# Patient Record
Sex: FEMALE | Race: WHITE | NOT HISPANIC OR LATINO | Employment: OTHER | URBAN - METROPOLITAN AREA
[De-identification: names, ages, dates, MRNs, and addresses within clinical notes are randomized per-mention and may not be internally consistent; named-entity substitution may affect disease eponyms.]

---

## 2017-01-03 ENCOUNTER — OFFICE VISIT (OUTPATIENT)
Dept: NEUROLOGY | Facility: CLINIC | Age: 72
End: 2017-01-03
Payer: MEDICARE

## 2017-01-03 VITALS
DIASTOLIC BLOOD PRESSURE: 78 MMHG | WEIGHT: 191.56 LBS | BODY MASS INDEX: 33.94 KG/M2 | RESPIRATION RATE: 16 BRPM | HEART RATE: 84 BPM | HEIGHT: 63 IN | SYSTOLIC BLOOD PRESSURE: 132 MMHG

## 2017-01-03 DIAGNOSIS — R41.3 MEMORY LOSS: ICD-10-CM

## 2017-01-03 DIAGNOSIS — E11.42 DIABETIC POLYNEUROPATHY ASSOCIATED WITH TYPE 2 DIABETES MELLITUS: ICD-10-CM

## 2017-01-03 DIAGNOSIS — R29.6 FALLS FREQUENTLY: ICD-10-CM

## 2017-01-03 DIAGNOSIS — D33.3 VESTIBULAR SCHWANNOMA: Primary | ICD-10-CM

## 2017-01-03 DIAGNOSIS — R13.10 DYSPHAGIA, UNSPECIFIED TYPE: ICD-10-CM

## 2017-01-03 PROCEDURE — 3046F HEMOGLOBIN A1C LEVEL >9.0%: CPT | Mod: S$GLB,,, | Performed by: NURSE PRACTITIONER

## 2017-01-03 PROCEDURE — 3075F SYST BP GE 130 - 139MM HG: CPT | Mod: S$GLB,,, | Performed by: NURSE PRACTITIONER

## 2017-01-03 PROCEDURE — 3078F DIAST BP <80 MM HG: CPT | Mod: S$GLB,,, | Performed by: NURSE PRACTITIONER

## 2017-01-03 PROCEDURE — 1159F MED LIST DOCD IN RCRD: CPT | Mod: S$GLB,,, | Performed by: NURSE PRACTITIONER

## 2017-01-03 PROCEDURE — 1157F ADVNC CARE PLAN IN RCRD: CPT | Mod: S$GLB,,, | Performed by: NURSE PRACTITIONER

## 2017-01-03 PROCEDURE — 1126F AMNT PAIN NOTED NONE PRSNT: CPT | Mod: S$GLB,,, | Performed by: NURSE PRACTITIONER

## 2017-01-03 PROCEDURE — 1160F RVW MEDS BY RX/DR IN RCRD: CPT | Mod: S$GLB,,, | Performed by: NURSE PRACTITIONER

## 2017-01-03 PROCEDURE — 99214 OFFICE O/P EST MOD 30 MIN: CPT | Mod: S$GLB,,, | Performed by: NURSE PRACTITIONER

## 2017-01-03 PROCEDURE — 2022F DILAT RTA XM EVC RTNOPTHY: CPT | Mod: S$GLB,,, | Performed by: NURSE PRACTITIONER

## 2017-01-03 PROCEDURE — 4010F ACE/ARB THERAPY RXD/TAKEN: CPT | Mod: S$GLB,,, | Performed by: NURSE PRACTITIONER

## 2017-01-03 PROCEDURE — 99999 PR PBB SHADOW E&M-EST. PATIENT-LVL IV: CPT | Mod: PBBFAC,,, | Performed by: NURSE PRACTITIONER

## 2017-01-03 PROCEDURE — 99499 UNLISTED E&M SERVICE: CPT | Mod: S$GLB,,, | Performed by: NURSE PRACTITIONER

## 2017-01-03 RX ORDER — DONEPEZIL HYDROCHLORIDE 10 MG/1
10 TABLET, FILM COATED ORAL AS DIRECTED
Qty: 30 TABLET | Refills: 3 | Status: SHIPPED | OUTPATIENT
Start: 2017-01-03 | End: 2017-06-11 | Stop reason: SDUPTHER

## 2017-01-03 NOTE — PROGRESS NOTES
Chief Complaint: Memory loss    HPI: Narda Person is a 71 y.o. female with a history of a right vestibular schwannoma, as well as diastolic dysfunction, hypothyroidism, and insulin dependent DM. She was recently evaluated for memory loss, as well as increased falls. She completed labs and a repeat MRI Brain at her last visit.     She continues with short term memory complaints. A recent example includes forgetting whether or not she received an insulin injection 20 minutes prior.     She continues to grieve the death of her , and admits to feeling stressed, due to family conflict at times. She does not believe that she is overly depressed, and that her feelings are appropriate to the situation. She continues to deny hallucinations, delusions, paranoia, or tremors.     She continues to fall frequently, more so when not using her cane for stability.     She continues to take Gabapentin for her neuropathic complaints, but discontinued the use of Elavil, as she no longer needs it for her insomnia.       Review of Systems   Constitutional: Negative for malaise/fatigue.   HENT: Positive for tinnitus.    Eyes: Positive for blurred vision.   Cardiovascular: Negative for chest pain.   Musculoskeletal: Positive for falls. Negative for myalgias.   Neurological: Positive for dizziness. Negative for speech change, seizures, weakness and headaches.   Endo/Heme/Allergies: Does not bruise/bleed easily.   Psychiatric/Behavioral: Positive for depression and memory loss. Negative for hallucinations and suicidal ideas. The patient is not nervous/anxious and does not have insomnia.          I have reviewed all of this patient's past medical and surgical histories as well as family and social histories and active allergies and medications as documented in the electronic medical record.    MMSE Today: 28/30    Exam:  Gen Appearance, well developed/nourished in no apparent distress  CV: 2+ distal pulses with no edema or  swelling  Neuro:  MS: Awake, alert, oriented to place, person, time, situation. Sustains attention. Recent/remote memory intact, Language is full to spontaneous speech/repetition/naming/comprehension. Fund of Knowledge is full  CN: Optic discs are flat with normal vasculature, PERRL, Extraoccular movements and visual fields are full. Normal facial sensation and strength, Hearing symmetric, Tongue and Palate are midline and strong. Shoulder Shrug symmetric and strong.  Motor: Normal bulk, tone, no abnormal movements. 5/5 strength bilateral upper/lower extremities with 2+ reflexes BUE, and 1+ reflexes BLE, and bilateral plantar response  Sensory: symmetric to light touch, pain, temp, and proprioception. Reduced vibration at left ankle; Romberg negative  Cerebellar: Finger-nose,Heal-shin, Rapid alternating movements intact  Gait: ambulates with walker for fall prevention.     Imaging:  MRI Brain with and without contrast 6/16/2016  4.5 mm focus of enhancement is again identified within the deep aspect of the right internal auditory canal to suggest a stable vestibular schwannoma.  Age-appropriate generalized volume loss with findings suggestive of mild chronic microvascular ischemic change.     MRI Brain with and without contrast 11/14/2016  4.5 mm focus of enhancement in the deep aspect of the right internal artery canal suggestive for a stable vestibular schwannoma.  No new enhancing lesion is seen.  Age-appropriate generalized cerebral volume loss with mild chronic microvascular ischemic change.    Labs: KARSTEN, homocysteine, B12/folate, B1 reviewed; unremarkable  Her BMP, CBC, Lipid Panel, TSH, and LFT's per Dr. Leone requested but not received    Assessment/Plan:  Narda Person is a 71 y.o. female with a history of a right vestibular schwannoma, as well as neuropathy, diastolic dysfunction, hypothyroidism, and insulin dependent DM. She was recently evaluated for memory loss, as well as increased falls. Her MRI Brain  was unremarkable for progression of her vestibular schwannoma or for areas of infarct, and her labs were overall unrevealing; however, I have yet to receive her labs from Dr. Leone, specifically her TSH.     While I suspect that her memory complaints are mild cognitive impairment, possibly related to stress/depression, I will start a trial of Aricept at this time. She was, however,    advised that her stress could be contributing to her memory complaints.     I recommend:     1. Refer for PT TIW x 6 weeks for balance/fall reduction, given her gait instability. This will be ordered via home health with the Medical Team, as she is unable to drive.    2. Start Aricept 5 mg x 2 weeks, then increase to 10 mg afterwards.   3. Continue to monitor her mood.   4. Refer for swallowing study to further assess her dysphagia.     Follow up in 6 weeks.

## 2017-01-03 NOTE — MR AVS SNAPSHOT
[facility]  [address] [address]  Phone: [phone]  Fax: [phone]                  Narda Person   1/3/2017 1:30 PM   Office Visit    Description:  Female : 1945   Provider:  Narda Serrato NP   Department:  [facility]           Reason for Visit     Memory Loss     Fall           Diagnoses this Visit        Comments    Vestibular schwannoma    -  Primary     Falls frequently         Memory loss         Diabetic polyneuropathy associated with type 2 diabetes mellitus                To Do List           Goals (5 Years of Data)     None      Follow-Up and Disposition     Return in about 6 weeks (around 2017).       These Medications        Disp Refills Start End    donepezil (ARICEPT) 10 MG tablet 30 tablet 3 1/3/2017 1/3/2018    Take 1 tablet (10 mg total) by mouth as directed. Take 1/2 tablet each night x 2 weeks, then take 1 tablet each night afterwards - Oral    Pharmacy: [facility]/pharmacy #[id number] - [place] - [address] Ph #: [phone]         [facility] On Call     [facility] On Call Nurse Care Line -  Assistance  Registered nurses in the [facility] On Call Center provide clinical advisement, health education, appointment booking, and other advisory services.  Call for this free service at [phone].             Medications           Message regarding Medications     Verify the changes and/or additions to your medication regime listed below are the same as discussed with your clinician today.  If any of these changes or additions are incorrect, please notify your healthcare provider.        START taking these NEW medications        Refills    donepezil (ARICEPT) 10 MG tablet 3    Sig: Take 1 tablet (10 mg total) by mouth as directed. Take 1/2 tablet each night x 2 weeks, then take 1 tablet each night afterwards    Class: Normal    Route: Oral      STOP taking these medications     amitriptyline (ELAVIL) 50 MG tablet Take 50  "mg by mouth every evening.           Verify that the below list of medications is an accurate representation of the medications you are currently taking.  If none reported, the list may be blank. If incorrect, please contact your healthcare provider. Carry this list with you in case of emergency.           Current Medications     atorvastatin (LIPITOR) 20 MG tablet Take 1 tablet (20 mg total) by mouth once daily.    blood sugar diagnostic Strp Accu-chek khadra testing strips use to test sugars AC/HS    blood-glucose meter (ACCU-CHEK KHADRA) Misc Use to test sugars AC/HS daily    gabapentin (NEURONTIN) 300 MG capsule TAKE ONE CAPSULE BY MOUTH 4 TIMES A DAY    hydrochlorothiazide (HYDRODIURIL) 25 MG tablet TAKE ONE TABLET BY MOUTH EVERY DAY    insulin aspart (NOVOLOG FLEXPEN) 100 unit/mL InPn pen INJECT 22 UNITS INTO THE SKIN 3 (THREE) TIMES DAILY WITH MEALS.    insulin detemir (LEVEMIR FLEXTOUCH) 100 unit/mL (3 mL) SubQ InPn pen INJECT 25 UNITS INTO THE SKIN 2 TIMES A DAY    insulin syringe-needle U-100 0.3 mL 30 Syrg Use with Levemir vial BID    JANUVIA 50 mg Tab TAKE 1 TABLET BY MOUTH EVERY DAY    lactulose (CHRONULAC) 10 gram/15 mL solution Take by mouth daily as needed.     lancets (ACCU-CHEK SOFTCLIX LANCETS) Misc Use to test sugars AC/HS    levothyroxine (SYNTHROID) 50 MCG tablet Take 1 tablet (50 mcg total) by mouth once daily.    meclizine (ANTIVERT) 25 mg tablet TAKE ONE TABLET BY MOUTH TWICE A DAY AS NEEDED FOR DIZZINESS OR NAUSEA    melatonin 5 mg Tab Take 5 mg by mouth nightly as needed.     metoprolol succinate (TOPROL-XL) 25 MG 24 hr tablet Take 25 mg by mouth once daily.    naproxen sodium 220 mg Cap Take 2 tablets by mouth daily as needed.    omeprazole (PRILOSEC) 20 MG capsule Take 1 capsule (20 mg total) by mouth 2 (two) times daily.    pen needle, diabetic (LITE TOUCH INSULIN PEN NEEDLES) 31 gauge x 3/16" Ndle 1 application by Misc.(Non-Drug; Combo Route) route 4 (four) times daily.    pen needle, " "diabetic 32 gauge x 5/32" Ndle 1 Device by Misc.(Non-Drug; Combo Route) route as directed. Needles to be used with Novolog pens and Levemir pens daily.    ramipril (ALTACE) 10 MG capsule TAKE ONE CAPSULE BY MOUTH EVERY DAY    donepezil (ARICEPT) 10 MG tablet Take 1 tablet (10 mg total) by mouth as directed. Take 1/2 tablet each night x 2 weeks, then take 1 tablet each night afterwards           Clinical Reference Information           Vital Signs - Last Recorded  Most recent update: 1/3/2017  1:27 PM by Natty Saavedra MA    BP Pulse Resp Ht Wt LMP    132/78 (BP Location: Right arm, Patient Position: Sitting, BP Method: Manual) 84 16 5' 2.5" (1.588 m) 86.9 kg (191 lb 9.3 oz) (LMP Unknown)    BMI                34.48 kg/m2          Blood Pressure          Most Recent Value    BP  132/78      Allergies as of 1/3/2017     Percocet [Oxycodone-acetaminophen]    Percodan [Oxycodone Hcl-oxycodone-asa]    Latex, Natural Rubber    Phenytoin Sodium Extended    Sutures, Catgut    Adhesive    Adhesive Tape-silicones      Immunizations Administered on Date of Encounter - 1/3/2017     None      Orders Placed During Today's Visit      Normal Orders This Visit    Ambulatory Referral to Home Health       Instructions    When starting Donepezil (Aricept), take 1/2 at night x 2 weeks, then increase to 1 tablet each night afterwards.        "

## 2017-01-03 NOTE — PATIENT INSTRUCTIONS
When starting Donepezil (Aricept), take 1/2 at night x 2 weeks, then increase to 1 tablet each night afterwards.

## 2017-01-04 ENCOUNTER — TELEPHONE (OUTPATIENT)
Dept: REHABILITATION | Facility: HOSPITAL | Age: 72
End: 2017-01-04

## 2017-01-09 ENCOUNTER — HOSPITAL ENCOUNTER (OUTPATIENT)
Dept: RADIOLOGY | Facility: HOSPITAL | Age: 72
Discharge: HOME | End: 2017-01-09
Attending: NURSE PRACTITIONER
Payer: MEDICARE

## 2017-01-09 ENCOUNTER — CLINICAL SUPPORT (OUTPATIENT)
Dept: REHABILITATION | Facility: HOSPITAL | Age: 72
End: 2017-01-09
Attending: NURSE PRACTITIONER
Payer: MEDICARE

## 2017-01-09 DIAGNOSIS — R13.10 DYSPHAGIA, UNSPECIFIED TYPE: ICD-10-CM

## 2017-01-09 DIAGNOSIS — R13.12 DYSPHAGIA, OROPHARYNGEAL PHASE: Primary | ICD-10-CM

## 2017-01-09 PROCEDURE — G8996 SWALLOW CURRENT STATUS: HCPCS | Mod: CJ

## 2017-01-09 PROCEDURE — G8998 SWALLOW D/C STATUS: HCPCS | Mod: CJ

## 2017-01-09 PROCEDURE — G8997 SWALLOW GOAL STATUS: HCPCS | Mod: CJ

## 2017-01-09 PROCEDURE — 74230 X-RAY XM SWLNG FUNCJ C+: CPT | Mod: TC

## 2017-01-09 PROCEDURE — 92611 MOTION FLUOROSCOPY/SWALLOW: CPT

## 2017-01-09 NOTE — PROCEDURES
SPEECH LANGUAGE PATHOLOGY  Outpatient Modified Barium Swallow Study    Time In: 1015   Time Out:1110  Total Time: 55 minutes  Charges: 1X Modified Barium Swallow Study  Referring Physician: Narda Serrato  History     Narda Person is a 71 y.o. female who was seen in Radiology today for a modified barium swallow study.  she was seated upright in a chair for a lateral videofluoroscopic view.  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 MRI done on  11/14/16 revealed 4.5 mm focus of enhancement in the deep aspect of the right internal artery canal suggestive for a stable vestibular schwannoma and age-appropriate generalized cerebral volume loss with mild chronic microvascular ischemic change.   Pt reported recent history of choking with everything.  Pt does give some conflicting information with regard to this.  Initially she was adamant that she chokes every time she eats or drinks something.  After her daughter entered the room she stated that she mostly has difficulty with meat and rice, and sometimes liquids.  This study is being done today to determine if dysphagia is present and to determine the most appropriate diet.      Past Medical History   Diagnosis Date    Abnormal Pap smear     DIONNE (acute kidney injury) 6/29/2014    DIONNE (acute kidney injury) 6/29/2014    Anemia     Breast disorder     Diabetes mellitus     Diabetes mellitus, type 2     ESSENTIAL HYPERTENSION 10/1/2012    Hypothyroid 6/29/2014     Past Surgical History   Procedure Laterality Date    Hysterectomy      Masectomy       single left breast       Current Outpatient Prescriptions:     atorvastatin (LIPITOR) 20 MG tablet, Take 1 tablet (20 mg total) by mouth once  daily., Disp: 30 tablet, Rfl: 3    blood sugar diagnostic Strp, Accu-chek khadra testing strips use to test sugars AC/HS, Disp: 400 each, Rfl: 3    blood-glucose meter (ACCU-CHEK KHADRA) Misc, Use to test sugars AC/HS daily, Disp: 1 each, Rfl: 0    donepezil (ARICEPT) 10 MG tablet, Take 1 tablet (10 mg total) by mouth as directed. Take 1/2 tablet each night x 2 weeks, then take 1 tablet each night afterwards, Disp: 30 tablet, Rfl: 3    gabapentin (NEURONTIN) 300 MG capsule, TAKE ONE CAPSULE BY MOUTH 4 TIMES A DAY (Patient taking differently: Take 300 mg by mouth 3 (three) times daily. ), Disp: 120 capsule, Rfl: 2    hydrochlorothiazide (HYDRODIURIL) 25 MG tablet, TAKE ONE TABLET BY MOUTH EVERY DAY, Disp: 30 tablet, Rfl: 3    insulin aspart (NOVOLOG FLEXPEN) 100 unit/mL InPn pen, INJECT 22 UNITS INTO THE SKIN 3 (THREE) TIMES DAILY WITH MEALS., Disp: 15 Syringe, Rfl: 2    insulin detemir (LEVEMIR FLEXTOUCH) 100 unit/mL (3 mL) SubQ InPn pen, INJECT 25 UNITS INTO THE SKIN 2 TIMES A DAY, Disp: 15 mL, Rfl: 1    insulin syringe-needle U-100 0.3 mL 30 Syrg, Use with Levemir vial BID, Disp: 100 each, Rfl: 5    JANUVIA 50 mg Tab, TAKE 1 TABLET BY MOUTH EVERY DAY, Disp: 30 tablet, Rfl: 2    lactulose (CHRONULAC) 10 gram/15 mL solution, Take by mouth daily as needed. , Disp: , Rfl:     lancets (ACCU-CHEK SOFTCLIX LANCETS) Misc, Use to test sugars AC/HS, Disp: 400 each, Rfl: 3    levothyroxine (SYNTHROID) 50 MCG tablet, Take 1 tablet (50 mcg total) by mouth once daily., Disp: 30 tablet, Rfl: 3    meclizine (ANTIVERT) 25 mg tablet, TAKE ONE TABLET BY MOUTH TWICE A DAY AS NEEDED FOR DIZZINESS OR NAUSEA, Disp: 60 tablet, Rfl: 1    melatonin 5 mg Tab, Take 5 mg by mouth nightly as needed. , Disp: , Rfl:     metoprolol succinate (TOPROL-XL) 25 MG 24 hr tablet, Take 25 mg by mouth once daily., Disp: , Rfl: 9    naproxen sodium 220 mg Cap, Take 2 tablets by mouth daily as needed., Disp: , Rfl:     omeprazole (PRILOSEC) 20  "MG capsule, Take 1 capsule (20 mg total) by mouth 2 (two) times daily., Disp: 60 capsule, Rfl: 2    pen needle, diabetic (LITE TOUCH INSULIN PEN NEEDLES) 31 gauge x 3/16" Ndle, 1 application by Misc.(Non-Drug; Combo Route) route 4 (four) times daily., Disp: 100 each, Rfl: 2    pen needle, diabetic 32 gauge x 5/32" Ndle, 1 Device by Misc.(Non-Drug; Combo Route) route as directed. Needles to be used with Novolog pens and Levemir pens daily., Disp: 100 each, Rfl: 3    ramipril (ALTACE) 10 MG capsule, TAKE ONE CAPSULE BY MOUTH EVERY DAY, Disp: 30 capsule, Rfl: 2  No current facility-administered medications for this visit.       Evaluation    Oral exam-  Pt with symmetric presentattion and no over signs of weakness.  Pt with upper and lower dentures in place initially.  She later stated that she does not eat with her lower dentures in due to them being loose.  These were removed after the first solid presentation.      Thin radiopaque barium was delivered via cup and small straw sips. Oral phase was characterized by decreased lingual-labial coordination resulting in decreased bolus cohesion and oral residue in the anterior sulcus. Pharyngeal phase of swallow was characterized by mild-moderate delay of swallow initiation, mild-mod decreased tongue base retraction, moderately decreased hyolaryngeal anterior excursion, decreased hyolaryngeal contraction, and decreased laryngeal closure resulting in transient penetration during the swallow and trace-minimal residue in the vallecula and pyriform sinuses.  No aspiration was present with this consistency.  Residue was cleared with 1-3 cued swallows.      Pudding thick radiopaque barium was delivered via large self fed teaspoon. Oral phase was characterized by decreased lingual-labial coordination resulting in decreased bolus cohesion, inconsistent premature spillage of bolus, inconsistent piecemeal deglutition, and oral residue along the tongue and throughout the oral " cavity. Pharyngeal phase of swallow was characterized by mild-moderate delay of swallow initiation, mild-mod decreased tongue base retraction, moderately decreased hyolaryngeal anterior excursion, decreased hyolaryngeal contraction, and decreased laryngeal closure resulting in transient penetration during the swallow, moderate residue in the vallecula and trace in the pyriform sinuses.  Penetration was observed with all presentations.  Silent aspiration was noted X1 during the swallow in which a consecutive bite was taken without clearing residue in between presentations.  Pt later was cued to elicit extra swallows between consecutive bites and this was effective in preventing aspiration.      Pudding barium coated cristina crackers were self fed. Oral phase was characterized by increased mastication time and trace oral residue along the tongue blade.  Piecemeal deglutition was also noted specifically of the solid bolus, not simply the pudding coating.  Pharyngeal phase of swallow was characterized by mild-moderate delay of swallow initiation, mild-mod decreased tongue base retraction, moderately decreased hyolaryngeal anterior excursion, decreased hyolaryngeal contraction, and decreased laryngeal closure resulting only in trace-minimal residue in the vallecula.    Cervical Esophageal Assessment  No laryngopharyngeal reflux noted and good UES function.  Some anatomical abnormalities were noted in the cervical esophagus though no bolus retention was found.  Pt described previously being told she has osteophytes in that area.  This could be related.      Impressions/Recommendations:    Pt demonstrated mild oropharyngeal dysphagia as noted above. Pt would benefit from skilled speech services for dysphagia tx 2Xweek for 4-6 weeks to address behavioral changes, compensatory strategies, and laryngeal strengthening.  Also recommend further assessment of esophageal function     Recommend the following:    · Soft  cooked/chopped foods with regular/thin liquids  · Avoid foods with hard pieces, nuts, grains, seeds, and hard pieces of meat  · Gravy on all meats    · Small bites and sips  · Chew all food well  · 2X/double swallow  · No talking while eating  · No more than 3 bites then take a sip of drink    Initially recommend OP speech for this patient, however, upon further review I found she is currently having PT home health.  I spoke with her and informed her that she will need to receive services through her home health company. She is in agreement to speak with her  nurse and request this service.  Goals are deferred to primary treating SLP.      FLAVIA Strickland, CCC-SLP   1/13/2017

## 2017-01-09 NOTE — Clinical Note
Pt will need hh orders for speech.  Please review and let me know if you have any questions.  Thanks! CBL

## 2017-01-12 PROBLEM — R13.12 DYSPHAGIA, OROPHARYNGEAL PHASE: Status: ACTIVE | Noted: 2017-01-12

## 2017-01-23 ENCOUNTER — HOSPITAL ENCOUNTER (EMERGENCY)
Facility: HOSPITAL | Age: 72
Discharge: HOME | End: 2017-01-24
Attending: EMERGENCY MEDICINE
Payer: MEDICARE

## 2017-01-23 DIAGNOSIS — R19.7 DIARRHEA: ICD-10-CM

## 2017-01-23 DIAGNOSIS — R19.7 DIARRHEA, UNSPECIFIED TYPE: ICD-10-CM

## 2017-01-23 LAB
ALBUMIN SERPL BCP-MCNC: 3.4 G/DL
ALP SERPL-CCNC: 73 U/L
ALT SERPL W/O P-5'-P-CCNC: 24 U/L
AMYLASE SERPL-CCNC: 26 U/L
ANION GAP SERPL CALC-SCNC: 13 MMOL/L
AST SERPL-CCNC: 23 U/L
BASOPHILS # BLD AUTO: 0.02 K/UL
BASOPHILS NFR BLD: 0.2 %
BILIRUB SERPL-MCNC: 0.7 MG/DL
BUN SERPL-MCNC: 15 MG/DL
CALCIUM SERPL-MCNC: 8.9 MG/DL
CHLORIDE SERPL-SCNC: 101 MMOL/L
CO2 SERPL-SCNC: 22 MMOL/L
CREAT SERPL-MCNC: 1.1 MG/DL
DIFFERENTIAL METHOD: ABNORMAL
EOSINOPHIL # BLD AUTO: 0 K/UL
EOSINOPHIL NFR BLD: 0 %
ERYTHROCYTE [DISTWIDTH] IN BLOOD BY AUTOMATED COUNT: 12.8 %
EST. GFR  (AFRICAN AMERICAN): 58 ML/MIN/1.73 M^2
EST. GFR  (NON AFRICAN AMERICAN): 50 ML/MIN/1.73 M^2
GLUCOSE SERPL-MCNC: 265 MG/DL
HCT VFR BLD AUTO: 39.5 %
HGB BLD-MCNC: 13.9 G/DL
LYMPHOCYTES # BLD AUTO: 0.7 K/UL
LYMPHOCYTES NFR BLD: 8 %
MCH RBC QN AUTO: 30.1 PG
MCHC RBC AUTO-ENTMCNC: 35.2 %
MCV RBC AUTO: 86 FL
MONOCYTES # BLD AUTO: 0.6 K/UL
MONOCYTES NFR BLD: 6.9 %
NEUTROPHILS # BLD AUTO: 7.1 K/UL
NEUTROPHILS NFR BLD: 84.9 %
PLATELET # BLD AUTO: 209 K/UL
PMV BLD AUTO: 10 FL
POTASSIUM SERPL-SCNC: 3.5 MMOL/L
PROT SERPL-MCNC: 7.2 G/DL
RBC # BLD AUTO: 4.62 M/UL
SODIUM SERPL-SCNC: 136 MMOL/L
WBC # BLD AUTO: 8.39 K/UL

## 2017-01-23 PROCEDURE — 80053 COMPREHEN METABOLIC PANEL: CPT

## 2017-01-23 PROCEDURE — 96360 HYDRATION IV INFUSION INIT: CPT

## 2017-01-23 PROCEDURE — 85025 COMPLETE CBC W/AUTO DIFF WBC: CPT

## 2017-01-23 PROCEDURE — 63600175 PHARM REV CODE 636 W HCPCS: Performed by: EMERGENCY MEDICINE

## 2017-01-23 PROCEDURE — 99284 EMERGENCY DEPT VISIT MOD MDM: CPT | Mod: 25

## 2017-01-23 PROCEDURE — 96361 HYDRATE IV INFUSION ADD-ON: CPT

## 2017-01-23 PROCEDURE — 96372 THER/PROPH/DIAG INJ SC/IM: CPT

## 2017-01-23 PROCEDURE — 82150 ASSAY OF AMYLASE: CPT

## 2017-01-23 PROCEDURE — 81000 URINALYSIS NONAUTO W/SCOPE: CPT

## 2017-01-23 PROCEDURE — 25000003 PHARM REV CODE 250: Performed by: EMERGENCY MEDICINE

## 2017-01-23 RX ORDER — DICYCLOMINE HYDROCHLORIDE 10 MG/ML
20 INJECTION INTRAMUSCULAR
Status: COMPLETED | OUTPATIENT
Start: 2017-01-23 | End: 2017-01-23

## 2017-01-23 RX ORDER — ACETAMINOPHEN 500 MG
500 TABLET ORAL
Status: COMPLETED | OUTPATIENT
Start: 2017-01-23 | End: 2017-01-23

## 2017-01-23 RX ADMIN — DICYCLOMINE HYDROCHLORIDE 20 MG: 20 INJECTION, SOLUTION INTRAMUSCULAR at 09:01

## 2017-01-23 RX ADMIN — SODIUM CHLORIDE 250 ML: 0.9 INJECTION, SOLUTION INTRAVENOUS at 10:01

## 2017-01-23 RX ADMIN — SODIUM CHLORIDE 500 ML: 0.9 INJECTION, SOLUTION INTRAVENOUS at 09:01

## 2017-01-23 RX ADMIN — ACETAMINOPHEN 500 MG: 500 TABLET ORAL at 09:01

## 2017-01-23 NOTE — ED AVS SNAPSHOT
[facility]  [address] [place] [id number]               Narda Person   2017  9:12 PM   ED    Description:  Female : 1945   Department:  [facility]           Your Care was Coordinated By:     Provider Role From To    Tyree Owusu MD Attending Provider 17      Reason for Visit     Diarrhea           Diagnoses this Visit        Comments    Food poisoning, accidental or unintentional, initial encounter    -  Primary     Diarrhea         Diarrhea, unspecified type           ED Disposition     ED Disposition Condition Comment    Discharge             To Do List           Follow-up Information     Schedule an appointment as soon as possible for a visit with Apolonia Sullivan NP.    Specialty:  Family Medicine    Contact information:    [address]  [facility] [address]  [phone]         These Medications        Disp Refills Start End    ondansetron (ZOFRAN) 4 MG tablet 12 tablet 0 2017     Take 1 tablet (4 mg total) by mouth every 8 (eight) hours as needed for Nausea. - Oral    Pharmacy: [facility] [address] [place], [place] - [address] [facility] [address] Ph #: [phone]       loperamide (IMODIUM) 2 mg capsule 12 capsule 0 2017 2/3/2017    Take 1 capsule (2 mg total) by mouth 4 (four) times daily as needed for Diarrhea. - Oral    Pharmacy: [facility]/pharmacy [address] [place] [address] [address] Ph #: [phone]       dicyclomine (BENTYL) 20 mg tablet 20 tablet 0 2017    Take 1 tablet (20 mg total) by mouth 2 (two) times daily. - Oral    Pharmacy: [facility] [address] [place], [place] [address] Ph #: [phone]         [facility]     [facility] -  Assistance  Registered nurses in the [facility] provide clinical advisement, health education, appointment booking, and other advisory services.  Call for this free service at [phone].             Medications            Message regarding Medications     Verify the changes and/or additions to your medication regime listed below are the same as discussed with your clinician today.  If any of these changes or additions are incorrect, please notify your healthcare provider.        START taking these NEW medications        Refills    ondansetron (ZOFRAN) 4 MG tablet 0    Sig: Take 1 tablet (4 mg total) by mouth every 8 (eight) hours as needed for Nausea.    Class: Print    Route: Oral    loperamide (IMODIUM) 2 mg capsule 0    Sig: Take 1 capsule (2 mg total) by mouth 4 (four) times daily as needed for Diarrhea.    Class: Print    Route: Oral    dicyclomine (BENTYL) 20 mg tablet 0    Sig: Take 1 tablet (20 mg total) by mouth 2 (two) times daily.    Class: Print    Route: Oral      These medications were administered today        Dose Freq    sodium chloride 0.9% bolus 500 mL 500 mL ED 1 Time    Sig: Inject 500 mLs into the vein ED 1 Time.    Class: Normal    Route: Intravenous    dicyclomine injection 20 mg 20 mg ED 1 Time    Sig: Inject 2 mLs (20 mg total) into the muscle ED 1 Time.    Class: Normal    Route: Intramuscular    acetaminophen tablet 500 mg 500 mg ED 1 Time    Sig: Take 1 tablet (500 mg total) by mouth ED 1 Time.    Class: Normal    Route: Oral    sodium chloride 0.9% bolus 250 mL 250 mL ED 1 Time    Sig: Inject 250 mLs into the vein ED 1 Time.    Class: Normal    Route: Intravenous    diphenoxylate-atropine 2.5-0.025 mg per tablet 1 tablet 1 tablet ED 1 Time    Sig: Take 1 tablet by mouth ED 1 Time.    Class: Normal    Route: Oral           Verify that the below list of medications is an accurate representation of the medications you are currently taking.  If none reported, the list may be blank. If incorrect, please contact your healthcare provider. Carry this list with you in case of emergency.           Current Medications     atorvastatin (LIPITOR) 20 MG tablet Take 1 tablet (20 mg total) by mouth once daily.     "blood sugar diagnostic Str Accu-chek khadra testing strips use to test sugars AC/HS    blood-glucose meter (ACCU-CHEK KHADRA) Misc Use to test sugars AC/HS daily    dicyclomine (BENTYL) 20 mg tablet Take 1 tablet (20 mg total) by mouth 2 (two) times daily.    donepezil (ARICEPT) 10 MG tablet Take 1 tablet (10 mg total) by mouth as directed. Take 1/2 tablet each night x 2 weeks, then take 1 tablet each night afterwards    gabapentin (NEURONTIN) 300 MG capsule TAKE ONE CAPSULE BY MOUTH 4 TIMES A DAY    hydrochlorothiazide (HYDRODIURIL) 25 MG tablet TAKE ONE TABLET BY MOUTH EVERY DAY    insulin aspart (NOVOLOG FLEXPEN) 100 unit/mL InPn pen INJECT 22 UNITS INTO THE SKIN 3 (THREE) TIMES DAILY WITH MEALS.    insulin detemir (LEVEMIR FLEXTOUCH) 100 unit/mL (3 mL) SubQ InPn pen INJECT 25 UNITS INTO THE SKIN 2 TIMES A DAY    insulin syringe-needle U-100 0.3 mL 30 Syrg Use with Levemir vial BID    JANUVIA 50 mg Tab TAKE 1 TABLET BY MOUTH EVERY DAY    lactulose (CHRONULAC) 10 gram/15 mL solution Take by mouth daily as needed.     lancets (ACCU-CHEK SOFTCLIX LANCETS) Misc Use to test sugars AC/HS    levothyroxine (SYNTHROID) 50 MCG tablet Take 1 tablet (50 mcg total) by mouth once daily.    loperamide (IMODIUM) 2 mg capsule Take 1 capsule (2 mg total) by mouth 4 (four) times daily as needed for Diarrhea.    meclizine (ANTIVERT) 25 mg tablet TAKE ONE TABLET BY MOUTH TWICE A DAY AS NEEDED FOR DIZZINESS OR NAUSEA    melatonin 5 mg Tab Take 5 mg by mouth nightly as needed.     metoprolol succinate (TOPROL-XL) 25 MG 24 hr tablet Take 25 mg by mouth once daily.    naproxen sodium 220 mg Cap Take 2 tablets by mouth daily as needed.    omeprazole (PRILOSEC) 20 MG capsule Take 1 capsule (20 mg total) by mouth 2 (two) times daily.    ondansetron (ZOFRAN) 4 MG tablet Take 1 tablet (4 mg total) by mouth every 8 (eight) hours as needed for Nausea.    pen needle, diabetic (LITE TOUCH INSULIN PEN NEEDLES) 31 gauge x 3/16" Ndle 1 application by " "Misc.(Non-Drug; Combo Route) route 4 (four) times daily.    pen needle, diabetic 32 gauge x 5/32" Ndle 1 Device by Misc.(Non-Drug; Combo Route) route as directed. Needles to be used with Novolog pens and Levemir pens daily.    ramipril (ALTACE) 10 MG capsule TAKE ONE CAPSULE BY MOUTH EVERY DAY           Clinical Reference Information           Your Vitals Were     BP Pulse Temp Resp Last Period       94/65 80 98.3 °F (36.8 °C) 16 (LMP Unknown)       Allergies as of 1/24/2017        Reactions    Percocet [Oxycodone-acetaminophen] Itching    Percodan [Oxycodone Hcl-oxycodone-asa] Itching    Other reaction(s): Unknown    Latex, Natural Rubber Rash    Phenytoin Sodium Extended     Other reaction(s): Unknown    Sutures, Catgut     Infections to sutures     Adhesive Rash    Adhesive Tape-silicones Rash      Immunizations Administered on Date of Encounter - 1/24/2017     None      ED Micro, Lab, POCT     Start Ordered       Status Ordering Provider    01/23/17 2123 01/23/17 2123  Comprehensive metabolic panel  STAT      Final result     01/23/17 2123 01/23/17 2123  CBC auto differential  STAT      Final result     01/23/17 2123 01/23/17 2123  Amylase  Once      Final result     01/23/17 2123 01/23/17 2123  Urinalysis  STAT      Final result     01/23/17 2123 01/23/17 2123  Urinalysis Microscopic  Once      Final result       ED Imaging Orders     Start Ordered       Status Ordering Provider    01/23/17 2148 01/23/17 2147  X-Ray Abdomen AP 1 View (KUB)  1 time imaging      In process         Discharge Instructions         Treating Diarrhea  Diarrhea happens when you have loose, watery, or frequent bowel movements. It is a common problem with many causes. Most cases of diarrhea clear up on their own. But certain cases may need treatment. Be sure to see your healthcare provider if your symptoms do not improve within a few days.    Getting relief  Treatment of diarrhea depends on its cause. Diarrhea caused by bacterial or " parasite infection is often treated with antibiotics. Diarrhea caused by other factors, such as a stomach virus, often improves with simple home treatment. The tips below may also help relieve your symptoms.  · Drink plenty of fluids. This helps prevent too much fluid loss (dehydration). Water, clear soups, and electrolyte solutions are good choices. Avoid alcohol, coffee, tea, and milk. These can irritate your intestines and make symptoms worse.  · Suck on ice chips if drinking makes you queasy.  · Return to your normal diet slowly. You may want to eat bland foods at first, such as rice and toast. Also, you may need to avoid certain foods for a while, such as dairy products. These can make symptoms worse. Ask your healthcare provider if there are any other foods you should avoid.  · If you were prescribed antibiotics, take them as directed.  · Do not take anti-diarrhea medicines without asking your healthcare provider first.  Call your healthcare provider   Call your healthcare provider if you have any of the following:   · A fever of 100.4°F (38.0°C) or higher, or as directed by your healthcare provider  · Severe pain  · Worsening diarrhea or diarrhea for more than 2 days  · Bloody vomit or stool  · Signs of dehydration (dizziness, dry mouth and tongue, rapid pulse, dark urine)  © 3505-8265 [employer]. [address] [place] [address], [place] [address]. All rights reserved. This information is not intended as a substitute for professional medical care. Always follow your healthcare professional's instructions.          Food Poisoning (Adult)  Food poisoning is illness that is passed along in food. It usually occurs from 1 to 24 hours after eating food that has spoiled. Food poisoning can occur when you eat food or drink that contains viruses, bacteria, parasites, or toxins. This includes food that has not been cooked or refrigerated properly.  Food poisoning can cause these symptoms:  · Abdominal pain and  cramping  · Nausea  · Vomiting  · Diarrhea  · Fever and chills  · Fatigue  The symptoms usually last from 1 to 2 days.  Antibiotics are not effective for this illness.  Home care  Follow all instructions given by your healthcare provider. Rest at home for the next 24 hours, or until you feel better. Avoid caffeine, tobacco, and alcohol. These can make diarrhea, cramping, and pain worse.  If taking medicines:  · Dont take over-the-counter diarrhea or nausea medicines unless your healthcare provider tells you to.  · You may be given medicine for nausea or vomiting to help keep down fluids. Take these medicines as prescribed.  · You may use acetaminophen or NSAID medicine like ibuprofen or naproxen to reduce pain and fever. Dont use these if you have chronic liver or kidney disease, or ever had a stomach ulcer or gastrointestinal bleeding. Talk with your healthcare provider first. Don't use NSAID medicines if you are already taking one for another condition (like arthritis) or are on aspirin (such as for heart disease or after a stroke).  To prevent the spread of illness:  · Remember that washing with soap and water or using alcohol-based  is the best way to prevent the spread of infection.  · Clean the toilet after each use.  · Wash your hands before eating.  · Wash your hands or use alcohol-based  before and after preparing food. Keep in mind that people with diarrhea or vomiting should not prepare food for others.  · Wash your hands after using cutting boards, counter-tops, and knives or other utensils that have been in contact with raw foods.  · Wash and then peel fruits and vegetables.  · Keep uncooked meats away from cooked and ready-to-eat foods.  · Use a food thermometer when cooking. Cook poultry to at least 165°F (74°C). Cook ground meat (beef, veal, pork, lamb) to at least 160°F (71°C). Cook fresh beef, veal, lamb, and pork to at least 145°F (63°C).  · Dont eat raw or undercooked eggs  (poached or trish side up), poultry, meat or unpasteurized milk and juices.  · Do not eat foods requiring refrigeration. Don't eat foods that have not been refrigerated for long periods such as at buffets or picnics.  · Do not eat seafood that is undercooked or with high rates of food toxins.  Food and drinks  The main goal while treating vomiting or diarrhea is to prevent dehydration. This is done by taking small amounts of liquids often.  · Keep in mind that liquids are more important than food right now.  · Drink only small amounts of liquids at a time.  · Dont force yourself to eat, especially if you are having cramping, vomiting, or diarrhea. Dont eat large amounts at a time, even if you are hungry.  · If you eat, avoid fatty, greasy, spicy, or fried foods.  · Dont eat dairy foods or drink milk if you have diarrhea. These can make diarrhea worse.  The first 24 hours you can try:  · Soft drinks without caffeine  · Ginger ale  · Water (plain or flavored)  · Decaf tea or coffee  · Clear broth, consommé, or bouillon  · Gelatin, popsicles, or frozen fruit juice bars  If you are very dehydrated, sports drinks are not a good choice. They have too much sugar and not enough electrolytes. In this case, commercially available products called oral rehydration solutions are best.  The second 24 hours, if you are feeling better, you can add:  · Hot cereal, plain toast, bread, rolls, or crackers  · Plain noodles, rice, mashed potatoes, chicken noodle soup, or rice soup  · Unsweetened canned fruit (no pineapple)  · Bananas  As you recover:  · Limit fat intake to less than 15 grams per day. Dont eat margarine, butter, oils, mayonnaise, sauces, gravies, fried foods, peanut butter, meat, poultry, or fish.  · Limit fiber. Dont eat raw or cooked vegetables, fresh fruits except bananas, and bran cereals.  · Limit caffeine and chocolate.  · Dont use spices or seasonings except salt.  · Resume a normal diet over time, as you  feel better and your symptoms improve.  · If the symptoms come back, go back to a simple diet or clear liquids.  Follow-up care  Follow up with your healthcare provider, or as advised. If a stool sample was taken or cultures were done, call the healthcare provider for the results as instructed.  Call 911  Call 911 if you have any of these symptoms:  · Trouble breathing  · Chest pain  · Confusion  · Extreme drowsiness or trouble walking  · Loss of consciousness  · Rapid heart rate  · Stiff neck  · Seizure  When to seek medical advice  Call your health care provider right away if any of these occur:  · Abdominal pain that gets worse  · Constant lower right abdominal pain  · Continued vomiting and inability to keep liquids down  · Diarrhea more than 5 times a day  · Blood in vomit or stool  · Dark urine or no urine for 8 hours, dry mouth and tongue, tiredness, weakness, or dizziness  · New rash  · You dont get better in 2 to 3 days  · Fever of 100.4°F (38°C) or higher that doesnt get lower with medicine  © 3851-8858 [employer] [address] [place] [address]. All rights reserved. This information is not intended as a substitute for professional medical care. Always follow your healthcare professional's instructions.          Your Scheduled Appointments     Feb 14, 2017  1:00 PM CST   Established Patient Visit with Narda Serrato NP   [facility] ([facility])    [address] [facility] [address] [phone]              Smoking Cessation     If you would like to quit smoking:   You may be eligible for free services if you are a [place] resident and started smoking cigarettes before September 1, 1988.  Call the Smoking Cessation Trust (SCT) toll free at (417) 144-1105 or (380) 437-9748.   Call 6-296-QUIT-NOW if you do not meet the above criteria.             [facility] complies with applicable Federal civil rights laws and does  not discriminate on the basis of race, color, national origin, age, disability, or sex.        Language Assistance Services     ATTENTION: Language assistance services are available, free of charge. Please call [phone].      ATENCIÓN: Si shyanne cisse, tiene a france disposición servicios gratuitos de asistencia lingüística. Llame al [phone].     CHÚ Ý: N?u b?n nói Ti?ng Vi?t, có các d?ch v? h? tr? ngôn ng? mi?n phí dành cho b?n. G?i s? [phone].

## 2017-01-24 VITALS
SYSTOLIC BLOOD PRESSURE: 108 MMHG | TEMPERATURE: 98 F | HEART RATE: 72 BPM | RESPIRATION RATE: 16 BRPM | DIASTOLIC BLOOD PRESSURE: 69 MMHG

## 2017-01-24 LAB
BACTERIA #/AREA URNS HPF: ABNORMAL /HPF
BILIRUB UR QL STRIP: ABNORMAL
CLARITY UR: ABNORMAL
COLOR UR: YELLOW
GLUCOSE UR QL STRIP: ABNORMAL
GRAN CASTS #/AREA URNS LPF: 5 /LPF
HGB UR QL STRIP: NEGATIVE
HYALINE CASTS #/AREA URNS LPF: 15 /LPF
KETONES UR QL STRIP: ABNORMAL
LEUKOCYTE ESTERASE UR QL STRIP: NEGATIVE
MICROSCOPIC COMMENT: ABNORMAL
NITRITE UR QL STRIP: NEGATIVE
PH UR STRIP: 5 [PH] (ref 5–8)
PROT UR QL STRIP: ABNORMAL
RBC #/AREA URNS HPF: 0 /HPF (ref 0–4)
SP GR UR STRIP: >=1.03 (ref 1–1.03)
SQUAMOUS #/AREA URNS HPF: 2 /HPF
URN SPEC COLLECT METH UR: ABNORMAL
UROBILINOGEN UR STRIP-ACNC: NEGATIVE EU/DL
WBC #/AREA URNS HPF: 20 /HPF (ref 0–5)
YEAST URNS QL MICRO: ABNORMAL

## 2017-01-24 PROCEDURE — 25000003 PHARM REV CODE 250: Performed by: EMERGENCY MEDICINE

## 2017-01-24 RX ORDER — ONDANSETRON 4 MG/1
4 TABLET, FILM COATED ORAL EVERY 8 HOURS PRN
Qty: 12 TABLET | Refills: 0 | Status: SHIPPED | OUTPATIENT
Start: 2017-01-24 | End: 2017-04-24

## 2017-01-24 RX ORDER — LOPERAMIDE HYDROCHLORIDE 2 MG/1
2 CAPSULE ORAL 4 TIMES DAILY PRN
Qty: 12 CAPSULE | Refills: 0 | Status: SHIPPED | OUTPATIENT
Start: 2017-01-24 | End: 2017-02-03

## 2017-01-24 RX ORDER — DIPHENOXYLATE HYDROCHLORIDE AND ATROPINE SULFATE 2.5; .025 MG/1; MG/1
1 TABLET ORAL
Status: COMPLETED | OUTPATIENT
Start: 2017-01-24 | End: 2017-01-24

## 2017-01-24 RX ORDER — DICYCLOMINE HYDROCHLORIDE 20 MG/1
20 TABLET ORAL 2 TIMES DAILY
Qty: 20 TABLET | Refills: 0 | Status: SHIPPED | OUTPATIENT
Start: 2017-01-24 | End: 2017-02-23

## 2017-01-24 RX ADMIN — DIPHENOXYLATE HYDROCHLORIDE AND ATROPINE SULFATE 1 TABLET: 2.5; .025 TABLET ORAL at 12:01

## 2017-01-24 NOTE — ED PROVIDER NOTES
Encounter Date: 1/23/2017       History     Chief Complaint   Patient presents with    Diarrhea     Review of patient's allergies indicates:   Allergen Reactions    Percocet [oxycodone-acetaminophen] Itching    Percodan [oxycodone hcl-oxycodone-asa] Itching     Other reaction(s): Unknown    Latex, natural rubber Rash    Phenytoin sodium extended      Other reaction(s): Unknown    Sutures, catgut      Infections to sutures     Adhesive Rash    Adhesive tape-silicones Rash     Patient is a 71 y.o. female presenting with the following complaint: diarrhea. The history is provided by the patient.   Diarrhea    This is a new problem. The current episode started yesterday. The problem has been unchanged. The stool consistency is described as watery. Associated symptoms include bloating and a fever. Pertinent negatives include no abdominal pain, arthralgias, chills, coughing, headaches, increased  flatus, myalgias, sweats, URI or vomiting. Risk factors include suspect food intake. She has tried nothing for the symptoms.     Past Medical History   Diagnosis Date    Abnormal Pap smear     DIONNE (acute kidney injury) 6/29/2014    DIONNE (acute kidney injury) 6/29/2014    Anemia     Breast disorder     Diabetes mellitus     Diabetes mellitus, type 2     ESSENTIAL HYPERTENSION 10/1/2012    Hypothyroid 6/29/2014     Past Medical History Pertinent Negatives   Diagnosis Date Noted    Asthma 7/3/2012    Blood dyscrasia 7/3/2012    Complication of anesthesia 7/3/2012    Coronary artery disease 7/3/2012    Deep vein thrombosis 7/3/2012    Herpes simplex without mention of complication 7/3/2012    HIV infection 7/3/2012    Infertility 7/3/2012    Liver disease 7/3/2012    Mental disorder 7/3/2012    Postpartum depression 7/3/2012    Rh incompatibility 7/3/2012    Seizures 7/3/2012    Sickle cell anemia 7/3/2012    Stroke 7/3/2012    Systemic lupus erythematosus 7/3/2012    Varicosities 7/3/2012     Past  Surgical History   Procedure Laterality Date    Hysterectomy      Masectomy       single left breast     Family History   Problem Relation Age of Onset    Cancer Mother     Diabetes Mother     Hypertension Mother     Heart attack Mother 66    Diabetes Sister     Heart disease Sister     Heart disease Brother     Hypertension Daughter     Thyroid disease Daughter     Heart attack Son 38    Heart disease Brother     Depression Daughter     Hypertension Son      Social History   Substance Use Topics    Smoking status: Former Smoker     Packs/day: 1.00     Years: 10.00     Types: Cigarettes     Quit date: 1/1/1988    Smokeless tobacco: Never Used    Alcohol use No     Review of Systems   Constitutional: Positive for fever. Negative for chills.   HENT: Negative for ear discharge, ear pain and facial swelling.    Eyes: Negative for pain, discharge, redness and itching.   Respiratory: Negative for cough, shortness of breath, wheezing and stridor.    Cardiovascular: Negative for chest pain.   Gastrointestinal: Positive for bloating and diarrhea. Negative for abdominal pain, flatus and vomiting.   Genitourinary: Negative for flank pain and frequency.   Musculoskeletal: Negative for arthralgias, myalgias, neck pain and neck stiffness.   Skin: Negative for color change, pallor, rash and wound.   Neurological: Negative for tremors, syncope, speech difficulty, weakness and headaches.       Physical Exam   Initial Vitals   BP Pulse Resp Temp SpO2   01/23/17 2118 01/23/17 2118 01/23/17 2118 01/23/17 2118 --   138/55 91 16 101.2 °F (38.4 °C)      Physical Exam    Nursing note and vitals reviewed.  Constitutional: She appears well-developed and well-nourished. She is not diaphoretic. No distress.   HENT:   Head: Normocephalic and atraumatic.   Mouth/Throat: No oropharyngeal exudate.   Eyes: Conjunctivae and EOM are normal. Pupils are equal, round, and reactive to light. Right eye exhibits no discharge. Left eye  exhibits no discharge. No scleral icterus.   Neck: Normal range of motion. Neck supple. No JVD present.   Cardiovascular: Normal rate, regular rhythm and normal heart sounds. Exam reveals no friction rub.    Pulmonary/Chest: Breath sounds normal. No stridor. No respiratory distress. She has no wheezes. She has no rhonchi. She has no rales.   Abdominal: Soft. Bowel sounds are normal. She exhibits no distension. There is no tenderness. There is no rebound and no guarding.   Musculoskeletal: Normal range of motion. She exhibits no edema or tenderness.   Neurological: She is oriented to person, place, and time. She has normal strength and normal reflexes. No sensory deficit.         ED Course   Procedures  Labs Reviewed   COMPREHENSIVE METABOLIC PANEL   CBC W/ AUTO DIFFERENTIAL   AMYLASE   URINALYSIS                               ED Course     Clinical Impression:   The primary encounter diagnosis was Food poisoning, accidental or unintentional, initial encounter. Diagnoses of Diarrhea and Diarrhea, unspecified type were also pertinent to this visit.    Disposition:   Disposition: Discharged  Condition: Stable       Tyree Owusu MD  01/24/17 0025

## 2017-01-24 NOTE — DISCHARGE INSTRUCTIONS
Treating Diarrhea  Diarrhea happens when you have loose, watery, or frequent bowel movements. It is a common problem with many causes. Most cases of diarrhea clear up on their own. But certain cases may need treatment. Be sure to see your healthcare provider if your symptoms do not improve within a few days.    Getting relief  Treatment of diarrhea depends on its cause. Diarrhea caused by bacterial or parasite infection is often treated with antibiotics. Diarrhea caused by other factors, such as a stomach virus, often improves with simple home treatment. The tips below may also help relieve your symptoms.  · Drink plenty of fluids. This helps prevent too much fluid loss (dehydration). Water, clear soups, and electrolyte solutions are good choices. Avoid alcohol, coffee, tea, and milk. These can irritate your intestines and make symptoms worse.  · Suck on ice chips if drinking makes you queasy.  · Return to your normal diet slowly. You may want to eat bland foods at first, such as rice and toast. Also, you may need to avoid certain foods for a while, such as dairy products. These can make symptoms worse. Ask your healthcare provider if there are any other foods you should avoid.  · If you were prescribed antibiotics, take them as directed.  · Do not take anti-diarrhea medicines without asking your healthcare provider first.  Call your healthcare provider   Call your healthcare provider if you have any of the following:   · A fever of 100.4°F (38.0°C) or higher, or as directed by your healthcare provider  · Severe pain  · Worsening diarrhea or diarrhea for more than 2 days  · Bloody vomit or stool  · Signs of dehydration (dizziness, dry mouth and tongue, rapid pulse, dark urine)  © 3021-2624 [employer]. [address] [place] [address], [place] [address]. All rights reserved. This information is not intended as a substitute for professional medical care. Always follow your healthcare professional's  instructions.          Food Poisoning (Adult)  Food poisoning is illness that is passed along in food. It usually occurs from 1 to 24 hours after eating food that has spoiled. Food poisoning can occur when you eat food or drink that contains viruses, bacteria, parasites, or toxins. This includes food that has not been cooked or refrigerated properly.  Food poisoning can cause these symptoms:  · Abdominal pain and cramping  · Nausea  · Vomiting  · Diarrhea  · Fever and chills  · Fatigue  The symptoms usually last from 1 to 2 days.  Antibiotics are not effective for this illness.  Home care  Follow all instructions given by your healthcare provider. Rest at home for the next 24 hours, or until you feel better. Avoid caffeine, tobacco, and alcohol. These can make diarrhea, cramping, and pain worse.  If taking medicines:  · Dont take over-the-counter diarrhea or nausea medicines unless your healthcare provider tells you to.  · You may be given medicine for nausea or vomiting to help keep down fluids. Take these medicines as prescribed.  · You may use acetaminophen or NSAID medicine like ibuprofen or naproxen to reduce pain and fever. Dont use these if you have chronic liver or kidney disease, or ever had a stomach ulcer or gastrointestinal bleeding. Talk with your healthcare provider first. Don't use NSAID medicines if you are already taking one for another condition (like arthritis) or are on aspirin (such as for heart disease or after a stroke).  To prevent the spread of illness:  · Remember that washing with soap and water or using alcohol-based  is the best way to prevent the spread of infection.  · Clean the toilet after each use.  · Wash your hands before eating.  · Wash your hands or use alcohol-based  before and after preparing food. Keep in mind that people with diarrhea or vomiting should not prepare food for others.  · Wash your hands after using cutting boards, counter-tops, and knives or  other utensils that have been in contact with raw foods.  · Wash and then peel fruits and vegetables.  · Keep uncooked meats away from cooked and ready-to-eat foods.  · Use a food thermometer when cooking. Cook poultry to at least 165°F (74°C). Cook ground meat (beef, veal, pork, lamb) to at least 160°F (71°C). Cook fresh beef, veal, lamb, and pork to at least 145°F (63°C).  · Dont eat raw or undercooked eggs (poached or trish side up), poultry, meat or unpasteurized milk and juices.  · Do not eat foods requiring refrigeration. Don't eat foods that have not been refrigerated for long periods such as at buffets or picnics.  · Do not eat seafood that is undercooked or with high rates of food toxins.  Food and drinks  The main goal while treating vomiting or diarrhea is to prevent dehydration. This is done by taking small amounts of liquids often.  · Keep in mind that liquids are more important than food right now.  · Drink only small amounts of liquids at a time.  · Dont force yourself to eat, especially if you are having cramping, vomiting, or diarrhea. Dont eat large amounts at a time, even if you are hungry.  · If you eat, avoid fatty, greasy, spicy, or fried foods.  · Dont eat dairy foods or drink milk if you have diarrhea. These can make diarrhea worse.  The first 24 hours you can try:  · Soft drinks without caffeine  · Ginger ale  · Water (plain or flavored)  · Decaf tea or coffee  · Clear broth, consommé, or bouillon  · Gelatin, popsicles, or frozen fruit juice bars  If you are very dehydrated, sports drinks are not a good choice. They have too much sugar and not enough electrolytes. In this case, commercially available products called oral rehydration solutions are best.  The second 24 hours, if you are feeling better, you can add:  · Hot cereal, plain toast, bread, rolls, or crackers  · Plain noodles, rice, mashed potatoes, chicken noodle soup, or rice soup  · Unsweetened canned fruit (no  pineapple)  · Bananas  As you recover:  · Limit fat intake to less than 15 grams per day. Dont eat margarine, butter, oils, mayonnaise, sauces, gravies, fried foods, peanut butter, meat, poultry, or fish.  · Limit fiber. Dont eat raw or cooked vegetables, fresh fruits except bananas, and bran cereals.  · Limit caffeine and chocolate.  · Dont use spices or seasonings except salt.  · Resume a normal diet over time, as you feel better and your symptoms improve.  · If the symptoms come back, go back to a simple diet or clear liquids.  Follow-up care  Follow up with your healthcare provider, or as advised. If a stool sample was taken or cultures were done, call the healthcare provider for the results as instructed.  Call 911  Call 911 if you have any of these symptoms:  · Trouble breathing  · Chest pain  · Confusion  · Extreme drowsiness or trouble walking  · Loss of consciousness  · Rapid heart rate  · Stiff neck  · Seizure  When to seek medical advice  Call your health care provider right away if any of these occur:  · Abdominal pain that gets worse  · Constant lower right abdominal pain  · Continued vomiting and inability to keep liquids down  · Diarrhea more than 5 times a day  · Blood in vomit or stool  · Dark urine or no urine for 8 hours, dry mouth and tongue, tiredness, weakness, or dizziness  · New rash  · You dont get better in 2 to 3 days  · Fever of 100.4°F (38°C) or higher that doesnt get lower with medicine  © 8655-2026 [employer]. [address] [place] [address], [place] [address]. All rights reserved. This information is not intended as a substitute for professional medical care. Always follow your healthcare professional's instructions.

## 2017-01-26 ENCOUNTER — HOSPITAL ENCOUNTER (INPATIENT)
Facility: HOSPITAL | Age: 72
LOS: 2 days | Discharge: HOME | DRG: 392 | End: 2017-01-29
Attending: SURGERY | Admitting: FAMILY MEDICINE
Payer: MEDICARE

## 2017-01-26 DIAGNOSIS — E86.0 DEHYDRATION: ICD-10-CM

## 2017-01-26 DIAGNOSIS — R19.7 INTRACTABLE DIARRHEA: Primary | ICD-10-CM

## 2017-01-26 DIAGNOSIS — R79.89 PRERENAL AZOTEMIA: ICD-10-CM

## 2017-01-26 LAB
ALBUMIN SERPL BCP-MCNC: 3.1 G/DL
ALP SERPL-CCNC: 71 U/L
ALT SERPL W/O P-5'-P-CCNC: 24 U/L
ANION GAP SERPL CALC-SCNC: 15 MMOL/L
AST SERPL-CCNC: 26 U/L
BASOPHILS # BLD AUTO: ABNORMAL K/UL
BASOPHILS NFR BLD: 2 %
BILIRUB SERPL-MCNC: 0.7 MG/DL
BUN SERPL-MCNC: 17 MG/DL
CALCIUM SERPL-MCNC: 8.7 MG/DL
CHLORIDE SERPL-SCNC: 101 MMOL/L
CO2 SERPL-SCNC: 17 MMOL/L
CREAT SERPL-MCNC: 1.3 MG/DL
DIFFERENTIAL METHOD: ABNORMAL
EOSINOPHIL # BLD AUTO: ABNORMAL K/UL
EOSINOPHIL NFR BLD: 0 %
ERYTHROCYTE [DISTWIDTH] IN BLOOD BY AUTOMATED COUNT: 12.7 %
EST. GFR  (AFRICAN AMERICAN): 47 ML/MIN/1.73 M^2
EST. GFR  (NON AFRICAN AMERICAN): 41 ML/MIN/1.73 M^2
GLUCOSE SERPL-MCNC: 194 MG/DL
HCT VFR BLD AUTO: 41.6 %
HGB BLD-MCNC: 14.7 G/DL
LACTATE SERPL-SCNC: 1.3 MMOL/L
LYMPHOCYTES # BLD AUTO: ABNORMAL K/UL
LYMPHOCYTES NFR BLD: 8 %
MCH RBC QN AUTO: 29.8 PG
MCHC RBC AUTO-ENTMCNC: 35.3 %
MCV RBC AUTO: 84 FL
MONOCYTES # BLD AUTO: ABNORMAL K/UL
MONOCYTES NFR BLD: 7 %
NEUTROPHILS # BLD AUTO: ABNORMAL K/UL
NEUTROPHILS NFR BLD: 51 %
NEUTS BAND NFR BLD MANUAL: 32 %
PLATELET # BLD AUTO: 224 K/UL
PMV BLD AUTO: 10.2 FL
POCT GLUCOSE: 225 MG/DL (ref 70–110)
POTASSIUM SERPL-SCNC: 3.2 MMOL/L
PROT SERPL-MCNC: 6.9 G/DL
RBC # BLD AUTO: 4.93 M/UL
SODIUM SERPL-SCNC: 133 MMOL/L
WBC # BLD AUTO: 6.91 K/UL

## 2017-01-26 PROCEDURE — 27000339 *HC DAILY SUPPLY KIT

## 2017-01-26 PROCEDURE — 99284 EMERGENCY DEPT VISIT MOD MDM: CPT | Mod: 25

## 2017-01-26 PROCEDURE — 87184 SC STD DISK METHOD PER PLATE: CPT

## 2017-01-26 PROCEDURE — 25000003 PHARM REV CODE 250: Performed by: SURGERY

## 2017-01-26 PROCEDURE — 87045 FECES CULTURE AEROBIC BACT: CPT

## 2017-01-26 PROCEDURE — 87427 SHIGA-LIKE TOXIN AG IA: CPT

## 2017-01-26 PROCEDURE — 96360 HYDRATION IV INFUSION INIT: CPT

## 2017-01-26 PROCEDURE — G0378 HOSPITAL OBSERVATION PER HR: HCPCS

## 2017-01-26 PROCEDURE — 80053 COMPREHEN METABOLIC PANEL: CPT

## 2017-01-26 PROCEDURE — 85027 COMPLETE CBC AUTOMATED: CPT

## 2017-01-26 PROCEDURE — 85007 BL SMEAR W/DIFF WBC COUNT: CPT

## 2017-01-26 PROCEDURE — 96361 HYDRATE IV INFUSION ADD-ON: CPT

## 2017-01-26 PROCEDURE — 87046 STOOL CULTR AEROBIC BACT EA: CPT | Mod: 59

## 2017-01-26 PROCEDURE — 87040 BLOOD CULTURE FOR BACTERIA: CPT

## 2017-01-26 PROCEDURE — 83605 ASSAY OF LACTIC ACID: CPT

## 2017-01-26 PROCEDURE — 36415 COLL VENOUS BLD VENIPUNCTURE: CPT

## 2017-01-26 PROCEDURE — 82962 GLUCOSE BLOOD TEST: CPT

## 2017-01-26 RX ORDER — PANTOPRAZOLE SODIUM 40 MG/1
40 TABLET, DELAYED RELEASE ORAL DAILY
Status: DISCONTINUED | OUTPATIENT
Start: 2017-01-27 | End: 2017-01-29 | Stop reason: HOSPADM

## 2017-01-26 RX ORDER — DIPHENOXYLATE HYDROCHLORIDE AND ATROPINE SULFATE 2.5; .025 MG/1; MG/1
2 TABLET ORAL
Status: DISCONTINUED | OUTPATIENT
Start: 2017-01-26 | End: 2017-01-26

## 2017-01-26 RX ORDER — SODIUM CHLORIDE 9 MG/ML
INJECTION, SOLUTION INTRAVENOUS CONTINUOUS
Status: DISCONTINUED | OUTPATIENT
Start: 2017-01-26 | End: 2017-01-29 | Stop reason: HOSPADM

## 2017-01-26 RX ORDER — INSULIN ASPART 100 [IU]/ML
22 INJECTION, SOLUTION INTRAVENOUS; SUBCUTANEOUS
Status: DISCONTINUED | OUTPATIENT
Start: 2017-01-27 | End: 2017-01-29 | Stop reason: HOSPADM

## 2017-01-26 RX ORDER — DIPHENOXYLATE HYDROCHLORIDE AND ATROPINE SULFATE 2.5; .025 MG/1; MG/1
2 TABLET ORAL 4 TIMES DAILY
Status: DISCONTINUED | OUTPATIENT
Start: 2017-01-27 | End: 2017-01-29 | Stop reason: HOSPADM

## 2017-01-26 RX ORDER — LEVOTHYROXINE SODIUM 50 UG/1
50 TABLET ORAL
Status: DISCONTINUED | OUTPATIENT
Start: 2017-01-27 | End: 2017-01-29 | Stop reason: HOSPADM

## 2017-01-26 RX ORDER — SODIUM CHLORIDE 9 MG/ML
1000 INJECTION, SOLUTION INTRAVENOUS
Status: COMPLETED | OUTPATIENT
Start: 2017-01-26 | End: 2017-01-26

## 2017-01-26 RX ORDER — ONDANSETRON 2 MG/ML
4 INJECTION INTRAMUSCULAR; INTRAVENOUS EVERY 8 HOURS PRN
Status: DISCONTINUED | OUTPATIENT
Start: 2017-01-26 | End: 2017-01-29 | Stop reason: HOSPADM

## 2017-01-26 RX ORDER — METOPROLOL SUCCINATE 25 MG/1
25 TABLET, EXTENDED RELEASE ORAL DAILY
Status: DISCONTINUED | OUTPATIENT
Start: 2017-01-27 | End: 2017-01-29 | Stop reason: HOSPADM

## 2017-01-26 RX ORDER — DIPHENOXYLATE HYDROCHLORIDE AND ATROPINE SULFATE 2.5; .025 MG/1; MG/1
2 TABLET ORAL
Status: COMPLETED | OUTPATIENT
Start: 2017-01-26 | End: 2017-01-26

## 2017-01-26 RX ORDER — INFANT FORMULA WITH IRON
POWDER (GRAM) ORAL
Status: DISCONTINUED | OUTPATIENT
Start: 2017-01-26 | End: 2017-01-29 | Stop reason: HOSPADM

## 2017-01-26 RX ORDER — DONEPEZIL HYDROCHLORIDE 5 MG/1
10 TABLET, FILM COATED ORAL DAILY
Status: DISCONTINUED | OUTPATIENT
Start: 2017-01-27 | End: 2017-01-27

## 2017-01-26 RX ADMIN — SODIUM CHLORIDE 500 ML: 0.9 INJECTION, SOLUTION INTRAVENOUS at 09:01

## 2017-01-26 RX ADMIN — DIPHENOXYLATE HYDROCHLORIDE AND ATROPINE SULFATE 2 TABLET: 2.5; .025 TABLET ORAL at 06:01

## 2017-01-26 RX ADMIN — SODIUM CHLORIDE 1000 ML: 0.9 INJECTION, SOLUTION INTRAVENOUS at 11:01

## 2017-01-26 RX ADMIN — SODIUM CHLORIDE 1000 ML: 0.9 INJECTION, SOLUTION INTRAVENOUS at 06:01

## 2017-01-26 NOTE — IP AVS SNAPSHOT
[address] [address]  Phone: [phone]           Patient Discharge Instructions     Our goal is to set you up for success. This packet includes information on your condition, medications, and your home care. It will help you to care for yourself so you don't get sicker and need to go back to the hospital.     Please ask your nurse if you have any questions.        There are many details to remember when preparing to leave the hospital. Here is what you will need to do:    1. Take your medicine. If you are prescribed medications, review your Medication List in the following pages. You may have new medications to  at the pharmacy and others that you'll need to stop taking. Review the instructions for how and when to take your medications. Talk with your doctor or nurses if you are unsure of what to do.     2. Go to your follow-up appointments. Specific follow-up information is listed in the following pages. Your may be contacted by a transition nurse or clinical provider about future appointments. Be sure we have all of the phone numbers to reach you, if needed. Please contact your provider's office if you are unable to make an appointment.     3. Watch for warning signs. Your doctor or nurse will give you detailed warning signs to watch for and when to call for assistance. These instructions may also include educational information about your condition. If you experience any of warning signs to your health, call your doctor.               [facility]  Unless otherwise directed by your provider, please contact [facility], our nurse care line that is available for 24/7 assistance.     [phone] (toll-free)    Registered nurses in the [facility] provide clinical advisement, health education, appointment booking, and other advisory services.                    ** Verify the list of medication(s) below is accurate and up to date. Carry  this with you in case of emergency. If your medications have changed, please notify your healthcare provider.             Medication List      START taking these medications        Additional Info                      colestipol 1 gram Tab   Commonly known as:  COLESTID   Quantity:  30 tablet   Refills:  0   Dose:  2 g    Instructions:  Take 2 tablets (2 g total) by mouth 2 (two) times daily as needed (loose stools).     Begin Date    AM    Noon    PM    Bedtime         CHANGE how you take these medications        Additional Info                      gabapentin 300 MG capsule   Commonly known as:  NEURONTIN   Quantity:  120 capsule   Refills:  2   What changed:    - how much to take  - how to take this  - when to take this  - additional instructions    Last time this was given:  300 mg on 1/29/2017  6:21 AM   Instructions:  TAKE ONE CAPSULE BY MOUTH 4 TIMES A DAY     Begin Date    AM    Noon    PM    Bedtime         CONTINUE taking these medications        Additional Info                      atorvastatin 20 MG tablet   Commonly known as:  LIPITOR   Quantity:  30 tablet   Refills:  3   Dose:  20 mg    Instructions:  Take 1 tablet (20 mg total) by mouth once daily.     Begin Date    AM    Noon    PM    Bedtime       blood sugar diagnostic Strp   Quantity:  400 each   Refills:  3    Instructions:  Accu-chek khadra testing strips use to test sugars AC/HS     Begin Date    AM    Noon    PM    Bedtime       blood-glucose meter Misc   Commonly known as:  ACCU-CHEK KHADRA   Quantity:  1 each   Refills:  0    Instructions:  Use to test sugars AC/HS daily     Begin Date    AM    Noon    PM    Bedtime       dicyclomine 20 mg tablet   Commonly known as:  BENTYL   Quantity:  20 tablet   Refills:  0   Dose:  20 mg    Instructions:  Take 1 tablet (20 mg total) by mouth 2 (two) times daily.     Begin Date    AM    Noon    PM    Bedtime       donepezil 10 MG tablet   Commonly known as:  ARICEPT   Quantity:  30 tablet   Refills:  3    Dose:  10 mg    Last time this was given:  10 mg on 1/27/2017  8:20 AM   Instructions:  Take 1 tablet (10 mg total) by mouth as directed. Take 1/2 tablet each night x 2 weeks, then take 1 tablet each night afterwards     Begin Date    AM    Noon    PM    Bedtime       hydrochlorothiazide 25 MG tablet   Commonly known as:  HYDRODIURIL   Quantity:  30 tablet   Refills:  3    Instructions:  TAKE ONE TABLET BY MOUTH EVERY DAY     Begin Date    AM    Noon    PM    Bedtime       insulin aspart 100 unit/mL Inpn pen   Commonly known as:  NOVOLOG FLEXPEN   Quantity:  15 Syringe   Refills:  2    Last time this was given:  22 Units on 1/29/2017  8:14 AM   Instructions:  INJECT 22 UNITS INTO THE SKIN 3 (THREE) TIMES DAILY WITH MEALS.     Begin Date    AM    Noon    PM    Bedtime       insulin detemir 100 unit/mL (3 mL) Inpn pen   Commonly known as:  LEVEMIR FLEXTOUCH   Quantity:  15 mL   Refills:  1    Last time this was given:  25 Units on 1/29/2017  9:11 AM   Instructions:  INJECT 25 UNITS INTO THE SKIN 2 TIMES A DAY     Begin Date    AM    Noon    PM    Bedtime       insulin syringe-needle U-100 0.3 mL 30 Syrg   Quantity:  100 each   Refills:  5    Instructions:  Use with Levemir vial BID     Begin Date    AM    Noon    PM    Bedtime       JANUVIA 50 MG Tab   Quantity:  30 tablet   Refills:  2   Generic drug:  SITagliptan    Instructions:  TAKE 1 TABLET BY MOUTH EVERY DAY     Begin Date    AM    Noon    PM    Bedtime       lactulose 10 gram/15 mL solution   Commonly known as:  CHRONULAC   Refills:  0    Instructions:  Take by mouth daily as needed.     Begin Date    AM    Noon    PM    Bedtime       lancets Misc   Commonly known as:  ACCU-CHEK SOFTCLIX LANCETS   Quantity:  400 each   Refills:  3    Instructions:  Use to test sugars AC/HS     Begin Date    AM    Noon    PM    Bedtime       levothyroxine 50 MCG tablet   Commonly known as:  SYNTHROID   Quantity:  30 tablet   Refills:  3   Dose:  50 mcg    Last time this was  "given:  50 mcg on 1/29/2017  6:21 AM   Instructions:  Take 1 tablet (50 mcg total) by mouth once daily.     Begin Date    AM    Noon    PM    Bedtime       loperamide 2 mg capsule   Commonly known as:  IMODIUM   Quantity:  12 capsule   Refills:  0   Dose:  2 mg    Instructions:  Take 1 capsule (2 mg total) by mouth 4 (four) times daily as needed for Diarrhea.     Begin Date    AM    Noon    PM    Bedtime       meclizine 25 mg tablet   Commonly known as:  ANTIVERT   Quantity:  60 tablet   Refills:  1    Instructions:  TAKE ONE TABLET BY MOUTH TWICE A DAY AS NEEDED FOR DIZZINESS OR NAUSEA     Begin Date    AM    Noon    PM    Bedtime       melatonin 5 mg Tab   Refills:  0   Dose:  5 mg    Instructions:  Take 5 mg by mouth nightly as needed.     Begin Date    AM    Noon    PM    Bedtime       metoprolol succinate 25 MG 24 hr tablet   Commonly known as:  TOPROL-XL   Refills:  9   Dose:  25 mg    Last time this was given:  25 mg on 1/29/2017  9:11 AM   Instructions:  Take 25 mg by mouth once daily.     Begin Date    AM    Noon    PM    Bedtime       naproxen sodium 220 mg Cap   Refills:  0   Dose:  2 tablet    Instructions:  Take 2 tablets by mouth daily as needed.     Begin Date    AM    Noon    PM    Bedtime       omeprazole 20 MG capsule   Commonly known as:  PRILOSEC   Quantity:  60 capsule   Refills:  2   Dose:  20 mg    Instructions:  Take 1 capsule (20 mg total) by mouth 2 (two) times daily.     Begin Date    AM    Noon    PM    Bedtime       ondansetron 4 MG tablet   Commonly known as:  ZOFRAN   Quantity:  12 tablet   Refills:  0   Dose:  4 mg    Instructions:  Take 1 tablet (4 mg total) by mouth every 8 (eight) hours as needed for Nausea.     Begin Date    AM    Noon    PM    Bedtime       pen needle, diabetic 32 gauge x 5/32" Ndle   Quantity:  100 each   Refills:  3   Dose:  1 Device    Instructions:  1 Device by Misc.(Non-Drug; Combo Route) route as directed. Needles to be used with Novolog pens and Levemir " "pens daily.     Begin Date    AM    Noon    PM    Bedtime       pen needle, diabetic 31 gauge x 3/16" Ndle   Commonly known as:  LITE TOUCH INSULIN PEN NEEDLES   Quantity:  100 each   Refills:  2   Dose:  1 application    Instructions:  1 application by Misc.(Non-Drug; Combo Route) route 4 (four) times daily.     Begin Date    AM    Noon    PM    Bedtime       ramipril 10 MG capsule   Commonly known as:  ALTACE   Quantity:  30 capsule   Refills:  2    Instructions:  TAKE ONE CAPSULE BY MOUTH EVERY DAY     Begin Date    AM    Noon    PM    Bedtime            Where to Get Your Medications      These medications were sent to [facility]/pharmacy #[id number] - [place], [place] - [address], [facility] [address]     Phone:  [phone]     colestipol 1 gram Tab                  Please bring to all follow up appointments:    1. A copy of your discharge instructions.  2. All medicines you are currently taking in their original bottles.  3. Identification and insurance card.    Please arrive 15 minutes ahead of scheduled appointment time.    Please call 24 hours in advance if you must reschedule your appointment and/or time.        Your Scheduled Appointments     Feb 06, 2017  9:15 AM [facility]   Hospital Follow Up with Apolonia Sullivan NP   [place] [facility] [place] [facility] [place]    [address] [facility] [address] [phone]            Feb 14, 2017  1:00 PM [facility]   Established Patient Visit with Narda Serrato NP   [facility] ([facility])    [address] [facility] [address] [phone]              Follow-up Information     Follow up with [facility]    Specialty:  Home Health Services    Why:  Home Health    Contact information:    [address] [place] [address] [phone]          Follow up with Apolonia Sullivan NP In 1 week.    Specialty:  Family Medicine    Contact information:    [address] [facility] [id number] [phone]          Discharge " "Instructions     Future Orders    Activity as tolerated     Diet general     Questions:    Total calories:      Fat restriction, if any:      Protein restriction, if any:      Na restriction, if any:      Fluid restriction:      Additional restrictions:        Discharge References/Attachments     FALLS, PREVENTING, MOVING SAFELY OUT OF A CHAIR AND BED (ENGLISH)    GETTING INTO AND OUT OF BED, BACK SAFETY (ENGLISH)    CLOSTRIDIUM DIFFICILE TOXIN (STOOL) (ENGLISH)    DIET, BLAND (ADULT) (ENGLISH)        Primary Diagnosis     Your primary diagnosis was:  Intractable Diarrhea      Admission Information     Date & Time Provider Department CSN    1/26/2017  5:59 PM Damaris Yoder MD [facility] [id number]      Care Providers     Provider Role Specialty Primary office phone    Damaris Yoder MD Attending Provider Family Medicine [phone]      Important Medicare Message          Most Recent Value    Important Message from Medicare Regarding Discharge Appeal Rights  Given to patient/caregiver, Explained to patient/caregiver, Signed/date by patient/caregiver yes 01/27/2017 1500      Your Vitals Were     BP Pulse Temp Resp Height    135/64 (BP Location: Right arm, Patient Position: Lying, BP Method: Automatic) 80 98.7 °F (37.1 °C) (Oral) 18 5' 2.5" (1.588 m)    Weight Last Period SpO2 BMI    126.3 kg (278 lb 6.4 oz) (LMP Unknown) 96% 50.11 kg/m2      Recent Lab Values        10/2/2012 3/1/2013 6/28/2013 6/9/2014 9/25/2014 8/10/2015 1/27/2017         9:25 AM  8:50 AM  8:30 AM  8:11 AM  9:40 AM 11:17 AM  5:37 AM     A1C 10.3 (H) 10.6 (H) 10.7 (H) 9.2 (H) 8.0 (H) 9.8 (H) 11.2 (H)     Comment for A1C at  5:37 AM on 1/27/2017:  According to ADA guidelines, hemoglobin A1C <7.0% represents  optimal control in non-pregnant diabetic patients.  Different  metrics may apply to specific populations.   Standards of Medical Care in Diabetes - 2016.  For the purpose of screening for the presence of diabetes:  <5.7% "     Consistent with the absence of diabetes  5.7-6.4%  Consistent with increasing risk for diabetes   (prediabetes)  >or=6.5%  Consistent with diabetes  Currently no consensus exists for use of hemoglobin A1C  for diagnosis of diabetes for children.        Pending Labs     Order Current Status    E. coli 0157 antigen In process    Giardia / Cryptosporidum, EIA In process    Stool Exam-Ova,Cysts,Parasites In process    WBC, Stool In process    Blood culture Preliminary result    Stool culture Preliminary result      Allergies as of 1/29/2017        Reactions    Percocet [Oxycodone-acetaminophen] Itching    Percodan [Oxycodone Hcl-oxycodone-asa] Itching    Other reaction(s): Unknown    Latex, Natural Rubber Rash    Phenytoin Sodium Extended     Other reaction(s): Unknown    Sutures, Catgut     Infections to sutures     Adhesive Rash    Adhesive Tape-silicones Rash      Advance Directives     An advance directive is a document which, in the event you are no longer able to make decisions for yourself, tells your healthcare team what kind of treatment you do or do not want to receive, or who you would like to make those decisions for you.  If you do not currently have an advance directive, [facility] encourages you to create one.  For more information call:  [phone]), [phone] ([phone]),  or log on to [email or web address].        Smoking Cessation     If you would like to quit smoking:   You may be eligible for free services if you are a [place] resident and started smoking cigarettes before September 1, 1988.  Call the Smoking Cessation Trust ([facility]) toll free at (078) 016-8504 or (436) 158-9818.   Call 9-080-QUIT-NOW if you do not meet the above criteria.            Language Assistance Services     ATTENTION: Language assistance services are available, free of charge. Please call [phone].      ATENCIÓN: Si habla español, tiene a france disposición servicios gratuitos de asistencia  lingüística. Andre al [phone].     SHANNON Ý: N?u b?n nói Ti?ng Vi?t, có các d?ch v? h? tr? ngôn ng? mi?n phí dành cho b?n. G?i s? [phone].        Diabetes Discharge Instructions                                    [facility] complies with applicable Federal civil rights laws and does not discriminate on the basis of race, color, national origin, age, disability, or sex.

## 2017-01-27 PROBLEM — E87.1 HYPONATREMIA: Status: ACTIVE | Noted: 2017-01-27

## 2017-01-27 PROBLEM — E87.6 HYPOKALEMIA: Status: ACTIVE | Noted: 2017-01-27

## 2017-01-27 PROBLEM — N28.9 ACUTE RENAL INSUFFICIENCY: Status: ACTIVE | Noted: 2017-01-27

## 2017-01-27 LAB
ALBUMIN SERPL BCP-MCNC: 2.8 G/DL
ALP SERPL-CCNC: 65 U/L
ALT SERPL W/O P-5'-P-CCNC: 19 U/L
ANION GAP SERPL CALC-SCNC: 11 MMOL/L
AST SERPL-CCNC: 21 U/L
BASOPHILS # BLD AUTO: ABNORMAL K/UL
BASOPHILS NFR BLD: 1 %
BILIRUB SERPL-MCNC: 0.8 MG/DL
BUN SERPL-MCNC: 19 MG/DL
CALCIUM SERPL-MCNC: 8 MG/DL
CHLORIDE SERPL-SCNC: 102 MMOL/L
CO2 SERPL-SCNC: 19 MMOL/L
CREAT SERPL-MCNC: 1 MG/DL
DIFFERENTIAL METHOD: ABNORMAL
EOSINOPHIL # BLD AUTO: ABNORMAL K/UL
EOSINOPHIL NFR BLD: 1 %
ERYTHROCYTE [DISTWIDTH] IN BLOOD BY AUTOMATED COUNT: 12.5 %
EST. GFR  (AFRICAN AMERICAN): >60 ML/MIN/1.73 M^2
EST. GFR  (NON AFRICAN AMERICAN): 56 ML/MIN/1.73 M^2
GLUCOSE SERPL-MCNC: 210 MG/DL
HCT VFR BLD AUTO: 35.5 %
HGB BLD-MCNC: 12.2 G/DL
LYMPHOCYTES # BLD AUTO: ABNORMAL K/UL
LYMPHOCYTES NFR BLD: 23 %
MCH RBC QN AUTO: 29.3 PG
MCHC RBC AUTO-ENTMCNC: 34.4 %
MCV RBC AUTO: 85 FL
MONOCYTES # BLD AUTO: ABNORMAL K/UL
MONOCYTES NFR BLD: 12 %
NEUTROPHILS # BLD AUTO: ABNORMAL K/UL
NEUTROPHILS NFR BLD: 40 %
NEUTS BAND NFR BLD MANUAL: 23 %
PLATELET # BLD AUTO: 198 K/UL
PMV BLD AUTO: 10.3 FL
POCT GLUCOSE: 116 MG/DL (ref 70–110)
POCT GLUCOSE: 122 MG/DL (ref 70–110)
POCT GLUCOSE: 207 MG/DL (ref 70–110)
POCT GLUCOSE: 66 MG/DL (ref 70–110)
POCT GLUCOSE: 67 MG/DL (ref 70–110)
POTASSIUM SERPL-SCNC: 3.2 MMOL/L
PROT SERPL-MCNC: 6 G/DL
RBC # BLD AUTO: 4.17 M/UL
SODIUM SERPL-SCNC: 132 MMOL/L
WBC # BLD AUTO: 5.85 K/UL

## 2017-01-27 PROCEDURE — 97162 PT EVAL MOD COMPLEX 30 MIN: CPT

## 2017-01-27 PROCEDURE — 36415 COLL VENOUS BLD VENIPUNCTURE: CPT

## 2017-01-27 PROCEDURE — 97110 THERAPEUTIC EXERCISES: CPT

## 2017-01-27 PROCEDURE — 25000003 PHARM REV CODE 250: Performed by: NURSE PRACTITIONER

## 2017-01-27 PROCEDURE — 85007 BL SMEAR W/DIFF WBC COUNT: CPT

## 2017-01-27 PROCEDURE — 63600175 PHARM REV CODE 636 W HCPCS: Performed by: SURGERY

## 2017-01-27 PROCEDURE — 85027 COMPLETE CBC AUTOMATED: CPT

## 2017-01-27 PROCEDURE — 25000003 PHARM REV CODE 250: Performed by: SURGERY

## 2017-01-27 PROCEDURE — 25000003 PHARM REV CODE 250: Performed by: FAMILY MEDICINE

## 2017-01-27 PROCEDURE — 99223 1ST HOSP IP/OBS HIGH 75: CPT | Mod: ,,, | Performed by: FAMILY MEDICINE

## 2017-01-27 PROCEDURE — 83036 HEMOGLOBIN GLYCOSYLATED A1C: CPT

## 2017-01-27 PROCEDURE — 27000339 *HC DAILY SUPPLY KIT

## 2017-01-27 PROCEDURE — 82962 GLUCOSE BLOOD TEST: CPT

## 2017-01-27 PROCEDURE — 80053 COMPREHEN METABOLIC PANEL: CPT

## 2017-01-27 PROCEDURE — 11000001 HC ACUTE MED/SURG PRIVATE ROOM

## 2017-01-27 PROCEDURE — 96372 THER/PROPH/DIAG INJ SC/IM: CPT

## 2017-01-27 PROCEDURE — 63600175 PHARM REV CODE 636 W HCPCS: Performed by: NURSE PRACTITIONER

## 2017-01-27 PROCEDURE — 96361 HYDRATE IV INFUSION ADD-ON: CPT

## 2017-01-27 RX ORDER — IBUPROFEN 200 MG
16 TABLET ORAL
Status: DISCONTINUED | OUTPATIENT
Start: 2017-01-27 | End: 2017-01-29 | Stop reason: HOSPADM

## 2017-01-27 RX ORDER — ENOXAPARIN SODIUM 100 MG/ML
40 INJECTION SUBCUTANEOUS EVERY 24 HOURS
Status: DISCONTINUED | OUTPATIENT
Start: 2017-01-27 | End: 2017-01-29 | Stop reason: HOSPADM

## 2017-01-27 RX ORDER — POTASSIUM CHLORIDE 20 MEQ/1
40 TABLET, EXTENDED RELEASE ORAL ONCE
Status: COMPLETED | OUTPATIENT
Start: 2017-01-27 | End: 2017-01-27

## 2017-01-27 RX ORDER — GABAPENTIN 300 MG/1
300 CAPSULE ORAL 3 TIMES DAILY
Status: DISCONTINUED | OUTPATIENT
Start: 2017-01-27 | End: 2017-01-29 | Stop reason: HOSPADM

## 2017-01-27 RX ORDER — DIPHENHYDRAMINE HCL 25 MG
25 CAPSULE ORAL ONCE
Status: COMPLETED | OUTPATIENT
Start: 2017-01-27 | End: 2017-01-27

## 2017-01-27 RX ORDER — POTASSIUM CHLORIDE 20 MEQ/1
40 TABLET, EXTENDED RELEASE ORAL DAILY
Status: DISCONTINUED | OUTPATIENT
Start: 2017-01-27 | End: 2017-01-29 | Stop reason: HOSPADM

## 2017-01-27 RX ORDER — IBUPROFEN 200 MG
24 TABLET ORAL
Status: DISCONTINUED | OUTPATIENT
Start: 2017-01-27 | End: 2017-01-29 | Stop reason: HOSPADM

## 2017-01-27 RX ORDER — GLUCAGON 1 MG
1 KIT INJECTION
Status: DISCONTINUED | OUTPATIENT
Start: 2017-01-27 | End: 2017-01-29 | Stop reason: HOSPADM

## 2017-01-27 RX ADMIN — PANTOPRAZOLE SODIUM 40 MG: 40 TABLET, DELAYED RELEASE ORAL at 08:01

## 2017-01-27 RX ADMIN — GABAPENTIN 300 MG: 300 CAPSULE ORAL at 09:01

## 2017-01-27 RX ADMIN — DIPHENOXYLATE HYDROCHLORIDE AND ATROPINE SULFATE 2 TABLET: 2.5; .025 TABLET ORAL at 05:01

## 2017-01-27 RX ADMIN — DIPHENOXYLATE HYDROCHLORIDE AND ATROPINE SULFATE 2 TABLET: 2.5; .025 TABLET ORAL at 06:01

## 2017-01-27 RX ADMIN — INSULIN DETEMIR 25 UNITS: 100 INJECTION, SOLUTION SUBCUTANEOUS at 08:01

## 2017-01-27 RX ADMIN — INSULIN ASPART 22 UNITS: 100 INJECTION, SOLUTION INTRAVENOUS; SUBCUTANEOUS at 11:01

## 2017-01-27 RX ADMIN — SODIUM CHLORIDE: 0.9 INJECTION, SOLUTION INTRAVENOUS at 07:01

## 2017-01-27 RX ADMIN — DIPHENOXYLATE HYDROCHLORIDE AND ATROPINE SULFATE 2 TABLET: 2.5; .025 TABLET ORAL at 12:01

## 2017-01-27 RX ADMIN — SODIUM CHLORIDE: 0.9 INJECTION, SOLUTION INTRAVENOUS at 11:01

## 2017-01-27 RX ADMIN — ENOXAPARIN SODIUM 40 MG: 40 INJECTION, SOLUTION INTRAVENOUS; SUBCUTANEOUS at 11:01

## 2017-01-27 RX ADMIN — DONEPEZIL HYDROCHLORIDE 10 MG: 5 TABLET, FILM COATED ORAL at 08:01

## 2017-01-27 RX ADMIN — DIPHENOXYLATE HYDROCHLORIDE AND ATROPINE SULFATE 2 TABLET: 2.5; .025 TABLET ORAL at 11:01

## 2017-01-27 RX ADMIN — LEVOTHYROXINE SODIUM 50 MCG: 50 TABLET ORAL at 06:01

## 2017-01-27 RX ADMIN — INSULIN ASPART 22 UNITS: 100 INJECTION, SOLUTION INTRAVENOUS; SUBCUTANEOUS at 08:01

## 2017-01-27 RX ADMIN — POTASSIUM CHLORIDE 40 MEQ: 1500 TABLET, EXTENDED RELEASE ORAL at 11:01

## 2017-01-27 RX ADMIN — SODIUM CHLORIDE: 0.9 INJECTION, SOLUTION INTRAVENOUS at 03:01

## 2017-01-27 RX ADMIN — POTASSIUM CHLORIDE 40 MEQ: 1500 TABLET, EXTENDED RELEASE ORAL at 08:01

## 2017-01-27 RX ADMIN — GABAPENTIN 300 MG: 300 CAPSULE ORAL at 02:01

## 2017-01-27 RX ADMIN — DIPHENHYDRAMINE HYDROCHLORIDE 25 MG: 25 CAPSULE ORAL at 04:01

## 2017-01-27 RX ADMIN — POTASSIUM CHLORIDE 40 MEQ: 1500 TABLET, EXTENDED RELEASE ORAL at 04:01

## 2017-01-27 RX ADMIN — METOPROLOL SUCCINATE 25 MG: 25 TABLET, EXTENDED RELEASE ORAL at 08:01

## 2017-01-27 NOTE — SUBJECTIVE & OBJECTIVE
Past Medical History   Diagnosis Date    Abnormal Pap smear     DIONNE (acute kidney injury) 6/29/2014    DIONNE (acute kidney injury) 6/29/2014    Anemia     Breast disorder     Diabetes mellitus     Diabetes mellitus, type 2     ESSENTIAL HYPERTENSION 10/1/2012    Hypothyroid 6/29/2014       Past Surgical History   Procedure Laterality Date    Hysterectomy      Masectomy       single left breast       Review of patient's allergies indicates:   Allergen Reactions    Percocet [oxycodone-acetaminophen] Itching    Percodan [oxycodone hcl-oxycodone-asa] Itching     Other reaction(s): Unknown    Latex, natural rubber Rash    Phenytoin sodium extended      Other reaction(s): Unknown    Sutures, catgut      Infections to sutures     Adhesive Rash    Adhesive tape-silicones Rash       No current facility-administered medications on file prior to encounter.      Current Outpatient Prescriptions on File Prior to Encounter   Medication Sig    atorvastatin (LIPITOR) 20 MG tablet Take 1 tablet (20 mg total) by mouth once daily.    blood sugar diagnostic Strp Accu-chek khadra testing strips use to test sugars AC/HS    blood-glucose meter (ACCU-CHEK KHADRA) Misc Use to test sugars AC/HS daily    dicyclomine (BENTYL) 20 mg tablet Take 1 tablet (20 mg total) by mouth 2 (two) times daily.    donepezil (ARICEPT) 10 MG tablet Take 1 tablet (10 mg total) by mouth as directed. Take 1/2 tablet each night x 2 weeks, then take 1 tablet each night afterwards    gabapentin (NEURONTIN) 300 MG capsule TAKE ONE CAPSULE BY MOUTH 4 TIMES A DAY (Patient taking differently: Take 300 mg by mouth 3 (three) times daily. )    hydrochlorothiazide (HYDRODIURIL) 25 MG tablet TAKE ONE TABLET BY MOUTH EVERY DAY    insulin aspart (NOVOLOG FLEXPEN) 100 unit/mL InPn pen INJECT 22 UNITS INTO THE SKIN 3 (THREE) TIMES DAILY WITH MEALS.    insulin detemir (LEVEMIR FLEXTOUCH) 100 unit/mL (3 mL) SubQ InPn pen INJECT 25 UNITS INTO THE SKIN 2 TIMES A  "DAY    insulin syringe-needle U-100 0.3 mL 30 Syrg Use with Levemir vial BID    JANUVIA 50 mg Tab TAKE 1 TABLET BY MOUTH EVERY DAY    lactulose (CHRONULAC) 10 gram/15 mL solution Take by mouth daily as needed.     lancets (ACCU-CHEK SOFTCLIX LANCETS) Misc Use to test sugars AC/HS    levothyroxine (SYNTHROID) 50 MCG tablet Take 1 tablet (50 mcg total) by mouth once daily.    loperamide (IMODIUM) 2 mg capsule Take 1 capsule (2 mg total) by mouth 4 (four) times daily as needed for Diarrhea.    meclizine (ANTIVERT) 25 mg tablet TAKE ONE TABLET BY MOUTH TWICE A DAY AS NEEDED FOR DIZZINESS OR NAUSEA    melatonin 5 mg Tab Take 5 mg by mouth nightly as needed.     metoprolol succinate (TOPROL-XL) 25 MG 24 hr tablet Take 25 mg by mouth once daily.    naproxen sodium 220 mg Cap Take 2 tablets by mouth daily as needed.    omeprazole (PRILOSEC) 20 MG capsule Take 1 capsule (20 mg total) by mouth 2 (two) times daily.    ondansetron (ZOFRAN) 4 MG tablet Take 1 tablet (4 mg total) by mouth every 8 (eight) hours as needed for Nausea.    pen needle, diabetic (LITE TOUCH INSULIN PEN NEEDLES) 31 gauge x 3/16" Ndle 1 application by Misc.(Non-Drug; Combo Route) route 4 (four) times daily.    pen needle, diabetic 32 gauge x 5/32" Ndle 1 Device by Misc.(Non-Drug; Combo Route) route as directed. Needles to be used with Novolog pens and Levemir pens daily.    ramipril (ALTACE) 10 MG capsule TAKE ONE CAPSULE BY MOUTH EVERY DAY     Family History     Problem Relation (Age of Onset)    Cancer Mother    Depression Daughter    Diabetes Mother, Sister    Heart attack Mother (66), Son (38)    Heart disease Sister, Brother, Brother    Hypertension Mother, Daughter, Son    Thyroid disease Daughter        Social History Main Topics    Smoking status: Former Smoker     Packs/day: 1.00     Years: 10.00     Types: Cigarettes     Quit date: 1/1/1988    Smokeless tobacco: Never Used    Alcohol use No    Drug use: No    Sexual activity: " No     Review of Systems   Constitutional: Positive for chills, diaphoresis, fatigue (low grade) and fever.   HENT: Negative for congestion, postnasal drip, rhinorrhea, sinus pressure and sore throat.    Respiratory: Negative for cough, shortness of breath and wheezing.    Gastrointestinal: Positive for abdominal distention, abdominal pain and diarrhea. Negative for blood in stool, constipation, nausea and vomiting.   Genitourinary: Negative for difficulty urinating, dysuria, frequency and hematuria. Vaginal pain: generalized.   Musculoskeletal: Positive for arthralgias. Negative for back pain, joint swelling and myalgias.   Neurological: Positive for weakness. Negative for dizziness, numbness and headaches.     Objective:     Vital Signs (Most Recent):  Temp: 97.6 °F (36.4 °C) (01/27/17 0757)  Pulse: 75 (01/27/17 0757)  Resp: 20 (01/27/17 0757)  BP: (!) 132/59 (01/27/17 0757)  SpO2: 98 % (01/27/17 0757) Vital Signs (24h Range):  Temp:  [97 °F (36.1 °C)-99.8 °F (37.7 °C)] 97.6 °F (36.4 °C)  Pulse:  [] 75  Resp:  [16-20] 20  SpO2:  [98 %] 98 %  BP: ()/(44-59) 132/59     Weight: 126.3 kg (278 lb 6.4 oz)  Body mass index is 50.11 kg/(m^2).    Physical Exam   Constitutional: She is oriented to person, place, and time. She appears well-developed and well-nourished.   HENT:   Head: Normocephalic and atraumatic.   Right Ear: External ear normal.   Left Ear: External ear normal.   Nose: Nose normal.   Mouth/Throat: Oropharynx is clear and moist.   Dry MM   Eyes: EOM are normal. Pupils are equal, round, and reactive to light.   Neck: Normal range of motion. Neck supple. No JVD present. No tracheal deviation present. No thyromegaly present.   Cardiovascular: Normal rate, normal heart sounds and intact distal pulses.    No murmur heard.  Pulmonary/Chest: Effort normal and breath sounds normal. No respiratory distress. She has no wheezes. She has no rales. She exhibits no tenderness.   Abdominal: Soft. Bowel sounds  are normal. She exhibits no distension and no mass. There is no tenderness. There is no rebound and no guarding.   Musculoskeletal: Normal range of motion. She exhibits no edema or tenderness.   Lymphadenopathy:     She has no cervical adenopathy.   Neurological: She is alert and oriented to person, place, and time. She has normal reflexes. She displays normal reflexes. No cranial nerve deficit. She exhibits normal muscle tone. Coordination normal.   Skin: Skin is warm and dry. No rash noted. No erythema. No pallor.   Psychiatric: She has a normal mood and affect. Her behavior is normal. Judgment and thought content normal.        Significant Labs:   CBC:   Recent Labs  Lab 01/26/17 1822 01/27/17  0537   WBC 6.91 5.85   HGB 14.7 12.2   HCT 41.6 35.5*    198     CMP:   Recent Labs  Lab 01/26/17 1822 01/27/17  0537   * 132*   K 3.2* 3.2*    102   CO2 17* 19*   * 210*   BUN 17 19   CREATININE 1.3 1.0   CALCIUM 8.7 8.0*   PROT 6.9 6.0   ALBUMIN 3.1* 2.8*   BILITOT 0.7 0.8   ALKPHOS 71 65   AST 26 21   ALT 24 19   ANIONGAP 15 11   EGFRNONAA 41* 56*       Significant Imaging: I have reviewed and interpreted all pertinent imaging results/findings within the past 24 hours.     CT ABD: neg

## 2017-01-27 NOTE — ED PROVIDER NOTES
[facility]                                        January 26, 2017                     Chief Complaint  71 y.o. female with Diarrhea    History of Present Illness  Narda Person presents to the emergency room with diarrhea for the past week  Patient was seen in the emergency room on January 23 with diarrhea/dehydration  Patient some moldy sausage with possible food poisoning per daughter at bedside  Patient was given IV fluids as well as a prescription for Imodium, diarrhea not better  Daughter states the pt has been having diarrhea around-the-clock, 10 times today  Pt states she cannot move without profuse watery diarrhea, feels very dehydrated  Pt has a temperature 99.8°F and is nauseated with the diarrhea but no vomiting  Patient presents listless/feels dehydrated, we changed her diaper 7 times in ER    The history is provided by the patient    Past Medical History   -- Abnormal Pap smear    -- DIONNE (acute kidney injury)    -- DIONNE (acute kidney injury)    -- Anemia    -- Breast disorder    -- Diabetes mellitus    -- Diabetes mellitus, type 2    -- ESSENTIAL HYPERTENSION    -- Hypothyroid      Past surgical history: Hysterectomy and mastectomy     Review of patient's allergies   -- Percocet [oxycodone-acetaminophen]    -- Percodan [oxycodone hcl-oxycodone-asa]    -- Latex, natural rubber    -- Phenytoin sodium extended    -- Sutures, catgut    -- Adhesive    -- Adhesive tape-silicones       Review of Systems and Physical Exam     Review of Systems  -- Constitution - no fever, denies fatigue, no weakness, no chills  -- Eyes - no tearing or redness, no visual disturbance  -- Ear, Nose - no tinnitus or earache, no nasal congestion or discharge  -- Mouth,Throat - no sore throat, no toothache, normal voice, normal swallowing  -- Respiratory - denies cough and congestion, no shortness of breath, no FOWLER  -- Cardiovascular - denies chest pain, no palpitations, denies claudication  -- Gastrointestinal -  abdominal cramps with nausea & diarrhea   -- Musculoskeletal - denies back pain, negative for myalgias and arthralgias   -- Neurological - no headache, denies weakness or seizure; no LOC  -- Skin - denies pallor, rash, or changes in skin. no hives or welts noted    Vital Signs  -- BP is 100/50 (abnormal) and her pulse is 83.   -- Her respiration is 16 and oxygen saturation is 98%.      Physical Exam  -- Nursing note and vitals reviewed  -- Head: Atraumatic. Normocephalic. No obvious abnormality  -- Eyes: Pupils are equal and reactive to light. Normal conjunctiva and lids  -- Cardiac: Normal rate, regular rhythm and normal heart sounds  -- Pulmonary: Normal respiratory effort, breath sounds clear to auscultation  -- Abdominal: Soft, no tenderness. Normal bowel sounds. Normal liver edge  -- Musculoskeletal: Normal range of motion, no effusions. Joints stable   -- Neurological: No focal deficits. Showed good interaction with staff    Emergency Room Course     Treatment and Evaluation  -- The CT of the abdomen and pelvis was negative for acute pathology   -- The CBC drawn in the ER today was within normal limits   -- Lactic Acid drawn in the ER today was normal   -- Stool cultures pending    Abnormal lab values  -- Sodium 133 (*)   -- Potassium 3.2 (*)   -- CO2 17 (*)   -- Glucose 194 (*)   -- Albumin 3.1 (*)   -- eGFR if non  41 (*)     Medications Given  -- 0.9%  NaCl infusion    -- diphenoxylate-atropine 2.5-0.025 mg per tablet 2 tablet    -- sodium chloride 0.9% bolus 500 mL      ED Management  -- The patient has had diarrhea since this past weekend, worse the last 2 days  -- The patient has had 6 diaper changes in the ER with watery frequent diarrhea  -- Daughter states the patient has become listless and dehydrated at home  -- Patient now has a CO2 of 17, also has a bandemia of 32 on today's labs  -- Patient states she does not usually ambulate and care for herself in this condition  -- Observation  overnight with IV fluids with labs in the morning  -- Around-the-clock Lomotil 4 times a day with stool cultures pending    Diagnosis  -- Intractable diarrhea  -- Dehydration  -- Prerenal azotemia     Disposition and Plan  -- Disposition: observation  -- Condition: stable  -- Telemetry monitoring  -- Morning labs  -- SCD hoses  -- Home medications  -- Nausea medication when necessary  -- Pain medication when necessary  -- Clear liquid diet  -- Intravenous fluids  -- Routine monitoring  -- Bed rest until otherwise stated  -- Protonix for GERD prophylaxis  -- Lomotil 4 times a day for diarrhea  -- Blood cultures pending  -- Stool cultures pending     This note is dictated on Dragon Natural Speaking word recognition program.  There are word recognition mistakes that are occasionally missed on review.           Leonid Burroughs MD  01/26/17 6625

## 2017-01-27 NOTE — PROGRESS NOTES
"Food & Nutrition  INITITAL Note    Recommendation/Interventions:  1. Continue Clear Liquids advancing as tolerated to Diabetic, encouraging good intake   2. Monitor days on Clear Liquids and recommend nutrition support as needed    Goals: advancement diet intake  Nutrition Goal Status:new    Reason for Assessment: Identified at risk  Diagnosis: Intractable Diarrhea  Medical History: Diabetes, HTN, Anemia, DIONNE  Level of Care: medium    Pt with diarrhea and nausea. Per daughter pt consumed moldy sausage the other day and possibly food poisoning    Nutrition Risk Screen: other (diarrhea)    Anthropometrics:  Ht: 62.5"  Wt: 126.3kg  IBW: 113Ibs  %IBW: 247  ABW: 70kg  BMI: 50.08  BMI Grade: morbid obesity    Labs:     Recent Labs  Lab 01/27/17  0537   WBC 5.85   RBC 4.17   HGB 12.2   HCT 35.5*      MCV 85   MCH 29.3   MCHC 34.4       Recent Labs  Lab 01/27/17  0537   CALCIUM 8.0*   PROT 6.0   *   K 3.2*   CO2 19*      BUN 19   CREATININE 1.0   ALKPHOS 65   ALT 19   AST 21   BILITOT 0.8     Medications:   Current Facility-Administered Medications:     0.9%  NaCl infusion, , Intravenous, Continuous, Leonid Burroughs MD, Last Rate: 125 mL/hr at 01/27/17 0745    dextrose 50% injection 12.5 g, 12.5 g, Intravenous, PRN, Conchis L. Trosclair, NP    dextrose 50% injection 25 g, 25 g, Intravenous, PRN, Conchis L. Trosclair, NP    diphenoxylate-atropine 2.5-0.025 mg per tablet 2 tablet, 2 tablet, Oral, QID, Leonid Burroughs MD, 2 tablet at 01/27/17 1154    enoxaparin injection 40 mg, 40 mg, Subcutaneous, Daily, Conchis L. Trosclair, NP, 40 mg at 01/27/17 1153    gabapentin capsule 300 mg, 300 mg, Oral, TID, Conchis L. Trosclair, NP, 300 mg at 01/27/17 1403    glucagon (human recombinant) injection 1 mg, 1 mg, Intramuscular, PRN, Conchis L. Trosclair, NP    glucose chewable tablet 16 g, 16 g, Oral, PRN, Conchis L. Trosclair, NP    glucose chewable tablet 24 g, 24 g, Oral, PRN, Conchis Garrido, LIANA    influenza " vaccine 180 mcg/0.5 mL (for patients > 65) injection, 0.5 mL, Intramuscular, vaccine x 1 dose, Damaris Yoder MD    insulin aspart pen 22 Units, 22 Units, Subcutaneous, TIDWM, Leonid Burroughs MD, 22 Units at 01/27/17 1155    insulin detemir pen 25 Units, 25 Units, Subcutaneous, Daily, Leonid Burroughs MD, 25 Units at 01/27/17 0820    levothyroxine tablet 50 mcg, 50 mcg, Oral, Before breakfast, Leonid Burroughs MD, 50 mcg at 01/27/17 0655    metoprolol succinate (TOPROL-XL) 24 hr tablet 25 mg, 25 mg, Oral, Daily, Leonid Burroughs MD, 25 mg at 01/27/17 0820    omnipaque 350 iohexol 30 mL, 30 mL, Oral, ONCE PRN, Leonid Burroughs MD    ondansetron injection 4 mg, 4 mg, Intravenous, Q8H PRN, Leonid Burroughs MD    pantoprazole EC tablet 40 mg, 40 mg, Oral, Daily, Leonid Burroughs MD, 40 mg at 01/27/17 0820    potassium chloride SA CR tablet 40 mEq, 40 mEq, Oral, Daily, Willis Garza MD, 40 mEq at 01/27/17 1154    promethazine (PHENERGAN) 12.5 mg in dextrose 5 % 50 mL IVPB, 12.5 mg, Intravenous, Q6H PRN, Leonid Burroughs MD    vits A and D-white pet-lanolin ointment, , Topical (Top), PRN, Leonid Burroughs MD      Physical Findings:  Overall: overweight    Estimated Needs:  Wt used: 70kg  Energy Calorie Requirement: 2000(mifflin 1.2)  Protein Requirement: 80(1.0-1.2)  Fluid: 2000(1ml/kcal)    Current Diet:  Clear Liquids  Nutrition Order Comments: inadequate    Nutrition Risk: moderate    Nutrition Diagnosis:   Inadequate energy intake R/T diarrhea AEB intractable diarrhea, possible food poisoning  Status: new    Monitor & Evaluate:  Intake: food and beverage intake  Nutrition Administration: diet order  Knowledge/Beliefs/Attitutes: food and nutrition knowledge/skills  Physical Activity & Function: nutrition related ADL's  Anthropometric Measurements: weight/weight change  Biochemical Data, Medical Tests and Procedures: glucose/endocrine profile, electrolytes  Nutrition-Focused Physical Findings:  overall appearance    Nutrition Follow-Up: 01/31/2017

## 2017-01-27 NOTE — PLAN OF CARE
Problem: Patient Care Overview  Goal: Plan of Care Review  Outcome: Ongoing (interventions implemented as appropriate)  Diarrhea has decreased. Tolerating IV fluids. Call bell in reach and bed alarm in use. Plan of care discussed during shift and verbalizes understanding. PATSY Hill RN

## 2017-01-27 NOTE — PLAN OF CARE
Problem: Patient Care Overview  Goal: Plan of Care Review  Outcome: Ongoing (interventions implemented as appropriate)  Nutrition Recommendation/Interventions:  1. Continue Clear Liquids advancing as tolerated to Diabetic, encouraging good intake   2. Monitor days on Clear Liquids and recommend nutrition support as needed     Goals: advancement diet intake  Nutrition Goal Status:new

## 2017-01-27 NOTE — NURSING
C/O anxiety and wanting medications. Has had profound diarrhea stool since admit. Barrier cream being applied. Dr. Yoder made aware of anxiety with diarrhea and a Potassium of 3.2 orders noted. PATSY Hill RN

## 2017-01-27 NOTE — PROGRESS NOTES
Nursing Notes  Bedside report rec'd from NICKI Hill RN.  Pt AAOX3. NS @125ml/hr infusing to right hand 20g without difficulty. Pt c/o still having diarrhea. Limb alert bracelet to left arm due to mastectomy. Fall and allergy bracelet also present. Special  Contact isolation maintained with isolation and latex allergy carts at door.      Huddle Comments

## 2017-01-27 NOTE — NURSING
"POCT glucose 66 with recheck.    Pt asymptomatic, does not want to take glucose tablet as ordered, supper tray here and served. Pt will eat what is served.   Daughter at bedside states "she will eat everything, I promise."  "

## 2017-01-27 NOTE — PT/OT/SLP EVAL
"Physical Therapy  Evaluation    Narda Person   MRN: [id number]   Admitting Diagnosis: Intractable diarrhea    PT Received On: 17  PT Start Time: 1105     PT Stop Time: 1130    PT Total Time (min): 25 min       Billable Minutes:  Evaluation 15 minutes and Therapeutic Exercise 10 minutes    Diagnosis: Intractable diarrhea      Past Medical History   Diagnosis Date    Abnormal Pap smear     DIONNE (acute kidney injury) 2014    DIONNE (acute kidney injury) 2014    Anemia     Breast disorder     Diabetes mellitus     Diabetes mellitus, type 2     ESSENTIAL HYPERTENSION 10/1/2012    Hypothyroid 2014      Past Surgical History   Procedure Laterality Date    Hysterectomy      Masectomy       single left breast       Referring physician: Conchis Garrido NP  Date referred to PT: 2017    General Precautions: Standard, fall, contact  Orthopedic Precautions: N/A     Patient History:  Lives With: child(lou), adult  Living Arrangements: house  Equipment Currently Used at Home: cane, straight, rollator, bedside commode  DME owned (not currently used): rolling walker, single point cane and bedside commode    Previous Level of Function:  Ambulation Skills: independent  Transfer Skills: independent  ADL Skills: independent  Work/Leisure Activity: independent    Subjective:  Communicated with patient and NSG prior to session.    Chief Complaint: "I cannot stop going to the bathroom"  Patient goals: "Go home"    Pain Ratin/10                    Objective:   Patient found with: peripheral IV     Cognitive Exam:  Oriented to: Person, Place, Time and Situation    Follows Commands/attention: Follows multistep  commands  Communication: clear/fluent  Safety awareness/insight to disability: intact    Physical Exam:    Skin integrity: Visible skin intact  Edema: None noted     Sensation:   Intact    Upper Extremity Range of Motion:  Right Upper Extremity: WNL  Left Upper Extremity: WNL    Upper Extremity " Strength:  Right Upper Extremity: WFL  Left Upper Extremity: WFL    Lower Extremity Range of Motion:  Right Lower Extremity: WNL  Left Lower Extremity: WNL    Lower Extremity Strength:  Right Lower Extremity: WFL  Left Lower Extremity: WFL     Fine motor coordination:  Intact    Gross motor coordination: WFL    Functional Mobility:  Bed Mobility:  Rolling/Turning to Left: Minimum assistance  Rolling/Turning Right: Minimum assistance  Scooting/Bridging: Minimum Assistance  Supine to Sit: Minimum Assistance  Sit to Supine: Minimum Assistance    Transfers: unable to assess       Gait: unable to assess       Balance:   Static Sit: FAIR: Maintains without assist, but unable to take any challenges   Dynamic Sit: FAIR+: Maintains balance through MINIMAL excursions of active trunk motion    Therapeutic Activities and Exercises:  Pt performed therapeutic exercises on BUE/BLE at all joints in available planes of motion with rest breaks as needed to increase strength and endurance with all gross mobility skills.    AM-PAC 6 CLICK MOBILITY  How much help from another person does this patient currently need?   1 = Unable, Total/Dependent Assistance  2 = A lot, Maximum/Moderate Assistance  3 = A little, Minimum/Contact Guard/Supervision  4 = None, Modified [place]/Independent    Turning over in bed (including adjusting bedclothes, sheets and blankets)?: 2  Sitting down on and standing up from a chair with arms (e.g., wheelchair, bedside commode, etc.): 1  Moving from lying on back to sitting on the side of the bed?: 2  Moving to and from a bed to a chair (including a wheelchair)?: 1  Need to walk in hospital room?: 1  Climbing 3-5 steps with a railing?: 1  Total Score: 8     AM-PAC Raw Score CMS G-Code Modifier Level of Impairment Assistance   6 % Total / Unable   7 - 9 CM 80 - 100% Maximal Assist   10 - 14 CL 60 - 80% Moderate Assist   15 - 19 CK 40 - 60% Moderate Assist   20 - 22 CJ 20 - 40% Minimal Assist   23 CI  1-20% SBA / CGA   24 CH 0% Independent/ Mod I     Patient left supine with all lines intact, call button in reach and NSG notified.    Assessment:     Factors that may affect patient's status:  [] Patient's age [] Time since onset of injury/illness/exacerbation  [x]Prior Level of function       [] Current level of function [x] Status of current condition []Patient's cognitive status and safety concerns      [] Patient's level of motivation    [] Patient's home situation (environment and family support)  [] Objective examination findings [] Goals and goal agreement with the patient        [] Rehab potential (prognosis) and probable outcome    [] Expected progression of patient    Criteria:     History:    1-2 Personal Factors and/or co-morbidity - 74012  Examination of Body System:  addressing 1-2 elements - 90598  Clinical Presentation:  Evolving - 97993  Clinical Decision Making (Complexity):  Moderate - 09977      On examination of body system using standardized tests and measures patient presents with 1-2 elements from any of the following: body structures and functions, activity limitations, and/or participation restrictions.  Leading to a clinical presentation that is considered evolving with changing characteristics    Narda Person is a 71 y.o. female with a medical diagnosis of Intractable diarrhea and presents with mild weakness in BUE/BLE and reduced functional activity tolerance. Pt with decline from independent PLOF. Pt with increased fall risk. Pt refused to attempt OOB T/F or ambulation upon evaluation secondary to diarrhea every time she attempts to move per pt report. Will assess T/F and ambulation when pt able to cooperate.    Rehab identified problem list/impairments: Rehab identified problem list/impairments: weakness, impaired endurance, impaired self care skills, impaired functional mobilty, gait instability, impaired balance, decreased lower extremity function, decreased safety awareness,  decreased upper extremity function    Rehab potential is good.    Activity tolerance: Good    Discharge recommendations: Discharge Facility/Level Of Care Needs: home     Barriers to discharge: Barriers to Discharge: None    Equipment recommendations: Equipment Needed After Discharge: none     GOALS:   Physical Therapy Goals        Problem: Physical Therapy Goal    Goal Priority Disciplines Outcome Goal Variances Interventions   Physical Therapy Goal     PT/OT, PT      Description:  Goals to be met by: upon d/C from facility     Patient will increase functional independence with mobility by performin. Supine to sit with [place]  2. Sit to supine with [place]  3. Sit to stand transfer with [place]  4. Bed to chair transfer with [place]   5. Gait  x 150 feet with Supervision using Rolling Walker.                 PLAN:    Patient to be seen  (1-2x/day Monday-Friday; PRN Weekends/Holidays) to address the above listed problems via therapeutic activities, therapeutic exercises, gait training  Plan of Care expires:  (upon d/C from facility)  Plan of Care reviewed with: patient          Humera Quinteros, PT  2017

## 2017-01-27 NOTE — PLAN OF CARE
01/27/17 0933   Discharge Assessment   Assessment Type Discharge Planning Assessment   Confirmed/corrected address and phone number on facesheet? Yes   Assessment information obtained from? Patient   Expected Length of Stay (days) 2   Type of Healthcare Directive Received (Declined)   Prior to hospitilization cognitive status: Alert/Oriented   Prior to hospitalization functional status: Assistive Equipment   Current cognitive status: Alert/Oriented   Current Functional Status: Assistive Equipment   Arrived From home or self-care   Lives With child(lou), adult   Able to Return to Prior Arrangements yes   Is patient able to care for self after discharge? Yes   How many people do you have in your home that can help with your care after discharge? 2   Who are your caregiver(s) and their phone number(s)? Daughter Marily Person [phone], and grandterra Ramirez.   Patient's perception of discharge disposition home or selfcare;home health   Readmission Within The Last 30 Days no previous admission in last 30 days   Patient currently being followed by outpatient case management? No   Patient currently receives [facility] health [facility]? [facility]  [facility])   Patient previously received home health services and would like to resume services if necessary? Yes   If yes, name of home health provider: [facility]   Does the patient currently use HME? Yes   Name and contact number for HME provider: Unknown   Patient currently receives private duty nursing? No   Equipment Currently Used at Home rollator;cane, straight;bedside commode   Do you have any problems affording any of your prescribed medications? No   Is the patient taking medications as prescribed? yes   Do you have any financial concerns preventing you from receiving the healthcare you need? No   Does the patient have transportation to healthcare appointments? Yes   Transportation Available family or friend will provide   On Dialysis? No   Does the  patient receive services at the Coumadin Clinic? No   Are there any open cases? No   Discharge Plan A Home with family   Discharge Plan B Home with family   Patient/Family In Agreement With Plan yes   Patient is a 71 year old female coming to the hospital from home where her daughter Marily and grandson James live with her.  Patient is alert and engages in conversation.  She states she has a lot of family and they take her to her doctors appointments.  She was informed of her observation status and given a copy of the MOON form.  She was also given the discharge planning check list to review.  Patient has [facility] and will benefit to resume.  No other discharge needs.

## 2017-01-27 NOTE — H&P
[facility] Medicine  History & Physical    Patient Name: Narda Person  MRN: [id number]  Admission Date: 1/26/2017  Attending Physician: Damaris Yoder MD   Primary Care Provider: Apolonia Sullivan NP         Patient information was obtained from patient and ER records.     Subjective:     Principal Problem:Intractable diarrhea    Chief Complaint:  diarrhea     HPI: Narda Person, 72 y/o female, presents to the emergency room with diarrhea for the past week. Patient was seen in the emergency room on January 23 with diarrhea/dehydration. Patient ate some moldy sausage with possible food poisoning per daughter at bedside. Patient was given IV fluids as well as a prescription for Imodium, diarrhea not better. Daughter states the pt has been having diarrhea around-the-clock, 10 times today. Admits black at first. Pt states she cannot move without profuse watery diarrhea, feels very dehydrated. Pt has a temperature 99.8°F and is nauseated with the diarrhea but no vomiting in ER.  Patient presents listless/feels dehydrated, her diaper was changed 7 times in ER.      Past Medical History   Diagnosis Date    Abnormal Pap smear     DIONNE (acute kidney injury) 6/29/2014    DIONNE (acute kidney injury) 6/29/2014    Anemia     Breast disorder     Diabetes mellitus     Diabetes mellitus, type 2     ESSENTIAL HYPERTENSION 10/1/2012    Hypothyroid 6/29/2014       Past Surgical History   Procedure Laterality Date    Hysterectomy      Masectomy       single left breast       Review of patient's allergies indicates:   Allergen Reactions    Percocet [oxycodone-acetaminophen] Itching    Percodan [oxycodone hcl-oxycodone-asa] Itching     Other reaction(s): Unknown    Latex, natural rubber Rash    Phenytoin sodium extended      Other reaction(s): Unknown    Sutures, catgut      Infections to sutures     Adhesive Rash    Adhesive tape-silicones Rash       No current facility-administered  medications on file prior to encounter.      Current Outpatient Prescriptions on File Prior to Encounter   Medication Sig    atorvastatin (LIPITOR) 20 MG tablet Take 1 tablet (20 mg total) by mouth once daily.    blood sugar diagnostic Strp Accu-chek khadra testing strips use to test sugars AC/HS    blood-glucose meter (ACCU-CHEK KHADRA) Misc Use to test sugars AC/HS daily    dicyclomine (BENTYL) 20 mg tablet Take 1 tablet (20 mg total) by mouth 2 (two) times daily.    donepezil (ARICEPT) 10 MG tablet Take 1 tablet (10 mg total) by mouth as directed. Take 1/2 tablet each night x 2 weeks, then take 1 tablet each night afterwards    gabapentin (NEURONTIN) 300 MG capsule TAKE ONE CAPSULE BY MOUTH 4 TIMES A DAY (Patient taking differently: Take 300 mg by mouth 3 (three) times daily. )    hydrochlorothiazide (HYDRODIURIL) 25 MG tablet TAKE ONE TABLET BY MOUTH EVERY DAY    insulin aspart (NOVOLOG FLEXPEN) 100 unit/mL InPn pen INJECT 22 UNITS INTO THE SKIN 3 (THREE) TIMES DAILY WITH MEALS.    insulin detemir (LEVEMIR FLEXTOUCH) 100 unit/mL (3 mL) SubQ InPn pen INJECT 25 UNITS INTO THE SKIN 2 TIMES A DAY    insulin syringe-needle U-100 0.3 mL 30 Syrg Use with Levemir vial BID    JANUVIA 50 mg Tab TAKE 1 TABLET BY MOUTH EVERY DAY    lactulose (CHRONULAC) 10 gram/15 mL solution Take by mouth daily as needed.     lancets (ACCU-CHEK SOFTCLIX LANCETS) Misc Use to test sugars AC/HS    levothyroxine (SYNTHROID) 50 MCG tablet Take 1 tablet (50 mcg total) by mouth once daily.    loperamide (IMODIUM) 2 mg capsule Take 1 capsule (2 mg total) by mouth 4 (four) times daily as needed for Diarrhea.    meclizine (ANTIVERT) 25 mg tablet TAKE ONE TABLET BY MOUTH TWICE A DAY AS NEEDED FOR DIZZINESS OR NAUSEA    melatonin 5 mg Tab Take 5 mg by mouth nightly as needed.     metoprolol succinate (TOPROL-XL) 25 MG 24 hr tablet Take 25 mg by mouth once daily.    naproxen sodium 220 mg Cap Take 2 tablets by mouth daily as needed.     "omeprazole (PRILOSEC) 20 MG capsule Take 1 capsule (20 mg total) by mouth 2 (two) times daily.    ondansetron (ZOFRAN) 4 MG tablet Take 1 tablet (4 mg total) by mouth every 8 (eight) hours as needed for Nausea.    pen needle, diabetic (LITE TOUCH INSULIN PEN NEEDLES) 31 gauge x 3/16" Ndle 1 application by Misc.(Non-Drug; Combo Route) route 4 (four) times daily.    pen needle, diabetic 32 gauge x 5/32" Ndle 1 Device by Misc.(Non-Drug; Combo Route) route as directed. Needles to be used with Novolog pens and Levemir pens daily.    ramipril (ALTACE) 10 MG capsule TAKE ONE CAPSULE BY MOUTH EVERY DAY     Family History     Problem Relation (Age of Onset)    Cancer Mother    Depression Daughter    Diabetes Mother, Sister    Heart attack Mother (66), Son (38)    Heart disease Sister, Brother, Brother    Hypertension Mother, Daughter, Son    Thyroid disease Daughter        Social History Main Topics    Smoking status: Former Smoker     Packs/day: 1.00     Years: 10.00     Types: Cigarettes     Quit date: 1/1/1988    Smokeless tobacco: Never Used    Alcohol use No    Drug use: No    Sexual activity: No     Review of Systems   Constitutional: Positive for chills, diaphoresis, fatigue (low grade) and fever.   HENT: Negative for congestion, postnasal drip, rhinorrhea, sinus pressure and sore throat.    Respiratory: Negative for cough, shortness of breath and wheezing.    Gastrointestinal: Positive for abdominal distention, abdominal pain and diarrhea. Negative for blood in stool, constipation, nausea and vomiting.   Genitourinary: Negative for difficulty urinating, dysuria, frequency and hematuria. Vaginal pain: generalized.   Musculoskeletal: Positive for arthralgias. Negative for back pain, joint swelling and myalgias.   Neurological: Positive for weakness. Negative for dizziness, numbness and headaches.     Objective:     Vital Signs (Most Recent):  Temp: 97.6 °F (36.4 °C) (01/27/17 0757)  Pulse: 75 (01/27/17 " 0757)  Resp: 20 (01/27/17 0757)  BP: (!) 132/59 (01/27/17 0757)  SpO2: 98 % (01/27/17 0757) Vital Signs (24h Range):  Temp:  [97 °F (36.1 °C)-99.8 °F (37.7 °C)] 97.6 °F (36.4 °C)  Pulse:  [] 75  Resp:  [16-20] 20  SpO2:  [98 %] 98 %  BP: ()/(44-59) 132/59     Weight: 126.3 kg (278 lb 6.4 oz)  Body mass index is 50.11 kg/(m^2).    Physical Exam   Constitutional: She is oriented to person, place, and time. She appears well-developed and well-nourished.   HENT:   Head: Normocephalic and atraumatic.   Right Ear: External ear normal.   Left Ear: External ear normal.   Nose: Nose normal.   Mouth/Throat: Oropharynx is clear and moist.   Dry MM   Eyes: EOM are normal. Pupils are equal, round, and reactive to light.   Neck: Normal range of motion. Neck supple. No JVD present. No tracheal deviation present. No thyromegaly present.   Cardiovascular: Normal rate, normal heart sounds and intact distal pulses.    No murmur heard.  Pulmonary/Chest: Effort normal and breath sounds normal. No respiratory distress. She has no wheezes. She has no rales. She exhibits no tenderness.   Abdominal: Soft. Bowel sounds are normal. She exhibits no distension and no mass. There is no tenderness. There is no rebound and no guarding.   Musculoskeletal: Normal range of motion. She exhibits no edema or tenderness.   Lymphadenopathy:     She has no cervical adenopathy.   Neurological: She is alert and oriented to person, place, and time. She has normal reflexes. She displays normal reflexes. No cranial nerve deficit. She exhibits normal muscle tone. Coordination normal.   Skin: Skin is warm and dry. No rash noted. No erythema. No pallor.   Psychiatric: She has a normal mood and affect. Her behavior is normal. Judgment and thought content normal.        Significant Labs:   CBC:   Recent Labs  Lab 01/26/17  1822 01/27/17  0537   WBC 6.91 5.85   HGB 14.7 12.2   HCT 41.6 35.5*    198     CMP:   Recent Labs  Lab 01/26/17  1306  01/27/17  0537   * 132*   K 3.2* 3.2*    102   CO2 17* 19*   * 210*   BUN 17 19   CREATININE 1.3 1.0   CALCIUM 8.7 8.0*   PROT 6.9 6.0   ALBUMIN 3.1* 2.8*   BILITOT 0.7 0.8   ALKPHOS 71 65   AST 26 21   ALT 24 19   ANIONGAP 15 11   EGFRNONAA 41* 56*       Significant Imaging: I have reviewed and interpreted all pertinent imaging results/findings within the past 24 hours.     CT ABD: neg    Assessment/Plan:     * Intractable diarrhea  Admit to obs for IVF-change to inpt therapy  Stool studies pending  Clear liq diet      Neuropathy in diabetes  Gabapentin 300 mg po tid  D/c scd's as this is worsening leg pain  OK for proph lovenox    Type 2 diabetes, uncontrolled, with neuropathy  Insulin resumed  Januvia on hold  Accu checks q shift    Lab Results   Component Value Date    HGBA1C 9.8 (H) 08/10/2015           Essential hypertension  ACE on hold  BB continued    Gastroesophageal reflux disease without esophagitis  protonix daily      Pure hypercholesterolemia  Statin on hold      Hypothyroidism due to acquired atrophy of thyroid  Synthroid 50 mcg daily      Memory loss  HOLD donepezil 10 mg daily-may worsen diarrhea      Hypokalemia  Bmp daily  Give 40 meq po on admit, repeat this am      Hyponatremia  Bmp daily, continue to monitor      Acute renal insufficiency  Bmp daily, much better this am      VTE Risk Mitigation         Ordered     enoxaparin injection 40 mg  Daily     Route:  Subcutaneous        01/27/17 1005     Medium Risk of VTE  Once      01/26/17 2300        Willis Garza MD  Department of Hospital Medicine   [facility]

## 2017-01-27 NOTE — ASSESSMENT & PLAN NOTE
Insulin resumed  Januvia on hold  Accu checks q shift    Lab Results   Component Value Date    HGBA1C 9.8 (H) 08/10/2015

## 2017-01-27 NOTE — ED NOTES
Meds given for diarrhea. Diarrhea stool noted; green and very watery. Nelli-rectal care given; buttocks with some redness due to prolonged stool exposure at home.  New diaper applied.

## 2017-01-27 NOTE — PLAN OF CARE
Problem: Patient Care Overview  Goal: Plan of Care Review  Outcome: Ongoing (interventions implemented as appropriate)  Free of falls/injury.  Fall reduction program  Maintained. She continues with diarrhea and continues on Lomotil.  Special contact isolation maintained. Potassium 3.2 in which oral potassium was given. Remains on IVFs and is tolerating clear liquids. Is on scheduled insulin and glucometer checks. Patient is able to position herself. For safety reasons she is using the bedpan at present.  Will progress to bedside commode as tolerated with strength.  Plan of care to continue as is and she agrees with plan.

## 2017-01-27 NOTE — PROGRESS NOTES
Patient has [facility] ([phone]).  They have been notified patient is here.  Patient states they were to come see her today.

## 2017-01-27 NOTE — PT/OT/SLP PROGRESS
Physical Therapy      Narda Person  MRN: [id number]    Patient not seen today secondary to Patient unwilling to participate (Pt c/o not feeling well and jittery. NSG notified.). Will follow-up 01/28/2017.    Humera Quinteros, PT

## 2017-01-27 NOTE — NURSING
Admitted to Rm 302 from ER. Report from Santy Bolanos RN. Stable condition. Skin warm and dry. Saline lock right hand intact with IV solutions initiated. Hyperactive bowl sounds. Had one large dark green diarrhea on admit. Cleanse and barrier cream applied. Room orientation and plan  of care discussed with daughter and patient and verbalizes understanding. PATSY Hill RN

## 2017-01-28 LAB
ANION GAP SERPL CALC-SCNC: 8 MMOL/L
BILIRUB UR QL STRIP: NEGATIVE
BUN SERPL-MCNC: 6 MG/DL
CALCIUM SERPL-MCNC: 8.2 MG/DL
CHLORIDE SERPL-SCNC: 112 MMOL/L
CLARITY UR: CLEAR
CO2 SERPL-SCNC: 18 MMOL/L
COLOR UR: YELLOW
CREAT SERPL-MCNC: 0.7 MG/DL
EST. GFR  (AFRICAN AMERICAN): >60 ML/MIN/1.73 M^2
EST. GFR  (NON AFRICAN AMERICAN): >60 ML/MIN/1.73 M^2
GLUCOSE SERPL-MCNC: 90 MG/DL
GLUCOSE UR QL STRIP: ABNORMAL
HGB UR QL STRIP: NEGATIVE
KETONES UR QL STRIP: NEGATIVE
LEUKOCYTE ESTERASE UR QL STRIP: NEGATIVE
NITRITE UR QL STRIP: NEGATIVE
PH UR STRIP: 6 [PH] (ref 5–8)
POCT GLUCOSE: 104 MG/DL (ref 70–110)
POCT GLUCOSE: 120 MG/DL (ref 70–110)
POCT GLUCOSE: 227 MG/DL (ref 70–110)
POCT GLUCOSE: 266 MG/DL (ref 70–110)
POTASSIUM SERPL-SCNC: 4.4 MMOL/L
PROT UR QL STRIP: NEGATIVE
SODIUM SERPL-SCNC: 138 MMOL/L
SP GR UR STRIP: 1.02 (ref 1–1.03)
URN SPEC COLLECT METH UR: ABNORMAL
UROBILINOGEN UR STRIP-ACNC: NEGATIVE EU/DL

## 2017-01-28 PROCEDURE — 63600175 PHARM REV CODE 636 W HCPCS: Performed by: FAMILY MEDICINE

## 2017-01-28 PROCEDURE — 25000003 PHARM REV CODE 250: Performed by: FAMILY MEDICINE

## 2017-01-28 PROCEDURE — 82962 GLUCOSE BLOOD TEST: CPT

## 2017-01-28 PROCEDURE — 11000001 HC ACUTE MED/SURG PRIVATE ROOM

## 2017-01-28 PROCEDURE — 80048 BASIC METABOLIC PNL TOTAL CA: CPT

## 2017-01-28 PROCEDURE — 27000339 *HC DAILY SUPPLY KIT

## 2017-01-28 PROCEDURE — 99232 SBSQ HOSP IP/OBS MODERATE 35: CPT | Mod: ,,, | Performed by: FAMILY MEDICINE

## 2017-01-28 PROCEDURE — 97116 GAIT TRAINING THERAPY: CPT

## 2017-01-28 PROCEDURE — 63600175 PHARM REV CODE 636 W HCPCS: Performed by: NURSE PRACTITIONER

## 2017-01-28 PROCEDURE — 87449 NOS EACH ORGANISM AG IA: CPT

## 2017-01-28 PROCEDURE — 81003 URINALYSIS AUTO W/O SCOPE: CPT

## 2017-01-28 PROCEDURE — 36415 COLL VENOUS BLD VENIPUNCTURE: CPT

## 2017-01-28 PROCEDURE — 25000003 PHARM REV CODE 250: Performed by: SURGERY

## 2017-01-28 PROCEDURE — 25000003 PHARM REV CODE 250: Performed by: NURSE PRACTITIONER

## 2017-01-28 RX ORDER — INSULIN ASPART 100 [IU]/ML
11 INJECTION, SOLUTION INTRAVENOUS; SUBCUTANEOUS ONCE
Status: COMPLETED | OUTPATIENT
Start: 2017-01-28 | End: 2017-01-28

## 2017-01-28 RX ADMIN — POTASSIUM CHLORIDE 40 MEQ: 1500 TABLET, EXTENDED RELEASE ORAL at 08:01

## 2017-01-28 RX ADMIN — SODIUM CHLORIDE: 0.9 INJECTION, SOLUTION INTRAVENOUS at 08:01

## 2017-01-28 RX ADMIN — METOPROLOL SUCCINATE 25 MG: 25 TABLET, EXTENDED RELEASE ORAL at 08:01

## 2017-01-28 RX ADMIN — PANTOPRAZOLE SODIUM 40 MG: 40 TABLET, DELAYED RELEASE ORAL at 08:01

## 2017-01-28 RX ADMIN — DIPHENOXYLATE HYDROCHLORIDE AND ATROPINE SULFATE 2 TABLET: 2.5; .025 TABLET ORAL at 05:01

## 2017-01-28 RX ADMIN — INSULIN DETEMIR 25 UNITS: 100 INJECTION, SOLUTION SUBCUTANEOUS at 08:01

## 2017-01-28 RX ADMIN — INSULIN ASPART 22 UNITS: 100 INJECTION, SOLUTION INTRAVENOUS; SUBCUTANEOUS at 08:01

## 2017-01-28 RX ADMIN — GABAPENTIN 300 MG: 300 CAPSULE ORAL at 03:01

## 2017-01-28 RX ADMIN — INSULIN ASPART 11 UNITS: 100 INJECTION, SOLUTION INTRAVENOUS; SUBCUTANEOUS at 05:01

## 2017-01-28 RX ADMIN — DIPHENOXYLATE HYDROCHLORIDE AND ATROPINE SULFATE 2 TABLET: 2.5; .025 TABLET ORAL at 11:01

## 2017-01-28 RX ADMIN — SODIUM CHLORIDE: 0.9 INJECTION, SOLUTION INTRAVENOUS at 04:01

## 2017-01-28 RX ADMIN — INSULIN ASPART 22 UNITS: 100 INJECTION, SOLUTION INTRAVENOUS; SUBCUTANEOUS at 11:01

## 2017-01-28 RX ADMIN — GABAPENTIN 300 MG: 300 CAPSULE ORAL at 09:01

## 2017-01-28 RX ADMIN — DIPHENOXYLATE HYDROCHLORIDE AND ATROPINE SULFATE 2 TABLET: 2.5; .025 TABLET ORAL at 06:01

## 2017-01-28 RX ADMIN — LEVOTHYROXINE SODIUM 50 MCG: 50 TABLET ORAL at 06:01

## 2017-01-28 RX ADMIN — ENOXAPARIN SODIUM 40 MG: 40 INJECTION, SOLUTION INTRAVENOUS; SUBCUTANEOUS at 11:01

## 2017-01-28 RX ADMIN — GABAPENTIN 300 MG: 300 CAPSULE ORAL at 06:01

## 2017-01-28 NOTE — PROGRESS NOTES
"Nursing Notes  Bedside report rec'd from ARACELIS Montoya RN.  Pt AAOX3. NS @125ml/hr infusing to right hand 20g without difficulty. Pt c/o still having diarrhea. Limb alert bracelet to left arm due to mastectomy. Fall and allergy bracelet also present.   Bed alarm engaged. Requesting "real food. I can't drink liquids anymore"      Huddle Comments    "

## 2017-01-28 NOTE — NURSING
Spoke with Dr Garza regarding blood sugar 104 before supper.  Aspart not given at that time.  Had regular food for supper.  He wants only 11 units Aspart given now X1

## 2017-01-28 NOTE — ASSESSMENT & PLAN NOTE
Admit to obs for IVF-change to inpt therapy  Stool studies pending  Clear liq diet. Advance to BRAT

## 2017-01-28 NOTE — PROGRESS NOTES
[facility] Medicine  Progress Note    Patient Name: Narda Person  MRN: [id number]  Patient Class: IP- Inpatient   Admission Date: 1/26/2017  Length of Stay: 1 days  Attending Physician: Damaris Yoder MD  Primary Care Provider: Apolonia Sullivan NP        Subjective:     Principal Problem:Intractable diarrhea    HPI:  Narda Person, 70 y/o female, presents to the emergency room with diarrhea for the past week. Patient was seen in the emergency room on January 23 with diarrhea/dehydration. Patient ate some moldy sausage with possible food poisoning per daughter at bedside. Patient was given IV fluids as well as a prescription for Imodium, diarrhea not better. Daughter states the pt has been having diarrhea around-the-clock, 10 times today. Admits black at first. Pt states she cannot move without profuse watery diarrhea, feels very dehydrated. Pt has a temperature 99.8°F and is nauseated with the diarrhea but no vomiting in ER.  Patient presents listless/feels dehydrated, her diaper was changed 7 times in ER.      Hospital Course:  Pt placed in obs on the floor on NS @ 125 ml/hour. She is a IDDM, BS from 194-225. On clear liq diet. CBC reviewed, pt with 32 bands yesterday, CBC with no bands found. Phoned [facility], they will check slide for bands this am.     Pt had 4 large watery stools last night, 1 small liquid stool.     Continues with loose stools. Several overnight and this am. On clears       Interval History: see HC    Review of Systems   Constitutional: Negative for chills and fever.   Gastrointestinal: Positive for diarrhea.   Neurological: Positive for dizziness.     Objective:     Vital Signs (Most Recent):  Temp: 99 °F (37.2 °C) (01/28/17 1530)  Pulse: 75 (01/28/17 1600)  Resp: 18 (01/28/17 1530)  BP: (!) 104/55 (01/28/17 1530)  SpO2: 97 % (01/28/17 1530) Vital Signs (24h Range):  Temp:  [97.4 °F (36.3 °C)-99 °F (37.2 °C)] 99 °F (37.2 °C)  Pulse:  [75-86] 75  Resp:  [16-18]  18  SpO2:  [94 %-98 %] 97 %  BP: (104-155)/(53-95) 104/55     Weight: 126.3 kg (278 lb 6.4 oz)  Body mass index is 50.11 kg/(m^2).    Intake/Output Summary (Last 24 hours) at 01/28/17 1649  Last data filed at 01/28/17 1430   Gross per 24 hour   Intake          4221.25 ml   Output                0 ml   Net          4221.25 ml      Physical Exam   Constitutional: She is oriented to person, place, and time. She appears well-developed and well-nourished.   HENT:   Head: Normocephalic and atraumatic.   Right Ear: External ear normal.   Left Ear: External ear normal.   Nose: Nose normal.   Mouth/Throat: Oropharynx is clear and moist.   Eyes: EOM are normal. Pupils are equal, round, and reactive to light.   Neck: Normal range of motion. Neck supple. No JVD present. No tracheal deviation present. No thyromegaly present.   Cardiovascular: Normal rate, normal heart sounds and intact distal pulses.    No murmur heard.  Pulmonary/Chest: Effort normal and breath sounds normal. No respiratory distress. She has no wheezes. She has no rales. She exhibits no tenderness.   Abdominal: Soft. Bowel sounds are normal. She exhibits no distension and no mass. There is no tenderness. There is no rebound and no guarding.   Musculoskeletal: Normal range of motion. She exhibits no edema or tenderness.   Lymphadenopathy:     She has no cervical adenopathy.   Neurological: She is alert and oriented to person, place, and time. She has normal reflexes. She displays normal reflexes. No cranial nerve deficit. She exhibits normal muscle tone. Coordination normal.   Skin: Skin is warm and dry. No rash noted. No erythema. No pallor.   Psychiatric: She has a normal mood and affect. Her behavior is normal. Judgment and thought content normal.       Significant Labs:   CBC:   Recent Labs  Lab 01/26/17 1822 01/27/17  0537   WBC 6.91 5.85   HGB 14.7 12.2   HCT 41.6 35.5*    198     CMP:   Recent Labs  Lab 01/26/17  1822 01/27/17  0537 01/28/17  1502    * 132* 138   K 3.2* 3.2* 4.4    102 112*   CO2 17* 19* 18*   * 210* 90   BUN 17 19 6*   CREATININE 1.3 1.0 0.7   CALCIUM 8.7 8.0* 8.2*   PROT 6.9 6.0  --    ALBUMIN 3.1* 2.8*  --    BILITOT 0.7 0.8  --    ALKPHOS 71 65  --    AST 26 21  --    ALT 24 19  --    ANIONGAP 15 11 8   EGFRNONAA 41* 56* >60       Significant Imaging: I have reviewed and interpreted all pertinent imaging results/findings within the past 24 hours.    Assessment/Plan:      * Intractable diarrhea  Admit to obs for IVF-change to inpt therapy  Stool studies pending  Clear liq diet. Advance to BRAT       Neuropathy in diabetes  Gabapentin 300 mg po tid  D/c scd's as this is worsening leg pain  OK for proph lovenox    Type 2 diabetes, uncontrolled, with neuropathy  Insulin resumed  Januvia on hold  Accu checks q shift    Lab Results   Component Value Date    HGBA1C 9.8 (H) 08/10/2015           Essential hypertension  ACE on hold  BB continued    Gastroesophageal reflux disease without esophagitis  protonix daily      Pure hypercholesterolemia  Statin on hold      Hypothyroidism due to acquired atrophy of thyroid  Synthroid 50 mcg daily      Memory loss  HOLD donepezil 10 mg daily-may worsen diarrhea      Hypokalemia  Resolved Bmp daily      Hyponatremia  Bmp daily, continue to monitor      Acute renal insufficiency  Bmp daily, much better this am      VTE Risk Mitigation         Ordered     enoxaparin injection 40 mg  Daily     Route:  Subcutaneous        01/27/17 1005     Medium Risk of VTE  Once      01/26/17 2300          Willis Garza MD  Department of Hospital Medicine   [facility]

## 2017-01-28 NOTE — SUBJECTIVE & OBJECTIVE
Interval History: see     Review of Systems   Constitutional: Negative for chills and fever.   Gastrointestinal: Positive for diarrhea.   Neurological: Positive for dizziness.     Objective:     Vital Signs (Most Recent):  Temp: 99 °F (37.2 °C) (01/28/17 1530)  Pulse: 75 (01/28/17 1600)  Resp: 18 (01/28/17 1530)  BP: (!) 104/55 (01/28/17 1530)  SpO2: 97 % (01/28/17 1530) Vital Signs (24h Range):  Temp:  [97.4 °F (36.3 °C)-99 °F (37.2 °C)] 99 °F (37.2 °C)  Pulse:  [75-86] 75  Resp:  [16-18] 18  SpO2:  [94 %-98 %] 97 %  BP: (104-155)/(53-95) 104/55     Weight: 126.3 kg (278 lb 6.4 oz)  Body mass index is 50.11 kg/(m^2).    Intake/Output Summary (Last 24 hours) at 01/28/17 1649  Last data filed at 01/28/17 1430   Gross per 24 hour   Intake          4221.25 ml   Output                0 ml   Net          4221.25 ml      Physical Exam   Constitutional: She is oriented to person, place, and time. She appears well-developed and well-nourished.   HENT:   Head: Normocephalic and atraumatic.   Right Ear: External ear normal.   Left Ear: External ear normal.   Nose: Nose normal.   Mouth/Throat: Oropharynx is clear and moist.   Eyes: EOM are normal. Pupils are equal, round, and reactive to light.   Neck: Normal range of motion. Neck supple. No JVD present. No tracheal deviation present. No thyromegaly present.   Cardiovascular: Normal rate, normal heart sounds and intact distal pulses.    No murmur heard.  Pulmonary/Chest: Effort normal and breath sounds normal. No respiratory distress. She has no wheezes. She has no rales. She exhibits no tenderness.   Abdominal: Soft. Bowel sounds are normal. She exhibits no distension and no mass. There is no tenderness. There is no rebound and no guarding.   Musculoskeletal: Normal range of motion. She exhibits no edema or tenderness.   Lymphadenopathy:     She has no cervical adenopathy.   Neurological: She is alert and oriented to person, place, and time. She has normal reflexes. She  displays normal reflexes. No cranial nerve deficit. She exhibits normal muscle tone. Coordination normal.   Skin: Skin is warm and dry. No rash noted. No erythema. No pallor.   Psychiatric: She has a normal mood and affect. Her behavior is normal. Judgment and thought content normal.       Significant Labs:   CBC:   Recent Labs  Lab 01/26/17 1822 01/27/17  0537   WBC 6.91 5.85   HGB 14.7 12.2   HCT 41.6 35.5*    198     CMP:   Recent Labs  Lab 01/26/17 1822 01/27/17  0537 01/28/17  1502   * 132* 138   K 3.2* 3.2* 4.4    102 112*   CO2 17* 19* 18*   * 210* 90   BUN 17 19 6*   CREATININE 1.3 1.0 0.7   CALCIUM 8.7 8.0* 8.2*   PROT 6.9 6.0  --    ALBUMIN 3.1* 2.8*  --    BILITOT 0.7 0.8  --    ALKPHOS 71 65  --    AST 26 21  --    ALT 24 19  --    ANIONGAP 15 11 8   EGFRNONAA 41* 56* >60       Significant Imaging: I have reviewed and interpreted all pertinent imaging results/findings within the past 24 hours.

## 2017-01-28 NOTE — PLAN OF CARE
Problem: Fall Risk (Adult)  Goal: Absence of Falls  Patient will demonstrate the desired outcomes by discharge/transition of care.   Outcome: Ongoing (interventions implemented as appropriate)  Fall precautions maintained. No falls this shift. Bed alarm engaged at all times.     Problem: Patient Care Overview  Goal: Plan of Care Review  Outcome: Ongoing (interventions implemented as appropriate)  Plan of care reviewed with patient and she agrees with the plan of care.     Problem: Pressure Ulcer Risk (Barak Scale) (Adult,Obstetrics,Pediatric)  Goal: Skin Integrity  Patient will demonstrate the desired outcomes by discharge/transition of care.   Outcome: Ongoing (interventions implemented as appropriate)  Patient is able to turn and reposition herself with encouragement.     Problem: Diarrhea (Adult)  Goal: Improved/Reduced Symptoms  Patient will demonstrate the desired outcomes by discharge/transition of care.   Outcome: Ongoing (interventions implemented as appropriate)  Patient has had several loose stools this shift. Lomotil continues. Special contact isolation maintained.     Problem: Mobility, Physical Impaired (Adult)  Goal: Enhanced Mobility Skills  Patient will demonstrate the desired outcomes by discharge/transition of care.   Outcome: Ongoing (interventions implemented as appropriate)  Encouraged patient to go to restroom instead of using diaper. Patient ambulated to bathroom several times this shift with assistance of 1 person.

## 2017-01-28 NOTE — PT/OT/SLP PROGRESS
Physical Therapy  Treatment    Narda Person   MRN: [id number]   Admitting Diagnosis: Intractable diarrhea    PT Received On: 01/28/17  PT Start Time: 0800     PT Stop Time: 0815    PT Total Time (min): 15 min       Billable Minutes:  Gait training 15 min     Treatment Type: Treatment  PT/PTA: PT             General Precautions: Standard,    Orthopedic Precautions:     Braces:           Subjective:  Communicated with patient prior to session.  I feel stronger than the last couple of days                        Objective:        Functional Mobility:  Bed Mobility:        Transfers:  Sit <> Stand Assistance: Contact Guard Assistance  Sit <> Stand Assistive Device: No Assistive Device  Bed <> Chair Technique: Stand Pivot    Gait:   Gait Distance: 50 ft.  Assistance 1: Contact Guard Assistance  Gait Assistive Device:  (I.V. Pole)  Gait Pattern: swing-through gait  Gait Deviation(s): decreased velocity of limb motion, decreased step length, decreased stride length    Stairs:      Balance:   Static Sit: GOOD+: Takes MAXIMAL challenges from all directions.    Dynamic Sit: GOOD-: Maintains balance through MODERATE excursions of active trunk movement,     Static Stand: GOOD-: Takes MODERATE challenges from all directions inconsistently  Dynamic stand: FAIR+: Needs CLOSE SUPERVISION during gait and is able to right self with minor LOB     Therapeutic Activities and Exercises:  Following gait training, Patient was able to participate with standing pivot and sit-stand to bedside chair for out of bed sitting.  Demonstrates appropriate  NM control; of all four  extremities     AM-PAC 6 CLICK MOBILITY  How much help from another person does this patient currently need?   1 = Unable, Total/Dependent Assistance  2 = A lot, Maximum/Moderate Assistance  3 = A little, Minimum/Contact Guard/Supervision  4 = None, Modified [place]/Independent    Turning over in bed (including adjusting bedclothes, sheets and blankets)?: 3  Sitting down  on and standing up from a chair with arms (e.g., wheelchair, bedside commode, etc.): 3  Need to walk in hospital room?: 3    AM-PAC Raw Score CMS G-Code Modifier Level of Impairment Assistance   6 % Total / Unable   7 - 9 CM 80 - 100% Maximal Assist   10 - 14 CL 60 - 80% Moderate Assist   15 - 19 CK 40 - 60% Moderate Assist   20 - 22 CJ 20 - 40% Minimal Assist   23 CI 1-20% SBA / CGA   24 CH 0% Independent/ Mod I     Patient left up in chair with call button in reach and RN staff notified.    Assessment:  Narda Person is a 71 y.o. female with a medical diagnosis of Intractable diarrhea and presents with generalized weakness.    Rehab identified problem list/impairments:      Rehab potential is good.    Activity tolerance: Fair    Discharge recommendations:       Barriers to discharge:      Equipment recommendations:       GOALS:   Physical Therapy Goals        Problem: Physical Therapy Goal    Goal Priority Disciplines Outcome Goal Variances Interventions   Physical Therapy Goal     PT/OT, PT      Description:  Goals to be met by: upon d/C from facility     Patient will increase functional independence with mobility by performin. Supine to sit with [place]  2. Sit to supine with [place]  3. Sit to stand transfer with [place]  4. Bed to chair transfer with [place]   5. Gait  x 150 feet with Supervision using Rolling Walker.                 PLAN:    Patient to be seen daily  to address the above listed problems via    Plan of Care expires:    Plan of Care reviewed with: patient         Juan Flemingnard, PT  2017

## 2017-01-29 VITALS
HEART RATE: 83 BPM | TEMPERATURE: 99 F | DIASTOLIC BLOOD PRESSURE: 64 MMHG | BODY MASS INDEX: 49.32 KG/M2 | OXYGEN SATURATION: 96 % | HEIGHT: 63 IN | RESPIRATION RATE: 18 BRPM | SYSTOLIC BLOOD PRESSURE: 135 MMHG | WEIGHT: 278.38 LBS

## 2017-01-29 LAB
ANION GAP SERPL CALC-SCNC: 7 MMOL/L
BUN SERPL-MCNC: 4 MG/DL
C DIFF GDH STL QL: NEGATIVE
C DIFF TOX A+B STL QL IA: NEGATIVE
CALCIUM SERPL-MCNC: 8.1 MG/DL
CHLORIDE SERPL-SCNC: 109 MMOL/L
CO2 SERPL-SCNC: 20 MMOL/L
CREAT SERPL-MCNC: 0.7 MG/DL
E COLI SXT1 STL QL IA: NEGATIVE
E COLI SXT2 STL QL IA: NEGATIVE
EST. GFR  (AFRICAN AMERICAN): >60 ML/MIN/1.73 M^2
EST. GFR  (NON AFRICAN AMERICAN): >60 ML/MIN/1.73 M^2
ESTIMATED AVG GLUCOSE: 275 MG/DL
GLUCOSE SERPL-MCNC: 201 MG/DL
HBA1C MFR BLD HPLC: 11.2 %
POCT GLUCOSE: 222 MG/DL (ref 70–110)
POCT GLUCOSE: 79 MG/DL (ref 70–110)
POTASSIUM SERPL-SCNC: 4.2 MMOL/L
SODIUM SERPL-SCNC: 136 MMOL/L

## 2017-01-29 PROCEDURE — 82962 GLUCOSE BLOOD TEST: CPT

## 2017-01-29 PROCEDURE — 97110 THERAPEUTIC EXERCISES: CPT

## 2017-01-29 PROCEDURE — 25000003 PHARM REV CODE 250: Performed by: FAMILY MEDICINE

## 2017-01-29 PROCEDURE — 97116 GAIT TRAINING THERAPY: CPT

## 2017-01-29 PROCEDURE — 25000003 PHARM REV CODE 250: Performed by: NURSE PRACTITIONER

## 2017-01-29 PROCEDURE — 25000003 PHARM REV CODE 250: Performed by: SURGERY

## 2017-01-29 PROCEDURE — 80048 BASIC METABOLIC PNL TOTAL CA: CPT

## 2017-01-29 PROCEDURE — 36415 COLL VENOUS BLD VENIPUNCTURE: CPT

## 2017-01-29 PROCEDURE — 99238 HOSP IP/OBS DSCHRG MGMT 30/<: CPT | Mod: ,,, | Performed by: FAMILY MEDICINE

## 2017-01-29 RX ORDER — MONTELUKAST SODIUM 4 MG/1
2 TABLET, CHEWABLE ORAL 2 TIMES DAILY PRN
Qty: 30 TABLET | Refills: 0 | Status: SHIPPED | OUTPATIENT
Start: 2017-01-29 | End: 2017-04-24

## 2017-01-29 RX ADMIN — SODIUM CHLORIDE: 0.9 INJECTION, SOLUTION INTRAVENOUS at 06:01

## 2017-01-29 RX ADMIN — LEVOTHYROXINE SODIUM 50 MCG: 50 TABLET ORAL at 06:01

## 2017-01-29 RX ADMIN — PANTOPRAZOLE SODIUM 40 MG: 40 TABLET, DELAYED RELEASE ORAL at 09:01

## 2017-01-29 RX ADMIN — DIPHENOXYLATE HYDROCHLORIDE AND ATROPINE SULFATE 2 TABLET: 2.5; .025 TABLET ORAL at 12:01

## 2017-01-29 RX ADMIN — DIPHENOXYLATE HYDROCHLORIDE AND ATROPINE SULFATE 2 TABLET: 2.5; .025 TABLET ORAL at 06:01

## 2017-01-29 RX ADMIN — INSULIN ASPART 22 UNITS: 100 INJECTION, SOLUTION INTRAVENOUS; SUBCUTANEOUS at 08:01

## 2017-01-29 RX ADMIN — GABAPENTIN 300 MG: 300 CAPSULE ORAL at 06:01

## 2017-01-29 RX ADMIN — METOPROLOL SUCCINATE 25 MG: 25 TABLET, EXTENDED RELEASE ORAL at 09:01

## 2017-01-29 RX ADMIN — INSULIN DETEMIR 25 UNITS: 100 INJECTION, SOLUTION SUBCUTANEOUS at 09:01

## 2017-01-29 RX ADMIN — POTASSIUM CHLORIDE 40 MEQ: 1500 TABLET, EXTENDED RELEASE ORAL at 09:01

## 2017-01-29 NOTE — PT/OT/SLP PROGRESS
"Physical Therapy  Treatment    Narda Person   MRN: [id number]   Admitting Diagnosis: Intractable diarrhea    PT Received On: 17  PT Start Time: 0800     PT Stop Time: 0825    PT Total Time (min): 25 min       Billable Minutes:  Gait [id number] min  and Therapeutic Exercise 10 min    Treatment Type: Treatment  PT/PTA: PT             General Precautions: Standard, fall, contact  Orthopedic Precautions:     Braces:           Subjective:  Communicated with patient and daughter prior to session.  I think I am getting stronger, still with "belly problems"    Pain Ratin/10              Pain Rating Post-Intervention: 0/10    Objective:        Functional Mobility:  Bed Mobility:   Rolling/Turning to Left: Contact guard assistance  Rolling/Turning Right: Contact guard assistance  Scooting/Bridging: Contact Guard Assistance  Supine to Sit: Contact Guard Assistance  Sit to Supine: Contact Guard Assistance    Transfers:  Sit <> Stand Assistance: Contact Guard Assistance  Sit <> Stand Assistive Device: No Assistive Device  Bed <> Chair Technique: Stand Pivot  Bed <> Chair Assistance: Contact Guard Assistance  Bed <> Chair Assistive Device: No Assistive Device    Gait:   Gait Distance: 60 feet about room  Assistance 1: Contact Guard Assistance  Gait Assistive Device: No device  Gait Pattern: swing-through gait    Stairs:      Balance:   Static Sit: GOOD+: Takes MAXIMAL challenges from all directions.    Dynamic Sit: GOOD: Maintains balance through MODERATE excursions of active trunk movement  Static Stand: GOOD-: Takes MODERATE challenges from all directions inconsistently  Dynamic stand: FAIR+: Needs CLOSE SUPERVISION during gait and is able to right self with minor LOB     Therapeutic Activities and Exercises:  Multiple active and assistive exercises for all four extremities to increase NM tone and conditioning     AM-PAC 6 CLICK MOBILITY  How much help from another person does this patient currently need?   1 = Unable, " Total/Dependent Assistance  2 = A lot, Maximum/Moderate Assistance  3 = A little, Minimum/Contact Guard/Supervision  4 = None, Modified [place]/Independent    Moving from lying on back to sitting on the side of the bed?: 3  Moving to and from a bed to a chair (including a wheelchair)?: 3  Need to walk in hospital room?: 3  Climbing 3-5 steps with a railing?: 1    AM-PAC Raw Score CMS G-Code Modifier Level of Impairment Assistance   6 % Total / Unable   7 - 9 CM 80 - 100% Maximal Assist   10 - 14 CL 60 - 80% Moderate Assist   15 - 19 CK 40 - 60% Moderate Assist   20 - 22 CJ 20 - 40% Minimal Assist   23 CI 1-20% SBA / CGA   24 CH 0% Independent/ Mod I     Patient left up in chair with call button in reach and Daughter present.    Assessment:  Narda Person is a 71 y.o. female with a medical diagnosis of Intractable diarrhea and presents with weakness and decreased ability to walk greater than 40 - 50 feet presently .    Rehab identified problem list/impairments:      Rehab potential is good.    Activity tolerance: Good    Discharge recommendations:       Barriers to discharge:      Equipment recommendations:       GOALS:   Physical Therapy Goals        Problem: Physical Therapy Goal    Goal Priority Disciplines Outcome Goal Variances Interventions   Physical Therapy Goal     PT/OT, PT      Description:  Goals to be met by: upon d/C from facility     Patient will increase functional independence with mobility by performin. Supine to sit with [place]  2. Sit to supine with [place]  3. Sit to stand transfer with [place]  4. Bed to chair transfer with [place]   5. Gait  x 150 feet with Supervision using Rolling Walker.                 PLAN:    Patient to be seen daily  to address the above listed problems via    Plan of Care expires:    Plan of Care reviewed with: patient, daughter         Juan Latham, PT  2017

## 2017-01-29 NOTE — NURSING
Assessment done with patient in the chair. Call bell is in hand. She is awaiting breakfast.  Discussed her NOT getting up alone and she must call for assistance.  She voices understanding and agrees to call. Informed her I would return shortly with her insulin.

## 2017-01-29 NOTE — NURSING
Daughter at the nurses station wanting to speak to me again.  She is informing me of their family dynamics.

## 2017-01-29 NOTE — PLAN OF CARE
Problem: Patient Care Overview  Goal: Plan of Care Review  Outcome: Outcome(s) achieved Date Met:  01/29/17  Dr Garza assessed patient and will discharge home today.  Patient and daughter agree with plan for discharge and will follow up as instructed.

## 2017-01-29 NOTE — DISCHARGE SUMMARY
[facility] Medicine  Discharge Summary      Patient Name: Narda Person  MRN: [id number]  Admission Date: 1/26/2017  Hospital Length of Stay: 2 days  Discharge Date and Time: 1/29/2017 12:08 PM  Attending Physician: Damaris Yoder MD   Discharging Provider: Willis Garza MD  Primary Care Provider: Apolonia Sullivan NP      HPI:   Narda Person, 72 y/o female, presents to the emergency room with diarrhea for the past week. Patient was seen in the emergency room on January 23 with diarrhea/dehydration. Patient ate some moldy sausage with possible food poisoning per daughter at bedside. Patient was given IV fluids as well as a prescription for Imodium, diarrhea not better. Daughter states the pt has been having diarrhea around-the-clock, 10 times today. Admits black at first. Pt states she cannot move without profuse watery diarrhea, feels very dehydrated. Pt has a temperature 99.8°F and is nauseated with the diarrhea but no vomiting in ER.  Patient presents listless/feels dehydrated, her diaper was changed 7 times in ER.      * No surgery found *      Indwelling Lines/Drains at time of discharge:   Lines/Drains/Airways          No matching active lines, drains, or airways        Hospital Course:   Pt placed in obs on the floor on NS @ 125 ml/hour. She is a IDDM, BS from 194-225. On clear liq diet. CBC reviewed, pt with 32 bands yesterday, CBC with no bands found. Phoned [facility], they will check slide for bands this am.     Pt had 4 large watery stools 1/27 night, 1 small liquid stool.     Continues with loose stools 1/28. Several overnight and this am. On clears     Advanced to BRAT yesterday and 4 loose stools overnight but much better, some consistency, no watery stools        Consults:     Significant Diagnostic Studies: Labs:   BMP:   Recent Labs  Lab 01/28/17  1502 01/29/17  0706   GLU 90 201*    136   K 4.4 4.2   * 109   CO2 18* 20*   BUN 6* 4*   CREATININE 0.7 0.7    CALCIUM 8.2* 8.1*       Pending Diagnostic Studies:     Procedure Component Value Units Date/Time    Giardia / Cryptosporidum, EIA [[id number]] Collected:  01/26/17 1900    Order Status:  Sent Lab Status:  In process Updated:  01/26/17 1900    Specimen:  Stool from Stool     Stool Exam-Ova,Cysts,Parasites [[id number]] Collected:  01/26/17 1900    Order Status:  Sent Lab Status:  In process Updated:  01/26/17 1900    Specimen:  Stool from Stool     WBC, Stool [[id number]] Collected:  01/26/17 1900    Order Status:  Sent Lab Status:  In process Updated:  01/26/17 1900    Specimen:  Stool from Stool         Final Active Diagnoses:    Diagnosis Date Noted POA    PRINCIPAL PROBLEM:  Intractable diarrhea [R19.7] 01/26/2017 Yes    Hypokalemia [E87.6] 01/27/2017 Yes    Hyponatremia [E87.1] 01/27/2017 Yes    Acute renal insufficiency [N28.9] 01/27/2017 Yes    Memory loss [R41.3] 01/03/2017 Yes    Pure hypercholesterolemia [E78.00] 06/29/2014 Yes    Hypothyroidism due to acquired atrophy of thyroid [E03.4] 06/29/2014 Yes    Type 2 diabetes, uncontrolled, with neuropathy [E11.40, E11.65] 10/01/2012 Yes    Essential hypertension [I10] 10/01/2012 Yes    Gastroesophageal reflux disease without esophagitis [K21.9] 10/01/2012 Yes    Neuropathy in diabetes [E11.40] 07/13/2012 Yes      Problems Resolved During this Admission:    Diagnosis Date Noted Date Resolved POA      * Intractable diarrhea  Admit to obs for IVF-change to inpt therapy  Stool studies pending, c diff is negative    Advance to BRAT   F/u PCP one week for stools studies       Neuropathy in diabetes  Gabapentin 300 mg po tid      Type 2 diabetes, uncontrolled, with neuropathy  Insulin resumed  F/u PCP    Lab Results   Component Value Date    HGBA1C 11.2 (H) 01/27/2017           Essential hypertension  ACE on hold  BB continued  Resume ACE with PCP     Gastroesophageal reflux disease without esophagitis  protonix daily      Pure hypercholesterolemia  Statin  on hold      Hypothyroidism due to acquired atrophy of thyroid  Synthroid 50 mcg daily      Memory loss  HOLD donepezil 10 mg daily-may worsen diarrhea      Hypokalemia  Resolved Bmp daily      Hyponatremia  resovled    Acute renal insufficiency  Resolved       Discharged Condition: good    Disposition: Home or Self Care    Follow Up:  Follow-up Information     Follow up with [facility].    Specialty:  Home Health Services    Why:  Home Health    Contact information:    [address] [address]  [place] [address]  [phone]          Follow up with Apolonia Sullivan NP In 1 week.    Specialty:  Family Medicine    Contact information:    [address]  [place] [address]  [phone]          Patient Instructions:     Diet general     Activity as tolerated       Medications:  Reconciled Home Medications:   Current Discharge Medication List      START taking these medications    Details   colestipol (COLESTID) 1 gram Tab Take 2 tablets (2 g total) by mouth 2 (two) times daily as needed (loose stools).  Qty: 30 tablet, Refills: 0         CONTINUE these medications which have NOT CHANGED    Details   atorvastatin (LIPITOR) 20 MG tablet Take 1 tablet (20 mg total) by mouth once daily.  Qty: 30 tablet, Refills: 3    Associated Diagnoses: Encounter for medication refill      blood sugar diagnostic Strp Accu-chek french testing strips use to test sugars AC/HS  Qty: 400 each, Refills: 3    Associated Diagnoses: Diabetes mellitus type II, uncontrolled      blood-glucose meter (ACCU-CHEK FRENCH) Misc Use to test sugars AC/HS daily  Qty: 1 each, Refills: 0    Associated Diagnoses: Diabetes mellitus type II, uncontrolled      dicyclomine (BENTYL) 20 mg tablet Take 1 tablet (20 mg total) by mouth 2 (two) times daily.  Qty: 20 tablet, Refills: 0      donepezil (ARICEPT) 10 MG tablet Take 1 tablet (10 mg total) by mouth as directed. Take 1/2 tablet each night x 2 weeks, then take 1 tablet each night afterwards  Qty: 30 tablet,  Refills: 3    Associated Diagnoses: Memory loss      gabapentin (NEURONTIN) 300 MG capsule TAKE ONE CAPSULE BY MOUTH 4 TIMES A DAY  Qty: 120 capsule, Refills: 2    Associated Diagnoses: Encounter for medication refill      hydrochlorothiazide (HYDRODIURIL) 25 MG tablet TAKE ONE TABLET BY MOUTH EVERY DAY  Qty: 30 tablet, Refills: 3    Associated Diagnoses: Encounter for medication refill      insulin aspart (NOVOLOG FLEXPEN) 100 unit/mL InPn pen INJECT 22 UNITS INTO THE SKIN 3 (THREE) TIMES DAILY WITH MEALS.  Qty: 15 Syringe, Refills: 2    Associated Diagnoses: Uncontrolled type 2 diabetes mellitus with complication, with long-term current use of insulin      insulin detemir (LEVEMIR FLEXTOUCH) 100 unit/mL (3 mL) SubQ InPn pen INJECT 25 UNITS INTO THE SKIN 2 TIMES A DAY  Qty: 15 mL, Refills: 1    Associated Diagnoses: Uncontrolled type 2 diabetes mellitus with complication, with long-term current use of insulin      insulin syringe-needle U-100 0.3 mL 30 Syrg Use with Levemir vial BID  Qty: 100 each, Refills: 5      JANUVIA 50 mg Tab TAKE 1 TABLET BY MOUTH EVERY DAY  Qty: 30 tablet, Refills: 2    Associated Diagnoses: Encounter for medication refill      lactulose (CHRONULAC) 10 gram/15 mL solution Take by mouth daily as needed.       lancets (ACCU-CHEK SOFTCLIX LANCETS) Misc Use to test sugars AC/HS  Qty: 400 each, Refills: 3    Associated Diagnoses: Diabetes mellitus type II, uncontrolled      levothyroxine (SYNTHROID) 50 MCG tablet Take 1 tablet (50 mcg total) by mouth once daily.  Qty: 30 tablet, Refills: 3    Associated Diagnoses: Encounter for medication refill      loperamide (IMODIUM) 2 mg capsule Take 1 capsule (2 mg total) by mouth 4 (four) times daily as needed for Diarrhea.  Qty: 12 capsule, Refills: 0      meclizine (ANTIVERT) 25 mg tablet TAKE ONE TABLET BY MOUTH TWICE A DAY AS NEEDED FOR DIZZINESS OR NAUSEA  Qty: 60 tablet, Refills: 1    Associated Diagnoses: Vertigo      melatonin 5 mg Tab Take 5 mg  "by mouth nightly as needed.       metoprolol succinate (TOPROL-XL) 25 MG 24 hr tablet Take 25 mg by mouth once daily.  Refills: 9      naproxen sodium 220 mg Cap Take 2 tablets by mouth daily as needed.      omeprazole (PRILOSEC) 20 MG capsule Take 1 capsule (20 mg total) by mouth 2 (two) times daily.  Qty: 60 capsule, Refills: 2    Associated Diagnoses: Encounter for medication refill      ondansetron (ZOFRAN) 4 MG tablet Take 1 tablet (4 mg total) by mouth every 8 (eight) hours as needed for Nausea.  Qty: 12 tablet, Refills: 0      pen needle, diabetic (LITE TOUCH INSULIN PEN NEEDLES) 31 gauge x 3/16" Ndle 1 application by Misc.(Non-Drug; Combo Route) route 4 (four) times daily.  Qty: 100 each, Refills: 2    Associated Diagnoses: Diabetes mellitus type II, uncontrolled      pen needle, diabetic 32 gauge x 5/32" Ndle 1 Device by Misc.(Non-Drug; Combo Route) route as directed. Needles to be used with Novolog pens and Levemir pens daily.  Qty: 100 each, Refills: 3    Associated Diagnoses: Encounter for medication refill      ramipril (ALTACE) 10 MG capsule TAKE ONE CAPSULE BY MOUTH EVERY DAY  Qty: 30 capsule, Refills: 2    Associated Diagnoses: Encounter for medication refill           Time spent on the discharge of patient: 30 minutes    Willis Garza MD  Department of Hospital Medicine  [facility]  "

## 2017-01-29 NOTE — ASSESSMENT & PLAN NOTE
Insulin resumed  F/u PCP    Lab Results   Component Value Date    HGBA1C 11.2 (H) 01/27/2017

## 2017-01-29 NOTE — PLAN OF CARE
Problem: Fall Risk (Adult)  Goal: Absence of Falls  Patient will demonstrate the desired outcomes by discharge/transition of care.   Outcome: Ongoing (interventions implemented as appropriate)  Fall precautions maintained. No falls this shift. Bed alarm engaged at all times. Patient instructed to call for help prior to getting out of bed.     Problem: Patient Care Overview  Goal: Plan of Care Review  Outcome: Ongoing (interventions implemented as appropriate)  Plan of care reviewed with patient and she agrees with the plan of care.      Problem: Pressure Ulcer Risk (Braak Scale) (Adult,Obstetrics,Pediatric)  Goal: Skin Integrity  Patient will demonstrate the desired outcomes by discharge/transition of care.   Outcome: Ongoing (interventions implemented as appropriate)  Patient is able to turn and reposition herself every 2 hours.     Problem: Diarrhea (Adult)  Goal: Improved/Reduced Symptoms  Patient will demonstrate the desired outcomes by discharge/transition of care.   Outcome: Ongoing (interventions implemented as appropriate)  Diarrhea continues. Loose, watery brown stools noted. Special contact isolation maintained. IV fluids continue. Lomotil continues. Patient unable to reach bathroom prior to having BM; bedside commode now in use.

## 2017-01-29 NOTE — NURSING
"Returned to room to administer insulin and patient has walked around the bed from the chair to the bedside commode.  There is a trail of urine on the floor.  Patient has removed her clothes.  She states "I need a mop to clean that"  I asked her why she didn't use call bell when we had just discussed this. Her response "I wanted to pee before breakfast came."  I had offered her the bathroom with the assessment and she declined. She is looking in the closest for a diaper.  I told her we would get her bathed and redress her.  Her IV is now red and swollen, leaking at site.  IVFs stopped.  Telemetry box hanging so I removed tht for now and informed ICU.  Pt is not worried about getting a bath, she wants to eat before it gets cold.. I told her we would get her another tray.  PCT in to assist and housekeeping here to mop.  Patient refuses for me to restart IV "I'm going home today"  She refuses to get in the bed for bed alarm to be engaged.  Pt assisted to chair after getting cleaned up and she immediately starts eating.  Does not want telemetry back on "Thats the culpret."  Call bell in chair with her and reviewed her calling to get up.  Again she voices understanding.  It is now 0830  "

## 2017-01-29 NOTE — NURSING
Spoke with the answering service for [facility], unable to get Reena on the phone, awaiting return call

## 2017-01-29 NOTE — NURSING
Reena Garza with [facility] called me back.  Information given on patient discharge and informed her that paperwork was faxed to [phone]

## 2017-01-29 NOTE — ASSESSMENT & PLAN NOTE
Admit to obs for IVF-change to inpt therapy  Stool studies pending, c diff is negative    Advance to BRAT   F/u PCP one week for stools studies

## 2017-01-29 NOTE — NURSING
Daughter arriving at bedside and discussed fall precautions.  Also discussed home situation and safety.  Daughter explaining all safety accommodations at home.  Dietary entering to provide lunch choices.  It is now 0855 will bring meds soon

## 2017-01-29 NOTE — PLAN OF CARE
Problem: Patient Care Overview  Goal: Plan of Care Review  Outcome: Ongoing (interventions implemented as appropriate)  Patient agrees with plan of care for diarrhea with Lomotil. Blood sugar checks with insulin administration.  Is being assisted to the bathroom today as she has gained more strength.  IVFs decreased to 75ml/hr today. Fall precautions remain in use and bed alarm engaged.  Family at bedside all day.  Urine and stool specimen sent to lab today

## 2017-01-30 NOTE — PT/OT/SLP DISCHARGE
Physical Therapy Discharge Summary    Narda Person  MRN: [id number]   Intractable diarrhea   Patient Discharged from acute Physical Therapy on 2017.  Please refer to prior PT noted date on 2017 for functional status.     Assessment:   Goals partially met.  GOALS:   Physical Therapy Goals     Not on file      Multidisciplinary Problems (Resolved)        Problem: Physical Therapy Goal    Goal Priority Disciplines Outcome Goal Variances Interventions   Physical Therapy Goal   (Resolved)     PT/OT, PT Outcome(s) achieved     Description:  Goals to be met by: upon d/C from facility     Patient will increase functional independence with mobility by performin. Supine to sit with [place] -- GOAL MET  2. Sit to supine with [place] -- GOAL MET  3. Sit to stand transfer with [place] -- NOT MET  4. Bed to chair transfer with [place] -- NOT MET  5. Gait  x 150 feet with Supervision using Rolling Walker. - NOT MET                Reasons for Discontinuation of Therapy Services  Transfer to alternate level of care.      Plan:  Patient Discharged to: Home with Home Health Service.

## 2017-01-31 ENCOUNTER — TELEPHONE (OUTPATIENT)
Dept: INTERNAL MEDICINE | Facility: CLINIC | Age: 72
End: 2017-01-31

## 2017-01-31 LAB
BACTERIA STL CULT: NORMAL
BACTERIA STL CULT: NORMAL

## 2017-01-31 RX ORDER — PEN NEEDLE, DIABETIC 30 GX3/16"
1 NEEDLE, DISPOSABLE MISCELLANEOUS 4 TIMES DAILY
Qty: 100 EACH | Refills: 2 | Status: SHIPPED | OUTPATIENT
Start: 2017-01-31

## 2017-01-31 NOTE — TELEPHONE ENCOUNTER
----- Message from Jessica Franco MA sent at 2017  3:06 PM CST -----  Contact: Daughter-Marily Person  MRN: [id number]  : 1945  PCP: Apolonia Sullivan  Home Phone      [phone]  Work Phone      [phone]  Mobile          Not on file.      MESSAGE: Patient needs a script for the needles for her Flexpens.  Send to [facility] ([place])

## 2017-02-01 DIAGNOSIS — A02.9 SALMONELLA: Primary | ICD-10-CM

## 2017-02-01 LAB — BACTERIA BLD CULT: NORMAL

## 2017-02-01 RX ORDER — SULFAMETHOXAZOLE AND TRIMETHOPRIM 800; 160 MG/1; MG/1
1 TABLET ORAL 2 TIMES DAILY
Qty: 14 TABLET | Refills: 0 | Status: SHIPPED | OUTPATIENT
Start: 2017-02-01 | End: 2017-02-11

## 2017-02-02 ENCOUNTER — OFFICE VISIT (OUTPATIENT)
Dept: INTERNAL MEDICINE | Facility: CLINIC | Age: 72
End: 2017-02-02
Payer: MEDICARE

## 2017-02-02 VITALS
SYSTOLIC BLOOD PRESSURE: 110 MMHG | BODY MASS INDEX: 34.76 KG/M2 | OXYGEN SATURATION: 98 % | DIASTOLIC BLOOD PRESSURE: 62 MMHG | HEIGHT: 63 IN | WEIGHT: 196.19 LBS | RESPIRATION RATE: 12 BRPM | HEART RATE: 78 BPM

## 2017-02-02 DIAGNOSIS — A02.0 SALMONELLA GASTROENTERITIS: Primary | ICD-10-CM

## 2017-02-02 LAB
BILIRUB SERPL-MCNC: NORMAL MG/DL
BLOOD URINE, POC: NORMAL
COLOR, POC UA: CLEAR
GLUCOSE UR QL STRIP: 100
KETONES UR QL STRIP: NORMAL
LEUKOCYTE ESTERASE URINE, POC: NORMAL
NITRITE, POC UA: NORMAL
PH, POC UA: 7
PROTEIN, POC: NORMAL
SPECIFIC GRAVITY, POC UA: 1.02
UROBILINOGEN, POC UA: NORMAL

## 2017-02-02 PROCEDURE — 3074F SYST BP LT 130 MM HG: CPT | Mod: S$GLB,,, | Performed by: NURSE PRACTITIONER

## 2017-02-02 PROCEDURE — 1159F MED LIST DOCD IN RCRD: CPT | Mod: S$GLB,,, | Performed by: NURSE PRACTITIONER

## 2017-02-02 PROCEDURE — 1160F RVW MEDS BY RX/DR IN RCRD: CPT | Mod: S$GLB,,, | Performed by: NURSE PRACTITIONER

## 2017-02-02 PROCEDURE — 99214 OFFICE O/P EST MOD 30 MIN: CPT | Mod: S$GLB,,, | Performed by: NURSE PRACTITIONER

## 2017-02-02 PROCEDURE — 1126F AMNT PAIN NOTED NONE PRSNT: CPT | Mod: S$GLB,,, | Performed by: NURSE PRACTITIONER

## 2017-02-02 PROCEDURE — 1157F ADVNC CARE PLAN IN RCRD: CPT | Mod: S$GLB,,, | Performed by: NURSE PRACTITIONER

## 2017-02-02 PROCEDURE — 3078F DIAST BP <80 MM HG: CPT | Mod: S$GLB,,, | Performed by: NURSE PRACTITIONER

## 2017-02-02 PROCEDURE — 99999 PR PBB SHADOW E&M-EST. PATIENT-LVL V: CPT | Mod: PBBFAC,,, | Performed by: NURSE PRACTITIONER

## 2017-02-02 PROCEDURE — 81001 URINALYSIS AUTO W/SCOPE: CPT | Mod: S$GLB,,, | Performed by: NURSE PRACTITIONER

## 2017-02-02 RX ORDER — AMITRIPTYLINE HYDROCHLORIDE 50 MG/1
50 TABLET, FILM COATED ORAL NIGHTLY
Refills: 5 | COMMUNITY
Start: 2017-01-19 | End: 2017-07-26 | Stop reason: SDUPTHER

## 2017-02-02 NOTE — MR AVS SNAPSHOT
[facility] [place] [facility]  [address] [facility] [address]  Phone: [phone]  Fax: [phone]                  Narda Person   2017 1:15 PM   Office Visit    Description:  Female : 1945   Provider:  Apolonia Sullivan NP   Department:  [facility]           Reason for Visit     Follow-up     Hematuria           Diagnoses this Visit        Comments    Salmonella gastroenteritis    -  Primary            To Do List           Future Appointments        Provider Department Dept Phone    2017 1:00 PM Narda Serrato NP [facility] [phone]      Goals (5 Years of Data)     None      Follow-Up and Disposition     Return if symptoms worsen or fail to improve.      [facility]     [facility] Care [facility] -  Assistance  Registered nurses in the [facility] provide clinical advisement, health education, appointment booking, and other advisory services.  Call for this free service at [phone].             Medications           Message regarding Medications     Verify the changes and/or additions to your medication regime listed below are the same as discussed with your clinician today.  If any of these changes or additions are incorrect, please notify your healthcare provider.             Verify that the below list of medications is an accurate representation of the medications you are currently taking.  If none reported, the list may be blank. If incorrect, please contact your healthcare provider. Carry this list with you in case of emergency.           Current Medications     amitriptyline (ELAVIL) 50 MG tablet Take 50 mg by mouth every evening.    atorvastatin (LIPITOR) 20 MG tablet Take 1 tablet (20 mg total) by mouth once daily.    blood sugar diagnostic Strp Accu-chek khadra testing strips use to test sugars AC/HS    blood-glucose meter (ACCU-CHEK KHADRA) Misc Use to test sugars AC/HS daily    colestipol (COLESTID)  "1 gram Tab Take 2 tablets (2 g total) by mouth 2 (two) times daily as needed (loose stools).    dicyclomine (BENTYL) 20 mg tablet Take 1 tablet (20 mg total) by mouth 2 (two) times daily.    donepezil (ARICEPT) 10 MG tablet Take 1 tablet (10 mg total) by mouth as directed. Take 1/2 tablet each night x 2 weeks, then take 1 tablet each night afterwards    gabapentin (NEURONTIN) 300 MG capsule TAKE ONE CAPSULE BY MOUTH 4 TIMES A DAY    hydrochlorothiazide (HYDRODIURIL) 25 MG tablet TAKE ONE TABLET BY MOUTH EVERY DAY    insulin aspart (NOVOLOG FLEXPEN) 100 unit/mL InPn pen INJECT 22 UNITS INTO THE SKIN 3 (THREE) TIMES DAILY WITH MEALS.    insulin detemir (LEVEMIR FLEXTOUCH) 100 unit/mL (3 mL) SubQ InPn pen INJECT 25 UNITS INTO THE SKIN 2 TIMES A DAY    insulin syringe-needle U-100 0.3 mL 30 Syrg Use with Levemir vial BID    JANUVIA 50 mg Tab TAKE 1 TABLET BY MOUTH EVERY DAY    lactulose (CHRONULAC) 10 gram/15 mL solution Take by mouth daily as needed.     lancets (ACCU-CHEK SOFTCLIX LANCETS) Misc Use to test sugars AC/HS    levothyroxine (SYNTHROID) 50 MCG tablet Take 1 tablet (50 mcg total) by mouth once daily.    loperamide (IMODIUM) 2 mg capsule Take 1 capsule (2 mg total) by mouth 4 (four) times daily as needed for Diarrhea.    meclizine (ANTIVERT) 25 mg tablet TAKE ONE TABLET BY MOUTH TWICE A DAY AS NEEDED FOR DIZZINESS OR NAUSEA    melatonin 5 mg Tab Take 5 mg by mouth nightly as needed.     metoprolol succinate (TOPROL-XL) 25 MG 24 hr tablet Take 25 mg by mouth once daily.    naproxen sodium 220 mg Cap Take 2 tablets by mouth daily as needed.    omeprazole (PRILOSEC) 20 MG capsule Take 1 capsule (20 mg total) by mouth 2 (two) times daily.    ondansetron (ZOFRAN) 4 MG tablet Take 1 tablet (4 mg total) by mouth every 8 (eight) hours as needed for Nausea.    pen needle, diabetic (LITE TOUCH INSULIN PEN NEEDLES) 31 gauge x 3/16" Ndle 1 application by Misc.(Non-Drug; Combo Route) route 4 (four) times daily.    pen " "needle, diabetic 32 gauge x 5/32" Ndle 1 Device by Misc.(Non-Drug; Combo Route) route as directed. Needles to be used with Novolog pens and Levemir pens daily.    ramipril (ALTACE) 10 MG capsule TAKE ONE CAPSULE BY MOUTH EVERY DAY    sulfamethoxazole-trimethoprim 800-160mg (BACTRIM DS) 800-160 mg Tab Take 1 tablet by mouth 2 (two) times daily.           Clinical Reference Information           Your Vitals Were     BP Pulse Resp Height Weight Last Period    110/62 (BP Location: Right arm, Patient Position: Sitting, BP Method: Manual) 78 12 5' 2.5" (1.588 m) 89 kg (196 lb 3.4 oz) (LMP Unknown)    SpO2 BMI             98% 35.32 kg/m2         Blood Pressure          Most Recent Value    BP  110/62      Allergies as of 2/2/2017     Percocet [Oxycodone-acetaminophen]    Percodan [Oxycodone Hcl-oxycodone-asa]    Latex, Natural Rubber    Phenytoin Sodium Extended    Sutures, Catgut    Adhesive    Adhesive Tape-silicones      Immunizations Administered on Date of Encounter - 2/2/2017     None      Orders Placed During Today's Visit      Normal Orders This Visit    POCT urinalysis, dipstick or tablet reag       Instructions      Food Poisoning (Adult)  Food poisoning is illness that is passed along in food. It usually occurs from 1 to 24 hours after eating food that has spoiled. Food poisoning can occur when you eat food or drink that contains viruses, bacteria, parasites, or toxins. This includes food that has not been cooked or refrigerated properly.  Food poisoning can cause these symptoms:  · Abdominal pain and cramping  · Nausea  · Vomiting  · Diarrhea  · Fever and chills  · Fatigue  The symptoms usually last from 1 to 2 days.  Antibiotics are not effective for this illness.  Home care  Follow all instructions given by your healthcare provider. Rest at home for the next 24 hours, or until you feel better. Avoid caffeine, tobacco, and alcohol. These can make diarrhea, cramping, and pain worse.  If taking " medicines:  · Dont take over-the-counter diarrhea or nausea medicines unless your healthcare provider tells you to.  · You may be given medicine for nausea or vomiting to help keep down fluids. Take these medicines as prescribed.  · You may use acetaminophen or NSAID medicine like ibuprofen or naproxen to reduce pain and fever. Dont use these if you have chronic liver or kidney disease, or ever had a stomach ulcer or gastrointestinal bleeding. Talk with your healthcare provider first. Don't use NSAID medicines if you are already taking one for another condition (like arthritis) or are on aspirin (such as for heart disease or after a stroke).  To prevent the spread of illness:  · Remember that washing with soap and water or using alcohol-based  is the best way to prevent the spread of infection.  · Clean the toilet after each use.  · Wash your hands before eating.  · Wash your hands or use alcohol-based  before and after preparing food. Keep in mind that people with diarrhea or vomiting should not prepare food for others.  · Wash your hands after using cutting boards, counter-tops, and knives or other utensils that have been in contact with raw foods.  · Wash and then peel fruits and vegetables.  · Keep uncooked meats away from cooked and ready-to-eat foods.  · Use a food thermometer when cooking. Cook poultry to at least 165°F (74°C). Cook ground meat (beef, veal, pork, lamb) to at least 160°F (71°C). Cook fresh beef, veal, lamb, and pork to at least 145°F (63°C).  · Dont eat raw or undercooked eggs (poached or trish side up), poultry, meat or unpasteurized milk and juices.  · Do not eat foods requiring refrigeration. Don't eat foods that have not been refrigerated for long periods such as at buffets or picnics.  · Do not eat seafood that is undercooked or with high rates of food toxins.  Food and drinks  The main goal while treating vomiting or diarrhea is to prevent dehydration. This is done  by taking small amounts of liquids often.  · Keep in mind that liquids are more important than food right now.  · Drink only small amounts of liquids at a time.  · Dont force yourself to eat, especially if you are having cramping, vomiting, or diarrhea. Dont eat large amounts at a time, even if you are hungry.  · If you eat, avoid fatty, greasy, spicy, or fried foods.  · Dont eat dairy foods or drink milk if you have diarrhea. These can make diarrhea worse.  The first 24 hours you can try:  · Soft drinks without caffeine  · Ginger ale  · Water (plain or flavored)  · Decaf tea or coffee  · Clear broth, consommé, or bouillon  · Gelatin, popsicles, or frozen fruit juice bars  If you are very dehydrated, sports drinks are not a good choice. They have too much sugar and not enough electrolytes. In this case, commercially available products called oral rehydration solutions are best.  The second 24 hours, if you are feeling better, you can add:  · Hot cereal, plain toast, bread, rolls, or crackers  · Plain noodles, rice, mashed potatoes, chicken noodle soup, or rice soup  · Unsweetened canned fruit (no pineapple)  · Bananas  As you recover:  · Limit fat intake to less than 15 grams per day. Dont eat margarine, butter, oils, mayonnaise, sauces, gravies, fried foods, peanut butter, meat, poultry, or fish.  · Limit fiber. Dont eat raw or cooked vegetables, fresh fruits except bananas, and bran cereals.  · Limit caffeine and chocolate.  · Dont use spices or seasonings except salt.  · Resume a normal diet over time, as you feel better and your symptoms improve.  · If the symptoms come back, go back to a simple diet or clear liquids.  Follow-up care  Follow up with your healthcare provider, or as advised. If a stool sample was taken or cultures were done, call the healthcare provider for the results as instructed.  Call 911  Call 911 if you have any of these symptoms:  · Trouble breathing  · Chest  pain  · Confusion  · Extreme drowsiness or trouble walking  · Loss of consciousness  · Rapid heart rate  · Stiff neck  · Seizure  When to seek medical advice  Call your health care provider right away if any of these occur:  · Abdominal pain that gets worse  · Constant lower right abdominal pain  · Continued vomiting and inability to keep liquids down  · Diarrhea more than 5 times a day  · Blood in vomit or stool  · Dark urine or no urine for 8 hours, dry mouth and tongue, tiredness, weakness, or dizziness  · New rash  · You dont get better in 2 to 3 days  · Fever of 100.4°F (38°C) or higher that doesnt get lower with medicine  © 8712-7087 [employer]. [address] [place] [address], [place] [address]. All rights reserved. This information is not intended as a substitute for professional medical care. Always follow your healthcare professional's instructions.        Salmonella Gastroenteritis (Adult)    Salmonella is a bacterium that some animals carry. Examples include poultry, cows, fish, pigs, turtles, lizards, iguanas, dogs, and cats. You can get salmonella by eating food contaminated with animal feces or from feces contaminated water. Any foods may be contaminated, including vegetables, but beef, poultry, milk, eggs, and seafood are the main sources of contamination. Cooking food completely kills salmonella.  Salmonella infection occurs most often in infants and children, older adults, pregnant women and their unborn fetuses, and people with weak immune systems. Examples are people with HIV or sickle cell disease, those who have had their spleen removed, or people taking chemotherapy.  A salmonella infection can cause nausea, vomiting, abdominal cramps, diarrhea (sometimes with blood), fever, and headache. Symptoms appear within 12 to 72 hours of exposure and usually go away after 4 to 7 days. Mild symptoms will get better without any antibiotic treatment. More severe illness, or those at high risk,  need to take antibiotics. Medicines to stop diarrhea can make the illness last longer and make symptoms worse, especially if used without antibiotics. Don't use these medicines unless your healthcare provider prescribes them.   Home care  Medications  · If antibiotics are prescribed, be sure you take them until they are finished.  · You may use acetaminophen or NSAID medicines like ibuprofen or naproxen to control fever, unless another medicine is prescribed. If you have chronic liver or kidney disease, talk with your healthcare provider before using these medicines. Also talk with your provider if you've had a stomach ulcer or gastrointestinal bleeding. Don't give aspirin to anyone under 18 years of age who is ill with a fever. It may cause severe liver damage. Don't use NSAID medicine if you are already taking one for another condition (like arthritis) or on aspirin (such as for heart disease or after a stroke.)  · If medicines for diarrhea or vomiting are prescribed, take only as directed.  General care  · If symptoms are severe, rest at home for the next 24 hours, or until you feel better.  · Washing your hands with soap and water or using alcohol-based  is the best way to stop the spread of infection. Wash your hands after touching anyone who is sick.  · Wash your hands after using the toilet and before meals. Clean the toilet after each use.  · Caffeine, tobacco, and alcohol can make your diarrhea, cramping, and pain worse.  Food  · Water and clear liquids are important so you don't get dehydrated. Drink small amounts at a time.  · Sports drinks may also help.  · Don't force yourself to eat, especially if you have cramps, vomiting, or diarrhea. Don't eat large amounts at a time, even if you are hungry.  · If you eat, avoid fatty, greasy, spicy, or fried foods.  · Don't eat dairy products if you have diarrhea. These can make the diarrhea worse.  During the first 24 hours (the first full day), follow  the diet below:  · Beverages: Sports drinks, soft drinks without caffeine, mineral water, and decaffeinated tea and coffee. Keep in mind, though, that if you are very dehydrated, sports drinks aren't a good choice. They have too much sugar and not enough electrolytes. In this case, commercially available products called oral rehydration solutions, are best.  · Soups: Clear broth, consommé, and bouillon  · Desserts: Plain gelatin, popsicles, and fruit juice bars  During the next 24 hours (the second day), you may add the following to the above if you are better. If not, continue what you did the first day:  · Hot cereal, plain toast, bread, rolls, crackers  · Plain noodles, rice, mashed potatoes, chicken noodle or rice soup  · Unsweetened canned fruit (avoid pineapple), bananas  · Limit caffeine and chocolate. No spices or seasonings except salt.  During the next 24 hours:  · Gradually resume a normal diet, as you feel better and your symptoms improve.  · If at any time your symptoms start getting worse, go back to clear liquids until you feel better.  Food preparation  · If you have diarrhea, you should not prepare or serve food to others.  · When preparing foods, wash your hands before and after.  · Wash your hands after using cutting boards, countertops, and knives that have been in contact with raw food.  · Keep uncooked meats away from cooked and ready-to-eat foods.  · Use a food thermometer when cooking. Cook poultry to at least 165.0°F (74.0°C). Cook ground meat (beef, veal, pork, lamb) to at least 160.0°F (71.0°C). Cook fresh beef, veal, lamb, and pork to at least 145.0°F (63.0°C).  · Avoid raw or undercooked eggs (poached or trish side up), and unpasteurized milk and juices.  · Wash and peel produce before eating.  Follow-up care  Follow up with your healthcare provider, or as advised. Call if you do not get better within 24 hours, or if diarrhea lasts more than 2 days. If a stool (diarrhea) sample was  taken, call as directed for the results.  Call 911  Call 911 if any of these occur:  · Trouble breathing  · Confusion  · Severe drowsiness or trouble awakening  · Fainting or loss of consciousness  · Rapid heart rate  · Chest pain  · Seizure  · Stiff neck  When to seek medical advice  Call your healthcare provider right away if any of these occur:  · Severe, constant pain in the abdomen  · Continued vomiting (unable to keep liquids down)  · Frequent diarrhea (more than 5 times a day)  · Blood (red or black color) or mucus in diarrhea  · Reduced oral intake  · Signs of dehydration: increased thirst, reduced or no urine output, dark colored urine, dry skin.   · Fever of 100.4°F (38°C) oral or higher, not better with fever medicine  · New rash  © 2317-9737 [employer]. [address] [place] [address], [place] [address]. All rights reserved. This information is not intended as a substitute for professional medical care. Always follow your healthcare professional's instructions.             Language Assistance Services     ATTENTION: Language assistance services are available, free of charge. Please call [phone].      ATENCIÓN: Si habla español, tiene a france disposición servicios gratuitos de asistencia lingüística. Llame al [phone].     CHÚ Ý: N?u b?n nói Ti?ng Vi?t, có các d?ch v? h? tr? ngôn ng? mi?n phí dành cho b?n. G?i s? [phone].         [facility] complies with applicable Federal civil rights laws and does not discriminate on the basis of race, color, national origin, age, disability, or sex.

## 2017-02-02 NOTE — PROGRESS NOTES
Subjective:       Patient ID: Narda Person is a 71 y.o. female.    Chief Complaint: Follow-up (ER ) and Hematuria (yesterday, reports no urine today)    Patient is known, to me and presents with   Chief Complaint   Patient presents with    Follow-up     ER     Hematuria     yesterday, reports no urine today   .  Denies chest pain and shortness of breath.  Patient presents with hospital follow up for salmonella food poisoning. She is feeling better and is on bactrim.  No longer having hematuria    HPI  Review of Systems   Constitutional: Negative for activity change, appetite change, fatigue, fever and unexpected weight change.   HENT: Negative for congestion, ear discharge, ear pain, hearing loss, postnasal drip and tinnitus.    Eyes: Negative for photophobia, pain and visual disturbance.   Respiratory: Negative for cough, shortness of breath, wheezing and stridor.    Cardiovascular: Negative for chest pain, palpitations and leg swelling.   Gastrointestinal: Negative for abdominal distention.   Genitourinary: Negative for difficulty urinating, dysuria, frequency, hematuria and urgency.   Musculoskeletal: Negative for arthralgias, back pain, gait problem, joint swelling and neck pain.   Skin: Negative.    Neurological: Negative for dizziness, seizures, syncope, weakness, light-headedness, numbness and headaches.   Hematological: Negative for adenopathy. Does not bruise/bleed easily.   Psychiatric/Behavioral: Negative for behavioral problems, confusion, hallucinations, sleep disturbance and suicidal ideas. The patient is not nervous/anxious.        Objective:      Physical Exam   Constitutional: She is oriented to person, place, and time. She appears well-developed and well-nourished. No distress.   HENT:   Head: Normocephalic and atraumatic.   Right Ear: External ear normal.   Left Ear: External ear normal.   Mouth/Throat: Oropharynx is clear and moist. No oropharyngeal exudate.   Eyes: Conjunctivae and EOM are  normal. Pupils are equal, round, and reactive to light. Right eye exhibits no discharge. Left eye exhibits no discharge.   Neck: Normal range of motion. Neck supple. No JVD present. No thyromegaly present.   Cardiovascular: Normal rate, regular rhythm, normal heart sounds and intact distal pulses.  Exam reveals no gallop and no friction rub.    No murmur heard.  Pulses:       Dorsalis pedis pulses are 2+ on the right side, and 2+ on the left side.        Posterior tibial pulses are 2+ on the right side, and 2+ on the left side.   Pulmonary/Chest: Effort normal and breath sounds normal. No stridor. No respiratory distress. She has no wheezes. She has no rales. She exhibits no tenderness.   Abdominal: Soft. Bowel sounds are normal. She exhibits no distension and no mass. There is no tenderness. There is no rebound.   Musculoskeletal: Normal range of motion. She exhibits no edema or tenderness.        Right foot: There is normal range of motion and no deformity.        Left foot: There is normal range of motion and no deformity.   Feet:   Right Foot:   Protective Sensation: 9 sites tested. 9 sites sensed.   Skin Integrity: Positive for dry skin. Negative for ulcer, blister, skin breakdown, erythema, warmth or callus.   Left Foot:   Protective Sensation: 9 sites tested. 9 sites sensed.   Skin Integrity: Positive for dry skin. Negative for ulcer, blister, skin breakdown, erythema, warmth or callus.   Lymphadenopathy:     She has no cervical adenopathy.   Neurological: She is alert and oriented to person, place, and time. She has normal reflexes. She displays normal reflexes. No cranial nerve deficit. She exhibits normal muscle tone. Coordination normal.   Skin: Skin is warm and dry. No rash noted. No erythema. No pallor.   Psychiatric: She has a normal mood and affect. Her behavior is normal. Judgment and thought content normal.       Assessment:       1. Salmonella gastroenteritis        Plan:   Narda was seen today for  "follow-up and hematuria.    Diagnoses and all orders for this visit:    Salmonella gastroenteritis  -     POCT urinalysis, dipstick or tablet reag  "This note will not be shared with the patient."  "This note will not be shared with the patient."  Keep hydrated  Take bactrim bid for seven days  Protective Sensation (w/ 10 gram monofilament):  Right: Decreased  Left: Decreased    Visual Inspection:  Normal -  Bilateral    Pedal Pulses:   Right: Present  Left: Present    Posterior tibialis:   Right:Present  Left: Present    Urine clear  rtc as scheduled  "

## 2017-02-02 NOTE — PATIENT INSTRUCTIONS
Food Poisoning (Adult)  Food poisoning is illness that is passed along in food. It usually occurs from 1 to 24 hours after eating food that has spoiled. Food poisoning can occur when you eat food or drink that contains viruses, bacteria, parasites, or toxins. This includes food that has not been cooked or refrigerated properly.  Food poisoning can cause these symptoms:  · Abdominal pain and cramping  · Nausea  · Vomiting  · Diarrhea  · Fever and chills  · Fatigue  The symptoms usually last from 1 to 2 days.  Antibiotics are not effective for this illness.  Home care  Follow all instructions given by your healthcare provider. Rest at home for the next 24 hours, or until you feel better. Avoid caffeine, tobacco, and alcohol. These can make diarrhea, cramping, and pain worse.  If taking medicines:  · Dont take over-the-counter diarrhea or nausea medicines unless your healthcare provider tells you to.  · You may be given medicine for nausea or vomiting to help keep down fluids. Take these medicines as prescribed.  · You may use acetaminophen or NSAID medicine like ibuprofen or naproxen to reduce pain and fever. Dont use these if you have chronic liver or kidney disease, or ever had a stomach ulcer or gastrointestinal bleeding. Talk with your healthcare provider first. Don't use NSAID medicines if you are already taking one for another condition (like arthritis) or are on aspirin (such as for heart disease or after a stroke).  To prevent the spread of illness:  · Remember that washing with soap and water or using alcohol-based  is the best way to prevent the spread of infection.  · Clean the toilet after each use.  · Wash your hands before eating.  · Wash your hands or use alcohol-based  before and after preparing food. Keep in mind that people with diarrhea or vomiting should not prepare food for others.  · Wash your hands after using cutting boards, counter-tops, and knives or other utensils that  have been in contact with raw foods.  · Wash and then peel fruits and vegetables.  · Keep uncooked meats away from cooked and ready-to-eat foods.  · Use a food thermometer when cooking. Cook poultry to at least 165°F (74°C). Cook ground meat (beef, veal, pork, lamb) to at least 160°F (71°C). Cook fresh beef, veal, lamb, and pork to at least 145°F (63°C).  · Dont eat raw or undercooked eggs (poached or trish side up), poultry, meat or unpasteurized milk and juices.  · Do not eat foods requiring refrigeration. Don't eat foods that have not been refrigerated for long periods such as at buffets or picnics.  · Do not eat seafood that is undercooked or with high rates of food toxins.  Food and drinks  The main goal while treating vomiting or diarrhea is to prevent dehydration. This is done by taking small amounts of liquids often.  · Keep in mind that liquids are more important than food right now.  · Drink only small amounts of liquids at a time.  · Dont force yourself to eat, especially if you are having cramping, vomiting, or diarrhea. Dont eat large amounts at a time, even if you are hungry.  · If you eat, avoid fatty, greasy, spicy, or fried foods.  · Dont eat dairy foods or drink milk if you have diarrhea. These can make diarrhea worse.  The first 24 hours you can try:  · Soft drinks without caffeine  · Ginger ale  · Water (plain or flavored)  · Decaf tea or coffee  · Clear broth, consommé, or bouillon  · Gelatin, popsicles, or frozen fruit juice bars  If you are very dehydrated, sports drinks are not a good choice. They have too much sugar and not enough electrolytes. In this case, commercially available products called oral rehydration solutions are best.  The second 24 hours, if you are feeling better, you can add:  · Hot cereal, plain toast, bread, rolls, or crackers  · Plain noodles, rice, mashed potatoes, chicken noodle soup, or rice soup  · Unsweetened canned fruit (no pineapple)  · Bananas  As you  recover:  · Limit fat intake to less than 15 grams per day. Dont eat margarine, butter, oils, mayonnaise, sauces, gravies, fried foods, peanut butter, meat, poultry, or fish.  · Limit fiber. Dont eat raw or cooked vegetables, fresh fruits except bananas, and bran cereals.  · Limit caffeine and chocolate.  · Dont use spices or seasonings except salt.  · Resume a normal diet over time, as you feel better and your symptoms improve.  · If the symptoms come back, go back to a simple diet or clear liquids.  Follow-up care  Follow up with your healthcare provider, or as advised. If a stool sample was taken or cultures were done, call the healthcare provider for the results as instructed.  Call 911  Call 911 if you have any of these symptoms:  · Trouble breathing  · Chest pain  · Confusion  · Extreme drowsiness or trouble walking  · Loss of consciousness  · Rapid heart rate  · Stiff neck  · Seizure  When to seek medical advice  Call your health care provider right away if any of these occur:  · Abdominal pain that gets worse  · Constant lower right abdominal pain  · Continued vomiting and inability to keep liquids down  · Diarrhea more than 5 times a day  · Blood in vomit or stool  · Dark urine or no urine for 8 hours, dry mouth and tongue, tiredness, weakness, or dizziness  · New rash  · You dont get better in 2 to 3 days  · Fever of 100.4°F (38°C) or higher that doesnt get lower with medicine  © 0419-4432 [employer]. [address] [place] [address], [place] [address]. All rights reserved. This information is not intended as a substitute for professional medical care. Always follow your healthcare professional's instructions.        Salmonella Gastroenteritis (Adult)    Salmonella is a bacterium that some animals carry. Examples include poultry, cows, fish, pigs, turtles, lizards, iguanas, dogs, and cats. You can get salmonella by eating food contaminated with animal feces or from feces contaminated water. Any  foods may be contaminated, including vegetables, but beef, poultry, milk, eggs, and seafood are the main sources of contamination. Cooking food completely kills salmonella.  Salmonella infection occurs most often in infants and children, older adults, pregnant women and their unborn fetuses, and people with weak immune systems. Examples are people with HIV or sickle cell disease, those who have had their spleen removed, or people taking chemotherapy.  A salmonella infection can cause nausea, vomiting, abdominal cramps, diarrhea (sometimes with blood), fever, and headache. Symptoms appear within 12 to 72 hours of exposure and usually go away after 4 to 7 days. Mild symptoms will get better without any antibiotic treatment. More severe illness, or those at high risk, need to take antibiotics. Medicines to stop diarrhea can make the illness last longer and make symptoms worse, especially if used without antibiotics. Don't use these medicines unless your healthcare provider prescribes them.   Home care  Medications  · If antibiotics are prescribed, be sure you take them until they are finished.  · You may use acetaminophen or NSAID medicines like ibuprofen or naproxen to control fever, unless another medicine is prescribed. If you have chronic liver or kidney disease, talk with your healthcare provider before using these medicines. Also talk with your provider if you've had a stomach ulcer or gastrointestinal bleeding. Don't give aspirin to anyone under 18 years of age who is ill with a fever. It may cause severe liver damage. Don't use NSAID medicine if you are already taking one for another condition (like arthritis) or on aspirin (such as for heart disease or after a stroke.)  · If medicines for diarrhea or vomiting are prescribed, take only as directed.  General care  · If symptoms are severe, rest at home for the next 24 hours, or until you feel better.  · Washing your hands with soap and water or using  alcohol-based  is the best way to stop the spread of infection. Wash your hands after touching anyone who is sick.  · Wash your hands after using the toilet and before meals. Clean the toilet after each use.  · Caffeine, tobacco, and alcohol can make your diarrhea, cramping, and pain worse.  Food  · Water and clear liquids are important so you don't get dehydrated. Drink small amounts at a time.  · Sports drinks may also help.  · Don't force yourself to eat, especially if you have cramps, vomiting, or diarrhea. Don't eat large amounts at a time, even if you are hungry.  · If you eat, avoid fatty, greasy, spicy, or fried foods.  · Don't eat dairy products if you have diarrhea. These can make the diarrhea worse.  During the first 24 hours (the first full day), follow the diet below:  · Beverages: Sports drinks, soft drinks without caffeine, mineral water, and decaffeinated tea and coffee. Keep in mind, though, that if you are very dehydrated, sports drinks aren't a good choice. They have too much sugar and not enough electrolytes. In this case, commercially available products called oral rehydration solutions, are best.  · Soups: Clear broth, consommé, and bouillon  · Desserts: Plain gelatin, popsicles, and fruit juice bars  During the next 24 hours (the second day), you may add the following to the above if you are better. If not, continue what you did the first day:  · Hot cereal, plain toast, bread, rolls, crackers  · Plain noodles, rice, mashed potatoes, chicken noodle or rice soup  · Unsweetened canned fruit (avoid pineapple), bananas  · Limit caffeine and chocolate. No spices or seasonings except salt.  During the next 24 hours:  · Gradually resume a normal diet, as you feel better and your symptoms improve.  · If at any time your symptoms start getting worse, go back to clear liquids until you feel better.  Food preparation  · If you have diarrhea, you should not prepare or serve food to  others.  · When preparing foods, wash your hands before and after.  · Wash your hands after using cutting boards, countertops, and knives that have been in contact with raw food.  · Keep uncooked meats away from cooked and ready-to-eat foods.  · Use a food thermometer when cooking. Cook poultry to at least 165.0°F (74.0°C). Cook ground meat (beef, veal, pork, lamb) to at least 160.0°F (71.0°C). Cook fresh beef, veal, lamb, and pork to at least 145.0°F (63.0°C).  · Avoid raw or undercooked eggs (poached or trish side up), and unpasteurized milk and juices.  · Wash and peel produce before eating.  Follow-up care  Follow up with your healthcare provider, or as advised. Call if you do not get better within 24 hours, or if diarrhea lasts more than 2 days. If a stool (diarrhea) sample was taken, call as directed for the results.  Call 911  Call 911 if any of these occur:  · Trouble breathing  · Confusion  · Severe drowsiness or trouble awakening  · Fainting or loss of consciousness  · Rapid heart rate  · Chest pain  · Seizure  · Stiff neck  When to seek medical advice  Call your healthcare provider right away if any of these occur:  · Severe, constant pain in the abdomen  · Continued vomiting (unable to keep liquids down)  · Frequent diarrhea (more than 5 times a day)  · Blood (red or black color) or mucus in diarrhea  · Reduced oral intake  · Signs of dehydration: increased thirst, reduced or no urine output, dark colored urine, dry skin.   · Fever of 100.4°F (38°C) oral or higher, not better with fever medicine  · New rash  © 1910-6732 [employer]. [address] [place] [address], [place] [address]. All rights reserved. This information is not intended as a substitute for professional medical care. Always follow your healthcare professional's instructions.

## 2017-02-10 DIAGNOSIS — Z76.0 ENCOUNTER FOR MEDICATION REFILL: ICD-10-CM

## 2017-02-10 RX ORDER — ATORVASTATIN CALCIUM 20 MG/1
TABLET, FILM COATED ORAL
Qty: 30 TABLET | Refills: 2 | Status: SHIPPED | OUTPATIENT
Start: 2017-02-10 | End: 2017-03-12 | Stop reason: SDUPTHER

## 2017-02-10 RX ORDER — SITAGLIPTIN 50 MG/1
TABLET, FILM COATED ORAL
Qty: 30 TABLET | Refills: 2 | Status: SHIPPED | OUTPATIENT
Start: 2017-02-10 | End: 2017-05-16 | Stop reason: SDUPTHER

## 2017-02-10 NOTE — TELEPHONE ENCOUNTER
Pt is leaving to go out of town to [place] and needing a refill on atorvastatin and januvia sent to [facility]  Please advise  Thanks!

## 2017-02-13 RX ORDER — INSULIN ASPART 100 [IU]/ML
INJECTION, SOLUTION INTRAVENOUS; SUBCUTANEOUS
Qty: 15 SYRINGE | Refills: 2 | Status: SHIPPED | OUTPATIENT
Start: 2017-02-13 | End: 2017-04-01 | Stop reason: SDUPTHER

## 2017-02-13 NOTE — TELEPHONE ENCOUNTER
----- Message from Jessica Franco MA sent at 2017  1:30 PM CST -----  Contact: Daughter-Barbi Person  MRN: [id number]  : 1945  PCP: Apolonia Sullivan  Home Phone      [phone]  Work Phone      [phone]  Mobile          Not on file.      MESSAGE: Patient is staying at her daughter's house in [place].  She forgot her Levemir and Novolog.  She needs a script for each sent [facility]

## 2017-02-15 DIAGNOSIS — Z76.0 ENCOUNTER FOR MEDICATION REFILL: ICD-10-CM

## 2017-02-16 RX ORDER — RAMIPRIL 10 MG/1
CAPSULE ORAL
Qty: 30 CAPSULE | Refills: 2 | Status: SHIPPED | OUTPATIENT
Start: 2017-02-16 | End: 2017-05-16 | Stop reason: SDUPTHER

## 2017-02-24 DIAGNOSIS — E11.9 TYPE 2 DIABETES MELLITUS WITHOUT COMPLICATION: ICD-10-CM

## 2017-03-02 ENCOUNTER — TELEPHONE (OUTPATIENT)
Dept: FAMILY MEDICINE | Facility: CLINIC | Age: 72
End: 2017-03-02

## 2017-03-02 NOTE — TELEPHONE ENCOUNTER
Francheska from [facility] called to say.Labs weren't done during  hospital stay on 1/26/17  WBC ,Stool ,Stool exam -Ova,Cysts,Parasites and Giardia /Cryptosporidum ,EIA She did know why they weren't done.

## 2017-03-03 NOTE — PHYSICIAN QUERY
PT Name: Narda Person  MR #: [id number]     Physician Query Form - Etiology of Condition Clarification    Reviewer Eloise Chandler - [email or web address]    This form is a permanent document in the medical record.     Query Date: March 3, 2017    By submitting this query, we are merely seeking further clarification of documentation.  Please utilize your independent clinical judgment when addressing the question(s) below.     (The Medical record reflects the following:)      Findings Location in Medical Record   Stool culture   Order: [id number]   Status:  Final result   Visible to patient:  No (Not Released) Next appt:  None   Notes Recorded by Alcides Mata on 2/1/2017 at 2:21 PM  Patient notified  ------  Notes Recorded by Willis Garza MD on 2/1/2017 at 12:57 PM  No but I just sent in bactrim   Nurses, please call her and let her know i sent in abx thanks  ------    Notes Recorded by Damaris Yoder MD on 1/31/2017 at 8:41 AM  Was she sent home on abx?  mh      1mo ago     Stool Culture No Shigella, Vibrio, Campylobacter, Yersinia isolated.   Stool Culture SALMONELLA SPECIES   Resulting Agency [facility]   Susceptibility    Salmonella species     CULTURE, STOOL     Ampicillin Sensitive     Ciprofloxacin Sensitive     Trimeth/Sulfa Sensitive              Specimen Collected: 01/26/17  7:00 PM Last Resulted: 01/31/17  1:02 PM Lab Flowsheet Order Details View Encounter Lab and                    Stool Culture     Patient was seen in the emergency room on January 23 with diarrhea/dehydration. Patient ate some moldy sausage with possible food poisoning per daughter at bedside.  Patient was given IV fluids as well as a prescription for Imodium, diarrhea not better. Daughter states the pt has been having diarrhea around-the-clock, 10 times today.      HPI of H&P     Progress Notes by Willis Garza MD at 1/29/2017  1:17 PM      Author: Willis Garza MD Service: (none) Author Type: Physician     Filed:  2/1/2017 12:57 PM Status: Signed : Willis Garza MD (Physician)        No but I just sent in bactrim   Nurses, please call her and let her know i sent in abx thanks            Signed by Willis Garza MD on 2/1/2017 12:57 PM          Progress note     Please provide in your best medical opinion regarding the etiology of diarrhea for which the primary focus of treatment was directed.     [    ]  Salmonella enteritis   [    ]  Chronic diarrhea (noninfectious gastroenteritis, unspecified)   [    ]  Infectious diarrhea (infectious gastroenteritis, unspecified)     [    ]  Other:  ________________________   [    ]  Clinically Undetermined

## 2017-03-12 DIAGNOSIS — Z76.0 ENCOUNTER FOR MEDICATION REFILL: ICD-10-CM

## 2017-03-13 DIAGNOSIS — R42 VERTIGO: ICD-10-CM

## 2017-03-13 RX ORDER — ATORVASTATIN CALCIUM 20 MG/1
TABLET, FILM COATED ORAL
Qty: 30 TABLET | Refills: 2 | Status: SHIPPED | OUTPATIENT
Start: 2017-03-13 | End: 2017-05-07 | Stop reason: SDUPTHER

## 2017-03-14 RX ORDER — MECLIZINE HYDROCHLORIDE 25 MG/1
TABLET ORAL
Qty: 60 TABLET | Refills: 5 | Status: SHIPPED | OUTPATIENT
Start: 2017-03-14 | End: 2017-05-15 | Stop reason: SDUPTHER

## 2017-04-03 RX ORDER — INSULIN ASPART 100 [IU]/ML
INJECTION, SOLUTION INTRAVENOUS; SUBCUTANEOUS
Qty: 18 SYRINGE | Refills: 1 | Status: SHIPPED | OUTPATIENT
Start: 2017-04-03 | End: 2017-06-14 | Stop reason: SDUPTHER

## 2017-04-24 ENCOUNTER — OFFICE VISIT (OUTPATIENT)
Dept: INTERNAL MEDICINE | Facility: CLINIC | Age: 72
End: 2017-04-24
Payer: MEDICARE

## 2017-04-24 ENCOUNTER — LAB VISIT (OUTPATIENT)
Dept: LAB | Facility: HOSPITAL | Age: 72
End: 2017-04-24
Attending: NURSE PRACTITIONER
Payer: MEDICARE

## 2017-04-24 VITALS
OXYGEN SATURATION: 96 % | HEART RATE: 80 BPM | WEIGHT: 194 LBS | BODY MASS INDEX: 35.7 KG/M2 | TEMPERATURE: 99 F | DIASTOLIC BLOOD PRESSURE: 60 MMHG | HEIGHT: 62 IN | SYSTOLIC BLOOD PRESSURE: 128 MMHG | RESPIRATION RATE: 20 BRPM

## 2017-04-24 DIAGNOSIS — N39.0 URINARY TRACT INFECTION WITH HEMATURIA, SITE UNSPECIFIED: ICD-10-CM

## 2017-04-24 DIAGNOSIS — N39.0 URINARY TRACT INFECTION WITH HEMATURIA, SITE UNSPECIFIED: Primary | ICD-10-CM

## 2017-04-24 DIAGNOSIS — R31.9 URINARY TRACT INFECTION WITH HEMATURIA, SITE UNSPECIFIED: Primary | ICD-10-CM

## 2017-04-24 DIAGNOSIS — N39.41 URGE INCONTINENCE: ICD-10-CM

## 2017-04-24 DIAGNOSIS — R31.9 URINARY TRACT INFECTION WITH HEMATURIA, SITE UNSPECIFIED: ICD-10-CM

## 2017-04-24 LAB
BILIRUB SERPL-MCNC: ABNORMAL MG/DL
BLOOD URINE, POC: ABNORMAL
COLOR, POC UA: CLEAR
GLUCOSE UR QL STRIP: 250
KETONES UR QL STRIP: ABNORMAL
LEUKOCYTE ESTERASE URINE, POC: ABNORMAL
NITRITE, POC UA: ABNORMAL
PH, POC UA: 5
PROTEIN, POC: NORMAL
SPECIFIC GRAVITY, POC UA: 1
UROBILINOGEN, POC UA: NORMAL

## 2017-04-24 PROCEDURE — 99214 OFFICE O/P EST MOD 30 MIN: CPT | Mod: S$GLB,,, | Performed by: NURSE PRACTITIONER

## 2017-04-24 PROCEDURE — 3078F DIAST BP <80 MM HG: CPT | Mod: S$GLB,,, | Performed by: NURSE PRACTITIONER

## 2017-04-24 PROCEDURE — 99499 UNLISTED E&M SERVICE: CPT | Mod: S$GLB,,, | Performed by: NURSE PRACTITIONER

## 2017-04-24 PROCEDURE — 1126F AMNT PAIN NOTED NONE PRSNT: CPT | Mod: S$GLB,,, | Performed by: NURSE PRACTITIONER

## 2017-04-24 PROCEDURE — 3074F SYST BP LT 130 MM HG: CPT | Mod: S$GLB,,, | Performed by: NURSE PRACTITIONER

## 2017-04-24 PROCEDURE — 81001 URINALYSIS AUTO W/SCOPE: CPT | Mod: S$GLB,,, | Performed by: NURSE PRACTITIONER

## 2017-04-24 PROCEDURE — 1157F ADVNC CARE PLAN IN RCRD: CPT | Mod: 8P,S$GLB,, | Performed by: NURSE PRACTITIONER

## 2017-04-24 PROCEDURE — 1159F MED LIST DOCD IN RCRD: CPT | Mod: S$GLB,,, | Performed by: NURSE PRACTITIONER

## 2017-04-24 PROCEDURE — 99999 PR PBB SHADOW E&M-EST. PATIENT-LVL V: CPT | Mod: PBBFAC,,, | Performed by: NURSE PRACTITIONER

## 2017-04-24 PROCEDURE — 87086 URINE CULTURE/COLONY COUNT: CPT

## 2017-04-24 PROCEDURE — 1160F RVW MEDS BY RX/DR IN RCRD: CPT | Mod: S$GLB,,, | Performed by: NURSE PRACTITIONER

## 2017-04-24 RX ORDER — CIPROFLOXACIN 500 MG/1
500 TABLET ORAL 2 TIMES DAILY
Qty: 10 TABLET | Refills: 0 | Status: SHIPPED | OUTPATIENT
Start: 2017-04-24 | End: 2017-04-29

## 2017-04-24 RX ORDER — OXYBUTYNIN CHLORIDE 5 MG/1
5 TABLET, EXTENDED RELEASE ORAL DAILY
Qty: 30 TABLET | Refills: 2 | Status: SHIPPED | OUTPATIENT
Start: 2017-04-24 | End: 2017-11-09 | Stop reason: SDUPTHER

## 2017-04-24 NOTE — PROGRESS NOTES
Subjective:       Patient ID: Narda Person is a 71 y.o. female.    Chief Complaint: Cystitis (x 1 month)    Patient is known, to me and presents with   Chief Complaint   Patient presents with    Cystitis     x 1 month   .  Denies chest pain and shortness of breath.  Patient presents with cystitis for the past month and is not improving. Patient states that she has to wear depends. She states that when she has the urge she urinates on herself   HPI  Review of Systems   Constitutional: Negative for activity change, appetite change, fatigue, fever and unexpected weight change.   HENT: Negative for congestion, ear discharge, ear pain, hearing loss, postnasal drip and tinnitus.    Eyes: Negative for photophobia, pain and visual disturbance.   Respiratory: Negative for cough, shortness of breath, wheezing and stridor.    Cardiovascular: Negative for chest pain, palpitations and leg swelling.   Gastrointestinal: Negative for abdominal distention.   Genitourinary: Positive for frequency and urgency. Negative for difficulty urinating, dysuria and hematuria.   Musculoskeletal: Negative for arthralgias, back pain, gait problem, joint swelling and neck pain.   Skin: Negative.    Neurological: Negative for dizziness, seizures, syncope, weakness, light-headedness, numbness and headaches.   Hematological: Negative for adenopathy. Does not bruise/bleed easily.   Psychiatric/Behavioral: Negative for behavioral problems, confusion, hallucinations, sleep disturbance and suicidal ideas. The patient is not nervous/anxious.        Objective:      Physical Exam   Constitutional: She is oriented to person, place, and time. She appears well-developed and well-nourished. No distress.   HENT:   Head: Normocephalic and atraumatic.   Right Ear: External ear normal.   Left Ear: External ear normal.   Mouth/Throat: Oropharynx is clear and moist. No oropharyngeal exudate.   Eyes: Conjunctivae and EOM are normal. Pupils are equal, round, and  "reactive to light. Right eye exhibits no discharge. Left eye exhibits no discharge.   Neck: Normal range of motion. Neck supple. No JVD present. No thyromegaly present.   Cardiovascular: Normal rate, regular rhythm, normal heart sounds and intact distal pulses.  Exam reveals no gallop and no friction rub.    No murmur heard.  Pulmonary/Chest: Effort normal and breath sounds normal. No stridor. No respiratory distress. She has no wheezes. She has no rales. She exhibits no tenderness.   Abdominal: Soft. Bowel sounds are normal. She exhibits no distension and no mass. There is no tenderness. There is no rebound.   Genitourinary:   Genitourinary Comments: See dip   Musculoskeletal: Normal range of motion. She exhibits no edema or tenderness.   Lymphadenopathy:     She has no cervical adenopathy.   Neurological: She is alert and oriented to person, place, and time. She has normal reflexes. She displays normal reflexes. No cranial nerve deficit. She exhibits normal muscle tone. Coordination normal.   Skin: Skin is warm and dry. No rash noted. No erythema. No pallor.   Psychiatric: She has a normal mood and affect. Her behavior is normal. Judgment and thought content normal.       Assessment:       1. Urinary tract infection with hematuria, site unspecified    2. Urge incontinence        Plan:   Narda was seen today for cystitis.    Diagnoses and all orders for this visit:    Urinary tract infection with hematuria, site unspecified  -     POCT urinalysis, dipstick or tablet reag  -     Urine culture; Future  -     ciprofloxacin HCl (CIPRO) 500 MG tablet; Take 1 tablet (500 mg total) by mouth 2 (two) times daily.    Urge incontinence  -     POCT urinalysis, dipstick or tablet reag  -     Urine culture; Future  -     oxybutynin (DITROPAN-XL) 5 MG TR24; Take 1 tablet (5 mg total) by mouth once daily.    "This note will not be shared with the patient."  I will place on oxybutynin for symptom control  My concern is that she " does suffer with mild dementia so I explained to her and family to watch for worsening s/s with incont meds  Keep  Hydrated  Will call me and let me know how it is working  RTC AS SCHEDULED

## 2017-04-24 NOTE — PATIENT INSTRUCTIONS
"  Bladder Infection, Female (Adult)    Urine is normally doesn't have any bacteria in it. But bacteria can get into the urinary tract from the skin around the rectum. Or they can travel in the blood from elsewhere in the body. Once they are in your urinary tract, they can cause infection in the urethra (urethritis), the bladder (cystitis), or the kidneys (pyelonephritis).  The most common place for an infection is in the bladder. This is called a bladder infection. This is one of the most common infections in women. Most bladder infections are easily treated. They are not serious unless the infection spreads to the kidney.  The phrases "bladder infection," "UTI," and "cystitis" are often used to describe the same thing. But they are not always the same. Cystitis is an inflammation of the bladder. The most common cause of cystitis is an infection.  Symptoms  The infection causes inflammation in the urethra and bladder. This causes many of the symptoms. The most common symptoms of a bladder infection are:  · Pain or burning when urinating  · Having to urinate more often than usual  · Urgent need to urinate  · Only a small amount of urine comes out  · Blood in urine  · Abdominal discomfort. This is usually in the lower abdomen above the pubic bone.  · Cloudy urine  · Strong- or bad-smelling urine  · Unable to urinate (urinary retention)  · Unable to hold urine in (urinary incontinence)  · Fever  · Loss of appetite  · Confusion (in older adults)  Causes  Bladder infections are not contagious. You can't get one from someone else, from a toilet seat, or from sharing a bath.  The most common cause of bladder infections is bacteria from the bowels. The bacteria get onto the skin around the opening of the urethra. From there, they can get into the urine and travel up to the bladder, causing inflammation and infection. This usually happens because of:  · Wiping improperly after urinating. Always wipe from front to " back.  · Bowel incontinence  · Pregnancy  · Procedures such as having a catheter inserted  · Older age  · Not emptying your bladder. This can allow bacteria a chance to grow in your urine.  · Dehydration  · Constipation  · Sex  · Use of a diaphragm for birth control   Treatment  Bladder infections are diagnosed by a urine test. They are treated with antibiotics and usually clear up quickly without complications. Treatment helps prevent a more serious kidney infection.  Medicines  Medicines can help in the treatment of a bladder infection:  · Take antibiotics until they are used up, even if you feel better. It is important to finish them to make sure the infection has cleared.  · You can use acetaminophen or ibuprofen for pain, fever, or discomfort, unless another medicine was prescribed. If you have chronic liver or kidney disease, talk with your healthcare provider before using these medicines. Also talk with your provider if you've ever had a stomach ulcer or gastrointestinal bleeding, or are taking blood-thinner medicines.  · If you are given phenazopydridine to reduce burning with urination, it will cause your urine to become a bright orange color. This can stain clothing.  Care and prevention  These self-care steps can help prevent future infections:  · Drink plenty of fluids to prevent dehydration and flush out your bladder. Do this unless you must restrict fluids for other health reasons, or your doctor told you not to.  · Proper cleaning after going to the bathroom is important. Wipe from front to back after using the toilet to prevent the spread of bacteria.  · Urinate more often. Don't try to hold urine in for a long time.  · Wear loose-fitting clothes and cotton underwear. Avoid tight-fitting pants.  · Improve your diet and prevent constipation. Eat more fresh fruit and vegetables, and fiber, and less junk and fatty foods.  · Avoid sex until your symptoms are gone.  · Avoid caffeine, alcohol, and spicy  foods. These can irritate your bladder.  · Urinate right after intercourse to flush out your bladder.  · If you use birth control pills and have frequent bladder infections, discuss it with your doctor.  Follow-up care  Call your healthcare provider if all symptoms are not gone after 3 days of treatment. This is especially important if you have repeat infections.  If a culture was done, you will be told if your treatment needs to be changed. If directed, you can call to find out the results.  If X-rays were done, you will be told if the results will affect your treatment.  Call 911  Call 911 if any of the following occur:  · Trouble breathing  · Hard to wake up or confusion  · Fainting or loss of consciousness  · Rapid heart rate  When to seek medical advice  Call your healthcare provider right away if any of these occur:  · Fever of 100.4ºF (38.0ºC) or higher, or as directed by your healthcare provider  · Symptoms are not better by the third day of treatment  · Back or belly (abdominal) pain that gets worse  · Repeated vomiting, or unable to keep medicine down  · Weakness or dizziness  · Vaginal discharge  · Pain, redness, or swelling in the outer vaginal area (labia)  Date Last Reviewed: 10/1/2016  © 8002-9314 [employer]. [address] [place] [address], [place] [address]. All rights reserved. This information is not intended as a substitute for professional medical care. Always follow your healthcare professional's instructions.        Urinary Incontinence, Female (Adult)  Urinary incontinence means loss of control of the bladder. This problem affects many women, especially as they get older. If you have incontinence, you may be embarrassed to ask for help. But know that this problem can be treated.    Types of Incontinence  There are different types of incontinence. Two of the main types are described here. You can have more than one type.  Stress incontinence: With this type, urine leaks when pressure  (stress) is put on the bladder. This may happen when you cough, sneeze, or laugh. Stress incontinence most often occurs because the pelvic floor muscles that support the bladder and urethra are weak. This can happen after pregnancy and vaginal childbirth or a hysterectomy. It can also be due to excess body weight or hormone changes.  Urge incontinence (also called overactive bladder): With this type, a sudden urge to urinate is felt often. This may happen even though there may not be much urine in the bladder. The need to urinate often during the night is common. Urge incontinence most often occurs because of bladder spasms. This may be due to bladder irritation or infection. Damage to bladder nerves or pelvic muscles, constipation, and certain medicines can also lead to urge incontinence.  Treatment of urinary incontinence depends on the cause. Further evaluation is needed to find the type you have. This will likely include an exam and certain tests. Based on the results, you and your healthcare provider can then plan treatment. Until a diagnosis is made, the home care tips below can help relieve symptoms.  Home care  · Do pelvic floor muscle (Kegel) exercises, if they are prescribed. The pelvic floor muscles help support the bladder and urethra. Many women find that their symptoms improve when doing special exercises that strengthen these muscles. To do the exercises:  ¨ Contract the muscles you would use to stop your stream of urine, but do this when youre not urinating. Hold for 10 seconds, then relax. Repeat 10 to 20 times in a row, at least 3 times a day. Your provider may give you other instructions for how to do the exercises and how often.  · Keep a bladder diary. This helps track how often and how much you urinate over a set period of time. Bring this diary with you to your next visit with the provider. The information can help your provider learn more about your bladder problem.  · Lose weight, if  advised to by your provider. Excess weight puts pressure on the bladder. Your provider can help you create a weight-loss plan thats right for you. This may include exercising more and making certain diet changes.  · Avoid foods and drinks that may irritate the bladder. These can include alcohol and caffeinated drinks.  · Quit smoking. Smoking and other tobacco use can lead to chronic cough that strains the pelvic floor muscles. Smoking may also damage the bladder and urethra. Talk with your provider about treatments or methods you can use to quit smoking.  · If drinking large amounts of fluid causes you to have symptoms, you may be advised to limit your fluid intake. You may also be advised to drink most of your fluids during the day and to limit fluids at night.  · If youre worried about urine leakage or accidents, you may wear absorbent pads to catch urine. Change the pads often. This helps reduce discomfort. It may also reduce the risk of skin or bladder infections.  Follow-up care  Follow up with your healthcare provider as directed. If testing was done, youll be told the results as soon as they are ready. It may take some to find the right treatment for your problem. Work closely with your provider to ensure you get the best care for your needs. Your treatment plan may include special therapies or medicines. Certain procedures or surgery may also be options. Be sure to discuss any questions you have with your provider.  When to seek medical advice  Call the healthcare provider right away if any of these occur:  · Fever of 100.4°F (38°C) or higher, or as directed by your provider  · Bladder pain or fullness  · Abdominal swelling  · Nausea or vomiting  · Back pain  · Weakness, dizziness or fainting  Date Last Reviewed: 7/26/2015 © 2000-2016 [employer]. [address] [place] [address], [place] [address]. All rights reserved. This information is not intended as a substitute for professional medical care.  Always follow your healthcare professional's instructions.

## 2017-04-24 NOTE — MR AVS SNAPSHOT
[facility]  [address] [facility] [address]  Phone: [phone]  Fax: [phone]                  Narda Person   2017 3:15 PM   Office Visit    Description:  Female : 1945   Provider:  Apolonia Sullivan NP   Department:  [facility]           Reason for Visit     Cystitis           Diagnoses this Visit        Comments    Urinary tract infection with hematuria, site unspecified    -  Primary     Urge incontinence                To Do List           Future Appointments        Provider Department Dept Phone    2017 3:15 PM Apolonia Sullivan NP [facility] [phone]      Goals (5 Years of Data)     None      Follow-Up and Disposition     Return if symptoms worsen or fail to improve.       These Medications        Disp Refills Start End    oxybutynin (DITROPAN-XL) 5 MG TR24 30 tablet 2 2017    Take 1 tablet (5 mg total) by mouth once daily. - Oral    [id number] [place] [facility] [address] Ph #: [phone]       ciprofloxacin HCl (CIPRO) 500 MG tablet 10 tablet 0 2017    Take 1 tablet (500 mg total) by mouth 2 (two) times daily. - Oral    Pharm[id number] [place] [facility] [address] Ph #: [phone]         [facility]     [facility] -  Assistance  Unless otherwise directed by your provider, please contact [facility], our nurse care line that is available for  assistance.     Registered nurses in the [facility] provide: appointment scheduling, clinical advisement, health education, and other advisory services.  Call: [phone] (toll free)               Medications           Message regarding Medications     Verify the changes and/or additions to your medication regime listed below are the same as discussed with your clinician today.  If any of these changes or additions are incorrect, please notify your  healthcare provider.        START taking these NEW medications        Refills    oxybutynin (DITROPAN-XL) 5 MG TR24 2    Sig: Take 1 tablet (5 mg total) by mouth once daily.    Class: Normal    Route: Oral    ciprofloxacin HCl (CIPRO) 500 MG tablet 0    Sig: Take 1 tablet (500 mg total) by mouth 2 (two) times daily.    Class: Normal    Route: Oral      STOP taking these medications     colestipol (COLESTID) 1 gram Tab Take 2 tablets (2 g total) by mouth 2 (two) times daily as needed (loose stools).    ondansetron (ZOFRAN) 4 MG tablet Take 1 tablet (4 mg total) by mouth every 8 (eight) hours as needed for Nausea.           Verify that the below list of medications is an accurate representation of the medications you are currently taking.  If none reported, the list may be blank. If incorrect, please contact your healthcare provider. Carry this list with you in case of emergency.           Current Medications     amitriptyline (ELAVIL) 50 MG tablet Take 50 mg by mouth every evening.    atorvastatin (LIPITOR) 20 MG tablet TAKE 1 TABLET (20 MG TOTAL) BY MOUTH ONCE DAILY.    blood sugar diagnostic Strp Accu-chek khadra testing strips use to test sugars AC/HS    blood-glucose meter (ACCU-CHEK KHADRA) Misc Use to test sugars AC/HS daily    donepezil (ARICEPT) 10 MG tablet Take 1 tablet (10 mg total) by mouth as directed. Take 1/2 tablet each night x 2 weeks, then take 1 tablet each night afterwards    gabapentin (NEURONTIN) 300 MG capsule TAKE ONE CAPSULE BY MOUTH 4 TIMES A DAY    hydrochlorothiazide (HYDRODIURIL) 25 MG tablet TAKE ONE TABLET BY MOUTH EVERY DAY    insulin detemir (LEVEMIR FLEXTOUCH) 100 unit/mL (3 mL) SubQ InPn pen INJECT 25 UNITS INTO THE SKIN 2 TIMES A DAY    insulin syringe-needle U-100 0.3 mL 30 Syrg Use with Levemir vial BID    JANUVIA 50 mg Tab TAKE 1 TABLET BY MOUTH EVERY DAY    lactulose (CHRONULAC) 10 gram/15 mL solution Take by mouth daily as needed.     lancets (ACCU-CHEK SOFTCLIX LANCETS) Misc Use  "to test sugars AC/HS    levothyroxine (SYNTHROID) 50 MCG tablet Take 1 tablet (50 mcg total) by mouth once daily.    meclizine (ANTIVERT) 25 mg tablet TAKE ONE TABLET BY MOUTH TWICE A DAY AS NEEDED FOR DIZZINESS OR NAUSEA    melatonin 5 mg Tab Take 5 mg by mouth nightly as needed.     metoprolol succinate (TOPROL-XL) 25 MG 24 hr tablet Take 25 mg by mouth once daily.    naproxen sodium 220 mg Cap Take 2 tablets by mouth daily as needed.    NOVOLOG FLEXPEN 100 unit/mL InPn pen INJECT 22 UNITS INTO THE SKIN 3 (THREE) TIMES DAILY WITH MEALS.    omeprazole (PRILOSEC) 20 MG capsule Take 1 capsule (20 mg total) by mouth 2 (two) times daily.    pen needle, diabetic (LITE TOUCH INSULIN PEN NEEDLES) 31 gauge x 3/16" Ndle 1 application by Misc.(Non-Drug; Combo Route) route 4 (four) times daily.    pen needle, diabetic 32 gauge x 5/32" Ndle 1 Device by Misc.(Non-Drug; Combo Route) route as directed. Needles to be used with Novolog pens and Levemir pens daily.    ramipril (ALTACE) 10 MG capsule TAKE ONE CAPSULE BY MOUTH EVERY DAY    ciprofloxacin HCl (CIPRO) 500 MG tablet Take 1 tablet (500 mg total) by mouth 2 (two) times daily.    oxybutynin (DITROPAN-XL) 5 MG TR24 Take 1 tablet (5 mg total) by mouth once daily.           Clinical Reference Information           Your Vitals Were     BP Pulse Temp Resp Height Weight    128/60 80 98.8 °F (37.1 °C) (Oral) 20 5' 2" (1.575 m) 88 kg (194 lb)    Last Period SpO2 BMI          (LMP Unknown) 96% 35.48 kg/m2        Blood Pressure          Most Recent Value    BP  128/60      Allergies as of 4/24/2017     Percocet [Oxycodone-acetaminophen]    Percodan [Oxycodone Hcl-oxycodone-asa]    Latex, Natural Rubber    Phenytoin Sodium Extended    Sutures, Catgut    Adhesive    Adhesive Tape-silicones      Immunizations Administered on Date of Encounter - 4/24/2017     None      Orders Placed During Today's Visit      Normal Orders This Visit    POCT urinalysis, dipstick or tablet reag     Future " "Labs/Procedures Expected by Expires    Urine culture  4/24/2017 6/23/2018      Instructions      Bladder Infection, Female (Adult)    Urine is normally doesn't have any bacteria in it. But bacteria can get into the urinary tract from the skin around the rectum. Or they can travel in the blood from elsewhere in the body. Once they are in your urinary tract, they can cause infection in the urethra (urethritis), the bladder (cystitis), or the kidneys (pyelonephritis).  The most common place for an infection is in the bladder. This is called a bladder infection. This is one of the most common infections in women. Most bladder infections are easily treated. They are not serious unless the infection spreads to the kidney.  The phrases "bladder infection," "UTI," and "cystitis" are often used to describe the same thing. But they are not always the same. Cystitis is an inflammation of the bladder. The most common cause of cystitis is an infection.  Symptoms  The infection causes inflammation in the urethra and bladder. This causes many of the symptoms. The most common symptoms of a bladder infection are:  · Pain or burning when urinating  · Having to urinate more often than usual  · Urgent need to urinate  · Only a small amount of urine comes out  · Blood in urine  · Abdominal discomfort. This is usually in the lower abdomen above the pubic bone.  · Cloudy urine  · Strong- or bad-smelling urine  · Unable to urinate (urinary retention)  · Unable to hold urine in (urinary incontinence)  · Fever  · Loss of appetite  · Confusion (in older adults)  Causes  Bladder infections are not contagious. You can't get one from someone else, from a toilet seat, or from sharing a bath.  The most common cause of bladder infections is bacteria from the bowels. The bacteria get onto the skin around the opening of the urethra. From there, they can get into the urine and travel up to the bladder, causing inflammation and infection. This usually " happens because of:  · Wiping improperly after urinating. Always wipe from front to back.  · Bowel incontinence  · Pregnancy  · Procedures such as having a catheter inserted  · Older age  · Not emptying your bladder. This can allow bacteria a chance to grow in your urine.  · Dehydration  · Constipation  · Sex  · Use of a diaphragm for birth control   Treatment  Bladder infections are diagnosed by a urine test. They are treated with antibiotics and usually clear up quickly without complications. Treatment helps prevent a more serious kidney infection.  Medicines  Medicines can help in the treatment of a bladder infection:  · Take antibiotics until they are used up, even if you feel better. It is important to finish them to make sure the infection has cleared.  · You can use acetaminophen or ibuprofen for pain, fever, or discomfort, unless another medicine was prescribed. If you have chronic liver or kidney disease, talk with your healthcare provider before using these medicines. Also talk with your provider if you've ever had a stomach ulcer or gastrointestinal bleeding, or are taking blood-thinner medicines.  · If you are given phenazopydridine to reduce burning with urination, it will cause your urine to become a bright orange color. This can stain clothing.  Care and prevention  These self-care steps can help prevent future infections:  · Drink plenty of fluids to prevent dehydration and flush out your bladder. Do this unless you must restrict fluids for other health reasons, or your doctor told you not to.  · Proper cleaning after going to the bathroom is important. Wipe from front to back after using the toilet to prevent the spread of bacteria.  · Urinate more often. Don't try to hold urine in for a long time.  · Wear loose-fitting clothes and cotton underwear. Avoid tight-fitting pants.  · Improve your diet and prevent constipation. Eat more fresh fruit and vegetables, and fiber, and less junk and fatty  foods.  · Avoid sex until your symptoms are gone.  · Avoid caffeine, alcohol, and spicy foods. These can irritate your bladder.  · Urinate right after intercourse to flush out your bladder.  · If you use birth control pills and have frequent bladder infections, discuss it with your doctor.  Follow-up care  Call your healthcare provider if all symptoms are not gone after 3 days of treatment. This is especially important if you have repeat infections.  If a culture was done, you will be told if your treatment needs to be changed. If directed, you can call to find out the results.  If X-rays were done, you will be told if the results will affect your treatment.  Call 911  Call 911 if any of the following occur:  · Trouble breathing  · Hard to wake up or confusion  · Fainting or loss of consciousness  · Rapid heart rate  When to seek medical advice  Call your healthcare provider right away if any of these occur:  · Fever of 100.4ºF (38.0ºC) or higher, or as directed by your healthcare provider  · Symptoms are not better by the third day of treatment  · Back or belly (abdominal) pain that gets worse  · Repeated vomiting, or unable to keep medicine down  · Weakness or dizziness  · Vaginal discharge  · Pain, redness, or swelling in the outer vaginal area (labia)  Date Last Reviewed: 10/1/2016  © 4861-6376 [employer]. [address] [place] [address], [place] [address]. All rights reserved. This information is not intended as a substitute for professional medical care. Always follow your healthcare professional's instructions.        Urinary Incontinence, Female (Adult)  Urinary incontinence means loss of control of the bladder. This problem affects many women, especially as they get older. If you have incontinence, you may be embarrassed to ask for help. But know that this problem can be treated.    Types of Incontinence  There are different types of incontinence. Two of the main types are described here. You can have  more than one type.  Stress incontinence: With this type, urine leaks when pressure (stress) is put on the bladder. This may happen when you cough, sneeze, or laugh. Stress incontinence most often occurs because the pelvic floor muscles that support the bladder and urethra are weak. This can happen after pregnancy and vaginal childbirth or a hysterectomy. It can also be due to excess body weight or hormone changes.  Urge incontinence (also called overactive bladder): With this type, a sudden urge to urinate is felt often. This may happen even though there may not be much urine in the bladder. The need to urinate often during the night is common. Urge incontinence most often occurs because of bladder spasms. This may be due to bladder irritation or infection. Damage to bladder nerves or pelvic muscles, constipation, and certain medicines can also lead to urge incontinence.  Treatment of urinary incontinence depends on the cause. Further evaluation is needed to find the type you have. This will likely include an exam and certain tests. Based on the results, you and your healthcare provider can then plan treatment. Until a diagnosis is made, the home care tips below can help relieve symptoms.  Home care  · Do pelvic floor muscle (Kegel) exercises, if they are prescribed. The pelvic floor muscles help support the bladder and urethra. Many women find that their symptoms improve when doing special exercises that strengthen these muscles. To do the exercises:  ¨ Contract the muscles you would use to stop your stream of urine, but do this when youre not urinating. Hold for 10 seconds, then relax. Repeat 10 to 20 times in a row, at least 3 times a day. Your provider may give you other instructions for how to do the exercises and how often.  · Keep a bladder diary. This helps track how often and how much you urinate over a set period of time. Bring this diary with you to your next visit with the provider. The information can  help your provider learn more about your bladder problem.  · Lose weight, if advised to by your provider. Excess weight puts pressure on the bladder. Your provider can help you create a weight-loss plan thats right for you. This may include exercising more and making certain diet changes.  · Avoid foods and drinks that may irritate the bladder. These can include alcohol and caffeinated drinks.  · Quit smoking. Smoking and other tobacco use can lead to chronic cough that strains the pelvic floor muscles. Smoking may also damage the bladder and urethra. Talk with your provider about treatments or methods you can use to quit smoking.  · If drinking large amounts of fluid causes you to have symptoms, you may be advised to limit your fluid intake. You may also be advised to drink most of your fluids during the day and to limit fluids at night.  · If youre worried about urine leakage or accidents, you may wear absorbent pads to catch urine. Change the pads often. This helps reduce discomfort. It may also reduce the risk of skin or bladder infections.  Follow-up care  Follow up with your healthcare provider as directed. If testing was done, youll be told the results as soon as they are ready. It may take some to find the right treatment for your problem. Work closely with your provider to ensure you get the best care for your needs. Your treatment plan may include special therapies or medicines. Certain procedures or surgery may also be options. Be sure to discuss any questions you have with your provider.  When to seek medical advice  Call the healthcare provider right away if any of these occur:  · Fever of 100.4°F (38°C) or higher, or as directed by your provider  · Bladder pain or fullness  · Abdominal swelling  · Nausea or vomiting  · Back pain  · Weakness, dizziness or fainting  Date Last Reviewed: 7/26/2015  © 3819-2101 [employer]. [address] [place] [address], [place] [address]. All rights reserved.  This information is not intended as a substitute for professional medical care. Always follow your healthcare professional's instructions.             Language Assistance Services     ATTENTION: Language assistance services are available, free of charge. Please call [phone].      ATENCIÓN: Si shyanne cisse, tiene a france disposición servicios gratuitos de asistencia lingüística. Llame al [phone].     CHÚ Ý: N?u b?n nói Ti?ng Vi?t, có các d?ch v? h? tr? ngôn ng? mi?n phí dành cho b?n. G?i s? [phone].         [facility] complies with applicable Federal civil rights laws and does not discriminate on the basis of race, color, national origin, age, disability, or sex.

## 2017-04-26 LAB — BACTERIA UR CULT: NORMAL

## 2017-05-07 DIAGNOSIS — Z76.0 ENCOUNTER FOR MEDICATION REFILL: ICD-10-CM

## 2017-05-08 RX ORDER — ATORVASTATIN CALCIUM 20 MG/1
TABLET, FILM COATED ORAL
Qty: 30 TABLET | Refills: 2 | Status: SHIPPED | OUTPATIENT
Start: 2017-05-08 | End: 2017-07-14 | Stop reason: SDUPTHER

## 2017-05-08 NOTE — TELEPHONE ENCOUNTER
Requested Prescriptions     Pending Prescriptions Disp Refills    insulin detemir (LEVEMIR FLEXTOUCH) 100 unit/mL (3 mL) SubQ InPn pen 15 mL 1     Sig: INJECT 25 UNITS INTO THE SKIN 2 TIMES A DAY     [facility]

## 2017-05-12 DIAGNOSIS — Z76.0 ENCOUNTER FOR MEDICATION REFILL: ICD-10-CM

## 2017-05-12 RX ORDER — LEVOTHYROXINE SODIUM 50 UG/1
TABLET ORAL
Qty: 30 TABLET | Refills: 2 | Status: SHIPPED | OUTPATIENT
Start: 2017-05-12 | End: 2017-09-23 | Stop reason: SDUPTHER

## 2017-05-15 DIAGNOSIS — R42 VERTIGO: ICD-10-CM

## 2017-05-15 RX ORDER — MECLIZINE HYDROCHLORIDE 25 MG/1
TABLET ORAL
Qty: 60 TABLET | Refills: 5 | Status: SHIPPED | OUTPATIENT
Start: 2017-05-15 | End: 2017-07-26 | Stop reason: SDUPTHER

## 2017-05-15 NOTE — TELEPHONE ENCOUNTER
Requested Prescriptions     Pending Prescriptions Disp Refills    meclizine (ANTIVERT) 25 mg tablet 60 tablet 5     [facility]

## 2017-05-16 DIAGNOSIS — Z76.0 ENCOUNTER FOR MEDICATION REFILL: ICD-10-CM

## 2017-05-16 RX ORDER — RAMIPRIL 10 MG/1
CAPSULE ORAL
Qty: 30 CAPSULE | Refills: 2 | Status: SHIPPED | OUTPATIENT
Start: 2017-05-16 | End: 2017-07-14 | Stop reason: SDUPTHER

## 2017-05-16 RX ORDER — SITAGLIPTIN 50 MG/1
TABLET, FILM COATED ORAL
Qty: 30 TABLET | Refills: 2 | Status: SHIPPED | OUTPATIENT
Start: 2017-05-16 | End: 2017-07-26 | Stop reason: SDUPTHER

## 2017-05-16 RX ORDER — OMEPRAZOLE 20 MG/1
CAPSULE, DELAYED RELEASE ORAL
Qty: 60 CAPSULE | Refills: 2 | Status: SHIPPED | OUTPATIENT
Start: 2017-05-16 | End: 2017-07-26 | Stop reason: SDUPTHER

## 2017-05-16 RX ORDER — GABAPENTIN 300 MG/1
CAPSULE ORAL
Qty: 120 CAPSULE | Refills: 2 | Status: SHIPPED | OUTPATIENT
Start: 2017-05-16 | End: 2017-12-12 | Stop reason: SDUPTHER

## 2017-06-02 DIAGNOSIS — E11.9 DIABETES MELLITUS WITHOUT COMPLICATION: ICD-10-CM

## 2017-06-05 ENCOUNTER — OUTPATIENT CASE MANAGEMENT (OUTPATIENT)
Dept: ADMINISTRATIVE | Facility: OTHER | Age: 72
End: 2017-06-05

## 2017-06-05 NOTE — PROGRESS NOTES
Please note the following patient has been assigned to Irlanda Leigh RN in Outpatient Case Management for Diabetes Disease Management with a hbA1C greater than 10.0.    Please contact [facility] at Ext. [phone] with any questions.    Thank you,      Katarina Hilliard, SSC

## 2017-06-06 ENCOUNTER — TELEPHONE (OUTPATIENT)
Dept: ADMINISTRATIVE | Facility: HOSPITAL | Age: 72
End: 2017-06-06

## 2017-06-06 NOTE — TELEPHONE ENCOUNTER
----- Message from Katarina Hilliard sent at 6/5/2017  3:12 PM CDT -----  Please note the following patient has been assigned to Irlanda Leigh RN in Outpatient Case Management for Diabetes Disease Management with a hbA1C greater than 10.0.     Please contact [facility] at [phone]. [phone] with any questions.     Thank you,        Katarina Hilliard, SSC

## 2017-06-11 DIAGNOSIS — R41.3 MEMORY LOSS: ICD-10-CM

## 2017-06-12 ENCOUNTER — OUTPATIENT CASE MANAGEMENT (OUTPATIENT)
Dept: ADMINISTRATIVE | Facility: OTHER | Age: 72
End: 2017-06-12

## 2017-06-12 ENCOUNTER — TELEPHONE (OUTPATIENT)
Dept: INTERNAL MEDICINE | Facility: CLINIC | Age: 72
End: 2017-06-12

## 2017-06-12 NOTE — PROGRESS NOTES
Patient referred to OPCM for disease management. Last A1C 11.2 on 1/17. Called patient at home number, spoke with grandson James Cardoza who states patient is staying with her daughter in [place] for one month to get her dental care as she lost her bottom teeth. Discussed [facility] and agrees patient needs help with DM. States she eats/snacks all night and wakes up with  up to 500. He is aware of complications. Agrees to set-up appt for mid July with PCP/NP/endocrinologist and DM educator. In-basket message sent to LIANA Sullivan. Will send unable to reach letter as discussed with family and will defer case at this time. Grandson will reach out to O[facility] upon her return.

## 2017-06-12 NOTE — LETTER
June 12, 2017    Narda Person  [address] [address]             [facility] [address] [place] [address] Dear MsJulianna Naya:    I work with [facility] Out[facility] Management Department. We received a referral to call you to discuss your medical history. I was told you are out of town until mid July. Upon your return, please call our department so that we can go over some questions with you regarding your health.    The Outpatient Case Management Department can be reached at [phone] from 8:00AM to 4:30 PM on Monday thru Friday. [facility] also has a program where a nurse is available 24/7 to answer questions or provide medical advice, their number is [phone].    Thanks,      Irlanda Leigh RN [facility]  Outpatient Complex Care Management

## 2017-06-12 NOTE — TELEPHONE ENCOUNTER
----- Message from Irlanda Leigh RN sent at 6/12/2017  1:51 PM CDT -----  Patient was referred to OPCM for disease management - DM teaching. Patient is currently in [place] with daughter, getting dental care. Family states she eats/snacks all night and wakes up with  up to 500. Aware of complications of DM. Requesting if you can set-up appt for mid July with PCP/NP, endocrinologist and DM educator. Can you please place referrals and make appointments mid July on forward. [facility]PCM case will be deferred until her return.Thank you.

## 2017-06-15 RX ORDER — INSULIN ASPART 100 [IU]/ML
INJECTION, SOLUTION INTRAVENOUS; SUBCUTANEOUS
Qty: 18 SYRINGE | Refills: 1 | Status: SHIPPED | OUTPATIENT
Start: 2017-06-15 | End: 2017-10-28 | Stop reason: SDUPTHER

## 2017-06-16 RX ORDER — DONEPEZIL HYDROCHLORIDE 10 MG/1
10 TABLET, FILM COATED ORAL NIGHTLY
Qty: 30 TABLET | Refills: 1 | Status: SHIPPED | OUTPATIENT
Start: 2017-06-16 | End: 2017-07-26 | Stop reason: SDUPTHER

## 2017-07-13 ENCOUNTER — TELEPHONE (OUTPATIENT)
Dept: INTERNAL MEDICINE | Facility: CLINIC | Age: 72
End: 2017-07-13

## 2017-07-13 DIAGNOSIS — Z76.0 ENCOUNTER FOR MEDICATION REFILL: ICD-10-CM

## 2017-07-13 NOTE — TELEPHONE ENCOUNTER
Pt called requesting refills on medications stating her levemir  Needs to be sent in to [facility] in  [facility]  And the rest of the meds listed to [facility] Jocelyn : ramipril 10 mg, atorva  Please advise  Thanks!

## 2017-07-14 ENCOUNTER — TELEPHONE (OUTPATIENT)
Dept: INTERNAL MEDICINE | Facility: CLINIC | Age: 72
End: 2017-07-14

## 2017-07-14 RX ORDER — RAMIPRIL 10 MG/1
10 CAPSULE ORAL DAILY
Qty: 30 CAPSULE | Refills: 2 | Status: SHIPPED | OUTPATIENT
Start: 2017-07-14 | End: 2017-11-07 | Stop reason: SDUPTHER

## 2017-07-14 RX ORDER — ATORVASTATIN CALCIUM 20 MG/1
TABLET, FILM COATED ORAL
Qty: 30 TABLET | Refills: 2 | Status: SHIPPED | OUTPATIENT
Start: 2017-07-14 | End: 2017-07-26 | Stop reason: SDUPTHER

## 2017-07-25 DIAGNOSIS — Z76.0 ENCOUNTER FOR MEDICATION REFILL: ICD-10-CM

## 2017-07-25 DIAGNOSIS — R41.3 MEMORY LOSS: ICD-10-CM

## 2017-07-25 DIAGNOSIS — R42 VERTIGO: ICD-10-CM

## 2017-07-25 NOTE — TELEPHONE ENCOUNTER
----- Message from Jessica Franco MA sent at 2017  2:06 PM CDT -----  Contact: Patient  Narda Person  MRN: [id number]  : 1945  PCP: Apolonia Sullivan  Home Phone      [phone]  Work Phone      [phone]  Mobile          Not on file.      MESSAGE: Patient needs all new scripts sent to [facility] ([place])  Atorvastatin              Januvia  Metoprolol                 Aricept  Amitriptylline             Omeprazole

## 2017-07-26 RX ORDER — MECLIZINE HYDROCHLORIDE 25 MG/1
TABLET ORAL
Qty: 60 TABLET | Refills: 5 | Status: SHIPPED | OUTPATIENT
Start: 2017-07-26 | End: 2017-11-16 | Stop reason: SDUPTHER

## 2017-07-26 RX ORDER — ATORVASTATIN CALCIUM 20 MG/1
TABLET, FILM COATED ORAL
Qty: 30 TABLET | Refills: 2 | Status: SHIPPED | OUTPATIENT
Start: 2017-07-26 | End: 2017-10-28 | Stop reason: SDUPTHER

## 2017-07-26 RX ORDER — DONEPEZIL HYDROCHLORIDE 10 MG/1
10 TABLET, FILM COATED ORAL NIGHTLY
Qty: 30 TABLET | Refills: 1 | Status: SHIPPED | OUTPATIENT
Start: 2017-07-26 | End: 2017-09-28 | Stop reason: SDUPTHER

## 2017-07-26 RX ORDER — METOPROLOL SUCCINATE 25 MG/1
25 TABLET, EXTENDED RELEASE ORAL DAILY
Qty: 30 TABLET | Refills: 9 | Status: SHIPPED | OUTPATIENT
Start: 2017-07-26 | End: 2019-08-07

## 2017-07-26 RX ORDER — OMEPRAZOLE 20 MG/1
20 CAPSULE, DELAYED RELEASE ORAL 2 TIMES DAILY
Qty: 60 CAPSULE | Refills: 2 | Status: SHIPPED | OUTPATIENT
Start: 2017-07-26 | End: 2017-09-23 | Stop reason: SDUPTHER

## 2017-07-26 RX ORDER — AMITRIPTYLINE HYDROCHLORIDE 50 MG/1
50 TABLET, FILM COATED ORAL NIGHTLY
Qty: 30 TABLET | Refills: 5 | Status: SHIPPED | OUTPATIENT
Start: 2017-07-26 | End: 2018-01-29 | Stop reason: SDUPTHER

## 2017-08-09 RX ORDER — INSULIN DETEMIR 100 [IU]/ML
INJECTION, SOLUTION SUBCUTANEOUS
Qty: 15 ML | Refills: 5 | Status: SHIPPED | OUTPATIENT
Start: 2017-08-09 | End: 2017-08-14 | Stop reason: SDUPTHER

## 2017-08-14 NOTE — TELEPHONE ENCOUNTER
----- Message from Jessica Franco MA sent at 2017  1:14 PM CDT -----  Contact: Patient  Narda Person  MRN: [id number]  : 1945  PCP: Apolonia Sullivan  Home Phone      [phone]  Work Phone      [phone]  Mobile          Not on file.      MESSAGE: Patient needs a script for Levemir.  Send to [facility] ([place])

## 2017-09-23 DIAGNOSIS — Z76.0 ENCOUNTER FOR MEDICATION REFILL: ICD-10-CM

## 2017-09-25 RX ORDER — LEVOTHYROXINE SODIUM 50 UG/1
TABLET ORAL
Qty: 30 TABLET | Refills: 5 | Status: SHIPPED | OUTPATIENT
Start: 2017-09-25 | End: 2019-08-07

## 2017-09-25 RX ORDER — SITAGLIPTIN 50 MG/1
TABLET, FILM COATED ORAL
Qty: 30 TABLET | Refills: 5 | Status: SHIPPED | OUTPATIENT
Start: 2017-09-25 | End: 2017-12-12 | Stop reason: SDUPTHER

## 2017-09-25 RX ORDER — OMEPRAZOLE 20 MG/1
20 CAPSULE, DELAYED RELEASE ORAL 2 TIMES DAILY
Qty: 60 CAPSULE | Refills: 5 | Status: SHIPPED | OUTPATIENT
Start: 2017-09-25 | End: 2017-12-12 | Stop reason: SDUPTHER

## 2017-09-28 DIAGNOSIS — R41.3 MEMORY LOSS: ICD-10-CM

## 2017-09-29 RX ORDER — DONEPEZIL HYDROCHLORIDE 10 MG/1
TABLET, FILM COATED ORAL
Qty: 30 TABLET | Refills: 2 | Status: SHIPPED | OUTPATIENT
Start: 2017-09-29 | End: 2017-11-09 | Stop reason: SDUPTHER

## 2017-10-20 DIAGNOSIS — Z12.11 COLON CANCER SCREENING: ICD-10-CM

## 2017-10-28 DIAGNOSIS — Z76.0 ENCOUNTER FOR MEDICATION REFILL: ICD-10-CM

## 2017-10-30 RX ORDER — ATORVASTATIN CALCIUM 20 MG/1
TABLET, FILM COATED ORAL
Qty: 30 TABLET | Refills: 2 | Status: SHIPPED | OUTPATIENT
Start: 2017-10-30 | End: 2018-01-29 | Stop reason: SDUPTHER

## 2017-10-30 RX ORDER — INSULIN ASPART 100 [IU]/ML
INJECTION, SOLUTION INTRAVENOUS; SUBCUTANEOUS
Qty: 15 SYRINGE | Refills: 2 | Status: SHIPPED | OUTPATIENT
Start: 2017-10-30 | End: 2018-01-29 | Stop reason: SDUPTHER

## 2017-11-07 DIAGNOSIS — Z76.0 ENCOUNTER FOR MEDICATION REFILL: ICD-10-CM

## 2017-11-07 RX ORDER — RAMIPRIL 10 MG/1
10 CAPSULE ORAL DAILY
Qty: 90 CAPSULE | Refills: 0 | Status: SHIPPED | OUTPATIENT
Start: 2017-11-07 | End: 2018-01-29 | Stop reason: SDUPTHER

## 2017-11-07 NOTE — TELEPHONE ENCOUNTER
LOV: 4/24/17    Received 90 day refill request from pharmacy for Ramipril 10mg. Advise    Pharmacy: [facility] in [place]

## 2017-11-09 ENCOUNTER — OFFICE VISIT (OUTPATIENT)
Dept: INTERNAL MEDICINE | Facility: CLINIC | Age: 72
End: 2017-11-09
Payer: MEDICARE

## 2017-11-09 ENCOUNTER — HOSPITAL ENCOUNTER (OUTPATIENT)
Dept: RADIOLOGY | Facility: HOSPITAL | Age: 72
Discharge: HOME | End: 2017-11-09
Attending: NURSE PRACTITIONER
Payer: MEDICARE

## 2017-11-09 VITALS — BODY MASS INDEX: 35.7 KG/M2 | WEIGHT: 194 LBS | HEIGHT: 62 IN

## 2017-11-09 VITALS
OXYGEN SATURATION: 97 % | WEIGHT: 193.56 LBS | RESPIRATION RATE: 18 BRPM | SYSTOLIC BLOOD PRESSURE: 122 MMHG | TEMPERATURE: 96 F | HEIGHT: 62 IN | HEART RATE: 87 BPM | BODY MASS INDEX: 35.62 KG/M2 | DIASTOLIC BLOOD PRESSURE: 64 MMHG
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
## 2017-11-09 DIAGNOSIS — Z12.31 ENCOUNTER FOR SCREENING MAMMOGRAM FOR BREAST CANCER: ICD-10-CM

## 2017-11-09 DIAGNOSIS — N39.3 STRESS INCONTINENCE OF URINE: ICD-10-CM

## 2017-11-09 DIAGNOSIS — M25.552 LEFT HIP PAIN: ICD-10-CM

## 2017-11-09 DIAGNOSIS — M25.512 ACUTE PAIN OF LEFT SHOULDER: ICD-10-CM

## 2017-11-09 DIAGNOSIS — R41.3 MEMORY LOSS: ICD-10-CM

## 2017-11-09 DIAGNOSIS — N39.41 URGE INCONTINENCE: ICD-10-CM

## 2017-11-09 PROCEDURE — 99214 OFFICE O/P EST MOD 30 MIN: CPT | Mod: S$GLB,,, | Performed by: NURSE PRACTITIONER

## 2017-11-09 PROCEDURE — 77063 BREAST TOMOSYNTHESIS BI: CPT | Mod: 26,,, | Performed by: RADIOLOGY

## 2017-11-09 PROCEDURE — 77067 SCR MAMMO BI INCL CAD: CPT | Mod: TC

## 2017-11-09 PROCEDURE — 77067 SCR MAMMO BI INCL CAD: CPT | Mod: 26,,, | Performed by: RADIOLOGY

## 2017-11-09 PROCEDURE — 99999 PR PBB SHADOW E&M-EST. PATIENT-LVL V: CPT | Mod: PBBFAC,,, | Performed by: NURSE PRACTITIONER

## 2017-11-09 RX ORDER — DONEPEZIL HYDROCHLORIDE 10 MG/1
TABLET, FILM COATED ORAL
Qty: 30 TABLET | Refills: 5 | Status: SHIPPED | OUTPATIENT
Start: 2017-11-09 | End: 2018-11-07 | Stop reason: SDUPTHER

## 2017-11-09 RX ORDER — OXYBUTYNIN CHLORIDE 5 MG/1
5 TABLET, EXTENDED RELEASE ORAL DAILY
Qty: 30 TABLET | Refills: 5 | Status: SHIPPED | OUTPATIENT
Start: 2017-11-09 | End: 2018-05-09 | Stop reason: SDUPTHER

## 2017-11-09 NOTE — PROGRESS NOTES
Subjective:       Patient ID: Narda Person is a 72 y.o. female.    Chief Complaint: Fall (x 3 days ago, multiple falls); Hip Pain (post fall); Urinary Incontinence; and Dizziness (mass in right ear, nerve since last year)    Patient is known, to me and presents with   Chief Complaint   Patient presents with    Fall     x 3 days ago, multiple falls    Hip Pain     post fall    Urinary Incontinence    Dizziness     mass in right ear, nerve since last year   .  Denies chest pain and shortness of breath. Patient presents with a complaint of a fall. The patient was ambulating to the restroom, and had a episode of urinary incontinence then she slipped and fell on a ceramic floor. Patient states she hit her left arm and hip. She was not utilizing her cane.  She did not hit her head or lose consciousness. She did not seek prior treatment. She rates the pain the the arm as a 2/10, and the hip pain as a 4/10. She has not attempted any treatments at home. She endorses numbness and tingling, which is a chronic issue. She also complains of dizziness. Patient and family states this is a chronic issue related to her right vestibular schwannoma. She established with Neurology.   HPI  Review of Systems   Constitutional: Negative for activity change, fatigue, fever and unexpected weight change.   HENT: Positive for tinnitus. Negative for congestion, ear discharge, ear pain, hearing loss, rhinorrhea and sore throat.    Eyes: Negative for pain, redness and visual disturbance.   Respiratory: Negative for cough, shortness of breath and wheezing.    Cardiovascular: Negative for chest pain, palpitations and leg swelling.   Gastrointestinal: Negative for abdominal pain, constipation, diarrhea, nausea and vomiting.   Genitourinary: Negative for dysuria, frequency, pelvic pain and urgency.   Musculoskeletal: Positive for arthralgias and joint swelling. Negative for back pain and neck pain.   Skin: Negative for color change, rash and  wound.   Neurological: Positive for dizziness and weakness. Negative for tremors, light-headedness and headaches.                 Objective:      Physical Exam   Constitutional: She is oriented to person, place, and time. She appears well-developed and well-nourished. No distress.   HENT:   Head: Normocephalic and atraumatic.   Right Ear: External ear normal.   Left Ear: External ear normal.   Eyes: EOM are normal. Right eye exhibits no discharge. Left eye exhibits no discharge.   Neck: Neck supple.   Cardiovascular: Normal rate, regular rhythm and intact distal pulses.  Exam reveals no gallop and no friction rub.    No murmur heard.       Pulmonary/Chest: Effort normal and breath sounds normal. No respiratory distress. She has no wheezes. She has no rales.   Abdominal: Soft. Bowel sounds are normal. She exhibits no distension. There is no tenderness.   Musculoskeletal: She exhibits no edema.        Left hip: She exhibits tenderness.   Tenderness upon palpation to left iliac    Lymphadenopathy:     She has no cervical adenopathy.   Neurological: She is alert and oriented to person, place, and time. She displays no atrophy and no tremor. No cranial nerve deficit. She exhibits normal muscle tone. Coordination and gait abnormal.   Skin: Skin is warm and dry.   Psychiatric: She has a normal mood and affect. Her behavior is normal.   Vitals reviewed.      Assessment:       1. Fall, initial encounter    2. Acute pain of left shoulder    3. Left hip pain    4. Stress incontinence of urine    5. Urge incontinence    6. Memory loss        Plan:   Narda was seen today for fall, hip pain, urinary incontinence and dizziness.    Diagnoses and all orders for this visit:    Fall, initial encounter    Acute pain of left shoulder    Left hip pain    Stress incontinence of urine    Urge incontinence  -     oxybutynin (DITROPAN-XL) 5 MG TR24; Take 1 tablet (5 mg total) by mouth once daily.    Memory loss  -     donepezil (ARICEPT) 10  "MG tablet; TAKE ONE TABLET BY MOUTH ONCE DAILY IN THE EVENING    "This note will not be shared with the patient."  Continue with plan of care  No need to have x-rays  Will place back on ditropan     RTC as scheduled  "

## 2017-11-15 ENCOUNTER — TELEPHONE (OUTPATIENT)
Dept: INTERNAL MEDICINE | Facility: CLINIC | Age: 72
End: 2017-11-15

## 2017-11-15 DIAGNOSIS — R42 VERTIGO: ICD-10-CM

## 2017-11-15 NOTE — TELEPHONE ENCOUNTER
Pt called stating she is now Taking her meclizine twice a day but increased to a pill and a half each time so total 3 pills a day instead of 2 and helping her better  so needs a new Rx sent in  Please advise  Thanks  WM

## 2017-11-16 RX ORDER — MECLIZINE HYDROCHLORIDE 25 MG/1
TABLET ORAL
Qty: 90 TABLET | Refills: 5 | Status: SHIPPED | OUTPATIENT
Start: 2017-11-16 | End: 2018-08-08 | Stop reason: SDUPTHER

## 2017-11-16 NOTE — TELEPHONE ENCOUNTER
Discussed Apolonia's recommendations with Marily, She says her mother c/o dizziness and that's why she falls. Meclizine twice daily is not working, what else can she give her?    Please call [phone]

## 2017-12-01 DIAGNOSIS — Z76.0 ENCOUNTER FOR MEDICATION REFILL: ICD-10-CM

## 2017-12-01 RX ORDER — OMEPRAZOLE 20 MG/1
CAPSULE, DELAYED RELEASE ORAL
Qty: 60 CAPSULE | Refills: 2 | Status: SHIPPED | OUTPATIENT
Start: 2017-12-01 | End: 2018-06-11 | Stop reason: SDUPTHER

## 2017-12-01 RX ORDER — SITAGLIPTIN 50 MG/1
TABLET, FILM COATED ORAL
Qty: 30 TABLET | Refills: 2 | Status: SHIPPED | OUTPATIENT
Start: 2017-12-01 | End: 2018-03-15

## 2017-12-01 NOTE — TELEPHONE ENCOUNTER
----- Message from Jessica Franco MA sent at 2017 11:01 AM CST -----  Contact: Patient  Narda Person  MRN: [id number]  : 1945  PCP: Apolonia Sullivan  Home Phone      [phone]  Work Phone      [phone]  Mobile          Not on file.      MESSAGE: Patient needs new 90 day scripts for Gabapentin, Januvia and Omeprazole.  All 3 to [facility] ([place])  Also to let Apolonia know that her Meclizine has been decreased to 1 in the am and 1 in th pm

## 2017-12-12 DIAGNOSIS — Z76.0 ENCOUNTER FOR MEDICATION REFILL: ICD-10-CM

## 2017-12-12 RX ORDER — OMEPRAZOLE 20 MG/1
20 CAPSULE, DELAYED RELEASE ORAL 2 TIMES DAILY
Qty: 180 CAPSULE | Refills: 0 | Status: SHIPPED | OUTPATIENT
Start: 2017-12-12 | End: 2018-01-15 | Stop reason: SDUPTHER

## 2017-12-12 RX ORDER — GABAPENTIN 300 MG/1
300 CAPSULE ORAL 2 TIMES DAILY
Qty: 180 CAPSULE | Refills: 0 | Status: SHIPPED | OUTPATIENT
Start: 2017-12-12 | End: 2018-03-08 | Stop reason: SDUPTHER

## 2017-12-12 NOTE — TELEPHONE ENCOUNTER
LOV: 11/09/2017    Patient had refills but was requesting 90 day refills for:   Januvia, Omeprazole, and Gabapentin. Advise    Pharmacy: [facility] in [place]

## 2017-12-30 DIAGNOSIS — R41.3 MEMORY LOSS: ICD-10-CM

## 2018-01-02 RX ORDER — DONEPEZIL HYDROCHLORIDE 10 MG/1
TABLET, FILM COATED ORAL
Qty: 30 TABLET | Refills: 2 | Status: SHIPPED | OUTPATIENT
Start: 2018-01-02 | End: 2018-03-09 | Stop reason: SDUPTHER

## 2018-01-15 ENCOUNTER — TELEPHONE (OUTPATIENT)
Dept: INTERNAL MEDICINE | Facility: CLINIC | Age: 73
End: 2018-01-15

## 2018-01-15 ENCOUNTER — OFFICE VISIT (OUTPATIENT)
Dept: INTERNAL MEDICINE | Facility: CLINIC | Age: 73
End: 2018-01-15
Payer: MEDICARE

## 2018-01-15 VITALS
OXYGEN SATURATION: 98 % | HEIGHT: 62 IN | BODY MASS INDEX: 36.07 KG/M2 | WEIGHT: 196 LBS | HEART RATE: 87 BPM | RESPIRATION RATE: 16 BRPM | DIASTOLIC BLOOD PRESSURE: 68 MMHG | SYSTOLIC BLOOD PRESSURE: 108 MMHG

## 2018-01-15 DIAGNOSIS — I10 ESSENTIAL HYPERTENSION: ICD-10-CM

## 2018-01-15 DIAGNOSIS — Z01.818 PREOP GENERAL PHYSICAL EXAM: Primary | ICD-10-CM

## 2018-01-15 PROCEDURE — 99214 OFFICE O/P EST MOD 30 MIN: CPT | Mod: S$GLB,,, | Performed by: INTERNAL MEDICINE

## 2018-01-15 PROCEDURE — 99499 UNLISTED E&M SERVICE: CPT | Mod: S$GLB,,, | Performed by: INTERNAL MEDICINE

## 2018-01-15 PROCEDURE — 99999 PR PBB SHADOW E&M-EST. PATIENT-LVL III: CPT | Mod: PBBFAC,,, | Performed by: INTERNAL MEDICINE

## 2018-01-15 NOTE — PROGRESS NOTES
Subjective:       Patient ID: Narda Person is a 72 y.o. female.    Chief Complaint: Pre-op Exam (right eye cataract surgery on 1/16/2018 by Dr. Dudley Cummins)    Narda Person is a 72 y.o. female  Here for right eye cataract surgery on 1/16/2018 by Dr. Dudley Cummins    She reports doing well.  No chest pain   No shortness of breath .        Review of Systems   Constitutional: Negative for activity change, fatigue, fever and unexpected weight change.   HENT: Positive for tinnitus. Negative for congestion, ear discharge, ear pain, hearing loss, rhinorrhea and sore throat.    Eyes: Negative for pain, redness and visual disturbance.   Respiratory: Negative for cough, shortness of breath and wheezing.    Cardiovascular: Negative for chest pain, palpitations and leg swelling.   Gastrointestinal: Negative for abdominal pain, constipation, diarrhea, nausea and vomiting.   Genitourinary: Negative for dysuria, frequency, pelvic pain and urgency.   Musculoskeletal: Positive for arthralgias and joint swelling. Negative for back pain and neck pain.   Skin: Negative for color change, rash and wound.   Neurological: Positive for dizziness and weakness. Negative for tremors, light-headedness and headaches.                 Objective:      Physical Exam   Constitutional: She is oriented to person, place, and time. She appears well-developed and well-nourished. No distress.   HENT:   Head: Normocephalic and atraumatic.   Right Ear: External ear normal.   Left Ear: External ear normal.   Eyes: EOM are normal. Right eye exhibits no discharge. Left eye exhibits no discharge.   Bilateral cataract changes in lens   Neck: Neck supple.   Cardiovascular: Normal rate, regular rhythm and intact distal pulses.  Exam reveals no gallop and no friction rub.    No murmur heard.       Pulmonary/Chest: Effort normal and breath sounds normal. No respiratory distress. She has no wheezes. She has no rales.   Abdominal: Soft. Bowel sounds are  normal. She exhibits no distension. There is no tenderness.   Musculoskeletal: She exhibits no edema.        Left hip: She exhibits tenderness.   Tenderness upon palpation to left iliac    Lymphadenopathy:     She has no cervical adenopathy.   Neurological: She is alert and oriented to person, place, and time. She displays no atrophy and no tremor. No cranial nerve deficit. She exhibits normal muscle tone. Coordination and gait abnormal.   Skin: Skin is warm and dry.   Psychiatric: She has a normal mood and affect. Her behavior is normal.   Vitals reviewed.      Assessment:       1. Preop general physical exam    2. Type 2 diabetes, uncontrolled, with neuropathy    3. Essential hypertension        Plan:   Narda was seen today for pre-op exam.    Diagnoses and all orders for this visit:    Preop general physical exam    Type 2 diabetes, uncontrolled, with neuropathy    Essential hypertension    Dear Dr. Cummins   I reviewed her history/labs  Her DM control is challenging     Please proceed with surgery.  Patient appears in stable Cardiovascular  status  Other recommendations include:  Telemetry     Patient to hold the insulin in am of the surgery .    Please allow the patient to take BP pills on the am of surgery    Thank you for allowing me to participate in this patient's care. Please consult me if post-operative medical care assistance is needed.

## 2018-01-15 NOTE — TELEPHONE ENCOUNTER
----- Message from Nanette Morgan sent at 1/15/2018 10:20 AM CST -----  Contact: CLAUDIA / DAUGHTER  Narda Person  MRN: [id number]  : 1945  PCP: Apolonia Sullivan  Home Phone      [phone]  Work Phone      [phone]  Mobile          Not on file.      MESSAGE: NEEDS PT TO BE SEEN TODAY FOR A SURGERY CLEARANCE. SURGERY IS TOMORROW. USUALLY SEE APOLONIA BUT NEEDS A MD TO CLEAR HER. PLEASE CALL     PHONE: [phone]

## 2018-01-29 DIAGNOSIS — Z76.0 ENCOUNTER FOR MEDICATION REFILL: ICD-10-CM

## 2018-01-29 RX ORDER — ATORVASTATIN CALCIUM 20 MG/1
TABLET, FILM COATED ORAL
Qty: 30 TABLET | Refills: 2 | Status: SHIPPED | OUTPATIENT
Start: 2018-01-29 | End: 2018-05-09 | Stop reason: SDUPTHER

## 2018-01-29 RX ORDER — INSULIN ASPART 100 [IU]/ML
INJECTION, SOLUTION INTRAVENOUS; SUBCUTANEOUS
Qty: 15 BOX | Refills: 2 | Status: SHIPPED | OUTPATIENT
Start: 2018-01-29 | End: 2018-05-18 | Stop reason: SDUPTHER

## 2018-01-29 RX ORDER — RAMIPRIL 10 MG/1
CAPSULE ORAL
Qty: 90 CAPSULE | Refills: 0 | Status: SHIPPED | OUTPATIENT
Start: 2018-01-29 | End: 2018-05-09 | Stop reason: SDUPTHER

## 2018-01-29 RX ORDER — AMITRIPTYLINE HYDROCHLORIDE 50 MG/1
TABLET, FILM COATED ORAL
Qty: 30 TABLET | Refills: 5 | Status: SHIPPED | OUTPATIENT
Start: 2018-01-29 | End: 2018-04-12 | Stop reason: ALTCHOICE

## 2018-02-15 RX ORDER — INSULIN DETEMIR 100 [IU]/ML
INJECTION, SOLUTION SUBCUTANEOUS
Qty: 15 ML | Refills: 5 | Status: SHIPPED | OUTPATIENT
Start: 2018-02-15 | End: 2019-04-01 | Stop reason: SDUPTHER

## 2018-03-08 DIAGNOSIS — Z76.0 ENCOUNTER FOR MEDICATION REFILL: ICD-10-CM

## 2018-03-08 RX ORDER — GABAPENTIN 300 MG/1
CAPSULE ORAL
Qty: 180 CAPSULE | Refills: 2 | Status: SHIPPED | OUTPATIENT
Start: 2018-03-08 | End: 2018-11-25 | Stop reason: SDUPTHER

## 2018-03-09 ENCOUNTER — OFFICE VISIT (OUTPATIENT)
Dept: INTERNAL MEDICINE | Facility: CLINIC | Age: 73
End: 2018-03-09
Payer: MEDICARE

## 2018-03-09 VITALS
HEIGHT: 62 IN | DIASTOLIC BLOOD PRESSURE: 56 MMHG | SYSTOLIC BLOOD PRESSURE: 96 MMHG | HEART RATE: 85 BPM | RESPIRATION RATE: 16 BRPM | WEIGHT: 196.63 LBS | OXYGEN SATURATION: 91 % | BODY MASS INDEX: 36.18 KG/M2

## 2018-03-09 DIAGNOSIS — E03.4 HYPOTHYROIDISM DUE TO ACQUIRED ATROPHY OF THYROID: ICD-10-CM

## 2018-03-09 DIAGNOSIS — E11.42 DIABETIC POLYNEUROPATHY ASSOCIATED WITH TYPE 2 DIABETES MELLITUS: Primary | ICD-10-CM

## 2018-03-09 DIAGNOSIS — I10 ESSENTIAL HYPERTENSION: ICD-10-CM

## 2018-03-09 DIAGNOSIS — E78.00 PURE HYPERCHOLESTEROLEMIA: ICD-10-CM

## 2018-03-09 PROCEDURE — 99499 UNLISTED E&M SERVICE: CPT | Mod: S$GLB,,, | Performed by: INTERNAL MEDICINE

## 2018-03-09 PROCEDURE — 90662 IIV NO PRSV INCREASED AG IM: CPT | Mod: S$GLB,,, | Performed by: INTERNAL MEDICINE

## 2018-03-09 PROCEDURE — 3074F SYST BP LT 130 MM HG: CPT | Mod: S$GLB,,, | Performed by: INTERNAL MEDICINE

## 2018-03-09 PROCEDURE — 3078F DIAST BP <80 MM HG: CPT | Mod: S$GLB,,, | Performed by: INTERNAL MEDICINE

## 2018-03-09 PROCEDURE — 99999 PR PBB SHADOW E&M-EST. PATIENT-LVL III: CPT | Mod: PBBFAC,,, | Performed by: INTERNAL MEDICINE

## 2018-03-09 PROCEDURE — G0008 ADMIN INFLUENZA VIRUS VAC: HCPCS | Mod: S$GLB,,, | Performed by: INTERNAL MEDICINE

## 2018-03-09 PROCEDURE — 99213 OFFICE O/P EST LOW 20 MIN: CPT | Mod: S$GLB,,, | Performed by: INTERNAL MEDICINE

## 2018-03-09 NOTE — PROGRESS NOTES
Subjective:       Patient ID: Narda Person is a 72 y.o. female.    Chief Complaint: Diabetic Foot Exam (needs Diabetic shoes.she has been on DM shoes.) and Diabetes    Narda Person is a 72 y.o. female  Here for DM-2 foot exam.  She has peripheral neuropathy.        Diabetes   Hypoglycemia symptoms include dizziness. Pertinent negatives for hypoglycemia include no headaches or tremors. Associated symptoms include weakness. Pertinent negatives for diabetes include no chest pain and no fatigue.     Review of Systems   Constitutional: Negative for activity change, fatigue, fever and unexpected weight change.   HENT: Positive for tinnitus. Negative for congestion, ear discharge, ear pain, hearing loss, rhinorrhea and sore throat.    Eyes: Negative for pain, redness and visual disturbance.   Respiratory: Negative for cough, shortness of breath and wheezing.    Cardiovascular: Negative for chest pain, palpitations and leg swelling.   Gastrointestinal: Negative for abdominal pain, constipation, diarrhea, nausea and vomiting.   Genitourinary: Negative for dysuria, frequency, pelvic pain and urgency.   Musculoskeletal: Positive for arthralgias and joint swelling. Negative for back pain and neck pain.   Skin: Negative for color change, rash and wound.   Neurological: Positive for dizziness and weakness. Negative for tremors, light-headedness and headaches.                 Objective:      Physical Exam   Constitutional: She is oriented to person, place, and time. She appears well-developed and well-nourished. No distress.   HENT:   Head: Normocephalic and atraumatic.   Right Ear: External ear normal.   Left Ear: External ear normal.   Eyes: EOM are normal. Right eye exhibits no discharge. Left eye exhibits no discharge.   Neck: Neck supple.   Cardiovascular: Normal rate, regular rhythm and intact distal pulses.  Exam reveals no gallop and no friction rub.    No murmur heard.  Pulses:       Dorsalis pedis pulses are 1+ on the  right side, and 1+ on the left side.        Posterior tibial pulses are 1+ on the right side, and 1+ on the left side.        Pulmonary/Chest: Effort normal and breath sounds normal. No respiratory distress. She has no wheezes. She has no rales.   Abdominal: Soft. Bowel sounds are normal. She exhibits no distension. There is no tenderness.   Musculoskeletal: She exhibits no edema.        Left hip: She exhibits tenderness.   Tenderness upon palpation to left iliac    Feet:   Right Foot:   Protective Sensation: 7 sites tested. 2 sites sensed.   Skin Integrity: Negative for ulcer, erythema or dry skin.   Left Foot:   Protective Sensation: 7 sites tested. 2 sites sensed.   Skin Integrity: Negative for ulcer, erythema or dry skin.   Lymphadenopathy:     She has no cervical adenopathy.   Neurological: She is alert and oriented to person, place, and time. She displays no atrophy and no tremor. No cranial nerve deficit. She exhibits normal muscle tone. Coordination and gait abnormal.   Skin: Skin is warm and dry.   Psychiatric: She has a normal mood and affect. Her behavior is normal.   Vitals reviewed.      Assessment:       1. Diabetic polyneuropathy associated with type 2 diabetes mellitus    2. Essential hypertension    3. Pure hypercholesterolemia    4. Hypothyroidism due to acquired atrophy of thyroid        Plan:   Narda was seen today for diabetic foot exam and diabetes.    Diagnoses and all orders for this visit:    Diabetic polyneuropathy associated with type 2 diabetes mellitus  -     Microalbumin/creatinine urine ratio; Future  -     Hemoglobin A1c; Future  -     DIABETIC SHOES FOR HOME USE    Essential hypertension  -     CBC auto differential; Future  -     Comprehensive metabolic panel; Future    Pure hypercholesterolemia  -     Lipid panel; Future    Hypothyroidism due to acquired atrophy of thyroid  -     TSH; Future

## 2018-03-12 ENCOUNTER — LAB VISIT (OUTPATIENT)
Dept: LAB | Facility: HOSPITAL | Age: 73
End: 2018-03-12
Attending: INTERNAL MEDICINE
Payer: MEDICARE

## 2018-03-12 DIAGNOSIS — E03.4 HYPOTHYROIDISM DUE TO ACQUIRED ATROPHY OF THYROID: ICD-10-CM

## 2018-03-12 DIAGNOSIS — E11.42 DIABETIC POLYNEUROPATHY ASSOCIATED WITH TYPE 2 DIABETES MELLITUS: ICD-10-CM

## 2018-03-12 DIAGNOSIS — E78.00 PURE HYPERCHOLESTEROLEMIA: ICD-10-CM

## 2018-03-12 DIAGNOSIS — I10 ESSENTIAL HYPERTENSION: ICD-10-CM

## 2018-03-12 LAB
ALBUMIN SERPL BCP-MCNC: 3.6 G/DL
ALP SERPL-CCNC: 90 U/L
ALT SERPL W/O P-5'-P-CCNC: 29 U/L
ANION GAP SERPL CALC-SCNC: 10 MMOL/L
AST SERPL-CCNC: 23 U/L
BASOPHILS # BLD AUTO: 0.05 K/UL
BASOPHILS NFR BLD: 1.1 %
BILIRUB SERPL-MCNC: 0.4 MG/DL
BUN SERPL-MCNC: 14 MG/DL
CALCIUM SERPL-MCNC: 9.3 MG/DL
CHLORIDE SERPL-SCNC: 103 MMOL/L
CHOLEST SERPL-MCNC: 149 MG/DL
CHOLEST/HDLC SERPL: 3.5 {RATIO}
CO2 SERPL-SCNC: 24 MMOL/L
CREAT SERPL-MCNC: 0.8 MG/DL
CREAT UR-MCNC: 77.8 MG/DL
DIFFERENTIAL METHOD: NORMAL
EOSINOPHIL # BLD AUTO: 0.3 K/UL
EOSINOPHIL NFR BLD: 6.3 %
ERYTHROCYTE [DISTWIDTH] IN BLOOD BY AUTOMATED COUNT: 12.9 %
EST. GFR  (AFRICAN AMERICAN): >60 ML/MIN/1.73 M^2
EST. GFR  (NON AFRICAN AMERICAN): >60 ML/MIN/1.73 M^2
ESTIMATED AVG GLUCOSE: 303 MG/DL
GLUCOSE SERPL-MCNC: 267 MG/DL
HBA1C MFR BLD HPLC: 12.2 %
HCT VFR BLD AUTO: 40.8 %
HDLC SERPL-MCNC: 43 MG/DL
HDLC SERPL: 28.9 %
HGB BLD-MCNC: 13.8 G/DL
LDLC SERPL CALC-MCNC: 89.4 MG/DL
LYMPHOCYTES # BLD AUTO: 1.4 K/UL
LYMPHOCYTES NFR BLD: 30.2 %
MCH RBC QN AUTO: 28.9 PG
MCHC RBC AUTO-ENTMCNC: 33.8 G/DL
MCV RBC AUTO: 85 FL
MICROALBUMIN UR DL<=1MG/L-MCNC: 11 UG/ML
MICROALBUMIN/CREATININE RATIO: 14.1 UG/MG
MONOCYTES # BLD AUTO: 0.4 K/UL
MONOCYTES NFR BLD: 8 %
NEUTROPHILS # BLD AUTO: 2.5 K/UL
NEUTROPHILS NFR BLD: 54.4 %
NONHDLC SERPL-MCNC: 106 MG/DL
PLATELET # BLD AUTO: 204 K/UL
PMV BLD AUTO: 10.2 FL
POTASSIUM SERPL-SCNC: 3.8 MMOL/L
PROT SERPL-MCNC: 7.2 G/DL
RBC # BLD AUTO: 4.78 M/UL
SODIUM SERPL-SCNC: 137 MMOL/L
TRIGL SERPL-MCNC: 83 MG/DL
TSH SERPL DL<=0.005 MIU/L-ACNC: 1.95 UIU/ML
WBC # BLD AUTO: 4.61 K/UL

## 2018-03-12 PROCEDURE — 85025 COMPLETE CBC W/AUTO DIFF WBC: CPT

## 2018-03-12 PROCEDURE — 80053 COMPREHEN METABOLIC PANEL: CPT

## 2018-03-12 PROCEDURE — 36415 COLL VENOUS BLD VENIPUNCTURE: CPT

## 2018-03-12 PROCEDURE — 83036 HEMOGLOBIN GLYCOSYLATED A1C: CPT

## 2018-03-12 PROCEDURE — 84443 ASSAY THYROID STIM HORMONE: CPT

## 2018-03-12 PROCEDURE — 82043 UR ALBUMIN QUANTITATIVE: CPT

## 2018-03-12 PROCEDURE — 80061 LIPID PANEL: CPT

## 2018-03-15 ENCOUNTER — OFFICE VISIT (OUTPATIENT)
Dept: INTERNAL MEDICINE | Facility: CLINIC | Age: 73
End: 2018-03-15
Payer: MEDICARE

## 2018-03-15 VITALS
WEIGHT: 193.31 LBS | RESPIRATION RATE: 16 BRPM | HEIGHT: 62 IN | DIASTOLIC BLOOD PRESSURE: 62 MMHG | HEART RATE: 88 BPM | SYSTOLIC BLOOD PRESSURE: 112 MMHG | BODY MASS INDEX: 35.57 KG/M2 | OXYGEN SATURATION: 96 %

## 2018-03-15 DIAGNOSIS — E66.01 SEVERE OBESITY (BMI 35.0-35.9 WITH COMORBIDITY): ICD-10-CM

## 2018-03-15 PROCEDURE — 99499 UNLISTED E&M SERVICE: CPT | Mod: S$GLB,,, | Performed by: INTERNAL MEDICINE

## 2018-03-15 PROCEDURE — 99213 OFFICE O/P EST LOW 20 MIN: CPT | Mod: S$GLB,,, | Performed by: INTERNAL MEDICINE

## 2018-03-15 PROCEDURE — 3046F HEMOGLOBIN A1C LEVEL >9.0%: CPT | Mod: CPTII,S$GLB,, | Performed by: INTERNAL MEDICINE

## 2018-03-15 PROCEDURE — 99999 PR PBB SHADOW E&M-EST. PATIENT-LVL III: CPT | Mod: PBBFAC,,, | Performed by: INTERNAL MEDICINE

## 2018-03-15 NOTE — PROGRESS NOTES
Subjective:       Patient ID: Narda Person is a 72 y.o. female.    Chief Complaint: Diabetes (follow up with lab review)    Narda Person is a 72 y.o. female  Here for DM-2 foot exam.  She has peripheral neuropathy.    Uncontrolled as ever.  Lab Results       Component                Value               Date                       HGBA1C                   12.2 (H)            03/12/2018                    Diabetes   She presents for her follow-up diabetic visit. She has type 2 diabetes mellitus. Her disease course has been worsening. Hypoglycemia symptoms include dizziness. Pertinent negatives for hypoglycemia include no headaches or tremors. Associated symptoms include weakness. Pertinent negatives for diabetes include no chest pain and no fatigue. Her breakfast blood glucose range is generally >200 mg/dl. Her dinner blood glucose range is generally >200 mg/dl. Her highest blood glucose is >200 mg/dl.     Review of Systems   Constitutional: Negative for activity change, fatigue, fever and unexpected weight change.   HENT: Positive for tinnitus. Negative for congestion, ear discharge, ear pain, hearing loss, rhinorrhea and sore throat.    Eyes: Negative for pain, redness and visual disturbance.   Respiratory: Negative for cough, shortness of breath and wheezing.    Cardiovascular: Negative for chest pain, palpitations and leg swelling.   Gastrointestinal: Negative for abdominal pain, constipation, diarrhea, nausea and vomiting.   Genitourinary: Negative for dysuria, frequency, pelvic pain and urgency.   Musculoskeletal: Positive for arthralgias and joint swelling. Negative for back pain and neck pain.   Skin: Negative for color change, rash and wound.   Neurological: Positive for dizziness and weakness. Negative for tremors, light-headedness and headaches.                 Objective:      Physical Exam   Constitutional: She is oriented to person, place, and time. She appears well-developed and well-nourished. No  distress.   HENT:   Head: Normocephalic and atraumatic.   Right Ear: External ear normal.   Left Ear: External ear normal.   Eyes: EOM are normal. Right eye exhibits no discharge. Left eye exhibits no discharge.   Neck: Neck supple.   Cardiovascular: Normal rate, regular rhythm and intact distal pulses.  Exam reveals no gallop and no friction rub.    No murmur heard.  Pulses:       Dorsalis pedis pulses are 2+ on the right side, and 2+ on the left side.        Posterior tibial pulses are 2+ on the right side, and 2+ on the left side.        Pulmonary/Chest: Effort normal and breath sounds normal. No respiratory distress. She has no wheezes. She has no rales.   Abdominal: Soft. Bowel sounds are normal. She exhibits no distension. There is no tenderness.   Musculoskeletal: She exhibits no edema.        Left hip: She exhibits tenderness.   Tenderness upon palpation to left iliac    Feet:   Right Foot:   Protective Sensation: 7 sites tested. 2 sites sensed.   Skin Integrity: Positive for callus. Negative for ulcer, blister, skin breakdown, erythema, warmth or dry skin.   Left Foot:   Protective Sensation: 7 sites tested. 2 sites sensed.   Skin Integrity: Positive for callus. Negative for ulcer, blister, skin breakdown, erythema, warmth or dry skin.   Lymphadenopathy:     She has no cervical adenopathy.   Neurological: She is alert and oriented to person, place, and time. She displays no atrophy and no tremor. No cranial nerve deficit. She exhibits normal muscle tone. Coordination and gait abnormal.   Skin: Skin is warm and dry.   Psychiatric: She has a normal mood and affect. Her behavior is normal.   Vitals reviewed.      Assessment:       1. Type 2 diabetes, uncontrolled, with neuropathy    2. Severe obesity (BMI 35.0-35.9 with comorbidity)        Plan:   Narda was seen today for diabetes.    Diagnoses and all orders for this visit:    Type 2 diabetes, uncontrolled, with neuropathy  -     dulaglutide 0.75 mg/0.5 mL  PnIj; Inject 0.5 mLs (0.75 mg total) into the skin once a week.  -     Hemoglobin A1c; Future  DC januvia  Continue insulins    Patient has uncontrolled Diabetes .  We discussed about diet ;low in calories. Avoid sweats, sodas.  Also increasing activity;walking 2-3 miles a day.  I also adjusted medications and gave patient  instructions about adherence to plan.  Goal of  A1c  less than 7 % stressed.  Also goal of LDL less than 70 highlighted to patient.  Severe obesity (BMI 35.0-35.9 with comorbidity)    # The patient is asked to make an attempt to improve diet and exercise patterns to aid in medical management of this problem.     # Eat  5 small meals a day.     # Cut out high carbohydrate  foods : bread, rice, pasta, potatoes.     # Exercise/walk 5x/week for at least 30-45  minutes.

## 2018-03-21 NOTE — PROGRESS NOTES
Patient, Narda Person (MRN #[id number]), presented with a recorded BMI of 35.36 kg/m^2 and a documented comorbidity(s):  - Diabetes Mellitus Type 2  to which the severe obesity is a contributing factor. This is consistent with the definition of severe obesity (BMI 35.0-35.9) with comorbidity (ICD-10 E66.01, Z68.35). The patient's severe obesity was monitored, evaluated, addressed and/or treated. This addendum to the medical record is made on 03/21/2018.

## 2018-04-09 ENCOUNTER — TELEPHONE (OUTPATIENT)
Dept: NEUROLOGY | Facility: CLINIC | Age: 73
End: 2018-04-09

## 2018-04-09 NOTE — TELEPHONE ENCOUNTER
----- Message from Katarina Yanez sent at 2018  2:15 PM CDT -----  Contact: DAUGHTER - CLAUDIA Person  MRN: [id number]  : 1945  PCP: Apolonia Sullivan  Home Phone      [phone]  Work Phone      [phone]  Mobile          Not on file.      MESSAGE: Claudia states that the patients dementia is getting worse.  She states that the patient memory is getting worse and combative.        Phone: [phone]

## 2018-04-09 NOTE — TELEPHONE ENCOUNTER
Spoke with Barbi, explained that we have not seen her mom in over a year and that she needed to be seen in clinic to be evaluated for her condition. Voiced understanding appointment made for 4/11 with Narda.

## 2018-04-12 ENCOUNTER — OFFICE VISIT (OUTPATIENT)
Dept: NEUROLOGY | Facility: CLINIC | Age: 73
End: 2018-04-12
Payer: MEDICARE

## 2018-04-12 ENCOUNTER — LAB VISIT (OUTPATIENT)
Dept: LAB | Facility: HOSPITAL | Age: 73
End: 2018-04-12
Attending: NURSE PRACTITIONER
Payer: MEDICARE

## 2018-04-12 VITALS
BODY MASS INDEX: 34.1 KG/M2 | WEIGHT: 192.44 LBS | RESPIRATION RATE: 20 BRPM | DIASTOLIC BLOOD PRESSURE: 60 MMHG | HEART RATE: 88 BPM | HEIGHT: 63 IN | SYSTOLIC BLOOD PRESSURE: 128 MMHG

## 2018-04-12 DIAGNOSIS — D33.3 VESTIBULAR SCHWANNOMA: ICD-10-CM

## 2018-04-12 DIAGNOSIS — R41.3 MEMORY LOSS: ICD-10-CM

## 2018-04-12 DIAGNOSIS — G30.9 ALZHEIMER'S DEMENTIA WITH BEHAVIORAL DISTURBANCE, UNSPECIFIED TIMING OF DEMENTIA ONSET: Primary | ICD-10-CM

## 2018-04-12 DIAGNOSIS — F02.81 ALZHEIMER'S DEMENTIA WITH BEHAVIORAL DISTURBANCE, UNSPECIFIED TIMING OF DEMENTIA ONSET: Primary | ICD-10-CM

## 2018-04-12 DIAGNOSIS — R26.9 GAIT ABNORMALITY: ICD-10-CM

## 2018-04-12 DIAGNOSIS — M48.061 SPINAL STENOSIS OF LUMBAR REGION, UNSPECIFIED WHETHER NEUROGENIC CLAUDICATION PRESENT: ICD-10-CM

## 2018-04-12 PROBLEM — F02.80 ALZHEIMER'S DEMENTIA: Status: ACTIVE | Noted: 2018-04-12

## 2018-04-12 LAB
ALBUMIN SERPL BCP-MCNC: 3.5 G/DL
ALP SERPL-CCNC: 86 U/L
ALT SERPL W/O P-5'-P-CCNC: 30 U/L
ANION GAP SERPL CALC-SCNC: 8 MMOL/L
AST SERPL-CCNC: 22 U/L
BASOPHILS # BLD AUTO: 0.07 K/UL
BASOPHILS NFR BLD: 1.2 %
BILIRUB SERPL-MCNC: 0.4 MG/DL
BUN SERPL-MCNC: 15 MG/DL
CALCIUM SERPL-MCNC: 9.2 MG/DL
CHLORIDE SERPL-SCNC: 102 MMOL/L
CO2 SERPL-SCNC: 27 MMOL/L
CREAT SERPL-MCNC: 0.9 MG/DL
DIFFERENTIAL METHOD: NORMAL
EOSINOPHIL # BLD AUTO: 0.2 K/UL
EOSINOPHIL NFR BLD: 3.1 %
ERYTHROCYTE [DISTWIDTH] IN BLOOD BY AUTOMATED COUNT: 13.1 %
EST. GFR  (AFRICAN AMERICAN): >60 ML/MIN/1.73 M^2
EST. GFR  (NON AFRICAN AMERICAN): >60 ML/MIN/1.73 M^2
GLUCOSE SERPL-MCNC: 378 MG/DL
HCT VFR BLD AUTO: 40.8 %
HGB BLD-MCNC: 14.1 G/DL
LYMPHOCYTES # BLD AUTO: 1.7 K/UL
LYMPHOCYTES NFR BLD: 29.1 %
MCH RBC QN AUTO: 29.7 PG
MCHC RBC AUTO-ENTMCNC: 34.6 G/DL
MCV RBC AUTO: 86 FL
MONOCYTES # BLD AUTO: 0.6 K/UL
MONOCYTES NFR BLD: 9.4 %
NEUTROPHILS # BLD AUTO: 3.4 K/UL
NEUTROPHILS NFR BLD: 57.2 %
PLATELET # BLD AUTO: 234 K/UL
PMV BLD AUTO: 10.7 FL
POTASSIUM SERPL-SCNC: 4.1 MMOL/L
PROT SERPL-MCNC: 7 G/DL
RBC # BLD AUTO: 4.74 M/UL
SODIUM SERPL-SCNC: 137 MMOL/L
TSH SERPL DL<=0.005 MIU/L-ACNC: 2.88 UIU/ML
VIT B12 SERPL-MCNC: 931 PG/ML
WBC # BLD AUTO: 5.85 K/UL

## 2018-04-12 PROCEDURE — 84443 ASSAY THYROID STIM HORMONE: CPT

## 2018-04-12 PROCEDURE — 99214 OFFICE O/P EST MOD 30 MIN: CPT | Mod: S$GLB,,, | Performed by: NURSE PRACTITIONER

## 2018-04-12 PROCEDURE — 36415 COLL VENOUS BLD VENIPUNCTURE: CPT

## 2018-04-12 PROCEDURE — 99499 UNLISTED E&M SERVICE: CPT | Mod: S$GLB,,, | Performed by: NURSE PRACTITIONER

## 2018-04-12 PROCEDURE — 82607 VITAMIN B-12: CPT

## 2018-04-12 PROCEDURE — 85025 COMPLETE CBC W/AUTO DIFF WBC: CPT

## 2018-04-12 PROCEDURE — 80053 COMPREHEN METABOLIC PANEL: CPT

## 2018-04-12 PROCEDURE — 99999 PR PBB SHADOW E&M-EST. PATIENT-LVL V: CPT | Mod: PBBFAC,,, | Performed by: NURSE PRACTITIONER

## 2018-04-12 RX ORDER — TRAZODONE HYDROCHLORIDE 50 MG/1
50 TABLET ORAL NIGHTLY
Qty: 30 TABLET | Refills: 11 | Status: SHIPPED | OUTPATIENT
Start: 2018-04-12 | End: 2018-05-08 | Stop reason: SDUPTHER

## 2018-04-12 NOTE — PROGRESS NOTES
"HPI: Narda Person is a 72 y.o. female with a history of a right vestibular schwannoma, as well as diastolic dysfunction, hypothyroidism, and insulin dependent DM.     She presents today for a follow up visit. She was placed on Aricept at her last visit, but has not noticed any effect from this.     She continues with depression, and is more agitated lately. She does not sleep well at night, and admits to having a hallucinations a few weeks, which involved seeing "three angels" at the foot of her bed. She saw a  for this, and has not had any further hallucinations.     Memory is worse lately. She has family conflict with her daughter, and also has her granddaughter living with her. No family history of dementia.     She continues to fall frequently, more so when not using her cane for stability. She underwent PT at home for her gait imbalance, which was overall ineffective. She admits to some lumbar complaints, and her last MRI L-spine several years ago showed moderate lumbar stenosis.     Review of Systems   Constitutional: Negative for malaise/fatigue.   HENT: Positive for tinnitus.    Eyes: Positive for blurred vision.   Cardiovascular: Negative for chest pain.   Musculoskeletal: Positive for back pain and falls. Negative for myalgias.   Neurological: Positive for dizziness. Negative for speech change, seizures, weakness and headaches.   Endo/Heme/Allergies: Does not bruise/bleed easily.   Psychiatric/Behavioral: Positive for depression, hallucinations and memory loss. Negative for suicidal ideas. The patient is nervous/anxious. The patient does not have insomnia.        I have reviewed all of this patient's past medical and surgical histories as well as family and social histories and active allergies and medications as documented in the electronic medical record.    Exam:  Gen Appearance, well developed/nourished in no apparent distress  CV: 2+ distal pulses with no edema or swelling  Neuro:  MS: Awake, " alert, oriented to place, person, time, situation. Sustains attention. Recent memory mildly impaired/remote memory intact, Language is full to spontaneous speech/repetition/naming/comprehension. Fund of Knowledge is full  CN: Optic discs are flat with normal vasculature, PERRL, Extraoccular movements and visual fields are full. Normal facial sensation and strength, Hearing symmetric, Tongue and Palate are midline and strong. Shoulder Shrug symmetric and strong.  Motor: Normal bulk, tone, no abnormal movements. 5/5 strength bilateral upper/lower extremities with 2+ reflexes BUE, and 1+ reflexes BLE, and bilateral plantar response  Sensory: symmetric to light touch, pain, temp, and proprioception. Reduced vibration at left ankle; Romberg negative  Cerebellar: Finger-nose,Heal-shin, Rapid alternating movements intact  Gait: ambulates with walker for fall prevention.     Imaging:  MRI Brain with and without contrast 6/16/2016  4.5 mm focus of enhancement is again identified within the deep aspect of the right internal auditory canal to suggest a stable vestibular schwannoma.  Age-appropriate generalized volume loss with findings suggestive of mild chronic microvascular ischemic change.     MRI Brain with and without contrast 11/14/2016  4.5 mm focus of enhancement in the deep aspect of the right internal artery canal suggestive for a stable vestibular schwannoma.  No new enhancing lesion is seen.  Age-appropriate generalized cerebral volume loss with mild chronic microvascular ischemic change.    Labs: KARSTEN, homocysteine, B12/folate, B1 reviewed; unremarkable  Her BMP, CBC, Lipid Panel, TSH, and LFT's per Dr. Leone requested but not received    Assessment/Plan:  Narda Person is a 71 y.o. female with a history of a right vestibular schwannoma, as well as neuropathy, diastolic dysfunction, hypothyroidism, and insulin dependent DM.     I recommend:   1. Repeat MRI Brain with and without at this time to assess for changes,  given her Schwannoma, and her memory loss, which began one year ago, and has become worse lately. Clinical picture suggestive of AD; however, depression likely worsening this.   2. CBC, CMP, TSH, B12 to assess for systemic cause of her memory loss and mood issues.   3. Start Trazodone 50 mg qhs for insomnia, agitation, and hallucinations (none currently; however).   4. PT ineffective for gait imbalance. Order MRI L-spine to assess for progression of her moderate lumbar stenosis, which could be contributing.   5. Continue Aricept for memory-may note more of a memory improvement once mood issues and insomnia are managed. Otherwise, consider Namenda.   6. Dysphagia helped with changes suggested per Speech Therapy after swallowing study.     Follow up in 6 weeks.

## 2018-04-18 ENCOUNTER — TELEPHONE (OUTPATIENT)
Dept: INTERNAL MEDICINE | Facility: CLINIC | Age: 73
End: 2018-04-18

## 2018-04-18 ENCOUNTER — HOSPITAL ENCOUNTER (OUTPATIENT)
Dept: RADIOLOGY | Facility: HOSPITAL | Age: 73
Discharge: HOME | End: 2018-04-18
Attending: NURSE PRACTITIONER
Payer: MEDICARE

## 2018-04-18 DIAGNOSIS — D33.3 VESTIBULAR SCHWANNOMA: ICD-10-CM

## 2018-04-18 DIAGNOSIS — R26.9 GAIT ABNORMALITY: ICD-10-CM

## 2018-04-18 DIAGNOSIS — R26.9 GAIT ABNORMALITY: Primary | ICD-10-CM

## 2018-04-18 DIAGNOSIS — M48.061 SPINAL STENOSIS OF LUMBAR REGION, UNSPECIFIED WHETHER NEUROGENIC CLAUDICATION PRESENT: ICD-10-CM

## 2018-04-18 DIAGNOSIS — R29.6 FALLS FREQUENTLY: ICD-10-CM

## 2018-04-18 DIAGNOSIS — R41.3 MEMORY LOSS: ICD-10-CM

## 2018-04-18 PROCEDURE — A9585 GADOBUTROL INJECTION: HCPCS | Performed by: NURSE PRACTITIONER

## 2018-04-18 PROCEDURE — 72148 MRI LUMBAR SPINE W/O DYE: CPT | Mod: 26,,, | Performed by: RADIOLOGY

## 2018-04-18 PROCEDURE — 25500020 PHARM REV CODE 255: Performed by: NURSE PRACTITIONER

## 2018-04-18 PROCEDURE — 70553 MRI BRAIN STEM W/O & W/DYE: CPT | Mod: TC

## 2018-04-18 PROCEDURE — 72148 MRI LUMBAR SPINE W/O DYE: CPT | Mod: TC

## 2018-04-18 PROCEDURE — 70553 MRI BRAIN STEM W/O & W/DYE: CPT | Mod: 26,,, | Performed by: RADIOLOGY

## 2018-04-18 RX ORDER — GADOBUTROL 604.72 MG/ML
8 INJECTION INTRAVENOUS
Status: COMPLETED | OUTPATIENT
Start: 2018-04-18 | End: 2018-04-18

## 2018-04-18 RX ADMIN — GADOBUTROL 8 ML: 604.72 INJECTION INTRAVENOUS at 10:04

## 2018-04-18 NOTE — TELEPHONE ENCOUNTER
----- Message from Taylor Shelton sent at 2018  1:13 PM CDT -----  Contact: Ilsa with [facility]  Narda Person  MRN: [id number]  : 1945  PCP: Apolonia Sullivan  Home Phone      [phone]  Work Phone      [phone]  Mobile          Not on file.      MESSAGE:  Ilsa requesting progress notes documenting pt's diabetic foot exam for her diabetic shoes. Please advise.  PHONE:  [phone]  FAX:  [phone]

## 2018-04-19 ENCOUNTER — TELEPHONE (OUTPATIENT)
Dept: NEUROSURGERY | Facility: CLINIC | Age: 73
End: 2018-04-19

## 2018-04-19 NOTE — TELEPHONE ENCOUNTER
----- Message from Natty Saavedra MA sent at 4/18/2018  4:11 PM CDT -----  [facility],  This patient is established with Dr Buenrostro and our NP Narda is asking that she be scheduled for follow up of vestibular schwannoma and her now severe lumbar stenosis. I appreciate your help.    Thanks,   Natty CAMERON

## 2018-04-19 NOTE — TELEPHONE ENCOUNTER
APPT MADE FOR MRI FU WITH DR TREJO AND SAME DAY APPT TO FU L-SPINE MRI WITH LINNETTE BADILLO IN NEUROSURGERY. SAILAJA TAYLOR PTS DAUGHTER. APPT SLIP IN THE MAIL.

## 2018-04-19 NOTE — TELEPHONE ENCOUNTER
----- Message from Natty Saavedra MA sent at 4/18/2018  4:11 PM CDT -----  [facility],  This patient is established with Dr Buenrostro and our NP Narda is asking that she be scheduled for follow up of vestibular schwannoma and her now severe lumbar stenosis. I appreciate your help.    Thanks,   Natty CAMERON

## 2018-04-23 ENCOUNTER — HOSPITAL ENCOUNTER (OUTPATIENT)
Dept: RADIOLOGY | Facility: HOSPITAL | Age: 73
Discharge: HOME | End: 2018-04-23
Attending: NURSE PRACTITIONER
Payer: MEDICARE

## 2018-04-23 ENCOUNTER — OFFICE VISIT (OUTPATIENT)
Dept: INTERNAL MEDICINE | Facility: CLINIC | Age: 73
End: 2018-04-23
Payer: MEDICARE

## 2018-04-23 VITALS
RESPIRATION RATE: 20 BRPM | SYSTOLIC BLOOD PRESSURE: 138 MMHG | BODY MASS INDEX: 34.3 KG/M2 | HEIGHT: 63 IN | OXYGEN SATURATION: 98 % | DIASTOLIC BLOOD PRESSURE: 58 MMHG | TEMPERATURE: 97 F | WEIGHT: 193.56 LBS | HEART RATE: 74 BPM

## 2018-04-23 DIAGNOSIS — Z78.0 ASYMPTOMATIC MENOPAUSAL STATE: ICD-10-CM

## 2018-04-23 DIAGNOSIS — Z01.818 PRE-OPERATIVE CLEARANCE: Primary | ICD-10-CM

## 2018-04-23 PROBLEM — R41.3 MEMORY LOSS: Status: RESOLVED | Noted: 2017-01-03 | Resolved: 2018-04-23

## 2018-04-23 PROBLEM — E66.9 CLASS 1 OBESITY WITH SERIOUS COMORBIDITY AND BODY MASS INDEX (BMI) OF 34.0 TO 34.9 IN ADULT: Status: ACTIVE | Noted: 2018-03-15

## 2018-04-23 PROBLEM — E87.1 HYPONATREMIA: Status: RESOLVED | Noted: 2017-01-27 | Resolved: 2018-04-23

## 2018-04-23 PROBLEM — H35.30 MACULAR DEGENERATION OF BOTH EYES: Status: ACTIVE | Noted: 2018-04-23

## 2018-04-23 PROBLEM — N28.9 ACUTE RENAL INSUFFICIENCY: Status: RESOLVED | Noted: 2017-01-27 | Resolved: 2018-04-23

## 2018-04-23 PROBLEM — Z85.3 HISTORY OF BREAST CANCER: Status: ACTIVE | Noted: 2018-04-23

## 2018-04-23 PROBLEM — Z90.12 H/O LEFT MASTECTOMY: Status: ACTIVE | Noted: 2018-04-23

## 2018-04-23 PROBLEM — E66.811 CLASS 1 OBESITY WITH SERIOUS COMORBIDITY AND BODY MASS INDEX (BMI) OF 34.0 TO 34.9 IN ADULT: Status: ACTIVE | Noted: 2018-03-15

## 2018-04-23 PROBLEM — E87.6 HYPOKALEMIA: Status: RESOLVED | Noted: 2017-01-27 | Resolved: 2018-04-23

## 2018-04-23 PROBLEM — R13.10 DYSPHAGIA: Status: RESOLVED | Noted: 2017-01-03 | Resolved: 2018-04-23

## 2018-04-23 PROCEDURE — 3046F HEMOGLOBIN A1C LEVEL >9.0%: CPT | Mod: CPTII,S$GLB,, | Performed by: NURSE PRACTITIONER

## 2018-04-23 PROCEDURE — 99214 OFFICE O/P EST MOD 30 MIN: CPT | Mod: S$GLB,,, | Performed by: NURSE PRACTITIONER

## 2018-04-23 PROCEDURE — 99999 PR PBB SHADOW E&M-EST. PATIENT-LVL V: CPT | Mod: PBBFAC,,, | Performed by: NURSE PRACTITIONER

## 2018-04-23 PROCEDURE — 99499 UNLISTED E&M SERVICE: CPT | Mod: S$GLB,,, | Performed by: NURSE PRACTITIONER

## 2018-04-23 PROCEDURE — 77080 DXA BONE DENSITY AXIAL: CPT | Mod: TC

## 2018-04-23 PROCEDURE — 77080 DXA BONE DENSITY AXIAL: CPT | Mod: 26,,, | Performed by: RADIOLOGY

## 2018-04-23 NOTE — PROGRESS NOTES
Subjective:       Patient ID: Narda Person is a 72 y.o. female.    Chief Complaint: surgery clearance    Narda Person is a 72 y.o. female  Here for left eye cataract surgery on 4/24/2018 by Dr. Dudley Cummins    She reports doing well.  No chest pain   No shortness of breath .  Has hx of uncontrolled DM.       Diabetes   She presents for her follow-up diabetic visit. She has type 2 diabetes mellitus. Her disease course has been worsening. Hypoglycemia symptoms include confusion, dizziness, hunger and nervousness/anxiousness. Pertinent negatives for hypoglycemia include no headaches, mood changes, pallor, seizures, sleepiness, speech difficulty, sweats or tremors. Associated symptoms include blurred vision, foot paresthesias, polydipsia, polyphagia, polyuria, visual change and weakness. Pertinent negatives for diabetes include no chest pain, no fatigue, no foot ulcerations and no weight loss. There are no hypoglycemic complications. Symptoms are worsening. Diabetic complications include autonomic neuropathy and peripheral neuropathy. Pertinent negatives for diabetic complications include no CVA, heart disease, impotence, nephropathy, PVD or retinopathy. Risk factors for coronary artery disease include diabetes mellitus, family history, obesity, sedentary lifestyle and post-menopausal. Current diabetic treatment includes insulin injections and diet. She is compliant with treatment all of the time. Her weight is stable. She is following a diabetic and generally healthy (improving) diet. Meal planning includes avoidance of concentrated sweets. She has had a previous visit with a dietitian. Her home blood glucose trend is decreasing steadily. (300's average last few weeks,  down from 400's) An ACE inhibitor/angiotensin II receptor blocker is being taken. She does not see a podiatrist.Eye exam is current.     Review of Systems   Constitutional: Negative for activity change, appetite change, fatigue, fever, unexpected  weight change and weight loss.   HENT: Positive for tinnitus. Negative for congestion, ear discharge, ear pain, hearing loss, rhinorrhea and sore throat.    Eyes: Positive for blurred vision. Negative for pain, redness and visual disturbance.   Respiratory: Negative for cough, chest tightness, shortness of breath and wheezing.    Cardiovascular: Negative for chest pain, palpitations and leg swelling.   Gastrointestinal: Negative for abdominal pain, constipation, diarrhea, nausea and vomiting.   Endocrine: Positive for polydipsia, polyphagia and polyuria.   Genitourinary: Negative for difficulty urinating, dysuria, frequency, impotence, menstrual problem, pelvic pain and urgency.   Musculoskeletal: Positive for arthralgias and joint swelling. Negative for back pain and neck pain.   Skin: Negative for color change, pallor, rash and wound.   Neurological: Positive for dizziness and weakness. Negative for tremors, seizures, speech difficulty, light-headedness and headaches.             Psychiatric/Behavioral: Positive for confusion. The patient is nervous/anxious.    All other systems reviewed and are negative.      Objective:      Physical Exam   Constitutional: She is oriented to person, place, and time. She appears well-developed and well-nourished. No distress.   HENT:   Head: Normocephalic and atraumatic.   Right Ear: External ear normal.   Left Ear: External ear normal.   Eyes: EOM are normal. Right eye exhibits no discharge. Left eye exhibits no discharge.   Bilateral cataract changes in lens   Neck: Neck supple.   Cardiovascular: Normal rate, regular rhythm and intact distal pulses.  Exam reveals no gallop and no friction rub.    No murmur heard.       Pulmonary/Chest: Effort normal and breath sounds normal. No respiratory distress. She has no wheezes. She has no rales.   Abdominal: Soft. Bowel sounds are normal. She exhibits no distension. There is no tenderness.   Musculoskeletal: She exhibits no edema.         Left hip: She exhibits tenderness.   Tenderness upon palpation to left iliac    Lymphadenopathy:     She has no cervical adenopathy.   Neurological: She is alert and oriented to person, place, and time. She displays no atrophy and no tremor. No cranial nerve deficit. She exhibits normal muscle tone. Coordination and gait abnormal.   Skin: Skin is warm and dry.   Psychiatric: She has a normal mood and affect. Her behavior is normal.   Vitals reviewed.      Assessment:       1. Pre-operative clearance    2. Uncontrolled type 2 diabetes mellitus with diabetic polyneuropathy, with long-term current use of insulin    3. Uncontrolled type 2 diabetes mellitus with diabetic cataract, with long-term current use of insulin    4. Asymptomatic menopausal state         Plan:   Narda was seen today for surgery clearance.    Diagnoses and all orders for this visit:    Pre-operative clearance    Uncontrolled type 2 diabetes mellitus with diabetic polyneuropathy, with long-term current use of insulin  -     Ambulatory Referral to Endocrinology    Uncontrolled type 2 diabetes mellitus with diabetic cataract, with long-term current use of insulin  -     Ambulatory Referral to Endocrinology    Asymptomatic menopausal state   -     DXA Bone Density Spine And Hip; Future    Dear Dr. Cummins   I reviewed her history/labs  Her DM control is challenging     Please proceed with surgery.  Patient appears in stable Cardiovascular  status  Other recommendations include:  Telemetry     Patient to hold the insulin in am of the surgery .    Please allow the patient to take BP pills on the am of surgery    Thank you for allowing me to participate in this patient's care. Please consult me if post-operative medical care assistance is needed.

## 2018-04-26 ENCOUNTER — TELEPHONE (OUTPATIENT)
Dept: INTERNAL MEDICINE | Facility: CLINIC | Age: 73
End: 2018-04-26

## 2018-04-26 NOTE — TELEPHONE ENCOUNTER
"Patient seen on 3/15/18 for DM foot exam. On paperwork that was faxed to [facility], you note that patient has, "peripheral neuropathy with evidence of callus formation". However this is not documented in your clinic note. [facility] is requesting an addendum to note including a description of calluses. Please advise. Thanks.   "

## 2018-04-26 NOTE — TELEPHONE ENCOUNTER
----- Message from Taylor Shelton sent at 2018 12:54 PM CDT -----  Contact: Miya with [facility]  Narda Person  MRN: [id number]  : 1945  PCP: Sirena Coleman  Home Phone      [phone]  Work Phone      [phone]  Mobile          Not on file.      MESSAGE:  Miya received the progress notes about pt's diabetic foot exam but there was nothing about pt's calluses. Asking if we can send a note including descriptions of pt's calluses. Please advise.  PHONE:  [phone]  FAX:  [phone]

## 2018-05-07 ENCOUNTER — TELEPHONE (OUTPATIENT)
Dept: NEUROLOGY | Facility: CLINIC | Age: 73
End: 2018-05-07

## 2018-05-07 DIAGNOSIS — G30.9 ALZHEIMER'S DEMENTIA WITH BEHAVIORAL DISTURBANCE, UNSPECIFIED TIMING OF DEMENTIA ONSET: ICD-10-CM

## 2018-05-07 DIAGNOSIS — F02.81 ALZHEIMER'S DEMENTIA WITH BEHAVIORAL DISTURBANCE, UNSPECIFIED TIMING OF DEMENTIA ONSET: ICD-10-CM

## 2018-05-07 NOTE — TELEPHONE ENCOUNTER
Patients daughter Barbi called for refills of Trazodone. Daughter states that the patient increased from 50 mg to 100 mg qhs and is now out of medication. Daughter also mentioned that the patient is doing very well on this dosage. Please advise.

## 2018-05-07 NOTE — TELEPHONE ENCOUNTER
----- Message from Katarina Yanez sent at 2018  2:56 PM CDT -----  Contact: DAUGHTER - CLAUDIA Person  MRN: [id number]  : 1945  PCP: Sirena Coleman  Home Phone      [phone]  Work Phone      [phone]  Mobile          Not on file.      MESSAGE: Daughter states that the patient needs a refill on the Trazodone 50 mg  sent to [facility].  She states that it has been increased and she is unable to get it filled due to the wrong directions.          Phone: [phone]

## 2018-05-08 DIAGNOSIS — Z76.0 ENCOUNTER FOR MEDICATION REFILL: ICD-10-CM

## 2018-05-08 RX ORDER — TRAZODONE HYDROCHLORIDE 100 MG/1
100 TABLET ORAL NIGHTLY
Qty: 30 TABLET | Refills: 11 | Status: SHIPPED | OUTPATIENT
Start: 2018-05-08 | End: 2018-05-29

## 2018-05-08 NOTE — TELEPHONE ENCOUNTER
Rx sent in for Trazodone 100 mg qhs; however, please advise family to contact me in the future if they intend to go up on her medications higher than the dose that I prescribed to her at her last visit.

## 2018-05-09 DIAGNOSIS — N39.41 URGE INCONTINENCE: ICD-10-CM

## 2018-05-09 RX ORDER — RAMIPRIL 10 MG/1
CAPSULE ORAL
Qty: 90 CAPSULE | Refills: 0 | Status: SHIPPED | OUTPATIENT
Start: 2018-05-09 | End: 2018-08-08 | Stop reason: SDUPTHER

## 2018-05-09 RX ORDER — OXYBUTYNIN CHLORIDE 5 MG/1
TABLET, EXTENDED RELEASE ORAL
Qty: 90 TABLET | Refills: 1 | Status: SHIPPED | OUTPATIENT
Start: 2018-05-09 | End: 2018-11-06 | Stop reason: SDUPTHER

## 2018-05-09 RX ORDER — ATORVASTATIN CALCIUM 20 MG/1
TABLET, FILM COATED ORAL
Qty: 30 TABLET | Refills: 2 | Status: SHIPPED | OUTPATIENT
Start: 2018-05-09 | End: 2018-08-08 | Stop reason: SDUPTHER

## 2018-05-10 ENCOUNTER — OFFICE VISIT (OUTPATIENT)
Dept: NEUROSURGERY | Facility: CLINIC | Age: 73
End: 2018-05-10
Payer: MEDICARE

## 2018-05-10 VITALS
WEIGHT: 191 LBS | SYSTOLIC BLOOD PRESSURE: 121 MMHG | TEMPERATURE: 99 F | HEIGHT: 63 IN | DIASTOLIC BLOOD PRESSURE: 62 MMHG | HEART RATE: 89 BPM | BODY MASS INDEX: 33.84 KG/M2

## 2018-05-10 DIAGNOSIS — M54.40 CHRONIC LOW BACK PAIN WITH SCIATICA, SCIATICA LATERALITY UNSPECIFIED, UNSPECIFIED BACK PAIN LATERALITY: ICD-10-CM

## 2018-05-10 DIAGNOSIS — R26.9 GAIT ABNORMALITY: ICD-10-CM

## 2018-05-10 DIAGNOSIS — M54.2 NECK PAIN, CHRONIC: ICD-10-CM

## 2018-05-10 DIAGNOSIS — M51.26 DISC DISPLACEMENT, LUMBAR: ICD-10-CM

## 2018-05-10 DIAGNOSIS — G89.29 CHRONIC LOW BACK PAIN WITH SCIATICA, SCIATICA LATERALITY UNSPECIFIED, UNSPECIFIED BACK PAIN LATERALITY: ICD-10-CM

## 2018-05-10 DIAGNOSIS — D33.3 RIGHT ACOUSTIC NEUROMA: Primary | ICD-10-CM

## 2018-05-10 DIAGNOSIS — D33.3 VESTIBULAR SCHWANNOMA: ICD-10-CM

## 2018-05-10 DIAGNOSIS — M48.061 SPINAL STENOSIS OF LUMBAR REGION, UNSPECIFIED WHETHER NEUROGENIC CLAUDICATION PRESENT: Primary | ICD-10-CM

## 2018-05-10 DIAGNOSIS — M47.22 OSTEOARTHRITIS OF SPINE WITH RADICULOPATHY, CERVICAL REGION: ICD-10-CM

## 2018-05-10 DIAGNOSIS — G89.29 NECK PAIN, CHRONIC: ICD-10-CM

## 2018-05-10 DIAGNOSIS — M51.36 DDD (DEGENERATIVE DISC DISEASE), LUMBAR: ICD-10-CM

## 2018-05-10 DIAGNOSIS — M47.816 LUMBAR FACET ARTHROPATHY: ICD-10-CM

## 2018-05-10 DIAGNOSIS — M62.838 MUSCLE SPASM OF BOTH LOWER LEGS: ICD-10-CM

## 2018-05-10 DIAGNOSIS — M54.16 LUMBAR RADICULOPATHY: ICD-10-CM

## 2018-05-10 PROCEDURE — 99499 UNLISTED E&M SERVICE: CPT | Mod: S$GLB,,, | Performed by: NEUROLOGICAL SURGERY

## 2018-05-10 PROCEDURE — 99213 OFFICE O/P EST LOW 20 MIN: CPT | Mod: S$PBB,,, | Performed by: NEUROLOGICAL SURGERY

## 2018-05-10 PROCEDURE — 99499 UNLISTED E&M SERVICE: CPT | Mod: S$GLB,,, | Performed by: PHYSICIAN ASSISTANT

## 2018-05-10 PROCEDURE — 99999 PR PBB SHADOW E&M-EST. PATIENT-LVL V: CPT | Mod: PBBFAC,,, | Performed by: PHYSICIAN ASSISTANT

## 2018-05-10 PROCEDURE — 99215 OFFICE O/P EST HI 40 MIN: CPT | Mod: 25,S$GLB,, | Performed by: PHYSICIAN ASSISTANT

## 2018-05-10 PROCEDURE — 99999 PR PBB SHADOW E&M-EST. PATIENT-LVL III: CPT | Mod: PBBFAC,,, | Performed by: NEUROLOGICAL SURGERY

## 2018-05-10 RX ORDER — TIZANIDINE HYDROCHLORIDE 2 MG/1
2 CAPSULE, GELATIN COATED ORAL 3 TIMES DAILY PRN
Qty: 40 CAPSULE | Refills: 0 | Status: SHIPPED | OUTPATIENT
Start: 2018-05-10 | End: 2018-05-20

## 2018-05-10 RX ORDER — NAPROXEN 500 MG/1
500 TABLET ORAL 2 TIMES DAILY WITH MEALS
Qty: 28 TABLET | Refills: 2 | Status: SHIPPED | OUTPATIENT
Start: 2018-05-10 | End: 2018-05-24

## 2018-05-10 NOTE — PROGRESS NOTES
This office note has been dictated.  May 10, 2018    Long Lucia M.D.  Department of Neurology  [facility] [address] [place] [address]     RE:  VICKY PERSON  [facility].:  [id number]    Dear Dr. Lucia:    Vicky Person was seen in neurosurgical followup at the office this morning.  She   has continued with hearing loss in the right ear, some associated tinnitus and   intermittent dizziness and gait imbalance.  She has been having more difficulty   with her vision.  She had recent cataract surgery, but visual loss apparently   relates also to diabetic retinopathy and macular degeneration.  Her memory has   also been poor over the last two years.  She has some difficulty remembering   events and appointments.  Generally, she gets along fairly well, but her   daughter has been helping care for her.    On examination today, she is alert and cooperative and answers questions   appropriately.  She could not hear finger rubbing in the right ear, but tapping   her forehead did spread to both ears.  Extraocular movements are good.  Speech   is generally clear.  Her gait is somewhat broad-based and slow.  She did not   lose her balance on exam, but apparently has taken some falls.    MRI of the brain was repeated at [facility] on 04/18/18, and compared to   her last study of 11/14/16.  Again, is seen the small right acoustic neuroma.    This is very distal in the internal auditory canal adjacent to the cochlea and   semicircular canals.  The length has increased from 4.7 mm to 8.4 mm.  The tumor   can only grow in the direction of the auditory canal.    Although there has been growth, the tumor is still quite small, completely   intracanalicular and I do not believe requires any specific treatment.  We will   continue to follow her and can have MRI done in about two years.    Thank you again for the opportunity to work with you in her care.    Sincerely yours,            DEBI/MARIAN  dd:  05/10/2018 11:58:37 (CDT)  td: 05/11/2018 07:54:06 (CDT)  Doc ID   #[id number]  Job ID #[id number]    CC: Long Person

## 2018-05-10 NOTE — LETTER
May 12, 2018      Apolonia Sullivan, NP  [address]  [place] [address]           [facility] - Neurosurgery 7th Fl  [address]  [facility] [address]  Phone: [phone]          Patient: Narda Person   MR Number: [id number]   YOB: 1945   Date of Visit: 5/10/2018       Dear Apolonia Sullivan:    Thank you for referring Narda Person to me for evaluation. Attached you will find relevant portions of my assessment and plan of care.    If you have questions, please do not hesitate to call me. I look forward to following Narda Person along with you.    Sincerely,    Bethel Estevez PA-C    Enclosure  CC:  No Recipients    If you would like to receive this communication electronically, please contact [email or web address] or [phone] to request more information on [employer] access.    For providers and/or their staff who would like to refer a patient to [facility], please contact us through our one-stop-shop provider referral line, [facility], at [phone].    If you feel you have received this communication in error or would no longer like to receive these types of communications, please e-mail [email or web address]

## 2018-05-10 NOTE — LETTER
May 10, 2018      Sirena Coleman MD  [address] [place] [id number]           [facility] - Neurosurgery 7th Fl  [address]  [place] [address]  Phone: [phone]          Patient: Narda Person   MR Number: [id number]   YOB: 1945   Date of Visit: 5/10/2018       Dear Dr. Sirena Coleman:    Thank you for referring Narda Person to me for evaluation. Attached you will find relevant portions of my assessment and plan of care.    If you have questions, please do not hesitate to call me. I look forward to following Narda Person along with you.    Sincerely,    Desean Buenrostro MD    Enclosure  CC:  No Recipients    If you would like to receive this communication electronically, please contact [email or web address] or [phone] to request more information on [employer] access.    For providers and/or their staff who would like to refer a patient to [facility], please contact us through our one-stop-shop provider referral line, [facility], at [phone].    If you feel you have received this communication in error or would no longer like to receive these types of communications, please e-mail [email or web address]
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
## 2018-05-21 RX ORDER — INSULIN ASPART 100 [IU]/ML
INJECTION, SOLUTION INTRAVENOUS; SUBCUTANEOUS
Qty: 15 SYRINGE | Refills: 2 | Status: SHIPPED | OUTPATIENT
Start: 2018-05-21 | End: 2019-08-07 | Stop reason: SDUPTHER

## 2018-05-29 ENCOUNTER — OFFICE VISIT (OUTPATIENT)
Dept: NEUROLOGY | Facility: CLINIC | Age: 73
End: 2018-05-29
Payer: MEDICARE

## 2018-05-29 VITALS
HEART RATE: 92 BPM | RESPIRATION RATE: 16 BRPM | WEIGHT: 188.94 LBS | BODY MASS INDEX: 34.77 KG/M2 | SYSTOLIC BLOOD PRESSURE: 128 MMHG | HEIGHT: 62 IN | DIASTOLIC BLOOD PRESSURE: 54 MMHG

## 2018-05-29 DIAGNOSIS — G30.9 ALZHEIMER'S DEMENTIA WITH BEHAVIORAL DISTURBANCE, UNSPECIFIED TIMING OF DEMENTIA ONSET: ICD-10-CM

## 2018-05-29 DIAGNOSIS — R29.6 FALLS FREQUENTLY: ICD-10-CM

## 2018-05-29 DIAGNOSIS — M48.061 SPINAL STENOSIS OF LUMBAR REGION, UNSPECIFIED WHETHER NEUROGENIC CLAUDICATION PRESENT: ICD-10-CM

## 2018-05-29 DIAGNOSIS — R26.9 GAIT ABNORMALITY: Primary | ICD-10-CM

## 2018-05-29 DIAGNOSIS — F02.81 ALZHEIMER'S DEMENTIA WITH BEHAVIORAL DISTURBANCE, UNSPECIFIED TIMING OF DEMENTIA ONSET: ICD-10-CM

## 2018-05-29 DIAGNOSIS — E11.42 DIABETIC POLYNEUROPATHY ASSOCIATED WITH TYPE 2 DIABETES MELLITUS: ICD-10-CM

## 2018-05-29 DIAGNOSIS — D33.3 VESTIBULAR SCHWANNOMA: ICD-10-CM

## 2018-05-29 PROCEDURE — 99999 PR PBB SHADOW E&M-EST. PATIENT-LVL V: CPT | Mod: PBBFAC,,, | Performed by: NURSE PRACTITIONER

## 2018-05-29 PROCEDURE — 99499 UNLISTED E&M SERVICE: CPT | Mod: S$GLB,,, | Performed by: NURSE PRACTITIONER

## 2018-05-29 PROCEDURE — 99214 OFFICE O/P EST MOD 30 MIN: CPT | Mod: S$GLB,,, | Performed by: NURSE PRACTITIONER

## 2018-05-29 PROCEDURE — 3046F HEMOGLOBIN A1C LEVEL >9.0%: CPT | Mod: CPTII,S$GLB,, | Performed by: NURSE PRACTITIONER

## 2018-05-29 RX ORDER — TRAZODONE HYDROCHLORIDE 100 MG/1
50 TABLET ORAL NIGHTLY PRN
COMMUNITY
End: 2018-05-29 | Stop reason: ALTCHOICE

## 2018-05-29 RX ORDER — ASPIRIN 81 MG/1
81 TABLET ORAL DAILY
COMMUNITY
End: 2019-07-23

## 2018-05-29 RX ORDER — TIZANIDINE HYDROCHLORIDE 2 MG/1
1 CAPSULE, GELATIN COATED ORAL 2 TIMES DAILY PRN
COMMUNITY
End: 2019-08-07

## 2018-05-29 RX ORDER — NAPROXEN 500 MG/1
500 TABLET ORAL 2 TIMES DAILY PRN
COMMUNITY
End: 2019-08-07

## 2018-05-29 RX ORDER — EPINEPHRINE 0.22MG
100 AEROSOL WITH ADAPTER (ML) INHALATION DAILY
COMMUNITY
End: 2022-02-23

## 2018-05-29 NOTE — PROGRESS NOTES
"HPI: Narda Person is a 72 y.o. female with a history of a right vestibular schwannoma, as well as diastolic dysfunction, hypothyroidism, and insulin dependent DM.     She presents today for a follow up visit. She completed an MRI Brain since her last visit, which showed some enlargement of her vestibular schwannoma. She saw Dr. Desean Buenrostro on 5/18/2018 for follow up regarding the growth of her vestibular schwannoma; however, he did not feel that this required any treatment, and plans to repeat her MRI in two years.    She completed an MRI L-spine, which showed severe stenosis at L4/5. She was referred to Neurosurgery for evaluation of her severe lumbar stenosis, who ordered an MRI C-spine and L-spine X-rays to evaluate for listhesis.     She was placed on Trazodone at her last visit for complaint of hallucinations, agitation, and insomnia. Her daughter called clinic shortly after this was prescribed stating that patient increased to 100 mg, which was very effective for her. She reports that there was no effect with 50 mg, but she had excessive sedation with the 100 mg. She is now taking 1/4 50 mg Trazodone "as needed".     She was prescribed an unknown muscle relaxant and anti-inflammatory, and is now sleeping well at night.     Memory loss is improved since reduction in interpersonal conflict with granddaughter. No further agitation.     No further hallucinations, which daughter attributes to discontinuation of Elavil; however, this was not disclosed to me at her last visit. Patient has no updated medication list today. Denies depression today.     She continues with recurrent falls, as well as some lumbar complaints. She does not drive and is interested in doing PT at home.     Review of Systems   Constitutional: Negative for malaise/fatigue.   HENT: Positive for tinnitus.    Eyes: Positive for blurred vision.   Cardiovascular: Negative for chest pain.   Musculoskeletal: Positive for back pain and falls. Negative " for myalgias.   Neurological: Positive for dizziness. Negative for speech change, seizures, weakness and headaches.   Endo/Heme/Allergies: Does not bruise/bleed easily.   Psychiatric/Behavioral: Positive for memory loss. Negative for depression, hallucinations and suicidal ideas. The patient is not nervous/anxious and does not have insomnia.        I have reviewed all of this patient's past medical and surgical histories as well as family and social histories and active allergies and medications as documented in the electronic medical record.    Exam:  Gen Appearance, well developed/nourished in no apparent distress  CV: 2+ distal pulses with no edema or swelling  Neuro:  MS: Awake, alert, oriented to place, person, time, situation. Sustains attention. Recent memory mildly impaired/remote memory intact, Language is full to spontaneous speech/repetition/naming/comprehension. Fund of Knowledge is full  CN: Optic discs are flat with normal vasculature, PERRL, Extraoccular movements and visual fields are full. Normal facial sensation and strength, Hearing symmetric, Tongue and Palate are midline and strong. Shoulder Shrug symmetric and strong.  Motor: Normal bulk, tone, no abnormal movements. 5/5 strength bilateral upper/lower extremities with 2+ reflexes BUE, and 1+ reflexes BLE, and bilateral plantar response  Sensory: symmetric to light touch, pain, temp, and proprioception. Reduced vibration at left ankle; Romberg negative  Cerebellar: Finger-nose,Heal-shin, Rapid alternating movements intact  Gait: ambulates with walker for fall prevention.     Imaging:  MRI Brain with and without contrast 4/2018:  Interval increased size of the enhancing lesion in the right internal auditory canal suggestive for a vestibular schwannoma.  It currently measures 8.4 x 4.7 mm, previously 4.7 x 4.0 mm.  No new enhancing lesion is seen.    Age-appropriate generalized cerebral volume loss with mild/moderate chronic microvascular ischemic  change.    MRI Brain with and without contrast 6/16/2016  4.5 mm focus of enhancement is again identified within the deep aspect of the right internal auditory canal to suggest a stable vestibular schwannoma.  Age-appropriate generalized volume loss with findings suggestive of mild chronic microvascular ischemic change.     MRI Brain with and without contrast 11/14/2016  4.5 mm focus of enhancement in the deep aspect of the right internal artery canal suggestive for a stable vestibular schwannoma.  No new enhancing lesion is seen.  Age-appropriate generalized cerebral volume loss with mild chronic microvascular ischemic change.    MRI L-spine 4/2018:  The incidentally visualized soft tissue structures of the abdomen are within normal limits.    Vertebral body heights are maintained.  There is mild anterolisthesis of L4 in respect to L5 by approximately 4 mm.  There is disc desiccation with significant disc space narrowing at L4-5 with adjacent endplate degenerative change.  The marrow signal is otherwise normal without evidence for a marrow replacement process, infection or tumor.  The conus terminates at T12-L1.    At T12-L1, there is no disc herniation, central canal stenosis or neural foraminal narrowing.    At L1-2, there is no disc herniation, central canal stenosis or neural foraminal narrowing.    At L2-3, there is a broad-based posterior disc bulge with ligamentum flavum hypertrophy and facet arthropathy contributing to mild central canal stenosis without neural foraminal narrowing.    At L3-4, there is a broad-based posterior disc bulge with ligamentum flavum hypertrophy and facet arthropathy contributing to moderate central canal stenosis with mild bilateral neural foraminal narrowing.    At L4-5, there is a broad-based posterior disc bulge, ligamentum flavum hypertrophy and facet arthropathy contributing to severe central canal stenosis with severe bilateral neural foraminal narrowing.  There appears to  be disc material protruding in the bilateral neural foramina.    At L5-S1, there is a broad-based posterior disc bulge and facet arthropathy contributing to mild to moderate bilateral neural foraminal narrowing.    Labs:   KARSTEN, homocysteine, B12/folate, B1 reviewed; unremarkable  Her BMP, CBC, Lipid Panel, TSH, and LFT's per Dr. Leone requested but not received  CBC, CMP, TSH, B12 4/2018 overall WNL other than her uncontrolled DM.     Assessment/Plan:  Narda Person is a 71 y.o. female with a history of a right vestibular schwannoma, as well as neuropathy, diastolic dysfunction, hypothyroidism, and insulin dependent DM.     I recommend:   1. Worsening memory loss and agitation coincided with conflict with granddaughter, which is now resolved.   2. Hallucinations resolved after she stopped Elavil; however, she did not disclosed that she was taking Elavil at the time that Trazodone was prescribed to her for hallucinations, agitation, and insomnia. Trazodone ineffective at 50 mg and caused excess sedation at 100 mg. Discontinue her prn use at this time.   3. Insomnia improved with muscle relaxants-unknown name and dose. No updated medication list today.   4. Growth of vestibular schwannoma followed by Dr. Desean Buenrostro, who plans to repeat imaging in two years.   5. Order PT with home health for gait imbalance, given her recurrent falls, likely related to her severe lumbar stenosis and her neuropathy, in addition to her vestibular schwannoma.   6. Neurosurgery ordered MRI C-spine to rule out cervical myelopathy as well, as she saw them for her severe lumbar stenosis. L-spine X-rays ordered as well to evaluate for listhesis.   7. Continue Aricept for memory at this time. Unsure of response thus far, as memory improves with reduction in interpersonal conflict.   8. Dysphagia helped with changes suggested per Speech Therapy after swallowing study.     Follow up in 3 months.

## 2018-05-31 DIAGNOSIS — R26.9 GAIT ABNORMALITY: ICD-10-CM

## 2018-05-31 DIAGNOSIS — R29.6 RECURRENT FALLS: Primary | ICD-10-CM

## 2018-06-01 ENCOUNTER — HOSPITAL ENCOUNTER (OUTPATIENT)
Dept: RADIOLOGY | Facility: HOSPITAL | Age: 73
Discharge: HOME | End: 2018-06-01
Attending: PHYSICIAN ASSISTANT
Payer: MEDICARE

## 2018-06-01 DIAGNOSIS — G89.29 CHRONIC LOW BACK PAIN WITH SCIATICA, SCIATICA LATERALITY UNSPECIFIED, UNSPECIFIED BACK PAIN LATERALITY: ICD-10-CM

## 2018-06-01 DIAGNOSIS — M47.816 LUMBAR FACET ARTHROPATHY: ICD-10-CM

## 2018-06-01 DIAGNOSIS — R26.9 GAIT ABNORMALITY: ICD-10-CM

## 2018-06-01 DIAGNOSIS — M51.36 DDD (DEGENERATIVE DISC DISEASE), LUMBAR: ICD-10-CM

## 2018-06-01 DIAGNOSIS — M62.838 MUSCLE SPASM OF BOTH LOWER LEGS: ICD-10-CM

## 2018-06-01 DIAGNOSIS — M54.40 CHRONIC LOW BACK PAIN WITH SCIATICA, SCIATICA LATERALITY UNSPECIFIED, UNSPECIFIED BACK PAIN LATERALITY: ICD-10-CM

## 2018-06-01 DIAGNOSIS — M47.22 OSTEOARTHRITIS OF SPINE WITH RADICULOPATHY, CERVICAL REGION: ICD-10-CM

## 2018-06-01 DIAGNOSIS — M48.061 SPINAL STENOSIS OF LUMBAR REGION, UNSPECIFIED WHETHER NEUROGENIC CLAUDICATION PRESENT: ICD-10-CM

## 2018-06-01 DIAGNOSIS — M54.16 LUMBAR RADICULOPATHY: ICD-10-CM

## 2018-06-01 PROCEDURE — 72114 X-RAY EXAM L-S SPINE BENDING: CPT | Mod: TC

## 2018-06-01 PROCEDURE — 72141 MRI NECK SPINE W/O DYE: CPT | Mod: TC

## 2018-06-01 PROCEDURE — 72114 X-RAY EXAM L-S SPINE BENDING: CPT | Mod: 26,,, | Performed by: RADIOLOGY

## 2018-06-01 PROCEDURE — 72141 MRI NECK SPINE W/O DYE: CPT | Mod: 26,,, | Performed by: RADIOLOGY

## 2018-06-04 DIAGNOSIS — E11.49 OTHER DIABETIC NEUROLOGICAL COMPLICATION ASSOCIATED WITH TYPE 2 DIABETES MELLITUS: Primary | ICD-10-CM

## 2018-06-04 DIAGNOSIS — I10 ESSENTIAL HYPERTENSION: ICD-10-CM

## 2018-06-04 RX ORDER — INSULIN PUMP SYRINGE, 3 ML
EACH MISCELLANEOUS
Qty: 1 EACH | Refills: 0 | Status: SHIPPED | OUTPATIENT
Start: 2018-06-04 | End: 2022-03-03

## 2018-06-04 RX ORDER — LANCETS
EACH MISCELLANEOUS
Qty: 200 EACH | Refills: 12 | Status: SHIPPED | OUTPATIENT
Start: 2018-06-04 | End: 2022-03-02 | Stop reason: SDUPTHER

## 2018-06-04 NOTE — TELEPHONE ENCOUNTER
----- Message from Nanette Morgan sent at 2018  3:57 PM CDT -----  Contact: CLAUDIA / DAUGHTER  Narda Person  MRN: [id number]  : 1945  PCP: Sirena Coleman  Home Phone      [phone]  Work Phone      [phone]  Mobile          Not on file.      MESSAGE: SAID THAT PT NEEDS NEW ACCU CHECK. SHE NEEDS TO CHECK 4 TIMES PER DAY AND WHEEL NO LONGER WORKS. SAID THAT PHARMACY TOLD HER THAT IF WE SEND A RX FOR IT, THAT IT WILL BE CHEAPER. PLEASE CALL    PHONE: [phone]    AMANDA BEVERLY

## 2018-06-04 NOTE — TELEPHONE ENCOUNTER
Requested Prescriptions     Pending Prescriptions Disp Refills    blood-glucose meter kit 1 each 0     Sig: To check BG 4 times daily, to use with insurance preferred meter    lancets Misc 200 each 12     Sig: To check BG 4 times daily, to use with insurance preferred meter    blood sugar diagnostic Strp 200 strip 12     Sig: To check BG 4 times daily, to use with insurance preferred meter

## 2018-06-09 DIAGNOSIS — Z76.0 ENCOUNTER FOR MEDICATION REFILL: ICD-10-CM

## 2018-06-11 RX ORDER — OMEPRAZOLE 20 MG/1
CAPSULE, DELAYED RELEASE ORAL
Qty: 60 CAPSULE | Refills: 2 | Status: SHIPPED | OUTPATIENT
Start: 2018-06-11 | End: 2018-09-10 | Stop reason: SDUPTHER

## 2018-06-15 ENCOUNTER — LAB VISIT (OUTPATIENT)
Dept: LAB | Facility: HOSPITAL | Age: 73
End: 2018-06-15
Attending: INTERNAL MEDICINE
Payer: MEDICARE

## 2018-06-15 LAB
ESTIMATED AVG GLUCOSE: 272 MG/DL
HBA1C MFR BLD HPLC: 11.1 %

## 2018-06-15 PROCEDURE — 83036 HEMOGLOBIN GLYCOSYLATED A1C: CPT

## 2018-06-15 PROCEDURE — 36415 COLL VENOUS BLD VENIPUNCTURE: CPT

## 2018-06-18 DIAGNOSIS — E11.9 DIABETES MELLITUS WITHOUT COMPLICATION: ICD-10-CM

## 2018-06-21 ENCOUNTER — OFFICE VISIT (OUTPATIENT)
Dept: INTERNAL MEDICINE | Facility: CLINIC | Age: 73
End: 2018-06-21
Payer: MEDICARE

## 2018-06-21 VITALS
OXYGEN SATURATION: 96 % | RESPIRATION RATE: 16 BRPM | WEIGHT: 195.31 LBS | HEIGHT: 62 IN | SYSTOLIC BLOOD PRESSURE: 120 MMHG | HEART RATE: 83 BPM | DIASTOLIC BLOOD PRESSURE: 54 MMHG | BODY MASS INDEX: 35.94 KG/M2

## 2018-06-21 DIAGNOSIS — J84.10 CALCIFIED GRANULOMA OF LUNG: ICD-10-CM

## 2018-06-21 DIAGNOSIS — E03.4 HYPOTHYROIDISM DUE TO ACQUIRED ATROPHY OF THYROID: ICD-10-CM

## 2018-06-21 DIAGNOSIS — I10 ESSENTIAL HYPERTENSION: ICD-10-CM

## 2018-06-21 DIAGNOSIS — I70.0 AORTIC ATHEROSCLEROSIS: ICD-10-CM

## 2018-06-21 DIAGNOSIS — E78.00 PURE HYPERCHOLESTEROLEMIA: ICD-10-CM

## 2018-06-21 PROBLEM — J98.4 CALCIFIED GRANULOMA OF LUNG: Status: ACTIVE | Noted: 2018-06-21

## 2018-06-21 PROCEDURE — 99213 OFFICE O/P EST LOW 20 MIN: CPT | Mod: S$GLB,,, | Performed by: INTERNAL MEDICINE

## 2018-06-21 PROCEDURE — 99999 PR PBB SHADOW E&M-EST. PATIENT-LVL III: CPT | Mod: PBBFAC,,, | Performed by: INTERNAL MEDICINE

## 2018-06-21 PROCEDURE — 99499 UNLISTED E&M SERVICE: CPT | Mod: S$GLB,,, | Performed by: INTERNAL MEDICINE

## 2018-06-21 PROCEDURE — 3046F HEMOGLOBIN A1C LEVEL >9.0%: CPT | Mod: CPTII,S$GLB,, | Performed by: INTERNAL MEDICINE

## 2018-06-21 RX ORDER — METFORMIN HYDROCHLORIDE 1000 MG/1
1000 TABLET ORAL 2 TIMES DAILY WITH MEALS
COMMUNITY
End: 2019-08-07 | Stop reason: SDUPTHER

## 2018-06-21 NOTE — PROGRESS NOTES
Subjective:       Patient ID: Narda Person is a 72 y.o. female.    Chief Complaint: Diabetes (3 MONTH FOLLOW UP WITH LAB REVIEW)    Narda Person is a 72 y.o. female  Here for DM-2  Follow up   She has peripheral neuropathy.    Uncontrolled as ever.  Lab Results       Component                Value                                 HGBA1C                  11 %                    Seen Dr Gautam ; she is on metformin, januvia, levemir and insulin .  Non compliant with meds.  She did not like trulicity   She gets angry with kids about food choices.          Diabetes   She presents for her follow-up diabetic visit. She has type 2 diabetes mellitus. Her disease course has been worsening. Hypoglycemia symptoms include dizziness. Pertinent negatives for hypoglycemia include no headaches or tremors. Associated symptoms include weakness. Pertinent negatives for diabetes include no chest pain and no fatigue. Her breakfast blood glucose range is generally >200 mg/dl. Her dinner blood glucose range is generally >200 mg/dl. Her highest blood glucose is >200 mg/dl.     Review of Systems   Constitutional: Negative for activity change, fatigue, fever and unexpected weight change.   HENT: Positive for tinnitus. Negative for congestion, ear discharge, ear pain, hearing loss, rhinorrhea and sore throat.    Eyes: Negative for pain, redness and visual disturbance.   Respiratory: Negative for cough, shortness of breath and wheezing.    Cardiovascular: Negative for chest pain, palpitations and leg swelling.   Gastrointestinal: Negative for abdominal pain, constipation, diarrhea, nausea and vomiting.   Genitourinary: Negative for dysuria, frequency, pelvic pain and urgency.   Musculoskeletal: Positive for arthralgias and joint swelling. Negative for back pain and neck pain.   Skin: Negative for color change, rash and wound.   Neurological: Positive for dizziness and weakness. Negative for tremors, light-headedness and headaches.                  Objective:      Physical Exam   Constitutional: She is oriented to person, place, and time. She appears well-developed and well-nourished. No distress.   HENT:   Head: Normocephalic and atraumatic.   Right Ear: External ear normal.   Left Ear: External ear normal.   Eyes: EOM are normal. Right eye exhibits no discharge. Left eye exhibits no discharge.   Neck: Neck supple.   Cardiovascular: Normal rate, regular rhythm and intact distal pulses.  Exam reveals no gallop and no friction rub.    No murmur heard.       Pulmonary/Chest: Effort normal and breath sounds normal. No respiratory distress. She has no wheezes. She has no rales.   Abdominal: Soft. Bowel sounds are normal. She exhibits no distension. There is no tenderness.   Musculoskeletal: She exhibits no edema.        Left hip: She exhibits tenderness.   Tenderness upon palpation to left iliac    Feet:   Left Foot:   Skin Integrity: Negative for callus.   Lymphadenopathy:     She has no cervical adenopathy.   Neurological: She is alert and oriented to person, place, and time. She displays no atrophy and no tremor. No cranial nerve deficit. She exhibits normal muscle tone. Coordination and gait abnormal.   Skin: Skin is warm and dry.   Psychiatric: She has a normal mood and affect. Her behavior is normal.   Vitals reviewed.      Assessment:       1. Uncontrolled type 2 diabetes mellitus with diabetic polyneuropathy, with long-term current use of insulin    2. Calcified granuloma of lung    3. Aortic atherosclerosis    4. Pure hypercholesterolemia    5. Hypothyroidism due to acquired atrophy of thyroid    6. Essential hypertension        Plan:   Uncontrolled type 2 diabetes mellitus with diabetic polyneuropathy, with long-term current use of insulin  -     Hemoglobin A1c; Future; Expected date: 09/19/2018  Patient has uncontrolled Diabetes .  We discussed about diet ;low in calories. Avoid sweats, sodas.  Also increasing activity;walking 2-3 miles a day.  I also  adjusted medications and gave patient  instructions about adherence to plan.  Goal of  A1c  less than 7 % stressed.  Also goal of LDL less than 70 highlighted to patient.      Calcified granuloma of lung  Stable.    Aortic atherosclerosis  Continue lipitor     Pure hypercholesterolemia  -     Lipid panel; Future; Expected date: 09/19/2018  Continue lipitor       Hypothyroidism due to acquired atrophy of thyroid  -     TSH; Future; Expected date: 09/19/2018  The current medical regimen is effective;  continue present plan and medications.      Essential hypertension  -     Comprehensive metabolic panel; Future; Expected date: 09/19/2018  -     CBC auto differential; Future; Expected date: 09/19/2018      Well controlled.  Continue same medication and dose.  1. Keep weight close to ideal body weight.   2.   Avoid high salt foods (olives, pickles, smoked meats, salted potato chips, etc.).   Do not add salt to your food at the table.   Use only small amounts of salt when cooking.   3. Begin an exercise program. Discuss with your doctor what type of exercise program would be best for you. It doesn't have to be difficult. Even brisk walking for 20 minutes three times a week is a good form of exercise.   4. Avoid medicines which contain heart stimulants. This includes many cold and sinus decongestant pills and sprays as well as diet pills. Check the warnings about hypertension on the label. Stimulants such as amphetamine or cocaine could be lethal for someone with hypertension. Never take these.

## 2018-07-19 VITALS — WEIGHT: 195 LBS | BODY MASS INDEX: 35.88 KG/M2 | HEIGHT: 62 IN

## 2018-07-25 ENCOUNTER — HOSPITAL ENCOUNTER (OUTPATIENT)
Dept: DIABETES | Facility: HOSPITAL | Age: 73
Discharge: HOME | End: 2018-07-25

## 2018-08-08 DIAGNOSIS — R42 VERTIGO: ICD-10-CM

## 2018-08-08 DIAGNOSIS — Z76.0 ENCOUNTER FOR MEDICATION REFILL: ICD-10-CM

## 2018-08-08 RX ORDER — MECLIZINE HYDROCHLORIDE 25 MG/1
TABLET ORAL
Qty: 90 TABLET | Refills: 5 | Status: SHIPPED | OUTPATIENT
Start: 2018-08-08 | End: 2019-08-20 | Stop reason: SDUPTHER

## 2018-08-08 RX ORDER — RAMIPRIL 10 MG/1
10 CAPSULE ORAL DAILY
Qty: 90 CAPSULE | Refills: 0 | Status: SHIPPED | OUTPATIENT
Start: 2018-08-08 | End: 2018-09-10 | Stop reason: SDUPTHER

## 2018-08-08 RX ORDER — ATORVASTATIN CALCIUM 20 MG/1
20 TABLET, FILM COATED ORAL DAILY
Qty: 30 TABLET | Refills: 2 | Status: SHIPPED | OUTPATIENT
Start: 2018-08-08 | End: 2018-11-11 | Stop reason: SDUPTHER

## 2018-08-08 NOTE — TELEPHONE ENCOUNTER
----- Message from Nanette Morgan sent at 2018  1:47 PM CDT -----  Contact: CLAUDIA / DAUGHTER  Narda Person  MRN: [id number]  : 1945  PCP: Sirena Coleman  Home Phone      [phone]  Work Phone      [phone]  Mobile          Not on file.      MESSAGE: NEEDS REFILLS ON ATORVASTATIN, ramipril  AND ANTIVERT. PT HAS UPCOMING APPTS BUT IS OUT OF THESE MEDICATIONS     PHONE: [phone]    PHARMACY: [facility]

## 2018-09-10 ENCOUNTER — LAB VISIT (OUTPATIENT)
Dept: LAB | Facility: HOSPITAL | Age: 73
End: 2018-09-10
Attending: INTERNAL MEDICINE
Payer: MEDICARE

## 2018-09-10 DIAGNOSIS — Z76.0 ENCOUNTER FOR MEDICATION REFILL: ICD-10-CM

## 2018-09-10 DIAGNOSIS — I10 ESSENTIAL HYPERTENSION: ICD-10-CM

## 2018-09-10 DIAGNOSIS — E03.4 HYPOTHYROIDISM DUE TO ACQUIRED ATROPHY OF THYROID: ICD-10-CM

## 2018-09-10 DIAGNOSIS — E78.00 PURE HYPERCHOLESTEROLEMIA: ICD-10-CM

## 2018-09-10 LAB
ALBUMIN SERPL BCP-MCNC: 3.9 G/DL
ALP SERPL-CCNC: 60 U/L
ALT SERPL W/O P-5'-P-CCNC: 26 U/L
ANION GAP SERPL CALC-SCNC: 13 MMOL/L
AST SERPL-CCNC: 20 U/L
BASOPHILS # BLD AUTO: 0.08 K/UL
BASOPHILS NFR BLD: 1 %
BILIRUB SERPL-MCNC: 0.5 MG/DL
BUN SERPL-MCNC: 22 MG/DL
CALCIUM SERPL-MCNC: 9.6 MG/DL
CHLORIDE SERPL-SCNC: 105 MMOL/L
CHOLEST SERPL-MCNC: 155 MG/DL
CHOLEST/HDLC SERPL: 3 {RATIO}
CO2 SERPL-SCNC: 21 MMOL/L
CREAT SERPL-MCNC: 1.1 MG/DL
DIFFERENTIAL METHOD: NORMAL
EOSINOPHIL # BLD AUTO: 0.3 K/UL
EOSINOPHIL NFR BLD: 3.2 %
ERYTHROCYTE [DISTWIDTH] IN BLOOD BY AUTOMATED COUNT: 13 %
EST. GFR  (AFRICAN AMERICAN): 57 ML/MIN/1.73 M^2
EST. GFR  (NON AFRICAN AMERICAN): 49 ML/MIN/1.73 M^2
GLUCOSE SERPL-MCNC: 262 MG/DL
HCT VFR BLD AUTO: 42.1 %
HDLC SERPL-MCNC: 52 MG/DL
HDLC SERPL: 33.5 %
HGB BLD-MCNC: 14.2 G/DL
LDLC SERPL CALC-MCNC: 83.6 MG/DL
LYMPHOCYTES # BLD AUTO: 1.8 K/UL
LYMPHOCYTES NFR BLD: 22.8 %
MCH RBC QN AUTO: 29.9 PG
MCHC RBC AUTO-ENTMCNC: 33.7 G/DL
MCV RBC AUTO: 89 FL
MONOCYTES # BLD AUTO: 0.6 K/UL
MONOCYTES NFR BLD: 8.1 %
NEUTROPHILS # BLD AUTO: 5 K/UL
NEUTROPHILS NFR BLD: 64.9 %
NONHDLC SERPL-MCNC: 103 MG/DL
PLATELET # BLD AUTO: 277 K/UL
PMV BLD AUTO: 10.3 FL
POTASSIUM SERPL-SCNC: 4.6 MMOL/L
PROT SERPL-MCNC: 7.4 G/DL
RBC # BLD AUTO: 4.75 M/UL
SODIUM SERPL-SCNC: 139 MMOL/L
TRIGL SERPL-MCNC: 97 MG/DL
TSH SERPL DL<=0.005 MIU/L-ACNC: 2.25 UIU/ML
WBC # BLD AUTO: 7.77 K/UL

## 2018-09-10 PROCEDURE — 80053 COMPREHEN METABOLIC PANEL: CPT

## 2018-09-10 PROCEDURE — 83036 HEMOGLOBIN GLYCOSYLATED A1C: CPT

## 2018-09-10 PROCEDURE — 36415 COLL VENOUS BLD VENIPUNCTURE: CPT

## 2018-09-10 PROCEDURE — 80061 LIPID PANEL: CPT

## 2018-09-10 PROCEDURE — 84443 ASSAY THYROID STIM HORMONE: CPT

## 2018-09-10 PROCEDURE — 85025 COMPLETE CBC W/AUTO DIFF WBC: CPT

## 2018-09-11 LAB
ESTIMATED AVG GLUCOSE: 189 MG/DL
HBA1C MFR BLD HPLC: 8.2 %

## 2018-09-11 RX ORDER — OMEPRAZOLE 20 MG/1
CAPSULE, DELAYED RELEASE ORAL
Qty: 60 CAPSULE | Refills: 2 | Status: SHIPPED | OUTPATIENT
Start: 2018-09-11 | End: 2022-03-02 | Stop reason: SDUPTHER

## 2018-09-11 RX ORDER — RAMIPRIL 10 MG/1
CAPSULE ORAL
Qty: 90 CAPSULE | Refills: 0 | Status: SHIPPED | OUTPATIENT
Start: 2018-09-11 | End: 2018-11-25 | Stop reason: SDUPTHER

## 2018-09-13 ENCOUNTER — OFFICE VISIT (OUTPATIENT)
Dept: INTERNAL MEDICINE | Facility: CLINIC | Age: 73
End: 2018-09-13
Payer: MEDICARE

## 2018-09-13 VITALS
DIASTOLIC BLOOD PRESSURE: 68 MMHG | BODY MASS INDEX: 35.09 KG/M2 | HEART RATE: 87 BPM | SYSTOLIC BLOOD PRESSURE: 104 MMHG | HEIGHT: 62 IN | RESPIRATION RATE: 16 BRPM | OXYGEN SATURATION: 98 % | WEIGHT: 190.69 LBS

## 2018-09-13 DIAGNOSIS — E78.00 PURE HYPERCHOLESTEROLEMIA: ICD-10-CM

## 2018-09-13 DIAGNOSIS — I10 ESSENTIAL HYPERTENSION: ICD-10-CM

## 2018-09-13 PROCEDURE — 99999 PR PBB SHADOW E&M-EST. PATIENT-LVL III: CPT | Mod: PBBFAC,,, | Performed by: INTERNAL MEDICINE

## 2018-09-13 PROCEDURE — 99213 OFFICE O/P EST LOW 20 MIN: CPT | Mod: PBBFAC | Performed by: INTERNAL MEDICINE

## 2018-09-13 PROCEDURE — 99214 OFFICE O/P EST MOD 30 MIN: CPT | Mod: S$PBB,,, | Performed by: INTERNAL MEDICINE

## 2018-09-13 PROCEDURE — 3045F PR MOST RECENT HEMOGLOBIN A1C LEVEL 7.0-9.0%: CPT | Mod: CPTII,,, | Performed by: INTERNAL MEDICINE

## 2018-09-13 PROCEDURE — 1101F PT FALLS ASSESS-DOCD LE1/YR: CPT | Mod: CPTII,,, | Performed by: INTERNAL MEDICINE

## 2018-09-13 NOTE — PROGRESS NOTES
Subjective:       Patient ID: Narda Person is a 73 y.o. female.    Chief Complaint: Thyroid Problem (follow up with lab review); Diabetes; Anemia; and Hypertension    Narda Person is a 73 y.o. female  Here for DM-2  , HTN, Hyperlipidemia.  Follow up   She has peripheral neuropathy.    Uncontrolled as ever.  Lab Results       Component                Value                                 HGBA1C                  Above 8 %           Seen Dr Gautam ; she is on metformin, januvia, levemir and insulin .  Non compliant with diet     She did not like trulicity     She gets angry with kids about food choices.          Diabetes   She presents for her follow-up diabetic visit. She has type 2 diabetes mellitus. Her disease course has been worsening. Hypoglycemia symptoms include dizziness. Pertinent negatives for hypoglycemia include no headaches or tremors. Associated symptoms include weakness. Pertinent negatives for diabetes include no chest pain and no fatigue. Her breakfast blood glucose range is generally >200 mg/dl. Her dinner blood glucose range is generally >200 mg/dl.     Review of Systems   Constitutional: Negative for activity change, fatigue, fever and unexpected weight change.   HENT: Positive for tinnitus. Negative for congestion, ear discharge, ear pain, hearing loss, rhinorrhea and sore throat.    Eyes: Negative for pain, redness and visual disturbance.   Respiratory: Negative for cough, shortness of breath and wheezing.    Cardiovascular: Negative for chest pain, palpitations and leg swelling.   Gastrointestinal: Negative for abdominal pain, constipation, diarrhea, nausea and vomiting.   Genitourinary: Negative for dysuria, frequency, pelvic pain and urgency.   Musculoskeletal: Positive for arthralgias and joint swelling. Negative for back pain and neck pain.   Skin: Negative for color change, rash and wound.   Neurological: Positive for dizziness and weakness. Negative for tremors, light-headedness and  headaches.                 Objective:      Physical Exam   Constitutional: She is oriented to person, place, and time. She appears well-developed and well-nourished. No distress.   HENT:   Head: Normocephalic and atraumatic.   Right Ear: External ear normal.   Left Ear: External ear normal.   Eyes: EOM are normal. Right eye exhibits no discharge. Left eye exhibits no discharge.   Neck: Neck supple.   Cardiovascular: Normal rate, regular rhythm and intact distal pulses. Exam reveals no gallop and no friction rub.   No murmur heard.       Pulmonary/Chest: Effort normal and breath sounds normal. No respiratory distress. She has no wheezes. She has no rales.   Abdominal: Soft. Bowel sounds are normal. She exhibits no distension. There is no tenderness.   Musculoskeletal: She exhibits no edema.        Left hip: She exhibits tenderness.   Tenderness upon palpation to left iliac    Feet:   Left Foot:   Skin Integrity: Negative for callus.   Lymphadenopathy:     She has no cervical adenopathy.   Neurological: She is alert and oriented to person, place, and time. She displays no atrophy and no tremor. No cranial nerve deficit. She exhibits normal muscle tone. Coordination and gait abnormal.   Skin: Skin is warm and dry.   Psychiatric: She has a normal mood and affect. Her behavior is normal.   Vitals reviewed.      Assessment:       1. Uncontrolled type 2 diabetes mellitus with diabetic polyneuropathy, with long-term current use of insulin    2. Essential hypertension    3. Pure hypercholesterolemia        Plan:   Narda was seen today for thyroid problem, diabetes, anemia and hypertension.    Diagnoses and all orders for this visit:    Uncontrolled type 2 diabetes mellitus with diabetic polyneuropathy, with long-term current use of insulin  -     Hemoglobin A1c; Future  -     Microalbumin/creatinine urine ratio; Future  -     Hemoglobin A1c; Future  Patient has uncontrolled Diabetes .  We discussed about diet ;low in  calories. Avoid sweats, sodas.  Also increasing activity;walking 2-3 miles a day.  I also adjusted medications and gave patient  instructions about adherence to plan.  Goal of  A1c  less than 7 % stressed.  Also goal of LDL less than 70 highlighted to patient.  Essential hypertension  -     CBC auto differential; Future  -     Comprehensive metabolic panel; Future    Well controlled.  Continue same medication and dose.  1. Keep weight close to ideal body weight.   2.   Avoid high salt foods (olives, pickles, smoked meats, salted potato chips, etc.).   Do not add salt to your food at the table.   Use only small amounts of salt when cooking.   3. Begin an exercise program. Discuss with your doctor what type of exercise program would be best for you. It doesn't have to be difficult. Even brisk walking for 20 minutes three times a week is a good form of exercise.   4. Avoid medicines which contain heart stimulants. This includes many cold and sinus decongestant pills and sprays as well as diet pills. Check the warnings about hypertension on the label. Stimulants such as amphetamine or cocaine could be lethal for someone with hypertension. Never take these.  '    Pure hypercholesterolemia  -     Lipid panel; Future  -     TSH; Future  Limit the cholesterol in your diet to less than 300 mg per day.   Fats should contribute no more than 20 to 35% of your daily calories.   Less than 7 to 10% of your calories should come from saturated fat.   Avoid saturated fat products e.g., Butter, some oils, meat, and poultry fat contain a lot of saturated fat.   Check food labels for fat and cholesterol content. Choose the foods with less fat per serving.   Limit the amount of butter and margarine you eat.   Use salad dressings and margarine made with polyunsaturated and monounsaturated fats.   Use egg whites or egg substitutes rather than whole eggs.   Replace whole-milk dairy products with nonfat or low-fat milk, cheese, spreads, and  yogurt.   Eat skinless chicken, turkey, fish, and meatless entrees more often than red meat.   Choose lean cuts of meat and trim off all visible fat. Keep portion sizes moderate.   Avoid fatty desserts such as ice cream, cream-filled cakes, and cheesecakes. Choose fresh fruits, nonfat frozen yogurt, Popsicles, etc.   Reduce the amount of fried foods, vending machine food, and fast food you eat.   Eat fruits and vegetables (especially fresh fruits and leafy vegetables), beans, and whole grains daily. The fiber in these foods helps lower cholesterol.   Look for low-fat or nonfat varieties of the foods you like to eat, or look for substitutes.   You may need to exercise 60 minutes a day to prevent weight gain and 90 minutes a day to lose weight.

## 2018-09-20 ENCOUNTER — TELEPHONE (OUTPATIENT)
Dept: INTERNAL MEDICINE | Facility: CLINIC | Age: 73
End: 2018-09-20

## 2018-09-20 NOTE — TELEPHONE ENCOUNTER
----- Message from Nanette Morgan sent at 2018  1:05 PM CDT -----  Contact: CLAUDIA / DAUGHTER  Narda Person  MRN: [id number]  : 1945  PCP: Sirena Coleman  Home Phone      [phone]  Work Phone      [phone]  Mobile          Not on file.      MESSAGE: SAID THAT PT WAS EXPOSED TO MOLD AND HER EYE IS VERY RED AND SEEMS INFECTED. WANTS HER TO BE SEEN TODAY. PLEASE CALL     PHONE: [phone]

## 2018-09-20 NOTE — TELEPHONE ENCOUNTER
"Barbi states that patient's eye is "blood shot" and may be draining. She does not know which eye as someone just sent her a pic of the patient's eye. I advised that she contact her eye doctor or go to ER for evaluation.   "

## 2018-10-01 ENCOUNTER — TELEPHONE (OUTPATIENT)
Dept: INTERNAL MEDICINE | Facility: CLINIC | Age: 73
End: 2018-10-01

## 2018-10-01 DIAGNOSIS — R30.0 BURNING WITH URINATION: Primary | ICD-10-CM

## 2018-10-01 NOTE — TELEPHONE ENCOUNTER
Call received from [facility] advising that patient brought urine sample to the hospital in a non-sterile container, so the sample will be wasted. Daughter notified; she will  a sterile container from the clinic and have patient collect another urine sample.

## 2018-10-01 NOTE — TELEPHONE ENCOUNTER
----- Message from Nanette Morgan sent at 10/1/2018 10:35 AM CDT -----  Contact: CLAUDIA / DAUGHTER  Narda Person  MRN: [id number]  : 1945  PCP: Sirena Coleman  Home Phone      [phone]  Work Phone      [phone]  Mobile          Not on file.      MESSAGE: WANTS PT TO BE SEEN TODAY FOR POSSIBLE KIDNEY INFECTION. PLEASE CALL     PHONE: [phone]

## 2018-10-05 ENCOUNTER — LAB VISIT (OUTPATIENT)
Dept: LAB | Facility: HOSPITAL | Age: 73
End: 2018-10-05
Attending: INTERNAL MEDICINE
Payer: MEDICARE

## 2018-10-05 DIAGNOSIS — R30.0 BURNING WITH URINATION: ICD-10-CM

## 2018-10-05 LAB
BILIRUB UR QL STRIP: NEGATIVE
CLARITY UR: CLEAR
COLOR UR: YELLOW
GLUCOSE UR QL STRIP: ABNORMAL
HGB UR QL STRIP: NEGATIVE
KETONES UR QL STRIP: NEGATIVE
LEUKOCYTE ESTERASE UR QL STRIP: NEGATIVE
NITRITE UR QL STRIP: NEGATIVE
PH UR STRIP: 5 [PH] (ref 5–8)
PROT UR QL STRIP: NEGATIVE
SP GR UR STRIP: 1.01 (ref 1–1.03)
URN SPEC COLLECT METH UR: ABNORMAL
UROBILINOGEN UR STRIP-ACNC: NEGATIVE EU/DL

## 2018-10-05 PROCEDURE — 87086 URINE CULTURE/COLONY COUNT: CPT

## 2018-10-05 PROCEDURE — 81003 URINALYSIS AUTO W/O SCOPE: CPT

## 2018-10-07 LAB
BACTERIA UR CULT: NORMAL
BACTERIA UR CULT: NORMAL

## 2018-10-10 ENCOUNTER — OFFICE VISIT (OUTPATIENT)
Dept: NEUROLOGY | Facility: CLINIC | Age: 73
End: 2018-10-10
Payer: MEDICARE

## 2018-10-10 VITALS
HEIGHT: 63 IN | DIASTOLIC BLOOD PRESSURE: 48 MMHG | BODY MASS INDEX: 34.61 KG/M2 | WEIGHT: 195.31 LBS | SYSTOLIC BLOOD PRESSURE: 102 MMHG | RESPIRATION RATE: 18 BRPM | HEART RATE: 88 BPM

## 2018-10-10 DIAGNOSIS — K21.9 GASTROESOPHAGEAL REFLUX DISEASE WITHOUT ESOPHAGITIS: ICD-10-CM

## 2018-10-10 DIAGNOSIS — R41.3 MEMORY LOSS: Primary | ICD-10-CM

## 2018-10-10 DIAGNOSIS — F32.A ANXIETY AND DEPRESSION: ICD-10-CM

## 2018-10-10 DIAGNOSIS — F41.9 ANXIETY AND DEPRESSION: ICD-10-CM

## 2018-10-10 DIAGNOSIS — I10 ESSENTIAL HYPERTENSION: ICD-10-CM

## 2018-10-10 DIAGNOSIS — E03.4 HYPOTHYROIDISM DUE TO ACQUIRED ATROPHY OF THYROID: ICD-10-CM

## 2018-10-10 DIAGNOSIS — Z85.3 HISTORY OF BREAST CANCER: ICD-10-CM

## 2018-10-10 DIAGNOSIS — E11.42 DIABETIC POLYNEUROPATHY ASSOCIATED WITH TYPE 2 DIABETES MELLITUS: ICD-10-CM

## 2018-10-10 DIAGNOSIS — M48.061 SPINAL STENOSIS OF LUMBAR REGION, UNSPECIFIED WHETHER NEUROGENIC CLAUDICATION PRESENT: ICD-10-CM

## 2018-10-10 DIAGNOSIS — M54.16 LUMBAR RADICULOPATHY: ICD-10-CM

## 2018-10-10 DIAGNOSIS — E78.00 PURE HYPERCHOLESTEROLEMIA: ICD-10-CM

## 2018-10-10 DIAGNOSIS — R29.6 FALLS FREQUENTLY: ICD-10-CM

## 2018-10-10 DIAGNOSIS — R26.9 GAIT ABNORMALITY: ICD-10-CM

## 2018-10-10 DIAGNOSIS — D33.3 VESTIBULAR SCHWANNOMA: ICD-10-CM

## 2018-10-10 PROBLEM — F02.80 ALZHEIMER'S DEMENTIA: Status: RESOLVED | Noted: 2018-04-12 | Resolved: 2018-10-10

## 2018-10-10 PROBLEM — G30.9 ALZHEIMER'S DEMENTIA: Status: RESOLVED | Noted: 2018-04-12 | Resolved: 2018-10-10

## 2018-10-10 PROCEDURE — 99999 PR PBB SHADOW E&M-EST. PATIENT-LVL V: CPT | Mod: PBBFAC,,, | Performed by: NURSE PRACTITIONER

## 2018-10-10 PROCEDURE — 3045F PR MOST RECENT HEMOGLOBIN A1C LEVEL 7.0-9.0%: CPT | Mod: CPTII,,, | Performed by: NURSE PRACTITIONER

## 2018-10-10 PROCEDURE — 1101F PT FALLS ASSESS-DOCD LE1/YR: CPT | Mod: CPTII,,, | Performed by: NURSE PRACTITIONER

## 2018-10-10 PROCEDURE — 99214 OFFICE O/P EST MOD 30 MIN: CPT | Mod: S$PBB,,, | Performed by: NURSE PRACTITIONER

## 2018-10-10 PROCEDURE — 99215 OFFICE O/P EST HI 40 MIN: CPT | Mod: PBBFAC | Performed by: NURSE PRACTITIONER

## 2018-10-10 RX ORDER — DULOXETIN HYDROCHLORIDE 30 MG/1
30 CAPSULE, DELAYED RELEASE ORAL DAILY
Qty: 30 CAPSULE | Refills: 11 | Status: SHIPPED | OUTPATIENT
Start: 2018-10-10 | End: 2019-07-23 | Stop reason: SDUPTHER

## 2018-10-26 DIAGNOSIS — Z12.11 COLON CANCER SCREENING: ICD-10-CM

## 2018-11-06 DIAGNOSIS — N39.41 URGE INCONTINENCE: ICD-10-CM

## 2018-11-06 RX ORDER — OXYBUTYNIN CHLORIDE 5 MG/1
TABLET, EXTENDED RELEASE ORAL
Qty: 60 TABLET | Refills: 0 | Status: SHIPPED | OUTPATIENT
Start: 2018-11-06 | End: 2019-08-07

## 2018-11-07 DIAGNOSIS — R41.3 MEMORY LOSS: ICD-10-CM

## 2018-11-07 RX ORDER — DONEPEZIL HYDROCHLORIDE 10 MG/1
TABLET, FILM COATED ORAL
Qty: 30 TABLET | Refills: 5 | Status: SHIPPED | OUTPATIENT
Start: 2018-11-07 | End: 2019-06-09 | Stop reason: SDUPTHER

## 2018-11-11 DIAGNOSIS — Z76.0 ENCOUNTER FOR MEDICATION REFILL: ICD-10-CM

## 2018-11-12 RX ORDER — ATORVASTATIN CALCIUM 20 MG/1
TABLET, FILM COATED ORAL
Qty: 30 TABLET | Refills: 2 | Status: SHIPPED | OUTPATIENT
Start: 2018-11-12 | End: 2019-04-01

## 2018-11-25 DIAGNOSIS — Z76.0 ENCOUNTER FOR MEDICATION REFILL: ICD-10-CM

## 2018-11-25 RX ORDER — GABAPENTIN 300 MG/1
CAPSULE ORAL
Qty: 180 CAPSULE | Refills: 2 | Status: SHIPPED | OUTPATIENT
Start: 2018-11-25 | End: 2019-05-03 | Stop reason: SDUPTHER

## 2018-11-26 RX ORDER — RAMIPRIL 10 MG/1
CAPSULE ORAL
Qty: 90 CAPSULE | Refills: 0 | Status: SHIPPED | OUTPATIENT
Start: 2018-11-26 | End: 2019-04-01

## 2018-12-05 ENCOUNTER — OFFICE VISIT (OUTPATIENT)
Dept: NEUROLOGY | Facility: CLINIC | Age: 73
End: 2018-12-05
Payer: MEDICARE

## 2018-12-05 VITALS
HEIGHT: 63 IN | RESPIRATION RATE: 16 BRPM | BODY MASS INDEX: 34.73 KG/M2 | WEIGHT: 196 LBS | DIASTOLIC BLOOD PRESSURE: 64 MMHG | SYSTOLIC BLOOD PRESSURE: 112 MMHG | HEART RATE: 92 BPM

## 2018-12-05 DIAGNOSIS — R41.3 MEMORY LOSS: Primary | ICD-10-CM

## 2018-12-05 DIAGNOSIS — Z85.3 HISTORY OF BREAST CANCER: ICD-10-CM

## 2018-12-05 DIAGNOSIS — M48.061 SPINAL STENOSIS OF LUMBAR REGION, UNSPECIFIED WHETHER NEUROGENIC CLAUDICATION PRESENT: ICD-10-CM

## 2018-12-05 DIAGNOSIS — K21.9 GASTROESOPHAGEAL REFLUX DISEASE WITHOUT ESOPHAGITIS: ICD-10-CM

## 2018-12-05 DIAGNOSIS — F32.A ANXIETY AND DEPRESSION: ICD-10-CM

## 2018-12-05 DIAGNOSIS — I10 ESSENTIAL HYPERTENSION: ICD-10-CM

## 2018-12-05 DIAGNOSIS — E03.4 HYPOTHYROIDISM DUE TO ACQUIRED ATROPHY OF THYROID: ICD-10-CM

## 2018-12-05 DIAGNOSIS — D33.3 VESTIBULAR SCHWANNOMA: ICD-10-CM

## 2018-12-05 DIAGNOSIS — E78.00 PURE HYPERCHOLESTEROLEMIA: ICD-10-CM

## 2018-12-05 DIAGNOSIS — F41.9 ANXIETY AND DEPRESSION: ICD-10-CM

## 2018-12-05 PROCEDURE — 99214 OFFICE O/P EST MOD 30 MIN: CPT | Mod: HCWC,S$GLB,, | Performed by: NURSE PRACTITIONER

## 2018-12-05 PROCEDURE — 3045F PR MOST RECENT HEMOGLOBIN A1C LEVEL 7.0-9.0%: CPT | Mod: CPTII,HCWC,S$GLB, | Performed by: NURSE PRACTITIONER

## 2018-12-05 PROCEDURE — 99999 PR PBB SHADOW E&M-EST. PATIENT-LVL V: CPT | Mod: PBBFAC,HCWC,, | Performed by: NURSE PRACTITIONER

## 2018-12-05 PROCEDURE — 1101F PT FALLS ASSESS-DOCD LE1/YR: CPT | Mod: CPTII,HCWC,S$GLB, | Performed by: NURSE PRACTITIONER

## 2018-12-05 NOTE — PROGRESS NOTES
HPI: Narda Person is a 73 y.o. female with a history of a right vestibular schwannoma, as well as diastolic dysfunction, hypothyroidism, and insulin dependent DM. Schwannoma followed by Dr. Desean Buenrostor. She has HLD, HTN, DM II, hypothyroidism, GERD, and a history of breast cancer. MRI L-spine 2018 shows severe lumbar stenosis and MRI C-spine 2008 shows moderate cervical stenosis.     She presents today for a follow up visit. Cymbalta was started at her last visit, which was very effective for her anxiety and her depression. She continues with memory loss; however. No signs of executive dysfunction. She does not drive. She does not cook on a stove anymore, as she burned food prior.     Melatonin is helping insomnia. No hallucinations with this.     Her DM is unmanaged currently. Dr. Lyn recently made some adjustments to her medications.    Lumbar pain is tolerable currently. She takes Advil prn, which is helpful. She saw neurosurgery for her severe lumbar stenosis, who recommended conservative therapy. She did not follow up with them afterwards, and declines further follow up. She does have occasional minor falls, due to gait imbalance. She declines PT for balance therapy at this time, as she is planning to move to [place] momentarily, due to the loss of her home. She is no longer taking muscle relaxants.     Review of Systems   Constitutional: Negative for malaise/fatigue.   HENT: Positive for tinnitus.    Eyes: Positive for blurred vision.   Cardiovascular: Negative for chest pain.   Musculoskeletal: Positive for back pain and falls. Negative for myalgias.   Neurological: Positive for dizziness. Negative for speech change, seizures, weakness and headaches.   Endo/Heme/Allergies: Does not bruise/bleed easily.   Psychiatric/Behavioral: Positive for memory loss. Negative for depression, hallucinations and suicidal ideas. The patient is not nervous/anxious and does not have insomnia.        I have reviewed all of  this patient's past medical and surgical histories as well as family and social histories and active allergies and medications as documented in the electronic medical record.    Exam:  Gen Appearance, well developed/nourished in no apparent distress  CV: 2+ distal pulses with no edema or swelling  Neuro:  MS: Awake, alert, oriented to place, person, time, situation. Sustains attention. Recent memory intact today/remote memory intact, Language is full to spontaneous speech/repetition/naming/comprehension. Fund of Knowledge is full  CN: Optic discs are flat with normal vasculature, PERRL, Extraoccular movements and visual fields are full. Normal facial sensation and strength, Hearing symmetric, Tongue and Palate are midline and strong. Shoulder Shrug symmetric and strong.  Motor: Normal bulk, tone, no abnormal movements. 5/5 strength bilateral upper/lower extremities with 2+ reflexes BUE, and 1+ reflexes BLE, and bilateral plantar response  Sensory: symmetric to light touch, pain, temp, and proprioception. Reduced vibration at left ankle; Romberg negative  Cerebellar: Finger-nose,Heal-shin, Rapid alternating movements intact  Gait: ambulates with a cane for stability    Imaging:  MRI Brain with and without contrast 4/2018:  Interval increased size of the enhancing lesion in the right internal auditory canal suggestive for a vestibular schwannoma.  It currently measures 8.4 x 4.7 mm, previously 4.7 x 4.0 mm.  No new enhancing lesion is seen.    Age-appropriate generalized cerebral volume loss with mild/moderate chronic microvascular ischemic change.    MRI Brain with and without contrast 6/16/2016  4.5 mm focus of enhancement is again identified within the deep aspect of the right internal auditory canal to suggest a stable vestibular schwannoma.  Age-appropriate generalized volume loss with findings suggestive of mild chronic microvascular ischemic change.     MRI Brain with and without contrast 11/14/2016  4.5 mm  focus of enhancement in the deep aspect of the right internal artery canal suggestive for a stable vestibular schwannoma.  No new enhancing lesion is seen.  Age-appropriate generalized cerebral volume loss with mild chronic microvascular ischemic change.    5/2018 MRI C-spine:  FINDINGS:  The craniocervical junction is intact.  There is no evidence for a Chiari malformation.  The spinal cord is normal in signal without cord edema or myelomalacia.    The incidentally visualized soft tissue structures of the neck are within normal limits.    There is straightening of the normal cervical lordosis.  Vertebral body heights are maintained.  There is disc desiccation with disc space narrowing throughout the cervical spine.  The marrow signal is normal without evidence for a marrow replacement process, infection or tumor.    At C2-3, there is no disc herniation, central canal stenosis or neural foraminal narrowing.    At C3-4, there is a posterior disc osteophyte complex with bilateral uncovertebral spurring and facet arthropathy contributing to mild central canal stenosis without neural foraminal narrowing.    At C4-5, there is a posterior disc osteophyte complex with bilateral uncovertebral spurring and facet arthropathy contributing to moderate central canal stenosis with moderate to severe bilateral neural foraminal narrowing.    At C5-6, there is a posterior disc osteophyte complex with bilateral uncovertebral spurring and facet arthropathy contributing to moderate central canal stenosis with severe bilateral neural foraminal narrowing.    At C6-7, there is a posterior disc osteophyte complex with bilateral uncovertebral spurring and facet arthropathy contributing to mild central canal stenosis with moderate severe left-sided neural foraminal narrowing.    At C7-T1, there is bilateral uncovertebral spurring without central canal stenosis or neural foraminal narrowing.      Impression       Multilevel degenerative  changes of the cervical spine contributing to central canal stenosis and neural foraminal narrowing as detailed in the above level by level description.     MRI L-spine 4/2018:  The incidentally visualized soft tissue structures of the abdomen are within normal limits.    Vertebral body heights are maintained.  There is mild anterolisthesis of L4 in respect to L5 by approximately 4 mm.  There is disc desiccation with significant disc space narrowing at L4-5 with adjacent endplate degenerative change.  The marrow signal is otherwise normal without evidence for a marrow replacement process, infection or tumor.  The conus terminates at T12-L1.    At T12-L1, there is no disc herniation, central canal stenosis or neural foraminal narrowing.    At L1-2, there is no disc herniation, central canal stenosis or neural foraminal narrowing.    At L2-3, there is a broad-based posterior disc bulge with ligamentum flavum hypertrophy and facet arthropathy contributing to mild central canal stenosis without neural foraminal narrowing.    At L3-4, there is a broad-based posterior disc bulge with ligamentum flavum hypertrophy and facet arthropathy contributing to moderate central canal stenosis with mild bilateral neural foraminal narrowing.    At L4-5, there is a broad-based posterior disc bulge, ligamentum flavum hypertrophy and facet arthropathy contributing to severe central canal stenosis with severe bilateral neural foraminal narrowing.  There appears to be disc material protruding in the bilateral neural foramina.    At L5-S1, there is a broad-based posterior disc bulge and facet arthropathy contributing to mild to moderate bilateral neural foraminal narrowing.    Labs:   KARSTEN, homocysteine, B12/folate, B1 reviewed; unremarkable  Her BMP, CBC, Lipid Panel, TSH, and LFT's per Dr. Leone requested but not received  CBC, CMP, TSH, B12 4/2018 overall WNL other than her uncontrolled DM.     Assessment/Plan: Narda Person is a 73 y.o.  female with a history of a right vestibular schwannoma, as well as diastolic dysfunction, hypothyroidism, and insulin dependent DM. Schwannoma followed by Dr. Desean Buenrostro. She has HLD, HTN, DM II, hypothyroidism, GERD, and a history of breast cancer. MRI L-spine 2018 shows severe lumbar stenosis and MRI C-spine 2008 shows moderate cervical stenosis. She has short term memory loss, pending evaluation.     I recommend:   1. Continue Cymbalta for depression and anxiety. Hallucinations occurred with Elavil. Instructed to take Cymbalta in the morning.   2. Continue with referral for neuropsych testing to evaluate memory loss. Concern over AD versus anxiety/depression, as the cause of this. Memory loss worsens with conflict, but there is some ongoing short term memory complaints daily. Continue Aricept for memory at this time. Unsure of response thus far.  3. Insomnia helped with Melatonin. Trazodone ineffective at 50 mg and caused excess sedation at 100 mg.  4. Growth of vestibular schwannoma followed by Dr. Desean Buenrostro, who plans to repeat imaging in 2020.  5. Gait imbalance secondary to multiple factors, including severe lumbar stenosis, moderate cervical stenosis, neuropathy, and her vestibular schwannoma.    6. Saw neurosurgery for spinal stenosis, who recommended conservative treatment. She declines follow up with them.   7. Dysphagia helped with changes suggested per Speech Therapy after swallowing study.     Follow up in 6 months.

## 2019-03-07 ENCOUNTER — LAB VISIT (OUTPATIENT)
Dept: LAB | Facility: HOSPITAL | Age: 74
End: 2019-03-07
Attending: INTERNAL MEDICINE
Payer: MEDICARE

## 2019-03-07 DIAGNOSIS — E78.00 PURE HYPERCHOLESTEROLEMIA: ICD-10-CM

## 2019-03-07 DIAGNOSIS — I10 ESSENTIAL HYPERTENSION: ICD-10-CM

## 2019-03-07 LAB
ALBUMIN SERPL BCP-MCNC: 3.7 G/DL
ALP SERPL-CCNC: 65 U/L
ALT SERPL W/O P-5'-P-CCNC: 53 U/L
ANION GAP SERPL CALC-SCNC: 9 MMOL/L
AST SERPL-CCNC: 38 U/L
BASOPHILS # BLD AUTO: 0.12 K/UL
BASOPHILS NFR BLD: 2 %
BILIRUB SERPL-MCNC: 0.4 MG/DL
BUN SERPL-MCNC: 12 MG/DL
CALCIUM SERPL-MCNC: 9.7 MG/DL
CHLORIDE SERPL-SCNC: 105 MMOL/L
CHOLEST SERPL-MCNC: 231 MG/DL
CHOLEST/HDLC SERPL: 4.4 {RATIO}
CO2 SERPL-SCNC: 26 MMOL/L
CREAT SERPL-MCNC: 1 MG/DL
DIFFERENTIAL METHOD: ABNORMAL
EOSINOPHIL # BLD AUTO: 0.2 K/UL
EOSINOPHIL NFR BLD: 2.8 %
ERYTHROCYTE [DISTWIDTH] IN BLOOD BY AUTOMATED COUNT: 13.4 %
EST. GFR  (AFRICAN AMERICAN): >60 ML/MIN/1.73 M^2
EST. GFR  (NON AFRICAN AMERICAN): 55 ML/MIN/1.73 M^2
ESTIMATED AVG GLUCOSE: 212 MG/DL
GLUCOSE SERPL-MCNC: 91 MG/DL
HBA1C MFR BLD HPLC: 9 %
HCT VFR BLD AUTO: 40.6 %
HDLC SERPL-MCNC: 53 MG/DL
HDLC SERPL: 22.9 %
HGB BLD-MCNC: 12.9 G/DL
LDLC SERPL CALC-MCNC: 148.6 MG/DL
LYMPHOCYTES # BLD AUTO: 2.2 K/UL
LYMPHOCYTES NFR BLD: 36.1 %
MCH RBC QN AUTO: 27.5 PG
MCHC RBC AUTO-ENTMCNC: 31.8 G/DL
MCV RBC AUTO: 87 FL
MONOCYTES # BLD AUTO: 0.6 K/UL
MONOCYTES NFR BLD: 9.3 %
NEUTROPHILS # BLD AUTO: 3.1 K/UL
NEUTROPHILS NFR BLD: 49.8 %
NONHDLC SERPL-MCNC: 178 MG/DL
PLATELET # BLD AUTO: 320 K/UL
PMV BLD AUTO: 10.3 FL
POTASSIUM SERPL-SCNC: 4.4 MMOL/L
PROT SERPL-MCNC: 7.3 G/DL
RBC # BLD AUTO: 4.69 M/UL
SODIUM SERPL-SCNC: 140 MMOL/L
TRIGL SERPL-MCNC: 147 MG/DL
TSH SERPL DL<=0.005 MIU/L-ACNC: 3.59 UIU/ML
WBC # BLD AUTO: 6.15 K/UL

## 2019-03-07 PROCEDURE — 84443 ASSAY THYROID STIM HORMONE: CPT | Mod: HCNC

## 2019-03-07 PROCEDURE — 36415 COLL VENOUS BLD VENIPUNCTURE: CPT | Mod: HCNC

## 2019-03-07 PROCEDURE — 80053 COMPREHEN METABOLIC PANEL: CPT | Mod: HCNC

## 2019-03-07 PROCEDURE — 82043 UR ALBUMIN QUANTITATIVE: CPT | Mod: HCNC

## 2019-03-07 PROCEDURE — 85025 COMPLETE CBC W/AUTO DIFF WBC: CPT | Mod: HCNC

## 2019-03-07 PROCEDURE — 80061 LIPID PANEL: CPT | Mod: HCNC

## 2019-03-07 PROCEDURE — 83036 HEMOGLOBIN GLYCOSYLATED A1C: CPT | Mod: HCNC

## 2019-03-08 LAB
ALBUMIN/CREAT UR: 9.4 UG/MG
CREAT UR-MCNC: 31.9 MG/DL
MICROALBUMIN UR DL<=1MG/L-MCNC: 3 UG/ML

## 2019-03-31 DIAGNOSIS — Z76.0 ENCOUNTER FOR MEDICATION REFILL: ICD-10-CM

## 2019-04-01 ENCOUNTER — OFFICE VISIT (OUTPATIENT)
Dept: INTERNAL MEDICINE | Facility: CLINIC | Age: 74
End: 2019-04-01
Payer: MEDICARE

## 2019-04-01 VITALS
SYSTOLIC BLOOD PRESSURE: 110 MMHG | WEIGHT: 201.25 LBS | HEIGHT: 63 IN | RESPIRATION RATE: 14 BRPM | DIASTOLIC BLOOD PRESSURE: 52 MMHG | BODY MASS INDEX: 35.66 KG/M2 | HEART RATE: 99 BPM | OXYGEN SATURATION: 98 %

## 2019-04-01 DIAGNOSIS — I10 ESSENTIAL HYPERTENSION: ICD-10-CM

## 2019-04-01 DIAGNOSIS — E78.00 PURE HYPERCHOLESTEROLEMIA: ICD-10-CM

## 2019-04-01 DIAGNOSIS — R26.9 GAIT ABNORMALITY: ICD-10-CM

## 2019-04-01 DIAGNOSIS — E03.4 HYPOTHYROIDISM DUE TO ACQUIRED ATROPHY OF THYROID: ICD-10-CM

## 2019-04-01 DIAGNOSIS — Z76.0 ENCOUNTER FOR MEDICATION REFILL: ICD-10-CM

## 2019-04-01 PROCEDURE — 99499 RISK ADDL DX/OHS AUDIT: ICD-10-PCS | Mod: HCNC,S$GLB,, | Performed by: INTERNAL MEDICINE

## 2019-04-01 PROCEDURE — 1101F PT FALLS ASSESS-DOCD LE1/YR: CPT | Mod: HCNC,CPTII,S$GLB, | Performed by: INTERNAL MEDICINE

## 2019-04-01 PROCEDURE — 3045F PR MOST RECENT HEMOGLOBIN A1C LEVEL 7.0-9.0%: ICD-10-PCS | Mod: HCNC,CPTII,S$GLB, | Performed by: INTERNAL MEDICINE

## 2019-04-01 PROCEDURE — 3045F PR MOST RECENT HEMOGLOBIN A1C LEVEL 7.0-9.0%: CPT | Mod: HCNC,CPTII,S$GLB, | Performed by: INTERNAL MEDICINE

## 2019-04-01 PROCEDURE — 99499 UNLISTED E&M SERVICE: CPT | Mod: S$GLB,,, | Performed by: INTERNAL MEDICINE

## 2019-04-01 PROCEDURE — 99214 PR OFFICE/OUTPT VISIT, EST, LEVL IV, 30-39 MIN: ICD-10-PCS | Mod: HCNC,S$GLB,, | Performed by: INTERNAL MEDICINE

## 2019-04-01 PROCEDURE — 99999 PR PBB SHADOW E&M-EST. PATIENT-LVL III: ICD-10-PCS | Mod: PBBFAC,HCNC,, | Performed by: INTERNAL MEDICINE

## 2019-04-01 PROCEDURE — 99214 OFFICE O/P EST MOD 30 MIN: CPT | Mod: HCNC,S$GLB,, | Performed by: INTERNAL MEDICINE

## 2019-04-01 PROCEDURE — 99999 PR PBB SHADOW E&M-EST. PATIENT-LVL III: CPT | Mod: PBBFAC,HCNC,, | Performed by: INTERNAL MEDICINE

## 2019-04-01 PROCEDURE — 1101F PR PT FALLS ASSESS DOC 0-1 FALLS W/OUT INJ PAST YR: ICD-10-PCS | Mod: HCNC,CPTII,S$GLB, | Performed by: INTERNAL MEDICINE

## 2019-04-01 RX ORDER — ATORVASTATIN CALCIUM 20 MG/1
TABLET, FILM COATED ORAL
Qty: 30 TABLET | Refills: 2 | Status: SHIPPED | OUTPATIENT
Start: 2019-04-01 | End: 2019-08-07 | Stop reason: SDUPTHER

## 2019-04-01 RX ORDER — RAMIPRIL 5 MG/1
5 CAPSULE ORAL DAILY
Qty: 90 CAPSULE | Refills: 3 | Status: SHIPPED | OUTPATIENT
Start: 2019-04-01 | End: 2020-05-12 | Stop reason: SDUPTHER

## 2019-04-01 NOTE — PATIENT INSTRUCTIONS
Change Levemir 35 units in AM and 40 in the evening.  If AM blood sugar over 180mg/dl next week increase PM dose to 45 units every night.

## 2019-04-01 NOTE — PROGRESS NOTES
Patient, Narda Person (MRN #[id number]), presented with a recorded BMI of 36.23 kg/m^2 and a documented comorbidity(s):  - Diabetes Mellitus Type 2  - Hypertension  - Hyperlipidemia  to which the severe obesity is a contributing factor. This is consistent with the definition of severe obesity (BMI 35.0-39.9) with comorbidity (ICD-10 E66.01, Z68.35). The patient's severe obesity was monitored, evaluated, addressed and/or treated. This addendum to the medical record is made on 04/01/2019.

## 2019-04-01 NOTE — PROGRESS NOTES
Subjective:       Patient ID: Narda Person is a 73 y.o. female.    Chief Complaint: Hypertension (follow up with lab review); Thyroid Problem; Diabetes; Anemia; and Fall    Narda Person is a 73 y.o. Female.   Here for 6 m follow up .  H/o equilibrium issues ;vestibular schwannoma :  C/o falling more frequently at home (1-2 x/week).  Reports falling when walking too fast.  Dizziness prior to fall.  /52 today.      A1c elevated since last visit.  Taking Levemir 70 units every am and Novolog 24 units before each meal.      Fall   The accident occurred 12 to 24 hours ago. The fall occurred while walking. She fell from a height of 1 to 2 ft. She landed on concrete. There was no blood loss. The point of impact was the right hip and left elbow. The pain is present in the right hip. The pain is mild. Pertinent negatives include no bowel incontinence, fever, headaches, hematuria, loss of consciousness, nausea, numbness, visual change or vomiting. She has tried acetaminophen for the symptoms. The treatment provided mild relief.   Diabetes   She presents for her follow-up diabetic visit. She has type 2 diabetes mellitus. Her disease course has been worsening. Pertinent negatives for hypoglycemia include no confusion, dizziness, headaches, nervousness/anxiousness, pallor or tremors. Associated symptoms include weakness. Pertinent negatives for diabetes include no chest pain, no fatigue and no visual change. Her breakfast blood glucose range is generally >200 mg/dl. Her dinner blood glucose range is generally >200 mg/dl. An ACE inhibitor/angiotensin II receptor blocker is being taken.     Review of Systems   Constitutional: Negative for chills, fatigue and fever.   HENT: Negative for congestion, hearing loss, sinus pressure and sore throat.    Eyes: Negative for photophobia.   Respiratory: Negative for cough, choking, chest tightness and wheezing.    Cardiovascular: Negative for chest pain and palpitations.    Gastrointestinal: Negative for blood in stool, bowel incontinence, nausea and vomiting.   Genitourinary: Negative for dysuria and hematuria.   Musculoskeletal: Positive for back pain, gait problem and neck stiffness. Negative for arthralgias and myalgias.   Skin: Positive for color change. Negative for pallor.   Neurological: Positive for weakness and light-headedness. Negative for dizziness, tremors, loss of consciousness, numbness and headaches.   Hematological: Does not bruise/bleed easily.   Psychiatric/Behavioral: Positive for sleep disturbance. Negative for confusion and suicidal ideas. The patient is not nervous/anxious.         Difficulty falling asleep.       Objective:      Physical Exam   Constitutional: She is oriented to person, place, and time. She appears well-developed and well-nourished.   HENT:   Head: Normocephalic and atraumatic.   Right Ear: External ear normal.   Left Ear: External ear normal.   Mouth/Throat: Oropharynx is clear and moist.   Eyes: Pupils are equal, round, and reactive to light. Conjunctivae and EOM are normal.   Neck: Normal range of motion. Neck supple. No JVD present. No tracheal deviation present. No thyromegaly present.   Cardiovascular: Normal rate, regular rhythm, normal heart sounds and intact distal pulses.   Pulmonary/Chest: Effort normal and breath sounds normal. No respiratory distress. She has no wheezes. She has no rales. She exhibits no tenderness.   Abdominal: Soft. Bowel sounds are normal. She exhibits no distension and no mass. There is no tenderness. There is no rebound and no guarding.   Musculoskeletal: Normal range of motion. She exhibits tenderness. She exhibits no edema.   Feet:   Right Foot:   Protective Sensation: 6 sites tested. 6 sites sensed.   Skin Integrity: Negative for ulcer, erythema or callus.   Left Foot:   Protective Sensation: 4 sites tested. 6 sites sensed.   Skin Integrity: Negative for ulcer, erythema or callus.   Lymphadenopathy:      She has no cervical adenopathy.   Neurological: She is alert and oriented to person, place, and time. She has normal reflexes. She displays normal reflexes. No cranial nerve deficit. She exhibits normal muscle tone. Coordination normal.   CN: Optic discs are flat with normal vasculature, PERRL, Extraoccular movements and visual fields are full. Normal facial sensation and strength, Hearing symmetric, Tongue and Palate are midline and strong. Shoulder Shrug symmetric and strong.   Skin: Skin is warm and dry.   Bruise to right flank   Psychiatric: She has a normal mood and affect.   Short term memory loss   Nursing note and vitals reviewed.      Assessment:       1. Uncontrolled type 2 diabetes mellitus with diabetic polyneuropathy, with long-term current use of insulin    2. Essential hypertension    3. Pure hypercholesterolemia    4. Hypothyroidism due to acquired atrophy of thyroid    5. Gait abnormality        Plan:   Nrada was seen today for hypertension, thyroid problem, diabetes, anemia and fall.    Diagnoses and all orders for this visit:    Uncontrolled type 2 diabetes mellitus with diabetic polyneuropathy, with long-term current use of insulin  STOP EATING NIGHTLY ICE CREAM   Adjust insulin to bid   She has seen Dr Gautam in past   -     insulin detemir U-100 (LEVEMIR FLEXTOUCH U-100 INSULN) 100 unit/mL (3 mL) SubQ InPn pen; Inject 40 Units into the skin 2 (two) times daily.  -     Hemoglobin A1c; Future  Patient has uncontrolled Diabetes .  We discussed about diet ;low in calories. Avoid sweats, sodas.  Also increasing activity;walking 2-3 miles a day.  I also adjusted medications and gave patient  instructions about adherence to plan.  Goal of  A1c  less than 7 % stressed.  Also goal of LDL less than 70 highlighted to patient.    Essential hypertension  On low side  Will decrease altace from 10 to 5 mg   -     ramipril (ALTACE) 5 MG capsule; Take 1 capsule (5 mg total) by mouth once daily.    Well  controlled.  Continue same medication and dose.  1. Keep weight close to ideal body weight.   2.   Avoid high salt foods (olives, pickles, smoked meats, salted potato chips, etc.).   Do not add salt to your food at the table.   Use only small amounts of salt when cooking.   3. Begin an exercise program. Discuss with your doctor what type of exercise program would be best for you. It doesn't have to be difficult. Even brisk walking for 20 minutes three times a week is a good form of exercise.   4. Avoid medicines which contain heart stimulants. This includes many cold and sinus decongestant pills and sprays as well as diet pills. Check the warnings about hypertension on the label. Stimulants such as amphetamine or cocaine could be lethal for someone with hypertension. Never take these.    Pure hypercholesterolemia  -     Lipid panel; Future  Lab Results   Component Value Date    LDLCALC 148.6 03/07/2019       Hypothyroidism due to acquired atrophy of thyroid  Lab Results   Component Value Date    TSH 3.588 03/07/2019       Gait abnormality  Seen neurosurgery   I offered her PT ;she decline d        Encounter for medication refill  meds  Refilled    Other orders  -     Cancel: CBC auto differential; Future  -     Cancel: Comprehensive metabolic panel; Future      Problem List Items Addressed This Visit     Uncontrolled type 2 diabetes mellitus with diabetic polyneuropathy, with long-term current use of insulin - Primary    Essential hypertension    Pure hypercholesterolemia    Hypothyroidism due to acquired atrophy of thyroid    Gait abnormality

## 2019-04-30 ENCOUNTER — TELEPHONE (OUTPATIENT)
Dept: INTERNAL MEDICINE | Facility: CLINIC | Age: 74
End: 2019-04-30

## 2019-04-30 NOTE — TELEPHONE ENCOUNTER
Per Health Maintenance patient is due for colon cancer screening in the form of a fitkit. I notified her daughter and she states that the patient already received a kit in the mail. She will instruct the patient to complete it.

## 2019-05-03 DIAGNOSIS — Z76.0 ENCOUNTER FOR MEDICATION REFILL: ICD-10-CM

## 2019-05-03 RX ORDER — GABAPENTIN 300 MG/1
CAPSULE ORAL
Qty: 180 CAPSULE | Refills: 2 | Status: SHIPPED | OUTPATIENT
Start: 2019-05-03 | End: 2019-08-07 | Stop reason: SDUPTHER

## 2019-05-09 ENCOUNTER — TELEPHONE (OUTPATIENT)
Dept: INTERNAL MEDICINE | Facility: CLINIC | Age: 74
End: 2019-05-09

## 2019-05-09 NOTE — TELEPHONE ENCOUNTER
spoke with pts daughter. She was given all oral meds twice today. Instructed to bring to ER immediately. pts daughter will bring.

## 2019-05-29 ENCOUNTER — TELEPHONE (OUTPATIENT)
Dept: INTERNAL MEDICINE | Facility: CLINIC | Age: 74
End: 2019-05-29

## 2019-05-29 NOTE — TELEPHONE ENCOUNTER
"----- Message from Barbi Mirza sent at 2019  2:14 PM CDT -----  Contact: Barbi (daughter)  Narda Person  MRN: [id number]  : 1945  PCP: Sirena Coleman  Home Phone      [phone]  Work Phone      Not on file.  Mobile          [phone]    MESSAGE:   Pt is out town with daughter. She requested medication refill - ramipril (ALTACE) 5 MG capsule The patient is out of medication    She also requested a "stool test" mailed to the patient to complete.    [address], [place] [address]  Phone # [phone]    [address]  Phone # [phone]     "

## 2019-05-29 NOTE — TELEPHONE ENCOUNTER
Altace was filled to [facility] on 4/1/19 (#90 with 3 refills). I advised her to contact [facility] and request that they transfer her Rx to the [facility] in [place].   FitKit was mailed to patient on 5/29/2019 4:00 PM

## 2019-06-03 DIAGNOSIS — R41.3 MEMORY LOSS: ICD-10-CM

## 2019-06-06 ENCOUNTER — PES CALL (OUTPATIENT)
Dept: ADMINISTRATIVE | Facility: CLINIC | Age: 74
End: 2019-06-06

## 2019-06-09 RX ORDER — DONEPEZIL HYDROCHLORIDE 10 MG/1
TABLET, FILM COATED ORAL
Qty: 30 TABLET | Refills: 5 | Status: SHIPPED | OUTPATIENT
Start: 2019-06-09 | End: 2019-08-07 | Stop reason: SDUPTHER

## 2019-06-20 ENCOUNTER — PES CALL (OUTPATIENT)
Dept: ADMINISTRATIVE | Facility: CLINIC | Age: 74
End: 2019-06-20

## 2019-06-28 DIAGNOSIS — E11.9 TYPE 2 DIABETES MELLITUS WITHOUT COMPLICATION, UNSPECIFIED WHETHER LONG TERM INSULIN USE: ICD-10-CM

## 2019-07-19 ENCOUNTER — LAB VISIT (OUTPATIENT)
Dept: LAB | Facility: HOSPITAL | Age: 74
End: 2019-07-19
Attending: INTERNAL MEDICINE
Payer: MEDICARE

## 2019-07-19 DIAGNOSIS — E78.00 PURE HYPERCHOLESTEROLEMIA: ICD-10-CM

## 2019-07-19 LAB
CHOLEST SERPL-MCNC: 145 MG/DL (ref 120–199)
CHOLEST/HDLC SERPL: 2.9 {RATIO} (ref 2–5)
ESTIMATED AVG GLUCOSE: 229 MG/DL (ref 68–131)
HBA1C MFR BLD HPLC: 9.6 % (ref 4–5.6)
HDLC SERPL-MCNC: 50 MG/DL (ref 40–75)
HDLC SERPL: 34.5 % (ref 20–50)
LDLC SERPL CALC-MCNC: 74.2 MG/DL (ref 63–159)
NONHDLC SERPL-MCNC: 95 MG/DL
TRIGL SERPL-MCNC: 104 MG/DL (ref 30–150)

## 2019-07-19 PROCEDURE — 36415 COLL VENOUS BLD VENIPUNCTURE: CPT | Mod: HCNC

## 2019-07-19 PROCEDURE — 83036 HEMOGLOBIN GLYCOSYLATED A1C: CPT | Mod: HCNC

## 2019-07-19 PROCEDURE — 80061 LIPID PANEL: CPT | Mod: HCNC

## 2019-07-23 ENCOUNTER — OFFICE VISIT (OUTPATIENT)
Dept: NEUROLOGY | Facility: CLINIC | Age: 74
End: 2019-07-23
Payer: MEDICARE

## 2019-07-23 VITALS
HEART RATE: 98 BPM | HEIGHT: 63 IN | BODY MASS INDEX: 35.34 KG/M2 | SYSTOLIC BLOOD PRESSURE: 100 MMHG | RESPIRATION RATE: 16 BRPM | DIASTOLIC BLOOD PRESSURE: 60 MMHG | WEIGHT: 199.44 LBS

## 2019-07-23 DIAGNOSIS — F41.9 ANXIETY AND DEPRESSION: ICD-10-CM

## 2019-07-23 DIAGNOSIS — Z85.3 HISTORY OF BREAST CANCER: ICD-10-CM

## 2019-07-23 DIAGNOSIS — E03.4 HYPOTHYROIDISM DUE TO ACQUIRED ATROPHY OF THYROID: ICD-10-CM

## 2019-07-23 DIAGNOSIS — E11.42 DIABETIC POLYNEUROPATHY ASSOCIATED WITH TYPE 2 DIABETES MELLITUS: ICD-10-CM

## 2019-07-23 DIAGNOSIS — F32.A ANXIETY AND DEPRESSION: ICD-10-CM

## 2019-07-23 DIAGNOSIS — D33.3 VESTIBULAR SCHWANNOMA: ICD-10-CM

## 2019-07-23 DIAGNOSIS — M51.36 DDD (DEGENERATIVE DISC DISEASE), LUMBAR: ICD-10-CM

## 2019-07-23 DIAGNOSIS — I10 ESSENTIAL HYPERTENSION: ICD-10-CM

## 2019-07-23 DIAGNOSIS — M47.22 OSTEOARTHRITIS OF SPINE WITH RADICULOPATHY, CERVICAL REGION: Primary | ICD-10-CM

## 2019-07-23 DIAGNOSIS — M48.061 SPINAL STENOSIS OF LUMBAR REGION, UNSPECIFIED WHETHER NEUROGENIC CLAUDICATION PRESENT: ICD-10-CM

## 2019-07-23 DIAGNOSIS — M54.16 LUMBAR RADICULOPATHY: ICD-10-CM

## 2019-07-23 DIAGNOSIS — R26.9 GAIT ABNORMALITY: ICD-10-CM

## 2019-07-23 DIAGNOSIS — Z90.12 H/O LEFT MASTECTOMY: ICD-10-CM

## 2019-07-23 DIAGNOSIS — E66.9 CLASS 1 OBESITY WITH SERIOUS COMORBIDITY AND BODY MASS INDEX (BMI) OF 34.0 TO 34.9 IN ADULT, UNSPECIFIED OBESITY TYPE: ICD-10-CM

## 2019-07-23 DIAGNOSIS — E78.00 PURE HYPERCHOLESTEROLEMIA: ICD-10-CM

## 2019-07-23 DIAGNOSIS — R41.3 MEMORY LOSS: ICD-10-CM

## 2019-07-23 PROCEDURE — 99999 PR PBB SHADOW E&M-EST. PATIENT-LVL V: ICD-10-PCS | Mod: PBBFAC,HCNC,, | Performed by: NURSE PRACTITIONER

## 2019-07-23 PROCEDURE — 1101F PT FALLS ASSESS-DOCD LE1/YR: CPT | Mod: HCNC,CPTII,S$GLB, | Performed by: NURSE PRACTITIONER

## 2019-07-23 PROCEDURE — 3046F HEMOGLOBIN A1C LEVEL >9.0%: CPT | Mod: HCNC,CPTII,S$GLB, | Performed by: NURSE PRACTITIONER

## 2019-07-23 PROCEDURE — 3046F PR MOST RECENT HEMOGLOBIN A1C LEVEL > 9.0%: ICD-10-PCS | Mod: HCNC,CPTII,S$GLB, | Performed by: NURSE PRACTITIONER

## 2019-07-23 PROCEDURE — 1101F PR PT FALLS ASSESS DOC 0-1 FALLS W/OUT INJ PAST YR: ICD-10-PCS | Mod: HCNC,CPTII,S$GLB, | Performed by: NURSE PRACTITIONER

## 2019-07-23 PROCEDURE — 99999 PR PBB SHADOW E&M-EST. PATIENT-LVL V: CPT | Mod: PBBFAC,HCNC,, | Performed by: NURSE PRACTITIONER

## 2019-07-23 PROCEDURE — 99214 PR OFFICE/OUTPT VISIT, EST, LEVL IV, 30-39 MIN: ICD-10-PCS | Mod: HCNC,S$GLB,, | Performed by: NURSE PRACTITIONER

## 2019-07-23 PROCEDURE — 99499 UNLISTED E&M SERVICE: CPT | Mod: HCNC,S$GLB,, | Performed by: NURSE PRACTITIONER

## 2019-07-23 PROCEDURE — 99214 OFFICE O/P EST MOD 30 MIN: CPT | Mod: HCNC,S$GLB,, | Performed by: NURSE PRACTITIONER

## 2019-07-23 PROCEDURE — 99499 RISK ADDL DX/OHS AUDIT: ICD-10-PCS | Mod: HCNC,S$GLB,, | Performed by: NURSE PRACTITIONER

## 2019-07-23 RX ORDER — DULOXETIN HYDROCHLORIDE 30 MG/1
30 CAPSULE, DELAYED RELEASE ORAL DAILY
Qty: 30 CAPSULE | Refills: 5 | Status: SHIPPED | OUTPATIENT
Start: 2019-07-23 | End: 2020-01-07 | Stop reason: SDUPTHER

## 2019-07-23 NOTE — PROGRESS NOTES
HPI: Narda Person is a 73 y.o. female with a history of a right vestibular schwannoma, as well as diastolic dysfunction, hypothyroidism, and insulin dependent DM. Schwannoma followed by Dr. Desean Buenrostro. She has HLD, HTN, DM II, hypothyroidism, GERD, and a history of breast cancer. MRI L-spine 2018 shows severe lumbar stenosis and MRI C-spine 2008 shows moderate cervical stenosis.     She presents today for a follow up visit. Neuropsych testing ordered at her last visit, but this was not completed, due to scheduling issues, as she lives in [place] for most of the year. She continues with short term memory loss, but no signs of executive dysfunction.  She does not cook on a stove anymore, as she burned food prior. She does not drive, due to severe visual impairment.     She takes Cymbalta for anxiety and depression, which is helpful, but she has increased anxiety lately. She is living at her daughter's house in [place] for the most part, and visits [place], and schedules her visits with this clinic when she is present.     She has insomnia, but cannot tolerate prescription medications (Elavil and muscle relaxants) for this, due to nightmares and worsening gait imbalance. She does take Melatonin, which is helpful-no hallucinations when this.     She continues with gait imbalance, and relies on a cane for stability. She has had one falls since her last visit, as she was attempting to carry items outside near stairs.     Lumbar pain occurs with excessive standing.     Her DM remains unmanaged. She continues to see Dr. Lyn.     Review of Systems   Constitutional: Negative for malaise/fatigue.   HENT: Positive for tinnitus.    Eyes: Positive for blurred vision.   Cardiovascular: Negative for chest pain.   Musculoskeletal: Positive for back pain and falls. Negative for myalgias.   Neurological: Positive for dizziness. Negative for speech change, seizures, weakness and headaches.   Endo/Heme/Allergies: Does not  bruise/bleed easily.   Psychiatric/Behavioral: Positive for memory loss. Negative for depression, hallucinations and suicidal ideas. The patient is not nervous/anxious and does not have insomnia.        I have reviewed all of this patient's past medical and surgical histories as well as family and social histories and active allergies and medications as documented in the electronic medical record.    Exam:  Gen Appearance, well developed/nourished in no apparent distress  CV: 2+ distal pulses with no edema or swelling  Neuro:  MS: Awake, alert, oriented to place, person, time, situation. Sustains attention. Recent memory intact today/remote memory intact, Language is full to spontaneous speech/repetition/naming/comprehension. Fund of Knowledge is full  CN: Optic discs are flat with normal vasculature, PERRL, Extraoccular movements and visual fields are full. Normal facial sensation and strength, Hearing symmetric, Tongue and Palate are midline and strong. Shoulder Shrug symmetric and strong.  Motor: Normal bulk, tone, no abnormal movements. 5/5 strength bilateral upper/lower extremities with 2+ reflexes BUE, and 1+ reflexes BLE, and bilateral plantar response  Sensory: symmetric to light touch, pain, temp, and proprioception. Reduced vibration at left ankle; Romberg negative  Cerebellar: Finger-nose,Heal-shin, Rapid alternating movements intact  Gait: ambulates with a cane for stability    Imaging:  MRI Brain with and without contrast 4/2018:  Interval increased size of the enhancing lesion in the right internal auditory canal suggestive for a vestibular schwannoma.  It currently measures 8.4 x 4.7 mm, previously 4.7 x 4.0 mm.  No new enhancing lesion is seen.    Age-appropriate generalized cerebral volume loss with mild/moderate chronic microvascular ischemic change.    MRI Brain with and without contrast 6/16/2016  4.5 mm focus of enhancement is again identified within the deep aspect of the right internal  auditory canal to suggest a stable vestibular schwannoma.  Age-appropriate generalized volume loss with findings suggestive of mild chronic microvascular ischemic change.     MRI Brain with and without contrast 11/14/2016  4.5 mm focus of enhancement in the deep aspect of the right internal artery canal suggestive for a stable vestibular schwannoma.  No new enhancing lesion is seen.  Age-appropriate generalized cerebral volume loss with mild chronic microvascular ischemic change.    5/2018 MRI C-spine:  FINDINGS:  The craniocervical junction is intact.  There is no evidence for a Chiari malformation.  The spinal cord is normal in signal without cord edema or myelomalacia.    The incidentally visualized soft tissue structures of the neck are within normal limits.    There is straightening of the normal cervical lordosis.  Vertebral body heights are maintained.  There is disc desiccation with disc space narrowing throughout the cervical spine.  The marrow signal is normal without evidence for a marrow replacement process, infection or tumor.    At C2-3, there is no disc herniation, central canal stenosis or neural foraminal narrowing.    At C3-4, there is a posterior disc osteophyte complex with bilateral uncovertebral spurring and facet arthropathy contributing to mild central canal stenosis without neural foraminal narrowing.    At C4-5, there is a posterior disc osteophyte complex with bilateral uncovertebral spurring and facet arthropathy contributing to moderate central canal stenosis with moderate to severe bilateral neural foraminal narrowing.    At C5-6, there is a posterior disc osteophyte complex with bilateral uncovertebral spurring and facet arthropathy contributing to moderate central canal stenosis with severe bilateral neural foraminal narrowing.    At C6-7, there is a posterior disc osteophyte complex with bilateral uncovertebral spurring and facet arthropathy contributing to mild central canal  stenosis with moderate severe left-sided neural foraminal narrowing.    At C7-T1, there is bilateral uncovertebral spurring without central canal stenosis or neural foraminal narrowing.      Impression       Multilevel degenerative changes of the cervical spine contributing to central canal stenosis and neural foraminal narrowing as detailed in the above level by level description.     MRI L-spine 4/2018:  The incidentally visualized soft tissue structures of the abdomen are within normal limits.    Vertebral body heights are maintained.  There is mild anterolisthesis of L4 in respect to L5 by approximately 4 mm.  There is disc desiccation with significant disc space narrowing at L4-5 with adjacent endplate degenerative change.  The marrow signal is otherwise normal without evidence for a marrow replacement process, infection or tumor.  The conus terminates at T12-L1.    At T12-L1, there is no disc herniation, central canal stenosis or neural foraminal narrowing.    At L1-2, there is no disc herniation, central canal stenosis or neural foraminal narrowing.    At L2-3, there is a broad-based posterior disc bulge with ligamentum flavum hypertrophy and facet arthropathy contributing to mild central canal stenosis without neural foraminal narrowing.    At L3-4, there is a broad-based posterior disc bulge with ligamentum flavum hypertrophy and facet arthropathy contributing to moderate central canal stenosis with mild bilateral neural foraminal narrowing.    At L4-5, there is a broad-based posterior disc bulge, ligamentum flavum hypertrophy and facet arthropathy contributing to severe central canal stenosis with severe bilateral neural foraminal narrowing.  There appears to be disc material protruding in the bilateral neural foramina.    At L5-S1, there is a broad-based posterior disc bulge and facet arthropathy contributing to mild to moderate bilateral neural foraminal narrowing.    Labs:   KARSTEN, homocysteine,  B12/folate, B1 reviewed; unremarkable  Her BMP, CBC, Lipid Panel, TSH, and LFT's per Dr. Leone requested but not received  CBC, CMP, TSH, B12 4/2018 overall WNL other than her uncontrolled DM.     Assessment/Plan: Narda Person is a 73 y.o. female with a history of a right vestibular schwannoma, as well as diastolic dysfunction, hypothyroidism, and insulin dependent DM. Schwannoma followed by Dr. Desean Buenrostro. She has HLD, HTN, DM II, hypothyroidism, GERD, and a history of breast cancer. MRI L-spine 2018 shows severe lumbar stenosis and MRI C-spine 2008 shows moderate cervical stenosis. She has short term memory loss, pending evaluation.     I recommend:   1. Continue Cymbalta for depression and anxiety. Hallucinations occurred with Elavil. Instructed to take Cymbalta in the morning.  2. I would like to order Neuropsych testing on her to further evaluate her memory loss; however, she lives in [place] for most of the year, and has not been able to schedule this locally. I will refer her again, and her family will attempt to schedule this if possible.   3. She declines transfer of care to a Neurologist in [place] currently, and will continue to come here.   4. No signs of executive dysfunction; however, there is concern over AD versus anxiety/depression, as the cause of her memory complaints. Memory loss worsens with conflict, but there is some ongoing short term memory complaints daily. Continue Aricept for memory at this time. Unsure of response thus far.  5. Insomnia helped with Melatonin. Trazodone ineffective at 50 mg and caused excess sedation at 100 mg. Hallucinations with Elavil prior.   6. Growth of vestibular schwannoma followed by Dr. Desean Buenrostro, who plans to repeat imaging in 2020.  7. Gait imbalance secondary to multiple factors, including severe lumbar stenosis, moderate cervical stenosis, neuropathy, and her vestibular schwannoma.    8. Saw neurosurgery for spinal stenosis, who recommended conservative  treatment. She declines follow up with them.   9. Prior dysphagia helped with changes suggested per Speech Therapy after swallowing study.     Follow up in 6 months

## 2019-07-30 DIAGNOSIS — E11.49 OTHER DIABETIC NEUROLOGICAL COMPLICATION ASSOCIATED WITH TYPE 2 DIABETES MELLITUS: ICD-10-CM

## 2019-07-30 DIAGNOSIS — I10 ESSENTIAL HYPERTENSION: ICD-10-CM

## 2019-08-07 ENCOUNTER — OFFICE VISIT (OUTPATIENT)
Dept: INTERNAL MEDICINE | Facility: CLINIC | Age: 74
End: 2019-08-07
Payer: MEDICARE

## 2019-08-07 ENCOUNTER — TELEPHONE (OUTPATIENT)
Dept: INTERNAL MEDICINE | Facility: CLINIC | Age: 74
End: 2019-08-07

## 2019-08-07 VITALS
SYSTOLIC BLOOD PRESSURE: 138 MMHG | RESPIRATION RATE: 16 BRPM | HEART RATE: 100 BPM | WEIGHT: 199.5 LBS | HEIGHT: 62 IN | DIASTOLIC BLOOD PRESSURE: 50 MMHG | BODY MASS INDEX: 36.71 KG/M2

## 2019-08-07 DIAGNOSIS — M46.96 INFLAMMATORY SPONDYLOPATHY OF LUMBAR REGION: ICD-10-CM

## 2019-08-07 DIAGNOSIS — J84.10 CALCIFIED GRANULOMA OF LUNG: ICD-10-CM

## 2019-08-07 DIAGNOSIS — R41.3 MEMORY LOSS: ICD-10-CM

## 2019-08-07 DIAGNOSIS — I70.0 AORTIC ATHEROSCLEROSIS: ICD-10-CM

## 2019-08-07 DIAGNOSIS — Z76.0 ENCOUNTER FOR MEDICATION REFILL: ICD-10-CM

## 2019-08-07 PROCEDURE — 99999 PR PBB SHADOW E&M-EST. PATIENT-LVL IV: ICD-10-PCS | Mod: PBBFAC,HCNC,, | Performed by: INTERNAL MEDICINE

## 2019-08-07 PROCEDURE — 1101F PT FALLS ASSESS-DOCD LE1/YR: CPT | Mod: HCNC,CPTII,S$GLB, | Performed by: INTERNAL MEDICINE

## 2019-08-07 PROCEDURE — 99214 OFFICE O/P EST MOD 30 MIN: CPT | Mod: HCNC,S$GLB,, | Performed by: INTERNAL MEDICINE

## 2019-08-07 PROCEDURE — 99499 RISK ADDL DX/OHS AUDIT: ICD-10-PCS | Mod: HCNC,S$GLB,, | Performed by: INTERNAL MEDICINE

## 2019-08-07 PROCEDURE — 99499 UNLISTED E&M SERVICE: CPT | Mod: HCNC,S$GLB,, | Performed by: INTERNAL MEDICINE

## 2019-08-07 PROCEDURE — 1101F PR PT FALLS ASSESS DOC 0-1 FALLS W/OUT INJ PAST YR: ICD-10-PCS | Mod: HCNC,CPTII,S$GLB, | Performed by: INTERNAL MEDICINE

## 2019-08-07 PROCEDURE — 99214 PR OFFICE/OUTPT VISIT, EST, LEVL IV, 30-39 MIN: ICD-10-PCS | Mod: HCNC,S$GLB,, | Performed by: INTERNAL MEDICINE

## 2019-08-07 PROCEDURE — 99999 PR PBB SHADOW E&M-EST. PATIENT-LVL IV: CPT | Mod: PBBFAC,HCNC,, | Performed by: INTERNAL MEDICINE

## 2019-08-07 RX ORDER — INSULIN ASPART 100 [IU]/ML
30 INJECTION, SOLUTION INTRAVENOUS; SUBCUTANEOUS
Qty: 15 SYRINGE | Refills: 2 | Status: SHIPPED | OUTPATIENT
Start: 2019-08-07 | End: 2020-07-06 | Stop reason: SDUPTHER

## 2019-08-07 RX ORDER — GABAPENTIN 300 MG/1
300 CAPSULE ORAL 2 TIMES DAILY
Qty: 180 CAPSULE | Refills: 2 | Status: SHIPPED | OUTPATIENT
Start: 2019-08-07 | End: 2020-05-12 | Stop reason: SDUPTHER

## 2019-08-07 RX ORDER — DONEPEZIL HYDROCHLORIDE 10 MG/1
TABLET, FILM COATED ORAL
Qty: 30 TABLET | Refills: 5 | Status: SHIPPED | OUTPATIENT
Start: 2019-08-07 | End: 2020-05-12 | Stop reason: SDUPTHER

## 2019-08-07 RX ORDER — METFORMIN HYDROCHLORIDE 1000 MG/1
1000 TABLET ORAL 2 TIMES DAILY WITH MEALS
Qty: 180 TABLET | Refills: 1 | Status: SHIPPED | OUTPATIENT
Start: 2019-08-07 | End: 2020-01-07

## 2019-08-07 RX ORDER — ATORVASTATIN CALCIUM 20 MG/1
20 TABLET, FILM COATED ORAL DAILY
Qty: 90 TABLET | Refills: 1 | Status: SHIPPED | OUTPATIENT
Start: 2019-08-07 | End: 2020-01-21

## 2019-08-07 RX ORDER — INSULIN ASPART 100 [IU]/ML
30 INJECTION, SOLUTION INTRAVENOUS; SUBCUTANEOUS
Qty: 15 SYRINGE | Refills: 2 | Status: SHIPPED | OUTPATIENT
Start: 2019-08-07 | End: 2019-08-07

## 2019-08-07 RX ORDER — AMITRIPTYLINE HYDROCHLORIDE 50 MG/1
50 TABLET, FILM COATED ORAL NIGHTLY
COMMUNITY
End: 2020-07-07 | Stop reason: ALTCHOICE

## 2019-08-07 NOTE — TELEPHONE ENCOUNTER
----- Message from Ely Magallanes MA sent at 2019 11:05 AM CDT -----  Contact: Clare canada/Franchesca  Narda Person  MRN: [id number]  : 1945  PCP: Sirena Coleman  Home Phone      [phone]  Work Phone      Not on file.  Mobile          [phone]      MESSAGE:    Has questions regarding Rx for Novolog that was sent in.    Phone;  [phone]

## 2019-08-07 NOTE — PROGRESS NOTES
Subjective:       Patient ID: Narda Person is a 74 y.o. female.    Chief Complaint: Follow-up (3 month)    Narda Person is a 74 y.o. Female.   Here for 6 m follow up .    A1c elevated since last visit.    Difficult patient ;eating everything ;ice cream  Recurrent falls      Diabetes   She presents for her follow-up diabetic visit. She has type 2 diabetes mellitus. Her disease course has been worsening. Pertinent negatives for hypoglycemia include no confusion, dizziness, nervousness/anxiousness, pallor or tremors. Associated symptoms include weakness. Pertinent negatives for diabetes include no chest pain and no fatigue. Her breakfast blood glucose range is generally >200 mg/dl. Her dinner blood glucose range is generally >200 mg/dl. An ACE inhibitor/angiotensin II receptor blocker is being taken.   Hyperlipidemia   This is a recurrent problem. The current episode started more than 1 year ago. The problem is controlled. Recent lipid tests were reviewed and are low. Exacerbating diseases include diabetes and obesity. Pertinent negatives include no chest pain or myalgias. Current antihyperlipidemic treatment includes statins. The current treatment provides moderate improvement of lipids. Risk factors for coronary artery disease include dyslipidemia, diabetes mellitus and obesity.     Review of Systems   Constitutional: Negative for chills and fatigue.   HENT: Negative for congestion, hearing loss, sinus pressure and sore throat.    Eyes: Positive for visual disturbance. Negative for photophobia.        Seeing ophthalmologist    Respiratory: Negative for cough, choking, chest tightness and wheezing.    Cardiovascular: Negative for chest pain and palpitations.   Gastrointestinal: Negative for blood in stool.   Genitourinary: Negative for dysuria.   Musculoskeletal: Positive for back pain, gait problem and neck stiffness. Negative for arthralgias and myalgias.   Skin: Positive for color change. Negative for pallor.    Neurological: Positive for weakness and light-headedness. Negative for dizziness and tremors.   Hematological: Does not bruise/bleed easily.   Psychiatric/Behavioral: Positive for sleep disturbance. Negative for confusion and suicidal ideas. The patient is not nervous/anxious.         Difficulty falling asleep.       Objective:      Physical Exam   Constitutional: She is oriented to person, place, and time. She appears well-developed and well-nourished.   HENT:   Head: Normocephalic and atraumatic.   Right Ear: External ear normal.   Left Ear: External ear normal.   Mouth/Throat: Oropharynx is clear and moist.   Eyes: Pupils are equal, round, and reactive to light. Conjunctivae and EOM are normal.   Neck: Normal range of motion. Neck supple. No JVD present. No tracheal deviation present. No thyromegaly present.   Cardiovascular: Normal rate, regular rhythm, normal heart sounds and intact distal pulses.   Pulmonary/Chest: Effort normal and breath sounds normal. No respiratory distress. She has no wheezes. She has no rales. She exhibits no tenderness.   Abdominal: Soft. Bowel sounds are normal. She exhibits no distension and no mass. There is no tenderness. There is no rebound and no guarding.   Musculoskeletal: Normal range of motion. She exhibits tenderness. She exhibits no edema.   Feet:   Right Foot:   Protective Sensation: 6 sites tested. 6 sites sensed.   Skin Integrity: Negative for ulcer, erythema or callus.   Left Foot:   Protective Sensation: 4 sites tested. 6 sites sensed.   Skin Integrity: Negative for ulcer, erythema or callus.   Lymphadenopathy:     She has no cervical adenopathy.   Neurological: She is alert and oriented to person, place, and time. She has normal reflexes. She displays normal reflexes. No cranial nerve deficit. She exhibits normal muscle tone. Coordination normal.   CN: Optic discs are flat with normal vasculature, PERRL, Extraoccular movements and visual fields are full. Normal  facial sensation and strength, Hearing symmetric, Tongue and Palate are midline and strong. Shoulder Shrug symmetric and strong.   Skin: Skin is warm and dry.   Bruise to right flank   Psychiatric: She has a normal mood and affect.   Short term memory loss   Nursing note and vitals reviewed.      Assessment:       1. Uncontrolled type 2 diabetes mellitus with complication, with long-term current use of insulin    2. Encounter for medication refill    3. Memory loss    4. Uncontrolled type 2 diabetes mellitus with diabetic polyneuropathy, with long-term current use of insulin    5. Inflammatory spondylopathy of lumbar region    6. Aortic atherosclerosis    7. Calcified granuloma of lung        Plan:   Narda was seen today for follow-up.    Diagnoses and all orders for this visit:    Uncontrolled type 2 diabetes mellitus with complication, with long-term current use of insulin  -     insulin aspart U-100 (NOVOLOG FLEXPEN U-100 INSULIN) 100 unit/mL (3 mL) InPn pen; Inject 30 Units into the skin 3 (three) times daily with meals. INJECT 22 UNITS UNDER THE SKIN THREE TIMES DAILY WITH MEALS  Seeing Dr SOUZA     Encounter for medication refill  -     atorvastatin (LIPITOR) 20 MG tablet; Take 1 tablet (20 mg total) by mouth once daily.  -     gabapentin (NEURONTIN) 300 MG capsule; Take 1 capsule (300 mg total) by mouth 2 (two) times daily.    Memory loss  -     donepezil (ARICEPT) 10 MG tablet; TAKE 1 TABLET BY MOUTH ONCE DAILY IN THE EVENING    Uncontrolled type 2 diabetes mellitus with diabetic polyneuropathy, with long-term current use of insulin  -     insulin detemir U-100 (LEVEMIR FLEXTOUCH U-100 INSULN) 100 unit/mL (3 mL) SubQ InPn pen; Inject 50 Units into the skin 2 (two) times daily.    Inflammatory spondylopathy of lumbar region  Pain is better   Tylenol should help     Aortic atherosclerosis  Continue lipitor     Calcified granuloma of lung  Stable  Asymptomatic     Other orders  -     metFORMIN (GLUCOPHAGE) 1000  MG tablet; Take 1 tablet (1,000 mg total) by mouth 2 (two) times daily with meals.  -     SITagliptin (JANUVIA) 100 MG Tab; Take 1 tablet (100 mg total) by mouth once daily.      Problem List Items Addressed This Visit     Uncontrolled type 2 diabetes mellitus with diabetic polyneuropathy, with long-term current use of insulin    Relevant Medications    metFORMIN (GLUCOPHAGE) 1000 MG tablet    SITagliptin (JANUVIA) 100 MG Tab    insulin aspart U-100 (NOVOLOG FLEXPEN U-100 INSULIN) 100 unit/mL (3 mL) InPn pen    insulin detemir U-100 (LEVEMIR FLEXTOUCH U-100 INSULN) 100 unit/mL (3 mL) SubQ InPn pen    Memory loss    Relevant Medications    donepezil (ARICEPT) 10 MG tablet      Other Visit Diagnoses     Encounter for medication refill        Relevant Medications    atorvastatin (LIPITOR) 20 MG tablet    gabapentin (NEURONTIN) 300 MG capsule    Uncontrolled type 2 diabetes mellitus with complication, with long-term current use of insulin        Relevant Medications    metFORMIN (GLUCOPHAGE) 1000 MG tablet    SITagliptin (JANUVIA) 100 MG Tab    insulin aspart U-100 (NOVOLOG FLEXPEN U-100 INSULIN) 100 unit/mL (3 mL) InPn pen    insulin detemir U-100 (LEVEMIR FLEXTOUCH U-100 INSULN) 100 unit/mL (3 mL) SubQ InPn pen

## 2019-08-07 NOTE — TELEPHONE ENCOUNTER
Please clarify Rx for Novolog that was sent in today with 2 different sets of instructions. Send new Rx to [facility]. Thanks.

## 2019-08-20 DIAGNOSIS — R42 VERTIGO: ICD-10-CM

## 2019-08-20 RX ORDER — MECLIZINE HYDROCHLORIDE 25 MG/1
TABLET ORAL
Qty: 90 TABLET | Refills: 5 | Status: SHIPPED | OUTPATIENT
Start: 2019-08-20 | End: 2020-05-12 | Stop reason: SDUPTHER

## 2019-09-25 ENCOUNTER — TELEPHONE (OUTPATIENT)
Dept: NEUROSURGERY | Facility: CLINIC | Age: 74
End: 2019-09-25

## 2019-09-25 NOTE — TELEPHONE ENCOUNTER
SAILAJA KENNY, MOM HAS OUT OF CONTROL DIABETES AND KEEPS FALLING.  SHE WILL CALL ME AND LET ME KNOW IF SHE WANTS TO GET THE MRI AND FU WITH DR TREJO SOONER THAN NEXT YR. 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

## 2019-09-25 NOTE — TELEPHONE ENCOUNTER
----- Message from Jose Miguel Valencia sent at 9/25/2019  1:13 PM CDT -----  Contact: Marily Rich(daughter)  Ms. Rich says that her mother have been falling about 3 to 4 times a week. She would like Ej to contact her at [phone].

## 2019-09-27 ENCOUNTER — TELEPHONE (OUTPATIENT)
Dept: INTERNAL MEDICINE | Facility: CLINIC | Age: 74
End: 2019-09-27

## 2019-09-27 NOTE — TELEPHONE ENCOUNTER
----- Message from Daniela Bowles MA sent at 9/27/2019 12:47 PM CDT -----  Contact: Daughter-Nataliia  Pt's daughter called and we had a prolonged conversation. Pt started seeing Dr. Andrews and was given insulin . She is still taking metformin and januvia. Pt started falling. One day she bottomed out-58. They are in [place] and do not want an appt. Pt was given Humulin after the hospital and it helped. They are taking her off of januvia and metformin. Pt is doing better now. Dr. Andrews advised her to get back on all of the previous medications. They want Dr. Coleman's advice.     [phone]

## 2019-09-27 NOTE — TELEPHONE ENCOUNTER
SPOKE WITH PTS DAUGHTER FOR 34 MINUTES. MOTHER IS STABLE SINCE GETTING DEXCOM BLOOD SUGAR METER. IT TAKES READING EVERY 5 MINUTES. PT HAS NOT HAD ANY HYPOGLYCEMIC EPISODES SINCE STOPPPING METFORMIN AND JANUVIA AND CUTTING BOTH INSULIN DOSES IN HALF.. I INSTRUCTED THAT THAT SHOULD HOLD THEM STEADY FOR THE WEEKEND AND THAT DR DINERO WOULD RESPOND TO THE MESSAGE ON Monday AND WE WOULD CALL THEM BACK.

## 2019-09-27 NOTE — TELEPHONE ENCOUNTER
----- Message from Francheska Mccarthy sent at 2019  4:38 PM CDT -----  Contact: Marily/Daughter  Narda Person  MRN: [id number]  : 1945  PCP: Sirena Coleman  Home Phone      [phone]  Work Phone      Not on file.  Mobile          [phone]    Message left on voicemail at 12:39pm    MESSAGE:   Having complications with diabetic medications and would like a nurse to return call.    Phone: [phone]

## 2019-09-30 NOTE — TELEPHONE ENCOUNTER
Patients daughter Nataliia notified. Nataliia states she took Narda's care in her own hands. States she is giving Narda 50 units of U-500 in am before breakfast, 40 units of U-500 before lunch, and 40 units of U-500 before supper. Dr. Gautam prescribed U-500 90 units in am, and 65 units before each meal. Nataliia states BS was crashing at 45 with Dr. Gautam's orders. States current dosage keeps BS under 200. Patient is not taking Januvia or Metformin prescribed by Dr. Gautam. Nataliia has not contacted Dr. Gautam's office to inform of medication changes. Please advise.

## 2019-09-30 NOTE — TELEPHONE ENCOUNTER
Follow DR correa advice   It would be inuslin making sugar go down .[facility] daily sugars

## 2020-01-07 ENCOUNTER — LAB VISIT (OUTPATIENT)
Dept: LAB | Facility: HOSPITAL | Age: 75
End: 2020-01-07
Attending: NURSE PRACTITIONER
Payer: MEDICARE

## 2020-01-07 ENCOUNTER — OFFICE VISIT (OUTPATIENT)
Dept: INTERNAL MEDICINE | Facility: CLINIC | Age: 75
End: 2020-01-07
Payer: MEDICARE

## 2020-01-07 ENCOUNTER — OFFICE VISIT (OUTPATIENT)
Dept: NEUROLOGY | Facility: CLINIC | Age: 75
End: 2020-01-07
Payer: MEDICARE

## 2020-01-07 VITALS
RESPIRATION RATE: 16 BRPM | HEART RATE: 84 BPM | OXYGEN SATURATION: 96 % | HEIGHT: 62 IN | DIASTOLIC BLOOD PRESSURE: 68 MMHG | WEIGHT: 199.5 LBS | SYSTOLIC BLOOD PRESSURE: 116 MMHG | BODY MASS INDEX: 36.71 KG/M2

## 2020-01-07 VITALS
HEIGHT: 63 IN | WEIGHT: 202.38 LBS | HEART RATE: 84 BPM | BODY MASS INDEX: 35.86 KG/M2 | DIASTOLIC BLOOD PRESSURE: 60 MMHG | SYSTOLIC BLOOD PRESSURE: 134 MMHG

## 2020-01-07 DIAGNOSIS — I10 ESSENTIAL HYPERTENSION: Primary | ICD-10-CM

## 2020-01-07 DIAGNOSIS — M47.22 OSTEOARTHRITIS OF SPINE WITH RADICULOPATHY, CERVICAL REGION: ICD-10-CM

## 2020-01-07 DIAGNOSIS — E11.42 DIABETIC POLYNEUROPATHY ASSOCIATED WITH TYPE 2 DIABETES MELLITUS: ICD-10-CM

## 2020-01-07 DIAGNOSIS — I10 ESSENTIAL HYPERTENSION: ICD-10-CM

## 2020-01-07 DIAGNOSIS — F41.9 ANXIETY AND DEPRESSION: ICD-10-CM

## 2020-01-07 DIAGNOSIS — D33.3 VESTIBULAR SCHWANNOMA: ICD-10-CM

## 2020-01-07 DIAGNOSIS — M54.16 LUMBAR RADICULOPATHY: ICD-10-CM

## 2020-01-07 DIAGNOSIS — F32.A ANXIETY AND DEPRESSION: ICD-10-CM

## 2020-01-07 DIAGNOSIS — R26.9 GAIT ABNORMALITY: ICD-10-CM

## 2020-01-07 DIAGNOSIS — Z12.11 SPECIAL SCREENING FOR MALIGNANT NEOPLASMS, COLON: ICD-10-CM

## 2020-01-07 DIAGNOSIS — E03.4 HYPOTHYROIDISM DUE TO ACQUIRED ATROPHY OF THYROID: ICD-10-CM

## 2020-01-07 DIAGNOSIS — D33.3 VESTIBULAR SCHWANNOMA: Primary | ICD-10-CM

## 2020-01-07 DIAGNOSIS — R41.3 MEMORY LOSS: ICD-10-CM

## 2020-01-07 LAB
CREAT SERPL-MCNC: 0.8 MG/DL (ref 0.5–1.4)
EST. GFR  (AFRICAN AMERICAN): >60 ML/MIN/1.73 M^2
EST. GFR  (NON AFRICAN AMERICAN): >60 ML/MIN/1.73 M^2

## 2020-01-07 PROCEDURE — 1126F AMNT PAIN NOTED NONE PRSNT: CPT | Mod: HCNC,S$GLB,, | Performed by: INTERNAL MEDICINE

## 2020-01-07 PROCEDURE — 82565 ASSAY OF CREATININE: CPT | Mod: HCNC

## 2020-01-07 PROCEDURE — 1159F PR MEDICATION LIST DOCUMENTED IN MEDICAL RECORD: ICD-10-PCS | Mod: HCNC,S$GLB,, | Performed by: INTERNAL MEDICINE

## 2020-01-07 PROCEDURE — 36415 COLL VENOUS BLD VENIPUNCTURE: CPT | Mod: HCNC

## 2020-01-07 PROCEDURE — 1125F AMNT PAIN NOTED PAIN PRSNT: CPT | Mod: HCNC,S$GLB,, | Performed by: NURSE PRACTITIONER

## 2020-01-07 PROCEDURE — 99999 PR PBB SHADOW E&M-EST. PATIENT-LVL V: ICD-10-PCS | Mod: PBBFAC,HCNC,, | Performed by: NURSE PRACTITIONER

## 2020-01-07 PROCEDURE — 1159F MED LIST DOCD IN RCRD: CPT | Mod: HCNC,S$GLB,, | Performed by: INTERNAL MEDICINE

## 2020-01-07 PROCEDURE — 1101F PT FALLS ASSESS-DOCD LE1/YR: CPT | Mod: HCNC,CPTII,S$GLB, | Performed by: NURSE PRACTITIONER

## 2020-01-07 PROCEDURE — 99214 PR OFFICE/OUTPT VISIT, EST, LEVL IV, 30-39 MIN: ICD-10-PCS | Mod: HCNC,S$GLB,, | Performed by: INTERNAL MEDICINE

## 2020-01-07 PROCEDURE — 1159F PR MEDICATION LIST DOCUMENTED IN MEDICAL RECORD: ICD-10-PCS | Mod: HCNC,S$GLB,, | Performed by: NURSE PRACTITIONER

## 2020-01-07 PROCEDURE — 1101F PR PT FALLS ASSESS DOC 0-1 FALLS W/OUT INJ PAST YR: ICD-10-PCS | Mod: HCNC,CPTII,S$GLB, | Performed by: INTERNAL MEDICINE

## 2020-01-07 PROCEDURE — 1126F PR PAIN SEVERITY QUANTIFIED, NO PAIN PRESENT: ICD-10-PCS | Mod: HCNC,S$GLB,, | Performed by: INTERNAL MEDICINE

## 2020-01-07 PROCEDURE — 99999 PR PBB SHADOW E&M-EST. PATIENT-LVL III: ICD-10-PCS | Mod: PBBFAC,HCNC,, | Performed by: INTERNAL MEDICINE

## 2020-01-07 PROCEDURE — 99999 PR PBB SHADOW E&M-EST. PATIENT-LVL III: CPT | Mod: PBBFAC,HCNC,, | Performed by: INTERNAL MEDICINE

## 2020-01-07 PROCEDURE — 99214 PR OFFICE/OUTPT VISIT, EST, LEVL IV, 30-39 MIN: ICD-10-PCS | Mod: HCNC,S$GLB,, | Performed by: NURSE PRACTITIONER

## 2020-01-07 PROCEDURE — 99499 UNLISTED E&M SERVICE: CPT | Mod: HCNC,S$GLB,, | Performed by: INTERNAL MEDICINE

## 2020-01-07 PROCEDURE — 1101F PR PT FALLS ASSESS DOC 0-1 FALLS W/OUT INJ PAST YR: ICD-10-PCS | Mod: HCNC,CPTII,S$GLB, | Performed by: NURSE PRACTITIONER

## 2020-01-07 PROCEDURE — 99499 RISK ADDL DX/OHS AUDIT: ICD-10-PCS | Mod: HCNC,S$GLB,, | Performed by: INTERNAL MEDICINE

## 2020-01-07 PROCEDURE — 99214 OFFICE O/P EST MOD 30 MIN: CPT | Mod: HCNC,S$GLB,, | Performed by: INTERNAL MEDICINE

## 2020-01-07 PROCEDURE — 1125F PR PAIN SEVERITY QUANTIFIED, PAIN PRESENT: ICD-10-PCS | Mod: HCNC,S$GLB,, | Performed by: NURSE PRACTITIONER

## 2020-01-07 PROCEDURE — 1159F MED LIST DOCD IN RCRD: CPT | Mod: HCNC,S$GLB,, | Performed by: NURSE PRACTITIONER

## 2020-01-07 PROCEDURE — 99999 PR PBB SHADOW E&M-EST. PATIENT-LVL V: CPT | Mod: PBBFAC,HCNC,, | Performed by: NURSE PRACTITIONER

## 2020-01-07 PROCEDURE — 1101F PT FALLS ASSESS-DOCD LE1/YR: CPT | Mod: HCNC,CPTII,S$GLB, | Performed by: INTERNAL MEDICINE

## 2020-01-07 PROCEDURE — 99214 OFFICE O/P EST MOD 30 MIN: CPT | Mod: HCNC,S$GLB,, | Performed by: NURSE PRACTITIONER

## 2020-01-07 RX ORDER — DICLOFENAC SODIUM 10 MG/G
2 GEL TOPICAL 2 TIMES DAILY
Qty: 100 G | Refills: 5 | Status: SHIPPED | OUTPATIENT
Start: 2020-01-07 | End: 2022-03-02 | Stop reason: SDUPTHER

## 2020-01-07 RX ORDER — CHOLECALCIFEROL (VITAMIN D3) 125 MCG
2 CAPSULE ORAL 2 TIMES DAILY PRN
Status: ON HOLD | COMMUNITY
End: 2020-06-29 | Stop reason: HOSPADM

## 2020-01-07 RX ORDER — DULOXETIN HYDROCHLORIDE 30 MG/1
30 CAPSULE, DELAYED RELEASE ORAL DAILY
Qty: 30 CAPSULE | Refills: 5 | Status: SHIPPED | OUTPATIENT
Start: 2020-01-07 | End: 2020-07-06 | Stop reason: SDUPTHER

## 2020-01-07 RX ORDER — IBUPROFEN 200 MG
400 TABLET ORAL EVERY 6 HOURS PRN
Status: ON HOLD | COMMUNITY
End: 2020-06-29 | Stop reason: HOSPADM

## 2020-01-07 RX ORDER — SENNOSIDES 8.6 MG/1
1 TABLET ORAL DAILY
COMMUNITY
End: 2020-07-06 | Stop reason: SDUPTHER

## 2020-01-07 NOTE — PROGRESS NOTES
Subjective:       Patient ID: Narda Person is a 74 y.o. female.    Chief Complaint: Follow-up (3 month follow up); Diabetes; Hyperlipidemia; and Memory Loss    Narda Person is a 74 y.o. Female.   Here for  follow up .    A1c elevated since last visit.    Difficult patient ;eating everything ;ice cream  Recurrent falls      Recently seen DR CORREA : meds changed .    Lab Results       Component                Value               Date                       HGBA1C                   9.6 (H)             07/19/2019          Dr correa labs : 8.7 % 12/30/19            Diabetes   She presents for her follow-up diabetic visit. She has type 2 diabetes mellitus. Her disease course has been worsening. Pertinent negatives for hypoglycemia include no confusion, dizziness, nervousness/anxiousness, pallor or tremors. Associated symptoms include weakness. Pertinent negatives for diabetes include no chest pain and no fatigue. Symptoms are worsening. Diabetic complications include peripheral neuropathy. Risk factors for coronary artery disease include obesity. Current diabetic treatment includes insulin injections and oral agent (dual therapy). She is following a generally unhealthy diet. Her breakfast blood glucose range is generally >200 mg/dl. Her dinner blood glucose range is generally >200 mg/dl. An ACE inhibitor/angiotensin II receptor blocker is being taken.   Hyperlipidemia   This is a recurrent problem. The current episode started more than 1 year ago. The problem is controlled. Recent lipid tests were reviewed and are low. Exacerbating diseases include diabetes and obesity. Pertinent negatives include no chest pain or myalgias. Current antihyperlipidemic treatment includes statins. The current treatment provides moderate improvement of lipids. Risk factors for coronary artery disease include dyslipidemia, diabetes mellitus and obesity.     Review of Systems   Constitutional: Negative for chills and fatigue.   HENT: Negative for  congestion, hearing loss, sinus pressure and sore throat.    Eyes: Positive for visual disturbance. Negative for photophobia.        Seeing ophthalmologist    Respiratory: Negative for cough, choking, chest tightness and wheezing.    Cardiovascular: Negative for chest pain and palpitations.   Gastrointestinal: Negative for blood in stool.   Genitourinary: Negative for dysuria.   Musculoskeletal: Positive for back pain, gait problem and neck stiffness. Negative for arthralgias and myalgias.   Skin: Positive for color change. Negative for pallor.   Neurological: Positive for weakness and light-headedness. Negative for dizziness and tremors.        Dysequilibrium    Hematological: Does not bruise/bleed easily.   Psychiatric/Behavioral: Positive for sleep disturbance. Negative for confusion and suicidal ideas. The patient is not nervous/anxious.         Difficulty falling asleep.       Objective:      Physical Exam   Constitutional: She is oriented to person, place, and time. She appears well-developed and well-nourished.   HENT:   Head: Normocephalic and atraumatic.   Right Ear: External ear normal.   Left Ear: External ear normal.   Mouth/Throat: Oropharynx is clear and moist.   Eyes: Pupils are equal, round, and reactive to light. Conjunctivae and EOM are normal.   Neck: Normal range of motion. Neck supple. No JVD present. No tracheal deviation present. No thyromegaly present.   Cardiovascular: Normal rate, regular rhythm, normal heart sounds and intact distal pulses.   Pulmonary/Chest: Effort normal and breath sounds normal. No respiratory distress. She has no wheezes. She has no rales. She exhibits no tenderness.   Abdominal: Soft. Bowel sounds are normal. She exhibits no distension and no mass. There is no tenderness. There is no rebound and no guarding.   Musculoskeletal: Normal range of motion. She exhibits tenderness. She exhibits no edema.   Feet:   Right Foot:   Protective Sensation: 6 sites tested. 6 sites  sensed.   Skin Integrity: Negative for ulcer, erythema or callus.   Left Foot:   Protective Sensation: 4 sites tested. 6 sites sensed.   Skin Integrity: Negative for ulcer, erythema or callus.   Lymphadenopathy:     She has no cervical adenopathy.   Neurological: She is alert and oriented to person, place, and time. She has normal reflexes. She displays normal reflexes. No cranial nerve deficit. She exhibits normal muscle tone. Coordination normal.   CN: Optic discs are flat with normal vasculature, PERRL, Extraoccular movements and visual fields are full. Normal facial sensation and strength, Hearing symmetric, Tongue and Palate are midline and strong. Shoulder Shrug symmetric and strong.   Skin: Skin is warm and dry.   Bruise to right flank   Psychiatric: She has a normal mood and affect.   Short term memory loss   Nursing note and vitals reviewed.      Assessment:       1. Essential hypertension    2. Hypothyroidism due to acquired atrophy of thyroid    3. Uncontrolled type 2 diabetes mellitus with diabetic polyneuropathy, with long-term current use of insulin    4. Anxiety and depression        Plan:   Narda was seen today for follow-up, diabetes, hyperlipidemia and memory loss.    Diagnoses and all orders for this visit:    Essential hypertension  -     CBC auto differential; Future  -     Comprehensive metabolic panel; Future    Well controlled.  Continue same medication and dose.  1. Keep weight close to ideal body weight.   2.   Avoid high salt foods (olives, pickles, smoked meats, salted potato chips, etc.).   Do not add salt to your food at the table.   Use only small amounts of salt when cooking.   3. Begin an exercise program. Discuss with your doctor what type of exercise program would be best for you. It doesn't have to be difficult. Even brisk walking for 20 minutes three times a week is a good form of exercise.   4. Avoid medicines which contain heart stimulants. This includes many cold and sinus  decongestant pills and sprays as well as diet pills. Check the warnings about hypertension on the label. Stimulants such as amphetamine or cocaine could be lethal for someone with hypertension. Never take these.    Hypothyroidism due to acquired atrophy of thyroid  -     TSH; Future  Well controlled.  Continue same medication and dose.  Uncontrolled type 2 diabetes mellitus with diabetic polyneuropathy, with long-term current use of insulin  -     Lipid panel; Future  -     Hemoglobin A1c; Future  -     Microalbumin/creatinine urine ratio; Future  Patient has uncontrolled Diabetes .  We discussed about diet ;low in calories. Avoid sweats, sodas.  Also increasing activity;walking 2-3 miles a day.  I also adjusted medications and gave patient  instructions about adherence to plan.  Goal of  A1c  less than 7 % stressed.  Also goal of LDL less than 70 highlighted to patient.  Anxiety and depression  -     TSH; Future  Continue cymbalata and elavil     Special screening for malignant neoplasms, colon  -     Fecal Immunochemical Test (iFOBT); Future      Problem List Items Addressed This Visit     Uncontrolled type 2 diabetes mellitus with diabetic polyneuropathy, with long-term current use of insulin    Relevant Medications    insulin degludec (TRESIBA FLEXTOUCH U-200 SUBQ)    Essential hypertension - Primary    Hypothyroidism due to acquired atrophy of thyroid    Anxiety and depression

## 2020-01-07 NOTE — PROGRESS NOTES
Patient, Narda Person (MRN #[id number]), presented with a recorded BMI of 36.49 kg/m^2 and a documented comorbidity(s):  - Diabetes Mellitus Type 2  - Hypertension  to which the severe obesity is a contributing factor. This is consistent with the definition of severe obesity (BMI 35.0-39.9) with comorbidity (ICD-10 E66.01, Z68.35). The patient's severe obesity was monitored, evaluated, addressed and/or treated. This addendum to the medical record is made on 01/07/2020.

## 2020-01-07 NOTE — PROGRESS NOTES
HPI: Narda Person is a 74 y.o. female with a history of a right vestibular schwannoma, as well as diastolic dysfunction, hypothyroidism, and insulin dependent DM. Schwannoma followed by Dr. Desean Buenrostro. She has HLD, HTN, DM II, hypothyroidism, GERD, and a history of breast cancer. MRI L-spine 2018 shows severe lumbar stenosis and MRI C-spine 2008 shows moderate cervical stenosis.     She presents today for a follow up visit. She has worsening visual complaints. She is seeing [facility] for this. She does not drive, due to severe visual impairment.     Her DM is unmanaged per patient currently. She recently received a new blood glucose monitor to help with this.     She continues to live with her family in [place] 90% of the time, and is in town for the holidays.     She continues with short term memory loss, but no signs of executive dysfunction. She has more frustration over dietary restrictions regarding her DM lately.     She has insomnia, but cannot tolerate prescription medications (Elavil and muscle relaxants) for this, due to nightmares and worsening gait imbalance. She does take Melatonin, which is helpful-no hallucinations when this.     She continues with gait imbalance, and relies on a cane for stability. This is worse lately. No falls lately.     Lumbar pain occurs with excessive standing.     Review of Systems   Constitutional: Negative for malaise/fatigue.   HENT: Positive for tinnitus.    Eyes: Positive for blurred vision.   Cardiovascular: Negative for chest pain.   Musculoskeletal: Positive for back pain and falls. Negative for myalgias.   Neurological: Positive for dizziness. Negative for speech change, seizures, weakness and headaches.   Endo/Heme/Allergies: Does not bruise/bleed easily.   Psychiatric/Behavioral: Positive for memory loss. Negative for depression, hallucinations and suicidal ideas. The patient is not nervous/anxious and does not have insomnia.        I have reviewed  all of this patient's past medical and surgical histories as well as family and social histories and active allergies and medications as documented in the electronic medical record.    Exam:  Gen Appearance, well developed/nourished in no apparent distress  CV: 2+ distal pulses with no edema or swelling  Neuro:  MS: Awake, alert, oriented to place, person, time, situation. Sustains attention. Recent memory intact today/remote memory intact, Language is full to spontaneous speech/repetition/naming/comprehension. Fund of Knowledge is full  CN: Optic discs are flat with normal vasculature, PERRL, Extraoccular movements and visual fields are full. Normal facial sensation and strength, Hearing symmetric, Tongue and Palate are midline and strong. Shoulder Shrug symmetric and strong.  Motor: Normal bulk, tone, no abnormal movements. 5/5 strength bilateral upper/lower extremities with 2+ reflexes BUE, and 1+ reflexes BLE, and bilateral plantar response  Sensory: symmetric to light touch, pain, temp, and proprioception. Reduced vibration at left ankle; Romberg negative  Cerebellar: Finger-nose,Heal-shin, Rapid alternating movements intact  Gait: ambulates with a cane for stability    Imaging:  MRI Brain with and without contrast 4/2018:  Interval increased size of the enhancing lesion in the right internal auditory canal suggestive for a vestibular schwannoma.  It currently measures 8.4 x 4.7 mm, previously 4.7 x 4.0 mm.  No new enhancing lesion is seen.    Age-appropriate generalized cerebral volume loss with mild/moderate chronic microvascular ischemic change.    MRI Brain with and without contrast 6/16/2016  4.5 mm focus of enhancement is again identified within the deep aspect of the right internal auditory canal to suggest a stable vestibular schwannoma.  Age-appropriate generalized volume loss with findings suggestive of mild chronic microvascular ischemic change.     MRI Brain with and without contrast 11/14/2016  4.5  mm focus of enhancement in the deep aspect of the right internal artery canal suggestive for a stable vestibular schwannoma.  No new enhancing lesion is seen.  Age-appropriate generalized cerebral volume loss with mild chronic microvascular ischemic change.    5/2018 MRI C-spine:  FINDINGS:  The craniocervical junction is intact.  There is no evidence for a Chiari malformation.  The spinal cord is normal in signal without cord edema or myelomalacia.    The incidentally visualized soft tissue structures of the neck are within normal limits.    There is straightening of the normal cervical lordosis.  Vertebral body heights are maintained.  There is disc desiccation with disc space narrowing throughout the cervical spine.  The marrow signal is normal without evidence for a marrow replacement process, infection or tumor.    At C2-3, there is no disc herniation, central canal stenosis or neural foraminal narrowing.    At C3-4, there is a posterior disc osteophyte complex with bilateral uncovertebral spurring and facet arthropathy contributing to mild central canal stenosis without neural foraminal narrowing.    At C4-5, there is a posterior disc osteophyte complex with bilateral uncovertebral spurring and facet arthropathy contributing to moderate central canal stenosis with moderate to severe bilateral neural foraminal narrowing.    At C5-6, there is a posterior disc osteophyte complex with bilateral uncovertebral spurring and facet arthropathy contributing to moderate central canal stenosis with severe bilateral neural foraminal narrowing.    At C6-7, there is a posterior disc osteophyte complex with bilateral uncovertebral spurring and facet arthropathy contributing to mild central canal stenosis with moderate severe left-sided neural foraminal narrowing.    At C7-T1, there is bilateral uncovertebral spurring without central canal stenosis or neural foraminal narrowing.      Impression       Multilevel degenerative  changes of the cervical spine contributing to central canal stenosis and neural foraminal narrowing as detailed in the above level by level description.     MRI L-spine 4/2018:  The incidentally visualized soft tissue structures of the abdomen are within normal limits.    Vertebral body heights are maintained.  There is mild anterolisthesis of L4 in respect to L5 by approximately 4 mm.  There is disc desiccation with significant disc space narrowing at L4-5 with adjacent endplate degenerative change.  The marrow signal is otherwise normal without evidence for a marrow replacement process, infection or tumor.  The conus terminates at T12-L1.    At T12-L1, there is no disc herniation, central canal stenosis or neural foraminal narrowing.    At L1-2, there is no disc herniation, central canal stenosis or neural foraminal narrowing.    At L2-3, there is a broad-based posterior disc bulge with ligamentum flavum hypertrophy and facet arthropathy contributing to mild central canal stenosis without neural foraminal narrowing.    At L3-4, there is a broad-based posterior disc bulge with ligamentum flavum hypertrophy and facet arthropathy contributing to moderate central canal stenosis with mild bilateral neural foraminal narrowing.    At L4-5, there is a broad-based posterior disc bulge, ligamentum flavum hypertrophy and facet arthropathy contributing to severe central canal stenosis with severe bilateral neural foraminal narrowing.  There appears to be disc material protruding in the bilateral neural foramina.    At L5-S1, there is a broad-based posterior disc bulge and facet arthropathy contributing to mild to moderate bilateral neural foraminal narrowing.    Labs:   KARSTEN, homocysteine, B12/folate, B1 reviewed; unremarkable  Her BMP, CBC, Lipid Panel, TSH, and LFT's per Dr. Leone requested but not received  CBC, CMP, TSH, B12 4/2018 overall WNL other than her uncontrolled DM.     Assessment/Plan: Narda Person is a 74 y.o.  female with a history of a right vestibular schwannoma, as well as diastolic dysfunction, hypothyroidism, and insulin dependent DM. Schwannoma followed by Dr. Desean Buenrostro. She has HLD, HTN, DM II, hypothyroidism, GERD, and a history of breast cancer. MRI L-spine 2018 shows severe lumbar stenosis and MRI C-spine 2008 shows moderate cervical stenosis. She has short term memory loss, pending evaluation.     I recommend:   1. Continue Cymbalta for depression and anxiety. Hallucinations occurred with Elavil. Instructed to take Cymbalta in the morning.  2. I would like to order Neuropsych testing on her to further evaluate her memory loss; however, she lives in [place] for most of the year, and has not been able to schedule this locally.   -suspect cognitive complaints to be related to anxiety/depression.  -she has frustration over dietary changes to control her DM.   -Memory loss worsens with conflict, but there is some ongoing short term memory complaints daily. Continue Aricept for memory at this time. Unsure of response thus far.  3. She declines transfer of care to a Neurologist in [place] currently, and will continue to come here.   4. No signs of executive dysfunction; however, there is concern over AD versus anxiety/depression, as the cause of her memory complaints.   5. Insomnia helped with Melatonin. Trazodone ineffective at 50 mg and caused excess sedation at 100 mg. Hallucinations with Elavil prior.   6. Growth of vestibular schwannoma followed by Dr. Desean Buenrostro, who plannedto repeat imaging in 2020.  -Given her worsening gait imbalance, I will repeat her MRI Brain with and without contrast to evaluate for surveillance of her schwannoma.   -Gait imbalance likely secondary to multiple factors, including severe lumbar stenosis, moderate cervical stenosis, neuropathy, low visual acuity, and her vestibular schwannoma.    -Have her follow up with Dr. Desean Buenrostro sooner than 5/2019 if there is any growth to her  schwannoma.   7. Saw neurosurgery for spinal stenosis, who recommended conservative treatment. She declines follow up with them.   8. Prior dysphagia helped with changes suggested per Speech Therapy after swallowing study.   9. Neuropathy pain is worse lately. DM is not well managed.   -she is on both Gabapentin and Cymbalta, as well as Elavil for insomnia.   -Voltaren Rx per PCP.   -Add Blue Emu with Lidocaine OTC.   10. Please see an orthopedist in [facility] for left shoulder pain, as she can't return for follow up.     Follow up in 6 months/will call with MRI results

## 2020-01-07 NOTE — PATIENT INSTRUCTIONS
You may try Blue Emu with Lidocaine over the counter for your neuropathy pain.     Please see an Orthopedist in [place] for your left shoulder pain.

## 2020-01-09 ENCOUNTER — TELEPHONE (OUTPATIENT)
Dept: INTERNAL MEDICINE | Facility: CLINIC | Age: 75
End: 2020-01-09

## 2020-01-09 NOTE — TELEPHONE ENCOUNTER
----- Message from Barbi Mirza sent at 2020  4:08 PM CST -----  Contact: Barbi (cheri)  Narda Person  MRN: [id number]  : 1945  PCP: Sirena Coleman  Home Phone      [phone]  Work Phone      Not on file.  Mobile          [phone]    MESSAGE:   She request to speak to nurse regarding the completion of paperwork for a handicap licence.   Appointment declined / paperwork requested to be given to nurse / paperwork placed in Dr. Coleman's bin.     Phone # [phone]    Pharmacy - [place] [id number]

## 2020-01-10 ENCOUNTER — HOSPITAL ENCOUNTER (OUTPATIENT)
Dept: RADIOLOGY | Facility: HOSPITAL | Age: 75
Discharge: HOME | End: 2020-01-10
Attending: NURSE PRACTITIONER
Payer: MEDICARE

## 2020-01-10 DIAGNOSIS — D33.3 VESTIBULAR SCHWANNOMA: ICD-10-CM

## 2020-01-10 PROCEDURE — 25500020 PHARM REV CODE 255: Mod: HCNC | Performed by: NURSE PRACTITIONER

## 2020-01-10 PROCEDURE — 70553 MRI BRAIN STEM W/O & W/DYE: CPT | Mod: TC,HCNC

## 2020-01-10 PROCEDURE — 70553 MRI BRAIN W WO CONTRAST: ICD-10-PCS | Mod: 26,HCNC,, | Performed by: RADIOLOGY

## 2020-01-10 PROCEDURE — 70553 MRI BRAIN STEM W/O & W/DYE: CPT | Mod: 26,HCNC,, | Performed by: RADIOLOGY

## 2020-01-10 PROCEDURE — A9585 GADOBUTROL INJECTION: HCPCS | Mod: HCNC | Performed by: NURSE PRACTITIONER

## 2020-01-10 RX ORDER — GADOBUTROL 604.72 MG/ML
9 INJECTION INTRAVENOUS
Status: COMPLETED | OUTPATIENT
Start: 2020-01-10 | End: 2020-01-10

## 2020-01-10 RX ADMIN — GADOBUTROL 10 ML: 604.72 INJECTION INTRAVENOUS at 02:01

## 2020-01-10 NOTE — TELEPHONE ENCOUNTER
Paperwork to be fill out by nurse and Dr. Coleman. Advised Barbi that they will be contacted when it it completed and ready for .

## 2020-01-14 ENCOUNTER — HOSPITAL ENCOUNTER (OUTPATIENT)
Dept: RADIOLOGY | Facility: HOSPITAL | Age: 75
Discharge: HOME | End: 2020-01-14
Attending: NURSE PRACTITIONER
Payer: MEDICARE

## 2020-01-14 ENCOUNTER — TELEPHONE (OUTPATIENT)
Dept: NEUROSURGERY | Facility: CLINIC | Age: 75
End: 2020-01-14

## 2020-01-14 ENCOUNTER — OFFICE VISIT (OUTPATIENT)
Dept: INTERNAL MEDICINE | Facility: CLINIC | Age: 75
End: 2020-01-14
Payer: MEDICARE

## 2020-01-14 VITALS
HEIGHT: 63 IN | RESPIRATION RATE: 18 BRPM | WEIGHT: 199.06 LBS | SYSTOLIC BLOOD PRESSURE: 120 MMHG | TEMPERATURE: 99 F | DIASTOLIC BLOOD PRESSURE: 70 MMHG | HEART RATE: 94 BPM | OXYGEN SATURATION: 97 % | BODY MASS INDEX: 35.27 KG/M2

## 2020-01-14 DIAGNOSIS — J20.9 ACUTE BRONCHITIS, UNSPECIFIED ORGANISM: Primary | ICD-10-CM

## 2020-01-14 DIAGNOSIS — J20.9 ACUTE BRONCHITIS, UNSPECIFIED ORGANISM: ICD-10-CM

## 2020-01-14 DIAGNOSIS — J18.9 PNEUMONIA OF RIGHT LUNG DUE TO INFECTIOUS ORGANISM, UNSPECIFIED PART OF LUNG: Primary | ICD-10-CM

## 2020-01-14 PROCEDURE — 71046 X-RAY EXAM CHEST 2 VIEWS: CPT | Mod: TC,HCNC

## 2020-01-14 PROCEDURE — 99999 PR PBB SHADOW E&M-EST. PATIENT-LVL III: CPT | Mod: PBBFAC,HCNC,, | Performed by: NURSE PRACTITIONER

## 2020-01-14 PROCEDURE — 1159F MED LIST DOCD IN RCRD: CPT | Mod: HCNC,S$GLB,, | Performed by: NURSE PRACTITIONER

## 2020-01-14 PROCEDURE — 1159F PR MEDICATION LIST DOCUMENTED IN MEDICAL RECORD: ICD-10-PCS | Mod: HCNC,S$GLB,, | Performed by: NURSE PRACTITIONER

## 2020-01-14 PROCEDURE — 99999 PR PBB SHADOW E&M-EST. PATIENT-LVL III: ICD-10-PCS | Mod: PBBFAC,HCNC,, | Performed by: NURSE PRACTITIONER

## 2020-01-14 PROCEDURE — 1126F PR PAIN SEVERITY QUANTIFIED, NO PAIN PRESENT: ICD-10-PCS | Mod: HCNC,S$GLB,, | Performed by: NURSE PRACTITIONER

## 2020-01-14 PROCEDURE — 99213 PR OFFICE/OUTPT VISIT, EST, LEVL III, 20-29 MIN: ICD-10-PCS | Mod: HCNC,S$GLB,, | Performed by: NURSE PRACTITIONER

## 2020-01-14 PROCEDURE — 1101F PT FALLS ASSESS-DOCD LE1/YR: CPT | Mod: HCNC,CPTII,S$GLB, | Performed by: NURSE PRACTITIONER

## 2020-01-14 PROCEDURE — 1101F PR PT FALLS ASSESS DOC 0-1 FALLS W/OUT INJ PAST YR: ICD-10-PCS | Mod: HCNC,CPTII,S$GLB, | Performed by: NURSE PRACTITIONER

## 2020-01-14 PROCEDURE — 99213 OFFICE O/P EST LOW 20 MIN: CPT | Mod: HCNC,S$GLB,, | Performed by: NURSE PRACTITIONER

## 2020-01-14 PROCEDURE — 1126F AMNT PAIN NOTED NONE PRSNT: CPT | Mod: HCNC,S$GLB,, | Performed by: NURSE PRACTITIONER

## 2020-01-14 RX ORDER — PREDNISONE 20 MG/1
40 TABLET ORAL DAILY
Qty: 10 TABLET | Refills: 0 | Status: SHIPPED | OUTPATIENT
Start: 2020-01-14 | End: 2020-01-19

## 2020-01-14 RX ORDER — AZITHROMYCIN 500 MG/1
500 TABLET, FILM COATED ORAL DAILY
Qty: 5 TABLET | Refills: 0 | Status: ON HOLD | OUTPATIENT
Start: 2020-01-14 | End: 2020-06-29 | Stop reason: HOSPADM

## 2020-01-14 RX ORDER — ALBUTEROL SULFATE 90 UG/1
2 AEROSOL, METERED RESPIRATORY (INHALATION) EVERY 4 HOURS PRN
Qty: 18 G | Refills: 1 | Status: SHIPPED | OUTPATIENT
Start: 2020-01-14 | End: 2020-10-26

## 2020-01-14 RX ORDER — ALBUTEROL SULFATE 0.63 MG/3ML
0.63 SOLUTION RESPIRATORY (INHALATION) EVERY 6 HOURS PRN
Qty: 1 BOX | Refills: 0 | Status: SHIPPED | OUTPATIENT
Start: 2020-01-14 | End: 2020-10-26

## 2020-01-14 NOTE — TELEPHONE ENCOUNTER
LVM WITH PT, APPT MADE FOR 1/16 THIS Thursday AT 1:30PM. SHE IS TO CALL ME IF SHE CANNOT MAKE THIS APPOINTMENT.

## 2020-01-14 NOTE — TELEPHONE ENCOUNTER
----- Message from Jose Miguel Valencia sent at 1/14/2020  2:09 PM CST -----  Ms. Narda Rojelio would like the patient seen sooner than February. Patient's Vestibular schwannoma is growing. Call the patient at [phone] to schedule

## 2020-01-14 NOTE — PROGRESS NOTES
Subjective:       Patient ID: Narda Person is a 74 y.o. female.    Chief Complaint: Nasal Congestion; Chest Congestion; Sore Throat; Cough; and Headache    HPI: Pt presents to clinic today known to Dr Coleman with c/o cough. She has low graded fever She reports that her Bs has been fairly well controlled. She reports that she started with a pick in her throat last week. Whole family has it. She has congestion, PND and wheezing. She has no hx of asthma or COPD. Coughing non productive   Review of Systems   Constitutional: Positive for fatigue. Negative for appetite change, chills and fever.   HENT: Negative for congestion, ear discharge, ear pain, rhinorrhea, sinus pressure and sore throat.    Respiratory: Positive for cough, shortness of breath and wheezing. Negative for choking and chest tightness.         With coughing and increased activity   Cardiovascular: Negative for chest pain and palpitations.   Gastrointestinal: Negative for constipation, diarrhea, nausea and vomiting.   Musculoskeletal: Negative for joint swelling and myalgias.   Skin: Negative for rash and wound.   Neurological: Negative for dizziness, syncope, weakness, light-headedness and numbness.       Objective:      Physical Exam   Constitutional: She is oriented to person, place, and time. She appears well-developed and well-nourished.   HENT:   Head: Normocephalic and atraumatic.   Right Ear: External ear normal.   Left Ear: External ear normal.   Nose: Nose normal.   Mouth/Throat: Oropharynx is clear and moist.   Eyes: Pupils are equal, round, and reactive to light. Conjunctivae and EOM are normal.   Neck: Normal range of motion. Neck supple. No thyromegaly present.   Cardiovascular: Normal rate, regular rhythm, normal heart sounds and intact distal pulses.   Pulmonary/Chest: Effort normal. No respiratory distress. She has wheezes. She has rales.   Abdominal: Soft. Bowel sounds are normal.   Musculoskeletal: Normal range of motion.    Neurological: She is alert and oriented to person, place, and time. She has normal reflexes.   Skin: Skin is warm and dry.   Psychiatric: She has a normal mood and affect. Her behavior is normal. Judgment and thought content normal.   Nursing note and vitals reviewed.      Assessment:       1. Acute bronchitis, unspecified organism        Plan:     Problem List Items Addressed This Visit     None      Visit Diagnoses     Acute bronchitis, unspecified organism    -  Primary    Relevant Medications    predniSONE (DELTASONE) 20 MG tablet    azithromycin (ZITHROMAX) 500 MG tablet    albuterol (VENTOLIN HFA) 90 mcg/actuation inhaler    Other Relevant Orders    X-Ray Chest PA And Lateral          mucinex DM OTc

## 2020-01-15 ENCOUNTER — PATIENT OUTREACH (OUTPATIENT)
Dept: ADMINISTRATIVE | Facility: OTHER | Age: 75
End: 2020-01-15

## 2020-01-16 ENCOUNTER — OFFICE VISIT (OUTPATIENT)
Dept: NEUROSURGERY | Facility: CLINIC | Age: 75
End: 2020-01-16
Payer: MEDICARE

## 2020-01-16 VITALS
BODY MASS INDEX: 35.26 KG/M2 | TEMPERATURE: 99 F | WEIGHT: 199 LBS | SYSTOLIC BLOOD PRESSURE: 113 MMHG | DIASTOLIC BLOOD PRESSURE: 64 MMHG | HEART RATE: 95 BPM | HEIGHT: 63 IN

## 2020-01-16 DIAGNOSIS — D33.3 RIGHT ACOUSTIC NEUROMA: Primary | ICD-10-CM

## 2020-01-16 PROCEDURE — 99499 UNLISTED E&M SERVICE: CPT | Mod: HCNC,S$GLB,, | Performed by: NEUROLOGICAL SURGERY

## 2020-01-16 PROCEDURE — 99999 PR PBB SHADOW E&M-EST. PATIENT-LVL V: CPT | Mod: PBBFAC,HCNC,, | Performed by: NEUROLOGICAL SURGERY

## 2020-01-16 PROCEDURE — 99999 PR PBB SHADOW E&M-EST. PATIENT-LVL V: ICD-10-PCS | Mod: PBBFAC,HCNC,, | Performed by: NEUROLOGICAL SURGERY

## 2020-01-16 PROCEDURE — 1101F PT FALLS ASSESS-DOCD LE1/YR: CPT | Mod: HCNC,CPTII,S$GLB, | Performed by: NEUROLOGICAL SURGERY

## 2020-01-16 PROCEDURE — 1159F PR MEDICATION LIST DOCUMENTED IN MEDICAL RECORD: ICD-10-PCS | Mod: HCNC,S$GLB,, | Performed by: NEUROLOGICAL SURGERY

## 2020-01-16 PROCEDURE — 99213 PR OFFICE/OUTPT VISIT, EST, LEVL III, 20-29 MIN: ICD-10-PCS | Mod: HCNC,S$GLB,, | Performed by: NEUROLOGICAL SURGERY

## 2020-01-16 PROCEDURE — 1125F PR PAIN SEVERITY QUANTIFIED, PAIN PRESENT: ICD-10-PCS | Mod: HCNC,S$GLB,, | Performed by: NEUROLOGICAL SURGERY

## 2020-01-16 PROCEDURE — 99213 OFFICE O/P EST LOW 20 MIN: CPT | Mod: HCNC,S$GLB,, | Performed by: NEUROLOGICAL SURGERY

## 2020-01-16 PROCEDURE — 1125F AMNT PAIN NOTED PAIN PRSNT: CPT | Mod: HCNC,S$GLB,, | Performed by: NEUROLOGICAL SURGERY

## 2020-01-16 PROCEDURE — 1159F MED LIST DOCD IN RCRD: CPT | Mod: HCNC,S$GLB,, | Performed by: NEUROLOGICAL SURGERY

## 2020-01-16 PROCEDURE — 1101F PR PT FALLS ASSESS DOC 0-1 FALLS W/OUT INJ PAST YR: ICD-10-PCS | Mod: HCNC,CPTII,S$GLB, | Performed by: NEUROLOGICAL SURGERY

## 2020-01-16 PROCEDURE — 99499 RISK ADDL DX/OHS AUDIT: ICD-10-PCS | Mod: HCNC,S$GLB,, | Performed by: NEUROLOGICAL SURGERY

## 2020-01-16 RX ORDER — SITAGLIPTIN 100 MG/1
TABLET, FILM COATED ORAL
COMMUNITY
Start: 2020-01-07 | End: 2020-05-12 | Stop reason: SDUPTHER

## 2020-01-16 RX ORDER — METFORMIN HYDROCHLORIDE 1000 MG/1
TABLET ORAL
COMMUNITY
Start: 2020-01-08 | End: 2020-05-12 | Stop reason: SDUPTHER

## 2020-01-16 RX ORDER — INSULIN DEGLUDEC 200 U/ML
60 INJECTION, SOLUTION SUBCUTANEOUS DAILY
COMMUNITY
Start: 2020-01-07 | End: 2022-09-07 | Stop reason: SDUPTHER

## 2020-01-16 NOTE — PROGRESS NOTES
This office note has been dictated.  01/16/2020    Long Lucia M.D.  [address] [place] [address]  [id number]    RE:  NARDA PERSON  [facility]  [id number]    Dear Dr. Lucia:    Narda Person returned in neurosurgical followup to the office this afternoon.    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  She continues to complain also of blurry   vision.  When I saw her two years ago, she just had cataract surgery, but this   did not improve her vision much and apparently this relates more to diabetic   retinopathy.    On brief examination today, she is alert and oriented.  She answers questions   appropriately.  Extraocular movements are good.  Vision is as noted above.  She   was able to hear finger rubbing in the left ear, but not in the right.  She has   had an upper respiratory infection and has a mask on.  She was in a wheelchair.    I did not have her get up and walk.    MRI of the brain with use of the posterior fossa was repeated at [facility] on 01/10/20 and compared to her last scan of 04/18/18.  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  The tumor does not actually enter the cerebellopontine cistern.  It   was about 8 mm in length and is now about 12 mm in length.  The brain otherwise   appears unremarkable.    Again, there is very gradual growth of this small tumor, but it remains within   the auditory canal.  I do not believe excision or radiosurgery are required for   this at this point.  We can continue to follow this, but I would like her to   return in one year with MRI and we will plan to see her then.    Thank you again for the opportunity to participate in her care.    Sincerely yours,            RDS/HN  dd: 01/16/2020 14:55:58 (CST)  td: 01/17/2020 03:52:10  ([facility])  Doc ID   #[id number]  Job ID #[id number]    CC: Long Person

## 2020-01-20 DIAGNOSIS — Z76.0 ENCOUNTER FOR MEDICATION REFILL: ICD-10-CM

## 2020-01-21 RX ORDER — ATORVASTATIN CALCIUM 20 MG/1
TABLET, FILM COATED ORAL
Qty: 90 TABLET | Refills: 0 | Status: SHIPPED | OUTPATIENT
Start: 2020-01-21 | End: 2020-05-12 | Stop reason: SDUPTHER

## 2020-01-24 ENCOUNTER — TELEPHONE (OUTPATIENT)
Dept: INTERNAL MEDICINE | Facility: CLINIC | Age: 75
End: 2020-01-24

## 2020-01-24 DIAGNOSIS — J18.9 PNEUMONIA DUE TO INFECTIOUS ORGANISM, UNSPECIFIED LATERALITY, UNSPECIFIED PART OF LUNG: Primary | ICD-10-CM

## 2020-01-24 NOTE — TELEPHONE ENCOUNTER
----- Message from Francheska Mccarthy sent at 2020 12:03 PM CST -----  Contact: Barbi/Daughter  Narda Person  MRN: [id number]  : 1945  PCP: Sirena Coleman  Home Phone      [phone]  Work Phone      Not on file.  Mobile          [phone]    MESSAGE:     She saw Ayaka recently and she told her that she wanted to repeat her chest xray in 10 days. Please call to advise.    Phone: [phone]

## 2020-01-28 ENCOUNTER — HOSPITAL ENCOUNTER (OUTPATIENT)
Dept: RADIOLOGY | Facility: HOSPITAL | Age: 75
Discharge: HOME | End: 2020-01-28
Attending: NURSE PRACTITIONER
Payer: MEDICARE

## 2020-01-28 DIAGNOSIS — J18.9 PNEUMONIA DUE TO INFECTIOUS ORGANISM, UNSPECIFIED LATERALITY, UNSPECIFIED PART OF LUNG: ICD-10-CM

## 2020-01-28 PROCEDURE — 71046 X-RAY EXAM CHEST 2 VIEWS: CPT | Mod: 26,HCNC,, | Performed by: RADIOLOGY

## 2020-01-28 PROCEDURE — 71046 X-RAY EXAM CHEST 2 VIEWS: CPT | Mod: TC,HCNC

## 2020-01-28 PROCEDURE — 71046 XR CHEST PA AND LATERAL: ICD-10-PCS | Mod: 26,HCNC,, | Performed by: RADIOLOGY

## 2020-02-10 ENCOUNTER — TELEPHONE (OUTPATIENT)
Dept: INTERNAL MEDICINE | Facility: CLINIC | Age: 75
End: 2020-02-10

## 2020-02-10 NOTE — TELEPHONE ENCOUNTER
Contacted and informed pt's granddaughter Carmelo to increase it form BID to TID and see if it helps. Carmelo verbalized understanding.

## 2020-02-10 NOTE — TELEPHONE ENCOUNTER
----- Message from Sasha Gunter sent at 2/10/2020 10:55 AM CST -----  Contact: gagandeep Carmelo  Narda Person  MRN: [id number]  : 1945  PCP: Sirena Coleman  Home Phone      [phone]  Work Phone      Not on file.  Mobile          [phone]      MESSAGE:   Pts granddaughter states she is having bad pain in her hands and would like to know if she can increase her gabapentin, please return call @ [phone]. Thanks.

## 2020-02-28 ENCOUNTER — PATIENT OUTREACH (OUTPATIENT)
Dept: ADMINISTRATIVE | Facility: OTHER | Age: 75
End: 2020-02-28

## 2020-05-11 ENCOUNTER — PES CALL (OUTPATIENT)
Dept: ADMINISTRATIVE | Facility: CLINIC | Age: 75
End: 2020-05-11

## 2020-05-12 DIAGNOSIS — R41.3 MEMORY LOSS: ICD-10-CM

## 2020-05-12 DIAGNOSIS — I10 ESSENTIAL HYPERTENSION: ICD-10-CM

## 2020-05-12 DIAGNOSIS — Z76.0 ENCOUNTER FOR MEDICATION REFILL: ICD-10-CM

## 2020-05-12 DIAGNOSIS — R42 VERTIGO: ICD-10-CM

## 2020-05-12 RX ORDER — GABAPENTIN 300 MG/1
300 CAPSULE ORAL 2 TIMES DAILY
Qty: 180 CAPSULE | Refills: 1 | Status: SHIPPED | OUTPATIENT
Start: 2020-05-12 | End: 2020-05-12 | Stop reason: SDUPTHER

## 2020-05-12 RX ORDER — RAMIPRIL 5 MG/1
5 CAPSULE ORAL DAILY
Qty: 90 CAPSULE | Refills: 1 | Status: SHIPPED | OUTPATIENT
Start: 2020-05-12 | End: 2020-07-06 | Stop reason: SDUPTHER

## 2020-05-12 RX ORDER — METFORMIN HYDROCHLORIDE 1000 MG/1
1000 TABLET ORAL 2 TIMES DAILY WITH MEALS
Qty: 180 TABLET | Refills: 1 | Status: SHIPPED | OUTPATIENT
Start: 2020-05-12 | End: 2020-07-06 | Stop reason: SDUPTHER

## 2020-05-12 RX ORDER — SITAGLIPTIN 100 MG/1
100 TABLET, FILM COATED ORAL DAILY
Qty: 90 TABLET | Refills: 1 | Status: SHIPPED | OUTPATIENT
Start: 2020-05-12 | End: 2020-07-06 | Stop reason: SDUPTHER

## 2020-05-12 RX ORDER — ATORVASTATIN CALCIUM 20 MG/1
20 TABLET, FILM COATED ORAL DAILY
Qty: 90 TABLET | Refills: 1 | Status: SHIPPED | OUTPATIENT
Start: 2020-05-12 | End: 2020-07-06 | Stop reason: SDUPTHER

## 2020-05-12 RX ORDER — DONEPEZIL HYDROCHLORIDE 10 MG/1
TABLET, FILM COATED ORAL
Qty: 90 TABLET | Refills: 1 | Status: SHIPPED | OUTPATIENT
Start: 2020-05-12 | End: 2020-07-06 | Stop reason: SDUPTHER

## 2020-05-12 RX ORDER — MECLIZINE HYDROCHLORIDE 25 MG/1
TABLET ORAL
Qty: 90 TABLET | Refills: 5 | Status: SHIPPED | OUTPATIENT
Start: 2020-05-12 | End: 2022-03-02 | Stop reason: SDUPTHER

## 2020-05-12 RX ORDER — GABAPENTIN 300 MG/1
300 CAPSULE ORAL 2 TIMES DAILY
Qty: 180 CAPSULE | Refills: 1 | Status: SHIPPED | OUTPATIENT
Start: 2020-05-12 | End: 2020-07-06 | Stop reason: SDUPTHER

## 2020-05-12 NOTE — TELEPHONE ENCOUNTER
----- Message from Francheska Mccarthy sent at 2020  1:34 PM CDT -----  Contact: Barbi/Daughter  Narda Person  MRN: [id number]  : 1945  PCP: Sirena Coleman  Home Phone      [phone]  Work Phone      Not on file.  Mobile          [phone]    MESSAGE:     Refill  gabapentin (NEURONTIN) 300 MG capsule  meclizine (ANTIVERT) 25 mg tablet  JANUVIA 100 mg Tab  donepezil (ARICEPT) 10 MG tablet  ramipril (ALTACE) 5 MG capsule  DULoxetine (CYMBALTA) 30 MG capsule  torvastatin (LIPITOR) 20 MG tablet  metFORMIN (GLUCOPHAGE) 1000 MG tablet    Pharmacy: [facility] [address] [place] [address]    Phone: [phone]

## 2020-05-12 NOTE — TELEPHONE ENCOUNTER
All refills pended except for duloxetine. Narda Serrato is the ordering provider for that. She will have to contact her for refills. LM for Barbi to return call.   [facility] in [place], [place]

## 2020-05-12 NOTE — TELEPHONE ENCOUNTER
Barbi notified that she will have to contact Narda Serrato for refills of the pt's duloxetine. Verbalized understanding.

## 2020-05-13 ENCOUNTER — TELEPHONE (OUTPATIENT)
Dept: INTERNAL MEDICINE | Facility: CLINIC | Age: 75
End: 2020-05-13

## 2020-05-13 NOTE — TELEPHONE ENCOUNTER
----- Message from Barbi Mirza sent at 2020  3:40 PM CDT -----  Contact: Barbi (daughter)  Narda Person  MRN: [id number]  : 1945  PCP: Sirena Coleman  Home Phone      [phone]  Work Phone      Not on file.  Mobile          [phone]    MESSAGE:   Rx refill - gabapentin (NEURONTIN) 300 MG capsule  Pharmacy stated no Rx received / contact PCP.     Phone # [phone]    Pharmacy - [facility] [address] [place] [address]

## 2020-06-28 ENCOUNTER — HOSPITAL ENCOUNTER (OUTPATIENT)
Facility: HOSPITAL | Age: 75
Discharge: HOME | End: 2020-06-29
Attending: SURGERY | Admitting: FAMILY MEDICINE
Payer: MEDICARE

## 2020-06-28 DIAGNOSIS — I25.10 CARDIOVASCULAR DISEASE: ICD-10-CM

## 2020-06-28 DIAGNOSIS — R06.02 SHORTNESS OF BREATH: ICD-10-CM

## 2020-06-28 DIAGNOSIS — D64.9 ANEMIA: ICD-10-CM

## 2020-06-28 DIAGNOSIS — D64.9 ANEMIA, UNSPECIFIED TYPE: Primary | ICD-10-CM

## 2020-06-28 LAB
ALBUMIN SERPL BCP-MCNC: 3.5 G/DL (ref 3.5–5.2)
ALP SERPL-CCNC: 53 U/L (ref 55–135)
ALT SERPL W/O P-5'-P-CCNC: 13 U/L (ref 10–44)
ANION GAP SERPL CALC-SCNC: 12 MMOL/L (ref 8–16)
ANISOCYTOSIS BLD QL SMEAR: ABNORMAL
APTT BLDCRRT: 28.6 SEC (ref 21–32)
AST SERPL-CCNC: 14 U/L (ref 10–40)
BASOPHILS # BLD AUTO: 0.06 K/UL (ref 0–0.2)
BASOPHILS NFR BLD: 0.8 % (ref 0–1.9)
BILIRUB SERPL-MCNC: 0.7 MG/DL (ref 0.1–1)
BNP SERPL-MCNC: 197 PG/ML (ref 0–99)
BUN SERPL-MCNC: 16 MG/DL (ref 8–23)
CALCIUM SERPL-MCNC: 8.7 MG/DL (ref 8.7–10.5)
CHLORIDE SERPL-SCNC: 102 MMOL/L (ref 95–110)
CK MB SERPL-MCNC: 2 NG/ML (ref 0.1–6.5)
CK MB SERPL-RTO: 2.8 % (ref 0–5)
CK SERPL-CCNC: 72 U/L (ref 20–180)
CK SERPL-CCNC: 72 U/L (ref 20–180)
CO2 SERPL-SCNC: 21 MMOL/L (ref 23–29)
CREAT SERPL-MCNC: 0.8 MG/DL (ref 0.5–1.4)
D DIMER PPP IA.FEU-MCNC: 1.7 MG/L FEU
DACRYOCYTES BLD QL SMEAR: ABNORMAL
DIFFERENTIAL METHOD: ABNORMAL
EOSINOPHIL # BLD AUTO: 0.2 K/UL (ref 0–0.5)
EOSINOPHIL NFR BLD: 2.3 % (ref 0–8)
ERYTHROCYTE [DISTWIDTH] IN BLOOD BY AUTOMATED COUNT: 19.9 % (ref 11.5–14.5)
ERYTHROCYTE [SEDIMENTATION RATE] IN BLOOD BY WESTERGREN METHOD: 25 MM/HR (ref 0–20)
EST. GFR  (AFRICAN AMERICAN): >60 ML/MIN/1.73 M^2
EST. GFR  (NON AFRICAN AMERICAN): >60 ML/MIN/1.73 M^2
GLUCOSE SERPL-MCNC: 165 MG/DL (ref 70–110)
GROUP A STREP, MOLECULAR: NEGATIVE
HCT VFR BLD AUTO: 19.7 % (ref 37–48.5)
HGB BLD-MCNC: 5.2 G/DL (ref 12–16)
HYPOCHROMIA BLD QL SMEAR: ABNORMAL
IMM GRANULOCYTES # BLD AUTO: 0.04 K/UL (ref 0–0.04)
IMM GRANULOCYTES NFR BLD AUTO: 0.5 % (ref 0–0.5)
INFLUENZA A, MOLECULAR: NEGATIVE
INFLUENZA B, MOLECULAR: NEGATIVE
INR PPP: 1.1 (ref 0.8–1.2)
LACTATE SERPL-SCNC: 2.8 MMOL/L (ref 0.5–2.2)
LDH SERPL L TO P-CCNC: 190 U/L (ref 110–260)
LYMPHOCYTES # BLD AUTO: 1.4 K/UL (ref 1–4.8)
LYMPHOCYTES NFR BLD: 18 % (ref 18–48)
MCH RBC QN AUTO: 17.2 PG (ref 27–31)
MCHC RBC AUTO-ENTMCNC: 26.4 G/DL (ref 32–36)
MCV RBC AUTO: 65 FL (ref 82–98)
MONOCYTES # BLD AUTO: 0.6 K/UL (ref 0.3–1)
MONOCYTES NFR BLD: 7.2 % (ref 4–15)
NEUTROPHILS # BLD AUTO: 5.6 K/UL (ref 1.8–7.7)
NEUTROPHILS NFR BLD: 71.2 % (ref 38–73)
NRBC BLD-RTO: 1 /100 WBC
OB PNL STL: NEGATIVE
OVALOCYTES BLD QL SMEAR: ABNORMAL
PLATELET # BLD AUTO: 243 K/UL (ref 150–350)
PLATELET BLD QL SMEAR: ABNORMAL
PMV BLD AUTO: 10.9 FL (ref 9.2–12.9)
POIKILOCYTOSIS BLD QL SMEAR: SLIGHT
POLYCHROMASIA BLD QL SMEAR: ABNORMAL
POTASSIUM SERPL-SCNC: 4.1 MMOL/L (ref 3.5–5.1)
PROCALCITONIN SERPL IA-MCNC: 0.02 NG/ML
PROT SERPL-MCNC: 6.7 G/DL (ref 6–8.4)
PROTHROMBIN TIME: 12 SEC (ref 9–12.5)
RBC # BLD AUTO: 3.03 M/UL (ref 4–5.4)
RETICS/RBC NFR AUTO: 2.2 % (ref 0.5–2.5)
SARS-COV-2 RDRP RESP QL NAA+PROBE: NEGATIVE
SCHISTOCYTES BLD QL SMEAR: ABNORMAL
SODIUM SERPL-SCNC: 135 MMOL/L (ref 136–145)
SPECIMEN SOURCE: NORMAL
SPHEROCYTES BLD QL SMEAR: ABNORMAL
TROPONIN I SERPL DL<=0.01 NG/ML-MCNC: 0.03 NG/ML (ref 0–0.03)
WBC # BLD AUTO: 7.83 K/UL (ref 3.9–12.7)

## 2020-06-28 PROCEDURE — 82550 ASSAY OF CK (CPK): CPT | Mod: HCNC

## 2020-06-28 PROCEDURE — 82746 ASSAY OF FOLIC ACID SERUM: CPT | Mod: HCNC

## 2020-06-28 PROCEDURE — 84484 ASSAY OF TROPONIN QUANT: CPT | Mod: HCNC

## 2020-06-28 PROCEDURE — 93010 EKG 12-LEAD: ICD-10-PCS | Mod: HCNC,,, | Performed by: INTERNAL MEDICINE

## 2020-06-28 PROCEDURE — 84439 ASSAY OF FREE THYROXINE: CPT | Mod: HCNC

## 2020-06-28 PROCEDURE — 87651 STREP A DNA AMP PROBE: CPT | Mod: HCNC

## 2020-06-28 PROCEDURE — 87502 INFLUENZA DNA AMP PROBE: CPT | Mod: HCNC

## 2020-06-28 PROCEDURE — 83540 ASSAY OF IRON: CPT | Mod: HCNC

## 2020-06-28 PROCEDURE — 82607 VITAMIN B-12: CPT | Mod: HCNC

## 2020-06-28 PROCEDURE — U0002 COVID-19 LAB TEST NON-CDC: HCPCS | Mod: HCNC

## 2020-06-28 PROCEDURE — 85651 RBC SED RATE NONAUTOMATED: CPT | Mod: HCNC

## 2020-06-28 PROCEDURE — 83036 HEMOGLOBIN GLYCOSYLATED A1C: CPT | Mod: HCNC

## 2020-06-28 PROCEDURE — 93005 ELECTROCARDIOGRAM TRACING: CPT | Mod: HCNC

## 2020-06-28 PROCEDURE — 93010 ELECTROCARDIOGRAM REPORT: CPT | Mod: HCNC,,, | Performed by: INTERNAL MEDICINE

## 2020-06-28 PROCEDURE — 87040 BLOOD CULTURE FOR BACTERIA: CPT | Mod: HCNC

## 2020-06-28 PROCEDURE — 82553 CREATINE MB FRACTION: CPT | Mod: HCNC

## 2020-06-28 PROCEDURE — 83605 ASSAY OF LACTIC ACID: CPT | Mod: HCNC

## 2020-06-28 PROCEDURE — 85379 FIBRIN DEGRADATION QUANT: CPT | Mod: HCNC

## 2020-06-28 PROCEDURE — 80053 COMPREHEN METABOLIC PANEL: CPT | Mod: HCNC

## 2020-06-28 PROCEDURE — 84145 PROCALCITONIN (PCT): CPT | Mod: HCNC

## 2020-06-28 PROCEDURE — 82272 OCCULT BLD FECES 1-3 TESTS: CPT | Mod: HCNC

## 2020-06-28 PROCEDURE — 85045 AUTOMATED RETICULOCYTE COUNT: CPT | Mod: HCNC

## 2020-06-28 PROCEDURE — 86850 RBC ANTIBODY SCREEN: CPT | Mod: HCNC

## 2020-06-28 PROCEDURE — 83615 LACTATE (LD) (LDH) ENZYME: CPT | Mod: HCNC

## 2020-06-28 PROCEDURE — 85730 THROMBOPLASTIN TIME PARTIAL: CPT | Mod: HCNC

## 2020-06-28 PROCEDURE — 82728 ASSAY OF FERRITIN: CPT | Mod: HCNC

## 2020-06-28 PROCEDURE — 99285 EMERGENCY DEPT VISIT HI MDM: CPT | Mod: 25,HCNC

## 2020-06-28 PROCEDURE — 94760 N-INVAS EAR/PLS OXIMETRY 1: CPT | Mod: HCNC

## 2020-06-28 PROCEDURE — G0378 HOSPITAL OBSERVATION PER HR: HCPCS | Mod: HCNC

## 2020-06-28 PROCEDURE — 83880 ASSAY OF NATRIURETIC PEPTIDE: CPT | Mod: HCNC

## 2020-06-28 PROCEDURE — 85610 PROTHROMBIN TIME: CPT | Mod: HCNC

## 2020-06-28 PROCEDURE — 25000242 PHARM REV CODE 250 ALT 637 W/ HCPCS: Mod: HCNC | Performed by: SURGERY

## 2020-06-28 PROCEDURE — 84443 ASSAY THYROID STIM HORMONE: CPT | Mod: HCNC

## 2020-06-28 PROCEDURE — 86920 COMPATIBILITY TEST SPIN: CPT | Mod: HCNC

## 2020-06-28 PROCEDURE — 85025 COMPLETE CBC W/AUTO DIFF WBC: CPT | Mod: HCNC

## 2020-06-28 PROCEDURE — 36415 COLL VENOUS BLD VENIPUNCTURE: CPT | Mod: HCNC

## 2020-06-28 PROCEDURE — 94640 AIRWAY INHALATION TREATMENT: CPT | Mod: HCNC

## 2020-06-28 PROCEDURE — 86140 C-REACTIVE PROTEIN: CPT | Mod: HCNC

## 2020-06-28 RX ORDER — CLONAZEPAM 0.5 MG/1
0.5 TABLET ORAL 2 TIMES DAILY PRN
Qty: 10 TABLET | Refills: 0 | Status: SHIPPED | OUTPATIENT
Start: 2020-06-28 | End: 2020-06-28 | Stop reason: ALTCHOICE

## 2020-06-28 RX ORDER — HYDROCODONE BITARTRATE AND ACETAMINOPHEN 500; 5 MG/1; MG/1
TABLET ORAL
Status: DISCONTINUED | OUTPATIENT
Start: 2020-06-29 | End: 2020-06-29 | Stop reason: HOSPADM

## 2020-06-28 RX ORDER — IPRATROPIUM BROMIDE AND ALBUTEROL SULFATE 2.5; .5 MG/3ML; MG/3ML
3 SOLUTION RESPIRATORY (INHALATION)
Status: COMPLETED | OUTPATIENT
Start: 2020-06-28 | End: 2020-06-28

## 2020-06-28 RX ADMIN — IPRATROPIUM BROMIDE AND ALBUTEROL SULFATE 3 ML: .5; 3 SOLUTION RESPIRATORY (INHALATION) at 10:06

## 2020-06-29 ENCOUNTER — TELEPHONE (OUTPATIENT)
Dept: INTERNAL MEDICINE | Facility: CLINIC | Age: 75
End: 2020-06-29

## 2020-06-29 VITALS
OXYGEN SATURATION: 94 % | WEIGHT: 215.63 LBS | TEMPERATURE: 99 F | RESPIRATION RATE: 20 BRPM | HEART RATE: 91 BPM | BODY MASS INDEX: 38.21 KG/M2 | SYSTOLIC BLOOD PRESSURE: 156 MMHG | DIASTOLIC BLOOD PRESSURE: 70 MMHG | HEIGHT: 63 IN

## 2020-06-29 LAB
ABO + RH BLD: NORMAL
ALBUMIN SERPL BCP-MCNC: 3.6 G/DL (ref 3.5–5.2)
ALP SERPL-CCNC: 57 U/L (ref 55–135)
ALT SERPL W/O P-5'-P-CCNC: 11 U/L (ref 10–44)
ANION GAP SERPL CALC-SCNC: 12 MMOL/L (ref 8–16)
AST SERPL-CCNC: 17 U/L (ref 10–40)
BASOPHILS # BLD AUTO: 0.1 K/UL (ref 0–0.2)
BASOPHILS NFR BLD: 0.9 % (ref 0–1.9)
BILIRUB SERPL-MCNC: 2 MG/DL (ref 0.1–1)
BLD GP AB SCN CELLS X3 SERPL QL: NORMAL
BLD PROD TYP BPU: NORMAL
BLD PROD TYP BPU: NORMAL
BLOOD UNIT EXPIRATION DATE: NORMAL
BLOOD UNIT EXPIRATION DATE: NORMAL
BLOOD UNIT TYPE CODE: 6200
BLOOD UNIT TYPE CODE: 6200
BLOOD UNIT TYPE: NORMAL
BLOOD UNIT TYPE: NORMAL
BUN SERPL-MCNC: 15 MG/DL (ref 8–23)
CALCIUM SERPL-MCNC: 9.2 MG/DL (ref 8.7–10.5)
CHLORIDE SERPL-SCNC: 100 MMOL/L (ref 95–110)
CO2 SERPL-SCNC: 24 MMOL/L (ref 23–29)
CODING SYSTEM: NORMAL
CODING SYSTEM: NORMAL
CREAT SERPL-MCNC: 0.9 MG/DL (ref 0.5–1.4)
CRP SERPL-MCNC: 7.1 MG/L (ref 0–8.2)
DIFFERENTIAL METHOD: ABNORMAL
DISPENSE STATUS: NORMAL
DISPENSE STATUS: NORMAL
EOSINOPHIL # BLD AUTO: 0.1 K/UL (ref 0–0.5)
EOSINOPHIL NFR BLD: 1.2 % (ref 0–8)
ERYTHROCYTE [DISTWIDTH] IN BLOOD BY AUTOMATED COUNT: 24.2 % (ref 11.5–14.5)
EST. GFR  (AFRICAN AMERICAN): >60 ML/MIN/1.73 M^2
EST. GFR  (NON AFRICAN AMERICAN): >60 ML/MIN/1.73 M^2
ESTIMATED AVG GLUCOSE: 151 MG/DL (ref 68–131)
FERRITIN SERPL-MCNC: 7 NG/ML (ref 20–300)
FOLATE SERPL-MCNC: 13.9 NG/ML (ref 4–24)
GLUCOSE SERPL-MCNC: 209 MG/DL (ref 70–110)
HBA1C MFR BLD HPLC: 6.9 % (ref 4–5.6)
HCT VFR BLD AUTO: 28.2 % (ref 37–48.5)
HGB BLD-MCNC: 8.2 G/DL (ref 12–16)
IMM GRANULOCYTES # BLD AUTO: 0.08 K/UL (ref 0–0.04)
IMM GRANULOCYTES NFR BLD AUTO: 0.7 % (ref 0–0.5)
IRON SERPL-MCNC: 15 UG/DL (ref 30–160)
LYMPHOCYTES # BLD AUTO: 1.1 K/UL (ref 1–4.8)
LYMPHOCYTES NFR BLD: 10.6 % (ref 18–48)
MCH RBC QN AUTO: 20.8 PG (ref 27–31)
MCHC RBC AUTO-ENTMCNC: 29.1 G/DL (ref 32–36)
MCV RBC AUTO: 72 FL (ref 82–98)
MONOCYTES # BLD AUTO: 0.7 K/UL (ref 0.3–1)
MONOCYTES NFR BLD: 6.9 % (ref 4–15)
NEUTROPHILS # BLD AUTO: 8.5 K/UL (ref 1.8–7.7)
NEUTROPHILS NFR BLD: 79.7 % (ref 38–73)
NRBC BLD-RTO: 1 /100 WBC
PLATELET # BLD AUTO: 224 K/UL (ref 150–350)
PMV BLD AUTO: 11.1 FL (ref 9.2–12.9)
POCT GLUCOSE: 171 MG/DL (ref 70–110)
POCT GLUCOSE: 172 MG/DL (ref 70–110)
POCT GLUCOSE: 188 MG/DL (ref 70–110)
POCT GLUCOSE: 204 MG/DL (ref 70–110)
POTASSIUM SERPL-SCNC: 4.2 MMOL/L (ref 3.5–5.1)
PROT SERPL-MCNC: 7 G/DL (ref 6–8.4)
RBC # BLD AUTO: 3.94 M/UL (ref 4–5.4)
SATURATED IRON: 3 % (ref 20–50)
SODIUM SERPL-SCNC: 136 MMOL/L (ref 136–145)
T4 FREE SERPL-MCNC: 0.82 NG/DL (ref 0.71–1.51)
TOTAL IRON BINDING CAPACITY: 508 UG/DL (ref 250–450)
TRANS ERYTHROCYTES VOL PATIENT: NORMAL ML
TRANS ERYTHROCYTES VOL PATIENT: NORMAL ML
TRANSFERRIN SERPL-MCNC: 343 MG/DL (ref 200–375)
TSH SERPL DL<=0.005 MIU/L-ACNC: 5.03 UIU/ML (ref 0.4–4)
VIT B12 SERPL-MCNC: 498 PG/ML (ref 210–950)
WBC # BLD AUTO: 10.71 K/UL (ref 3.9–12.7)

## 2020-06-29 PROCEDURE — P9021 RED BLOOD CELLS UNIT: HCPCS | Mod: HCNC

## 2020-06-29 PROCEDURE — 99234 HOSP IP/OBS SM DT SF/LOW 45: CPT | Mod: HCNC,,, | Performed by: FAMILY MEDICINE

## 2020-06-29 PROCEDURE — 25000003 PHARM REV CODE 250: Mod: HCNC | Performed by: NURSE PRACTITIONER

## 2020-06-29 PROCEDURE — 36415 COLL VENOUS BLD VENIPUNCTURE: CPT | Mod: HCNC

## 2020-06-29 PROCEDURE — 96374 THER/PROPH/DIAG INJ IV PUSH: CPT

## 2020-06-29 PROCEDURE — 27000221 HC OXYGEN, UP TO 24 HOURS: Mod: HCNC

## 2020-06-29 PROCEDURE — 93010 ELECTROCARDIOGRAM REPORT: CPT | Mod: HCNC,,, | Performed by: INTERNAL MEDICINE

## 2020-06-29 PROCEDURE — 63600175 PHARM REV CODE 636 W HCPCS: Mod: HCNC | Performed by: SURGERY

## 2020-06-29 PROCEDURE — 93010 EKG 12-LEAD: ICD-10-PCS | Mod: HCNC,,, | Performed by: INTERNAL MEDICINE

## 2020-06-29 PROCEDURE — 25000003 PHARM REV CODE 250: Mod: HCNC | Performed by: SURGERY

## 2020-06-29 PROCEDURE — 94761 N-INVAS EAR/PLS OXIMETRY MLT: CPT | Mod: HCNC

## 2020-06-29 PROCEDURE — 96372 THER/PROPH/DIAG INJ SC/IM: CPT | Mod: 59

## 2020-06-29 PROCEDURE — 99234 PR OBSERV/HOSP SAME DATE,LEVL III: ICD-10-PCS | Mod: HCNC,,, | Performed by: FAMILY MEDICINE

## 2020-06-29 PROCEDURE — 63600175 PHARM REV CODE 636 W HCPCS: Mod: HCNC | Performed by: FAMILY MEDICINE

## 2020-06-29 PROCEDURE — G0378 HOSPITAL OBSERVATION PER HR: HCPCS | Mod: HCNC

## 2020-06-29 PROCEDURE — 85025 COMPLETE CBC W/AUTO DIFF WBC: CPT | Mod: HCNC

## 2020-06-29 PROCEDURE — C9399 UNCLASSIFIED DRUGS OR BIOLOG: HCPCS | Mod: HCNC | Performed by: SURGERY

## 2020-06-29 PROCEDURE — 80053 COMPREHEN METABOLIC PANEL: CPT | Mod: HCNC

## 2020-06-29 PROCEDURE — 36430 TRANSFUSION BLD/BLD COMPNT: CPT | Mod: HCNC

## 2020-06-29 PROCEDURE — 93005 ELECTROCARDIOGRAM TRACING: CPT | Mod: HCNC

## 2020-06-29 RX ORDER — DULOXETIN HYDROCHLORIDE 30 MG/1
30 CAPSULE, DELAYED RELEASE ORAL DAILY
Status: DISCONTINUED | OUTPATIENT
Start: 2020-06-29 | End: 2020-06-29 | Stop reason: HOSPADM

## 2020-06-29 RX ORDER — SENNOSIDES 8.6 MG/1
1 TABLET ORAL DAILY
Status: DISCONTINUED | OUTPATIENT
Start: 2020-06-29 | End: 2020-06-29 | Stop reason: HOSPADM

## 2020-06-29 RX ORDER — FUROSEMIDE 10 MG/ML
20 INJECTION INTRAMUSCULAR; INTRAVENOUS ONCE
Status: COMPLETED | OUTPATIENT
Start: 2020-06-29 | End: 2020-06-29

## 2020-06-29 RX ORDER — INSULIN ASPART 100 [IU]/ML
0-5 INJECTION, SOLUTION INTRAVENOUS; SUBCUTANEOUS
Status: DISCONTINUED | OUTPATIENT
Start: 2020-06-29 | End: 2020-06-29 | Stop reason: HOSPADM

## 2020-06-29 RX ORDER — AMITRIPTYLINE HYDROCHLORIDE 25 MG/1
50 TABLET, FILM COATED ORAL NIGHTLY
Status: DISCONTINUED | OUTPATIENT
Start: 2020-06-29 | End: 2020-06-29 | Stop reason: HOSPADM

## 2020-06-29 RX ORDER — INSULIN ASPART 100 [IU]/ML
7 INJECTION, SOLUTION INTRAVENOUS; SUBCUTANEOUS
Status: DISCONTINUED | OUTPATIENT
Start: 2020-06-29 | End: 2020-06-29 | Stop reason: HOSPADM

## 2020-06-29 RX ORDER — ATORVASTATIN CALCIUM 20 MG/1
20 TABLET, FILM COATED ORAL DAILY
Status: DISCONTINUED | OUTPATIENT
Start: 2020-06-29 | End: 2020-06-29 | Stop reason: HOSPADM

## 2020-06-29 RX ORDER — IBUPROFEN 200 MG
16 TABLET ORAL
Status: DISCONTINUED | OUTPATIENT
Start: 2020-06-29 | End: 2020-06-29 | Stop reason: HOSPADM

## 2020-06-29 RX ORDER — GABAPENTIN 300 MG/1
300 CAPSULE ORAL 2 TIMES DAILY
Status: DISCONTINUED | OUTPATIENT
Start: 2020-06-29 | End: 2020-06-29 | Stop reason: HOSPADM

## 2020-06-29 RX ORDER — IBUPROFEN 200 MG
24 TABLET ORAL
Status: DISCONTINUED | OUTPATIENT
Start: 2020-06-29 | End: 2020-06-29 | Stop reason: HOSPADM

## 2020-06-29 RX ORDER — PANTOPRAZOLE SODIUM 40 MG/1
40 TABLET, DELAYED RELEASE ORAL DAILY
Status: DISCONTINUED | OUTPATIENT
Start: 2020-06-29 | End: 2020-06-29 | Stop reason: HOSPADM

## 2020-06-29 RX ORDER — DONEPEZIL HYDROCHLORIDE 5 MG/1
10 TABLET, FILM COATED ORAL NIGHTLY
Status: DISCONTINUED | OUTPATIENT
Start: 2020-06-29 | End: 2020-06-29 | Stop reason: HOSPADM

## 2020-06-29 RX ORDER — TALC
9 POWDER (GRAM) TOPICAL NIGHTLY PRN
Status: DISCONTINUED | OUTPATIENT
Start: 2020-06-29 | End: 2020-06-29 | Stop reason: HOSPADM

## 2020-06-29 RX ORDER — GLUCAGON 1 MG
1 KIT INJECTION
Status: DISCONTINUED | OUTPATIENT
Start: 2020-06-29 | End: 2020-06-29 | Stop reason: HOSPADM

## 2020-06-29 RX ORDER — RAMIPRIL 2.5 MG/1
5 CAPSULE ORAL DAILY
Status: DISCONTINUED | OUTPATIENT
Start: 2020-06-29 | End: 2020-06-29 | Stop reason: HOSPADM

## 2020-06-29 RX ORDER — ONDANSETRON 2 MG/ML
4 INJECTION INTRAMUSCULAR; INTRAVENOUS EVERY 8 HOURS PRN
Status: DISCONTINUED | OUTPATIENT
Start: 2020-06-29 | End: 2020-06-29 | Stop reason: HOSPADM

## 2020-06-29 RX ORDER — FERROUS SULFATE 325(65) MG
325 TABLET ORAL 2 TIMES DAILY
Qty: 60 TABLET | Refills: 2 | COMMUNITY
Start: 2020-06-29 | End: 2020-07-06 | Stop reason: SDUPTHER

## 2020-06-29 RX ADMIN — INSULIN DETEMIR 15 UNITS: 100 INJECTION, SOLUTION SUBCUTANEOUS at 01:06

## 2020-06-29 RX ADMIN — INSULIN ASPART 7 UNITS: 100 INJECTION, SOLUTION INTRAVENOUS; SUBCUTANEOUS at 11:06

## 2020-06-29 RX ADMIN — INSULIN ASPART 2 UNITS: 100 INJECTION, SOLUTION INTRAVENOUS; SUBCUTANEOUS at 11:06

## 2020-06-29 RX ADMIN — SENNA 1 TABLET: 8.6 TABLET, COATED ORAL at 09:06

## 2020-06-29 RX ADMIN — INSULIN ASPART 7 UNITS: 100 INJECTION, SOLUTION INTRAVENOUS; SUBCUTANEOUS at 08:06

## 2020-06-29 RX ADMIN — GABAPENTIN 300 MG: 300 CAPSULE ORAL at 09:06

## 2020-06-29 RX ADMIN — RAMIPRIL 5 MG: 2.5 CAPSULE ORAL at 09:06

## 2020-06-29 RX ADMIN — ATORVASTATIN CALCIUM 20 MG: 20 TABLET, FILM COATED ORAL at 09:06

## 2020-06-29 RX ADMIN — DULOXETINE 30 MG: 30 CAPSULE, DELAYED RELEASE ORAL at 09:06

## 2020-06-29 RX ADMIN — PANTOPRAZOLE SODIUM 40 MG: 40 TABLET, DELAYED RELEASE ORAL at 09:06

## 2020-06-29 RX ADMIN — FUROSEMIDE 20 MG: 10 INJECTION, SOLUTION INTRAMUSCULAR; INTRAVENOUS at 06:06

## 2020-06-29 NOTE — DISCHARGE SUMMARY
[facility] Medicine  Discharge Summary      Patient Name: Narda Person  MRN: [id number]  Admission Date: 6/28/2020  Hospital Length of Stay: 0 days  Discharge Date and Time:  06/29/2020 11:08 AM  Attending Physician: Damaris Yoder MD   Discharging Provider: Angella Ruiz NP  Primary Care Provider: Sirena Coleman MD      HPI:   Narda Person is a 74 y.o. female with PMHX of HTN, Hypothyroidism, Type II DM, Alzheimer's dementia, DIONNE and recent pneumonia in January presented to ER last PM with c/o SOB   Patient was visiting in [place] would she became short of breath PTA. No hypoxia on exam, O2 sat 96%l , COvid negative  H&H demonstrate severe anemia, H&H 5.2/19.7, Denies signs/symptoms of bleeding. Notes she does not consume much red meat.   Stool for OB negative,   Lab Results   Component Value Date    IRON 15 (L) 06/28/2020    TIBC 508 (H) 06/28/2020    FERRITIN 7 (L) 06/28/2020           * No surgery found *      Hospital Course:   6/29 H&P continued  This am pt noted to have SOB after 1U PRBCs; she was given Lasix 20mg IVP , has diuresed 1Liter urine since.      Consults:     * Anemia  No signes of acute bleeding, retic count, PLT normal, stool for OB negative  Rule out Bone marrow failure, ? Peripheral smear, Very iron deficient.  Transfuse , currently receiving her 2nd unit PRBC  Future OP Hematology eval   D/ C home later this pm if H&H Better, HGB >8  F/U as op     Anxiety and depression    Continue cymbalta     Alzheimer's dementia  Continue aricept  requires frequent reminders       Hypothyroidism due to acquired atrophy of thyroid        Essential hypertension    Continue ramapril 5mg, increase if needed.    Prn clonidine okay.     Uncontrolled type 2 diabetes mellitus with diabetic polyneuropathy, with long-term current use of insulin  Detemir/aspart per protocol  Correction scale  Cymbalta for neuropathy   glucsoe 165-171         Final Active Diagnoses:     Diagnosis Date Noted POA    PRINCIPAL PROBLEM:  Anemia [D64.9] 06/28/2020 Yes    Anxiety and depression [F41.9, F32.9] 10/10/2018 Yes    Alzheimer's dementia [G30.9, F02.80] 04/12/2018 Yes    Hypothyroidism due to acquired atrophy of thyroid [E03.4] 06/29/2014 Yes    Essential hypertension [I10] 10/01/2012 Yes    Uncontrolled type 2 diabetes mellitus with diabetic polyneuropathy, with long-term current use of insulin [E11.42, Z79.4, E11.65] 10/01/2012 Not Applicable      Problems Resolved During this Admission:       Discharged Condition: stable    Disposition: Home or Self Care    Follow Up:  Follow-up Information     Schedule an appointment as soon as possible for a visit with Sirena Coleman MD.    Specialty: Internal Medicine  Contact information:  [address] [place] [address]  [phone]                 Patient Instructions:      CBC auto differential   Standing Status: Future Standing Exp. Date: 06/29/21     Ambulatory referral/consult to Hematology / Oncology   Standing Status: Future   Referral Priority: Routine Referral Type: Consultation   Referral Reason: Specialty Services Required   Requested Specialty: Hematology and Oncology   Number of Visits Requested: 1       Significant Diagnostic Studies:   A1C:   Recent Labs   Lab 06/28/20  2214   HGBA1C 6.9*      ABGs: No results for input(s): PH, PCO2, HCO3, POCSATURATED, BE, TOTALHB, COHB, METHB, O2HB, POCFIO2 in the last 48 hours.  Bilirubin:       Recent Labs   Lab 06/28/20  2210   BILITOT 0.7      Blood Culture: No results for input(s): LABBLOO in the last 48 hours.  CBC:       Recent Labs   Lab 06/28/20  2210   WBC 7.83   HGB 5.2*   HCT 19.7*         CMP:       Recent Labs   Lab 06/28/20  2210   *   K 4.1      CO2 21*   *   BUN 16   CREATININE 0.8   CALCIUM 8.7   PROT 6.7   ALBUMIN 3.5   BILITOT 0.7   ALKPHOS 53*   AST 14   ALT 13   ANIONGAP 12   EGFRNONAA >60      Cardiac Markers:       Recent Labs   Lab  06/28/20 2210   *      Coagulation:       Recent Labs   Lab 06/28/20 2210   INR 1.1   APTT 28.6      Lactic Acid:       Recent Labs   Lab 06/28/20 2210   LACTATE 2.8*      Magnesium: No results for input(s): MG in the last 48 hours.  Respiratory Culture: No results for input(s): GSRESP, RESPIRATORYC in the last 48 hours.  Troponin:       Recent Labs   Lab 06/28/20 2210   TROPONINI 0.026      TSH:       Recent Labs   Lab 06/28/20 2214   TSH 5.027*   Free T4- 0.82      Stool for OB negative  Lab Results   Component Value Date     IRON 15 (L) 06/28/2020     TIBC 508 (H) 06/28/2020     FERRITIN 7 (L) 06/28/2020   Retic 2.2- NML  B12- 498   All pertinent labs within the past 24 hours have been reviewed.     Significant Imaging: I have reviewed all pertinent imaging results/findings within the past 24 hours. Patchy opacity in the medial right lung base which could reflect atelectasis, aspiration or pneumonia.  Left lung is clear.  Heart is normal in size.  Calcified atheromatous disease affects the aorta.  Age-appropriate degenerative changes affect the skeleton.      Pending Diagnostic Studies:     Procedure Component Value Units Date/Time    CBC auto differential [[id number]]     Order Status: Sent Lab Status: No result     Specimen: Blood     Comprehensive metabolic panel [[id number]]     Order Status: Sent Lab Status: No result     Specimen: Blood          Medications:  Reconciled Home Medications:      Medication List      START taking these medications    ferrous sulfate 325 mg (65 mg iron) Tab tablet  Commonly known as: FEOSOL  Take 1 tablet (325 mg total) by mouth 2 (two) times daily. Take by oral route daily twice daily  with orange juice; do not take calcium or antacid within  2 hours of iron supplement        CONTINUE taking these medications    ACCU-CHEK COMPACT PLUS TEST Strp  Generic drug: blood sugar diagnostic, drum  USE TO CHECK BLOOD GLUCOSE FOUR TIMES DAILY     * albuterol 90 mcg/actuation  inhaler  Commonly known as: VENTOLIN HFA  Inhale 2 puffs into the lungs every 4 (four) hours as needed for Wheezing or Shortness of Breath. Rescue     * albuterol 0.63 mg/3 mL Nebu  Commonly known as: ACCUNEB  Take 3 mLs (0.63 mg total) by nebulization every 6 (six) hours as needed. Rescue     amitriptyline 50 MG tablet  Commonly known as: ELAVIL  Take 50 mg by mouth every evening.     atorvastatin 20 MG tablet  Commonly known as: LIPITOR  Take 1 tablet (20 mg total) by mouth once daily.     blood-glucose meter kit  To check BG 4 times daily, to use with insurance preferred meter     CO Q-10 100 mg capsule  Generic drug: coenzyme Q10  Take 100 mg by mouth once daily.     diclofenac sodium 1 % Gel  Commonly known as: VOLTAREN  Apply 2 g topically 2 (two) times daily.     donepeziL 10 MG tablet  Commonly known as: ARICEPT  TAKE 1 TABLET BY MOUTH ONCE DAILY IN THE EVENING     DULoxetine 30 MG capsule  Commonly known as: CYMBALTA  Take 1 capsule (30 mg total) by mouth once daily.     gabapentin 300 MG capsule  Commonly known as: NEURONTIN  Take 1 capsule (300 mg total) by mouth 2 (two) times daily.     insulin aspart U-100 100 unit/mL (3 mL) Inpn pen  Commonly known as: NovoLOG Flexpen U-100 Insulin  Inject 30 Units into the skin 3 (three) times daily with meals.     insulin syringe-needle U-100 0.3 mL 30 Syrg  Use with Levemir vial BID     JANUVIA 100 MG Tab  Generic drug: SITagliptin  Take 1 tablet (100 mg total) by mouth once daily.     lancets Misc  To check BG 4 times daily, to use with insurance preferred meter     MEGARED OMEGA-3 KRILL OIL ORAL  Take 1 capsule by mouth 2 (two) times daily.     melatonin  Commonly known as: MELATIN  Take 10 mg by mouth nightly as needed.     metFORMIN 1000 MG tablet  Commonly known as: GLUCOPHAGE  Take 1 tablet (1,000 mg total) by mouth 2 (two) times daily with meals.     omeprazole 20 MG capsule  Commonly known as: PRILOSEC  TAKE 1 CAPSULE BY MOUTH TWICE DAILY     pen needle,  "diabetic 31 gauge x 3/16" Ndle  Commonly known as: LITE TOUCH INSULIN PEN NEEDLES  1 application by Misc.(Non-Drug; Combo Route) route 4 (four) times daily.     pen needle, diabetic 32 gauge x 5/32" Ndle  1 Device by Misc.(Non-Drug; Combo Route) route as directed. Needles to be used with Novolog pens and Levemir pens daily.     ramipriL 5 MG capsule  Commonly known as: ALTACE  Take 1 capsule (5 mg total) by mouth once daily.     senna 8.6 mg tablet  Commonly known as: SENOKOT  Take 1 tablet by mouth once daily.     TRESIBA FLEXTOUCH U-200 200 unit/mL (3 mL) Inpn  Generic drug: insulin degludec  INJECT 90 UNITS SUBCUTANEOUSLY IN THE MORNING FOR 90 DAYS         * This list has 2 medication(s) that are the same as other medications prescribed for you. Read the directions carefully, and ask your doctor or other care provider to review them with you.            STOP taking these medications    ALEVE 220 mg Cap  Generic drug: naproxen sodium     azithromycin 500 MG tablet  Commonly known as: ZITHROMAX     ibuprofen 200 MG tablet  Commonly known as: ADVIL,MOTRIN        ASK your doctor about these medications    meclizine 25 mg tablet  Commonly known as: ANTIVERT  TAKE 1 TABLET BY MOUTH THREE TIMES DAILY AS NEEDED FOR DIZZINESS OR NAUSEA            Indwelling Lines/Drains at time of discharge:   Lines/Drains/Airways     None                 Time spent on the discharge of patient: 30 minutes  Patient was seen and examined on the date of discharge and determined to be suitable for discharge.         Angella Ruiz NP  Department of Hospital Medicine  [facility]  "

## 2020-06-29 NOTE — HOSPITAL COURSE
6/29 H&P continued  This am pt noted to have SOB after 1U PRBCs; she was given Lasix 20mg IVP , has diuresed 1Liter urine since.

## 2020-06-29 NOTE — NURSING
Patient c/o shortness of breath. Respiratory tech. To check patient. Expiratory wheezing noted. Oxygen saturation 95-96% on 2L Nasal cannula. Dr. Yoder notified. New order for Lasix given.

## 2020-06-29 NOTE — PLAN OF CARE
Patient admitted with Dx of anemia. Patient educated on diagnosis for this admission, and patient verbalizes understanding. Discharge planning brochure and education of what signs and symptoms to look for after discharge, and how to manage health at home, given to patient and family. Patient and family are encouraged to call with any questions or help the would need. Contact information given. CM will continue to follow patient throughout the transitions of care, and assist with any discharge needs.    Patient states she is never alone. She states she lives with daughter. Daughter has a house in [place] and in [place]. Patient States she goes between the 2 houses with her daughter.

## 2020-06-29 NOTE — DISCHARGE INSTRUCTIONS
Anemia  Anemia is a condition that occurs when your body does not have enough healthy red blood cells (RBCs). RBCs are the parts of your blood that carry oxygen throughout your body. A protein called hemoglobin allows your RBCs to absorb and release oxygen. Without enough RBCs or hemoglobin, your body doesn't get enough oxygen. Symptoms of anemia may then occur.    What are the symptoms of anemia?  Some people with anemia have no symptoms. But most people have symptoms that range from mild to severe. These can include:  · Tiredness (fatigue)  · Weakness  · Pale skin  · Shortness of breath  · Dizziness or fainting  · Rapid heartbeat  · Trouble doing normal amounts of activity  · Jaundice (yellowing of your eyes, skin, or mouth; dark urine)  What causes anemia?  Anemia can occur when your body:  · Loses too much blood  · Does not make enough RBCs  · Destroys your RBCs at a faster rate than it can replace them  · Does not make a normal amount of hemoglobin in your RBCs  These problems can occur for many reasons, including:  · A condition that you are born with (congenital or inherited), such as sickle cell disease or thalassemia  · Heavy bleeding for any reason, including injury, surgery, childbirth, or even heavy menstrual periods  · Being low in certain nutrients, such as iron, folate, or vitamin B12, possibly from a poor diet or a condition like celiac disease or Crohn's disease  · Certain chronic conditions like diabetes, arthritis, or kidney disease  · Certain chronic infections like tuberculosis or HIV  · Exposure to certain medicines, such as those used for chemotherapy  There are different types of anemia. Your healthcare provider can tell you more about the type of anemia you have and what may have caused it.  How is anemia diagnosed?  To diagnose anemia, your healthcare provider orders blood tests. These can include:  · Complete blood cell count (CBC). This test measures the amounts of the different types   [facility]    [address] [address]    Phone:  [phone]                                       Zeus Ford   MRN: [id number]    Department:  [facility]   Date of Visit:  5/10/2018           Patient Information     Date Of Birth          1957        Your diagnoses for this visit were:     Puncture wound of right hand without foreign body, initial encounter        You were seen by Gianna Marte NP.      Follow-up Information     Follow up with Cirilo Landeros MD In 5 days.    Specialty:  Family Practice    Why:  for re-evaluation    Contact information:    [address]  [facility] [address] [phone]          Follow up with [facility].    Specialty:  EMERGENCY MEDICINE    Why:  If symptoms worsen    Contact information:    [address] [address] [phone]    Additional information:    From [facility]: Take [address]. Turn left at [address]. Turn left at the first stoplight on [address] [place] [address]. At the first stop sign, take a right onto [address]. Take a left into the parking lot and continue through until you reach the North enterance of the building.       From [place]: Take [address]. Take the [address] ramp towards [place]. Turn left onto [address]. Take a slight right onto [address]. Turn left at the first stoplight on [address]. At the first stop sign, take a right onto [address]. Take a left into the parking lot and continue through until you reach the North enterance of the building.       From [place]: Take [address]. Take a right at [address]. At the first stop sign, take a right onto [address]. Take a left into the parking lot and continue through until you reach the North enterance of the building.         Discharge Instructions         Your x-ray showed no fractures and no foreign body.   Your  Tdap is up to date.   Take medication as ordered.   Follow up with PCP for re-evaluation in 5-7 days.  Return here if symptoms worsen.     Puncture Wound       A puncture wound occurs when a pointed object pushes into the skin. It may go into the tissues below the skin, including fat and muscle. This type of wound is narrow and deep and can be hard to clean. Because of this, puncture wounds are at high risk for becoming infected.  X-rays may be done to check the wound for objects stuck under the skin. Your may also need a tetanus shot. This is given if you are not up to date on this vaccination and the object that caused the wound may lead to tetanus.  Home care    Your healthcare provider may prescribe an antibiotic. This is to help prevent infection. Follow all instructions for taking this medicine. Take the medicine every day until it is gone or you are told to stop. You should not have any left over.    You can take acetaminophen or ibuprofen for pain, unless you were given a different pain medicine to use.     Keep the wound clean and dry. Do not get the wound wet until you are told it is okay to do so. If the area gets wet, gently pat it dry with a clean cloth. Replace the wet bandage with a dry one.    If a bandage was applied and it becomes wet or dirty, replace it. Otherwise, leave it in place for the first 24 hours.    Once you can get the wound wet, you may shower as usual but do not soak the wound in water (no tub baths or swimming)    Even with proper treatment, a puncture wound may become infected. Check the wound daily for signs of infection listed below.  Follow-up care  Follow up with your healthcare provider as advised.   When to seek medical advice  Call your healthcare provider right away if any of these occur:    Signs of infection, including:  ? Increasing redness or swelling around the wound  ? Increased warmth of the wound  ? Worsening pain  ? Red streaking lines away from the  of blood cells.  · Blood smear. This test checks the size and shape of your blood cells. To do the test, a drop of your blood is viewed under a microscope. A stain is used to make the blood cells easier to see.  · Iron studies. These tests measure the amount of iron in your blood. Your body needs iron to make hemoglobin in your RBCs.  · Vitamin B12 and folate studies. These tests check for some of the components that help give RBCs a normal size and shape.  · Reticulocyte count. This test measures the amount of new RBCs that your bone marrow makes.  · Hemoglobin electrophoresis. This test checks for problems with your hemoglobin in RBCs.  How is anemia treated?  Treatment for anemia is based on the type of anemia, its cause, and the severity of your symptoms. Treatments may include:  · Diet changes. This involves increasing the amount of certain nutrients in your diet, such as iron, vitamin B12, or folate. Your healthcare provider may also prescribe nutrient supplements.  · Medicines. Certain medicines treat the cause of your anemia. Others help build new RBCs or relieve symptoms. If a medicine is the cause of your anemia, you may need to stop or change it.  · Blood transfusions. Replacing some of your blood can increase the number of healthy RBCs in your body.  · Surgery. In some cases, your doctor may do surgery to treat the underlying cause of anemia. If you need surgery, your healthcare provider will explain the procedure and outline the risks and benefits for you.  What are the long-term concerns?  If you have a certain type of anemia, you can expect a full recovery after treatment. If you have other types of anemia (especially a type you're born with), you will need to manage it for life. Your doctor can tell you more.  Date Last Reviewed: 12/1/2016  © 2636-3502 [employer]. [address] [place] [address], [place] [address]. All rights reserved. This information is not intended as a substitute for  professional medical care. Always follow your healthcare professional's instructions.         wound  ? Draining pus    Fever of 100.4 F (38. C) or higher or as directed by your healthcare provider    Wound changes colors    Numbness around the wound    Decreased movement around the injured area             Review of your medicines      START taking        Dose / Directions Last dose taken    cephALEXin 500 MG capsule   Commonly known as:  KEFLEX   Dose:  500 mg   Quantity:  14 capsule        Take 1 capsule (500 mg) by mouth 2 times daily for 7 days   Refills:  0          Our records show that you are taking the medicines listed below. If these are incorrect, please call your family doctor or clinic.        Dose / Directions Last dose taken    ammonium lactate 12 % lotion   Commonly known as:  LAC-HYDRIN   Quantity:  360 g        Apply topically 2 times daily as needed for dry skin   Refills:  3        EPIPEN 0.3 MG/0.3ML injection   Dose:  0.3 mg   Generic drug:  EPINEPHrine        Inject 0.3 mg into the muscle once as needed. For anaphylaxis   Refills:  0        ketoconazole 2 % cream   Commonly known as:  NIZORAL   Quantity:  15 g        Apply topically 2 times daily   Refills:  1        LORazepam 1 MG tablet   Commonly known as:  ATIVAN   Dose:  1 mg   Quantity:  20 tablet        Take 1 tablet (1 mg) by mouth every 8 hours as needed for anxiety   Refills:  0        metroNIDAZOLE 0.75 % topical gel   Commonly known as:  METROGEL   Quantity:  45 g        Apply to face h.s. For red papules   Refills:  3        sildenafil 100 MG tablet   Commonly known as:  VIAGRA   Dose:  100 mg   Quantity:  6 tablet        Take 1 tablet (100 mg) by mouth daily as needed Take 30 min to 4 hours before intercourse.  Never use with nitroglycerin, terazosin or doxazosin.   Refills:  11        terbinafine 1 % cream   Commonly known as:  lamISIL AT   Quantity:  30 g        Apply topically 2 times daily For fungal infection not resolved with other antifungals (e.g. Clotrimazole)   Refills:  1        triamcinolone 0.1 % cream  "  Commonly known as:  KENALOG   Quantity:  30 g        Apply sparingly to affected area three times daily for 14 days.   Refills:  3                Prescriptions were sent or printed at these locations (1 Prescription)                   [facility] [place] [facility] [address]   [address] [address]    Telephone:  [phone]   Fax:  [phone]   Hours:                  E-Prescribed (1 of 1)         cephALEXin (KEFLEX) 500 MG capsule                Procedures and tests performed during your visit     CBC with platelets differential    XR Hand Right G/E 3 Views      Orders Needing Specimen Collection     None      Pending Results     No orders found from 2018 to 2018.            Pending Culture Results     No orders found from 2018 to 2018.            Thank you for choosing [facility]       Thank you for choosing [facility] for your care. Our goal is always to provide you with excellent care. Hearing back from our patients is one way we can continue to improve our services. Please take a few minutes to complete the written survey that you may receive in the mail after you visit with us. Thank you!        [employer] Information     [employer] lets you send messages to your doctor, view your test results, renew your prescriptions, schedule appointments and more. To sign up, go to [email or web address] . Click on \"Log in\" on the left side of the screen, which will take you to the Welcome page. Then click on \"Sign up Now\" on the right side of the page.     You will be asked to enter the access code listed below, as well as some personal information. Please follow the directions to create your username and password.     Your access code is: [email or web address]  Expires: 2018  2:53 PM     Your access code will  in 90 days. If you need help or a new code, please call your [facility] clinic or [phone].        Care EveryWhere ID     This is your Care EveryWhere ID. This could " be used by other organizations to access your [facility] medical records  [id number]        Equal Access to Services     DEIDRE JANE : Hadii anali Machado, samuel manuel, adama bonilla. So [facility] [phone].    ATENCIÓN: Si habla español, tiene a glover disposición servicios gratuitos de asistencia lingüística. Llame al [phone].    We comply with applicable federal civil rights laws and [place] laws. We do not discriminate on the basis of race, color, national origin, age, disability, sex, sexual orientation, or gender identity.            After Visit Summary       This is your record. Keep this with you and show to your community pharmacist(s) and doctor(s) at your next visit.

## 2020-06-29 NOTE — NURSING
Patient received to room 306 per W/C. AAOX3. Admits to shortness of breath upon minimal exertion. Oriented to room. Teachings begun. Verbalized understanding. Instructed patient not to get up alone. Verbalized understanding. Bed Alarm in use.

## 2020-06-29 NOTE — ASSESSMENT & PLAN NOTE
No signes of acute bleeding, retic count, PLT normal, stool for OB negative  Rule out Bone marrow failure, ? Peripheral smear, Very iron deficient.  Transfuse , currently receiving her 2nd unit PRBC  Future OP Hematology eval

## 2020-06-29 NOTE — PLAN OF CARE
06/29/20 1331   Discharge Assessment   Assessment Type Discharge Planning Assessment   Confirmed/corrected address and phone number on facesheet? Yes   Assessment information obtained from? Patient   Prior to hospitilization cognitive status: Alert/Oriented   Prior to hospitalization functional status: Assistive Equipment   Current cognitive status: Alert/Oriented   Current Functional Status: Assistive Equipment   Facility Arrived From: Home   Lives With child(lou), adult  (Daughter, Barbi, and Barbi's spouse.)   Able to Return to Prior Arrangements yes   Is patient able to care for self after discharge? Yes   Who are your caregiver(s) and their phone number(s)? Barbi Brand (Daughter) [phone]   Patient's perception of discharge disposition home or selfcare   Readmission Within the Last 30 Days no previous admission in last 30 days   Patient currently being followed by outpatient case management? No   Patient currently receives any other outside agency services? No   Equipment Currently Used at Home wheelchair;rollator;bedside commode   Do you have any problems affording any of your prescribed medications? No   Does the patient have transportation home? Yes   Transportation Anticipated family or friend will provide   Discharge Plan A Home with family   Discharge Plan B Home with family;Home Health   DME Needed Upon Discharge  none   Patient/Family in Agreement with Plan yes       Discharge planning assessment completed with patient. Denies any post-acute care needs at this time. SW to remain available for discharge planning.     Earnestine Brand LMSW

## 2020-06-29 NOTE — ASSESSMENT & PLAN NOTE
No signes of acute bleeding, retic count, PLT normal, stool for OB negative  Rule out Bone marrow failure, ? Peripheral smear, Very iron deficient.  Transfuse , currently receiving her 2nd unit PRBC  Future OP Hematology eval   D/ C home later this pm if H&H Better, HGB >8  F/U as op

## 2020-06-29 NOTE — ED TRIAGE NOTES
Daughter reported that patient C/O SOB. States that patient does not have cardiac or lung problems. States they recently traveled to [place] and return 3 days ago.

## 2020-06-29 NOTE — H&P
[facility]  History & Physical    Patient Name: Narda Person  MRN: [id number]  Admission Date: 6/28/2020  Attending Physician: Damaris Yoder MD   Primary Care Provider: Sirena Coleman MD         Patient information was obtained from patient and ER records.     Subjective:     Principal Problem:Anemia    Chief Complaint:   Chief Complaint   Patient presents with    Shortness of Breath        HPI:   Narda Person is a 74 y.o. female with PMHX of HTN, Hypothyroidism, Type II DM, Alzheimer's dementia, DIONNE and recent pneumonia in January presented to ER last PM with c/o SOB   Patient was visiting in [place] would she became short of breath PTA. No hypoxia on exam, O2 sat 96%l , COvid negative  H&H demonstrate severe anemia, H&H 5.2/19.7, Denies signs/symptoms of bleeding. Notes she does not consume much red meat.   Stool for OB negative,   Lab Results   Component Value Date    IRON 15 (L) 06/28/2020    TIBC 508 (H) 06/28/2020    FERRITIN 7 (L) 06/28/2020           Past Medical History:   Diagnosis Date    Abnormal Pap smear     DIONNE (acute kidney injury) 6/29/2014    DIONNE (acute kidney injury) 6/29/2014    Alzheimer's dementia 4/12/2018    Anemia     Breast cancer 1995    left breast    Breast disorder     Diabetes mellitus     Diabetes mellitus, type 2     ESSENTIAL HYPERTENSION 10/1/2012    Hypothyroid 6/29/2014    Pneumonia 01/16/2020       Past Surgical History:   Procedure Laterality Date    CATARACT EXTRACTION, BILATERAL      HYSTERECTOMY      masectomy      single left breast    MASTECTOMY Left     OOPHORECTOMY  1997    only 1       Review of patient's allergies indicates:   Allergen Reactions    Percocet [oxycodone-acetaminophen] Itching    Percodan [oxycodone hcl-oxycodone-asa] Itching     Other reaction(s): Unknown    Latex, natural rubber Rash    Phenytoin sodium extended      Other reaction(s): Unknown    Sutures, catgut      Infections to  sutures     Adhesive Rash    Adhesive tape-silicones Rash       No current facility-administered medications on file prior to encounter.      Current Outpatient Medications on File Prior to Encounter   Medication Sig    ACCU-CHEK COMPACT PLUS TEST Strp USE TO CHECK BLOOD GLUCOSE FOUR TIMES DAILY    albuterol (ACCUNEB) 0.63 mg/3 mL Nebu Take 3 mLs (0.63 mg total) by nebulization every 6 (six) hours as needed. Rescue    albuterol (VENTOLIN HFA) 90 mcg/actuation inhaler Inhale 2 puffs into the lungs every 4 (four) hours as needed for Wheezing or Shortness of Breath. Rescue    amitriptyline (ELAVIL) 50 MG tablet Take 50 mg by mouth every evening.    atorvastatin (LIPITOR) 20 MG tablet Take 1 tablet (20 mg total) by mouth once daily.    azithromycin (ZITHROMAX) 500 MG tablet Take 1 tablet (500 mg total) by mouth once daily.    azithromycin (ZITHROMAX) 500 MG tablet Take 1 tablet (500 mg total) by mouth once daily. (Patient not taking: Reported on 1/16/2020)    blood-glucose meter kit To check BG 4 times daily, to use with insurance preferred meter    coenzyme Q10 (CO Q-10) 100 mg capsule Take 100 mg by mouth once daily.    diclofenac sodium (VOLTAREN) 1 % Gel Apply 2 g topically 2 (two) times daily.    donepeziL (ARICEPT) 10 MG tablet TAKE 1 TABLET BY MOUTH ONCE DAILY IN THE EVENING    DULoxetine (CYMBALTA) 30 MG capsule Take 1 capsule (30 mg total) by mouth once daily.    gabapentin (NEURONTIN) 300 MG capsule Take 1 capsule (300 mg total) by mouth 2 (two) times daily.    ibuprofen (ADVIL,MOTRIN) 200 MG tablet Take 400 mg by mouth every 6 (six) hours as needed for Pain.    insulin aspart U-100 (NOVOLOG FLEXPEN U-100 INSULIN) 100 unit/mL (3 mL) InPn pen Inject 30 Units into the skin 3 (three) times daily with meals.    insulin syringe-needle U-100 0.3 mL 30 Syrg Use with Levemir vial BID    JANUVIA 100 mg Tab Take 1 tablet (100 mg total) by mouth once daily.    krill/om-3/dha/epa/phospho/ast (MEGARED  "OMEGA-3 KRILL OIL ORAL) Take 1 capsule by mouth 2 (two) times daily.    lancets [facility] To check BG 4 times daily, to use with insurance preferred meter    meclizine (ANTIVERT) 25 mg tablet TAKE 1 TABLET BY MOUTH THREE TIMES DAILY AS NEEDED FOR DIZZINESS OR NAUSEA    melatonin 5 mg Tab Take 10 mg by mouth nightly as needed.     metFORMIN (GLUCOPHAGE) 1000 MG tablet Take 1 tablet (1,000 mg total) by mouth 2 (two) times daily with meals.    naproxen sodium (ALEVE) 220 mg Cap Take 2 capsules by mouth 2 (two) times daily as needed.    omeprazole (PRILOSEC) 20 MG capsule TAKE 1 CAPSULE BY MOUTH TWICE DAILY    pen needle, diabetic (LITE TOUCH INSULIN PEN NEEDLES) 31 gauge x 3/16" Ndle 1 application by Misc.(Non-Drug; Combo Route) route 4 (four) times daily.    pen needle, diabetic 32 gauge x 5/32" Ndle 1 Device by Misc.(Non-Drug; Combo Route) route as directed. Needles to be used with Novolog pens and Levemir pens daily.    ramipriL (ALTACE) 5 MG capsule Take 1 capsule (5 mg total) by mouth once daily.    senna (SENOKOT) 8.6 mg tablet Take 1 tablet by mouth once daily.    TRESIBA FLEXTOUCH U-200 200 unit/mL (3 mL) InPn INJECT 90 UNITS SUBCUTANEOUSLY IN THE MORNING FOR 90 DAYS     Family History     Problem Relation (Age of Onset)    Depression Daughter    Diabetes Mother, Sister    Heart attack Mother (66), Son (38)    Heart disease Sister, Brother, Brother    Hypertension Mother, Daughter, Son    Thyroid disease Daughter    Uterine cancer Mother        Tobacco Use    Smoking status: Former Smoker     Packs/day: 1.00     Years: 10.00     Pack years: 10.00     Types: Cigarettes     Quit date: 1988     Years since quittin.5    Smokeless tobacco: Never Used   Substance and Sexual Activity    Alcohol use: Yes     Alcohol/week: 1.0 standard drinks     Types: 1 Glasses of wine per week     Comment: Occ.    Drug use: No    Sexual activity: Never     Review of Systems   Constitutional: Positive for fatigue. " Negative for chills and fever.   HENT: Negative for congestion, ear pain, postnasal drip, sinus pressure, sneezing and sore throat.    Eyes: Negative for discharge.   Respiratory: Positive for cough and shortness of breath. Negative for chest tightness.    Cardiovascular: Negative.    Gastrointestinal: Negative for abdominal pain, constipation, diarrhea, nausea and vomiting.   Genitourinary: Negative for difficulty urinating, flank pain and hematuria.   Musculoskeletal: Negative.  Negative for arthralgias and joint swelling.   Skin: Negative.    Neurological: Negative for dizziness and headaches.   Psychiatric/Behavioral: Negative for behavioral problems and confusion.     Objective:     Vital Signs (Most Recent):  Temp: 99.2 °F (37.3 °C) (06/29/20 0600)  Pulse: (!) 114 (06/29/20 0600)  Resp: 20 (06/29/20 0600)  BP: (!) 179/93 (06/29/20 0600)  SpO2: 96 % (06/29/20 0611) Vital Signs (24h Range):  Temp:  [97.6 °F (36.4 °C)-99.3 °F (37.4 °C)] 99.2 °F (37.3 °C)  Pulse:  [] 114  Resp:  [20] 20  SpO2:  [96 %-99 %] 96 %  BP: (134-204)/(68-93) 179/93     Weight: 97.8 kg (215 lb 9.8 oz)  Body mass index is 38.81 kg/m².    Physical Exam  Vitals signs and nursing note reviewed.   Constitutional:       General: She is not in acute distress.     Appearance: She is well-developed. She is obese.   HENT:      Head: Normocephalic and atraumatic.   Eyes:      Conjunctiva/sclera: Conjunctivae normal.      Pupils: Pupils are equal, round, and reactive to light.   Neck:      Musculoskeletal: Normal range of motion and neck supple.      Thyroid: No thyromegaly.   Cardiovascular:      Rate and Rhythm: Normal rate and regular rhythm.      Heart sounds: Murmur present.   Pulmonary:      Effort: Pulmonary effort is normal. No respiratory distress.      Breath sounds: Normal breath sounds. No wheezing.   Abdominal:      General: Bowel sounds are normal.      Palpations: Abdomen is soft.      Tenderness: There is no abdominal tenderness.    Musculoskeletal: Normal range of motion.   Lymphadenopathy:      Cervical: No cervical adenopathy.   Skin:     General: Skin is warm and dry.      Coloration: Skin is pale.      Findings: No rash.   Neurological:      Mental Status: She is alert and oriented to person, place, and time.   Psychiatric:         Behavior: Behavior normal.           CRANIAL NERVES     CN III, IV, VI   Pupils are equal, round, and reactive to light.       Significant Labs:   A1C:   Recent Labs   Lab 06/28/20 2214   HGBA1C 6.9*     ABGs: No results for input(s): PH, PCO2, HCO3, POCSATURATED, BE, TOTALHB, COHB, METHB, O2HB, POCFIO2 in the last 48 hours.  Bilirubin:   Recent Labs   Lab 06/28/20 2210   BILITOT 0.7     Blood Culture: No results for input(s): LABBLOO in the last 48 hours.  CBC:   Recent Labs   Lab 06/28/20 2210   WBC 7.83   HGB 5.2*   HCT 19.7*        CMP:   Recent Labs   Lab 06/28/20 2210   *   K 4.1      CO2 21*   *   BUN 16   CREATININE 0.8   CALCIUM 8.7   PROT 6.7   ALBUMIN 3.5   BILITOT 0.7   ALKPHOS 53*   AST 14   ALT 13   ANIONGAP 12   EGFRNONAA >60     Cardiac Markers:   Recent Labs   Lab 06/28/20 2210   *     Coagulation:   Recent Labs   Lab 06/28/20 2210   INR 1.1   APTT 28.6     Lactic Acid:   Recent Labs   Lab 06/28/20 2210   LACTATE 2.8*     Magnesium: No results for input(s): MG in the last 48 hours.  Respiratory Culture: No results for input(s): GSRESP, RESPIRATORYC in the last 48 hours.  Troponin:   Recent Labs   Lab 06/28/20 2210   TROPONINI 0.026     TSH:   Recent Labs   Lab 06/28/20 2214   TSH 5.027*   Free T4- 0.82     Stool for OB negative  Lab Results   Component Value Date    IRON 15 (L) 06/28/2020    TIBC 508 (H) 06/28/2020    FERRITIN 7 (L) 06/28/2020   Retic 2.2- NML  B12- 498   All pertinent labs within the past 24 hours have been reviewed.    Significant Imaging: I have reviewed all pertinent imaging results/findings within the past 24 hours. Patchy opacity  in the medial right lung base which could reflect atelectasis, aspiration or pneumonia.  Left lung is clear.  Heart is normal in size.  Calcified atheromatous disease affects the aorta.  Age-appropriate degenerative changes affect the skeleton.    Assessment/Plan:     * Anemia  No signes of acute bleeding, retic count, PLT normal, stool for OB negative  Rule out Bone marrow failure, ? Peripheral smear, Very iron deficient.  Transfuse , currently receiving her 2nd unit PRBC  Future OP Hematology eval     Anxiety and depression    Continue cymbalta     Alzheimer's dementia  Continue aricept  requires frequent reminders       Hypothyroidism due to acquired atrophy of thyroid        Essential hypertension    Continue ramapril 5mg, increase if needed.    Prn clonidine okay.     Uncontrolled type 2 diabetes mellitus with diabetic polyneuropathy, with long-term current use of insulin  Detemir/aspart per protocol  Correction scale  Cymbalta for neuropathy   glucsoe 165-171         VTE Risk Mitigation (From admission, onward)         Ordered     IP VTE HIGH RISK PATIENT  Once      06/29/20 0056     Place sequential compression device  Until discontinued      06/29/20 0056                   Damaris Yoder MD  Department of Hospital Medicine   [facility]

## 2020-06-29 NOTE — ED PROVIDER NOTES
[facility]                                                 Chief Complaint  74 y.o. female with Shortness of Breath      History of Present Illness  Narda Person presents to the emergency room with shortness of breath   Patient was visiting in [place] would she became short of breath PTA  Patient had pneumonia in January 2020 with similar symptoms at the time  Patient states that she has a seldom cough, actual no hypoxia on arrival  Patient denies any chest pain with this shortness of breath this evening    The history is provided by the patient   device was not used during this ER visit  Medical history: DIONNE, Alzheimer's, abnormal Pap smear, diabetes, pneumonia  Surgeries: Cataract, hysterectomy, mastectomy, oophorectomy  Allergies: Percocet, latex, adhesive, phenytoin, sutures    I have reviewed all of this patient's past medical, surgical, family, and social   histories as well as active allergies and medications documented in the  electronic medical record    Review of Systems and Physical Exam      Review of Systems  -- Constitution - weakness and fatigue with no fever chills  -- Eyes - no tearing or redness, no visual disturbance  -- Ear, Nose - no tinnitus or earache, no nasal congestion or discharge  -- Mouth,Throat - no sore throat, no toothache, normal voice, normal swallowing  -- Respiratory - shortness of breath with no cough or congestion  -- Cardiovascular - denies chest pain, no palpitations, denies claudication  -- Gastrointestinal - denies abdominal pain, nausea, vomiting, or diarrhea  -- Genitourinary - no dysuria, denies flank pain, no hematuria, no STD risk  -- Musculoskeletal - denies back pain, negative for trauma or injury  -- Neurological - no headache, denies weakness or seizure; no LOC  -- Skin - denies pallor, rash, or changes in skin. no hives or welts noted  -- Psychiatric - Denies SI or HI, no psychosis or fractured thought noted     Vital  Signs  Her tympanic temperature is 97.7 °F (36.5 °C).   Her blood pressure is 171/82 and her pulse is 100.   Her respiration is 20 and oxygen saturation is 96%.     Physical Exam  -- Nursing note and vitals reviewed  -- Constitutional: Appears well-developed and well-nourished  -- Head: Atraumatic. Normocephalic. No obvious abnormality  -- Eyes: Pupils are equal and reactive to light. Normal conjunctiva and lids  -- Cardiac: Normal rate, regular rhythm and normal heart sounds  -- Pulmonary: Normal respiratory effort, breath sounds clear to auscultation  -- Abdominal: Soft, no tenderness. Normal bowel sounds. Normal liver edge  -- Musculoskeletal: Normal range of motion, no effusions. Joints stable   -- Neurological: No focal deficits. Showed good interaction with staff  -- Vascular: Posterior tibial, dorsalis pedis and radial pulses 2+ bilaterally      Emergency Room Course      Lab Results   (L)   K 4.1      CO2 21 (L)   BUN 16   CREATININE 0.8    (H)   ALKPHOS 53 (L)   AST 14   ALT 13   BILITOT 0.7   ALBUMIN 3.5   PROT 6.7   WBC 7.83   HGB 5.2 (LL)   HCT 19.7 (LL)      CPK 72   CPK 72   CPKMB 2.0   TROPONINI 0.026   INR 1.1    (H)   DDIMER 1.70 (H)   LACTATE 2.8 (H)      EKG  -- The EKG findings today were without concerning findings from baseline     Radiology  -- Chest x-ray showed no infiltrate and showed no acute pathology     Additional Work up  -- chronic anemia workup currently pending    Medications Given  0.9%  NaCl infusion (for blood administration) (has no administration in time range)   albuterol-ipratropium 2.5 mg-0.5 mg/3 mL nebulizer solution 3 mL      ED Physician Management  -- Diagnosis management comments: 74 y.o. female with shortness of breath  -- patient with shortness of breath, patient with a hemoglobin of 5.2 in the ER   -- March 2019 hemoglobin was normal, no lab work since that time in epic  -- hemoccult was negative, patient has reticulocyte count was  normal today  -- patient to be admitted and transfused with anemia workup pending tonight    Diagnosis  [R06.02] Shortness of breath  [D64.9] Anemia, unspecified type (Primary)    Disposition and Plan  -- Disposition: observation  -- Condition: stable    This note is dictated on M*Modal word recognition program.  There are word recognition mistakes that are occasionally missed on review.         Leonid Burroughs MD  06/28/20 9411

## 2020-06-29 NOTE — PROGRESS NOTES
Daughter called for discharge will be on their way. Educated patient about appts and will also educated daughter

## 2020-06-29 NOTE — SUBJECTIVE & OBJECTIVE
Past Medical History:   Diagnosis Date    Abnormal Pap smear     DIONNE (acute kidney injury) 6/29/2014    DIONNE (acute kidney injury) 6/29/2014    Alzheimer's dementia 4/12/2018    Anemia     Breast cancer 1995    left breast    Breast disorder     Diabetes mellitus     Diabetes mellitus, type 2     ESSENTIAL HYPERTENSION 10/1/2012    Hypothyroid 6/29/2014    Pneumonia 01/16/2020       Past Surgical History:   Procedure Laterality Date    CATARACT EXTRACTION, BILATERAL      HYSTERECTOMY      masectomy      single left breast    MASTECTOMY Left     OOPHORECTOMY  1997    only 1       Review of patient's allergies indicates:   Allergen Reactions    Percocet [oxycodone-acetaminophen] Itching    Percodan [oxycodone hcl-oxycodone-asa] Itching     Other reaction(s): Unknown    Latex, natural rubber Rash    Phenytoin sodium extended      Other reaction(s): Unknown    Sutures, catgut      Infections to sutures     Adhesive Rash    Adhesive tape-silicones Rash       No current facility-administered medications on file prior to encounter.      Current Outpatient Medications on File Prior to Encounter   Medication Sig    ACCU-CHEK COMPACT PLUS TEST Strp USE TO CHECK BLOOD GLUCOSE FOUR TIMES DAILY    albuterol (ACCUNEB) 0.63 mg/3 mL Nebu Take 3 mLs (0.63 mg total) by nebulization every 6 (six) hours as needed. Rescue    albuterol (VENTOLIN HFA) 90 mcg/actuation inhaler Inhale 2 puffs into the lungs every 4 (four) hours as needed for Wheezing or Shortness of Breath. Rescue    amitriptyline (ELAVIL) 50 MG tablet Take 50 mg by mouth every evening.    atorvastatin (LIPITOR) 20 MG tablet Take 1 tablet (20 mg total) by mouth once daily.    azithromycin (ZITHROMAX) 500 MG tablet Take 1 tablet (500 mg total) by mouth once daily.    azithromycin (ZITHROMAX) 500 MG tablet Take 1 tablet (500 mg total) by mouth once daily. (Patient not taking: Reported on 1/16/2020)    blood-glucose meter kit To check BG 4 times  "daily, to use with insurance preferred meter    coenzyme Q10 (CO Q-10) 100 mg capsule Take 100 mg by mouth once daily.    diclofenac sodium (VOLTAREN) 1 % Gel Apply 2 g topically 2 (two) times daily.    donepeziL (ARICEPT) 10 MG tablet TAKE 1 TABLET BY MOUTH ONCE DAILY IN THE EVENING    DULoxetine (CYMBALTA) 30 MG capsule Take 1 capsule (30 mg total) by mouth once daily.    gabapentin (NEURONTIN) 300 MG capsule Take 1 capsule (300 mg total) by mouth 2 (two) times daily.    ibuprofen (ADVIL,MOTRIN) 200 MG tablet Take 400 mg by mouth every 6 (six) hours as needed for Pain.    insulin aspart U-100 (NOVOLOG FLEXPEN U-100 INSULIN) 100 unit/mL (3 mL) InPn pen Inject 30 Units into the skin 3 (three) times daily with meals.    insulin syringe-needle U-100 0.3 mL 30 Syrg Use with Levemir vial BID    JANUVIA 100 mg Tab Take 1 tablet (100 mg total) by mouth once daily.    krill/om-3/dha/epa/phospho/ast (MEGARED OMEGA-3 KRILL OIL ORAL) Take 1 capsule by mouth 2 (two) times daily.    lancets [facility] To check BG 4 times daily, to use with insurance preferred meter    meclizine (ANTIVERT) 25 mg tablet TAKE 1 TABLET BY MOUTH THREE TIMES DAILY AS NEEDED FOR DIZZINESS OR NAUSEA    melatonin 5 mg Tab Take 10 mg by mouth nightly as needed.     metFORMIN (GLUCOPHAGE) 1000 MG tablet Take 1 tablet (1,000 mg total) by mouth 2 (two) times daily with meals.    naproxen sodium (ALEVE) 220 mg Cap Take 2 capsules by mouth 2 (two) times daily as needed.    omeprazole (PRILOSEC) 20 MG capsule TAKE 1 CAPSULE BY MOUTH TWICE DAILY    pen needle, diabetic (LITE TOUCH INSULIN PEN NEEDLES) 31 gauge x 3/16" Ndle 1 application by Misc.(Non-Drug; Combo Route) route 4 (four) times daily.    pen needle, diabetic 32 gauge x 5/32" Ndle 1 Device by Misc.(Non-Drug; Combo Route) route as directed. Needles to be used with Novolog pens and Levemir pens daily.    ramipriL (ALTACE) 5 MG capsule Take 1 capsule (5 mg total) by mouth once daily.    " senna (SENOKOT) 8.6 mg tablet Take 1 tablet by mouth once daily.    TRESIBA FLEXTOUCH U-200 200 unit/mL (3 mL) InPn INJECT 90 UNITS SUBCUTANEOUSLY IN THE MORNING FOR 90 DAYS     Family History     Problem Relation (Age of Onset)    Depression Daughter    Diabetes Mother, Sister    Heart attack Mother (66), Son (38)    Heart disease Sister, Brother, Brother    Hypertension Mother, Daughter, Son    Thyroid disease Daughter    Uterine cancer Mother        Tobacco Use    Smoking status: Former Smoker     Packs/day: 1.00     Years: 10.00     Pack years: 10.00     Types: Cigarettes     Quit date: 1988     Years since quittin.5    Smokeless tobacco: Never Used   Substance and Sexual Activity    Alcohol use: Yes     Alcohol/week: 1.0 standard drinks     Types: 1 Glasses of wine per week     Comment: Occ.    Drug use: No    Sexual activity: Never     Review of Systems   Constitutional: Positive for fatigue. Negative for chills and fever.   HENT: Negative for congestion, ear pain, postnasal drip, sinus pressure, sneezing and sore throat.    Eyes: Negative for discharge.   Respiratory: Positive for cough and shortness of breath. Negative for chest tightness.    Cardiovascular: Negative.    Gastrointestinal: Negative for abdominal pain, constipation, diarrhea, nausea and vomiting.   Genitourinary: Negative for difficulty urinating, flank pain and hematuria.   Musculoskeletal: Negative.  Negative for arthralgias and joint swelling.   Skin: Negative.    Neurological: Negative for dizziness and headaches.   Psychiatric/Behavioral: Negative for behavioral problems and confusion.     Objective:     Vital Signs (Most Recent):  Temp: 99.2 °F (37.3 °C) (20 06)  Pulse: (!) 114 (20 06)  Resp: 20 (20)  BP: (!) 179/93 (20 06)  SpO2: 96 % (20 06) Vital Signs (24h Range):  Temp:  [97.6 °F (36.4 °C)-99.3 °F (37.4 °C)] 99.2 °F (37.3 °C)  Pulse:  [] 114  Resp:  [20] 20  SpO2:  [96  %-99 %] 96 %  BP: (134-204)/(68-93) 179/93     Weight: 97.8 kg (215 lb 9.8 oz)  Body mass index is 38.81 kg/m².    Physical Exam  Vitals signs and nursing note reviewed.   Constitutional:       General: She is not in acute distress.     Appearance: She is well-developed. She is obese.   HENT:      Head: Normocephalic and atraumatic.   Eyes:      Conjunctiva/sclera: Conjunctivae normal.      Pupils: Pupils are equal, round, and reactive to light.   Neck:      Musculoskeletal: Normal range of motion and neck supple.      Thyroid: No thyromegaly.   Cardiovascular:      Rate and Rhythm: Normal rate and regular rhythm.      Heart sounds: Murmur present.   Pulmonary:      Effort: Pulmonary effort is normal. No respiratory distress.      Breath sounds: Normal breath sounds. No wheezing.   Abdominal:      General: Bowel sounds are normal.      Palpations: Abdomen is soft.      Tenderness: There is no abdominal tenderness.   Musculoskeletal: Normal range of motion.   Lymphadenopathy:      Cervical: No cervical adenopathy.   Skin:     General: Skin is warm and dry.      Coloration: Skin is pale.      Findings: No rash.   Neurological:      Mental Status: She is alert and oriented to person, place, and time.   Psychiatric:         Behavior: Behavior normal.           CRANIAL NERVES     CN III, IV, VI   Pupils are equal, round, and reactive to light.       Significant Labs:   A1C:   Recent Labs   Lab 06/28/20  2214   HGBA1C 6.9*     ABGs: No results for input(s): PH, PCO2, HCO3, POCSATURATED, BE, TOTALHB, COHB, METHB, O2HB, POCFIO2 in the last 48 hours.  Bilirubin:   Recent Labs   Lab 06/28/20  2210   BILITOT 0.7     Blood Culture: No results for input(s): LABBLOO in the last 48 hours.  CBC:   Recent Labs   Lab 06/28/20 2210   WBC 7.83   HGB 5.2*   HCT 19.7*        CMP:   Recent Labs   Lab 06/28/20 2210   *   K 4.1      CO2 21*   *   BUN 16   CREATININE 0.8   CALCIUM 8.7   PROT 6.7   ALBUMIN 3.5    BILITOT 0.7   ALKPHOS 53*   AST 14   ALT 13   ANIONGAP 12   EGFRNONAA >60     Cardiac Markers:   Recent Labs   Lab 06/28/20 2210   *     Coagulation:   Recent Labs   Lab 06/28/20 2210   INR 1.1   APTT 28.6     Lactic Acid:   Recent Labs   Lab 06/28/20 2210   LACTATE 2.8*     Magnesium: No results for input(s): MG in the last 48 hours.  Respiratory Culture: No results for input(s): GSRESP, RESPIRATORYC in the last 48 hours.  Troponin:   Recent Labs   Lab 06/28/20 2210   TROPONINI 0.026     TSH:   Recent Labs   Lab 06/28/20 2214   TSH 5.027*   Free T4- 0.82     Stool for OB negative  Lab Results   Component Value Date    IRON 15 (L) 06/28/2020    TIBC 508 (H) 06/28/2020    FERRITIN 7 (L) 06/28/2020   Retic 2.2- NML  B12- 498   All pertinent labs within the past 24 hours have been reviewed.    Significant Imaging: I have reviewed all pertinent imaging results/findings within the past 24 hours. Patchy opacity in the medial right lung base which could reflect atelectasis, aspiration or pneumonia.  Left lung is clear.  Heart is normal in size.  Calcified atheromatous disease affects the aorta.  Age-appropriate degenerative changes affect the skeleton.

## 2020-06-29 NOTE — HPI
Narda Person is a 74 y.o. female with PMHX of HTN, Hypothyroidism, Type II DM, Alzheimer's dementia, DIONNE and recent pneumonia in January presented to ER last PM with c/o SOB   Patient was visiting in [place] would she became short of breath PTA. No hypoxia on exam, O2 sat 96%l , COvid negative  H&H demonstrate severe anemia, H&H 5.2/19.7, Denies signs/symptoms of bleeding. Notes she does not consume much red meat.   Stool for OB negative,   Lab Results   Component Value Date    IRON 15 (L) 06/28/2020    TIBC 508 (H) 06/28/2020    FERRITIN 7 (L) 06/28/2020

## 2020-06-29 NOTE — PLAN OF CARE
Patient Agrees and Compliant with Plan of Care;  Monitor Vital signs B/P 150's-170's over 70's -80's.  Monitor for signs of increased anxiety; No c/o increased anxiety noted.  Monitor Breathing Pattern; Bilateral Breath sounds auscultated early this shift with fine crackles LLL. Post Transfusion note slight expiratory wheezing . Lasix 20mg given per physician's request.Patient continues on   Monitor Hgb. And Hct.; Last noted was Hg. 5.2 and Hct. 19.7.  Transfuse 2 units of PRBC's ; Patient received 1 U of PRBC's since on unit. No transfusion reaction noted  Accuchecks AC and HS Blood sugar tonight was 171.  Administer night time insulin as ordered; Patient refused to take 30U of Detemir, did take 15U of Detemir.  Monitor Cardiac Rhythm; Patient on Tele in Sinus Tachycardia without ectopy.   Fall Precautions; Maintain Patient Safety; Bed alarm in use; No falls or injury noted this shift.

## 2020-06-29 NOTE — TELEPHONE ENCOUNTER
----- Message from Barbi Mirza sent at 2020  2:14 PM CDT -----  Regarding: Appointment Access  Contact: Marta with [facility]  Narda Person  MRN: [id number]  : 1945  PCP: Sirena Coleman  Home Phone      [phone]  Work Phone      Not on file.  Mobile          [phone]      MESSAGE:    Request appointment access - hospital f/u - physician  instructed one week f/u with PCP.  Appointment scheduled 20 with PCP.    Phone # [phone]    Pharmacy - [facility] [address] [place] [address]

## 2020-06-29 NOTE — PLAN OF CARE
ESTES completed. Signed copy given to patient.    06/29/20 1023   ESTES Message   Medicare Outpatient and Observation Notification regarding financial responsibility Given to patient/caregiver;Explained to patient/caregiver;Signed/date by patient/caregiver   Date ESTES was signed 06/29/20   Time ESTES was signed 1000

## 2020-06-30 ENCOUNTER — PATIENT OUTREACH (OUTPATIENT)
Dept: ADMINISTRATIVE | Facility: OTHER | Age: 75
End: 2020-06-30

## 2020-06-30 ENCOUNTER — OUTPATIENT CASE MANAGEMENT (OUTPATIENT)
Dept: ADMINISTRATIVE | Facility: OTHER | Age: 75
End: 2020-06-30

## 2020-06-30 NOTE — PROGRESS NOTES
6/30/2020    2 10 pm call again no ans message left  10:30 am  Number listed in chart is daughter number.  She indicates pt is sleeping and she will have her call me back when she wakes up.

## 2020-06-30 NOTE — PLAN OF CARE
06/30/20 0752   Final Note   Assessment Type Final Discharge Note   Anticipated Discharge Disposition Home   What phone number can be called within the next 1-3 days to see how you are doing after discharge? [phone]   Hospital Follow Up  Appt(s) scheduled? Yes   Discharge plans and expectations educations in teach back method with documentation complete? Yes       No post-acute care needs identified during this hospital stay.     Earnestine Brand LMSW

## 2020-06-30 NOTE — PLAN OF CARE
06/30/20 0752   Post-Acute Status   Post-Acute Authorization Other   Other Status No Post-Acute Service Needs   Discharge Delays None known at this time

## 2020-06-30 NOTE — PROGRESS NOTES
Requested updates within Care Everywhere.  Patient's chart was reviewed for overdue [place] topics.  Immunizations reconciled.

## 2020-07-04 LAB
BACTERIA BLD CULT: NORMAL
BACTERIA BLD CULT: NORMAL

## 2020-07-06 ENCOUNTER — OFFICE VISIT (OUTPATIENT)
Dept: INTERNAL MEDICINE | Facility: CLINIC | Age: 75
End: 2020-07-06
Payer: MEDICARE

## 2020-07-06 ENCOUNTER — LAB VISIT (OUTPATIENT)
Dept: LAB | Facility: HOSPITAL | Age: 75
End: 2020-07-06
Attending: INTERNAL MEDICINE
Payer: MEDICARE

## 2020-07-06 VITALS
OXYGEN SATURATION: 92 % | HEIGHT: 63 IN | WEIGHT: 215 LBS | SYSTOLIC BLOOD PRESSURE: 136 MMHG | DIASTOLIC BLOOD PRESSURE: 70 MMHG | HEART RATE: 90 BPM | BODY MASS INDEX: 38.09 KG/M2 | RESPIRATION RATE: 16 BRPM

## 2020-07-06 DIAGNOSIS — F41.9 ANXIETY AND DEPRESSION: ICD-10-CM

## 2020-07-06 DIAGNOSIS — R41.3 MEMORY LOSS: ICD-10-CM

## 2020-07-06 DIAGNOSIS — Z76.0 ENCOUNTER FOR MEDICATION REFILL: ICD-10-CM

## 2020-07-06 DIAGNOSIS — I10 ESSENTIAL HYPERTENSION: ICD-10-CM

## 2020-07-06 DIAGNOSIS — F32.A ANXIETY AND DEPRESSION: ICD-10-CM

## 2020-07-06 DIAGNOSIS — D50.0 IRON DEFICIENCY ANEMIA DUE TO CHRONIC BLOOD LOSS: Primary | ICD-10-CM

## 2020-07-06 LAB
ALBUMIN SERPL BCP-MCNC: 3.5 G/DL (ref 3.5–5.2)
ALBUMIN/CREAT UR: 54.7 UG/MG (ref 0–30)
ALP SERPL-CCNC: 61 U/L (ref 55–135)
ALT SERPL W/O P-5'-P-CCNC: 21 U/L (ref 10–44)
ANION GAP SERPL CALC-SCNC: 10 MMOL/L (ref 8–16)
AST SERPL-CCNC: 23 U/L (ref 10–40)
BASOPHILS # BLD AUTO: 0.14 K/UL (ref 0–0.2)
BASOPHILS NFR BLD: 1.8 % (ref 0–1.9)
BILIRUB SERPL-MCNC: 0.5 MG/DL (ref 0.1–1)
BUN SERPL-MCNC: 15 MG/DL (ref 8–23)
CALCIUM SERPL-MCNC: 9.8 MG/DL (ref 8.7–10.5)
CHLORIDE SERPL-SCNC: 106 MMOL/L (ref 95–110)
CHOLEST SERPL-MCNC: 131 MG/DL (ref 120–199)
CHOLEST/HDLC SERPL: 3.5 {RATIO} (ref 2–5)
CO2 SERPL-SCNC: 25 MMOL/L (ref 23–29)
CREAT SERPL-MCNC: 0.8 MG/DL (ref 0.5–1.4)
CREAT UR-MCNC: 104.3 MG/DL (ref 15–325)
DIFFERENTIAL METHOD: ABNORMAL
EOSINOPHIL # BLD AUTO: 0.3 K/UL (ref 0–0.5)
EOSINOPHIL NFR BLD: 4 % (ref 0–8)
ERYTHROCYTE [DISTWIDTH] IN BLOOD BY AUTOMATED COUNT: 26.4 % (ref 11.5–14.5)
EST. GFR  (AFRICAN AMERICAN): >60 ML/MIN/1.73 M^2
EST. GFR  (NON AFRICAN AMERICAN): >60 ML/MIN/1.73 M^2
ESTIMATED AVG GLUCOSE: 128 MG/DL (ref 68–131)
GLUCOSE SERPL-MCNC: 95 MG/DL (ref 70–110)
HBA1C MFR BLD HPLC: 6.1 % (ref 4–5.6)
HCT VFR BLD AUTO: 35.5 % (ref 37–48.5)
HDLC SERPL-MCNC: 37 MG/DL (ref 40–75)
HDLC SERPL: 28.2 % (ref 20–50)
HGB BLD-MCNC: 10 G/DL (ref 12–16)
IMM GRANULOCYTES # BLD AUTO: 0.03 K/UL (ref 0–0.04)
IMM GRANULOCYTES NFR BLD AUTO: 0.4 % (ref 0–0.5)
LDLC SERPL CALC-MCNC: 76.6 MG/DL (ref 63–159)
LYMPHOCYTES # BLD AUTO: 1.4 K/UL (ref 1–4.8)
LYMPHOCYTES NFR BLD: 18.7 % (ref 18–48)
MCH RBC QN AUTO: 20.8 PG (ref 27–31)
MCHC RBC AUTO-ENTMCNC: 28.2 G/DL (ref 32–36)
MCV RBC AUTO: 74 FL (ref 82–98)
MICROALBUMIN UR DL<=1MG/L-MCNC: 57 UG/ML
MONOCYTES # BLD AUTO: 0.6 K/UL (ref 0.3–1)
MONOCYTES NFR BLD: 7.8 % (ref 4–15)
NEUTROPHILS # BLD AUTO: 5.2 K/UL (ref 1.8–7.7)
NEUTROPHILS NFR BLD: 67.3 % (ref 38–73)
NONHDLC SERPL-MCNC: 94 MG/DL
NRBC BLD-RTO: 0 /100 WBC
PLATELET # BLD AUTO: 287 K/UL (ref 150–350)
PMV BLD AUTO: 10.3 FL (ref 9.2–12.9)
POTASSIUM SERPL-SCNC: 4.4 MMOL/L (ref 3.5–5.1)
PROT SERPL-MCNC: 7.2 G/DL (ref 6–8.4)
RBC # BLD AUTO: 4.8 M/UL (ref 4–5.4)
SODIUM SERPL-SCNC: 141 MMOL/L (ref 136–145)
TRIGL SERPL-MCNC: 87 MG/DL (ref 30–150)
TSH SERPL DL<=0.005 MIU/L-ACNC: 2.57 UIU/ML (ref 0.4–4)
WBC # BLD AUTO: 7.66 K/UL (ref 3.9–12.7)

## 2020-07-06 PROCEDURE — 1101F PT FALLS ASSESS-DOCD LE1/YR: CPT | Mod: HCNC,CPTII,S$GLB, | Performed by: INTERNAL MEDICINE

## 2020-07-06 PROCEDURE — 1159F PR MEDICATION LIST DOCUMENTED IN MEDICAL RECORD: ICD-10-PCS | Mod: HCNC,S$GLB,, | Performed by: INTERNAL MEDICINE

## 2020-07-06 PROCEDURE — 99999 PR PBB SHADOW E&M-EST. PATIENT-LVL IV: CPT | Mod: PBBFAC,HCNC,, | Performed by: INTERNAL MEDICINE

## 2020-07-06 PROCEDURE — 99214 OFFICE O/P EST MOD 30 MIN: CPT | Mod: HCNC,S$GLB,, | Performed by: INTERNAL MEDICINE

## 2020-07-06 PROCEDURE — 1159F MED LIST DOCD IN RCRD: CPT | Mod: HCNC,S$GLB,, | Performed by: INTERNAL MEDICINE

## 2020-07-06 PROCEDURE — 99499 UNLISTED E&M SERVICE: CPT | Mod: HCNC,S$GLB,, | Performed by: INTERNAL MEDICINE

## 2020-07-06 PROCEDURE — 1126F PR PAIN SEVERITY QUANTIFIED, NO PAIN PRESENT: ICD-10-PCS | Mod: HCNC,S$GLB,, | Performed by: INTERNAL MEDICINE

## 2020-07-06 PROCEDURE — 85025 COMPLETE CBC W/AUTO DIFF WBC: CPT | Mod: HCNC

## 2020-07-06 PROCEDURE — 99999 PR PBB SHADOW E&M-EST. PATIENT-LVL IV: ICD-10-PCS | Mod: PBBFAC,HCNC,, | Performed by: INTERNAL MEDICINE

## 2020-07-06 PROCEDURE — 83036 HEMOGLOBIN GLYCOSYLATED A1C: CPT | Mod: HCNC

## 2020-07-06 PROCEDURE — 82043 UR ALBUMIN QUANTITATIVE: CPT | Mod: HCNC

## 2020-07-06 PROCEDURE — 99499 RISK ADDL DX/OHS AUDIT: ICD-10-PCS | Mod: HCNC,S$GLB,, | Performed by: INTERNAL MEDICINE

## 2020-07-06 PROCEDURE — 80053 COMPREHEN METABOLIC PANEL: CPT | Mod: HCNC

## 2020-07-06 PROCEDURE — 99214 PR OFFICE/OUTPT VISIT, EST, LEVL IV, 30-39 MIN: ICD-10-PCS | Mod: HCNC,S$GLB,, | Performed by: INTERNAL MEDICINE

## 2020-07-06 PROCEDURE — 84443 ASSAY THYROID STIM HORMONE: CPT | Mod: HCNC

## 2020-07-06 PROCEDURE — 1101F PR PT FALLS ASSESS DOC 0-1 FALLS W/OUT INJ PAST YR: ICD-10-PCS | Mod: HCNC,CPTII,S$GLB, | Performed by: INTERNAL MEDICINE

## 2020-07-06 PROCEDURE — 80061 LIPID PANEL: CPT | Mod: HCNC

## 2020-07-06 PROCEDURE — 36415 COLL VENOUS BLD VENIPUNCTURE: CPT | Mod: HCNC

## 2020-07-06 PROCEDURE — 1126F AMNT PAIN NOTED NONE PRSNT: CPT | Mod: HCNC,S$GLB,, | Performed by: INTERNAL MEDICINE

## 2020-07-06 RX ORDER — ATORVASTATIN CALCIUM 20 MG/1
20 TABLET, FILM COATED ORAL DAILY
Qty: 90 TABLET | Refills: 1 | Status: SHIPPED | OUTPATIENT
Start: 2020-07-06 | End: 2021-03-29

## 2020-07-06 RX ORDER — INSULIN DEGLUDEC 200 U/ML
INJECTION, SOLUTION SUBCUTANEOUS
Status: CANCELLED | OUTPATIENT
Start: 2020-07-06

## 2020-07-06 RX ORDER — FERROUS SULFATE 325(65) MG
325 TABLET ORAL 2 TIMES DAILY
Qty: 60 TABLET | Refills: 2 | COMMUNITY
Start: 2020-07-06 | End: 2021-01-06

## 2020-07-06 RX ORDER — SITAGLIPTIN 100 MG/1
100 TABLET, FILM COATED ORAL DAILY
Qty: 90 TABLET | Refills: 1 | Status: SHIPPED | OUTPATIENT
Start: 2020-07-06 | End: 2021-01-08

## 2020-07-06 RX ORDER — METFORMIN HYDROCHLORIDE 1000 MG/1
1000 TABLET ORAL 2 TIMES DAILY WITH MEALS
Qty: 180 TABLET | Refills: 1 | Status: SHIPPED | OUTPATIENT
Start: 2020-07-06 | End: 2020-12-14

## 2020-07-06 RX ORDER — RAMIPRIL 5 MG/1
5 CAPSULE ORAL DAILY
Qty: 90 CAPSULE | Refills: 1 | Status: SHIPPED | OUTPATIENT
Start: 2020-07-06 | End: 2021-03-29

## 2020-07-06 RX ORDER — GABAPENTIN 300 MG/1
300 CAPSULE ORAL 2 TIMES DAILY
Qty: 180 CAPSULE | Refills: 1 | Status: SHIPPED | OUTPATIENT
Start: 2020-07-06 | End: 2021-01-08 | Stop reason: SDUPTHER

## 2020-07-06 RX ORDER — SENNOSIDES 8.6 MG/1
1 TABLET ORAL DAILY
COMMUNITY
Start: 2020-07-06 | End: 2022-10-05

## 2020-07-06 RX ORDER — AMITRIPTYLINE HYDROCHLORIDE 50 MG/1
50 TABLET, FILM COATED ORAL NIGHTLY
Status: CANCELLED | OUTPATIENT
Start: 2020-07-06

## 2020-07-06 RX ORDER — INSULIN ASPART 100 [IU]/ML
30 INJECTION, SOLUTION INTRAVENOUS; SUBCUTANEOUS
Qty: 15 SYRINGE | Refills: 2 | Status: SHIPPED | OUTPATIENT
Start: 2020-07-06 | End: 2022-09-07 | Stop reason: SDUPTHER

## 2020-07-06 RX ORDER — DULOXETIN HYDROCHLORIDE 30 MG/1
30 CAPSULE, DELAYED RELEASE ORAL DAILY
Qty: 30 CAPSULE | Refills: 5 | Status: SHIPPED | OUTPATIENT
Start: 2020-07-06 | End: 2020-10-12

## 2020-07-06 RX ORDER — DONEPEZIL HYDROCHLORIDE 10 MG/1
TABLET, FILM COATED ORAL
Qty: 90 TABLET | Refills: 1 | Status: SHIPPED | OUTPATIENT
Start: 2020-07-06 | End: 2021-03-01

## 2020-07-06 NOTE — PROGRESS NOTES
Subjective:       Patient ID: Narda Person is a 74 y.o. female.    Chief Complaint: Hospital Follow Up    Discharge Summary        Patient Name: Narda Person  MRN: [id number]  Admission Date: 6/28/2020  Hospital Length of Stay: 0 days  Discharge Date and Time:  06/29/2020 11:08 AM  Attending Physician: Damaris Yoder MD   Discharging Provider: Angella Ruiz NP  Primary Care Provider: Sirena Coleman MD        HPI:   Narda Person is a 74 y.o. female with PMHX of HTN, Hypothyroidism, Type II DM, Alzheimer's dementia, DIONNE and recent pneumonia in January presented to ER last PM with c/o SOB   Patient was visiting in [place] would she became short of breath PTA. No hypoxia on exam, O2 sat 96%l , COvid negative  H&H demonstrate severe anemia, H&H 5.2/19.7, Denies signs/symptoms of bleeding. Notes she does not consume much red meat.   Stool for OB negative,   Lab Results  Component Value Date    IRON 15 (L) 06/28/2020    TIBC 508 (H) 06/28/2020    FERRITIN 7 (L) 06/28/2020             * No surgery found *       Hospital Course:   6/29 H&P continued  This am pt noted to have SOB after 1U PRBCs; she was given Lasix 20mg IVP , has diuresed 1Liter urine since.       Consults:      * Anemia  No signes of acute bleeding, retic count, PLT normal, stool for OB negative  Rule out Bone marrow failure, ? Peripheral smear, Very iron deficient.  Transfuse , currently receiving her 2nd unit PRBC  Future OP Hematology eval   D/ C home later this pm if H&H Better, HGB >8  F/U as op      Anxiety and depression     Continue cymbalta      Alzheimer's dementia  Continue aricept  requires frequent reminders         Hypothyroidism due to acquired atrophy of thyroid           Essential hypertension     Continue ramapril 5mg, increase if needed.     Prn clonidine okay.      Uncontrolled type 2 diabetes mellitus with diabetic polyneuropathy, with long-term current use of insulin  Detemir/aspart per protocol  Correction scale  Cymbalta  for neuropathy   glucsoe 165-171           Final Active Diagnoses:    Diagnosis Date Noted POA   PRINCIPAL PROBLEM:  Anemia (D64.9) 06/28/2020 Yes   Anxiety and depression (F41.9, F32.9) 10/10/2018 Yes   Alzheimer's dementia (G30.9, F02.80) 04/12/2018 Yes   Hypothyroidism due to acquired atrophy of thyroid (E03.4) 06/29/2014 Yes   Essential hypertension (I10) 10/01/2012 Yes   Uncontrolled type 2 diabetes mellitus with diabetic polyneuropathy, with long-term current use of insulin (E11.42, Z79.4, E11.65) 10/01/2012 Not Applicable     Problems Resolved During this Admission:       Discharged Condition: stable     Disposition: Home or Self Care     Follow Up:  Follow-up Information       Schedule an appointment as soon as possible for a visit with Sirena Coleman MD.   Specialty: Internal Medicine  Contact information:  [address] [address]  [phone]                    Patient Instructions:     CBC auto differential  Standing Status: Future Standing Exp. Date: 06/29/21     Ambulatory referral/consult to Hematology / Oncology   Standing Status: Future  Referral Priority: Routine Referral Type: Consultation   Referral Reason: Specialty Services Required   Requested Specialty: Hematology and Oncology   Number of Visits Requested: 1        Significant Diagnostic Studies:   A1C:      Recent Labs  Lab 06/28/20  2214  HGBA1C 6.9*     ABGs: No results for input(s): PH, PCO2, HCO3, POCSATURATED, BE, TOTALHB, COHB, METHB, O2HB, POCFIO2 in the last 48 hours.  Bilirubin:        Recent Labs  Lab 06/28/20  2210  BILITOT 0.7     Blood Culture: No results for input(s): LABBLOO in the last 48 hours.  CBC:        Recent Labs  Lab 06/28/20  2210  WBC 7.83  HGB 5.2*  HCT 19.7*       CMP:        Recent Labs  Lab 06/28/20  2210  *  K 4.1    CO2 21*  *  BUN 16  CREATININE 0.8  CALCIUM 8.7  PROT 6.7  ALBUMIN 3.5  BILITOT 0.7  ALKPHOS 53*  AST 14  ALT 13  ANIONGAP 12  EGFRNONAA >60     Cardiac  Markers:        Recent Labs  Lab 06/28/20 2210  *     Coagulation:        Recent Labs  Lab 06/28/20 2210  INR 1.1  APTT 28.6     Lactic Acid:        Recent Labs  Lab 06/28/20 2210  LACTATE 2.8*     Magnesium: No results for input(s): MG in the last 48 hours.  Respiratory Culture: No results for input(s): GSRESP, RESPIRATORYC in the last 48 hours.  Troponin:        Recent Labs  Lab 06/28/20 2210  TROPONINI 0.026     TSH:        Recent Labs  Lab 06/28/20 2214  TSH 5.027*  Free T4- 0.82      Stool for OB negative       Lab Results  Component Value Date    IRON 15 (L) 06/28/2020    TIBC 508 (H) 06/28/2020    FERRITIN 7 (L) 06/28/2020  Retic 2.2- NML  B12- 498   All pertinent labs within the past 24 hours have been reviewed.     Significant Imaging: I have reviewed all pertinent imaging results/findings within the past 24 hours. Patchy opacity in the medial right lung base which could reflect atelectasis, aspiration or pneumonia.  Left lung is clear.  Heart is normal in size.  Calcified atheromatous disease affects the aorta.  Age-appropriate degenerative changes affect the skeleton.        Pending Diagnostic Studies:     Procedure Component Value Units Date/Time    CBC auto differential ([id number])      Order Status: Sent Lab Status: No result      Specimen: Blood      Comprehensive metabolic panel ([id number])      Order Status: Sent Lab Status: No result      Specimen: Blood          Medications:  Reconciled Home Medications:     Medication List     START taking these medications   ferrous sulfate 325 mg (65 mg iron) Tab tablet  Commonly known as: FEOSOL  Take 1 tablet (325 mg total) by mouth 2 (two) times daily. Take by oral route daily twice daily  with orange juice; do not take calcium or antacid within  2 hours of iron supplement        CONTINUE taking these medications   ACCU-CHEK COMPACT PLUS TEST Strp  Generic drug: blood sugar diagnostic, drum  USE TO CHECK BLOOD GLUCOSE FOUR TIMES DAILY     *  albuterol 90 mcg/actuation inhaler  Commonly known as: VENTOLIN HFA  Inhale 2 puffs into the lungs every 4 (four) hours as needed for Wheezing or Shortness of Breath. Rescue     * albuterol 0.63 mg/3 mL Nebu  Commonly known as: ACCUNEB  Take 3 mLs (0.63 mg total) by nebulization every 6 (six) hours as needed. Rescue     amitriptyline 50 MG tablet  Commonly known as: ELAVIL  Take 50 mg by mouth every evening.     atorvastatin 20 MG tablet  Commonly known as: LIPITOR  Take 1 tablet (20 mg total) by mouth once daily.     blood-glucose meter kit  To check BG 4 times daily, to use with insurance preferred meter     CO Q-10 100 mg capsule  Generic drug: coenzyme Q10  Take 100 mg by mouth once daily.     diclofenac sodium 1 % Gel  Commonly known as: VOLTAREN  Apply 2 g topically 2 (two) times daily.     donepeziL 10 MG tablet  Commonly known as: ARICEPT  TAKE 1 TABLET BY MOUTH ONCE DAILY IN THE EVENING     DULoxetine 30 MG capsule  Commonly known as: CYMBALTA  Take 1 capsule (30 mg total) by mouth once daily.     gabapentin 300 MG capsule  Commonly known as: NEURONTIN  Take 1 capsule (300 mg total) by mouth 2 (two) times daily.     insulin aspart U-100 100 unit/mL (3 mL) Inpn pen  Commonly known as: NovoLOG Flexpen U-100 Insulin  Inject 30 Units into the skin 3 (three) times daily with meals.     insulin syringe-needle U-100 0.3 mL 30 Syrg  Use with Levemir vial BID     JANUVIA 100 MG Tab  Generic drug: SITagliptin  Take 1 tablet (100 mg total) by mouth once daily.     lancets Misc  To check BG 4 times daily, to use with insurance preferred meter     MEGARED OMEGA-3 KRILL OIL ORAL  Take 1 capsule by mouth 2 (two) times daily.     melatonin  Commonly known as: MELATIN  Take 10 mg by mouth nightly as needed.     metFORMIN 1000 MG tablet  Commonly known as: GLUCOPHAGE  Take 1 tablet (1,000 mg total) by mouth 2 (two) times daily with meals.     omeprazole 20 MG capsule  Commonly known as: PRILOSEC  TAKE 1 CAPSULE BY MOUTH  "TWICE DAILY     pen needle, diabetic 31 gauge x 3/16" Ndle  Commonly known as: LITE TOUCH INSULIN PEN NEEDLES  1 application by Misc.(Non-Drug; Combo Route) route 4 (four) times daily.     pen needle, diabetic 32 gauge x 5/32" Ndle  1 Device by Misc.(Non-Drug; Combo Route) route as directed. Needles to be used with Novolog pens and Levemir pens daily.     ramipriL 5 MG capsule  Commonly known as: ALTACE  Take 1 capsule (5 mg total) by mouth once daily.     senna 8.6 mg tablet  Commonly known as: SENOKOT  Take 1 tablet by mouth once daily.     TRESIBA FLEXTOUCH U-200 200 unit/mL (3 mL) Inpn  Generic drug: insulin degludec  INJECT 90 UNITS SUBCUTANEOUSLY IN THE MORNING FOR 90 DAYS         * This list has 2 medication(s) that are the same as other medications prescribed for you. Read the directions carefully, and ask your doctor or other care provider to review them with you.          STOP taking these medications   ALEVE 220 mg Cap  Generic drug: naproxen sodium     azithromycin 500 MG tablet  Commonly known as: ZITHROMAX     ibuprofen 200 MG tablet  Commonly known as: ADVIL,MOTRIN        ASK your doctor about these medications   meclizine 25 mg tablet  Commonly known as: ANTIVERT  TAKE 1 TABLET BY MOUTH THREE TIMES DAILY AS NEEDED FOR DIZZINESS OR NAUSEA             Indwelling Lines/Drains at time of discharge:   Lines/Drains/Airways     None                     Time spent on the discharge of patient: 30 minutes  Patient was seen and examined on the date of discharge and determined to be suitable for discharge.           Angella Ruiz NP  Department of Hospital Medicine  [facility]      7/6/20  She reports no blood loss ; no melena ; no bleeding P/R   She is taking her iron supplements   She cant recall EGD and colonoscopy   Her HCT is improved.  Lab Results      Component                Value               Date                      WBC                      7.66                07/06/2020     "            HGB                      10.0 (L)            07/06/2020                HCT                      35.5 (L)            07/06/2020                MCV                      74 (L)              07/06/2020                PLT                      287                 07/06/2020                      Review of Systems   Constitutional: Negative for chills and fatigue.   HENT: Negative for congestion, hearing loss, sinus pressure and sore throat.    Eyes: Positive for visual disturbance. Negative for photophobia.        Seeing ophthalmologist    Respiratory: Negative for cough, choking, chest tightness and wheezing.    Cardiovascular: Negative for chest pain and palpitations.   Gastrointestinal: Positive for abdominal pain. Negative for blood in stool.        ++heart burn    Genitourinary: Negative for dysuria.   Musculoskeletal: Positive for back pain, gait problem and neck stiffness. Negative for arthralgias and myalgias.   Skin: Positive for color change. Negative for pallor.   Neurological: Positive for weakness and light-headedness. Negative for dizziness and tremors.        Dysequilibrium    Hematological: Does not bruise/bleed easily.   Psychiatric/Behavioral: Positive for sleep disturbance. Negative for confusion and suicidal ideas. The patient is not nervous/anxious.         Difficulty falling asleep.       Objective:      Physical Exam  Vitals signs and nursing note reviewed.   Constitutional:       Appearance: She is well-developed.   HENT:      Head: Normocephalic and atraumatic.      Right Ear: External ear normal.      Left Ear: External ear normal.   Eyes:      Conjunctiva/sclera: Conjunctivae normal.      Pupils: Pupils are equal, round, and reactive to light.   Neck:      Musculoskeletal: Normal range of motion and neck supple.      Thyroid: No thyromegaly.      Vascular: No JVD.      Trachea: No tracheal deviation.   Cardiovascular:      Rate and Rhythm: Normal rate and regular rhythm.      Heart  sounds: Normal heart sounds.   Pulmonary:      Effort: Pulmonary effort is normal. No respiratory distress.      Breath sounds: Normal breath sounds. No wheezing or rales.   Chest:      Chest wall: No tenderness.   Abdominal:      General: Bowel sounds are normal. There is no distension.      Palpations: Abdomen is soft. There is no mass.      Tenderness: There is no abdominal tenderness. There is no guarding or rebound.   Musculoskeletal: Normal range of motion.         General: Tenderness present.   Feet:      Right foot:      Protective Sensation: 6 sites tested. 6 sites sensed.      Skin integrity: No ulcer, erythema or callus.      Left foot:      Protective Sensation: 4 sites tested. 6 sites sensed.      Skin integrity: No ulcer, erythema or callus.   Lymphadenopathy:      Cervical: No cervical adenopathy.   Skin:     General: Skin is warm and dry.      Comments: Bruise to right flank   Neurological:      Mental Status: She is alert and oriented to person, place, and time.      Cranial Nerves: No cranial nerve deficit.      Motor: No abnormal muscle tone.      Coordination: Coordination normal.      Deep Tendon Reflexes: Reflexes are normal and symmetric. Reflexes normal.      Comments: CN: Optic discs are flat with normal vasculature, PERRL, Extraoccular movements and visual fields are full. Normal facial sensation and strength, Hearing symmetric, Tongue and Palate are midline and strong. Shoulder Shrug symmetric and strong.   Psychiatric:      Comments: Short term memory loss         Assessment:       1. Iron deficiency anemia due to chronic blood loss    2. Encounter for medication refill    3. Anxiety and depression    4. Memory loss    5. Uncontrolled type 2 diabetes mellitus with complication, with long-term current use of insulin    6. Essential hypertension        Plan:   Narda was seen today for hospital follow up.    Diagnoses and all orders for this visit:    Iron deficiency anemia due to chronic  blood loss  -     Ambulatory referral/consult to Gastroenterology; Future  She needs to stay on iron replacement   Needs EGD and colonoscopy ; schedule to see GI   Her HCT improved     Encounter for medication refill  -     atorvastatin (LIPITOR) 20 MG tablet; Take 1 tablet (20 mg total) by mouth once daily.  -     gabapentin (NEURONTIN) 300 MG capsule; Take 1 capsule (300 mg total) by mouth 2 (two) times daily.    Anxiety and depression  -     DULoxetine (CYMBALTA) 30 MG capsule; Take 1 capsule (30 mg total) by mouth once daily.    Memory loss  -     donepeziL (ARICEPT) 10 MG tablet; TAKE 1 TABLET BY MOUTH ONCE DAILY IN THE EVENING    Uncontrolled type 2 diabetes mellitus with complication, with long-term current use of insulin  -     metFORMIN (GLUCOPHAGE) 1000 MG tablet; Take 1 tablet (1,000 mg total) by mouth 2 (two) times daily with meals.  -     insulin aspart U-100 (NOVOLOG FLEXPEN U-100 INSULIN) 100 unit/mL (3 mL) InPn pen; Inject 30 Units into the skin 3 (three) times daily with meals.    Essential hypertension  -     ramipriL (ALTACE) 5 MG capsule; Take 1 capsule (5 mg total) by mouth once daily.    Other orders  -     Cancel: amitriptyline (ELAVIL) 50 MG tablet; Take 1 tablet (50 mg total) by mouth every evening.  -     ferrous sulfate (FEOSOL) 325 mg (65 mg iron) Tab tablet; Take 1 tablet (325 mg total) by mouth 2 (two) times daily. Take by oral route daily twice daily  with orange juice; do not take calcium or antacid within  2 hours of iron supplement  -     Cancel: TRESIBA FLEXTOUCH U-200 200 unit/mL (3 mL) InPn  -     senna (SENOKOT) 8.6 mg tablet; Take 1 tablet by mouth once daily.  -     JANUVIA 100 mg Tab; Take 1 tablet (100 mg total) by mouth once daily.      Problem List Items Addressed This Visit     Essential hypertension    Memory loss    Anxiety and depression      Other Visit Diagnoses     Encounter for medication refill        Uncontrolled type 2 diabetes mellitus with complication, with  long-term current use of insulin

## 2020-07-07 ENCOUNTER — OFFICE VISIT (OUTPATIENT)
Dept: NEUROLOGY | Facility: CLINIC | Age: 75
End: 2020-07-07
Payer: MEDICARE

## 2020-07-07 VITALS
HEIGHT: 63 IN | BODY MASS INDEX: 34.65 KG/M2 | RESPIRATION RATE: 18 BRPM | HEART RATE: 82 BPM | DIASTOLIC BLOOD PRESSURE: 60 MMHG | WEIGHT: 195.56 LBS | SYSTOLIC BLOOD PRESSURE: 120 MMHG

## 2020-07-07 DIAGNOSIS — I10 ESSENTIAL HYPERTENSION: ICD-10-CM

## 2020-07-07 DIAGNOSIS — F32.A ANXIETY AND DEPRESSION: ICD-10-CM

## 2020-07-07 DIAGNOSIS — Z85.3 HISTORY OF BREAST CANCER: ICD-10-CM

## 2020-07-07 DIAGNOSIS — E11.42 DIABETIC POLYNEUROPATHY ASSOCIATED WITH TYPE 2 DIABETES MELLITUS: ICD-10-CM

## 2020-07-07 DIAGNOSIS — H35.30 MACULAR DEGENERATION OF BOTH EYES, UNSPECIFIED TYPE: ICD-10-CM

## 2020-07-07 DIAGNOSIS — Z79.4 TYPE 2 DIABETES MELLITUS WITH OTHER SPECIFIED COMPLICATION, WITH LONG-TERM CURRENT USE OF INSULIN: ICD-10-CM

## 2020-07-07 DIAGNOSIS — F41.9 ANXIETY AND DEPRESSION: ICD-10-CM

## 2020-07-07 DIAGNOSIS — E78.00 PURE HYPERCHOLESTEROLEMIA: ICD-10-CM

## 2020-07-07 DIAGNOSIS — E11.69 TYPE 2 DIABETES MELLITUS WITH OTHER SPECIFIED COMPLICATION, WITH LONG-TERM CURRENT USE OF INSULIN: ICD-10-CM

## 2020-07-07 DIAGNOSIS — D33.3 VESTIBULAR SCHWANNOMA: ICD-10-CM

## 2020-07-07 DIAGNOSIS — R41.3 MEMORY LOSS: Primary | ICD-10-CM

## 2020-07-07 PROBLEM — F02.80 ALZHEIMER'S DEMENTIA: Status: RESOLVED | Noted: 2018-04-12 | Resolved: 2020-07-07

## 2020-07-07 PROBLEM — G30.9 ALZHEIMER'S DEMENTIA: Status: RESOLVED | Noted: 2018-04-12 | Resolved: 2020-07-07

## 2020-07-07 PROCEDURE — 1101F PR PT FALLS ASSESS DOC 0-1 FALLS W/OUT INJ PAST YR: ICD-10-PCS | Mod: HCNC,CPTII,S$GLB, | Performed by: NURSE PRACTITIONER

## 2020-07-07 PROCEDURE — 1159F PR MEDICATION LIST DOCUMENTED IN MEDICAL RECORD: ICD-10-PCS | Mod: HCNC,S$GLB,, | Performed by: NURSE PRACTITIONER

## 2020-07-07 PROCEDURE — 1126F AMNT PAIN NOTED NONE PRSNT: CPT | Mod: HCNC,S$GLB,, | Performed by: NURSE PRACTITIONER

## 2020-07-07 PROCEDURE — 1159F MED LIST DOCD IN RCRD: CPT | Mod: HCNC,S$GLB,, | Performed by: NURSE PRACTITIONER

## 2020-07-07 PROCEDURE — 99999 PR PBB SHADOW E&M-EST. PATIENT-LVL V: ICD-10-PCS | Mod: PBBFAC,HCNC,, | Performed by: NURSE PRACTITIONER

## 2020-07-07 PROCEDURE — 99214 PR OFFICE/OUTPT VISIT, EST, LEVL IV, 30-39 MIN: ICD-10-PCS | Mod: HCNC,S$GLB,, | Performed by: NURSE PRACTITIONER

## 2020-07-07 PROCEDURE — 99999 PR PBB SHADOW E&M-EST. PATIENT-LVL V: CPT | Mod: PBBFAC,HCNC,, | Performed by: NURSE PRACTITIONER

## 2020-07-07 PROCEDURE — 99214 OFFICE O/P EST MOD 30 MIN: CPT | Mod: HCNC,S$GLB,, | Performed by: NURSE PRACTITIONER

## 2020-07-07 PROCEDURE — 1101F PT FALLS ASSESS-DOCD LE1/YR: CPT | Mod: HCNC,CPTII,S$GLB, | Performed by: NURSE PRACTITIONER

## 2020-07-07 PROCEDURE — 1126F PR PAIN SEVERITY QUANTIFIED, NO PAIN PRESENT: ICD-10-PCS | Mod: HCNC,S$GLB,, | Performed by: NURSE PRACTITIONER

## 2020-07-07 NOTE — PROGRESS NOTES
"HPI: Narda Person is a 74 y.o. female with a history of a right vestibular schwannoma, as well as diastolic dysfunction, hypothyroidism, and insulin dependent DM. Schwannoma followed by Dr. Desean Buenrostro. She has HLD, HTN, DM II, hypothyroidism, GERD, and a history of breast cancer. MRI L-spine 2018 shows severe lumbar stenosis and MRI C-spine 2008 shows moderate cervical stenosis.     She presents today for a follow up visit. MRI brain repeated at her last visit in 1/2020, which did show growth of her vestibular schwannoma, and she was asked to follow up with Neurosurgery. She saw Dr. Buenrostro at [facility], who did not feel that intervention was needed, and advised that she follow up for repeat MRI Brain at one year from 1/2020.     She has had several near falls. She relies on her cane for stability.     She has a restless feeling in her right hand. She feels as if she has to move it. She makes a piano playing motion throughout the day. She does have a great deal of anxiety. No pain or numbness, but this feeling improves once she moves her hand. She is not dropping anything with this hand. She denies any restless legs.     She is living locally now in [place]-no longer in [place].     She has worsening visual complaints. She is seeing [facility] for this. She does not drive, due to severe visual impairment.     Her DM is now well managed since receiving a dermal monitoring device. Neuropathy pain is improved since her last visit.     She continues with short term memory loss, but no signs of executive dysfunction.    She was admitted to [facility] for SOB, and was found to have significant anemia. She was transfused there, and is scheduled to consult with GI, as well as Hematology.     She likes to stay awake late watching t.v., and has done this since her  passed away. She goes to sleep once she is tired. Denies insomnia, and says that she has her "own rhythm of sleep".     Lumbar pain occurs with excessive " standing.     Review of Systems   Constitutional: Negative for malaise/fatigue.   HENT: Positive for tinnitus.    Eyes: Positive for blurred vision.   Cardiovascular: Negative for chest pain.   Musculoskeletal: Positive for back pain and falls. Negative for myalgias.   Neurological: Positive for dizziness. Negative for speech change, seizures, weakness and headaches.   Endo/Heme/Allergies: Does not bruise/bleed easily.   Psychiatric/Behavioral: Positive for memory loss. Negative for depression, hallucinations and suicidal ideas. The patient is not nervous/anxious and does not have insomnia.        I have reviewed all of this patient's past medical and surgical histories as well as family and social histories and active allergies and medications as documented in the electronic medical record.    Exam:  Gen Appearance, well developed/nourished in no apparent distress  CV: 2+ distal pulses with no edema or swelling  Neuro:  MS: Awake, alert, oriented to place, person, time, situation. Sustains attention. Recent memory intact today/remote memory intact, Language is full to spontaneous speech/repetition/naming/comprehension. Fund of Knowledge is full.  CN: Optic discs are flat with normal vasculature, PERRL, Extraoccular movements and visual fields are full. Normal facial sensation and strength, Hearing symmetric, Tongue and Palate are midline and strong. Shoulder Shrug symmetric and strong.  Motor: Normal bulk, tone, no abnormal movements. 5/5 strength bilateral upper/lower extremities with 2+ reflexes BUE, and 1+ reflexes BLE, and bilateral plantar response  Sensory: symmetric to light touch, pain, temp, and proprioception. Reduced vibration at left ankle; Romberg negative  Cerebellar: Finger-nose,Heal-shin, Rapid alternating movements intact  Gait: ambulates with a cane for stability    Imaging:  MRI Brain 1/10/2020:   FINDINGS:  There is age-appropriate generalized cerebral volume loss.  No abnormal diffusion  restriction is identified to suggest an acute infarction and no abnormal gradient susceptibility is identified.  Few scattered foci of T2/FLAIR signal abnormality in the supratentorial white matter in keeping with chronic microvascular ischemic disease of a mild degree.  The ventricles are stable in size and configuration without evidence for hydrocephalus.  No extra-axial fluid collections are identified.  Again noted is a mass within the right internal artery canal, best seen on the axial T2 and postcontrast images.  This mass measures approximately 12.7 x 5.5 mm in transverse dimension and 5.5 mm in craniocaudal dimension.  It previously measured 8.4 x 4.7 mm in transverse dimension and 4.1 mm in craniocaudal dimension.  There does appear to be enhancement extension into the cochlea.  No cisternal extension.  No other abnormal enhancement is seen.  Expected intracranial flow voids are detected.  The visualized paranasal sinuses including the mastoid air cells are clear.     Impression:     Age-appropriate generalized cerebral volume loss with mild chronic microvascular ischemic change.     Interval increased size of the enhancing lesion in the right internal artery Mina canal suggesting a vestibular schwannoma.  This lesion currently measures 12.7 x 5.5 x 5.5 mm, previously measuring 8.4 x 4.7 x 4.4 mm.  Additionally, there does also appear to be extension of enhancement into the cochlea on today's examination.    MRI Brain with and without contrast 4/2018:  Interval increased size of the enhancing lesion in the right internal auditory canal suggestive for a vestibular schwannoma.  It currently measures 8.4 x 4.7 mm, previously 4.7 x 4.0 mm.  No new enhancing lesion is seen.    Age-appropriate generalized cerebral volume loss with mild/moderate chronic microvascular ischemic change.    MRI Brain with and without contrast 6/16/2016  4.5 mm focus of enhancement is again identified within the deep aspect of the  right internal auditory canal to suggest a stable vestibular schwannoma.  Age-appropriate generalized volume loss with findings suggestive of mild chronic microvascular ischemic change.     MRI Brain with and without contrast 11/14/2016  4.5 mm focus of enhancement in the deep aspect of the right internal artery canal suggestive for a stable vestibular schwannoma.  No new enhancing lesion is seen.  Age-appropriate generalized cerebral volume loss with mild chronic microvascular ischemic change.    5/2018 MRI C-spine:  FINDINGS:  The craniocervical junction is intact.  There is no evidence for a Chiari malformation.  The spinal cord is normal in signal without cord edema or myelomalacia.    The incidentally visualized soft tissue structures of the neck are within normal limits.    There is straightening of the normal cervical lordosis.  Vertebral body heights are maintained.  There is disc desiccation with disc space narrowing throughout the cervical spine.  The marrow signal is normal without evidence for a marrow replacement process, infection or tumor.    At C2-3, there is no disc herniation, central canal stenosis or neural foraminal narrowing.    At C3-4, there is a posterior disc osteophyte complex with bilateral uncovertebral spurring and facet arthropathy contributing to mild central canal stenosis without neural foraminal narrowing.    At C4-5, there is a posterior disc osteophyte complex with bilateral uncovertebral spurring and facet arthropathy contributing to moderate central canal stenosis with moderate to severe bilateral neural foraminal narrowing.    At C5-6, there is a posterior disc osteophyte complex with bilateral uncovertebral spurring and facet arthropathy contributing to moderate central canal stenosis with severe bilateral neural foraminal narrowing.    At C6-7, there is a posterior disc osteophyte complex with bilateral uncovertebral spurring and facet arthropathy contributing to mild  central canal stenosis with moderate severe left-sided neural foraminal narrowing.    At C7-T1, there is bilateral uncovertebral spurring without central canal stenosis or neural foraminal narrowing.      Impression       Multilevel degenerative changes of the cervical spine contributing to central canal stenosis and neural foraminal narrowing as detailed in the above level by level description.     MRI L-spine 4/2018:  The incidentally visualized soft tissue structures of the abdomen are within normal limits.    Vertebral body heights are maintained.  There is mild anterolisthesis of L4 in respect to L5 by approximately 4 mm.  There is disc desiccation with significant disc space narrowing at L4-5 with adjacent endplate degenerative change.  The marrow signal is otherwise normal without evidence for a marrow replacement process, infection or tumor.  The conus terminates at T12-L1.    At T12-L1, there is no disc herniation, central canal stenosis or neural foraminal narrowing.    At L1-2, there is no disc herniation, central canal stenosis or neural foraminal narrowing.    At L2-3, there is a broad-based posterior disc bulge with ligamentum flavum hypertrophy and facet arthropathy contributing to mild central canal stenosis without neural foraminal narrowing.    At L3-4, there is a broad-based posterior disc bulge with ligamentum flavum hypertrophy and facet arthropathy contributing to moderate central canal stenosis with mild bilateral neural foraminal narrowing.    At L4-5, there is a broad-based posterior disc bulge, ligamentum flavum hypertrophy and facet arthropathy contributing to severe central canal stenosis with severe bilateral neural foraminal narrowing.  There appears to be disc material protruding in the bilateral neural foramina.    At L5-S1, there is a broad-based posterior disc bulge and facet arthropathy contributing to mild to moderate bilateral neural foraminal narrowing.    Labs:   KARSTEN,  homocysteine, B12/folate, B1 reviewed; unremarkable  Her BMP, CBC, Lipid Panel, TSH, and LFT's per Dr. Leone requested but not received  CBC, CMP, TSH, B12 4/2018 overall WNL other than her uncontrolled DM.   7/2020 HgA1c 6.1%    Assessment/Plan: Narda Person is a 74 y.o. female with a history of a right vestibular schwannoma, as well as diastolic dysfunction, hypothyroidism, and insulin dependent DM. Schwannoma followed by Dr. Desean Buenrostro. She has HLD, HTN, DM II, hypothyroidism, GERD, and a history of breast cancer. MRI L-spine 2018 shows severe lumbar stenosis and MRI C-spine 2008 shows moderate cervical stenosis. She has short term memory loss, pending evaluation.     I recommend:   1. Continue Cymbalta for depression and anxiety. Hallucinations occurred with Elavil. Instructed to take Cymbalta in the morning.  2. Neuropsych testing to further evaluate her memory loss, has not been done to date, [place].    -suspect cognitive complaints to be related to anxiety/depression.  -Memory loss worsens with conflict, but there is some ongoing short term memory complaints daily. Continue Aricept for memory at this time. Unsure of response thus far.  -No signs of executive dysfunction; however, there is concern over AD versus anxiety/depression, as the cause of her memory complaints.   5. Prior insomnia helped with Melatonin.   -Trazodone ineffective at 50 mg and caused excess sedation at 100 mg.   -Hallucinations with Elavil prior.   6. Growth of vestibular schwannoma followed by Dr. Desean Buenrostro, who planned to repeat imaging in 1/2021.  -Neurosurgery did not feel that the growth of her vestibular schwannoma required intervention in 1/2019.   -Gait imbalance likely secondary to multiple factors, including severe lumbar stenosis, moderate cervical stenosis, neuropathy, low visual acuity, and her vestibular schwannoma.    7. Saw Neurosurgery for spinal stenosis, who recommended conservative  treatment. She declines follow up with them.   8. Prior dysphagia helped with changes suggested per Speech Therapy after swallowing study.   9. Neuropathy pain is improved lately. DM is much improved.   -she is on both Gabapentin and Cymbalta. Hallucinations with Elavil prior.   -Voltaren Rx per PCP.   -Advised to try Blue Emu with Lidocaine OTC prior.   10. Left shoulder pain is resolved.     Follow up in 6 months

## 2020-07-23 ENCOUNTER — PATIENT OUTREACH (OUTPATIENT)
Dept: ADMINISTRATIVE | Facility: OTHER | Age: 75
End: 2020-07-23

## 2020-07-23 NOTE — PROGRESS NOTES
Requested updates within Care Everywhere.  Patient's chart was reviewed for overdue [place] topics.  Immunizations reconciled.    Orders placed:  Tasked appts:  Labs Linked:

## 2020-07-27 ENCOUNTER — OFFICE VISIT (OUTPATIENT)
Dept: GASTROENTEROLOGY | Facility: CLINIC | Age: 75
End: 2020-07-27
Payer: MEDICARE

## 2020-07-27 ENCOUNTER — ANESTHESIA EVENT (OUTPATIENT)
Dept: ENDOSCOPY | Facility: HOSPITAL | Age: 75
End: 2020-07-27
Payer: MEDICARE

## 2020-07-27 VITALS
HEIGHT: 62 IN | SYSTOLIC BLOOD PRESSURE: 118 MMHG | WEIGHT: 196.88 LBS | BODY MASS INDEX: 36.23 KG/M2 | DIASTOLIC BLOOD PRESSURE: 62 MMHG

## 2020-07-27 DIAGNOSIS — D50.9 IRON DEFICIENCY ANEMIA, UNSPECIFIED IRON DEFICIENCY ANEMIA TYPE: Primary | ICD-10-CM

## 2020-07-27 DIAGNOSIS — Z01.818 PREOP EXAMINATION: ICD-10-CM

## 2020-07-27 DIAGNOSIS — K21.9 GASTROESOPHAGEAL REFLUX DISEASE WITHOUT ESOPHAGITIS: ICD-10-CM

## 2020-07-27 PROBLEM — R19.7 INTRACTABLE DIARRHEA: Status: RESOLVED | Noted: 2017-01-26 | Resolved: 2020-07-27

## 2020-07-27 PROCEDURE — 99999 PR PBB SHADOW E&M-EST. PATIENT-LVL V: ICD-10-PCS | Mod: PBBFAC,HCNC,, | Performed by: INTERNAL MEDICINE

## 2020-07-27 PROCEDURE — 99999 PR PBB SHADOW E&M-EST. PATIENT-LVL V: CPT | Mod: PBBFAC,HCNC,, | Performed by: INTERNAL MEDICINE

## 2020-07-27 PROCEDURE — 1101F PR PT FALLS ASSESS DOC 0-1 FALLS W/OUT INJ PAST YR: ICD-10-PCS | Mod: HCNC,CPTII,S$GLB, | Performed by: INTERNAL MEDICINE

## 2020-07-27 PROCEDURE — 99204 OFFICE O/P NEW MOD 45 MIN: CPT | Mod: HCNC,S$GLB,, | Performed by: INTERNAL MEDICINE

## 2020-07-27 PROCEDURE — 99204 PR OFFICE/OUTPT VISIT, NEW, LEVL IV, 45-59 MIN: ICD-10-PCS | Mod: HCNC,S$GLB,, | Performed by: INTERNAL MEDICINE

## 2020-07-27 PROCEDURE — 1159F PR MEDICATION LIST DOCUMENTED IN MEDICAL RECORD: ICD-10-PCS | Mod: HCNC,S$GLB,, | Performed by: INTERNAL MEDICINE

## 2020-07-27 PROCEDURE — 1126F PR PAIN SEVERITY QUANTIFIED, NO PAIN PRESENT: ICD-10-PCS | Mod: HCNC,S$GLB,, | Performed by: INTERNAL MEDICINE

## 2020-07-27 PROCEDURE — 1126F AMNT PAIN NOTED NONE PRSNT: CPT | Mod: HCNC,S$GLB,, | Performed by: INTERNAL MEDICINE

## 2020-07-27 PROCEDURE — 1159F MED LIST DOCD IN RCRD: CPT | Mod: HCNC,S$GLB,, | Performed by: INTERNAL MEDICINE

## 2020-07-27 PROCEDURE — 1101F PT FALLS ASSESS-DOCD LE1/YR: CPT | Mod: HCNC,CPTII,S$GLB, | Performed by: INTERNAL MEDICINE

## 2020-07-27 RX ORDER — POLYETHYLENE GLYCOL 3350, SODIUM SULFATE ANHYDROUS, SODIUM BICARBONATE, SODIUM CHLORIDE, POTASSIUM CHLORIDE 236; 22.74; 6.74; 5.86; 2.97 G/4L; G/4L; G/4L; G/4L; G/4L
POWDER, FOR SOLUTION ORAL
Qty: 1 BOTTLE | Refills: 0 | Status: SHIPPED | OUTPATIENT
Start: 2020-07-27 | End: 2021-04-22

## 2020-07-27 NOTE — PATIENT INSTRUCTIONS
GOLYTELY/ COLYTE/ NULYTELY Instructions    You are scheduled for a EGD/colonoscopy with Dr. Perales on Monday, August 10 at [facility] located at [facility] in [place].  Enter through the Main Entrance.  Check-in at Registration.      You will receive a call 1-2 days before your procedure to tell you the time to arrive.  If you have not received a call by the day before your procedure, call the OR  at [phone].    To ensure that your test is accurate and complete, you MUST follow these instructions listed below.  If you have any questions, please call our office at [phone].  Plan on being at the hospital for your procedure for 3-4 hours.    1.  Follow a CLEAR LIQUID DIET for the entire day before your scheduled colonoscopy.  This means no solid food the entire day starting when you wake.  You may have as much of the clear liquids as you want throughout the day.   CLEAR LIQUID DIET:   - Avoid Red, Orange, Purple, and/or Blue food coloring   - NO DAIRY   - You can have:  Coffee with sugar (no creamer), tea, water, soda, apple or white grape juice, chicken or beef broth/bouillon (no meat, noodles, or veggies), green/yellow popsicles, green/yellow Jell-O, lemonade.    2.  MIX GOLYTELY/COLYTE/NULYTELY (all names for same product) WITH ONE (1) GALLON OF WATER.  YOU MAY ADD A FLAVOR PACKET OR YELLOW/GREEN POWDER DRINK MIX TO THIS.  PUT IN REFRIGERATOR.  This is easier to drink if this solution is cold, so you can mix the solution one day ahead of time and place in the refrigerator prior to drinking.  You have to drink the solution within 24 hours of mixing it.  Do NOT put this solution over ice.  It IS ok to drink with a straw.    3. AT 5 PM THE DAY BEFORE YOUR COLONOSCOPY, DRINK ONE (1) 8 OUNCE GLASS OF MIXTURE EVERY 10 MINUTES UNTIL HALF OF THE GALLON IS CONSUMED.  Keep this mixture cold and in refrigerator as much as you can while drinking it.  Place the remaining half of mixture in  the refrigerator when you finish the first half.    4.  The endoscopy department will call you 1 day before your colonoscopy to tell you the exact time to arrive, AND to tell you the exact time to drink the 2nd portion of your prep (which will be FIVE HOURS BEFORE YOUR ARRIVAL TIME).  At this time given to you, DRINK ONE (1) 8 OUNCE GLASS OF MIXTURE EVERY 10 MINUTES UNTIL THE OTHER HALF IS CONSUMED. Keep the mixture cold while you are drinking it. Once this is complete, you may not have ANYTHING else by mouth!      5.  It is ok to take MOST of your REGULAR MEDICATIONS  in the morning of your test with a SIP of water.  THE ONLY MEDS YOU NEED TO HOLD ARE YOUR DIABETES MEDICATIONS,  SOME BLOOD PRESSURE MEDS, AND BLOOD THINNERS IF OK'D BY YOUR DOCTOR.  Do NOT have anything else to eat or drink the morning of your colonoscopy.  It is ok to brush your teeth.    6.  If you are on blood thinners THAT YOU HAVE BEEN INSTRUCTED TO HOLD BY YOUR DOCTOR FOR THIS PROCEDURE, then do NOT take this the morning of your colonoscopy.  Do NOT stop these medications on your own, they must be approved to be held by your doctor.  Your colonoscopy can NOT be done if you are on these medications.  Examples of blood thinners include: Coumadin, Aggrenox, Plavix, Pradaxa, Reapro, Pletal, Xarelto, Ticagrelor, Brilinta, Eliquis, and high dose aspirin (325 mg).  You do not have to stop baby aspirin 81 mg.    7.  IF YOU ARE DIABETIC:  NO INSULIN OR ORAL MEDICATIONS THE MORNING OF THE COLONOSCOPY.  TAKE ONLY HALF THE DOSE OF YOUR INSULIN THE DAY BEFORE THE COLONOSCOPY.  DO NOT TAKE ANY ORAL DIABETIC MEDICATIONS THE DAY BEFORE THE COLONOSCOPY.  IF YOU ARE AN INSULIN DEPENDENT DIABETIC WITH UNSTABLE BLOOD SUGARS, NOTIFY YOUR PRIMARY CARE PHYSICIAN FOR INSTRUCTIONS.    8.  Please DO use your inhalers the morning of your procedure.          Anemia  Anemia is a condition that occurs when your body does not have enough healthy red blood cells (RBCs). RBCs  are the parts of your blood that carry oxygen throughout your body. A protein called hemoglobin allows your RBCs to absorb and release oxygen. Without enough RBCs or hemoglobin, your body doesn't get enough oxygen. Symptoms of anemia may then occur.    What are the symptoms of anemia?  Some people with anemia have no symptoms. But most people have symptoms that range from mild to severe. These can include:  · Tiredness (fatigue)  · Weakness  · Pale skin  · Shortness of breath  · Dizziness or fainting  · Rapid heartbeat  · Trouble doing normal amounts of activity  · Jaundice (yellowing of your eyes, skin, or mouth; dark urine)  What causes anemia?  Anemia can occur when your body:  · Loses too much blood  · Does not make enough RBCs  · Destroys your RBCs at a faster rate than it can replace them  · Does not make a normal amount of hemoglobin in your RBCs  These problems can occur for many reasons, including:  · A condition that you are born with (congenital or inherited), such as sickle cell disease or thalassemia  · Heavy bleeding for any reason, including injury, surgery, childbirth, or even heavy menstrual periods  · Being low in certain nutrients, such as iron, folate, or vitamin B12, possibly from a poor diet or a condition like celiac disease or Crohn's disease  · Certain chronic conditions like diabetes, arthritis, or kidney disease  · Certain chronic infections like tuberculosis or HIV  · Exposure to certain medicines, such as those used for chemotherapy  There are different types of anemia. Your healthcare provider can tell you more about the type of anemia you have and what may have caused it.  How is anemia diagnosed?  To diagnose anemia, your healthcare provider orders blood tests. These can include:  · Complete blood cell count (CBC). This test measures the amounts of the different types of blood cells.  · Blood smear. This test checks the size and shape of your blood cells. To do the test, a drop of  your blood is viewed under a microscope. A stain is used to make the blood cells easier to see.  · Iron studies. These tests measure the amount of iron in your blood. Your body needs iron to make hemoglobin in your RBCs.  · Vitamin B12 and folate studies. These tests check for some of the components that help give RBCs a normal size and shape.  · Reticulocyte count. This test measures the amount of new RBCs that your bone marrow makes.  · Hemoglobin electrophoresis. This test checks for problems with your hemoglobin in RBCs.  How is anemia treated?  Treatment for anemia is based on the type of anemia, its cause, and the severity of your symptoms. Treatments may include:  · Diet changes. This involves increasing the amount of certain nutrients in your diet, such as iron, vitamin B12, or folate. Your healthcare provider may also prescribe nutrient supplements.  · Medicines. Certain medicines treat the cause of your anemia. Others help build new RBCs or relieve symptoms. If a medicine is the cause of your anemia, you may need to stop or change it.  · Blood transfusions. Replacing some of your blood can increase the number of healthy RBCs in your body.  · Surgery. In some cases, your doctor may do surgery to treat the underlying cause of anemia. If you need surgery, your healthcare provider will explain the procedure and outline the risks and benefits for you.  What are the long-term concerns?  If you have a certain type of anemia, you can expect a full recovery after treatment. If you have other types of anemia (especially a type you're born with), you will need to manage it for life. Your doctor can tell you more.  Date Last Reviewed: 12/1/2016  © 8985-2458 [employer]. [address] [place] [address], [place] [address]. All rights reserved. This information is not intended as a substitute for professional medical care. Always follow your healthcare professional's instructions.

## 2020-07-27 NOTE — H&P (VIEW-ONLY)
"Subjective:       Patient ID: Narda Person is a 74 y.o. female.    Chief Complaint: Anemia    75 yo F referred for anemia.  She went to the ED one month ago with symptomatic anemia, was found to have Hgb less than 6.  This is the first time she has ever needed transfusion.  She denies weight loss, difficulty eating, abdominal pain, bleeding from mouth or rectum, or n/v.  She states that she had a colonoscopy more than 20 years ago and was told it was ok.  She has mild intermittent GERD for which she takes Tums; she only takes this 1-2 times per month.    Review of Systems   Constitutional: Negative for appetite change and unexpected weight change.   Eyes: Negative for photophobia and visual disturbance.   Respiratory: Negative for chest tightness, shortness of breath and wheezing.    Cardiovascular: Negative for chest pain, palpitations and leg swelling.   Gastrointestinal: Negative for abdominal distention, abdominal pain and blood in stool.   Genitourinary: Negative for dysuria, flank pain and hematuria.   Musculoskeletal: Negative for joint swelling and myalgias.   Integumentary:  Negative for color change and rash.   Neurological: Negative for dizziness and speech difficulty.   Psychiatric/Behavioral: Negative for confusion and hallucinations.     Objective:       /62   Ht 5' 2" (1.575 m)   Wt 89.3 kg (196 lb 13.9 oz)   LMP  (LMP Unknown)   BMI 36.01 kg/m²     Physical Exam  Constitutional:       Appearance: Normal appearance. She is well-developed.   HENT:      Head: Normocephalic and atraumatic.   Eyes:      Extraocular Movements: Extraocular movements intact.      Pupils: Pupils are equal, round, and reactive to light.   Neck:      Musculoskeletal: Normal range of motion and neck supple.   Cardiovascular:      Rate and Rhythm: Normal rate and regular rhythm.   Pulmonary:      Effort: Pulmonary effort is normal.      Breath sounds: Normal breath sounds.   Abdominal:      General: Bowel sounds are " normal. There is no distension.      Palpations: Abdomen is soft. There is no mass.      Tenderness: There is no abdominal tenderness. There is no guarding or rebound.   Musculoskeletal: Normal range of motion.         General: No tenderness.   Neurological:      General: No focal deficit present.      Mental Status: She is alert and oriented to person, place, and time.   Psychiatric:         Behavior: Behavior normal.         Thought Content: Thought content normal.       Lab Results   Component Value Date    WBC 7.66 07/06/2020    HGB 10.0 (L) 07/06/2020    HCT 35.5 (L) 07/06/2020    MCV 74 (L) 07/06/2020     07/06/2020     Lab Results   Component Value Date    IRON 15 (L) 06/28/2020    TIBC 508 (H) 06/28/2020    FERRITIN 7 (L) 06/28/2020     CXR was independently visualized and reviewed by me and showed patchy infiltrate on the right, normal on left, enlarged cardia silhouette.    Assessment:       1. Iron deficiency anemia, unspecified iron deficiency anemia type    2. Gastroesophageal reflux disease without esophagitis    3. Preop examination        Plan:       Iron deficiency anemia, unspecified iron deficiency anemia type; Gastroesophageal reflux disease without esophagitis  -     Case request GI: EGD (ESOPHAGOGASTRODUODENOSCOPY), COLONOSCOPY  -     polyethylene glycol (GOLYTELY,NULYTELY) 236-22.74-6.74 -5.86 gram suspension; Use as directed  Dispense: 1 Bottle; Refill: 0  -     Ok to use as needed Tums    Preop examination  -     COVID-19 Routine Screening; Future; Expected date: 08/03/2020

## 2020-07-27 NOTE — LETTER
July 27, 2020      Sirena Coleman MD  [address]  [place] [address]           [facility]  [address] [place] [address] [place] [address]  Phone: [phone]  Fax: [phone]          Patient: Narda Person   MR Number: [id number]   YOB: 1945   Date of Visit: 7/27/2020       Dear Dr. Sirena Coleman:    Thank you for referring Narda Person to me for evaluation. Attached you will find relevant portions of my assessment and plan of care.    If you have questions, please do not hesitate to call me. I look forward to following Narda Person along with you.    Sincerely,    Guerda Perales MD    Enclosure  CC:  No Recipients    If you would like to receive this communication electronically, please contact [email or web address] or [phone] to request more information on [employer] access.    For providers and/or their staff who would like to refer a patient to [facility], please contact us through our one-stop-shop provider referral line, [facility], at [phone].    If you feel you have received this communication in error or would no longer like to receive these types of communications, please e-mail [email or web address]

## 2020-07-27 NOTE — PROGRESS NOTES
"Subjective:       Patient ID: Narda Person is a 74 y.o. female.    Chief Complaint: Anemia    73 yo F referred for anemia.  She went to the ED one month ago with symptomatic anemia, was found to have Hgb less than 6.  This is the first time she has ever needed transfusion.  She denies weight loss, difficulty eating, abdominal pain, bleeding from mouth or rectum, or n/v.  She states that she had a colonoscopy more than 20 years ago and was told it was ok.  She has mild intermittent GERD for which she takes Tums; she only takes this 1-2 times per month.    Review of Systems   Constitutional: Negative for appetite change and unexpected weight change.   Eyes: Negative for photophobia and visual disturbance.   Respiratory: Negative for chest tightness, shortness of breath and wheezing.    Cardiovascular: Negative for chest pain, palpitations and leg swelling.   Gastrointestinal: Negative for abdominal distention, abdominal pain and blood in stool.   Genitourinary: Negative for dysuria, flank pain and hematuria.   Musculoskeletal: Negative for joint swelling and myalgias.   Integumentary:  Negative for color change and rash.   Neurological: Negative for dizziness and speech difficulty.   Psychiatric/Behavioral: Negative for confusion and hallucinations.     Objective:       /62   Ht 5' 2" (1.575 m)   Wt 89.3 kg (196 lb 13.9 oz)   LMP  (LMP Unknown)   BMI 36.01 kg/m²     Physical Exam  Constitutional:       Appearance: Normal appearance. She is well-developed.   HENT:      Head: Normocephalic and atraumatic.   Eyes:      Extraocular Movements: Extraocular movements intact.      Pupils: Pupils are equal, round, and reactive to light.   Neck:      Musculoskeletal: Normal range of motion and neck supple.   Cardiovascular:      Rate and Rhythm: Normal rate and regular rhythm.   Pulmonary:      Effort: Pulmonary effort is normal.      Breath sounds: Normal breath sounds.   Abdominal:      General: Bowel sounds are " normal. There is no distension.      Palpations: Abdomen is soft. There is no mass.      Tenderness: There is no abdominal tenderness. There is no guarding or rebound.   Musculoskeletal: Normal range of motion.         General: No tenderness.   Neurological:      General: No focal deficit present.      Mental Status: She is alert and oriented to person, place, and time.   Psychiatric:         Behavior: Behavior normal.         Thought Content: Thought content normal.       Lab Results   Component Value Date    WBC 7.66 07/06/2020    HGB 10.0 (L) 07/06/2020    HCT 35.5 (L) 07/06/2020    MCV 74 (L) 07/06/2020     07/06/2020     Lab Results   Component Value Date    IRON 15 (L) 06/28/2020    TIBC 508 (H) 06/28/2020    FERRITIN 7 (L) 06/28/2020     CXR was independently visualized and reviewed by me and showed patchy infiltrate on the right, normal on left, enlarged cardia silhouette.    Assessment:       1. Iron deficiency anemia, unspecified iron deficiency anemia type    2. Gastroesophageal reflux disease without esophagitis    3. Preop examination        Plan:       Iron deficiency anemia, unspecified iron deficiency anemia type; Gastroesophageal reflux disease without esophagitis  -     Case request GI: EGD (ESOPHAGOGASTRODUODENOSCOPY), COLONOSCOPY  -     polyethylene glycol (GOLYTELY,NULYTELY) 236-22.74-6.74 -5.86 gram suspension; Use as directed  Dispense: 1 Bottle; Refill: 0  -     Ok to use as needed Tums    Preop examination  -     COVID-19 Routine Screening; Future; Expected date: 08/03/2020

## 2020-08-07 ENCOUNTER — HOSPITAL ENCOUNTER (OUTPATIENT)
Dept: PREADMISSION TESTING | Facility: HOSPITAL | Age: 75
Discharge: HOME | End: 2020-08-07
Attending: INTERNAL MEDICINE
Payer: MEDICARE

## 2020-08-07 DIAGNOSIS — Z01.818 PREOP EXAMINATION: ICD-10-CM

## 2020-08-07 PROCEDURE — U0003 INFECTIOUS AGENT DETECTION BY NUCLEIC ACID (DNA OR RNA); SEVERE ACUTE RESPIRATORY SYNDROME CORONAVIRUS 2 (SARS-COV-2) (CORONAVIRUS DISEASE [COVID-19]), AMPLIFIED PROBE TECHNIQUE, MAKING USE OF HIGH THROUGHPUT TECHNOLOGIES AS DESCRIBED BY CMS-2020-01-R: HCPCS | Mod: HCNC

## 2020-08-08 LAB — SARS-COV-2 RNA RESP QL NAA+PROBE: NOT DETECTED

## 2020-08-10 ENCOUNTER — ANESTHESIA (OUTPATIENT)
Dept: ENDOSCOPY | Facility: HOSPITAL | Age: 75
End: 2020-08-10
Payer: MEDICARE

## 2020-08-10 ENCOUNTER — HOSPITAL ENCOUNTER (OUTPATIENT)
Facility: HOSPITAL | Age: 75
Discharge: HOME | End: 2020-08-10
Attending: INTERNAL MEDICINE | Admitting: INTERNAL MEDICINE
Payer: MEDICARE

## 2020-08-10 VITALS
TEMPERATURE: 97 F | OXYGEN SATURATION: 96 % | DIASTOLIC BLOOD PRESSURE: 68 MMHG | RESPIRATION RATE: 18 BRPM | HEART RATE: 88 BPM | SYSTOLIC BLOOD PRESSURE: 147 MMHG

## 2020-08-10 DIAGNOSIS — D50.9 IRON DEFICIENCY ANEMIA: ICD-10-CM

## 2020-08-10 LAB — POCT GLUCOSE: 164 MG/DL (ref 70–110)

## 2020-08-10 PROCEDURE — 37000009 HC ANESTHESIA EA ADD 15 MINS: Mod: HCNC | Performed by: INTERNAL MEDICINE

## 2020-08-10 PROCEDURE — 63600175 PHARM REV CODE 636 W HCPCS: Mod: HCNC | Performed by: NURSE ANESTHETIST, CERTIFIED REGISTERED

## 2020-08-10 PROCEDURE — 25000003 PHARM REV CODE 250: Mod: HCNC | Performed by: NURSE ANESTHETIST, CERTIFIED REGISTERED

## 2020-08-10 PROCEDURE — 45378 DIAGNOSTIC COLONOSCOPY: CPT | Mod: HCNC | Performed by: INTERNAL MEDICINE

## 2020-08-10 PROCEDURE — 43239 EGD BIOPSY SINGLE/MULTIPLE: CPT | Mod: HCNC | Performed by: INTERNAL MEDICINE

## 2020-08-10 PROCEDURE — 45378 DIAGNOSTIC COLONOSCOPY: CPT | Mod: HCNC,,, | Performed by: INTERNAL MEDICINE

## 2020-08-10 PROCEDURE — 37000008 HC ANESTHESIA 1ST 15 MINUTES: Mod: HCNC | Performed by: INTERNAL MEDICINE

## 2020-08-10 PROCEDURE — 45378 PR COLONOSCOPY,DIAGNOSTIC: ICD-10-PCS | Mod: HCNC,,, | Performed by: INTERNAL MEDICINE

## 2020-08-10 PROCEDURE — 27201012 HC FORCEPS, HOT/COLD, DISP: Mod: HCNC | Performed by: INTERNAL MEDICINE

## 2020-08-10 PROCEDURE — 43239 EGD BIOPSY SINGLE/MULTIPLE: CPT | Mod: HCNC,51,, | Performed by: INTERNAL MEDICINE

## 2020-08-10 PROCEDURE — 88305 TISSUE EXAM BY PATHOLOGIST: CPT | Mod: 59,HCNC | Performed by: PATHOLOGY

## 2020-08-10 PROCEDURE — 00813 ANES UPR LWR GI NDSC PX: CPT | Mod: P3,GZ,HCNC | Performed by: NURSE ANESTHETIST, CERTIFIED REGISTERED

## 2020-08-10 PROCEDURE — 88305 TISSUE EXAM BY PATHOLOGIST: CPT | Mod: 26,HCNC,, | Performed by: PATHOLOGY

## 2020-08-10 PROCEDURE — 88305 TISSUE EXAM BY PATHOLOGIST: ICD-10-PCS | Mod: 26,HCNC,, | Performed by: PATHOLOGY

## 2020-08-10 PROCEDURE — 82962 GLUCOSE BLOOD TEST: CPT | Mod: HCNC | Performed by: INTERNAL MEDICINE

## 2020-08-10 PROCEDURE — 43239 PR EGD, FLEX, W/BIOPSY, SGL/MULTI: ICD-10-PCS | Mod: HCNC,51,, | Performed by: INTERNAL MEDICINE

## 2020-08-10 RX ORDER — LIDOCAINE HCL/PF 100 MG/5ML
SYRINGE (ML) INTRAVENOUS
Status: DISCONTINUED | OUTPATIENT
Start: 2020-08-10 | End: 2020-08-10

## 2020-08-10 RX ORDER — SODIUM CHLORIDE, SODIUM LACTATE, POTASSIUM CHLORIDE, CALCIUM CHLORIDE 600; 310; 30; 20 MG/100ML; MG/100ML; MG/100ML; MG/100ML
INJECTION, SOLUTION INTRAVENOUS CONTINUOUS PRN
Status: DISCONTINUED | OUTPATIENT
Start: 2020-08-10 | End: 2020-08-10

## 2020-08-10 RX ORDER — PROPOFOL 10 MG/ML
INJECTION, EMULSION INTRAVENOUS
Status: DISCONTINUED | OUTPATIENT
Start: 2020-08-10 | End: 2020-08-10

## 2020-08-10 RX ORDER — SODIUM CHLORIDE 9 MG/ML
INJECTION, SOLUTION INTRAVENOUS CONTINUOUS
Status: DISCONTINUED | OUTPATIENT
Start: 2020-08-10 | End: 2020-08-10 | Stop reason: HOSPADM

## 2020-08-10 RX ORDER — SODIUM CHLORIDE 0.9 % (FLUSH) 0.9 %
10 SYRINGE (ML) INJECTION
Status: DISCONTINUED | OUTPATIENT
Start: 2020-08-10 | End: 2020-08-10 | Stop reason: HOSPADM

## 2020-08-10 RX ADMIN — PROPOFOL 50 MG: 10 INJECTION, EMULSION INTRAVENOUS at 09:08

## 2020-08-10 RX ADMIN — SODIUM CHLORIDE, SODIUM LACTATE, POTASSIUM CHLORIDE, AND CALCIUM CHLORIDE: .6; .31; .03; .02 INJECTION, SOLUTION INTRAVENOUS at 08:08

## 2020-08-10 RX ADMIN — PROPOFOL 50 MG: 10 INJECTION, EMULSION INTRAVENOUS at 08:08

## 2020-08-10 RX ADMIN — PROPOFOL 120 MG: 10 INJECTION, EMULSION INTRAVENOUS at 08:08

## 2020-08-10 RX ADMIN — LIDOCAINE HYDROCHLORIDE 50 MG: 20 INJECTION, SOLUTION INTRAVENOUS at 08:08

## 2020-08-10 NOTE — TRANSFER OF CARE
Anesthesia Transfer of Care Note    Patient: Narda Person    Procedure(s) Performed: Procedure(s) (LRB):  EGD (ESOPHAGOGASTRODUODENOSCOPY) (N/A)  COLONOSCOPY (N/A)    Patient location: PACU    Anesthesia Type: general    Transport from OR: Transported from OR on 6-10 L/min O2 by face mask with adequate spontaneous ventilation    Post pain: adequate analgesia    Post assessment: no apparent anesthetic complications and tolerated procedure well    Post vital signs: stable    Level of consciousness: sedated    Nausea/Vomiting: no nausea/vomiting    Complications: none    Transfer of care protocol was followed      Last vitals:   Visit Vitals  /65 (BP Location: Left arm, Patient Position: Lying)   Pulse 89   Temp 36.1 °C (97 °F)   Resp 17   LMP  (LMP Unknown)   SpO2 (!) 93%   Breastfeeding No

## 2020-08-10 NOTE — ANESTHESIA PREPROCEDURE EVALUATION
08/10/2020  Narda Person is a 75 y.o., female.    Anesthesia Evaluation    I have reviewed the Patient Summary Reports.    I have reviewed the Nursing Notes. I have reviewed the NPO Status.   I have reviewed the Medications.     Review of Systems  Anesthesia Hx:  No problems with previous Anesthesia    Social:  Non-Smoker, No Alcohol Use    Hematology/Oncology:  Hematology Normal   Oncology Normal     EENT/Dental:EENT/Dental Normal   Cardiovascular:   Exercise tolerance: good Hypertension    Pulmonary:   Pneumonia    Renal/:   Chronic Renal Disease    Hepatic/GI:   GERD    Musculoskeletal:   Arthritis     Neurological:   Neuromuscular Disease,    Endocrine:   Diabetes Hypothyroidism    Psych:   Psychiatric History          Physical Exam  General:  Well nourished    Airway/Jaw/Neck:  Airway Findings: Mouth Opening: Normal General Airway Assessment: Adult  Mallampati: II  TM Distance: Normal, at least 6 cm  Jaw/Neck Findings:  Neck ROM: Normal ROM      Dental:  Dental Findings: In tact        Mental Status:  Mental Status Findings:  Cooperative         Anesthesia Plan  Type of Anesthesia, risks & benefits discussed:  Anesthesia Type:  general  Patient's Preference:   Intra-op Monitoring Plan: standard ASA monitors  Intra-op Monitoring Plan Comments:   Post Op Pain Control Plan: multimodal analgesia  Post Op Pain Control Plan Comments:   Induction:   IV  Beta Blocker:  Patient is not currently on a Beta-Blocker (No further documentation required).       Informed Consent: Patient understands risks and agrees with Anesthesia plan.  Questions answered. Anesthesia consent signed with patient.  ASA Score: 3     Day of Surgery Review of History & Physical: I have interviewed and examined the patient. I have reviewed the patient's H&P dated: 8/10/20. There are no significant changes.  H&P update referred to the  surgeon.         Ready For Surgery From Anesthesia Perspective.

## 2020-08-10 NOTE — ANESTHESIA POSTPROCEDURE EVALUATION
Anesthesia Post Evaluation    Patient: Narda Person    Procedure(s) Performed: Procedure(s) (LRB):  EGD (ESOPHAGOGASTRODUODENOSCOPY) (N/A)  COLONOSCOPY (N/A)    Final Anesthesia Type: general    Patient location during evaluation: PACU  Patient participation: Yes- Able to Participate  Level of consciousness: awake and alert, oriented and awake  Post-procedure vital signs: reviewed and stable  Pain management: adequate  Airway patency: patent    PONV status at discharge: No PONV  Anesthetic complications: no      Cardiovascular status: blood pressure returned to baseline  Respiratory status: unassisted, spontaneous ventilation and room air  Hydration status: euvolemic  Follow-up not needed.  Comments: [facility]          Vitals Value Taken Time   /68 08/10/20 0946   Temp 36.1 °C (97 °F) 08/10/20 0926   Pulse 88 08/10/20 0946   Resp 18 08/10/20 0946   SpO2 96 % 08/10/20 0946         No case tracking events are documented in the log.      Pain/Turner Score: Turner Score: 9 (8/10/2020  9:26 AM)

## 2020-08-10 NOTE — ANESTHESIA POSTPROCEDURE EVALUATION
Anesthesia Post Evaluation    Patient: Narda Person    Procedure(s) Performed: Procedure(s) (LRB):  EGD (ESOPHAGOGASTRODUODENOSCOPY) (N/A)  COLONOSCOPY (N/A)    Final Anesthesia Type: general    Patient location during evaluation: PACU  Patient participation: Yes- Able to Participate  Level of consciousness: awake and alert, oriented and awake  Post-procedure vital signs: reviewed and stable  Pain management: adequate  Airway patency: patent    PONV status at discharge: No PONV  Anesthetic complications: no      Cardiovascular status: blood pressure returned to baseline, hemodynamically stable and stable  Respiratory status: unassisted, spontaneous ventilation and room air  Hydration status: euvolemic  Follow-up not needed.          Vitals Value Taken Time   /68 08/10/20 0946   Temp 36.1 °C (97 °F) 08/10/20 0926   Pulse 88 08/10/20 0946   Resp 18 08/10/20 0946   SpO2 96 % 08/10/20 0946         Event Time   Out of Recovery 09:53:30         Pain/Turner Score: Turner Score: 9 (8/10/2020  9:26 AM)

## 2020-08-10 NOTE — OP NOTE
[facility]  [place]    Date of procedure:  08/10/2020    Procedure:  EGD with cold forceps biopsy; Diagnostic colonoscopy     Provider:  Guerda Perales MD    Indication:  Iron deficiency anemia    ASA:  3    Complications:  none    Estimated blood loss:  minimal    Medications:  General anesthesia per CRNA    Procedure in Detail:  Prior to the procedure, a History and Physical was performed, and patient medications and allergies were reviewed. The patient is competent. The risks and benefits of the procedure and the sedation options and risks were discussed with the patient. All questions were answered and informed consent was obtained. Patient identification and proposed procedure were verified by the physician, the nurse, and the anesthetist in the pre-procedure area and in the procedure room. Mental Status Examination: alert and oriented.   Airway Examination: normal oropharyngeal airway and neck mobility. Respiratory Examination: clear to auscultation. CV Examination: normal. Prophylactic Antibiotics: The patient does not require prophylactic antibiotics. Prior Anticoagulants: The patient has taken no previous anticoagulant or antiplatelet agents.  After reviewing the risks and benefits, the patient was deemed in satisfactory condition to undergo the procedure. The anesthesia plan was to use monitored anesthesia care (MAC) with General Anesthesia. Immediately prior to administration of medications, the patient was re-assessed for adequacy to receive sedatives. The heart rate, respiratory rate, oxygen saturations, blood pressure, adequacy of pulmonary ventilation, and response to care were monitored throughout the procedure. The physical status of the patient was re-assessed after the procedure.  After obtaining informed consent, the endoscope was passed under direct vision. Throughout the procedure, the patient's blood pressure, pulse, and oxygen saturations were monitored continuously. The  Olympus scope GIF- was introduced through the mouth, and advanced to the second part of the duodenum. The upper GI endoscopy was accomplished without difficulty. The gastroscope was then removed and a digital rectal exam was done.  The colonoscope was then inserted through the anus and passed to the cecum in standard fashion.  Air was insufflated for adequate visualization, but most of the air was removed prior to completion of the exam.  The colonoscopy was completed without difficulty.  The patient tolerated the procedure well.    Findings on EGD:  The esophagus is normal.  Mild antral gastritis was noted.  Cold forceps biopsies were taken from the antrum and placed in jar 2.  The duodenum is normal.  Empiric cold forceps biopsies were taken and placed in jar 1.    Findings on Colonoscopy:  The prep was good.  The sigmoid and rectum are redundant.  Grade I medium internal hemorrhoids were noted on retroflexion.    Impression:    1.  No cause for MARYAM noted  2.  Gastritis biopsied for HP  3.  Empiric biopsies taken for celiac disease.  No angiectasias noted.  4.  IH, o/w normal colonoscopy    Specimens collected:  2    Recommendations:   1. Discharge pt to home  2. Patient has a contact number available for emergencies. The signs and symptoms of potential delayed complications were discussed with the patient. Return to normal activities tomorrow. Written discharge instructions were provided to the patient.  3. Continue current medications  4. Continue current diet  5. Continue iron replacement.  6. Consider VCE in future if needed  7. No repeat colonoscopy due to absence of adenomas and patient's age

## 2020-08-12 LAB
FINAL PATHOLOGIC DIAGNOSIS: NORMAL
GROSS: NORMAL
MICROSCOPIC EXAM: NORMAL

## 2020-08-27 ENCOUNTER — TELEPHONE (OUTPATIENT)
Dept: GASTROENTEROLOGY | Facility: CLINIC | Age: 75
End: 2020-08-27

## 2020-08-27 NOTE — TELEPHONE ENCOUNTER
8/27/2020 spoke with patient daughter who takes care of patient, gave her patient test results. HP neg,kenzie neg. Also cont  Oral Iron supplemenionton. If that is not enough to keep blood count up will consider small bowel video capsul.vf/ma

## 2020-08-27 NOTE — TELEPHONE ENCOUNTER
----- Message from Guerda Perales MD sent at 8/13/2020 11:29 AM CDT -----  HP (-), celiac (-).  Cont oral iron supplementation.  If that is not enough to keep her blood count up, will consider small bowel video capsule in the future.

## 2020-10-06 ENCOUNTER — OFFICE VISIT (OUTPATIENT)
Dept: INTERNAL MEDICINE | Facility: CLINIC | Age: 75
End: 2020-10-06
Payer: MEDICARE

## 2020-10-06 VITALS
SYSTOLIC BLOOD PRESSURE: 128 MMHG | DIASTOLIC BLOOD PRESSURE: 60 MMHG | HEIGHT: 62 IN | RESPIRATION RATE: 18 BRPM | HEART RATE: 70 BPM | BODY MASS INDEX: 35.17 KG/M2 | WEIGHT: 191.13 LBS | TEMPERATURE: 98 F

## 2020-10-06 DIAGNOSIS — E03.4 HYPOTHYROIDISM DUE TO ACQUIRED ATROPHY OF THYROID: ICD-10-CM

## 2020-10-06 DIAGNOSIS — I70.0 AORTIC ATHEROSCLEROSIS: ICD-10-CM

## 2020-10-06 DIAGNOSIS — D50.9 IRON DEFICIENCY ANEMIA, UNSPECIFIED IRON DEFICIENCY ANEMIA TYPE: ICD-10-CM

## 2020-10-06 DIAGNOSIS — Z79.4 TYPE 2 DIABETES MELLITUS WITH OTHER SPECIFIED COMPLICATION, WITH LONG-TERM CURRENT USE OF INSULIN: ICD-10-CM

## 2020-10-06 DIAGNOSIS — E11.69 TYPE 2 DIABETES MELLITUS WITH OTHER SPECIFIED COMPLICATION, WITH LONG-TERM CURRENT USE OF INSULIN: ICD-10-CM

## 2020-10-06 DIAGNOSIS — I10 ESSENTIAL HYPERTENSION: Primary | ICD-10-CM

## 2020-10-06 DIAGNOSIS — J84.10 CALCIFIED GRANULOMA OF LUNG: ICD-10-CM

## 2020-10-06 PROBLEM — M46.96 INFLAMMATORY SPONDYLOPATHY OF LUMBAR REGION: Status: RESOLVED | Noted: 2019-08-07 | Resolved: 2020-10-06

## 2020-10-06 PROCEDURE — 99214 OFFICE O/P EST MOD 30 MIN: CPT | Mod: HCNC,25,S$GLB, | Performed by: INTERNAL MEDICINE

## 2020-10-06 PROCEDURE — G0008 FLU VACCINE - QUADRIVALENT - ADJUVANTED: ICD-10-PCS | Mod: HCNC,S$GLB,, | Performed by: INTERNAL MEDICINE

## 2020-10-06 PROCEDURE — 1159F PR MEDICATION LIST DOCUMENTED IN MEDICAL RECORD: ICD-10-PCS | Mod: HCNC,S$GLB,, | Performed by: INTERNAL MEDICINE

## 2020-10-06 PROCEDURE — G0008 ADMIN INFLUENZA VIRUS VAC: HCPCS | Mod: HCNC,S$GLB,, | Performed by: INTERNAL MEDICINE

## 2020-10-06 PROCEDURE — 1101F PT FALLS ASSESS-DOCD LE1/YR: CPT | Mod: HCNC,CPTII,S$GLB, | Performed by: INTERNAL MEDICINE

## 2020-10-06 PROCEDURE — 99499 RISK ADDL DX/OHS AUDIT: ICD-10-PCS | Mod: S$GLB,,, | Performed by: INTERNAL MEDICINE

## 2020-10-06 PROCEDURE — 99999 PR PBB SHADOW E&M-EST. PATIENT-LVL V: ICD-10-PCS | Mod: PBBFAC,HCNC,, | Performed by: INTERNAL MEDICINE

## 2020-10-06 PROCEDURE — 1101F PR PT FALLS ASSESS DOC 0-1 FALLS W/OUT INJ PAST YR: ICD-10-PCS | Mod: HCNC,CPTII,S$GLB, | Performed by: INTERNAL MEDICINE

## 2020-10-06 PROCEDURE — 99999 PR PBB SHADOW E&M-EST. PATIENT-LVL V: CPT | Mod: PBBFAC,HCNC,, | Performed by: INTERNAL MEDICINE

## 2020-10-06 PROCEDURE — 99214 PR OFFICE/OUTPT VISIT, EST, LEVL IV, 30-39 MIN: ICD-10-PCS | Mod: HCNC,25,S$GLB, | Performed by: INTERNAL MEDICINE

## 2020-10-06 PROCEDURE — 99499 UNLISTED E&M SERVICE: CPT | Mod: S$GLB,,, | Performed by: INTERNAL MEDICINE

## 2020-10-06 PROCEDURE — 90694 VACC AIIV4 NO PRSRV 0.5ML IM: CPT | Mod: HCNC,S$GLB,, | Performed by: INTERNAL MEDICINE

## 2020-10-06 PROCEDURE — 1159F MED LIST DOCD IN RCRD: CPT | Mod: HCNC,S$GLB,, | Performed by: INTERNAL MEDICINE

## 2020-10-06 PROCEDURE — 90694 FLU VACCINE - QUADRIVALENT - ADJUVANTED: ICD-10-PCS | Mod: HCNC,S$GLB,, | Performed by: INTERNAL MEDICINE

## 2020-10-06 RX ORDER — INSULIN DEGLUDEC 200 U/ML
80 INJECTION, SOLUTION SUBCUTANEOUS
Status: CANCELLED | OUTPATIENT
Start: 2020-10-06

## 2020-10-06 NOTE — PROGRESS NOTES
Subjective:       Patient ID: Narda Person is a 75 y.o. female.    Chief Complaint: Follow-up (6 month checkup), Hypertension, and Diabetes    Narda Person is a 75y.o. Female.   Here for 6 m follow up .      Difficult patient ;eating everything ;ice cream  Recurrent falls    Seeing DR SOUZA for DM ;       Diabetes  She presents for her follow-up diabetic visit. She has type 2 diabetes mellitus. Her disease course has been worsening. Pertinent negatives for hypoglycemia include no confusion, dizziness, nervousness/anxiousness, pallor or tremors. Associated symptoms include weakness. Pertinent negatives for diabetes include no chest pain and no fatigue. Current diabetic treatment includes intensive insulin program. She is compliant with treatment some of the time. Her breakfast blood glucose range is generally >200 mg/dl. Her dinner blood glucose range is generally >200 mg/dl. An ACE inhibitor/angiotensin II receptor blocker is being taken.   Hyperlipidemia  This is a recurrent problem. The current episode started more than 1 year ago. The problem is controlled. Recent lipid tests were reviewed and are low. Exacerbating diseases include diabetes and obesity. Pertinent negatives include no chest pain or myalgias. Current antihyperlipidemic treatment includes statins. The current treatment provides moderate improvement of lipids. Risk factors for coronary artery disease include dyslipidemia, diabetes mellitus and obesity.     Review of Systems   Constitutional: Negative for chills and fatigue.   HENT: Negative for congestion, hearing loss, sinus pressure and sore throat.    Eyes: Negative for photophobia.        Seeing ophthalmologist    Respiratory: Negative for cough, choking, chest tightness and wheezing.    Cardiovascular: Negative for chest pain and palpitations.   Gastrointestinal: Negative for blood in stool.        ++heart burn    Genitourinary: Negative for dysuria.   Musculoskeletal: Positive for back pain,  gait problem and neck stiffness. Negative for arthralgias and myalgias.   Skin: Positive for color change. Negative for pallor.   Neurological: Positive for weakness and light-headedness. Negative for dizziness and tremors.        Dysequilibrium    Hematological: Does not bruise/bleed easily.   Psychiatric/Behavioral: Positive for sleep disturbance. Negative for confusion and suicidal ideas. The patient is not nervous/anxious.         Difficulty falling asleep.       Objective:      Physical Exam  Vitals signs and nursing note reviewed.   Constitutional:       Appearance: She is well-developed.   HENT:      Head: Normocephalic and atraumatic.      Right Ear: External ear normal.      Left Ear: External ear normal.   Eyes:      Conjunctiva/sclera: Conjunctivae normal.      Pupils: Pupils are equal, round, and reactive to light.   Neck:      Musculoskeletal: Normal range of motion and neck supple.      Thyroid: No thyromegaly.      Vascular: No JVD.      Trachea: No tracheal deviation.   Cardiovascular:      Rate and Rhythm: Normal rate and regular rhythm.      Heart sounds: Normal heart sounds.   Pulmonary:      Effort: Pulmonary effort is normal. No respiratory distress.      Breath sounds: Normal breath sounds. No wheezing or rales.   Chest:      Chest wall: No tenderness.   Abdominal:      General: Bowel sounds are normal. There is no distension.      Palpations: Abdomen is soft. There is no mass.      Tenderness: There is no abdominal tenderness. There is no guarding or rebound.   Musculoskeletal: Normal range of motion.         General: Tenderness present.   Feet:      Right foot:      Protective Sensation: 6 sites tested. 6 sites sensed.      Skin integrity: No ulcer, erythema or callus.      Left foot:      Protective Sensation: 4 sites tested. 6 sites sensed.      Skin integrity: No ulcer, erythema or callus.   Lymphadenopathy:      Cervical: No cervical adenopathy.   Skin:     General: Skin is warm and dry.       Comments: Bruise to right flank   Neurological:      Mental Status: She is alert and oriented to person, place, and time.      Cranial Nerves: No cranial nerve deficit.      Motor: No abnormal muscle tone.      Coordination: Coordination normal.      Deep Tendon Reflexes: Reflexes are normal and symmetric. Reflexes normal.      Comments: CN: Optic discs are flat with normal vasculature, PERRL, Extraoccular movements and visual fields are full. Normal facial sensation and strength, Hearing symmetric, Tongue and Palate are midline and strong. Shoulder Shrug symmetric and strong.   Psychiatric:      Comments: Short term memory loss         Assessment:       1. Essential hypertension    2. Aortic atherosclerosis    3. Calcified granuloma of lung    4. Iron deficiency anemia, unspecified iron deficiency anemia type    5. Hypothyroidism due to acquired atrophy of thyroid    6. Type 2 diabetes mellitus with other specified complication, with long-term current use of insulin        Plan:   Narda was seen today for follow-up, hypertension and diabetes.    Diagnoses and all orders for this visit:    Essential hypertension  -     CBC auto differential; Future  -     Comprehensive Metabolic Panel; Future    Well controlled.  Continue same medication and dose.  1. Keep weight close to ideal body weight.   2.   Avoid high salt foods (olives, pickles, smoked meats, salted potato chips, etc.).   Do not add salt to your food at the table.   Use only small amounts of salt when cooking.   3. Begin an exercise program. Discuss with your doctor what type of exercise program would be best for you. It doesn't have to be difficult. Even brisk walking for 20 minutes three times a week is a good form of exercise.   4. Avoid medicines which contain heart stimulants. This includes many cold and sinus decongestant pills and sprays as well as diet pills. Check the warnings about hypertension on the label. Stimulants such as amphetamine or  "cocaine could be lethal for someone with hypertension. Never take these.    Aortic atherosclerosis  -     Lipid Panel; Future  Lab Results   Component Value Date    LDLCALC 76.6 07/06/2020       Calcified granuloma of lung    Iron deficiency anemia, unspecified iron deficiency anemia type  Lab Results   Component Value Date    WBC 7.66 07/06/2020    HGB 10.0 (L) 07/06/2020    HCT 35.5 (L) 07/06/2020    MCV 74 (L) 07/06/2020     07/06/2020         Hypothyroidism due to acquired atrophy of thyroid  -     TSH; Future  Well controlled.  Continue same medication and dose.  Lab Results   Component Value Date    TSH 2.572 07/06/2020       Type 2 diabetes mellitus with other specified complication, with long-term current use of insulin  -     Lipid Panel; Future  -     Hemoglobin A1C; Future  -     Microalbumin/Creatinine Ratio, Urine; Future  Patient has uncontrolled Diabetes .  We discussed about diet ;low in calories. Avoid sweats, sodas.  Also increasing activity;walking 2-3 miles a day.  I also adjusted medications and gave patient  instructions about adherence to plan.  Goal of  A1c  less than 7 % stressed.  Also goal of LDL less than 70 highlighted to patient.      Problem List Items Addressed This Visit     Diabetes mellitus, type 2    Essential hypertension - Primary    Hypothyroidism due to acquired atrophy of thyroid    Calcified granuloma of lung    Overview     7/25/14 CT Abdomen "There is a calcified granuloma in the right lung base."            Aortic atherosclerosis    Overview     /26/17 CT Abdomen "Calcified atheromatous disease affects the aorta and its major branch vessels."           Anemia          "

## 2020-10-26 ENCOUNTER — OFFICE VISIT (OUTPATIENT)
Dept: INTERNAL MEDICINE | Facility: CLINIC | Age: 75
End: 2020-10-26
Payer: MEDICARE

## 2020-10-26 VITALS
HEIGHT: 62 IN | RESPIRATION RATE: 20 BRPM | TEMPERATURE: 98 F | BODY MASS INDEX: 35.17 KG/M2 | HEART RATE: 90 BPM | DIASTOLIC BLOOD PRESSURE: 58 MMHG | OXYGEN SATURATION: 97 % | SYSTOLIC BLOOD PRESSURE: 142 MMHG | WEIGHT: 191.13 LBS

## 2020-10-26 DIAGNOSIS — M25.552 LEFT HIP PAIN: ICD-10-CM

## 2020-10-26 DIAGNOSIS — Z09 HOSPITAL DISCHARGE FOLLOW-UP: Primary | ICD-10-CM
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
## 2020-10-26 PROCEDURE — 1125F PR PAIN SEVERITY QUANTIFIED, PAIN PRESENT: ICD-10-PCS | Mod: HCNC,S$GLB,, | Performed by: INTERNAL MEDICINE

## 2020-10-26 PROCEDURE — 1159F MED LIST DOCD IN RCRD: CPT | Mod: HCNC,S$GLB,, | Performed by: INTERNAL MEDICINE

## 2020-10-26 PROCEDURE — 99213 OFFICE O/P EST LOW 20 MIN: CPT | Mod: HCNC,S$GLB,, | Performed by: INTERNAL MEDICINE

## 2020-10-26 PROCEDURE — 3288F FALL RISK ASSESSMENT DOCD: CPT | Mod: HCNC,CPTII,S$GLB, | Performed by: INTERNAL MEDICINE

## 2020-10-26 PROCEDURE — 1100F PR PT FALLS ASSESS DOC 2+ FALLS/FALL W/INJURY/YR: ICD-10-PCS | Mod: HCNC,CPTII,S$GLB, | Performed by: INTERNAL MEDICINE

## 2020-10-26 PROCEDURE — 99213 PR OFFICE/OUTPT VISIT, EST, LEVL III, 20-29 MIN: ICD-10-PCS | Mod: HCNC,S$GLB,, | Performed by: INTERNAL MEDICINE

## 2020-10-26 PROCEDURE — 1125F AMNT PAIN NOTED PAIN PRSNT: CPT | Mod: HCNC,S$GLB,, | Performed by: INTERNAL MEDICINE

## 2020-10-26 PROCEDURE — 1100F PTFALLS ASSESS-DOCD GE2>/YR: CPT | Mod: HCNC,CPTII,S$GLB, | Performed by: INTERNAL MEDICINE

## 2020-10-26 PROCEDURE — 99999 PR PBB SHADOW E&M-EST. PATIENT-LVL V: CPT | Mod: PBBFAC,HCNC,, | Performed by: INTERNAL MEDICINE

## 2020-10-26 PROCEDURE — 1159F PR MEDICATION LIST DOCUMENTED IN MEDICAL RECORD: ICD-10-PCS | Mod: HCNC,S$GLB,, | Performed by: INTERNAL MEDICINE

## 2020-10-26 PROCEDURE — 99999 PR PBB SHADOW E&M-EST. PATIENT-LVL V: ICD-10-PCS | Mod: PBBFAC,HCNC,, | Performed by: INTERNAL MEDICINE

## 2020-10-26 PROCEDURE — 3288F PR FALLS RISK ASSESSMENT DOCUMENTED: ICD-10-PCS | Mod: HCNC,CPTII,S$GLB, | Performed by: INTERNAL MEDICINE

## 2020-10-26 NOTE — PROGRESS NOTES
"Subjective:       Patient ID: Narda Person is a 75 y.o. female.    Chief Complaint: ER follow up, Hip Pain, and Hand Pain (broke nail on right pointer finger)      HPI:  Patient is new to me and presents s/p fall at home. 1 week ago she was showering and slide off the shower bench, had diarrhea in the shower and called 911 to lift her up. Went to ER in [place]. Was told no fractures and instructions for NSAIDs for pain. Today she is still having left hip pain. Described as stabbing pain. Reports like a grabbing muscle. Worse with turning in bed or trying to stand from seated position. No prior injuries to the hip but thinks had "nerve ablation" on that side. She has generalized gait instability from chronic low back issues and walks with a cane but using wheelchair for long distances right now.    Past Medical History:   Diagnosis Date    Abnormal Pap smear     DIONNE (acute kidney injury) 6/29/2014    DIONNE (acute kidney injury) 6/29/2014    Alzheimer's dementia 4/12/2018    Anemia     Breast cancer 1995    left breast    Breast disorder     Diabetes mellitus     Diabetes mellitus, type 2     ESSENTIAL HYPERTENSION 10/1/2012    Hypothyroid 6/29/2014    Pneumonia 01/16/2020       Family History   Problem Relation Age of Onset    Diabetes Mother     Hypertension Mother     Heart attack Mother 66    Uterine cancer Mother     Diabetes Sister     Heart disease Sister     Heart disease Brother     Hypertension Daughter     Thyroid disease Daughter     Heart attack Son 38    Heart disease Brother     Depression Daughter     Hypertension Son        Social History     Socioeconomic History    Marital status:      Spouse name: Not on file    Number of children: Not on file    Years of education: Not on file    Highest education level: Not on file   Occupational History    Not on file   Social Needs    Financial resource strain: Not on file    Food insecurity     Worry: Not on file     " Inability: Not on file    Transportation needs     Medical: Not on file     Non-medical: Not on file   Tobacco Use    Smoking status: Former Smoker     Packs/day: 1.00     Years: 10.00     Pack years: 10.00     Types: Cigarettes     Quit date: 1988     Years since quittin.8    Smokeless tobacco: Never Used   Substance and Sexual Activity    Alcohol use: Yes     Alcohol/week: 1.0 standard drinks     Types: 1 Glasses of wine per week     Comment: Occ.    Drug use: No    Sexual activity: Never   Lifestyle    Physical activity     Days per week: Not on file     Minutes per session: Not on file    Stress: Not on file   Relationships    Social connections     Talks on phone: Not on file     Gets together: Not on file     Attends Anabaptism service: Not on file     Active member of club or organization: Not on file     Attends meetings of clubs or organizations: Not on file     Relationship status: Not on file   Other Topics Concern    Not on file   Social History Narrative    Not on file       Review of Systems   Constitutional: Negative for activity change, fatigue, fever and unexpected weight change.   HENT: Negative for congestion, ear pain, hearing loss, rhinorrhea and sore throat.    Eyes: Negative for redness and visual disturbance.   Respiratory: Negative for cough, shortness of breath and wheezing.    Cardiovascular: Negative for chest pain, palpitations and leg swelling.   Gastrointestinal: Negative for abdominal pain, constipation, diarrhea, nausea and vomiting.   Genitourinary: Negative for dysuria, frequency and urgency.   Musculoskeletal: Negative for back pain, joint swelling and neck pain.   Skin: Negative for color change, rash and wound.   Neurological: Positive for weakness. Negative for dizziness, tremors, light-headedness and headaches. Syncope: chronic, walks with cane.         Objective:      Physical Exam  Vitals signs reviewed.   Constitutional:       General: She is not in acute  distress.     Appearance: She is well-developed.   HENT:      Head: Normocephalic and atraumatic.      Right Ear: External ear normal.      Left Ear: External ear normal.      Nose: Nose normal.   Eyes:      General: No scleral icterus.        Right eye: No discharge.         Left eye: No discharge.      Conjunctiva/sclera: Conjunctivae normal.      Pupils: Pupils are equal, round, and reactive to light.   Neck:      Musculoskeletal: Neck supple.      Thyroid: No thyromegaly.   Cardiovascular:      Rate and Rhythm: Normal rate and regular rhythm.      Heart sounds: No murmur.   Pulmonary:      Effort: Pulmonary effort is normal. No respiratory distress.      Breath sounds: Normal breath sounds. No wheezing or rales.   Abdominal:      General: Bowel sounds are normal. There is no distension.      Palpations: Abdomen is soft.      Tenderness: There is no abdominal tenderness.   Lymphadenopathy:      Cervical: No cervical adenopathy.   Skin:     General: Skin is warm and dry.   Neurological:      Mental Status: She is alert and oriented to person, place, and time.      Cranial Nerves: No cranial nerve deficit.   Psychiatric:         Behavior: Behavior normal.         Assessment:       1. Hospital discharge follow-up    2. Left hip pain    3. Fall in home, initial encounter        Plan:       Narda was seen today for er follow up, hip pain and hand pain.    Diagnoses and all orders for this visit:    Hospital discharge follow-up    Left hip pain    Fall in home, initial encounter      No outside records to review  If had imaging and no fractures this is reassuring  Cont PRN tyelnol, NSAIDs  Stretching, heating pain  biofreeze for muscle pain  Should continue to slowly improving  Needs to walk---should not be sedentary  Discussed PT, if not improving let me know and can start    Follow up PCP as stephan

## 2020-12-31 ENCOUNTER — PATIENT OUTREACH (OUTPATIENT)
Dept: ADMINISTRATIVE | Facility: OTHER | Age: 75
End: 2020-12-31

## 2020-12-31 NOTE — PROGRESS NOTES
Health Maintenance Due   Topic Date Due    Hepatitis C Screening  1945    Aspirin/Antiplatelet Therapy  07/28/1961    Shingles Vaccine (1 of 2) 07/28/1993    Eye Exam  06/12/2019    Foot Exam  01/07/2021     Updates were requested from care everywhere.  Chart was reviewed for overdue Proactive [facility] En[facility] ([facility]) topics (CRS, Breast Cancer Screening, Eye exam)  Health Maintenance has been updated.  LINKS immunization registry triggered.  Immunizations were reconciled.

## 2021-01-06 ENCOUNTER — OFFICE VISIT (OUTPATIENT)
Dept: NEUROLOGY | Facility: CLINIC | Age: 76
End: 2021-01-06
Payer: MEDICARE

## 2021-01-06 ENCOUNTER — TELEPHONE (OUTPATIENT)
Dept: NEUROSURGERY | Facility: CLINIC | Age: 76
End: 2021-01-06

## 2021-01-06 VITALS
HEIGHT: 63 IN | WEIGHT: 186.5 LBS | TEMPERATURE: 97 F | SYSTOLIC BLOOD PRESSURE: 136 MMHG | DIASTOLIC BLOOD PRESSURE: 64 MMHG | HEART RATE: 70 BPM | RESPIRATION RATE: 16 BRPM | BODY MASS INDEX: 33.04 KG/M2

## 2021-01-06 DIAGNOSIS — F32.A ANXIETY AND DEPRESSION: ICD-10-CM

## 2021-01-06 DIAGNOSIS — R26.9 GAIT ABNORMALITY: ICD-10-CM

## 2021-01-06 DIAGNOSIS — M48.061 SPINAL STENOSIS OF LUMBAR REGION, UNSPECIFIED WHETHER NEUROGENIC CLAUDICATION PRESENT: ICD-10-CM

## 2021-01-06 DIAGNOSIS — D33.3 VESTIBULAR SCHWANNOMA: ICD-10-CM

## 2021-01-06 DIAGNOSIS — R41.3 MEMORY LOSS: Primary | ICD-10-CM

## 2021-01-06 DIAGNOSIS — G45.9 TRANSIENT CEREBRAL ISCHEMIA, UNSPECIFIED TYPE: ICD-10-CM

## 2021-01-06 DIAGNOSIS — D33.3 RIGHT ACOUSTIC NEUROMA: Primary | ICD-10-CM

## 2021-01-06 DIAGNOSIS — F41.9 ANXIETY AND DEPRESSION: ICD-10-CM

## 2021-01-06 DIAGNOSIS — E11.42 DIABETIC POLYNEUROPATHY ASSOCIATED WITH TYPE 2 DIABETES MELLITUS: ICD-10-CM

## 2021-01-06 DIAGNOSIS — E03.4 HYPOTHYROIDISM DUE TO ACQUIRED ATROPHY OF THYROID: ICD-10-CM

## 2021-01-06 PROCEDURE — 99999 PR PBB SHADOW E&M-EST. PATIENT-LVL V: CPT | Mod: PBBFAC,HCNC,, | Performed by: NURSE PRACTITIONER

## 2021-01-06 PROCEDURE — 3288F PR FALLS RISK ASSESSMENT DOCUMENTED: ICD-10-PCS | Mod: CPTII,S$GLB,, | Performed by: NURSE PRACTITIONER

## 2021-01-06 PROCEDURE — 1159F PR MEDICATION LIST DOCUMENTED IN MEDICAL RECORD: ICD-10-PCS | Mod: S$GLB,,, | Performed by: NURSE PRACTITIONER

## 2021-01-06 PROCEDURE — 1100F PTFALLS ASSESS-DOCD GE2>/YR: CPT | Mod: CPTII,S$GLB,, | Performed by: NURSE PRACTITIONER

## 2021-01-06 PROCEDURE — 99214 PR OFFICE/OUTPT VISIT, EST, LEVL IV, 30-39 MIN: ICD-10-PCS | Mod: S$GLB,,, | Performed by: NURSE PRACTITIONER

## 2021-01-06 PROCEDURE — 1159F MED LIST DOCD IN RCRD: CPT | Mod: S$GLB,,, | Performed by: NURSE PRACTITIONER

## 2021-01-06 PROCEDURE — 99499 RISK ADDL DX/OHS AUDIT: ICD-10-PCS | Mod: S$GLB,,, | Performed by: NURSE PRACTITIONER

## 2021-01-06 PROCEDURE — 99214 OFFICE O/P EST MOD 30 MIN: CPT | Mod: S$GLB,,, | Performed by: NURSE PRACTITIONER

## 2021-01-06 PROCEDURE — 1100F PR PT FALLS ASSESS DOC 2+ FALLS/FALL W/INJURY/YR: ICD-10-PCS | Mod: CPTII,S$GLB,, | Performed by: NURSE PRACTITIONER

## 2021-01-06 PROCEDURE — 99499 UNLISTED E&M SERVICE: CPT | Mod: S$GLB,,, | Performed by: NURSE PRACTITIONER

## 2021-01-06 PROCEDURE — 1126F PR PAIN SEVERITY QUANTIFIED, NO PAIN PRESENT: ICD-10-PCS | Mod: S$GLB,,, | Performed by: NURSE PRACTITIONER

## 2021-01-06 PROCEDURE — 99999 PR PBB SHADOW E&M-EST. PATIENT-LVL V: ICD-10-PCS | Mod: PBBFAC,HCNC,, | Performed by: NURSE PRACTITIONER

## 2021-01-06 PROCEDURE — 1126F AMNT PAIN NOTED NONE PRSNT: CPT | Mod: S$GLB,,, | Performed by: NURSE PRACTITIONER

## 2021-01-06 PROCEDURE — 3288F FALL RISK ASSESSMENT DOCD: CPT | Mod: CPTII,S$GLB,, | Performed by: NURSE PRACTITIONER

## 2021-01-06 RX ORDER — DULOXETIN HYDROCHLORIDE 60 MG/1
60 CAPSULE, DELAYED RELEASE ORAL DAILY
Qty: 30 CAPSULE | Refills: 3 | Status: SHIPPED | OUTPATIENT
Start: 2021-01-06 | End: 2021-04-22 | Stop reason: DRUGHIGH

## 2021-01-08 DIAGNOSIS — Z76.0 ENCOUNTER FOR MEDICATION REFILL: ICD-10-CM

## 2021-01-08 RX ORDER — GABAPENTIN 300 MG/1
300 CAPSULE ORAL 2 TIMES DAILY
Qty: 180 CAPSULE | Refills: 1 | Status: SHIPPED | OUTPATIENT
Start: 2021-01-08 | End: 2021-04-13 | Stop reason: SDUPTHER

## 2021-01-08 RX ORDER — SITAGLIPTIN 100 MG/1
100 TABLET, FILM COATED ORAL DAILY
Qty: 90 TABLET | Refills: 1 | Status: CANCELLED | OUTPATIENT
Start: 2021-01-08 | End: 2021-07-07

## 2021-01-11 ENCOUNTER — HOSPITAL ENCOUNTER (EMERGENCY)
Facility: HOSPITAL | Age: 76
Discharge: HOME | End: 2021-01-11
Attending: SURGERY
Payer: MEDICARE

## 2021-01-11 VITALS
TEMPERATURE: 98 F | OXYGEN SATURATION: 99 % | SYSTOLIC BLOOD PRESSURE: 155 MMHG | DIASTOLIC BLOOD PRESSURE: 70 MMHG | RESPIRATION RATE: 18 BRPM | HEART RATE: 80 BPM

## 2021-01-11 DIAGNOSIS — M25.511 CHRONIC PAIN OF BOTH SHOULDERS: ICD-10-CM

## 2021-01-11 DIAGNOSIS — M25.512 CHRONIC PAIN OF BOTH SHOULDERS: ICD-10-CM

## 2021-01-11 DIAGNOSIS — G89.29 CHRONIC PAIN OF BOTH SHOULDERS: ICD-10-CM
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
## 2021-01-11 DIAGNOSIS — S09.90XA INJURY OF HEAD, INITIAL ENCOUNTER: ICD-10-CM

## 2021-01-11 DIAGNOSIS — M25.551 RIGHT HIP PAIN: ICD-10-CM

## 2021-01-11 PROCEDURE — 25000003 PHARM REV CODE 250: Performed by: SURGERY

## 2021-01-11 PROCEDURE — 99284 EMERGENCY DEPT VISIT MOD MDM: CPT | Mod: 25

## 2021-01-11 RX ORDER — ACETAMINOPHEN 500 MG
1000 TABLET ORAL
Status: COMPLETED | OUTPATIENT
Start: 2021-01-11 | End: 2021-01-11

## 2021-01-11 RX ADMIN — ACETAMINOPHEN 1000 MG: 500 TABLET ORAL at 08:01

## 2021-02-01 ENCOUNTER — HOSPITAL ENCOUNTER (OUTPATIENT)
Dept: RADIOLOGY | Facility: HOSPITAL | Age: 76
Discharge: HOME | End: 2021-02-01
Attending: NEUROLOGICAL SURGERY
Payer: MEDICARE

## 2021-02-01 ENCOUNTER — OFFICE VISIT (OUTPATIENT)
Dept: NEUROSURGERY | Facility: CLINIC | Age: 76
End: 2021-02-01
Payer: MEDICARE

## 2021-02-01 VITALS
HEIGHT: 63 IN | WEIGHT: 186 LBS | SYSTOLIC BLOOD PRESSURE: 123 MMHG | DIASTOLIC BLOOD PRESSURE: 61 MMHG | BODY MASS INDEX: 32.96 KG/M2 | TEMPERATURE: 98 F | HEART RATE: 86 BPM

## 2021-02-01 DIAGNOSIS — D33.3 RIGHT ACOUSTIC NEUROMA: ICD-10-CM

## 2021-02-01 DIAGNOSIS — D33.3 RIGHT ACOUSTIC NEUROMA: Primary | ICD-10-CM

## 2021-02-01 LAB
CREAT SERPL-MCNC: 0.8 MG/DL (ref 0.5–1.4)
SAMPLE: NORMAL

## 2021-02-01 PROCEDURE — A9585 GADOBUTROL INJECTION: HCPCS | Performed by: NEUROLOGICAL SURGERY

## 2021-02-01 PROCEDURE — 1159F PR MEDICATION LIST DOCUMENTED IN MEDICAL RECORD: ICD-10-PCS | Mod: S$GLB,,, | Performed by: NEUROLOGICAL SURGERY

## 2021-02-01 PROCEDURE — 99999 PR PBB SHADOW E&M-EST. PATIENT-LVL V: ICD-10-PCS | Mod: PBBFAC,,, | Performed by: NEUROLOGICAL SURGERY

## 2021-02-01 PROCEDURE — 70553 MRI BRAIN W WO CONTRAST: ICD-10-PCS | Mod: 26,,, | Performed by: RADIOLOGY

## 2021-02-01 PROCEDURE — 25500020 PHARM REV CODE 255: Performed by: NEUROLOGICAL SURGERY

## 2021-02-01 PROCEDURE — 99999 PR PBB SHADOW E&M-EST. PATIENT-LVL V: CPT | Mod: PBBFAC,,, | Performed by: NEUROLOGICAL SURGERY

## 2021-02-01 PROCEDURE — 1159F MED LIST DOCD IN RCRD: CPT | Mod: S$GLB,,, | Performed by: NEUROLOGICAL SURGERY

## 2021-02-01 PROCEDURE — 70553 MRI BRAIN STEM W/O & W/DYE: CPT | Mod: TC

## 2021-02-01 PROCEDURE — 99213 OFFICE O/P EST LOW 20 MIN: CPT | Mod: S$GLB,,, | Performed by: NEUROLOGICAL SURGERY

## 2021-02-01 PROCEDURE — 1126F PR PAIN SEVERITY QUANTIFIED, NO PAIN PRESENT: ICD-10-PCS | Mod: S$GLB,,, | Performed by: NEUROLOGICAL SURGERY

## 2021-02-01 PROCEDURE — 99213 PR OFFICE/OUTPT VISIT, EST, LEVL III, 20-29 MIN: ICD-10-PCS | Mod: S$GLB,,, | Performed by: NEUROLOGICAL SURGERY

## 2021-02-01 PROCEDURE — 1126F AMNT PAIN NOTED NONE PRSNT: CPT | Mod: S$GLB,,, | Performed by: NEUROLOGICAL SURGERY

## 2021-02-01 PROCEDURE — 70553 MRI BRAIN STEM W/O & W/DYE: CPT | Mod: 26,,, | Performed by: RADIOLOGY

## 2021-02-01 RX ORDER — GADOBUTROL 604.72 MG/ML
9 INJECTION INTRAVENOUS
Status: COMPLETED | OUTPATIENT
Start: 2021-02-01 | End: 2021-02-01

## 2021-02-01 RX ADMIN — GADOBUTROL 9 ML: 604.72 INJECTION INTRAVENOUS at 01:02

## 2021-02-26 ENCOUNTER — PES CALL (OUTPATIENT)
Dept: ADMINISTRATIVE | Facility: CLINIC | Age: 76
End: 2021-02-26

## 2021-03-11 ENCOUNTER — TELEPHONE (OUTPATIENT)
Dept: NEUROLOGY | Facility: CLINIC | Age: 76
End: 2021-03-11

## 2021-04-07 ENCOUNTER — PATIENT OUTREACH (OUTPATIENT)
Dept: ADMINISTRATIVE | Facility: OTHER | Age: 76
End: 2021-04-07

## 2021-04-09 ENCOUNTER — PES CALL (OUTPATIENT)
Dept: ADMINISTRATIVE | Facility: CLINIC | Age: 76
End: 2021-04-09

## 2021-04-13 ENCOUNTER — OFFICE VISIT (OUTPATIENT)
Dept: INTERNAL MEDICINE | Facility: CLINIC | Age: 76
End: 2021-04-13
Payer: MEDICARE

## 2021-04-13 VITALS
BODY MASS INDEX: 34.45 KG/M2 | WEIGHT: 194.44 LBS | HEART RATE: 88 BPM | SYSTOLIC BLOOD PRESSURE: 130 MMHG | RESPIRATION RATE: 16 BRPM | DIASTOLIC BLOOD PRESSURE: 70 MMHG | HEIGHT: 63 IN

## 2021-04-13 DIAGNOSIS — G30.9 ALZHEIMER'S DEMENTIA WITH BEHAVIORAL DISTURBANCE, UNSPECIFIED TIMING OF DEMENTIA ONSET: ICD-10-CM

## 2021-04-13 DIAGNOSIS — I10 ESSENTIAL HYPERTENSION: Primary | ICD-10-CM

## 2021-04-13 DIAGNOSIS — Z76.0 ENCOUNTER FOR MEDICATION REFILL: ICD-10-CM

## 2021-04-13 DIAGNOSIS — E11.69 TYPE 2 DIABETES MELLITUS WITH OTHER SPECIFIED COMPLICATION, WITH LONG-TERM CURRENT USE OF INSULIN: ICD-10-CM

## 2021-04-13 DIAGNOSIS — E03.4 HYPOTHYROIDISM DUE TO ACQUIRED ATROPHY OF THYROID: ICD-10-CM

## 2021-04-13 DIAGNOSIS — E66.01 MORBID (SEVERE) OBESITY DUE TO EXCESS CALORIES: ICD-10-CM

## 2021-04-13 DIAGNOSIS — J84.10 CALCIFIED GRANULOMA OF LUNG: ICD-10-CM

## 2021-04-13 DIAGNOSIS — F02.81 ALZHEIMER'S DEMENTIA WITH BEHAVIORAL DISTURBANCE, UNSPECIFIED TIMING OF DEMENTIA ONSET: ICD-10-CM

## 2021-04-13 DIAGNOSIS — E11.3599 PROLIFERATIVE DIABETIC RETINOPATHY ASSOCIATED WITH TYPE 2 DIABETES MELLITUS, UNSPECIFIED LATERALITY, UNSPECIFIED PROLIFERATIVE RETINOPATHY TYPE: ICD-10-CM

## 2021-04-13 DIAGNOSIS — I70.0 AORTIC ATHEROSCLEROSIS: ICD-10-CM

## 2021-04-13 DIAGNOSIS — Z79.4 TYPE 2 DIABETES MELLITUS WITH OTHER SPECIFIED COMPLICATION, WITH LONG-TERM CURRENT USE OF INSULIN: ICD-10-CM

## 2021-04-13 PROBLEM — F02.818 ALZHEIMER'S DEMENTIA WITH BEHAVIORAL DISTURBANCE: Status: ACTIVE | Noted: 2018-04-12

## 2021-04-13 PROCEDURE — 99499 RISK ADDL DX/OHS AUDIT: ICD-10-PCS | Mod: S$GLB,,, | Performed by: INTERNAL MEDICINE

## 2021-04-13 PROCEDURE — 1126F PR PAIN SEVERITY QUANTIFIED, NO PAIN PRESENT: ICD-10-PCS | Mod: S$GLB,,, | Performed by: INTERNAL MEDICINE

## 2021-04-13 PROCEDURE — 99214 OFFICE O/P EST MOD 30 MIN: CPT | Mod: S$GLB,,, | Performed by: INTERNAL MEDICINE

## 2021-04-13 PROCEDURE — 3288F PR FALLS RISK ASSESSMENT DOCUMENTED: ICD-10-PCS | Mod: CPTII,S$GLB,, | Performed by: INTERNAL MEDICINE

## 2021-04-13 PROCEDURE — 1159F MED LIST DOCD IN RCRD: CPT | Mod: S$GLB,,, | Performed by: INTERNAL MEDICINE

## 2021-04-13 PROCEDURE — 1159F PR MEDICATION LIST DOCUMENTED IN MEDICAL RECORD: ICD-10-PCS | Mod: S$GLB,,, | Performed by: INTERNAL MEDICINE

## 2021-04-13 PROCEDURE — 99999 PR PBB SHADOW E&M-EST. PATIENT-LVL III: CPT | Mod: PBBFAC,,, | Performed by: INTERNAL MEDICINE

## 2021-04-13 PROCEDURE — 99999 PR PBB SHADOW E&M-EST. PATIENT-LVL III: ICD-10-PCS | Mod: PBBFAC,,, | Performed by: INTERNAL MEDICINE

## 2021-04-13 PROCEDURE — 99499 UNLISTED E&M SERVICE: CPT | Mod: S$GLB,,, | Performed by: INTERNAL MEDICINE

## 2021-04-13 PROCEDURE — 99214 PR OFFICE/OUTPT VISIT, EST, LEVL IV, 30-39 MIN: ICD-10-PCS | Mod: S$GLB,,, | Performed by: INTERNAL MEDICINE

## 2021-04-13 PROCEDURE — 1126F AMNT PAIN NOTED NONE PRSNT: CPT | Mod: S$GLB,,, | Performed by: INTERNAL MEDICINE

## 2021-04-13 PROCEDURE — 1100F PTFALLS ASSESS-DOCD GE2>/YR: CPT | Mod: CPTII,S$GLB,, | Performed by: INTERNAL MEDICINE

## 2021-04-13 PROCEDURE — 1100F PR PT FALLS ASSESS DOC 2+ FALLS/FALL W/INJURY/YR: ICD-10-PCS | Mod: CPTII,S$GLB,, | Performed by: INTERNAL MEDICINE

## 2021-04-13 PROCEDURE — 3288F FALL RISK ASSESSMENT DOCD: CPT | Mod: CPTII,S$GLB,, | Performed by: INTERNAL MEDICINE

## 2021-04-13 RX ORDER — METFORMIN HYDROCHLORIDE 1000 MG/1
1000 TABLET ORAL 2 TIMES DAILY WITH MEALS
Qty: 180 TABLET | Refills: 1 | Status: SHIPPED | OUTPATIENT
Start: 2021-04-13 | End: 2022-02-14

## 2021-04-13 RX ORDER — GABAPENTIN 300 MG/1
300 CAPSULE ORAL 2 TIMES DAILY
Qty: 180 CAPSULE | Refills: 1 | Status: SHIPPED | OUTPATIENT
Start: 2021-04-13 | End: 2022-03-02 | Stop reason: SDUPTHER

## 2021-04-14 ENCOUNTER — LAB VISIT (OUTPATIENT)
Dept: LAB | Facility: HOSPITAL | Age: 76
End: 2021-04-14
Attending: INTERNAL MEDICINE
Payer: MEDICARE

## 2021-04-14 ENCOUNTER — IMMUNIZATION (OUTPATIENT)
Dept: FAMILY MEDICINE | Facility: CLINIC | Age: 76
End: 2021-04-14
Payer: MEDICARE

## 2021-04-14 DIAGNOSIS — Z23 NEED FOR VACCINATION: Primary | ICD-10-CM

## 2021-04-14 DIAGNOSIS — E11.69 TYPE 2 DIABETES MELLITUS WITH OTHER SPECIFIED COMPLICATION, WITH LONG-TERM CURRENT USE OF INSULIN: ICD-10-CM

## 2021-04-14 DIAGNOSIS — I70.0 AORTIC ATHEROSCLEROSIS: ICD-10-CM

## 2021-04-14 DIAGNOSIS — I10 ESSENTIAL HYPERTENSION: ICD-10-CM

## 2021-04-14 DIAGNOSIS — Z79.4 TYPE 2 DIABETES MELLITUS WITH OTHER SPECIFIED COMPLICATION, WITH LONG-TERM CURRENT USE OF INSULIN: ICD-10-CM

## 2021-04-14 DIAGNOSIS — E03.4 HYPOTHYROIDISM DUE TO ACQUIRED ATROPHY OF THYROID: ICD-10-CM

## 2021-04-14 LAB
ALBUMIN SERPL BCP-MCNC: 3.9 G/DL (ref 3.5–5.2)
ALBUMIN/CREAT UR: 18.1 UG/MG (ref 0–30)
ALP SERPL-CCNC: 65 U/L (ref 55–135)
ALT SERPL W/O P-5'-P-CCNC: 24 U/L (ref 10–44)
ANION GAP SERPL CALC-SCNC: 10 MMOL/L (ref 8–16)
AST SERPL-CCNC: 22 U/L (ref 10–40)
BASOPHILS # BLD AUTO: 0.07 K/UL (ref 0–0.2)
BASOPHILS NFR BLD: 1.2 % (ref 0–1.9)
BILIRUB SERPL-MCNC: 0.6 MG/DL (ref 0.1–1)
BUN SERPL-MCNC: 14 MG/DL (ref 8–23)
CALCIUM SERPL-MCNC: 9.6 MG/DL (ref 8.7–10.5)
CHLORIDE SERPL-SCNC: 101 MMOL/L (ref 95–110)
CHOLEST SERPL-MCNC: 143 MG/DL (ref 120–199)
CHOLEST/HDLC SERPL: 3 {RATIO} (ref 2–5)
CO2 SERPL-SCNC: 27 MMOL/L (ref 23–29)
CREAT SERPL-MCNC: 0.8 MG/DL (ref 0.5–1.4)
CREAT UR-MCNC: 27.7 MG/DL (ref 15–325)
DIFFERENTIAL METHOD: NORMAL
EOSINOPHIL # BLD AUTO: 0.2 K/UL (ref 0–0.5)
EOSINOPHIL NFR BLD: 2.6 % (ref 0–8)
ERYTHROCYTE [DISTWIDTH] IN BLOOD BY AUTOMATED COUNT: 13.2 % (ref 11.5–14.5)
EST. GFR  (AFRICAN AMERICAN): >60 ML/MIN/1.73 M^2
EST. GFR  (NON AFRICAN AMERICAN): >60 ML/MIN/1.73 M^2
GLUCOSE SERPL-MCNC: 113 MG/DL (ref 70–110)
HCT VFR BLD AUTO: 39.3 % (ref 37–48.5)
HDLC SERPL-MCNC: 47 MG/DL (ref 40–75)
HDLC SERPL: 32.9 % (ref 20–50)
HGB BLD-MCNC: 12.8 G/DL (ref 12–16)
IMM GRANULOCYTES # BLD AUTO: 0.01 K/UL (ref 0–0.04)
IMM GRANULOCYTES NFR BLD AUTO: 0.2 % (ref 0–0.5)
LDLC SERPL CALC-MCNC: 75.8 MG/DL (ref 63–159)
LYMPHOCYTES # BLD AUTO: 1.2 K/UL (ref 1–4.8)
LYMPHOCYTES NFR BLD: 20.8 % (ref 18–48)
MCH RBC QN AUTO: 28 PG (ref 27–31)
MCHC RBC AUTO-ENTMCNC: 32.6 G/DL (ref 32–36)
MCV RBC AUTO: 86 FL (ref 82–98)
MICROALBUMIN UR DL<=1MG/L-MCNC: 5 UG/ML
MONOCYTES # BLD AUTO: 0.4 K/UL (ref 0.3–1)
MONOCYTES NFR BLD: 7.5 % (ref 4–15)
NEUTROPHILS # BLD AUTO: 4 K/UL (ref 1.8–7.7)
NEUTROPHILS NFR BLD: 67.7 % (ref 38–73)
NONHDLC SERPL-MCNC: 96 MG/DL
NRBC BLD-RTO: 0 /100 WBC
PLATELET # BLD AUTO: 244 K/UL (ref 150–450)
PMV BLD AUTO: 10.3 FL (ref 9.2–12.9)
POTASSIUM SERPL-SCNC: 4.3 MMOL/L (ref 3.5–5.1)
PROT SERPL-MCNC: 7.3 G/DL (ref 6–8.4)
RBC # BLD AUTO: 4.57 M/UL (ref 4–5.4)
SODIUM SERPL-SCNC: 138 MMOL/L (ref 136–145)
TRIGL SERPL-MCNC: 101 MG/DL (ref 30–150)
TSH SERPL DL<=0.005 MIU/L-ACNC: 2.02 UIU/ML (ref 0.4–4)
WBC # BLD AUTO: 5.87 K/UL (ref 3.9–12.7)

## 2021-04-14 PROCEDURE — 83036 HEMOGLOBIN GLYCOSYLATED A1C: CPT | Performed by: INTERNAL MEDICINE

## 2021-04-14 PROCEDURE — 80053 COMPREHEN METABOLIC PANEL: CPT | Performed by: INTERNAL MEDICINE

## 2021-04-14 PROCEDURE — 85025 COMPLETE CBC W/AUTO DIFF WBC: CPT | Performed by: INTERNAL MEDICINE

## 2021-04-14 PROCEDURE — 36415 COLL VENOUS BLD VENIPUNCTURE: CPT | Performed by: INTERNAL MEDICINE

## 2021-04-14 PROCEDURE — 82570 ASSAY OF URINE CREATININE: CPT | Performed by: INTERNAL MEDICINE

## 2021-04-14 PROCEDURE — 84443 ASSAY THYROID STIM HORMONE: CPT | Performed by: INTERNAL MEDICINE

## 2021-04-14 PROCEDURE — 80061 LIPID PANEL: CPT | Performed by: INTERNAL MEDICINE

## 2021-04-14 PROCEDURE — 0001A COVID-19, MRNA, LNP-S, PF, 30 MCG/0.3 ML DOSE VACCINE: ICD-10-PCS | Mod: CV19,S$GLB,, | Performed by: FAMILY MEDICINE

## 2021-04-14 PROCEDURE — 91300 COVID-19, MRNA, LNP-S, PF, 30 MCG/0.3 ML DOSE VACCINE: CPT | Mod: S$GLB,,, | Performed by: FAMILY MEDICINE

## 2021-04-14 PROCEDURE — 91300 COVID-19, MRNA, LNP-S, PF, 30 MCG/0.3 ML DOSE VACCINE: ICD-10-PCS | Mod: S$GLB,,, | Performed by: FAMILY MEDICINE

## 2021-04-14 PROCEDURE — 82043 UR ALBUMIN QUANTITATIVE: CPT | Performed by: INTERNAL MEDICINE

## 2021-04-14 PROCEDURE — 0001A COVID-19, MRNA, LNP-S, PF, 30 MCG/0.3 ML DOSE VACCINE: CPT | Mod: CV19,S$GLB,, | Performed by: FAMILY MEDICINE

## 2021-04-15 LAB
ESTIMATED AVG GLUCOSE: 174 MG/DL (ref 68–131)
HBA1C MFR BLD: 7.7 % (ref 4–5.6)

## 2021-04-20 ENCOUNTER — OFFICE VISIT (OUTPATIENT)
Dept: INTERNAL MEDICINE | Facility: CLINIC | Age: 76
End: 2021-04-20
Payer: MEDICARE

## 2021-04-20 VITALS
SYSTOLIC BLOOD PRESSURE: 120 MMHG | RESPIRATION RATE: 18 BRPM | BODY MASS INDEX: 34.18 KG/M2 | WEIGHT: 192.88 LBS | HEART RATE: 100 BPM | HEIGHT: 63 IN | OXYGEN SATURATION: 98 % | DIASTOLIC BLOOD PRESSURE: 58 MMHG

## 2021-04-20 DIAGNOSIS — Z91.81 RISK FOR FALLS: ICD-10-CM

## 2021-04-20 DIAGNOSIS — E03.4 HYPOTHYROIDISM DUE TO ACQUIRED ATROPHY OF THYROID: ICD-10-CM

## 2021-04-20 DIAGNOSIS — I10 ESSENTIAL HYPERTENSION: ICD-10-CM

## 2021-04-20 DIAGNOSIS — E11.65 UNCONTROLLED TYPE 2 DIABETES MELLITUS WITH HYPERGLYCEMIA: ICD-10-CM

## 2021-04-20 DIAGNOSIS — E78.00 PURE HYPERCHOLESTEROLEMIA: Primary | ICD-10-CM

## 2021-04-20 DIAGNOSIS — R26.9 GAIT ABNORMALITY: ICD-10-CM

## 2021-04-20 PROCEDURE — 99214 OFFICE O/P EST MOD 30 MIN: CPT | Mod: S$GLB,,, | Performed by: INTERNAL MEDICINE

## 2021-04-20 PROCEDURE — 99214 PR OFFICE/OUTPT VISIT, EST, LEVL IV, 30-39 MIN: ICD-10-PCS | Mod: S$GLB,,, | Performed by: INTERNAL MEDICINE

## 2021-04-20 PROCEDURE — 99999 PR PBB SHADOW E&M-EST. PATIENT-LVL V: ICD-10-PCS | Mod: PBBFAC,,, | Performed by: INTERNAL MEDICINE

## 2021-04-20 PROCEDURE — 99999 PR PBB SHADOW E&M-EST. PATIENT-LVL V: CPT | Mod: PBBFAC,,, | Performed by: INTERNAL MEDICINE

## 2021-04-20 PROCEDURE — 1100F PTFALLS ASSESS-DOCD GE2>/YR: CPT | Mod: CPTII,S$GLB,, | Performed by: INTERNAL MEDICINE

## 2021-04-20 PROCEDURE — 3288F FALL RISK ASSESSMENT DOCD: CPT | Mod: CPTII,S$GLB,, | Performed by: INTERNAL MEDICINE

## 2021-04-20 PROCEDURE — 1159F PR MEDICATION LIST DOCUMENTED IN MEDICAL RECORD: ICD-10-PCS | Mod: S$GLB,,, | Performed by: INTERNAL MEDICINE

## 2021-04-20 PROCEDURE — 3288F PR FALLS RISK ASSESSMENT DOCUMENTED: ICD-10-PCS | Mod: CPTII,S$GLB,, | Performed by: INTERNAL MEDICINE

## 2021-04-20 PROCEDURE — 3051F HG A1C>EQUAL 7.0%<8.0%: CPT | Mod: CPTII,S$GLB,, | Performed by: INTERNAL MEDICINE

## 2021-04-20 PROCEDURE — 1159F MED LIST DOCD IN RCRD: CPT | Mod: S$GLB,,, | Performed by: INTERNAL MEDICINE

## 2021-04-20 PROCEDURE — 1100F PR PT FALLS ASSESS DOC 2+ FALLS/FALL W/INJURY/YR: ICD-10-PCS | Mod: CPTII,S$GLB,, | Performed by: INTERNAL MEDICINE

## 2021-04-20 PROCEDURE — 3051F PR MOST RECENT HEMOGLOBIN A1C LEVEL 7.0 - < 8.0%: ICD-10-PCS | Mod: CPTII,S$GLB,, | Performed by: INTERNAL MEDICINE

## 2021-04-20 PROCEDURE — 1126F AMNT PAIN NOTED NONE PRSNT: CPT | Mod: S$GLB,,, | Performed by: INTERNAL MEDICINE

## 2021-04-20 PROCEDURE — 1126F PR PAIN SEVERITY QUANTIFIED, NO PAIN PRESENT: ICD-10-PCS | Mod: S$GLB,,, | Performed by: INTERNAL MEDICINE

## 2021-04-21 ENCOUNTER — TELEPHONE (OUTPATIENT)
Dept: INTERNAL MEDICINE | Facility: CLINIC | Age: 76
End: 2021-04-21

## 2021-04-22 ENCOUNTER — TELEPHONE (OUTPATIENT)
Dept: INTERNAL MEDICINE | Facility: CLINIC | Age: 76
End: 2021-04-22

## 2021-04-22 ENCOUNTER — OFFICE VISIT (OUTPATIENT)
Dept: NEUROLOGY | Facility: CLINIC | Age: 76
End: 2021-04-22
Payer: MEDICARE

## 2021-04-22 VITALS
BODY MASS INDEX: 32.38 KG/M2 | SYSTOLIC BLOOD PRESSURE: 144 MMHG | HEART RATE: 93 BPM | RESPIRATION RATE: 20 BRPM | HEIGHT: 63 IN | DIASTOLIC BLOOD PRESSURE: 78 MMHG | OXYGEN SATURATION: 98 % | WEIGHT: 182.75 LBS

## 2021-04-22 DIAGNOSIS — F32.A ANXIETY AND DEPRESSION: ICD-10-CM

## 2021-04-22 DIAGNOSIS — D33.3 VESTIBULAR SCHWANNOMA: Primary | ICD-10-CM

## 2021-04-22 DIAGNOSIS — M54.16 LUMBAR RADICULOPATHY: ICD-10-CM

## 2021-04-22 DIAGNOSIS — E11.42 DIABETIC POLYNEUROPATHY ASSOCIATED WITH TYPE 2 DIABETES MELLITUS: ICD-10-CM

## 2021-04-22 DIAGNOSIS — R26.9 GAIT ABNORMALITY: ICD-10-CM

## 2021-04-22 DIAGNOSIS — R41.3 MEMORY LOSS: ICD-10-CM

## 2021-04-22 DIAGNOSIS — R29.6 RECURRENT FALLS: ICD-10-CM

## 2021-04-22 DIAGNOSIS — F41.9 ANXIETY AND DEPRESSION: ICD-10-CM

## 2021-04-22 DIAGNOSIS — E11.65 UNCONTROLLED TYPE 2 DIABETES MELLITUS WITH HYPERGLYCEMIA: ICD-10-CM

## 2021-04-22 PROBLEM — F02.818 ALZHEIMER'S DEMENTIA WITH BEHAVIORAL DISTURBANCE: Status: RESOLVED | Noted: 2018-04-12 | Resolved: 2021-04-22

## 2021-04-22 PROBLEM — G30.9 ALZHEIMER'S DEMENTIA WITH BEHAVIORAL DISTURBANCE: Status: RESOLVED | Noted: 2018-04-12 | Resolved: 2021-04-22

## 2021-04-22 PROCEDURE — 99999 PR PBB SHADOW E&M-EST. PATIENT-LVL V: ICD-10-PCS | Mod: PBBFAC,,, | Performed by: NURSE PRACTITIONER

## 2021-04-22 PROCEDURE — 1159F MED LIST DOCD IN RCRD: CPT | Mod: S$GLB,,, | Performed by: NURSE PRACTITIONER

## 2021-04-22 PROCEDURE — 1125F PR PAIN SEVERITY QUANTIFIED, PAIN PRESENT: ICD-10-PCS | Mod: S$GLB,,, | Performed by: NURSE PRACTITIONER

## 2021-04-22 PROCEDURE — 1159F PR MEDICATION LIST DOCUMENTED IN MEDICAL RECORD: ICD-10-PCS | Mod: S$GLB,,, | Performed by: NURSE PRACTITIONER

## 2021-04-22 PROCEDURE — 99499 UNLISTED E&M SERVICE: CPT | Mod: S$GLB,,, | Performed by: NURSE PRACTITIONER

## 2021-04-22 PROCEDURE — 3288F FALL RISK ASSESSMENT DOCD: CPT | Mod: CPTII,S$GLB,, | Performed by: NURSE PRACTITIONER

## 2021-04-22 PROCEDURE — 1100F PTFALLS ASSESS-DOCD GE2>/YR: CPT | Mod: CPTII,S$GLB,, | Performed by: NURSE PRACTITIONER

## 2021-04-22 PROCEDURE — 3051F PR MOST RECENT HEMOGLOBIN A1C LEVEL 7.0 - < 8.0%: ICD-10-PCS | Mod: CPTII,S$GLB,, | Performed by: NURSE PRACTITIONER

## 2021-04-22 PROCEDURE — 99215 OFFICE O/P EST HI 40 MIN: CPT | Mod: S$GLB,,, | Performed by: NURSE PRACTITIONER

## 2021-04-22 PROCEDURE — 99999 PR PBB SHADOW E&M-EST. PATIENT-LVL V: CPT | Mod: PBBFAC,,, | Performed by: NURSE PRACTITIONER

## 2021-04-22 PROCEDURE — 99499 RISK ADDL DX/OHS AUDIT: ICD-10-PCS | Mod: S$GLB,,, | Performed by: NURSE PRACTITIONER

## 2021-04-22 PROCEDURE — 3051F HG A1C>EQUAL 7.0%<8.0%: CPT | Mod: CPTII,S$GLB,, | Performed by: NURSE PRACTITIONER

## 2021-04-22 PROCEDURE — 3288F PR FALLS RISK ASSESSMENT DOCUMENTED: ICD-10-PCS | Mod: CPTII,S$GLB,, | Performed by: NURSE PRACTITIONER

## 2021-04-22 PROCEDURE — 1100F PR PT FALLS ASSESS DOC 2+ FALLS/FALL W/INJURY/YR: ICD-10-PCS | Mod: CPTII,S$GLB,, | Performed by: NURSE PRACTITIONER

## 2021-04-22 PROCEDURE — 99215 PR OFFICE/OUTPT VISIT, EST, LEVL V, 40-54 MIN: ICD-10-PCS | Mod: S$GLB,,, | Performed by: NURSE PRACTITIONER

## 2021-04-22 PROCEDURE — 1125F AMNT PAIN NOTED PAIN PRSNT: CPT | Mod: S$GLB,,, | Performed by: NURSE PRACTITIONER

## 2021-04-22 RX ORDER — DULOXETIN HYDROCHLORIDE 30 MG/1
30 CAPSULE, DELAYED RELEASE ORAL DAILY
Qty: 14 CAPSULE | Refills: 0 | Status: SHIPPED | OUTPATIENT
Start: 2021-04-22 | End: 2021-05-12

## 2021-04-22 RX ORDER — ASCORBIC ACID 125 MG
TABLET,CHEWABLE ORAL
COMMUNITY
End: 2022-02-23

## 2021-04-22 RX ORDER — ESCITALOPRAM OXALATE 5 MG/1
5 TABLET ORAL DAILY
Qty: 30 TABLET | Refills: 5 | Status: SHIPPED | OUTPATIENT
Start: 2021-04-22 | End: 2021-05-12 | Stop reason: SDUPTHER

## 2021-04-23 ENCOUNTER — PATIENT MESSAGE (OUTPATIENT)
Dept: INTERNAL MEDICINE | Facility: CLINIC | Age: 76
End: 2021-04-23

## 2021-04-23 DIAGNOSIS — I10 ESSENTIAL HYPERTENSION: ICD-10-CM

## 2021-04-23 DIAGNOSIS — Z91.81 RISK FOR FALLS: ICD-10-CM

## 2021-04-23 DIAGNOSIS — R26.9 GAIT ABNORMALITY: ICD-10-CM

## 2021-04-23 DIAGNOSIS — E11.65 UNCONTROLLED TYPE 2 DIABETES MELLITUS WITH HYPERGLYCEMIA: Primary | ICD-10-CM

## 2021-04-26 ENCOUNTER — TELEPHONE (OUTPATIENT)
Dept: NEUROLOGY | Facility: CLINIC | Age: 76
End: 2021-04-26

## 2021-04-26 PROCEDURE — G0180 MD CERTIFICATION HHA PATIENT: HCPCS | Mod: ,,, | Performed by: INTERNAL MEDICINE

## 2021-04-26 PROCEDURE — G0180 PR HOME HEALTH MD CERTIFICATION: ICD-10-PCS | Mod: ,,, | Performed by: INTERNAL MEDICINE

## 2021-05-04 ENCOUNTER — PATIENT MESSAGE (OUTPATIENT)
Dept: INTERNAL MEDICINE | Facility: CLINIC | Age: 76
End: 2021-05-04

## 2021-05-04 DIAGNOSIS — R26.9 GAIT ABNORMALITY: Primary | ICD-10-CM

## 2021-05-04 DIAGNOSIS — R29.6 RECURRENT FALLS: ICD-10-CM

## 2021-05-04 DIAGNOSIS — Z91.81 RISK FOR FALLS: ICD-10-CM

## 2021-05-06 ENCOUNTER — TELEPHONE (OUTPATIENT)
Dept: INTERNAL MEDICINE | Facility: CLINIC | Age: 76
End: 2021-05-06

## 2021-05-06 DIAGNOSIS — R29.6 RECURRENT FALLS: ICD-10-CM

## 2021-05-06 DIAGNOSIS — R26.9 GAIT ABNORMALITY: Primary | ICD-10-CM

## 2021-05-06 DIAGNOSIS — Z91.81 RISK FOR FALLS: ICD-10-CM

## 2021-05-10 ENCOUNTER — EXTERNAL HOME HEALTH (OUTPATIENT)
Dept: HOME HEALTH SERVICES | Facility: HOSPITAL | Age: 76
End: 2021-05-10
Payer: MEDICARE

## 2021-05-12 ENCOUNTER — IMMUNIZATION (OUTPATIENT)
Dept: FAMILY MEDICINE | Facility: CLINIC | Age: 76
End: 2021-05-12
Payer: MEDICARE

## 2021-05-12 ENCOUNTER — TELEPHONE (OUTPATIENT)
Dept: NEUROLOGY | Facility: CLINIC | Age: 76
End: 2021-05-12

## 2021-05-12 DIAGNOSIS — F32.A ANXIETY AND DEPRESSION: ICD-10-CM

## 2021-05-12 DIAGNOSIS — F41.9 ANXIETY AND DEPRESSION: ICD-10-CM

## 2021-05-12 DIAGNOSIS — Z23 NEED FOR VACCINATION: Primary | ICD-10-CM

## 2021-05-12 PROCEDURE — 91300 COVID-19, MRNA, LNP-S, PF, 30 MCG/0.3 ML DOSE VACCINE: CPT | Mod: HCNC,PBBFAC | Performed by: FAMILY MEDICINE

## 2021-05-12 PROCEDURE — 0002A COVID-19, MRNA, LNP-S, PF, 30 MCG/0.3 ML DOSE VACCINE: CPT | Mod: HCNC,PBBFAC | Performed by: FAMILY MEDICINE

## 2021-05-12 RX ORDER — ESCITALOPRAM OXALATE 10 MG/1
10 TABLET ORAL DAILY
Qty: 30 TABLET | Refills: 5 | Status: SHIPPED | OUTPATIENT
Start: 2021-05-12 | End: 2021-08-25 | Stop reason: SDUPTHER

## 2021-05-17 ENCOUNTER — DOCUMENT SCAN (OUTPATIENT)
Dept: HOME HEALTH SERVICES | Facility: HOSPITAL | Age: 76
End: 2021-05-17
Payer: MEDICARE

## 2021-05-26 ENCOUNTER — DOCUMENT SCAN (OUTPATIENT)
Dept: HOME HEALTH SERVICES | Facility: HOSPITAL | Age: 76
End: 2021-05-26
Payer: MEDICARE

## 2021-05-28 ENCOUNTER — DOCUMENT SCAN (OUTPATIENT)
Dept: HOME HEALTH SERVICES | Facility: HOSPITAL | Age: 76
End: 2021-05-28
Payer: MEDICARE

## 2021-05-31 ENCOUNTER — TELEPHONE (OUTPATIENT)
Dept: INTERNAL MEDICINE | Facility: CLINIC | Age: 76
End: 2021-05-31

## 2021-06-23 ENCOUNTER — PATIENT OUTREACH (OUTPATIENT)
Dept: ADMINISTRATIVE | Facility: OTHER | Age: 76
End: 2021-06-23

## 2021-07-26 ENCOUNTER — PES CALL (OUTPATIENT)
Dept: ADMINISTRATIVE | Facility: CLINIC | Age: 76
End: 2021-07-26

## 2021-08-11 ENCOUNTER — TELEPHONE (OUTPATIENT)
Dept: NEUROLOGY | Facility: CLINIC | Age: 76
End: 2021-08-11

## 2021-08-19 ENCOUNTER — PATIENT OUTREACH (OUTPATIENT)
Dept: ADMINISTRATIVE | Facility: OTHER | Age: 76
End: 2021-08-19

## 2021-08-25 ENCOUNTER — OFFICE VISIT (OUTPATIENT)
Dept: NEUROLOGY | Facility: CLINIC | Age: 76
End: 2021-08-25
Payer: MEDICARE

## 2021-08-25 ENCOUNTER — TELEPHONE (OUTPATIENT)
Dept: NEUROLOGY | Facility: CLINIC | Age: 76
End: 2021-08-25

## 2021-08-25 VITALS
OXYGEN SATURATION: 93 % | WEIGHT: 195.31 LBS | HEIGHT: 62 IN | DIASTOLIC BLOOD PRESSURE: 60 MMHG | HEART RATE: 83 BPM | BODY MASS INDEX: 35.94 KG/M2 | SYSTOLIC BLOOD PRESSURE: 110 MMHG | RESPIRATION RATE: 16 BRPM

## 2021-08-25 DIAGNOSIS — F41.9 ANXIETY AND DEPRESSION: ICD-10-CM

## 2021-08-25 DIAGNOSIS — D33.3 VESTIBULAR SCHWANNOMA: ICD-10-CM

## 2021-08-25 DIAGNOSIS — F32.A ANXIETY AND DEPRESSION: ICD-10-CM

## 2021-08-25 DIAGNOSIS — R29.6 RECURRENT FALLS: ICD-10-CM

## 2021-08-25 DIAGNOSIS — E11.42 DIABETIC POLYNEUROPATHY ASSOCIATED WITH TYPE 2 DIABETES MELLITUS: ICD-10-CM

## 2021-08-25 DIAGNOSIS — M54.16 LUMBAR RADICULOPATHY: Primary | ICD-10-CM

## 2021-08-25 DIAGNOSIS — R26.9 GAIT ABNORMALITY: ICD-10-CM

## 2021-08-25 DIAGNOSIS — R41.3 MEMORY LOSS: ICD-10-CM

## 2021-08-25 PROCEDURE — 3074F PR MOST RECENT SYSTOLIC BLOOD PRESSURE < 130 MM HG: ICD-10-PCS | Mod: CPTII,S$GLB,, | Performed by: NURSE PRACTITIONER

## 2021-08-25 PROCEDURE — 99499 UNLISTED E&M SERVICE: CPT | Mod: HCNC,S$GLB,, | Performed by: NURSE PRACTITIONER

## 2021-08-25 PROCEDURE — 1126F AMNT PAIN NOTED NONE PRSNT: CPT | Mod: CPTII,S$GLB,, | Performed by: NURSE PRACTITIONER

## 2021-08-25 PROCEDURE — 1126F PR PAIN SEVERITY QUANTIFIED, NO PAIN PRESENT: ICD-10-PCS | Mod: CPTII,S$GLB,, | Performed by: NURSE PRACTITIONER

## 2021-08-25 PROCEDURE — 1160F RVW MEDS BY RX/DR IN RCRD: CPT | Mod: CPTII,S$GLB,, | Performed by: NURSE PRACTITIONER

## 2021-08-25 PROCEDURE — 1159F MED LIST DOCD IN RCRD: CPT | Mod: CPTII,S$GLB,, | Performed by: NURSE PRACTITIONER

## 2021-08-25 PROCEDURE — 99999 PR PBB SHADOW E&M-EST. PATIENT-LVL V: ICD-10-PCS | Mod: PBBFAC,,, | Performed by: NURSE PRACTITIONER

## 2021-08-25 PROCEDURE — 3051F PR MOST RECENT HEMOGLOBIN A1C LEVEL 7.0 - < 8.0%: ICD-10-PCS | Mod: CPTII,S$GLB,, | Performed by: NURSE PRACTITIONER

## 2021-08-25 PROCEDURE — 3051F HG A1C>EQUAL 7.0%<8.0%: CPT | Mod: CPTII,S$GLB,, | Performed by: NURSE PRACTITIONER

## 2021-08-25 PROCEDURE — 3078F PR MOST RECENT DIASTOLIC BLOOD PRESSURE < 80 MM HG: ICD-10-PCS | Mod: CPTII,S$GLB,, | Performed by: NURSE PRACTITIONER

## 2021-08-25 PROCEDURE — 99499 RISK ADDL DX/OHS AUDIT: ICD-10-PCS | Mod: HCNC,S$GLB,, | Performed by: NURSE PRACTITIONER

## 2021-08-25 PROCEDURE — 1159F PR MEDICATION LIST DOCUMENTED IN MEDICAL RECORD: ICD-10-PCS | Mod: CPTII,S$GLB,, | Performed by: NURSE PRACTITIONER

## 2021-08-25 PROCEDURE — 99999 PR PBB SHADOW E&M-EST. PATIENT-LVL V: CPT | Mod: PBBFAC,,, | Performed by: NURSE PRACTITIONER

## 2021-08-25 PROCEDURE — 3074F SYST BP LT 130 MM HG: CPT | Mod: CPTII,S$GLB,, | Performed by: NURSE PRACTITIONER

## 2021-08-25 PROCEDURE — 99214 OFFICE O/P EST MOD 30 MIN: CPT | Mod: S$GLB,,, | Performed by: NURSE PRACTITIONER

## 2021-08-25 PROCEDURE — 99214 PR OFFICE/OUTPT VISIT, EST, LEVL IV, 30-39 MIN: ICD-10-PCS | Mod: S$GLB,,, | Performed by: NURSE PRACTITIONER

## 2021-08-25 PROCEDURE — 3078F DIAST BP <80 MM HG: CPT | Mod: CPTII,S$GLB,, | Performed by: NURSE PRACTITIONER

## 2021-08-25 PROCEDURE — 1160F PR REVIEW ALL MEDS BY PRESCRIBER/CLIN PHARMACIST DOCUMENTED: ICD-10-PCS | Mod: CPTII,S$GLB,, | Performed by: NURSE PRACTITIONER

## 2021-08-25 RX ORDER — ESCITALOPRAM OXALATE 10 MG/1
10 TABLET ORAL DAILY
Qty: 90 TABLET | Refills: 1 | Status: SHIPPED | OUTPATIENT
Start: 2021-08-25 | End: 2021-11-30

## 2021-08-26 DIAGNOSIS — R41.3 MEMORY LOSS: ICD-10-CM

## 2021-08-26 RX ORDER — DONEPEZIL HYDROCHLORIDE 10 MG/1
10 TABLET, FILM COATED ORAL NIGHTLY
Qty: 90 TABLET | Refills: 0 | Status: SHIPPED | OUTPATIENT
Start: 2021-08-26 | End: 2021-11-29

## 2021-10-05 DIAGNOSIS — I10 ESSENTIAL HYPERTENSION: ICD-10-CM

## 2021-10-05 DIAGNOSIS — Z76.0 ENCOUNTER FOR MEDICATION REFILL: ICD-10-CM

## 2021-10-06 RX ORDER — ATORVASTATIN CALCIUM 20 MG/1
20 TABLET, FILM COATED ORAL DAILY
Qty: 90 TABLET | Refills: 1 | Status: SHIPPED | OUTPATIENT
Start: 2021-10-06 | End: 2022-03-02 | Stop reason: SDUPTHER

## 2021-10-06 RX ORDER — RAMIPRIL 5 MG/1
5 CAPSULE ORAL DAILY
Qty: 90 CAPSULE | Refills: 1 | Status: SHIPPED | OUTPATIENT
Start: 2021-10-06 | End: 2022-03-02 | Stop reason: SDUPTHER

## 2021-10-08 ENCOUNTER — TELEPHONE (OUTPATIENT)
Dept: INTERNAL MEDICINE | Facility: CLINIC | Age: 76
End: 2021-10-08

## 2021-10-18 ENCOUNTER — OFFICE VISIT (OUTPATIENT)
Dept: INTERNAL MEDICINE | Facility: CLINIC | Age: 76
End: 2021-10-18
Payer: MEDICARE

## 2021-10-18 VITALS
HEART RATE: 89 BPM | BODY MASS INDEX: 35.3 KG/M2 | WEIGHT: 191.81 LBS | SYSTOLIC BLOOD PRESSURE: 142 MMHG | OXYGEN SATURATION: 98 % | RESPIRATION RATE: 16 BRPM | HEIGHT: 62 IN | DIASTOLIC BLOOD PRESSURE: 50 MMHG

## 2021-10-18 DIAGNOSIS — T23.221A PARTIAL THICKNESS BURN OF RIGHT INDEX FINGER: Primary | ICD-10-CM

## 2021-10-18 PROCEDURE — 99999 PR PBB SHADOW E&M-EST. PATIENT-LVL V: ICD-10-PCS | Mod: PBBFAC,HCNC,, | Performed by: INTERNAL MEDICINE

## 2021-10-18 PROCEDURE — 1125F PR PAIN SEVERITY QUANTIFIED, PAIN PRESENT: ICD-10-PCS | Mod: HCNC,CPTII,S$GLB, | Performed by: INTERNAL MEDICINE

## 2021-10-18 PROCEDURE — 1159F PR MEDICATION LIST DOCUMENTED IN MEDICAL RECORD: ICD-10-PCS | Mod: HCNC,CPTII,S$GLB, | Performed by: INTERNAL MEDICINE

## 2021-10-18 PROCEDURE — 99213 OFFICE O/P EST LOW 20 MIN: CPT | Mod: HCNC,S$GLB,, | Performed by: INTERNAL MEDICINE

## 2021-10-18 PROCEDURE — 3288F FALL RISK ASSESSMENT DOCD: CPT | Mod: HCNC,CPTII,S$GLB, | Performed by: INTERNAL MEDICINE

## 2021-10-18 PROCEDURE — 99999 PR PBB SHADOW E&M-EST. PATIENT-LVL V: CPT | Mod: PBBFAC,HCNC,, | Performed by: INTERNAL MEDICINE

## 2021-10-18 PROCEDURE — 1159F MED LIST DOCD IN RCRD: CPT | Mod: HCNC,CPTII,S$GLB, | Performed by: INTERNAL MEDICINE

## 2021-10-18 PROCEDURE — 1101F PR PT FALLS ASSESS DOC 0-1 FALLS W/OUT INJ PAST YR: ICD-10-PCS | Mod: HCNC,CPTII,S$GLB, | Performed by: INTERNAL MEDICINE

## 2021-10-18 PROCEDURE — 3078F DIAST BP <80 MM HG: CPT | Mod: HCNC,CPTII,S$GLB, | Performed by: INTERNAL MEDICINE

## 2021-10-18 PROCEDURE — 3077F SYST BP >= 140 MM HG: CPT | Mod: HCNC,CPTII,S$GLB, | Performed by: INTERNAL MEDICINE

## 2021-10-18 PROCEDURE — 1125F AMNT PAIN NOTED PAIN PRSNT: CPT | Mod: HCNC,CPTII,S$GLB, | Performed by: INTERNAL MEDICINE

## 2021-10-18 PROCEDURE — 1101F PT FALLS ASSESS-DOCD LE1/YR: CPT | Mod: HCNC,CPTII,S$GLB, | Performed by: INTERNAL MEDICINE

## 2021-10-18 PROCEDURE — 3078F PR MOST RECENT DIASTOLIC BLOOD PRESSURE < 80 MM HG: ICD-10-PCS | Mod: HCNC,CPTII,S$GLB, | Performed by: INTERNAL MEDICINE

## 2021-10-18 PROCEDURE — 3288F PR FALLS RISK ASSESSMENT DOCUMENTED: ICD-10-PCS | Mod: HCNC,CPTII,S$GLB, | Performed by: INTERNAL MEDICINE

## 2021-10-18 PROCEDURE — 99213 PR OFFICE/OUTPT VISIT, EST, LEVL III, 20-29 MIN: ICD-10-PCS | Mod: HCNC,S$GLB,, | Performed by: INTERNAL MEDICINE

## 2021-10-18 PROCEDURE — 3077F PR MOST RECENT SYSTOLIC BLOOD PRESSURE >= 140 MM HG: ICD-10-PCS | Mod: HCNC,CPTII,S$GLB, | Performed by: INTERNAL MEDICINE

## 2021-10-18 RX ORDER — CEPHALEXIN 500 MG/1
500 CAPSULE ORAL EVERY 8 HOURS
Qty: 30 CAPSULE | Refills: 0 | Status: SHIPPED | OUTPATIENT
Start: 2021-10-18 | End: 2022-02-23

## 2021-10-18 RX ORDER — SILVER SULFADIAZINE 10 G/1000G
CREAM TOPICAL DAILY
Qty: 20 G | Refills: 1 | Status: SHIPPED | OUTPATIENT
Start: 2021-10-18 | End: 2022-02-23

## 2021-10-21 RX ORDER — SITAGLIPTIN 100 MG/1
100 TABLET, FILM COATED ORAL DAILY
Qty: 90 TABLET | Refills: 0 | Status: SHIPPED | OUTPATIENT
Start: 2021-10-21 | End: 2022-01-26 | Stop reason: SDUPTHER

## 2021-11-01 ENCOUNTER — OFFICE VISIT (OUTPATIENT)
Dept: INTERNAL MEDICINE | Facility: CLINIC | Age: 76
End: 2021-11-01
Payer: MEDICARE

## 2021-11-01 VITALS
SYSTOLIC BLOOD PRESSURE: 124 MMHG | OXYGEN SATURATION: 98 % | HEIGHT: 62 IN | HEART RATE: 74 BPM | DIASTOLIC BLOOD PRESSURE: 52 MMHG | RESPIRATION RATE: 16 BRPM | WEIGHT: 194.69 LBS | BODY MASS INDEX: 35.83 KG/M2

## 2021-11-01 DIAGNOSIS — E03.4 HYPOTHYROIDISM DUE TO ACQUIRED ATROPHY OF THYROID: ICD-10-CM

## 2021-11-01 DIAGNOSIS — I10 ESSENTIAL HYPERTENSION: Primary | ICD-10-CM

## 2021-11-01 DIAGNOSIS — E11.65 UNCONTROLLED TYPE 2 DIABETES MELLITUS WITH HYPERGLYCEMIA: ICD-10-CM

## 2021-11-01 PROCEDURE — 3078F PR MOST RECENT DIASTOLIC BLOOD PRESSURE < 80 MM HG: ICD-10-PCS | Mod: HCNC,CPTII,S$GLB, | Performed by: INTERNAL MEDICINE

## 2021-11-01 PROCEDURE — 1159F MED LIST DOCD IN RCRD: CPT | Mod: HCNC,CPTII,S$GLB, | Performed by: INTERNAL MEDICINE

## 2021-11-01 PROCEDURE — 3078F DIAST BP <80 MM HG: CPT | Mod: HCNC,CPTII,S$GLB, | Performed by: INTERNAL MEDICINE

## 2021-11-01 PROCEDURE — 3288F FALL RISK ASSESSMENT DOCD: CPT | Mod: HCNC,CPTII,S$GLB, | Performed by: INTERNAL MEDICINE

## 2021-11-01 PROCEDURE — 1160F PR REVIEW ALL MEDS BY PRESCRIBER/CLIN PHARMACIST DOCUMENTED: ICD-10-PCS | Mod: HCNC,CPTII,S$GLB, | Performed by: INTERNAL MEDICINE

## 2021-11-01 PROCEDURE — 99999 PR PBB SHADOW E&M-EST. PATIENT-LVL V: ICD-10-PCS | Mod: PBBFAC,HCNC,, | Performed by: INTERNAL MEDICINE

## 2021-11-01 PROCEDURE — 3288F PR FALLS RISK ASSESSMENT DOCUMENTED: ICD-10-PCS | Mod: HCNC,CPTII,S$GLB, | Performed by: INTERNAL MEDICINE

## 2021-11-01 PROCEDURE — 99214 OFFICE O/P EST MOD 30 MIN: CPT | Mod: 25,HCNC,S$GLB, | Performed by: INTERNAL MEDICINE

## 2021-11-01 PROCEDURE — 1160F RVW MEDS BY RX/DR IN RCRD: CPT | Mod: HCNC,CPTII,S$GLB, | Performed by: INTERNAL MEDICINE

## 2021-11-01 PROCEDURE — 1101F PR PT FALLS ASSESS DOC 0-1 FALLS W/OUT INJ PAST YR: ICD-10-PCS | Mod: HCNC,CPTII,S$GLB, | Performed by: INTERNAL MEDICINE

## 2021-11-01 PROCEDURE — 90694 FLU VACCINE - QUADRIVALENT - ADJUVANTED: ICD-10-PCS | Mod: HCNC,S$GLB,, | Performed by: INTERNAL MEDICINE

## 2021-11-01 PROCEDURE — 3074F SYST BP LT 130 MM HG: CPT | Mod: HCNC,CPTII,S$GLB, | Performed by: INTERNAL MEDICINE

## 2021-11-01 PROCEDURE — 1126F PR PAIN SEVERITY QUANTIFIED, NO PAIN PRESENT: ICD-10-PCS | Mod: HCNC,CPTII,S$GLB, | Performed by: INTERNAL MEDICINE

## 2021-11-01 PROCEDURE — 90694 VACC AIIV4 NO PRSRV 0.5ML IM: CPT | Mod: HCNC,S$GLB,, | Performed by: INTERNAL MEDICINE

## 2021-11-01 PROCEDURE — 1126F AMNT PAIN NOTED NONE PRSNT: CPT | Mod: HCNC,CPTII,S$GLB, | Performed by: INTERNAL MEDICINE

## 2021-11-01 PROCEDURE — 1101F PT FALLS ASSESS-DOCD LE1/YR: CPT | Mod: HCNC,CPTII,S$GLB, | Performed by: INTERNAL MEDICINE

## 2021-11-01 PROCEDURE — 3051F PR MOST RECENT HEMOGLOBIN A1C LEVEL 7.0 - < 8.0%: ICD-10-PCS | Mod: HCNC,CPTII,S$GLB, | Performed by: INTERNAL MEDICINE

## 2021-11-01 PROCEDURE — 99214 PR OFFICE/OUTPT VISIT, EST, LEVL IV, 30-39 MIN: ICD-10-PCS | Mod: 25,HCNC,S$GLB, | Performed by: INTERNAL MEDICINE

## 2021-11-01 PROCEDURE — G0008 FLU VACCINE - QUADRIVALENT - ADJUVANTED: ICD-10-PCS | Mod: HCNC,S$GLB,, | Performed by: INTERNAL MEDICINE

## 2021-11-01 PROCEDURE — 1159F PR MEDICATION LIST DOCUMENTED IN MEDICAL RECORD: ICD-10-PCS | Mod: HCNC,CPTII,S$GLB, | Performed by: INTERNAL MEDICINE

## 2021-11-01 PROCEDURE — 99999 PR PBB SHADOW E&M-EST. PATIENT-LVL V: CPT | Mod: PBBFAC,HCNC,, | Performed by: INTERNAL MEDICINE

## 2021-11-01 PROCEDURE — G0008 ADMIN INFLUENZA VIRUS VAC: HCPCS | Mod: HCNC,S$GLB,, | Performed by: INTERNAL MEDICINE

## 2021-11-01 PROCEDURE — 3051F HG A1C>EQUAL 7.0%<8.0%: CPT | Mod: HCNC,CPTII,S$GLB, | Performed by: INTERNAL MEDICINE

## 2021-11-01 PROCEDURE — 3074F PR MOST RECENT SYSTOLIC BLOOD PRESSURE < 130 MM HG: ICD-10-PCS | Mod: HCNC,CPTII,S$GLB, | Performed by: INTERNAL MEDICINE

## 2022-01-12 ENCOUNTER — LAB VISIT (OUTPATIENT)
Dept: LAB | Facility: HOSPITAL | Age: 77
End: 2022-01-12
Attending: INTERNAL MEDICINE
Payer: MEDICARE

## 2022-01-12 DIAGNOSIS — E11.8: Primary | ICD-10-CM

## 2022-01-12 LAB
ANION GAP SERPL CALC-SCNC: 10 MMOL/L (ref 8–16)
BUN SERPL-MCNC: 15 MG/DL (ref 8–23)
CALCIUM SERPL-MCNC: 9.6 MG/DL (ref 8.7–10.5)
CHLORIDE SERPL-SCNC: 102 MMOL/L (ref 95–110)
CO2 SERPL-SCNC: 28 MMOL/L (ref 23–29)
CREAT SERPL-MCNC: 0.8 MG/DL (ref 0.5–1.4)
EST. GFR  (AFRICAN AMERICAN): >60 ML/MIN/1.73 M^2
EST. GFR  (NON AFRICAN AMERICAN): >60 ML/MIN/1.73 M^2
ESTIMATED AVG GLUCOSE: 209 MG/DL (ref 68–131)
GLUCOSE SERPL-MCNC: 75 MG/DL (ref 70–110)
HBA1C MFR BLD: 8.9 % (ref 4–5.6)
POTASSIUM SERPL-SCNC: 4.5 MMOL/L (ref 3.5–5.1)
SODIUM SERPL-SCNC: 140 MMOL/L (ref 136–145)

## 2022-01-12 PROCEDURE — 83036 HEMOGLOBIN GLYCOSYLATED A1C: CPT | Mod: HCNC | Performed by: INTERNAL MEDICINE

## 2022-01-12 PROCEDURE — 80048 BASIC METABOLIC PNL TOTAL CA: CPT | Mod: HCNC | Performed by: INTERNAL MEDICINE

## 2022-01-12 PROCEDURE — 36415 COLL VENOUS BLD VENIPUNCTURE: CPT | Mod: HCNC | Performed by: INTERNAL MEDICINE

## 2022-01-26 RX ORDER — SITAGLIPTIN 100 MG/1
100 TABLET, FILM COATED ORAL DAILY
Qty: 90 TABLET | Refills: 0 | Status: SHIPPED | OUTPATIENT
Start: 2022-01-26 | End: 2022-03-02 | Stop reason: SDUPTHER

## 2022-01-26 NOTE — TELEPHONE ENCOUNTER
----- Message from Francheska Mccarthy sent at 2022  1:13 PM CST -----  Contact: Barbi Brand (Daughter)  Narda Person  MRN: [id number]  : 1945  PCP: Sirena Coleman  Home Phone      [phone]  Work Phone      Not on file.  Mobile          [phone]    VOICEMAIL    MESSAGE:     Refill  JANUVIA 100 mg Tab      [facility] [address] [place] [address]   Phone: [phone]  Fax:  [phone]

## 2022-01-26 NOTE — TELEPHONE ENCOUNTER
Care Due:                  Date            Visit Type   Department     Provider  --------------------------------------------------------------------------------                                             [facility] INTERNAL  Last Visit: 11-      [facility]    Sirena Coleman                                           [facility] INTERNAL  Next Visit: 02-      [facility]    Sirena Coleman                                                            Last  Test          Frequency    Reason                     Performed    Due Date  --------------------------------------------------------------------------------    CMP.........  12 months..  atorvastatin.............  Not Found    Overdue    Lipid Panel.  12 months..  atorvastatin.............  Not Found    Overdue    Powered by [employer]. Reference number: [id number].   1/26/2022 1:17:43 PM CST

## 2022-02-21 ENCOUNTER — PATIENT OUTREACH (OUTPATIENT)
Dept: ADMINISTRATIVE | Facility: OTHER | Age: 77
End: 2022-02-21
Payer: MEDICARE

## 2022-02-21 NOTE — PROGRESS NOTES
Health Maintenance Due   Topic Date Due    Hepatitis C Screening  Never done    Shingles Vaccine (1 of 2) Never done    Eye Exam  06/12/2019    Foot Exam  01/07/2021    DEXA SCAN  04/23/2021    COVID-19 Vaccine (3 - Booster for Pfizer series) 10/12/2021    Diabetes Urine Screening  04/14/2022     Updates were requested from care everywhere.  Chart was reviewed for overdue [facility]active [facility] Encounters ([facility]) topics (CRS, Breast Cancer Screening, Eye exam)  Health Maintenance has been updated.  LINKS immunization registry triggered.  Immunizations were reconciled.

## 2022-02-23 ENCOUNTER — OFFICE VISIT (OUTPATIENT)
Dept: NEUROLOGY | Facility: CLINIC | Age: 77
End: 2022-02-23
Payer: MEDICARE

## 2022-02-23 VITALS
RESPIRATION RATE: 16 BRPM | SYSTOLIC BLOOD PRESSURE: 124 MMHG | BODY MASS INDEX: 34.48 KG/M2 | WEIGHT: 187.38 LBS | HEIGHT: 62 IN | DIASTOLIC BLOOD PRESSURE: 48 MMHG | HEART RATE: 78 BPM

## 2022-02-23 DIAGNOSIS — R29.6 RECURRENT FALLS: ICD-10-CM

## 2022-02-23 DIAGNOSIS — D33.3 VESTIBULAR SCHWANNOMA: Primary | ICD-10-CM

## 2022-02-23 DIAGNOSIS — E11.69 TYPE 2 DIABETES MELLITUS WITH OTHER SPECIFIED COMPLICATION, WITH LONG-TERM CURRENT USE OF INSULIN: ICD-10-CM

## 2022-02-23 DIAGNOSIS — R26.9 GAIT ABNORMALITY: ICD-10-CM

## 2022-02-23 DIAGNOSIS — F41.9 ANXIETY AND DEPRESSION: ICD-10-CM

## 2022-02-23 DIAGNOSIS — E11.42 DIABETIC POLYNEUROPATHY ASSOCIATED WITH TYPE 2 DIABETES MELLITUS: ICD-10-CM

## 2022-02-23 DIAGNOSIS — Z79.4 TYPE 2 DIABETES MELLITUS WITH OTHER SPECIFIED COMPLICATION, WITH LONG-TERM CURRENT USE OF INSULIN: ICD-10-CM

## 2022-02-23 DIAGNOSIS — R41.3 MEMORY LOSS: ICD-10-CM

## 2022-02-23 DIAGNOSIS — R53.81 PHYSICAL DECONDITIONING: ICD-10-CM

## 2022-02-23 DIAGNOSIS — R26.89 IMBALANCE: ICD-10-CM

## 2022-02-23 DIAGNOSIS — M54.16 LUMBAR RADICULOPATHY: ICD-10-CM

## 2022-02-23 DIAGNOSIS — F32.A ANXIETY AND DEPRESSION: ICD-10-CM

## 2022-02-23 PROCEDURE — 3052F HG A1C>EQUAL 8.0%<EQUAL 9.0%: CPT | Mod: HCNC,CPTII,S$GLB, | Performed by: NURSE PRACTITIONER

## 2022-02-23 PROCEDURE — 3074F PR MOST RECENT SYSTOLIC BLOOD PRESSURE < 130 MM HG: ICD-10-PCS | Mod: HCNC,CPTII,S$GLB, | Performed by: NURSE PRACTITIONER

## 2022-02-23 PROCEDURE — 3052F PR MOST RECENT HEMOGLOBIN A1C LEVEL 8.0 - < 9.0%: ICD-10-PCS | Mod: HCNC,CPTII,S$GLB, | Performed by: NURSE PRACTITIONER

## 2022-02-23 PROCEDURE — 3078F DIAST BP <80 MM HG: CPT | Mod: HCNC,CPTII,S$GLB, | Performed by: NURSE PRACTITIONER

## 2022-02-23 PROCEDURE — 1126F PR PAIN SEVERITY QUANTIFIED, NO PAIN PRESENT: ICD-10-PCS | Mod: HCNC,CPTII,S$GLB, | Performed by: NURSE PRACTITIONER

## 2022-02-23 PROCEDURE — 99214 OFFICE O/P EST MOD 30 MIN: CPT | Mod: HCNC,S$GLB,, | Performed by: NURSE PRACTITIONER

## 2022-02-23 PROCEDURE — 99214 PR OFFICE/OUTPT VISIT, EST, LEVL IV, 30-39 MIN: ICD-10-PCS | Mod: HCNC,S$GLB,, | Performed by: NURSE PRACTITIONER

## 2022-02-23 PROCEDURE — 1159F MED LIST DOCD IN RCRD: CPT | Mod: HCNC,CPTII,S$GLB, | Performed by: NURSE PRACTITIONER

## 2022-02-23 PROCEDURE — 3078F PR MOST RECENT DIASTOLIC BLOOD PRESSURE < 80 MM HG: ICD-10-PCS | Mod: HCNC,CPTII,S$GLB, | Performed by: NURSE PRACTITIONER

## 2022-02-23 PROCEDURE — 99499 UNLISTED E&M SERVICE: CPT | Mod: S$GLB,,, | Performed by: NURSE PRACTITIONER

## 2022-02-23 PROCEDURE — 99999 PR PBB SHADOW E&M-EST. PATIENT-LVL V: ICD-10-PCS | Mod: PBBFAC,HCNC,, | Performed by: NURSE PRACTITIONER

## 2022-02-23 PROCEDURE — 1160F RVW MEDS BY RX/DR IN RCRD: CPT | Mod: HCNC,CPTII,S$GLB, | Performed by: NURSE PRACTITIONER

## 2022-02-23 PROCEDURE — 99999 PR PBB SHADOW E&M-EST. PATIENT-LVL V: CPT | Mod: PBBFAC,HCNC,, | Performed by: NURSE PRACTITIONER

## 2022-02-23 PROCEDURE — 1160F PR REVIEW ALL MEDS BY PRESCRIBER/CLIN PHARMACIST DOCUMENTED: ICD-10-PCS | Mod: HCNC,CPTII,S$GLB, | Performed by: NURSE PRACTITIONER

## 2022-02-23 PROCEDURE — 1126F AMNT PAIN NOTED NONE PRSNT: CPT | Mod: HCNC,CPTII,S$GLB, | Performed by: NURSE PRACTITIONER

## 2022-02-23 PROCEDURE — 3074F SYST BP LT 130 MM HG: CPT | Mod: HCNC,CPTII,S$GLB, | Performed by: NURSE PRACTITIONER

## 2022-02-23 PROCEDURE — 1159F PR MEDICATION LIST DOCUMENTED IN MEDICAL RECORD: ICD-10-PCS | Mod: HCNC,CPTII,S$GLB, | Performed by: NURSE PRACTITIONER

## 2022-02-23 PROCEDURE — 99499 RISK ADDL DX/OHS AUDIT: ICD-10-PCS | Mod: S$GLB,,, | Performed by: NURSE PRACTITIONER

## 2022-02-23 RX ORDER — ESCITALOPRAM OXALATE 10 MG/1
10 TABLET ORAL DAILY
Qty: 90 TABLET | Refills: 1 | Status: SHIPPED | OUTPATIENT
Start: 2022-02-23 | End: 2022-03-02 | Stop reason: SDUPTHER

## 2022-02-23 NOTE — PROGRESS NOTES
HPI: Narda Person is a 76 y.o. female with a history of a right vestibular schwannoma, as well as diastolic dysfunction, hypothyroidism, and insulin dependent DM. Schwannoma followed by Dr. Desean Buenrostro. She has HLD, HTN, DM II, hypothyroidism, GERD, and a history of breast cancer. MRI L-spine 2018 shows severe lumbar stenosis and MRI C-spine 2008 shows moderate cervical stenosis.     She presents today for a follow up visit. She had two falls since her last visit trying to ambulate to the restroom. She is mostly reliant on her walker. She is here today with her daughter, who states that she is moving slower over time.     Anxiety and depression are helped with Lexapro. She has some situational stress, due to her relative having cancer, and her having some other family members pass away. Daughter occasionally gives her Xanax for increased anxiety, but doesn't do this often.     Neuropsych testing ordered again at her last visit. She had scheduling issues with this. She continues with short term memory complaints.     The restless movement to her right hand improved with improvement to her anxiety.     She has follow up with Neurosurgery periodically, and had repeat MRI Brain in 2/2021, which failed to demonstrate any significant changes, regarding her schwannoma. Neurosurgery advised follow up at 2 years with repeat imaging then.     Her DM is better managed since receiving a dermal monitoring device, but she is having an issue obtaining supplies, due to an insurance.     She continues to see Endocrinology as well. Neuropathy pain is currently controlled.    Lumbar pain occurs with excessive standing.     Review of Systems   Constitutional: Negative for malaise/fatigue.   HENT: Positive for tinnitus.    Eyes: Positive for blurred vision.   Cardiovascular: Negative for chest pain.   Musculoskeletal: Positive for back pain and falls. Negative for myalgias.   Neurological: Positive for dizziness. Negative for speech  change, seizures, weakness and headaches.   Endo/Heme/Allergies: Does not bruise/bleed easily.   Psychiatric/Behavioral: Positive for memory loss. Negative for depression, hallucinations and suicidal ideas. The patient is not nervous/anxious and does not have insomnia.      I have reviewed all of this patient's past medical and surgical histories as well as family and social histories and active allergies and medications as documented in the electronic medical record.    Exam:  Gen Appearance, well developed/nourished in no apparent distress  CV: 2+ distal pulses with no edema or swelling  Neuro:  MS: Awake, alert, oriented to place, person, time, situation. Sustains attention. Recent memory intact today/remote memory intact, Language is full to spontaneous speech/repetition/naming/comprehension. Fund of Knowledge is full.  CN: Optic discs are flat with normal vasculature, PERRL, Extraoccular movements and visual fields are full. Normal facial sensation and strength, Hearing symmetric, Tongue and Palate are midline and strong. Shoulder Shrug symmetric and strong.  Motor: Normal bulk, tone, no abnormal movements. 5/5 strength bilateral upper/lower extremities with 2+ reflexes BUE, and 1+ reflexes BLE, and bilateral plantar response  Sensory: symmetric to light touch, pain, temp, and proprioception. Reduced vibration at left ankle; Romberg negative  Cerebellar: Finger-nose,Heal-shin, Rapid alternating movements intact  Gait: ambulates with a cane for stability, but seated in a wheelchair today.     Imagin2021 MRI Brain per Neurosurgery per Dr. Desean Buenrostro:   MRI of the brain was repeated [facility] today and compared to her earlier scans from [facility].  There is better contrast enhancement on the current scan but the size of the right acoustic neuroma seems about the same.  It is pretty much restricted to the internal auditory meatus with no real extension into the cerebellopontine angle    2021 CT  Head per ER after [facility]:     No acute intracranial findings.     Age-appropriate cerebral volume loss with mild moderate patchy decreased attenuation supratentorial white matter while nonspecific suggestive for chronic ischemic change.     No evidence for acute intracranial hemorrhage.  Clinical correlation and further evaluation as warranted.       MRI Brain 1/10/2020:   FINDINGS:  There is age-appropriate generalized cerebral volume loss.  No abnormal diffusion restriction is identified to suggest an acute infarction and no abnormal gradient susceptibility is identified.  Few scattered foci of T2/FLAIR signal abnormality in the supratentorial white matter in keeping with chronic microvascular ischemic disease of a mild degree.  The ventricles are stable in size and configuration without evidence for hydrocephalus.  No extra-axial fluid collections are identified.  Again noted is a mass within the right internal artery canal, best seen on the axial T2 and postcontrast images.  This mass measures approximately 12.7 x 5.5 mm in transverse dimension and 5.5 mm in craniocaudal dimension.  It previously measured 8.4 x 4.7 mm in transverse dimension and 4.1 mm in craniocaudal dimension.  There does appear to be enhancement extension into the cochlea.  No cisternal extension.  No other abnormal enhancement is seen.  Expected intracranial flow voids are detected.  The visualized paranasal sinuses including the mastoid air cells are clear.     Impression:   Age-appropriate generalized cerebral volume loss with mild chronic microvascular ischemic change.     Interval increased size of the enhancing lesion in the right internal artery Mina canal suggesting a vestibular schwannoma.  This lesion currently measures 12.7 x 5.5 x 5.5 mm, previously measuring 8.4 x 4.7 x 4.4 mm.  Additionally, there does also appear to be extension of enhancement into the cochlea on today's examination.    MRI Brain with and without contrast  4/2018:  Interval increased size of the enhancing lesion in the right internal auditory canal suggestive for a vestibular schwannoma.  It currently measures 8.4 x 4.7 mm, previously 4.7 x 4.0 mm.  No new enhancing lesion is seen.    Age-appropriate generalized cerebral volume loss with mild/moderate chronic microvascular ischemic change.    MRI Brain with and without contrast 6/16/2016  4.5 mm focus of enhancement is again identified within the deep aspect of the right internal auditory canal to suggest a stable vestibular schwannoma.  Age-appropriate generalized volume loss with findings suggestive of mild chronic microvascular ischemic change.     MRI Brain with and without contrast 11/14/2016  4.5 mm focus of enhancement in the deep aspect of the right internal artery canal suggestive for a stable vestibular schwannoma.  No new enhancing lesion is seen.  Age-appropriate generalized cerebral volume loss with mild chronic microvascular ischemic change.    5/2018 MRI C-spine:  FINDINGS:  The craniocervical junction is intact.  There is no evidence for a Chiari malformation.  The spinal cord is normal in signal without cord edema or myelomalacia.    The incidentally visualized soft tissue structures of the neck are within normal limits.    There is straightening of the normal cervical lordosis.  Vertebral body heights are maintained.  There is disc desiccation with disc space narrowing throughout the cervical spine.  The marrow signal is normal without evidence for a marrow replacement process, infection or tumor.    At C2-3, there is no disc herniation, central canal stenosis or neural foraminal narrowing.    At C3-4, there is a posterior disc osteophyte complex with bilateral uncovertebral spurring and facet arthropathy contributing to mild central canal stenosis without neural foraminal narrowing.    At C4-5, there is a posterior disc osteophyte complex with bilateral uncovertebral spurring and facet arthropathy  contributing to moderate central canal stenosis with moderate to severe bilateral neural foraminal narrowing.    At C5-6, there is a posterior disc osteophyte complex with bilateral uncovertebral spurring and facet arthropathy contributing to moderate central canal stenosis with severe bilateral neural foraminal narrowing.    At C6-7, there is a posterior disc osteophyte complex with bilateral uncovertebral spurring and facet arthropathy contributing to mild central canal stenosis with moderate severe left-sided neural foraminal narrowing.    At C7-T1, there is bilateral uncovertebral spurring without central canal stenosis or neural foraminal narrowing.      Impression       Multilevel degenerative changes of the cervical spine contributing to central canal stenosis and neural foraminal narrowing as detailed in the above level by level description.     MRI L-spine 4/2018:  The incidentally visualized soft tissue structures of the abdomen are within normal limits.    Vertebral body heights are maintained.  There is mild anterolisthesis of L4 in respect to L5 by approximately 4 mm.  There is disc desiccation with significant disc space narrowing at L4-5 with adjacent endplate degenerative change.  The marrow signal is otherwise normal without evidence for a marrow replacement process, infection or tumor.  The conus terminates at T12-L1.    At T12-L1, there is no disc herniation, central canal stenosis or neural foraminal narrowing.    At L1-2, there is no disc herniation, central canal stenosis or neural foraminal narrowing.    At L2-3, there is a broad-based posterior disc bulge with ligamentum flavum hypertrophy and facet arthropathy contributing to mild central canal stenosis without neural foraminal narrowing.    At L3-4, there is a broad-based posterior disc bulge with ligamentum flavum hypertrophy and facet arthropathy contributing to moderate central canal stenosis with mild bilateral neural foraminal  narrowing.    At L4-5, there is a broad-based posterior disc bulge, ligamentum flavum hypertrophy and facet arthropathy contributing to severe central canal stenosis with severe bilateral neural foraminal narrowing.  There appears to be disc material protruding in the bilateral neural foramina.    At L5-S1, there is a broad-based posterior disc bulge and facet arthropathy contributing to mild to moderate bilateral neural foraminal narrowing.    Labs:   KARSTEN, homocysteine, B12/folate, B1 reviewed; unremarkable  Her BMP, CBC, Lipid Panel, TSH, and LFT's per Dr. Leone requested but not received  CBC, CMP, TSH, B12 4/2018 overall WNL other than her uncontrolled DM.   7/2020 HgA1c 6.1%    Assessment/Plan: Narda Person is a 76 y.o. female with a history of a right vestibular schwannoma, as well as diastolic dysfunction, hypothyroidism, and insulin dependent DM. Schwannoma followed by Dr. Desean Buenrostro. She has HLD, HTN, DM II, hypothyroidism, GERD, and a history of breast cancer. MRI L-spine 2018 shows severe lumbar stenosis and MRI C-spine 2008 shows moderate cervical stenosis. She has short term memory loss, pending evaluation.     I recommend:   1. Continue Lexapro for GIAN, which has been very helpful-much more helpful than Cymbalta, which was weaned at her last visit.  -Hallucinations occurred with Elavil.     2. Neuropsych testing needed to further evaluate her memory loss; has not been done to date, [place]. I gave family the number to contact should they want to proceed with this. Testing ordered multiple times prior.   -history of significant head injury at age 2, but no cognitive impairment after.   -Memory loss worsens with conflict, but there is some ongoing short term memory complaints daily. Continue Aricept for memory at this time. Unsure of response thus far.  -No signs of executive dysfunction; however, there is concern over AD versus anxiety/depression, as the cause of her memory  complaints.     3. Prior insomnia helped with Melatonin.   -Trazodone ineffective at 50 mg and caused excess sedation at 100 mg.   -Hallucinations with Elavil prior.     4. Neurosurgery did not feel that the growth of her vestibular schwannoma required intervention in 1/2019 or 1/2021, and plans to follow up with her in 2 years.   -Gait imbalance likely secondary to multiple factors, including severe lumbar stenosis, moderate cervical stenosis, neuropathy, low visual acuity, and her vestibular schwannoma.   -PCP ordered home health with PT/OT for gait and balance.  -fall precautions were discussed.     Repeat PT/OT with home health for gait and balance therapy, as this was quite helpful for her prior.     5. Saw Neurosurgery for spinal stenosis, who recommended conservative treatment. She declines follow up with them.     6. Prior dysphagia helped with changes suggested per Speech Therapy after swallowing study.     7. Neuropathy pain is improved lately. DM is improved.   -she is on both Gabapentin. Hallucinations with Elavil prior.   -Voltaren Rx per PCP.   -Advised to try Blue Emu with Lidocaine OTC prior.     8. Left shoulder pain is resolved.     Follow up in 6 months

## 2022-02-23 NOTE — PATIENT INSTRUCTIONS
You may call [facility] to schedule Neuropsych testing with Dr. Gonzáles. We have already sent a referral.

## 2022-03-02 ENCOUNTER — OFFICE VISIT (OUTPATIENT)
Dept: INTERNAL MEDICINE | Facility: CLINIC | Age: 77
End: 2022-03-02
Payer: MEDICARE

## 2022-03-02 VITALS
BODY MASS INDEX: 34.69 KG/M2 | WEIGHT: 188.5 LBS | OXYGEN SATURATION: 98 % | HEIGHT: 62 IN | SYSTOLIC BLOOD PRESSURE: 120 MMHG | RESPIRATION RATE: 16 BRPM | DIASTOLIC BLOOD PRESSURE: 60 MMHG | HEART RATE: 74 BPM

## 2022-03-02 DIAGNOSIS — R42 VERTIGO: ICD-10-CM

## 2022-03-02 DIAGNOSIS — F41.9 ANXIETY AND DEPRESSION: ICD-10-CM

## 2022-03-02 DIAGNOSIS — I10 ESSENTIAL HYPERTENSION: ICD-10-CM

## 2022-03-02 DIAGNOSIS — E03.4 HYPOTHYROIDISM DUE TO ACQUIRED ATROPHY OF THYROID: ICD-10-CM

## 2022-03-02 DIAGNOSIS — Z79.4 TYPE 2 DIABETES MELLITUS WITH OTHER SPECIFIED COMPLICATION, WITH LONG-TERM CURRENT USE OF INSULIN: ICD-10-CM

## 2022-03-02 DIAGNOSIS — E11.65 UNCONTROLLED TYPE 2 DIABETES MELLITUS WITH HYPERGLYCEMIA: Primary | ICD-10-CM

## 2022-03-02 DIAGNOSIS — J84.10 CALCIFIED GRANULOMA OF LUNG: ICD-10-CM

## 2022-03-02 DIAGNOSIS — E11.69 TYPE 2 DIABETES MELLITUS WITH OTHER SPECIFIED COMPLICATION, WITH LONG-TERM CURRENT USE OF INSULIN: ICD-10-CM

## 2022-03-02 DIAGNOSIS — I70.0 AORTIC ATHEROSCLEROSIS: ICD-10-CM

## 2022-03-02 DIAGNOSIS — F32.A ANXIETY AND DEPRESSION: ICD-10-CM

## 2022-03-02 DIAGNOSIS — R41.3 MEMORY LOSS: ICD-10-CM

## 2022-03-02 DIAGNOSIS — E11.3599 PROLIFERATIVE DIABETIC RETINOPATHY ASSOCIATED WITH TYPE 2 DIABETES MELLITUS, UNSPECIFIED LATERALITY, UNSPECIFIED PROLIFERATIVE RETINOPATHY TYPE: ICD-10-CM

## 2022-03-02 DIAGNOSIS — Z76.0 ENCOUNTER FOR MEDICATION REFILL: ICD-10-CM

## 2022-03-02 DIAGNOSIS — E11.49 OTHER DIABETIC NEUROLOGICAL COMPLICATION ASSOCIATED WITH TYPE 2 DIABETES MELLITUS: ICD-10-CM

## 2022-03-02 PROBLEM — E66.01 MORBID (SEVERE) OBESITY DUE TO EXCESS CALORIES: Status: RESOLVED | Noted: 2018-03-15 | Resolved: 2022-03-02

## 2022-03-02 PROCEDURE — 1160F RVW MEDS BY RX/DR IN RCRD: CPT | Mod: HCNC,CPTII,S$GLB, | Performed by: INTERNAL MEDICINE

## 2022-03-02 PROCEDURE — 1100F PTFALLS ASSESS-DOCD GE2>/YR: CPT | Mod: HCNC,CPTII,S$GLB, | Performed by: INTERNAL MEDICINE

## 2022-03-02 PROCEDURE — 3052F HG A1C>EQUAL 8.0%<EQUAL 9.0%: CPT | Mod: HCNC,CPTII,S$GLB, | Performed by: INTERNAL MEDICINE

## 2022-03-02 PROCEDURE — 1126F AMNT PAIN NOTED NONE PRSNT: CPT | Mod: HCNC,CPTII,S$GLB, | Performed by: INTERNAL MEDICINE

## 2022-03-02 PROCEDURE — 99999 PR PBB SHADOW E&M-EST. PATIENT-LVL IV: ICD-10-PCS | Mod: PBBFAC,HCNC,, | Performed by: INTERNAL MEDICINE

## 2022-03-02 PROCEDURE — 3078F DIAST BP <80 MM HG: CPT | Mod: HCNC,CPTII,S$GLB, | Performed by: INTERNAL MEDICINE

## 2022-03-02 PROCEDURE — 1100F PR PT FALLS ASSESS DOC 2+ FALLS/FALL W/INJURY/YR: ICD-10-PCS | Mod: HCNC,CPTII,S$GLB, | Performed by: INTERNAL MEDICINE

## 2022-03-02 PROCEDURE — 99499 RISK ADDL DX/OHS AUDIT: ICD-10-PCS | Mod: HCNC,S$GLB,, | Performed by: INTERNAL MEDICINE

## 2022-03-02 PROCEDURE — 1159F PR MEDICATION LIST DOCUMENTED IN MEDICAL RECORD: ICD-10-PCS | Mod: HCNC,CPTII,S$GLB, | Performed by: INTERNAL MEDICINE

## 2022-03-02 PROCEDURE — 99214 OFFICE O/P EST MOD 30 MIN: CPT | Mod: HCNC,S$GLB,, | Performed by: INTERNAL MEDICINE

## 2022-03-02 PROCEDURE — 99999 PR PBB SHADOW E&M-EST. PATIENT-LVL IV: CPT | Mod: PBBFAC,HCNC,, | Performed by: INTERNAL MEDICINE

## 2022-03-02 PROCEDURE — 1126F PR PAIN SEVERITY QUANTIFIED, NO PAIN PRESENT: ICD-10-PCS | Mod: HCNC,CPTII,S$GLB, | Performed by: INTERNAL MEDICINE

## 2022-03-02 PROCEDURE — 1159F MED LIST DOCD IN RCRD: CPT | Mod: HCNC,CPTII,S$GLB, | Performed by: INTERNAL MEDICINE

## 2022-03-02 PROCEDURE — 99499 UNLISTED E&M SERVICE: CPT | Mod: HCNC,S$GLB,, | Performed by: INTERNAL MEDICINE

## 2022-03-02 PROCEDURE — 1160F PR REVIEW ALL MEDS BY PRESCRIBER/CLIN PHARMACIST DOCUMENTED: ICD-10-PCS | Mod: HCNC,CPTII,S$GLB, | Performed by: INTERNAL MEDICINE

## 2022-03-02 PROCEDURE — 3074F SYST BP LT 130 MM HG: CPT | Mod: HCNC,CPTII,S$GLB, | Performed by: INTERNAL MEDICINE

## 2022-03-02 PROCEDURE — 3052F PR MOST RECENT HEMOGLOBIN A1C LEVEL 8.0 - < 9.0%: ICD-10-PCS | Mod: HCNC,CPTII,S$GLB, | Performed by: INTERNAL MEDICINE

## 2022-03-02 PROCEDURE — 3074F PR MOST RECENT SYSTOLIC BLOOD PRESSURE < 130 MM HG: ICD-10-PCS | Mod: HCNC,CPTII,S$GLB, | Performed by: INTERNAL MEDICINE

## 2022-03-02 PROCEDURE — 99214 PR OFFICE/OUTPT VISIT, EST, LEVL IV, 30-39 MIN: ICD-10-PCS | Mod: HCNC,S$GLB,, | Performed by: INTERNAL MEDICINE

## 2022-03-02 PROCEDURE — 3078F PR MOST RECENT DIASTOLIC BLOOD PRESSURE < 80 MM HG: ICD-10-PCS | Mod: HCNC,CPTII,S$GLB, | Performed by: INTERNAL MEDICINE

## 2022-03-02 PROCEDURE — 3288F PR FALLS RISK ASSESSMENT DOCUMENTED: ICD-10-PCS | Mod: HCNC,CPTII,S$GLB, | Performed by: INTERNAL MEDICINE

## 2022-03-02 PROCEDURE — 3288F FALL RISK ASSESSMENT DOCD: CPT | Mod: HCNC,CPTII,S$GLB, | Performed by: INTERNAL MEDICINE

## 2022-03-02 RX ORDER — RAMIPRIL 5 MG/1
5 CAPSULE ORAL DAILY
Qty: 90 CAPSULE | Refills: 1 | Status: SHIPPED | OUTPATIENT
Start: 2022-03-02 | End: 2022-09-07 | Stop reason: SDUPTHER

## 2022-03-02 RX ORDER — DONEPEZIL HYDROCHLORIDE 10 MG/1
10 TABLET, FILM COATED ORAL NIGHTLY
Qty: 90 TABLET | Refills: 0 | Status: SHIPPED | OUTPATIENT
Start: 2022-03-02 | End: 2022-06-07

## 2022-03-02 RX ORDER — MECLIZINE HYDROCHLORIDE 25 MG/1
TABLET ORAL
Qty: 90 TABLET | Refills: 5 | Status: SHIPPED | OUTPATIENT
Start: 2022-03-02 | End: 2023-02-15

## 2022-03-02 RX ORDER — ESCITALOPRAM OXALATE 10 MG/1
10 TABLET ORAL DAILY
Qty: 90 TABLET | Refills: 1 | Status: SHIPPED | OUTPATIENT
Start: 2022-03-02 | End: 2022-09-07 | Stop reason: SDUPTHER

## 2022-03-02 RX ORDER — NAPROXEN SODIUM 220 MG
TABLET ORAL
Qty: 100 EACH | Refills: 5 | Status: SHIPPED | OUTPATIENT
Start: 2022-03-02

## 2022-03-02 RX ORDER — SITAGLIPTIN 100 MG/1
100 TABLET, FILM COATED ORAL DAILY
Qty: 90 TABLET | Refills: 0 | Status: SHIPPED | OUTPATIENT
Start: 2022-03-02 | End: 2022-07-29 | Stop reason: SDUPTHER

## 2022-03-02 RX ORDER — GABAPENTIN 300 MG/1
300 CAPSULE ORAL 2 TIMES DAILY
Qty: 180 CAPSULE | Refills: 1 | Status: SHIPPED | OUTPATIENT
Start: 2022-03-02 | End: 2022-08-29

## 2022-03-02 RX ORDER — DICLOFENAC SODIUM 10 MG/G
2 GEL TOPICAL 2 TIMES DAILY
Qty: 100 G | Refills: 5 | Status: SHIPPED | OUTPATIENT
Start: 2022-03-02

## 2022-03-02 RX ORDER — OMEPRAZOLE 20 MG/1
20 CAPSULE, DELAYED RELEASE ORAL 2 TIMES DAILY
Qty: 60 CAPSULE | Refills: 2 | Status: SHIPPED | OUTPATIENT
Start: 2022-03-02 | End: 2022-07-14

## 2022-03-02 RX ORDER — METFORMIN HYDROCHLORIDE 1000 MG/1
1000 TABLET ORAL 2 TIMES DAILY WITH MEALS
Qty: 180 TABLET | Refills: 0 | Status: SHIPPED | OUTPATIENT
Start: 2022-03-02 | End: 2022-08-12 | Stop reason: SDUPTHER

## 2022-03-02 RX ORDER — ATORVASTATIN CALCIUM 20 MG/1
20 TABLET, FILM COATED ORAL DAILY
Qty: 90 TABLET | Refills: 1 | Status: SHIPPED | OUTPATIENT
Start: 2022-03-02 | End: 2022-09-07 | Stop reason: SDUPTHER

## 2022-03-02 RX ORDER — LANCETS
EACH MISCELLANEOUS
Qty: 200 EACH | Refills: 12 | Status: SHIPPED | OUTPATIENT
Start: 2022-03-02 | End: 2022-03-03

## 2022-03-02 NOTE — PROGRESS NOTES
Subjective:       Patient ID: Narda Person is a 76 y.o. female.    Chief Complaint: 3 month check up, Hyperlipidemia, Hypertension, and Diabetes    Narda Person is a 76 y.o. Female.   Here for follow up .  Lab Results       Component                Value               Date                       HGBA1C                   8.9 (H)             01/12/2022            Seeing Dr Gautam         Hypertension  Pertinent negatives include no chest pain, headaches, neck pain, palpitations or shortness of breath.   Hyperlipidemia  This is a recurrent problem. The current episode started more than 1 year ago. The problem is controlled. Recent lipid tests were reviewed and are low. Exacerbating diseases include diabetes and obesity. Pertinent negatives include no chest pain, myalgias or shortness of breath. Current antihyperlipidemic treatment includes statins. The current treatment provides moderate improvement of lipids. Risk factors for coronary artery disease include dyslipidemia, diabetes mellitus and obesity.   Diabetes  She presents for her follow-up diabetic visit. She has type 2 diabetes mellitus. Her disease course has been worsening. Pertinent negatives for hypoglycemia include no confusion, dizziness, headaches, nervousness/anxiousness, pallor or tremors. Associated symptoms include weakness. Pertinent negatives for diabetes include no chest pain and no fatigue. Current diabetic treatment includes intensive insulin program. She is compliant with treatment some of the time. Her breakfast blood glucose range is generally >200 mg/dl. Her dinner blood glucose range is generally >200 mg/dl. An ACE inhibitor/angiotensin II receptor blocker is being taken.   Follow-up  Associated symptoms include weakness. Pertinent negatives include no abdominal pain, arthralgias, chest pain, chills, congestion, coughing, fatigue, fever, headaches, joint swelling, myalgias, nausea, neck pain, numbness, rash, sore throat or vomiting.     Review  of Systems   Constitutional: Negative for chills, fatigue and fever.   HENT: Negative for congestion, hearing loss, sinus pressure and sore throat.    Eyes: Negative for photophobia.   Respiratory: Negative for cough, choking, chest tightness, shortness of breath and wheezing.    Cardiovascular: Negative for chest pain and palpitations.   Gastrointestinal: Negative for abdominal pain, blood in stool, nausea and vomiting.   Genitourinary: Negative for dysuria and hematuria.   Musculoskeletal: Negative for arthralgias, joint swelling, myalgias and neck pain.   Skin: Negative for pallor and rash.   Neurological: Positive for weakness. Negative for dizziness, tremors, numbness and headaches.   Hematological: Does not bruise/bleed easily.   Psychiatric/Behavioral: Negative for confusion and suicidal ideas. The patient is not nervous/anxious.        Objective:      Physical Exam  Vitals and nursing note reviewed.   Constitutional:       Appearance: She is well-developed.   HENT:      Head: Normocephalic and atraumatic.      Right Ear: External ear normal.      Left Ear: External ear normal.   Eyes:      Conjunctiva/sclera: Conjunctivae normal.      Pupils: Pupils are equal, round, and reactive to light.   Neck:      Thyroid: No thyromegaly.      Vascular: No JVD.      Trachea: No tracheal deviation.   Cardiovascular:      Rate and Rhythm: Normal rate and regular rhythm.      Heart sounds: Normal heart sounds.   Pulmonary:      Effort: Pulmonary effort is normal. No respiratory distress.      Breath sounds: Normal breath sounds. No wheezing or rales.   Chest:      Chest wall: No tenderness.   Abdominal:      General: Bowel sounds are normal. There is no distension.      Palpations: Abdomen is soft. There is no mass.      Tenderness: There is no abdominal tenderness. There is no guarding or rebound.   Musculoskeletal:         General: Normal range of motion.      Cervical back: Normal range of motion and neck supple.    Lymphadenopathy:      Cervical: No cervical adenopathy.   Skin:     General: Skin is warm and dry.   Neurological:      Mental Status: She is alert and oriented to person, place, and time.      Cranial Nerves: No cranial nerve deficit.      Motor: No abnormal muscle tone.      Coordination: Coordination normal.      Deep Tendon Reflexes: Reflexes are normal and symmetric. Reflexes normal.      Comments: CN: Optic discs are flat with normal vasculature, PERRL, Extraoccular movements and visual fields are full. Normal facial sensation and strength, Hearing symmetric, Tongue and Palate are midline and strong. Shoulder Shrug symmetric and strong.         Assessment:       1. Uncontrolled type 2 diabetes mellitus with hyperglycemia    2. Other diabetic neurological complication associated with type 2 diabetes mellitus    3. Essential hypertension    4. Encounter for medication refill    5. Memory loss    6. Anxiety and depression    7. Vertigo    8. Type 2 diabetes mellitus with other specified complication, with long-term current use of insulin    9. Hypothyroidism due to acquired atrophy of thyroid    10. Aortic atherosclerosis    11. Calcified granuloma of lung    12. Proliferative diabetic retinopathy associated with type 2 diabetes mellitus, unspecified laterality, unspecified proliferative retinopathy type        Plan:   Narda was seen today for 3 month check up, hyperlipidemia, hypertension and diabetes.    Diagnoses and all orders for this visit:    Uncontrolled type 2 diabetes mellitus with hyperglycemia  -     Hemoglobin A1C; Future  -     Hemoglobin A1C; Future  -     Lipid Panel; Future  She I seeing DR correa   Patient has uncontrolled Diabetes .  We discussed about diet ;low in calories. Avoid sweats, sodas.  Also increasing activity;walking 2-3 miles a day.  I also adjusted medications and gave patient  instructions about adherence to plan.  Goal of  A1c  less than 7 % stressed.  Also goal of LDL less than  70 highlighted to patient.    Other diabetic neurological complication associated with type 2 diabetes mellitus  -     blood sugar diagnostic, drum (ACCU-CHEK COMPACT PLUS TEST) Strp; USE TO CHECK BLOOD GLUCOSE FOUR TIMES DAILY  -     lancets Misc; To check BG 4 times daily, to use with insurance preferred meter    Essential hypertension  -     blood sugar diagnostic, drum (ACCU-CHEK COMPACT PLUS TEST) Strp; USE TO CHECK BLOOD GLUCOSE FOUR TIMES DAILY  -     lancets Misc; To check BG 4 times daily, to use with insurance preferred meter  -     ramipriL (ALTACE) 5 MG capsule; Take 1 capsule (5 mg total) by mouth once daily.  -     CBC Auto Differential; Future  -     Comprehensive Metabolic Panel; Future  -     TSH; Future    Well controlled.  Continue same medication and dose.  1. Keep weight close to ideal body weight.   2.   Avoid high salt foods (olives, pickles, smoked meats, salted potato chips, etc.).   Do not add salt to your food at the table.   Use only small amounts of salt when cooking.   3. Begin an exercise program. Discuss with your doctor what type of exercise program would be best for you. It doesn't have to be difficult. Even brisk walking for 20 minutes three times a week is a good form of exercise.   4. Avoid medicines which contain heart stimulants. This includes many cold and sinus decongestant pills and sprays as well as diet pills. Check the warnings about hypertension on the label. Stimulants such as amphetamine or cocaine could be lethal for someone with hypertension. Never take these.      Encounter for medication refill  -     atorvastatin (LIPITOR) 20 MG tablet; Take 1 tablet (20 mg total) by mouth once daily.  -     gabapentin (NEURONTIN) 300 MG capsule; Take 1 capsule (300 mg total) by mouth 2 (two) times daily.  -     omeprazole (PRILOSEC) 20 MG capsule; Take 1 capsule (20 mg total) by mouth 2 (two) times daily.    Memory loss  -     donepeziL (ARICEPT) 10 MG tablet; Take 1 tablet (10  mg total) by mouth every evening.    Anxiety and depression  -     EScitalopram oxalate (LEXAPRO) 10 MG tablet; Take 1 tablet (10 mg total) by mouth once daily.    Vertigo  -     meclizine (ANTIVERT) 25 mg tablet; TAKE 1 TABLET BY MOUTH THREE TIMES DAILY AS NEEDED FOR DIZZINESS OR NAUSEA    Type 2 diabetes mellitus with other specified complication, with long-term current use of insulin  -     metFORMIN (GLUCOPHAGE) 1000 MG tablet; Take 1 tablet (1,000 mg total) by mouth 2 (two) times daily with meals.    Hypothyroidism due to acquired atrophy of thyroid  Lab Results   Component Value Date    TSH 2.019 04/14/2021     Well controlled.  Continue same medication and dose.  Aortic atherosclerosis  -     Lipid Panel; Future  Continue statin     Calcified granuloma of lung  Stable   Asymptomatic     Proliferative diabetic retinopathy associated with type 2 diabetes mellitus, unspecified laterality, unspecified proliferative retinopathy type  Needs better control     Other orders  -     diclofenac sodium (VOLTAREN) 1 % Gel; Apply 2 g topically 2 (two) times daily.  -     insulin syringe-needle U-100 0.3 mL 30 Syrg; Use with Levemir vial BID  -     JANUVIA 100 mg Tab; Take 1 tablet (100 mg total) by mouth once daily.      Problem List Items Addressed This Visit     Neuropathy in diabetes    Relevant Medications    blood sugar diagnostic, drum (ACCU-CHEK COMPACT PLUS TEST) Strp    JANUVIA 100 mg Tab    lancets Misc    metFORMIN (GLUCOPHAGE) 1000 MG tablet    Diabetes mellitus, type 2    Relevant Medications    JANUVIA 100 mg Tab    metFORMIN (GLUCOPHAGE) 1000 MG tablet    Essential hypertension    Relevant Medications    blood sugar diagnostic, drum (ACCU-CHEK COMPACT PLUS TEST) Strp    lancets Misc    ramipriL (ALTACE) 5 MG capsule    Other Relevant Orders    CBC Auto Differential    Comprehensive Metabolic Panel    TSH    Hypothyroidism due to acquired atrophy of thyroid    Memory loss    Relevant Medications    donepeziL  "(ARICEPT) 10 MG tablet    Calcified granuloma of lung    Overview     7/25/14 CT Abdomen "There is a calcified granuloma in the right lung base."              Aortic atherosclerosis    Overview /26/17 CT Abdomen "Calcified atheromatous disease affects the aorta and its major branch vessels."             Relevant Orders    Lipid Panel    Anxiety and depression    Relevant Medications    EScitalopram oxalate (LEXAPRO) 10 MG tablet    Proliferative diabetic retinopathy associated with type 2 diabetes mellitus    Relevant Medications    JANUVIA 100 mg Tab    metFORMIN (GLUCOPHAGE) 1000 MG tablet    Uncontrolled type 2 diabetes mellitus with hyperglycemia - Primary    Relevant Medications    JANUVIA 100 mg Tab    metFORMIN (GLUCOPHAGE) 1000 MG tablet    Other Relevant Orders    Hemoglobin A1C    Hemoglobin A1C    Lipid Panel      Other Visit Diagnoses     Encounter for medication refill        Relevant Medications    atorvastatin (LIPITOR) 20 MG tablet    gabapentin (NEURONTIN) 300 MG capsule    omeprazole (PRILOSEC) 20 MG capsule    Vertigo        Relevant Medications    meclizine (ANTIVERT) 25 mg tablet          "

## 2022-03-03 NOTE — TELEPHONE ENCOUNTER
----- Message from Barbi Mirza sent at 3/3/2022 12:58 PM CST -----  Regarding: Request to speak to a nurse  Contact: [facility]  Narda Preson  MRN: [id number]  : 1945  PCP: Sirena Coleman  Home Phone      [phone]  Work Phone      Not on file.  Mobile          [phone]      MESSAGE:   Request to speak to a nurse regarding prescription for a new glucometer and testing supplies.   Accucheck Guide glucometer, test strips, and lancets.     Phone # [phone]    Pharmacy - [facility] [address] [place] [address]

## 2022-03-03 NOTE — TELEPHONE ENCOUNTER
No new care gaps identified.  Powered by [email or web address]. Reference number: [id number].   3/03/2022 2:42:24 PM CST

## 2022-03-07 RX ORDER — LANCETS
1 EACH MISCELLANEOUS 4 TIMES DAILY
Qty: 400 EACH | Refills: 5 | Status: SHIPPED | OUTPATIENT
Start: 2022-03-07

## 2022-03-07 RX ORDER — DEXTROSE 4 G
1 TABLET,CHEWABLE ORAL 4 TIMES DAILY
Qty: 1 EACH | Refills: 0 | Status: SHIPPED | OUTPATIENT
Start: 2022-03-07 | End: 2023-03-07

## 2022-05-09 ENCOUNTER — LAB VISIT (OUTPATIENT)
Dept: LAB | Facility: HOSPITAL | Age: 77
End: 2022-05-09
Attending: INTERNAL MEDICINE
Payer: MEDICARE

## 2022-05-09 DIAGNOSIS — E11.8 DIABETIC COMPLICATION: Primary | ICD-10-CM

## 2022-05-09 DIAGNOSIS — I10 ESSENTIAL HYPERTENSION, MALIGNANT: ICD-10-CM

## 2022-05-09 LAB
ANION GAP SERPL CALC-SCNC: 10 MMOL/L (ref 8–16)
BUN SERPL-MCNC: 15 MG/DL (ref 8–23)
CALCIUM SERPL-MCNC: 9.2 MG/DL (ref 8.7–10.5)
CHLORIDE SERPL-SCNC: 102 MMOL/L (ref 95–110)
CO2 SERPL-SCNC: 25 MMOL/L (ref 23–29)
CREAT SERPL-MCNC: 1 MG/DL (ref 0.5–1.4)
EST. GFR  (AFRICAN AMERICAN): >60 ML/MIN/1.73 M^2
EST. GFR  (NON AFRICAN AMERICAN): 54 ML/MIN/1.73 M^2
ESTIMATED AVG GLUCOSE: 160 MG/DL (ref 68–131)
GLUCOSE SERPL-MCNC: 188 MG/DL (ref 70–110)
HBA1C MFR BLD: 7.2 % (ref 4–5.6)
POTASSIUM SERPL-SCNC: 4.2 MMOL/L (ref 3.5–5.1)
SODIUM SERPL-SCNC: 137 MMOL/L (ref 136–145)

## 2022-05-09 PROCEDURE — 83036 HEMOGLOBIN GLYCOSYLATED A1C: CPT | Performed by: INTERNAL MEDICINE

## 2022-05-09 PROCEDURE — 80048 BASIC METABOLIC PNL TOTAL CA: CPT | Performed by: INTERNAL MEDICINE

## 2022-05-09 PROCEDURE — 36415 COLL VENOUS BLD VENIPUNCTURE: CPT | Performed by: INTERNAL MEDICINE

## 2022-06-07 DIAGNOSIS — R41.3 MEMORY LOSS: ICD-10-CM

## 2022-06-07 RX ORDER — DONEPEZIL HYDROCHLORIDE 10 MG/1
TABLET, FILM COATED ORAL
Qty: 90 TABLET | Refills: 0 | Status: SHIPPED | OUTPATIENT
Start: 2022-06-07 | End: 2022-09-07 | Stop reason: SDUPTHER

## 2022-07-13 DIAGNOSIS — Z76.0 ENCOUNTER FOR MEDICATION REFILL: ICD-10-CM

## 2022-07-13 NOTE — TELEPHONE ENCOUNTER
Care Due:                  Date            Visit Type   Department     Provider  --------------------------------------------------------------------------------                                EP -                              PRIMARY      [facility] INTER[facility]  Last Visit: 03-      CARE ([facility])   MEDICINE II    Sirena Coleman                              EP -                              PRIMARY      [facility] INTERNAL  Next Visit: 09-      CARE ([facility])   MEDICINE II    Sirena Coleman                                                            Last  Test          Frequency    Reason                     Performed    Due Date  --------------------------------------------------------------------------------    CMP.........  12 months..  atorvastatin.............  04- 04-    Lipid Panel.  12 months..  atorvastatin.............  04- 04-    [employer] Embedded Care Gaps. Reference number: [id number]. 7/13/2022   2:39:35 PM CDT

## 2022-07-14 RX ORDER — OMEPRAZOLE 20 MG/1
20 CAPSULE, DELAYED RELEASE ORAL 2 TIMES DAILY
Qty: 180 CAPSULE | Refills: 2 | Status: SHIPPED | OUTPATIENT
Start: 2022-07-14 | End: 2023-06-01

## 2022-07-14 NOTE — TELEPHONE ENCOUNTER
Refill Decision Note   Narda Person  is requesting a refill authorization.  Brief Assessment and Rationale for Refill:  Approve     Medication Therapy Plan:  FOV;FLOS;    Medication Reconciliation Completed: No   Comments:     No Care Gaps recommended.     Note composed:4:00 PM 07/14/2022

## 2022-07-29 NOTE — TELEPHONE ENCOUNTER
Requested Prescriptions     Pending Prescriptions Disp Refills    JANUVIA 100 mg Tab 90 tablet 0     Sig: Take 1 tablet (100 mg total) by mouth once daily.     Medication phoned in during your absence.

## 2022-07-29 NOTE — TELEPHONE ENCOUNTER
No new care gaps identified.  [facility] Embedded Care Gaps. Reference number: [id number]. 7/29/2022   3:45:21 PM CDT

## 2022-07-29 NOTE — TELEPHONE ENCOUNTER
----- Message from Barbi Mirza sent at 2022  3:39 PM CDT -----  Regarding: Rx Refill  Contact: Barbi (daughter)  Narda Person  MRN: [id number]  : 1945  PCP: Sirena Coleman  Home Phone      [phone]  Work Phone      Not on file.  Mobile          [phone]      MESSAGE:   Rx Refill - JANUVIA 100 mg Tab. Patient is out of medication    Phone # [phone]    Pharmacy - [facility] [address] [place] [address]

## 2022-08-01 RX ORDER — SITAGLIPTIN 100 MG/1
100 TABLET, FILM COATED ORAL DAILY
Qty: 90 TABLET | Refills: 0 | Status: SHIPPED | OUTPATIENT
Start: 2022-08-01 | End: 2022-11-02

## 2022-08-12 DIAGNOSIS — Z79.4 TYPE 2 DIABETES MELLITUS WITH OTHER SPECIFIED COMPLICATION, WITH LONG-TERM CURRENT USE OF INSULIN: ICD-10-CM

## 2022-08-12 DIAGNOSIS — E11.69 TYPE 2 DIABETES MELLITUS WITH OTHER SPECIFIED COMPLICATION, WITH LONG-TERM CURRENT USE OF INSULIN: ICD-10-CM

## 2022-08-12 NOTE — TELEPHONE ENCOUNTER
Medication phoned in during your absence.       Requested Prescriptions     Pending Prescriptions Disp Refills    metFORMIN (GLUCOPHAGE) 1000 MG tablet 180 tablet 0     Sig: Take 1 tablet (1,000 mg total) by mouth 2 (two) times daily with meals.

## 2022-08-15 RX ORDER — METFORMIN HYDROCHLORIDE 1000 MG/1
1000 TABLET ORAL 2 TIMES DAILY WITH MEALS
Qty: 180 TABLET | Refills: 0 | Status: SHIPPED | OUTPATIENT
Start: 2022-08-15 | End: 2022-09-07 | Stop reason: SDUPTHER

## 2022-09-01 ENCOUNTER — LAB VISIT (OUTPATIENT)
Dept: LAB | Facility: HOSPITAL | Age: 77
End: 2022-09-01
Attending: INTERNAL MEDICINE
Payer: MEDICARE

## 2022-09-01 DIAGNOSIS — I70.0 AORTIC ATHEROSCLEROSIS: ICD-10-CM

## 2022-09-01 DIAGNOSIS — E11.65 UNCONTROLLED TYPE 2 DIABETES MELLITUS WITH HYPERGLYCEMIA: ICD-10-CM

## 2022-09-01 DIAGNOSIS — I10 ESSENTIAL HYPERTENSION: ICD-10-CM

## 2022-09-01 LAB
ALBUMIN SERPL BCP-MCNC: 3.6 G/DL (ref 3.5–5.2)
ALP SERPL-CCNC: 55 U/L (ref 55–135)
ALT SERPL W/O P-5'-P-CCNC: 22 U/L (ref 10–44)
ANION GAP SERPL CALC-SCNC: 9 MMOL/L (ref 8–16)
AST SERPL-CCNC: 19 U/L (ref 10–40)
BASOPHILS # BLD AUTO: 0.08 K/UL (ref 0–0.2)
BASOPHILS NFR BLD: 1.6 % (ref 0–1.9)
BILIRUB SERPL-MCNC: 0.4 MG/DL (ref 0.1–1)
BUN SERPL-MCNC: 23 MG/DL (ref 8–23)
CALCIUM SERPL-MCNC: 9 MG/DL (ref 8.7–10.5)
CHLORIDE SERPL-SCNC: 104 MMOL/L (ref 95–110)
CHOLEST SERPL-MCNC: 152 MG/DL (ref 120–199)
CHOLEST/HDLC SERPL: 3.9 {RATIO} (ref 2–5)
CO2 SERPL-SCNC: 26 MMOL/L (ref 23–29)
CREAT SERPL-MCNC: 1 MG/DL (ref 0.5–1.4)
DIFFERENTIAL METHOD: ABNORMAL
EOSINOPHIL # BLD AUTO: 0.1 K/UL (ref 0–0.5)
EOSINOPHIL NFR BLD: 2.3 % (ref 0–8)
ERYTHROCYTE [DISTWIDTH] IN BLOOD BY AUTOMATED COUNT: 14.9 % (ref 11.5–14.5)
EST. GFR  (NO RACE VARIABLE): 57 ML/MIN/1.73 M^2
ESTIMATED AVG GLUCOSE: 174 MG/DL (ref 68–131)
GLUCOSE SERPL-MCNC: 107 MG/DL (ref 70–110)
HBA1C MFR BLD: 7.7 % (ref 4–5.6)
HCT VFR BLD AUTO: 35.9 % (ref 37–48.5)
HDLC SERPL-MCNC: 39 MG/DL (ref 40–75)
HDLC SERPL: 25.7 % (ref 20–50)
HGB BLD-MCNC: 11.2 G/DL (ref 12–16)
IMM GRANULOCYTES # BLD AUTO: 0.02 K/UL (ref 0–0.04)
IMM GRANULOCYTES NFR BLD AUTO: 0.4 % (ref 0–0.5)
LDLC SERPL CALC-MCNC: 94.2 MG/DL (ref 63–159)
LYMPHOCYTES # BLD AUTO: 1.4 K/UL (ref 1–4.8)
LYMPHOCYTES NFR BLD: 29.6 % (ref 18–48)
MCH RBC QN AUTO: 25.1 PG (ref 27–31)
MCHC RBC AUTO-ENTMCNC: 31.2 G/DL (ref 32–36)
MCV RBC AUTO: 81 FL (ref 82–98)
MONOCYTES # BLD AUTO: 0.4 K/UL (ref 0.3–1)
MONOCYTES NFR BLD: 9 % (ref 4–15)
NEUTROPHILS # BLD AUTO: 2.8 K/UL (ref 1.8–7.7)
NEUTROPHILS NFR BLD: 57.1 % (ref 38–73)
NONHDLC SERPL-MCNC: 113 MG/DL
NRBC BLD-RTO: 0 /100 WBC
PLATELET # BLD AUTO: 251 K/UL (ref 150–450)
PMV BLD AUTO: 10.1 FL (ref 9.2–12.9)
POTASSIUM SERPL-SCNC: 4.4 MMOL/L (ref 3.5–5.1)
PROT SERPL-MCNC: 6.8 G/DL (ref 6–8.4)
RBC # BLD AUTO: 4.46 M/UL (ref 4–5.4)
SODIUM SERPL-SCNC: 139 MMOL/L (ref 136–145)
TRIGL SERPL-MCNC: 94 MG/DL (ref 30–150)
TSH SERPL DL<=0.005 MIU/L-ACNC: 2.91 UIU/ML (ref 0.4–4)
WBC # BLD AUTO: 4.87 K/UL (ref 3.9–12.7)

## 2022-09-01 PROCEDURE — 80053 COMPREHEN METABOLIC PANEL: CPT | Performed by: INTERNAL MEDICINE

## 2022-09-01 PROCEDURE — 80061 LIPID PANEL: CPT | Performed by: INTERNAL MEDICINE

## 2022-09-01 PROCEDURE — 36415 COLL VENOUS BLD VENIPUNCTURE: CPT | Performed by: INTERNAL MEDICINE

## 2022-09-01 PROCEDURE — 83036 HEMOGLOBIN GLYCOSYLATED A1C: CPT | Performed by: INTERNAL MEDICINE

## 2022-09-01 PROCEDURE — 85025 COMPLETE CBC W/AUTO DIFF WBC: CPT | Performed by: INTERNAL MEDICINE

## 2022-09-01 PROCEDURE — 84443 ASSAY THYROID STIM HORMONE: CPT | Performed by: INTERNAL MEDICINE

## 2022-09-06 DIAGNOSIS — R41.3 MEMORY LOSS: ICD-10-CM

## 2022-09-06 RX ORDER — DONEPEZIL HYDROCHLORIDE 10 MG/1
10 TABLET, FILM COATED ORAL NIGHTLY
Qty: 90 TABLET | Refills: 1 | OUTPATIENT
Start: 2022-09-06

## 2022-09-06 NOTE — TELEPHONE ENCOUNTER
----- Message from Keke Fry sent at 2022 12:49 PM CDT -----  Contact: Barbi/Daughter  Narda Pesron  MRN: [id number]  : 1945  PCP: Sirena Coleman  Home Phone      [phone]  Work Phone      Not on file.  Mobile          [phone]      MESSAGE: refill on ARICEPT  Pharmacy [facility]      Phone [phone]

## 2022-09-06 NOTE — TELEPHONE ENCOUNTER
"Last OV 2/23/22. Last refill 6/7/22, 90 tablets, 0 refills. Next OV 10/5/22.    Per last OV 2/23/22  "-Memory loss worsens with conflict, but there is some ongoing short term memory complaints daily. Continue Aricept for memory at this time. Unsure of response thus far."    Provider refused patient's request for refill today.  "

## 2022-09-07 ENCOUNTER — OFFICE VISIT (OUTPATIENT)
Dept: INTERNAL MEDICINE | Facility: CLINIC | Age: 77
End: 2022-09-07
Payer: MEDICARE

## 2022-09-07 DIAGNOSIS — E11.65 UNCONTROLLED TYPE 2 DIABETES MELLITUS WITH HYPERGLYCEMIA: ICD-10-CM

## 2022-09-07 DIAGNOSIS — N18.31 CHRONIC KIDNEY DISEASE, STAGE 3A: ICD-10-CM

## 2022-09-07 DIAGNOSIS — Z79.4 TYPE 2 DIABETES MELLITUS WITH OTHER SPECIFIED COMPLICATION, WITH LONG-TERM CURRENT USE OF INSULIN: ICD-10-CM

## 2022-09-07 DIAGNOSIS — R53.81 DEBILITY: ICD-10-CM

## 2022-09-07 DIAGNOSIS — E03.4 HYPOTHYROIDISM DUE TO ACQUIRED ATROPHY OF THYROID: ICD-10-CM

## 2022-09-07 DIAGNOSIS — F41.9 ANXIETY AND DEPRESSION: ICD-10-CM

## 2022-09-07 DIAGNOSIS — E11.69 TYPE 2 DIABETES MELLITUS WITH OTHER SPECIFIED COMPLICATION, WITH LONG-TERM CURRENT USE OF INSULIN: ICD-10-CM

## 2022-09-07 DIAGNOSIS — Z76.0 ENCOUNTER FOR MEDICATION REFILL: ICD-10-CM

## 2022-09-07 DIAGNOSIS — R41.3 MEMORY LOSS: ICD-10-CM

## 2022-09-07 DIAGNOSIS — I10 ESSENTIAL HYPERTENSION: Primary | ICD-10-CM

## 2022-09-07 DIAGNOSIS — E78.00 PURE HYPERCHOLESTEROLEMIA: ICD-10-CM

## 2022-09-07 DIAGNOSIS — F32.A ANXIETY AND DEPRESSION: ICD-10-CM

## 2022-09-07 PROCEDURE — 1160F RVW MEDS BY RX/DR IN RCRD: CPT | Mod: CPTII,95,, | Performed by: INTERNAL MEDICINE

## 2022-09-07 PROCEDURE — 99499 RISK ADDL DX/OHS AUDIT: ICD-10-PCS | Mod: 95,,, | Performed by: INTERNAL MEDICINE

## 2022-09-07 PROCEDURE — 1160F PR REVIEW ALL MEDS BY PRESCRIBER/CLIN PHARMACIST DOCUMENTED: ICD-10-PCS | Mod: CPTII,95,, | Performed by: INTERNAL MEDICINE

## 2022-09-07 PROCEDURE — 99214 OFFICE O/P EST MOD 30 MIN: CPT | Mod: 95,,, | Performed by: INTERNAL MEDICINE

## 2022-09-07 PROCEDURE — 1159F PR MEDICATION LIST DOCUMENTED IN MEDICAL RECORD: ICD-10-PCS | Mod: CPTII,95,, | Performed by: INTERNAL MEDICINE

## 2022-09-07 PROCEDURE — 99214 PR OFFICE/OUTPT VISIT, EST, LEVL IV, 30-39 MIN: ICD-10-PCS | Mod: 95,,, | Performed by: INTERNAL MEDICINE

## 2022-09-07 PROCEDURE — 1159F MED LIST DOCD IN RCRD: CPT | Mod: CPTII,95,, | Performed by: INTERNAL MEDICINE

## 2022-09-07 PROCEDURE — 99499 UNLISTED E&M SERVICE: CPT | Mod: 95,,, | Performed by: INTERNAL MEDICINE

## 2022-09-07 RX ORDER — DONEPEZIL HYDROCHLORIDE 10 MG/1
10 TABLET, FILM COATED ORAL NIGHTLY
Qty: 90 TABLET | Refills: 1 | Status: SHIPPED | OUTPATIENT
Start: 2022-09-07 | End: 2023-04-04

## 2022-09-07 RX ORDER — ATORVASTATIN CALCIUM 20 MG/1
20 TABLET, FILM COATED ORAL DAILY
Qty: 90 TABLET | Refills: 1 | Status: SHIPPED | OUTPATIENT
Start: 2022-09-07 | End: 2022-10-25

## 2022-09-07 RX ORDER — METFORMIN HYDROCHLORIDE 1000 MG/1
1000 TABLET ORAL 2 TIMES DAILY WITH MEALS
Qty: 180 TABLET | Refills: 1 | Status: SHIPPED | OUTPATIENT
Start: 2022-09-07 | End: 2023-04-26

## 2022-09-07 RX ORDER — RAMIPRIL 5 MG/1
5 CAPSULE ORAL DAILY
Qty: 90 CAPSULE | Refills: 1 | Status: SHIPPED | OUTPATIENT
Start: 2022-09-07 | End: 2023-01-20 | Stop reason: SDUPTHER

## 2022-09-07 RX ORDER — ESCITALOPRAM OXALATE 10 MG/1
10 TABLET ORAL DAILY
Qty: 90 TABLET | Refills: 1 | Status: SHIPPED | OUTPATIENT
Start: 2022-09-07 | End: 2023-04-26

## 2022-09-07 RX ORDER — INSULIN ASPART 100 [IU]/ML
30 INJECTION, SOLUTION INTRAVENOUS; SUBCUTANEOUS
Qty: 15 EACH | Refills: 2 | Status: SHIPPED | OUTPATIENT
Start: 2022-09-07

## 2022-09-07 RX ORDER — INSULIN DEGLUDEC 200 U/ML
60 INJECTION, SOLUTION SUBCUTANEOUS DAILY
Qty: 3 PEN | Refills: 3 | Status: SHIPPED | OUTPATIENT
Start: 2022-09-07

## 2022-09-07 RX ORDER — GABAPENTIN 300 MG/1
300 CAPSULE ORAL 2 TIMES DAILY
Qty: 180 CAPSULE | Refills: 1 | Status: SHIPPED | OUTPATIENT
Start: 2022-09-07 | End: 2023-09-19

## 2022-09-07 NOTE — PROGRESS NOTES
Subjective:       Patient ID: Narda Person is a 77 y.o. female.    Chief Complaint: Diabetes, Anxiety, and Hyperlipidemia    Narda Person is a 77 y.o. female  Virtual visit .  Becoming debilitated .  Labs are reviewed.  Family requesting home health     The patient location is: home   The chief complaint leading to consultation is: follow up     Visit type: audiovisual    Face to Face time with patient: 20  Ten minutes of total time spent on the encounter, which includes face to face time and non-face to face time preparing to see the patient (eg, review of tests), Obtaining and/or reviewing separately obtained history, Documenting clinical information in the electronic or other health record, Independently interpreting results (not separately reported) and communicating results to the patient/family/caregiver, or Care coordination (not separately reported).         Each patient to whom he or she provides medical services by telemedicine is:  (1) informed of the relationship between the physician and patient and the respective role of any other health care provider with respect to management of the patient; and (2) notified that he or she may decline to receive medical services by telemedicine and may withdraw from such care at any time.    Notes:        Diabetes  Pertinent negatives for hypoglycemia include no headaches. Pertinent negatives for diabetes include no chest pain.   Anxiety  Patient reports no chest pain or palpitations.       Hyperlipidemia  Pertinent negatives include no chest pain.   Review of Systems   Constitutional:  Positive for activity change.   HENT:  Negative for hearing loss and trouble swallowing.    Eyes:  Negative for discharge.   Respiratory:  Negative for chest tightness and wheezing.    Cardiovascular:  Negative for chest pain and palpitations.   Gastrointestinal:  Negative for constipation, diarrhea and vomiting.   Genitourinary:  Negative for difficulty urinating and hematuria.    Musculoskeletal:  Positive for arthralgias and gait problem.         small steps   Neurological:  Negative for headaches.   Psychiatric/Behavioral:  Positive for dysphoric mood.      Objective:      Physical Exam    Assessment:       1. Essential hypertension    2. Chronic kidney disease, stage 3a    3. Pure hypercholesterolemia    4. Hypothyroidism due to acquired atrophy of thyroid    5. Uncontrolled type 2 diabetes mellitus with hyperglycemia    6. Debility    7. Encounter for medication refill    8. Type 2 diabetes mellitus with other specified complication, with long-term current use of insulin    9. Anxiety and depression    10. Uncontrolled type 2 diabetes mellitus with complication, with long-term current use of insulin    11. Memory loss          Plan:   Narda was seen today for diabetes, anxiety and hyperlipidemia.    Diagnoses and all orders for this visit:    Essential hypertension  -     ramipriL (ALTACE) 5 MG capsule; Take 1 capsule (5 mg total) by mouth once daily.  .htn    Chronic kidney disease, stage 3a  BMP  Lab Results   Component Value Date     09/01/2022    K 4.4 09/01/2022     09/01/2022    CO2 26 09/01/2022    BUN 23 09/01/2022    CREATININE 1.0 09/01/2022    CALCIUM 9.0 09/01/2022    ANIONGAP 9 09/01/2022    ESTGFRAFRICA >60 05/09/2022    EGFRNONAA 54 (A) 05/09/2022       Pure hypercholesterolemia  Well controlled.  Continue same medication and dose.   Hypothyroidism due to acquired atrophy of thyroid  Well controlled.  Continue same medication and dose.   Uncontrolled type 2 diabetes mellitus with hyperglycemia    Debility  -     Ambulatory referral/consult to Home Health; Future    Encounter for medication refill  -     gabapentin (NEURONTIN) 300 MG capsule; Take 1 capsule (300 mg total) by mouth 2 (two) times daily.  -     atorvastatin (LIPITOR) 20 MG tablet; Take 1 tablet (20 mg total) by mouth once daily.    Type 2 diabetes mellitus with other specified complication, with  long-term current use of insulin  -     metFORMIN (GLUCOPHAGE) 1000 MG tablet; Take 1 tablet (1,000 mg total) by mouth 2 (two) times daily with meals.    Anxiety and depression  -     EScitalopram oxalate (LEXAPRO) 10 MG tablet; Take 1 tablet (10 mg total) by mouth once daily.    Uncontrolled type 2 diabetes mellitus with complication, with long-term current use of insulin  -     insulin aspart U-100 (NOVOLOG FLEXPEN U-100 INSULIN) 100 unit/mL (3 mL) InPn pen; Inject 30 Units into the skin 3 (three) times daily with meals.    Memory loss  -     donepeziL (ARICEPT) 10 MG tablet; Take 1 tablet (10 mg total) by mouth every evening.    Other orders  -     TRESIBA FLEXTOUCH U-200 200 unit/mL (3 mL) insulin pen; Inject 60 Units into the skin once daily.    Problem List Items Addressed This Visit       Essential hypertension    Pure hypercholesterolemia    Hypothyroidism due to acquired atrophy of thyroid    Uncontrolled type 2 diabetes mellitus with hyperglycemia    Chronic kidney disease, stage 3a - Primary     Other Visit Diagnoses       Debility        Relevant Orders    Ambulatory referral/consult to Home Health

## 2022-09-09 ENCOUNTER — TELEPHONE (OUTPATIENT)
Dept: INTERNAL MEDICINE | Facility: CLINIC | Age: 77
End: 2022-09-09
Payer: MEDICARE

## 2022-09-09 NOTE — TELEPHONE ENCOUNTER
----- Message from Marii Albarran sent at 2022 11:25 AM CDT -----  Contact: Lavell Person  MRN: [id number]  : 1945  PCP: Sirena Coleman  Home Phone      [phone]  Work Phone      Not on file.  Mobile          [phone]      MESSAGE: Lavell with [facility] called to let us know the family requested Home Health to state next week so he will go Monday to admit her.      If you have any questions he can be reached at [phone]. If not Have a great weekend

## 2022-09-12 PROCEDURE — G0180 MD CERTIFICATION HHA PATIENT: HCPCS | Mod: ,,, | Performed by: INTERNAL MEDICINE

## 2022-09-12 PROCEDURE — G0180 PR HOME HEALTH MD CERTIFICATION: ICD-10-PCS | Mod: ,,, | Performed by: INTERNAL MEDICINE

## 2022-09-30 ENCOUNTER — EXTERNAL HOME HEALTH (OUTPATIENT)
Dept: HOME HEALTH SERVICES | Facility: HOSPITAL | Age: 77
End: 2022-09-30
Payer: MEDICARE

## 2022-10-03 ENCOUNTER — TELEPHONE (OUTPATIENT)
Dept: INTERNAL MEDICINE | Facility: CLINIC | Age: 77
End: 2022-10-03
Payer: MEDICARE

## 2022-10-03 DIAGNOSIS — R53.81 DEBILITY: Primary | ICD-10-CM

## 2022-10-03 NOTE — TELEPHONE ENCOUNTER
"----- Message from Barbi Mirza sent at 10/3/2022  1:58 PM CDT -----  Regarding: Home Health Orders  Contact: Lavell with [facility]  Narda Person  MRN: [id number]  : 1945  PCP: Sirena Coleman  Home Phone      [phone]  Work Phone      Not on file.  Mobile          [phone]      MESSAGE:   Request to speak to a nurse regarding home health visit on 10/3/22  Patient is presently a patient with [facility] for physical therapy.   Lavell (PT with [facility]) requesting PCP orders for skilled nursing, and lab orders.   Ms. Person c/o "weakness and sleepy." Patient has a history of anemia.     Phone # [phone]    Pharmacy - [facility] [address] [place] [address]    "

## 2022-10-03 NOTE — TELEPHONE ENCOUNTER
Verbal orders for skilled nursing phoned in to Lavell. Lab orders faxed to [facility].

## 2022-10-03 NOTE — TELEPHONE ENCOUNTER
Patient currently has home health PT only. The therapist is requesting PCP orders for skilled nursing also. He reports that the patient c/o feeling weak and sleepy.. he is wondering if you would like to have any labs drawn. Please advise. Thanks.

## 2022-10-04 ENCOUNTER — LAB VISIT (OUTPATIENT)
Dept: LAB | Facility: HOSPITAL | Age: 77
End: 2022-10-04
Attending: INTERNAL MEDICINE
Payer: MEDICARE

## 2022-10-04 DIAGNOSIS — I12.9 HYPERTENSION, RENAL: Primary | ICD-10-CM

## 2022-10-04 LAB
ALBUMIN SERPL BCP-MCNC: 3.7 G/DL (ref 3.5–5.2)
ALP SERPL-CCNC: 52 U/L (ref 55–135)
ALT SERPL W/O P-5'-P-CCNC: 27 U/L (ref 10–44)
ANION GAP SERPL CALC-SCNC: 12 MMOL/L (ref 8–16)
AST SERPL-CCNC: 23 U/L (ref 10–40)
BASOPHILS # BLD AUTO: 0.06 K/UL (ref 0–0.2)
BASOPHILS NFR BLD: 1.3 % (ref 0–1.9)
BILIRUB SERPL-MCNC: 0.4 MG/DL (ref 0.1–1)
BUN SERPL-MCNC: 15 MG/DL (ref 8–23)
CALCIUM SERPL-MCNC: 9.1 MG/DL (ref 8.7–10.5)
CHLORIDE SERPL-SCNC: 104 MMOL/L (ref 95–110)
CO2 SERPL-SCNC: 23 MMOL/L (ref 23–29)
CREAT SERPL-MCNC: 0.9 MG/DL (ref 0.5–1.4)
DIFFERENTIAL METHOD: ABNORMAL
EOSINOPHIL # BLD AUTO: 0.1 K/UL (ref 0–0.5)
EOSINOPHIL NFR BLD: 1.3 % (ref 0–8)
ERYTHROCYTE [DISTWIDTH] IN BLOOD BY AUTOMATED COUNT: 14.6 % (ref 11.5–14.5)
EST. GFR  (NO RACE VARIABLE): >60 ML/MIN/1.73 M^2
GLUCOSE SERPL-MCNC: 148 MG/DL (ref 70–110)
HCT VFR BLD AUTO: 36 % (ref 37–48.5)
HGB BLD-MCNC: 11.1 G/DL (ref 12–16)
IMM GRANULOCYTES # BLD AUTO: 0.01 K/UL (ref 0–0.04)
IMM GRANULOCYTES NFR BLD AUTO: 0.2 % (ref 0–0.5)
LYMPHOCYTES # BLD AUTO: 1.2 K/UL (ref 1–4.8)
LYMPHOCYTES NFR BLD: 26.5 % (ref 18–48)
MCH RBC QN AUTO: 24.4 PG (ref 27–31)
MCHC RBC AUTO-ENTMCNC: 30.8 G/DL (ref 32–36)
MCV RBC AUTO: 79 FL (ref 82–98)
MONOCYTES # BLD AUTO: 0.4 K/UL (ref 0.3–1)
MONOCYTES NFR BLD: 8.3 % (ref 4–15)
NEUTROPHILS # BLD AUTO: 2.8 K/UL (ref 1.8–7.7)
NEUTROPHILS NFR BLD: 62.4 % (ref 38–73)
NRBC BLD-RTO: 0 /100 WBC
PLATELET # BLD AUTO: 231 K/UL (ref 150–450)
PMV BLD AUTO: 11.8 FL (ref 9.2–12.9)
POTASSIUM SERPL-SCNC: 4.8 MMOL/L (ref 3.5–5.1)
PROT SERPL-MCNC: 7.1 G/DL (ref 6–8.4)
RBC # BLD AUTO: 4.54 M/UL (ref 4–5.4)
SODIUM SERPL-SCNC: 139 MMOL/L (ref 136–145)
WBC # BLD AUTO: 4.46 K/UL (ref 3.9–12.7)

## 2022-10-04 PROCEDURE — 80053 COMPREHEN METABOLIC PANEL: CPT

## 2022-10-04 PROCEDURE — 85025 COMPLETE CBC W/AUTO DIFF WBC: CPT

## 2022-10-05 ENCOUNTER — OFFICE VISIT (OUTPATIENT)
Dept: NEUROLOGY | Facility: CLINIC | Age: 77
End: 2022-10-05
Payer: MEDICARE

## 2022-10-05 DIAGNOSIS — N18.31 CHRONIC KIDNEY DISEASE, STAGE 3A: ICD-10-CM

## 2022-10-05 DIAGNOSIS — M54.16 LUMBAR RADICULOPATHY: ICD-10-CM

## 2022-10-05 DIAGNOSIS — E11.69 TYPE 2 DIABETES MELLITUS WITH OTHER SPECIFIED COMPLICATION, WITH LONG-TERM CURRENT USE OF INSULIN: ICD-10-CM

## 2022-10-05 DIAGNOSIS — R26.9 GAIT ABNORMALITY: ICD-10-CM

## 2022-10-05 DIAGNOSIS — R29.6 RECURRENT FALLS: ICD-10-CM

## 2022-10-05 DIAGNOSIS — R41.3 MEMORY LOSS: ICD-10-CM

## 2022-10-05 DIAGNOSIS — D33.3 VESTIBULAR SCHWANNOMA: Primary | ICD-10-CM

## 2022-10-05 DIAGNOSIS — Z79.4 TYPE 2 DIABETES MELLITUS WITH OTHER SPECIFIED COMPLICATION, WITH LONG-TERM CURRENT USE OF INSULIN: ICD-10-CM

## 2022-10-05 PROCEDURE — 1159F PR MEDICATION LIST DOCUMENTED IN MEDICAL RECORD: ICD-10-PCS | Mod: CPTII,95,, | Performed by: NURSE PRACTITIONER

## 2022-10-05 PROCEDURE — 99214 OFFICE O/P EST MOD 30 MIN: CPT | Mod: 95,,, | Performed by: NURSE PRACTITIONER

## 2022-10-05 PROCEDURE — 1159F MED LIST DOCD IN RCRD: CPT | Mod: CPTII,95,, | Performed by: NURSE PRACTITIONER

## 2022-10-05 PROCEDURE — 99214 PR OFFICE/OUTPT VISIT, EST, LEVL IV, 30-39 MIN: ICD-10-PCS | Mod: 95,,, | Performed by: NURSE PRACTITIONER

## 2022-10-05 NOTE — PROGRESS NOTES
"HPI: Narda Person is a 77 y.o. female with a history of a right vestibular schwannoma, as well as diastolic dysfunction, hypothyroidism, and insulin dependent DM. Schwannoma followed by Dr. Desean Buenrostro. She has HLD, HTN, DM II, hypothyroidism, GERD, and a history of breast cancer. MRI L-spine 2018 shows severe lumbar stenosis and MRI C-spine 2008 shows moderate cervical stenosis.     The patient location is: home  The chief complaint leading to consultation is: "slowing down"    Visit type: audiovisual    Face to Face time with patient: 20 minutes of total time spent on the encounter, which includes face to face time and non-face to face time preparing to see the patient (eg, review of tests), Obtaining and/or reviewing separately obtained history, Documenting clinical information in the electronic or other health record, Independently interpreting results (not separately reported) and communicating results to the patient/family/caregiver, or Care coordination (not separately reported).     Each patient to whom he or she provides medical services by telemedicine is:  (1) informed of the relationship between the physician and patient and the respective role of any other health care provider with respect to management of the patient; and (2) notified that he or she may decline to receive medical services by telemedicine and may withdraw from such care at any time.    Notes:   She presents today at the request of her family for "slowing down". She has home health currently. She did see Dr. Rich earlier this month, who ordered home health, given her reported decline. CBC and CMP done yesterday were overall unremarkable.     She is accompanied by her sister and son in law. She has worsening gait issues. She mostly stays in her wheelchair. She has PT with home health. She has days where is able to ambulate more, and other days, where she feels as if her legs "don't want to move". Denies any worsening bowel/bladder " issues.     She has had some falls. Falls occur when trying to get into the bed, as she is not getting close enough to get into her bed or when trying to ambulate to the bathroom. She now has a bedside commode. No injury with her falls.  Family is keeping her walker near her bed.     Short term memory loss is unchanged.     DM well controlled per family at this time. She still sees Endocrinology/Dr. Lyn.     Anxiety and depression are stable currently.     The restless movement to her right hand improved with improvement to her anxiety. This has occurred with increased anxiety before.     She has follow up with Neurosurgery periodically, and had repeat MRI Brain in 2/2021, which failed to demonstrate any significant changes, regarding her schwannoma. Neurosurgery advised follow up at 2 years with repeat imaging then.     She continues with numbness from her neuropathy.     Lumbar pain occurs with excessive standing.     Review of Systems   Constitutional:  Negative for malaise/fatigue.   HENT:  Positive for tinnitus.    Eyes:  Positive for blurred vision.   Cardiovascular:  Negative for chest pain.   Musculoskeletal:  Positive for back pain and falls. Negative for myalgias.   Neurological:  Positive for dizziness and weakness. Negative for speech change, seizures and headaches.   Endo/Heme/Allergies:  Does not bruise/bleed easily.   Psychiatric/Behavioral:  Positive for memory loss. Negative for depression, hallucinations and suicidal ideas. The patient is not nervous/anxious and does not have insomnia.    I have reviewed all of this patient's past medical and surgical histories as well as family and social histories and active allergies and medications as documented in the electronic medical record.    Exam:  Gen Appearance, well developed/nourished in no apparent distress  CV: not evaluated  Neuro:  MS: Awake, alert, oriented to place, person, time, situation. Sustains attention. Recent/remote memory intact,  Language is full to spontaneous speech/repetition/naming/comprehension. Fund of Knowledge is full  CN: Optic discs not observed, PERRL not done, Extraoccular movements and visual fields are full. Normal facial strength, Hearing adequate for telemed visit, Tongue is midline, palate not evaluated. Shoulder Shrug symmetric.  Motor: deferred due to telemed  Sensory:deferred due to telemed  Cerebellar: deferred due to telemed  Gait: deferred due to telemed    Imagin2021 MRI Brain per Neurosurgery per Dr. Desean Buenrostro:   MRI of the brain was repeated [facility] today and compared to her earlier scans from [facility].  There is better contrast enhancement on the current scan but the size of the right acoustic neuroma seems about the same.  It is pretty much restricted to the internal auditory meatus with no real extension into the cerebellopontine angle    2021 CT Head per ER after [facility]:     No acute intracranial findings.     Age-appropriate cerebral volume loss with mild moderate patchy decreased attenuation supratentorial white matter while nonspecific suggestive for chronic ischemic change.     No evidence for acute intracranial hemorrhage.  Clinical correlation and further evaluation as warranted.     MRI Brain 1/10/2020:   FINDINGS:  There is age-appropriate generalized cerebral volume loss.  No abnormal diffusion restriction is identified to suggest an acute infarction and no abnormal gradient susceptibility is identified.  Few scattered foci of T2/FLAIR signal abnormality in the supratentorial white matter in keeping with chronic microvascular ischemic disease of a mild degree.  The ventricles are stable in size and configuration without evidence for hydrocephalus.  No extra-axial fluid collections are identified.  Again noted is a mass within the right internal artery canal, best seen on the axial T2 and postcontrast images.  This mass measures approximately 12.7 x 5.5 mm in transverse dimension  and 5.5 mm in craniocaudal dimension.  It previously measured 8.4 x 4.7 mm in transverse dimension and 4.1 mm in craniocaudal dimension.  There does appear to be enhancement extension into the cochlea.  No cisternal extension.  No other abnormal enhancement is seen.  Expected intracranial flow voids are detected.  The visualized paranasal sinuses including the mastoid air cells are clear.     Impression:   Age-appropriate generalized cerebral volume loss with mild chronic microvascular ischemic change.     Interval increased size of the enhancing lesion in the right internal artery Mina canal suggesting a vestibular schwannoma.  This lesion currently measures 12.7 x 5.5 x 5.5 mm, previously measuring 8.4 x 4.7 x 4.4 mm.  Additionally, there does also appear to be extension of enhancement into the cochlea on today's examination.    MRI Brain with and without contrast 4/2018:  Interval increased size of the enhancing lesion in the right internal auditory canal suggestive for a vestibular schwannoma.  It currently measures 8.4 x 4.7 mm, previously 4.7 x 4.0 mm.  No new enhancing lesion is seen.    Age-appropriate generalized cerebral volume loss with mild/moderate chronic microvascular ischemic change.    MRI Brain with and without contrast 6/16/2016  4.5 mm focus of enhancement is again identified within the deep aspect of the right internal auditory canal to suggest a stable vestibular schwannoma.  Age-appropriate generalized volume loss with findings suggestive of mild chronic microvascular ischemic change.     MRI Brain with and without contrast 11/14/2016  4.5 mm focus of enhancement in the deep aspect of the right internal artery canal suggestive for a stable vestibular schwannoma.  No new enhancing lesion is seen.  Age-appropriate generalized cerebral volume loss with mild chronic microvascular ischemic change.    5/2018 MRI C-spine:  FINDINGS:  The craniocervical junction is intact.  There is no evidence for  a Chiari malformation.  The spinal cord is normal in signal without cord edema or myelomalacia.    The incidentally visualized soft tissue structures of the neck are within normal limits.    There is straightening of the normal cervical lordosis.  Vertebral body heights are maintained.  There is disc desiccation with disc space narrowing throughout the cervical spine.  The marrow signal is normal without evidence for a marrow replacement process, infection or tumor.    At C2-3, there is no disc herniation, central canal stenosis or neural foraminal narrowing.    At C3-4, there is a posterior disc osteophyte complex with bilateral uncovertebral spurring and facet arthropathy contributing to mild central canal stenosis without neural foraminal narrowing.    At C4-5, there is a posterior disc osteophyte complex with bilateral uncovertebral spurring and facet arthropathy contributing to moderate central canal stenosis with moderate to severe bilateral neural foraminal narrowing.    At C5-6, there is a posterior disc osteophyte complex with bilateral uncovertebral spurring and facet arthropathy contributing to moderate central canal stenosis with severe bilateral neural foraminal narrowing.    At C6-7, there is a posterior disc osteophyte complex with bilateral uncovertebral spurring and facet arthropathy contributing to mild central canal stenosis with moderate severe left-sided neural foraminal narrowing.    At C7-T1, there is bilateral uncovertebral spurring without central canal stenosis or neural foraminal narrowing.      Impression       Multilevel degenerative changes of the cervical spine contributing to central canal stenosis and neural foraminal narrowing as detailed in the above level by level description.     MRI L-spine 4/2018:  The incidentally visualized soft tissue structures of the abdomen are within normal limits.    Vertebral body heights are maintained.  There is mild anterolisthesis of L4 in respect  to L5 by approximately 4 mm.  There is disc desiccation with significant disc space narrowing at L4-5 with adjacent endplate degenerative change.  The marrow signal is otherwise normal without evidence for a marrow replacement process, infection or tumor.  The conus terminates at T12-L1.    At T12-L1, there is no disc herniation, central canal stenosis or neural foraminal narrowing.    At L1-2, there is no disc herniation, central canal stenosis or neural foraminal narrowing.    At L2-3, there is a broad-based posterior disc bulge with ligamentum flavum hypertrophy and facet arthropathy contributing to mild central canal stenosis without neural foraminal narrowing.    At L3-4, there is a broad-based posterior disc bulge with ligamentum flavum hypertrophy and facet arthropathy contributing to moderate central canal stenosis with mild bilateral neural foraminal narrowing.    At L4-5, there is a broad-based posterior disc bulge, ligamentum flavum hypertrophy and facet arthropathy contributing to severe central canal stenosis with severe bilateral neural foraminal narrowing.  There appears to be disc material protruding in the bilateral neural foramina.    At L5-S1, there is a broad-based posterior disc bulge and facet arthropathy contributing to mild to moderate bilateral neural foraminal narrowing.    Labs:   KARSTEN, homocysteine, B12/folate, B1 reviewed; unremarkable  Her BMP, CBC, Lipid Panel, TSH, and LFT's per Dr. Leone requested but not received  CBC, CMP, TSH, B12 4/2018 overall WNL other than her uncontrolled DM.   7/2020 HgA1c 6.1%  9/2022 TSH normal, LDL 94, A1c 7.7%  10/2022 CBC and CMP overall unremarkable    Assessment/Plan: Narda Person is a 77 y.o. female with a history of a right vestibular schwannoma, as well as diastolic dysfunction, hypothyroidism, and insulin dependent DM. Schwannoma followed by Dr. Desean Buenrostro. She has HLD, HTN, DM II, hypothyroidism, GERD, and a history of breast cancer. MRI L-spine  2018 shows severe lumbar stenosis and MRI C-spine 2008 shows moderate cervical stenosis. She has short term memory loss, pending evaluation.     I recommend:   1. Continue Lexapro for GIAN, which has been very helpful-much more helpful than Cymbalta, which was weaned at her last visit.  -Hallucinations occurred with Elavil.     2. Neuropsych testing needed to further evaluate her memory loss; has not been done to date, [place]. I gave family the number to contact should they want to proceed with this. Testing ordered multiple times prior. Cognitive complaints are stable.   -history of significant head injury at age 2, but no cognitive impairment after.   -Memory loss worsens with conflict, but there is some ongoing short term memory complaints daily. Continue Aricept for memory at this time. Unsure of response thus far.  -No signs of executive dysfunction; however, there is concern over AD versus anxiety/depression, as the cause of her memory complaints.     3. Prior insomnia helped with Melatonin.   -Trazodone ineffective at 50 mg and caused excess sedation at 100 mg.   -Hallucinations with Elavil prior.     4. Neurosurgery did not feel that the growth of her vestibular schwannoma required intervention in 1/2019 or 1/2021, and plans to follow up with her in 2 years.     -Gait imbalance likely secondary to multiple factors, including severe lumbar stenosis, moderate cervical stenosis, neuropathy, low visual acuity, and her vestibular schwannoma.     -PCP ordered home health with PT/OT for gait and balance again. She responded well to this earlier this year and prior.     -fall precautions were discussed.     -Advised to continue with PT/OT with home health for gait and balance therapy, as this was quite helpful for her prior; however, progressive gait issues can be seen in patients with severe lumbar stenosis, which was discussed.     -I do recommend that family coordinate with home health to  obtain safety equipment, such as rails or other options to reduce her falls when getting into and out of her bed or transferring to the bedside commode.     5. Saw Neurosurgery for spinal stenosis, who recommended conservative treatment. She declines follow up with them.     6. Prior dysphagia helped with changes suggested per Speech Therapy after swallowing study.     7. Neuropathy pain is improved lately. DM is improved.   -she is on both Gabapentin. Hallucinations with Elavil prior.   -Voltaren Rx per PCP.   -Advised to try Blue Emu with Lidocaine OTC prior.     8. Left shoulder pain is resolved.     Follow up in 6 months

## 2022-10-07 ENCOUNTER — DOCUMENT SCAN (OUTPATIENT)
Dept: HOME HEALTH SERVICES | Facility: HOSPITAL | Age: 77
End: 2022-10-07
Payer: MEDICARE

## 2022-10-10 ENCOUNTER — DOCUMENT SCAN (OUTPATIENT)
Dept: HOME HEALTH SERVICES | Facility: HOSPITAL | Age: 77
End: 2022-10-10
Payer: MEDICARE

## 2022-10-12 ENCOUNTER — DOCUMENT SCAN (OUTPATIENT)
Dept: HOME HEALTH SERVICES | Facility: HOSPITAL | Age: 77
End: 2022-10-12
Payer: MEDICARE

## 2022-10-18 ENCOUNTER — TELEPHONE (OUTPATIENT)
Dept: INTERNAL MEDICINE | Facility: CLINIC | Age: 77
End: 2022-10-18
Payer: MEDICARE

## 2022-10-18 NOTE — TELEPHONE ENCOUNTER
----- Message from Barbi Mirza sent at 10/18/2022  2:36 PM CDT -----  Regarding: Home Health PT Orders  Contact: Lavell love [facility]  Narda Person  MRN: [id number]  : 1945  PCP: Sirena Coleman  Home Phone      [phone]  Work Phone      Not on file.  Mobile          [phone]    VOICEMAIL:  MESSAGE:   Request PCP orders to continue home health physical therapy.     Phone # [phone]    Pharmacy -  [facility] [address] [place] [address]

## 2022-10-21 ENCOUNTER — DOCUMENT SCAN (OUTPATIENT)
Dept: HOME HEALTH SERVICES | Facility: HOSPITAL | Age: 77
End: 2022-10-21
Payer: MEDICARE

## 2022-10-25 ENCOUNTER — TELEPHONE (OUTPATIENT)
Dept: INTERNAL MEDICINE | Facility: CLINIC | Age: 77
End: 2022-10-25
Payer: MEDICARE

## 2022-10-25 DIAGNOSIS — L60.2 THICKENED NAILS: Primary | ICD-10-CM

## 2022-10-25 NOTE — TELEPHONE ENCOUNTER
"----- Message from Barbi Mirza sent at 10/25/2022 12:25 PM CDT -----  Regarding: PCP Referral  Contact: Barbi (daughter)  Narda Person  MRN: [id number]  : 1945  PCP: Sirena Coleman  Home Phone      [phone]  Work Phone      Not on file.  Mobile          [phone]      MESSAGE:   PCP Referral Podiatry - "nail clipping"    Phone # [phone]    Pharmacy - [facility] [address] [place] [address]     "

## 2022-11-11 ENCOUNTER — TELEPHONE (OUTPATIENT)
Dept: INTERNAL MEDICINE | Facility: CLINIC | Age: 77
End: 2022-11-11
Payer: MEDICARE

## 2022-11-11 PROCEDURE — G0179 MD RECERTIFICATION HHA PT: HCPCS | Mod: ,,, | Performed by: INTERNAL MEDICINE

## 2022-11-11 PROCEDURE — G0179 PR HOME HEALTH MD RECERTIFICATION: ICD-10-PCS | Mod: ,,, | Performed by: INTERNAL MEDICINE

## 2022-11-11 NOTE — TELEPHONE ENCOUNTER
"----- Message from Barbi Mirza sent at 2022  2:33 PM CST -----  Regarding: PCP Referral  Contact: Barbi (daughter)  Narda Person  MRN: [id number]  : 1945  PCP: Sirena Coleman  Home Phone      [phone]  Work Phone      Not on file.  Mobile          [phone]      MESSAGE:   PCP referral to have "nails trimmed"    Stevenson Be DPM   [address] [place] [address]  Phone # [phone]    Phone # [phone]    Pharmacy - [place] [id number]    "

## 2022-11-11 NOTE — TELEPHONE ENCOUNTER
Referral, insurance and demographic information faxed to Dr. Be's [place] office. Attempted to contact daughter, but no answer. BHAVANA full

## 2022-11-23 ENCOUNTER — EXTERNAL HOME HEALTH (OUTPATIENT)
Dept: HOME HEALTH SERVICES | Facility: HOSPITAL | Age: 77
End: 2022-11-23
Payer: MEDICARE

## 2022-12-29 ENCOUNTER — DOCUMENT SCAN (OUTPATIENT)
Dept: HOME HEALTH SERVICES | Facility: HOSPITAL | Age: 77
End: 2022-12-29
Payer: MEDICARE

## 2023-01-20 DIAGNOSIS — I10 ESSENTIAL HYPERTENSION: ICD-10-CM

## 2023-01-20 NOTE — TELEPHONE ENCOUNTER
Care Due:                  Date            Visit Type   Department     Provider  --------------------------------------------------------------------------------                                MYCHART                              SAME DAY                              VIRTUAL      [facility]  Last Visit: 09-      VISIT        MEDICINE AMY Coleman  Next Visit: None Scheduled  None         None Found                                                            Last  Test          Frequency    Reason                     Performed    Due Date  --------------------------------------------------------------------------------    HBA1C.......  6 months...  VANITA PEDRAZA,          09- 03-                             insulin, metFORMIN.......    [employer] Embedded Care Gaps. Reference number: [id number]. 1/20/2023   4:32:09 PM CST

## 2023-01-20 NOTE — TELEPHONE ENCOUNTER
LOV 9/7/2022    Requested Prescriptions     Pending Prescriptions Disp Refills    ramipriL (ALTACE) 5 MG capsule 90 capsule 1     Sig: Take 1 capsule (5 mg total) by mouth once daily.     Medication refill requested via fax.

## 2023-01-21 RX ORDER — RAMIPRIL 5 MG/1
5 CAPSULE ORAL DAILY
Qty: 90 CAPSULE | Refills: 1 | Status: SHIPPED | OUTPATIENT
Start: 2023-01-21 | End: 2023-10-14

## 2023-02-02 ENCOUNTER — HOSPITAL ENCOUNTER (EMERGENCY)
Facility: HOSPITAL | Age: 78
Discharge: HOME | End: 2023-02-02
Attending: STUDENT IN AN ORGANIZED HEALTH CARE EDUCATION/TRAINING PROGRAM
Payer: MEDICARE
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
## 2023-02-02 DIAGNOSIS — R19.7 DIARRHEA, UNSPECIFIED TYPE: ICD-10-CM

## 2023-02-02 DIAGNOSIS — M79.602 LEFT ARM PAIN: ICD-10-CM

## 2023-02-02 LAB
ALBUMIN SERPL BCP-MCNC: 3.9 G/DL (ref 3.5–5.2)
ALP SERPL-CCNC: 60 U/L (ref 55–135)
ALT SERPL W/O P-5'-P-CCNC: 15 U/L (ref 10–44)
ANION GAP SERPL CALC-SCNC: 13 MMOL/L (ref 8–16)
AST SERPL-CCNC: 17 U/L (ref 10–40)
BASOPHILS # BLD AUTO: 0.07 K/UL (ref 0–0.2)
BASOPHILS NFR BLD: 0.7 % (ref 0–1.9)
BILIRUB SERPL-MCNC: 0.6 MG/DL (ref 0.1–1)
BILIRUB UR QL STRIP: NEGATIVE
BUN SERPL-MCNC: 15 MG/DL (ref 8–23)
CALCIUM SERPL-MCNC: 9.1 MG/DL (ref 8.7–10.5)
CHLORIDE SERPL-SCNC: 101 MMOL/L (ref 95–110)
CLARITY UR: CLEAR
CO2 SERPL-SCNC: 22 MMOL/L (ref 23–29)
COLOR UR: YELLOW
CREAT SERPL-MCNC: 1.4 MG/DL (ref 0.5–1.4)
DIFFERENTIAL METHOD: ABNORMAL
EOSINOPHIL # BLD AUTO: 0.1 K/UL (ref 0–0.5)
EOSINOPHIL NFR BLD: 0.5 % (ref 0–8)
ERYTHROCYTE [DISTWIDTH] IN BLOOD BY AUTOMATED COUNT: 14.6 % (ref 11.5–14.5)
EST. GFR  (NO RACE VARIABLE): 38 ML/MIN/1.73 M^2
GLUCOSE SERPL-MCNC: 151 MG/DL (ref 70–110)
GLUCOSE UR QL STRIP: NEGATIVE
HCT VFR BLD AUTO: 33.7 % (ref 37–48.5)
HGB BLD-MCNC: 10.4 G/DL (ref 12–16)
HGB UR QL STRIP: ABNORMAL
IMM GRANULOCYTES # BLD AUTO: 0.03 K/UL (ref 0–0.04)
IMM GRANULOCYTES NFR BLD AUTO: 0.3 % (ref 0–0.5)
KETONES UR QL STRIP: NEGATIVE
LEUKOCYTE ESTERASE UR QL STRIP: NEGATIVE
LYMPHOCYTES # BLD AUTO: 1.2 K/UL (ref 1–4.8)
LYMPHOCYTES NFR BLD: 12.1 % (ref 18–48)
MCH RBC QN AUTO: 24.1 PG (ref 27–31)
MCHC RBC AUTO-ENTMCNC: 30.9 G/DL (ref 32–36)
MCV RBC AUTO: 78 FL (ref 82–98)
MONOCYTES # BLD AUTO: 0.9 K/UL (ref 0.3–1)
MONOCYTES NFR BLD: 9.2 % (ref 4–15)
NEUTROPHILS # BLD AUTO: 7.6 K/UL (ref 1.8–7.7)
NEUTROPHILS NFR BLD: 77.2 % (ref 38–73)
NITRITE UR QL STRIP: NEGATIVE
NRBC BLD-RTO: 0 /100 WBC
PH UR STRIP: 6 [PH] (ref 5–8)
PLATELET # BLD AUTO: 284 K/UL (ref 150–450)
PMV BLD AUTO: 10.8 FL (ref 9.2–12.9)
POTASSIUM SERPL-SCNC: 3.8 MMOL/L (ref 3.5–5.1)
PROT SERPL-MCNC: 7.4 G/DL (ref 6–8.4)
PROT UR QL STRIP: NEGATIVE
RBC # BLD AUTO: 4.31 M/UL (ref 4–5.4)
SODIUM SERPL-SCNC: 136 MMOL/L (ref 136–145)
SP GR UR STRIP: 1.01 (ref 1–1.03)
URN SPEC COLLECT METH UR: ABNORMAL
UROBILINOGEN UR STRIP-ACNC: NEGATIVE EU/DL
WBC # BLD AUTO: 9.83 K/UL (ref 3.9–12.7)

## 2023-02-02 PROCEDURE — 93010 EKG 12-LEAD: ICD-10-PCS | Mod: HCNC,,, | Performed by: INTERNAL MEDICINE

## 2023-02-02 PROCEDURE — 36415 COLL VENOUS BLD VENIPUNCTURE: CPT | Mod: HCNC | Performed by: NURSE PRACTITIONER

## 2023-02-02 PROCEDURE — 93010 ELECTROCARDIOGRAM REPORT: CPT | Mod: HCNC,,, | Performed by: INTERNAL MEDICINE

## 2023-02-02 PROCEDURE — 80053 COMPREHEN METABOLIC PANEL: CPT | Mod: HCNC | Performed by: NURSE PRACTITIONER

## 2023-02-02 PROCEDURE — 85025 COMPLETE CBC W/AUTO DIFF WBC: CPT | Mod: HCNC | Performed by: NURSE PRACTITIONER

## 2023-02-02 PROCEDURE — 96360 HYDRATION IV INFUSION INIT: CPT | Mod: HCNC

## 2023-02-02 PROCEDURE — 25000003 PHARM REV CODE 250: Mod: HCNC | Performed by: NURSE PRACTITIONER

## 2023-02-02 PROCEDURE — 93005 ELECTROCARDIOGRAM TRACING: CPT | Mod: HCNC

## 2023-02-02 PROCEDURE — 99285 EMERGENCY DEPT VISIT HI MDM: CPT | Mod: 25,HCNC

## 2023-02-02 PROCEDURE — 81003 URINALYSIS AUTO W/O SCOPE: CPT | Mod: HCNC | Performed by: NURSE PRACTITIONER

## 2023-02-02 RX ORDER — SODIUM CHLORIDE 9 MG/ML
1000 INJECTION, SOLUTION INTRAVENOUS
Status: COMPLETED | OUTPATIENT
Start: 2023-02-02 | End: 2023-02-02

## 2023-02-02 RX ADMIN — SODIUM CHLORIDE 1000 ML: 0.9 INJECTION, SOLUTION INTRAVENOUS at 09:02

## 2023-02-03 VITALS
HEIGHT: 62 IN | OXYGEN SATURATION: 95 % | DIASTOLIC BLOOD PRESSURE: 63 MMHG | TEMPERATURE: 98 F | WEIGHT: 187.38 LBS | HEART RATE: 84 BPM | RESPIRATION RATE: 18 BRPM | SYSTOLIC BLOOD PRESSURE: 144 MMHG | BODY MASS INDEX: 34.48 KG/M2

## 2023-02-03 NOTE — ED TRIAGE NOTES
Pt arrived to ED c/o fall yesterday in bathroom on left arm/wrist and diarrhea. Unsure if pt hit head. Pt denies being on any blood thinners. Pt rates pain 2/10

## 2023-02-03 NOTE — ED PROVIDER NOTES
Encounter Date: 2/2/2023       History     Chief Complaint   Patient presents with    Fall     Pt arrived to ED c/o fall yesterday in bathroom on left arm/wrist and diarrhea. Unsure if pt hit head. Pt denies being on any blood thinners. Pt rates pain 2/10     Chief complaint:  Fall, diarrhea   77-year-old female with a history Alzheimer's, tremors, ataxia, dementia, neck tumor, deafness, Parkinson's breast cancer anemia diabetes hypertension hypothyroidism presents with family to be evaluated after she fell 1 day prior.  Patient's daughter states that she has been very unsteady when ambulating back and forth from her bedroom to her bathroom.  Daughter states she is been having frequent falls.  Patient's unsure if she hit her head.  She does complain of deep aching pain to the posterior aspect of the left hand and wrist.  She does have some edema to the area.  Patient does not take blood thinners.  Patient's daughter states she is currently her baseline ED today.  Daughter states she is also had several episodes of nonbloody nonmucoid diarrhea but states that had a very foul odor.  Patient denies any abdominal pain.  She does report cramping when she does have episodes of diarrhea.  Denies any fever.  Unsure of any recent ill contacts or suspicious foods.  Patient's daughter states she had similar presentation when she had salmonella several years ago.  She denies recent use of antibiotics    Review of patient's allergies indicates:   Allergen Reactions    Percocet [oxycodone-acetaminophen] Itching    Percodan [oxycodone hcl-oxycodone-asa] Itching     Other reaction(s): Unknown    Latex, natural rubber Rash    Phenytoin sodium extended      Other reaction(s): Unknown    Sutures, catgut      Infections to sutures     Adhesive Rash    Adhesive tape-silicones Rash     Past Medical History:   Diagnosis Date    Abnormal Pap smear     DIONNE (acute kidney injury) 6/29/2014    DIONNE (acute kidney injury) 6/29/2014    Alzheimer's  dementia 2018    Anemia     Breast cancer 1995    left breast    Breast disorder     Diabetes mellitus     Diabetes mellitus, type 2     ESSENTIAL HYPERTENSION 10/1/2012    Hypothyroid 2014    Pneumonia 2020     Past Surgical History:   Procedure Laterality Date    CATARACT EXTRACTION, BILATERAL      COLONOSCOPY N/A 8/10/2020    Procedure: COLONOSCOPY;  Surgeon: Guerda Perales MD;  Location: [facility];  Service: Endoscopy;  Laterality: N/A;    ESOPHAGOGASTRODUODENOSCOPY N/A 8/10/2020    Procedure: EGD (ESOPHAGOGASTRODUODENOSCOPY) WITH BIOPSY;  Surgeon: Guerda Perales MD;  Location: [facility];  Service: Endoscopy;  Laterality: N/A;    HYSTERECTOMY      masectomy      single left breast    MASTECTOMY Left     OOPHORECTOMY  1997    only 1     Family History   Problem Relation Age of Onset    Diabetes Mother     Hypertension Mother     Heart attack Mother 66    Uterine cancer Mother     Diabetes Sister     Heart disease Sister     Heart disease Brother     Hypertension Daughter     Thyroid disease Daughter     Heart attack Son 38    Heart disease Brother     Depression Daughter     Hypertension Son      Social History     Tobacco Use    Smoking status: Former     Packs/day: 1.00     Years: 10.00     Pack years: 10.00     Types: Cigarettes     Quit date: 1988     Years since quittin.1    Smokeless tobacco: Never   Substance Use Topics    Alcohol use: Yes     Alcohol/week: 1.0 standard drink     Types: 1 Glasses of wine per week     Comment: Occ.    Drug use: No     Review of Systems   Constitutional:  Positive for fatigue.   Respiratory:  Positive for shortness of breath.    Cardiovascular:  Negative for chest pain and palpitations.   Gastrointestinal:  Positive for diarrhea. Negative for abdominal distention, nausea and vomiting.   Neurological:  Positive for tremors and weakness. Negative for facial asymmetry and numbness.     Physical Exam     Initial Vitals [23]   BP  Pulse Resp Temp SpO2   110/76 89 18 97.7 °F (36.5 °C) 98 %      MAP       --         Physical Exam    Nursing note and vitals reviewed.  Constitutional: She appears well-developed and well-nourished.   Cardiovascular:  Normal rate and regular rhythm.     Exam reveals no gallop and no friction rub.       No murmur heard.  Pulmonary/Chest: Breath sounds normal. She has no wheezes. She has no rhonchi. She has no rales.   Abdominal: Abdomen is soft. Bowel sounds are normal. She exhibits no distension and no mass. There is no abdominal tenderness. There is no rebound and no guarding.   Musculoskeletal:         General: Tenderness and edema present. Normal range of motion.      Comments: Edema and pain with flexion-extension of the left wrist.  No scaphoid tenderness     Skin: Skin is warm. There is erythema.   Erythema of the dorsal aspect of the left hand and wrist   Psychiatric: She has a normal mood and affect. Thought content normal.       ED Course   Procedures  Labs Reviewed   CBC W/ AUTO DIFFERENTIAL - Abnormal; Notable for the following components:       Result Value    Hemoglobin 10.4 (*)     Hematocrit 33.7 (*)     MCV 78 (*)     MCH 24.1 (*)     MCHC 30.9 (*)     RDW 14.6 (*)     Gran % 77.2 (*)     Lymph % 12.1 (*)     All other components within normal limits   CLOSTRIDIUM DIFFICILE   CULTURE, STOOL   COMPREHENSIVE METABOLIC PANEL   URINALYSIS, REFLEX TO URINE CULTURE   H. PYLORI ANTIGEN, STOOL   PANCREATIC ELASTASE, FECAL   FECAL FAT, QUALITATIVE   OCCULT BLOOD X 1, STOOL   WBC, STOOL   ROTAVIRUS ANTIGEN, STOOL   CALPROTECTIN, STOOL   GIARDIA/CRYPTOSPORIDIUM (EIA)   STOOL EXAM-OVA,CYSTS,PARASITES     EKG Readings: (Independently Interpreted)   Initial Reading: No STEMI. Previous EKG: Compared with most recent EKG Rhythm: Normal Sinus Rhythm. Heart Rate: 81. Other Impression: Posterior left fascicular block     Imaging Results              CT Head Without Contrast (Final result)  Result time 02/02/23  20:57:13      Final result by Josue Feliz MD (02/02/23 20:57:13)                   Impression:      No acute intracranial hemorrhage or mass effect.    Age-related changes.      Electronically signed by: Josue Feliz MD  Date:    02/02/2023  Time:    20:57               Narrative:    EXAMINATION:  CT HEAD WITHOUT CONTRAST    CLINICAL HISTORY:  Headache, chronic, new features or increased frequency;    TECHNIQUE:  Low dose axial images were obtained through the head.  Coronal and sagittal reformations were also performed. Contrast was not administered.    COMPARISON:  MRI dated 02/01/2021    CT head 01/11/2021.    FINDINGS:  The subcutaneous tissues are unremarkable.  The bony calvarium is intact.  The paranasal sinuses are unremarkable.  The mastoid air cells are clear.  There are postoperative changes in the globes.    The craniocervical junction is intact.  There is no evidence of intracranial hemorrhage.  The ventricles and sulci are prominent, consistent cerebral volume loss.  There are extensive hypodensities within the periventricular and subcortical white matter.  The gray-white differentiation is maintained.  There is no dense vessel sign.  There is no mass effect.                                       X-Ray Forearm Left (Final result)  Result time 02/02/23 20:50:12      Final result by Josue Feliz MD (02/02/23 20:50:12)                   Impression:      No acute process.      Electronically signed by: Josue Feliz MD  Date:    02/02/2023  Time:    20:50               Narrative:    EXAMINATION:  XR FOREARM LEFT    CLINICAL HISTORY:  Unspecified fall, initial encounter    TECHNIQUE:  AP and lateral views of the left forearm were performed.    COMPARISON:  None    FINDINGS:  The bone mineralization is within normal limits.  There is no cortical step-off.  There is no evidence of periostitis.    The joint spaces are maintained.  The soft tissues are unremarkable.  No radiopaque foreign body is  identified.    There is no evidence of a fracture or dislocation.                                       X-Ray Wrist Complete Left (Final result)  Result time 02/02/23 20:52:19      Final result by Josue Feliz MD (02/02/23 20:52:19)                   Impression:      No evidence of a fracture dislocation of the left wrist.    Soft tissue swelling about the left wrist.      Electronically signed by: Josue Feliz MD  Date:    02/02/2023  Time:    20:52               Narrative:    EXAMINATION:  XR WRIST COMPLETE 3 VIEWS LEFT    CLINICAL HISTORY:  Fall (on) (from) other stairs and steps, initial encounter    TECHNIQUE:  PA, lateral, and oblique views of the left wrist were performed.    COMPARISON:  None    FINDINGS:  The bone mineralization is within normal limits.  There is no cortical step-off.  There is no evidence of periostitis.    There is expected joint space narrowing.  The soft tissue swelling about the left wrist.  No radiopaque foreign body is identified.    There is no evidence of a fracture or dislocation of the left wrist.                                       Medications   0.9%  NaCl infusion (has no administration in time range)     Medical Decision Making:   Differential Diagnosis:   Fall, wrist sprain, fracture, radial fracture, ulnar , dehydration, ICH, skull fracture, diarrhea, C diff, electrolyte abnormality  Clinical Tests:   Lab Tests: Reviewed  The following lab test(s) were unremarkable: CBC and CMP  Radiological Study: Reviewed  ED Management:   patient with neurological exam at baseline today   Patient has no abdominal tenderness bowel sounds active throughout  Patient had x-ray of the left wrist and forearm with no acute fracture.  CT of the head done with no acute findings   Patient was given normal saline 2 replace volume loss from diarrhea   Stool studies ordered   Patient care transferred to Dr. Estevez at 10:00 p.m.                        Clinical Impression:   Final  diagnoses:  [W19.XXXA] Fall  [W10.8XXA] Fall (on) (from) other stairs and steps, initial encounter               Katarzyna Forman, LIANA  02/02/23 2103       Katarzyna Forman, LIANA  02/02/23 2112       Katarzyna Forman, LIANA  02/02/23 2122

## 2023-02-09 DIAGNOSIS — Z00.00 ENCOUNTER FOR MEDICARE ANNUAL WELLNESS EXAM: ICD-10-CM

## 2023-02-10 ENCOUNTER — OUTPATIENT CASE MANAGEMENT (OUTPATIENT)
Dept: ADMINISTRATIVE | Facility: OTHER | Age: 78
End: 2023-02-10
Payer: MEDICARE

## 2023-02-10 ENCOUNTER — TELEPHONE (OUTPATIENT)
Dept: NEUROLOGY | Facility: CLINIC | Age: 78
End: 2023-02-10
Payer: MEDICARE

## 2023-02-10 DIAGNOSIS — R41.3 MEMORY LOSS: Primary | ICD-10-CM

## 2023-02-10 NOTE — PROGRESS NOTES
Outpatient Care Management   - Low Risk Patient Assessment    Patient: Narda Person  MRN:  [id number]  Date of Service:  2/10/2023  Completed by:  Deisi Loera LMSW  Referral Date: 02/02/2023    Reason for Visit   Patient presents with    OPCM SW First Assessment Attempt     02/10/2023    Social Work Assessment - Low/Mod Risk     02/10/2023    Plan Of Care     02/10/2023    Case Closure     02/10/2023       Brief Summary:  received a referral from I/P .  Patient gave permission to this SW to speak with daughter regarding health care needs.  SW completed assessment with patient daughter Barbi. Barbi reports she spouse and patient resides together. Daughter reports patient ambulates with a walker and wheelchair. Daughter assist patient with IADL's and ADL's. Daughter requesting additional resources for more in home support. Daughter denied NH Placement will be an option at this time. Daughter currently working with an  to discuss assets. Daughter denied patient and or spouse served in a  and or has a LTC policy. Daughter reports patient receives material aid from the local [employer] on aging. SW encouraged daughter to apply for additional services with the local [employer] on aging such as: respite and personal care services. Daughter voiced understanding.  Daughter an agreement with applying for [employer] program to assist with more support. Daughter aware waiver program has a wait list. [facility] Daughter denied patient needs assistance with food, shelter, medication or medical. Daughter currently working with an  for ACP.   . Care plan was created in collaboration with patient/caregiver input.       Attempt #:  1  This SW attempted to reach patient/caregiver to provide resource , however daughter reports patient is asleep. SW will attempt to reach patient around 2pm.

## 2023-02-10 NOTE — TELEPHONE ENCOUNTER
Referral for neuropsychological testing pended.     I contacted the patients daughter Barbi and gave her the phone number to call once the referral is placed.

## 2023-02-10 NOTE — TELEPHONE ENCOUNTER
----- Message from Deisi Loera LMSW sent at 2/10/2023  3:07 PM CST -----  Greetings, NP Rojelio and or Staff    Daughter is ready to schedule appointment for neuropsych testing to evaluate patient memory loss.  Daughter reports you provided her with the phone number if she wishes to proceed but she misplaced it.     Can you please provide this SW with the phone number and or daughter       Thank you,    Deisi Loera LMSW

## 2023-02-18 ENCOUNTER — HOSPITAL ENCOUNTER (EMERGENCY)
Facility: HOSPITAL | Age: 78
Discharge: HOME | End: 2023-02-18
Attending: STUDENT IN AN ORGANIZED HEALTH CARE EDUCATION/TRAINING PROGRAM
Payer: MEDICARE

## 2023-02-18 VITALS
DIASTOLIC BLOOD PRESSURE: 104 MMHG | TEMPERATURE: 99 F | SYSTOLIC BLOOD PRESSURE: 142 MMHG | HEART RATE: 81 BPM | OXYGEN SATURATION: 99 % | RESPIRATION RATE: 18 BRPM
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
## 2023-02-18 DIAGNOSIS — R10.84 GENERALIZED ABDOMINAL PAIN: Primary | ICD-10-CM

## 2023-02-18 LAB
ALBUMIN SERPL BCP-MCNC: 3.6 G/DL (ref 3.5–5.2)
ALP SERPL-CCNC: 69 U/L (ref 55–135)
ALT SERPL W/O P-5'-P-CCNC: 17 U/L (ref 10–44)
ANION GAP SERPL CALC-SCNC: 10 MMOL/L (ref 8–16)
AST SERPL-CCNC: 15 U/L (ref 10–40)
BASOPHILS # BLD AUTO: 0.06 K/UL (ref 0–0.2)
BASOPHILS NFR BLD: 0.8 % (ref 0–1.9)
BILIRUB SERPL-MCNC: 0.4 MG/DL (ref 0.1–1)
BILIRUB UR QL STRIP: NEGATIVE
BUN SERPL-MCNC: 20 MG/DL (ref 8–23)
CALCIUM SERPL-MCNC: 8.9 MG/DL (ref 8.7–10.5)
CHLORIDE SERPL-SCNC: 100 MMOL/L (ref 95–110)
CLARITY UR: CLEAR
CO2 SERPL-SCNC: 22 MMOL/L (ref 23–29)
COLOR UR: YELLOW
CREAT SERPL-MCNC: 1 MG/DL (ref 0.5–1.4)
DIFFERENTIAL METHOD: ABNORMAL
EOSINOPHIL # BLD AUTO: 0.1 K/UL (ref 0–0.5)
EOSINOPHIL NFR BLD: 0.7 % (ref 0–8)
ERYTHROCYTE [DISTWIDTH] IN BLOOD BY AUTOMATED COUNT: 14.3 % (ref 11.5–14.5)
EST. GFR  (NO RACE VARIABLE): 57 ML/MIN/1.73 M^2
GLUCOSE SERPL-MCNC: 311 MG/DL (ref 70–110)
GLUCOSE UR QL STRIP: ABNORMAL
HCT VFR BLD AUTO: 32.8 % (ref 37–48.5)
HGB BLD-MCNC: 10.2 G/DL (ref 12–16)
HGB UR QL STRIP: ABNORMAL
IMM GRANULOCYTES # BLD AUTO: 0.02 K/UL (ref 0–0.04)
IMM GRANULOCYTES NFR BLD AUTO: 0.3 % (ref 0–0.5)
KETONES UR QL STRIP: NEGATIVE
LEUKOCYTE ESTERASE UR QL STRIP: NEGATIVE
LYMPHOCYTES # BLD AUTO: 1.1 K/UL (ref 1–4.8)
LYMPHOCYTES NFR BLD: 15.5 % (ref 18–48)
MCH RBC QN AUTO: 24.3 PG (ref 27–31)
MCHC RBC AUTO-ENTMCNC: 31.1 G/DL (ref 32–36)
MCV RBC AUTO: 78 FL (ref 82–98)
MONOCYTES # BLD AUTO: 0.5 K/UL (ref 0.3–1)
MONOCYTES NFR BLD: 6.7 % (ref 4–15)
NEUTROPHILS # BLD AUTO: 5.5 K/UL (ref 1.8–7.7)
NEUTROPHILS NFR BLD: 76 % (ref 38–73)
NITRITE UR QL STRIP: NEGATIVE
NRBC BLD-RTO: 0 /100 WBC
PH UR STRIP: 6 [PH] (ref 5–8)
PLATELET # BLD AUTO: 274 K/UL (ref 150–450)
PMV BLD AUTO: 11.2 FL (ref 9.2–12.9)
POTASSIUM SERPL-SCNC: 4.4 MMOL/L (ref 3.5–5.1)
PROT SERPL-MCNC: 7 G/DL (ref 6–8.4)
PROT UR QL STRIP: NEGATIVE
RBC # BLD AUTO: 4.19 M/UL (ref 4–5.4)
SODIUM SERPL-SCNC: 132 MMOL/L (ref 136–145)
SP GR UR STRIP: <=1.005 (ref 1–1.03)
URN SPEC COLLECT METH UR: ABNORMAL
UROBILINOGEN UR STRIP-ACNC: NEGATIVE EU/DL
WBC # BLD AUTO: 7.21 K/UL (ref 3.9–12.7)

## 2023-02-18 PROCEDURE — 99285 EMERGENCY DEPT VISIT HI MDM: CPT | Mod: 25,HCNC

## 2023-02-18 PROCEDURE — 81003 URINALYSIS AUTO W/O SCOPE: CPT | Mod: HCNC | Performed by: STUDENT IN AN ORGANIZED HEALTH CARE EDUCATION/TRAINING PROGRAM

## 2023-02-18 PROCEDURE — 36415 COLL VENOUS BLD VENIPUNCTURE: CPT | Mod: HCNC | Performed by: STUDENT IN AN ORGANIZED HEALTH CARE EDUCATION/TRAINING PROGRAM

## 2023-02-18 PROCEDURE — 80053 COMPREHEN METABOLIC PANEL: CPT | Mod: HCNC | Performed by: STUDENT IN AN ORGANIZED HEALTH CARE EDUCATION/TRAINING PROGRAM

## 2023-02-18 PROCEDURE — 25500020 PHARM REV CODE 255: Mod: HCNC | Performed by: SURGERY

## 2023-02-18 PROCEDURE — 85025 COMPLETE CBC W/AUTO DIFF WBC: CPT | Mod: HCNC | Performed by: STUDENT IN AN ORGANIZED HEALTH CARE EDUCATION/TRAINING PROGRAM

## 2023-02-18 RX ADMIN — IOHEXOL 100 ML: 350 INJECTION, SOLUTION INTRAVENOUS at 05:02

## 2023-02-18 NOTE — ED PROVIDER NOTES
Encounter Date: 2/18/2023       History     Chief Complaint   Patient presents with    Hip Pain     Patient to er via aasi with c/o right hip pain after falling this am     77-year-old female with history of diabetes and hypertension presenting after mechanical fall at home.  Patient reports she was in her bedroom, tripped and fell.  Reports she fell onto her right side.  Denies any arm pain, leg pain.  Patient only reports right-sided abdominal pain.  No difficulty breathing.  No chest pain.  No head strike or neck pain.  No other complaints.    Review of patient's allergies indicates:   Allergen Reactions    Percocet [oxycodone-acetaminophen] Itching    Percodan [oxycodone hcl-oxycodone-asa] Itching     Other reaction(s): Unknown    Latex, natural rubber Rash    Phenytoin sodium extended      Other reaction(s): Unknown    Sutures, catgut      Infections to sutures     Adhesive Rash    Adhesive tape-silicones Rash     Past Medical History:   Diagnosis Date    Abnormal Pap smear     DIONNE (acute kidney injury) 6/29/2014    DIONNE (acute kidney injury) 6/29/2014    Alzheimer's dementia 4/12/2018    Anemia     Breast cancer 1995    left breast    Breast disorder     Diabetes mellitus     Diabetes mellitus, type 2     ESSENTIAL HYPERTENSION 10/1/2012    Hypothyroid 6/29/2014    Pneumonia 01/16/2020     Past Surgical History:   Procedure Laterality Date    CATARACT EXTRACTION, BILATERAL      COLONOSCOPY N/A 8/10/2020    Procedure: COLONOSCOPY;  Surgeon: Guerda Perales MD;  Location: [facility];  Service: Endoscopy;  Laterality: N/A;    ESOPHAGOGASTRODUODENOSCOPY N/A 8/10/2020    Procedure: EGD (ESOPHAGOGASTRODUODENOSCOPY) WITH BIOPSY;  Surgeon: Guerda Perales MD;  Location: [facility];  Service: Endoscopy;  Laterality: N/A;    HYSTERECTOMY      masectomy      single left breast    MASTECTOMY Left     OOPHORECTOMY  1997    only 1     Family History   Problem Relation Age of Onset    Diabetes Mother     Hypertension  Mother     Heart attack Mother 66    Uterine cancer Mother     Diabetes Sister     Heart disease Sister     Heart disease Brother     Hypertension Daughter     Thyroid disease Daughter     Heart attack Son 38    Heart disease Brother     Depression Daughter     Hypertension Son      Social History     Tobacco Use    Smoking status: Former     Packs/day: 1.00     Years: 10.00     Pack years: 10.00     Types: Cigarettes     Quit date: 1988     Years since quittin.1    Smokeless tobacco: Never   Substance Use Topics    Alcohol use: Yes     Alcohol/week: 1.0 standard drink     Types: 1 Glasses of wine per week     Comment: Occ.    Drug use: No     Review of Systems   Constitutional:  Negative for fever.   HENT:  Negative for sore throat.    Respiratory:  Negative for shortness of breath.    Cardiovascular:  Negative for chest pain.   Gastrointestinal:  Positive for abdominal pain. Negative for diarrhea, nausea and vomiting.   Genitourinary:  Negative for dysuria.   Musculoskeletal:  Negative for back pain.   Skin:  Negative for rash.   Neurological:  Negative for weakness.   Hematological:  Does not bruise/bleed easily.     Physical Exam     Initial Vitals [23 1645]   BP Pulse Resp Temp SpO2   (!) 152/68 81 18 99 °F (37.2 °C) 96 %      MAP       --         Physical Exam    Nursing note and vitals reviewed.  Constitutional: She appears well-developed.   HENT:   Head: Normocephalic.   Eyes: Pupils are equal, round, and reactive to light.   Neck:   Normal range of motion.  Cardiovascular:            No murmur heard.  Pulmonary/Chest: No respiratory distress.   Abdominal: Abdomen is soft.   No skin changes.  Patient tender to palpation to the right abdomen.  No rebound or guarding.  Not peritoneal.  No CVA tenderness.   Musculoskeletal:         General: No edema.      Cervical back: Normal range of motion.      Comments: Moving all extremities. No pain with palpation to bilateral shoulders, elbows, wrists,  hips, knees, ankles. No midline tenderness.        Neurological: She is alert.   Skin: Skin is warm.   Psychiatric: She has a normal mood and affect.       ED Course   Procedures  Labs Reviewed   CBC W/ AUTO DIFFERENTIAL   COMPREHENSIVE METABOLIC PANEL   URINALYSIS, REFLEX TO URINE CULTURE          Imaging Results    None          Medications - No data to display  Medical Decision Making:   Differential Diagnosis:   DDX:  Right-sided abdominal pain after a mechanical fall.  Will perform lab work and imaging given patient's right-sided tenderness to palpation.  Rule out solid organ injury, urinary tract infection, pancreatitis.  DX:  CBC, CMP, UA, CT abdomen pelvis  TX:  Analgesia PRN  Dispo:  Pending workup                            Clinical Impression:   Final diagnoses:  [R10.84] Generalized abdominal pain (Primary)               Oscar Berry MD  02/18/23 [id number]

## 2023-02-19 NOTE — PROVIDER PROGRESS NOTES - EMERGENCY DEPT.
Emergency Room Physician       77-year-old female fell on her right side earlier today  Patient has long history of frequent falls on interview  Patient actually complained of right abdominal pain  Patient states she hit her right abdomen during fall  Denies any head trauma or loss of consciousness  Seen previously by the ER physician Dr. Berry    Lab work ordered with a CT scan of the abdomen pelvis  I took over this patient at the 6:00 p.m. shift change  Lab work including urinalysis were (-) in the ER today  (-) CT scan of the abdomen pelvis, no traumatic findings  Tylenol & Motrin for pain on ER discharge this evening  PCP follow-up 48 hours, return with any concerns on EDGAR Burroughs M.D. 7:23 PM 2/18/2023

## 2023-03-22 ENCOUNTER — TELEPHONE (OUTPATIENT)
Dept: INTERNAL MEDICINE | Facility: CLINIC | Age: 78
End: 2023-03-22
Payer: MEDICARE

## 2023-03-22 NOTE — TELEPHONE ENCOUNTER
----- Message from Irma Magallanes sent at 3/22/2023  1:28 PM CDT -----  Contact: Barbi Person  MRN: [id number]  : 1945  PCP: Sirena Coleman  Home Phone      [phone]  Work Phone      Not on file.  Mobile          [phone]      MESSAGE:     Pt daughter Barbi is wanting to know if they can get a virtual appt because the pt is sick with a sinus drip.       Please advise  Barbi   [phone]

## 2023-03-23 ENCOUNTER — OFFICE VISIT (OUTPATIENT)
Dept: INTERNAL MEDICINE | Facility: CLINIC | Age: 78
End: 2023-03-23
Payer: MEDICARE

## 2023-03-23 DIAGNOSIS — J01.90 ACUTE RHINOSINUSITIS: Primary | ICD-10-CM

## 2023-03-23 PROCEDURE — 99213 PR OFFICE/OUTPT VISIT, EST, LEVL III, 20-29 MIN: ICD-10-PCS | Mod: HCNC,95,, | Performed by: INTERNAL MEDICINE

## 2023-03-23 PROCEDURE — 99213 OFFICE O/P EST LOW 20 MIN: CPT | Mod: HCNC,95,, | Performed by: INTERNAL MEDICINE

## 2023-03-23 RX ORDER — DOXYCYCLINE 100 MG/1
100 CAPSULE ORAL DAILY
Qty: 10 CAPSULE | Refills: 0 | Status: SHIPPED | OUTPATIENT
Start: 2023-03-23 | End: 2023-04-02

## 2023-03-23 RX ORDER — METHYLPREDNISOLONE 4 MG/1
TABLET ORAL
Qty: 21 EACH | Refills: 0 | Status: SHIPPED | OUTPATIENT
Start: 2023-03-23

## 2023-03-23 NOTE — PROGRESS NOTES
HPI:  Narda Person is a 77 y.o. female here for No chief complaint on file.  The patient location is: home  The chief complaint leading to consultation is: sinus congestion/cough/soret throat  for few days .  Daughter with similar symptoms .        Visit type: audiovisual    Face to Face time with patient:TEN  TEN minutes of total time spent on the encounter, which includes face to face time and non-face to face time preparing to see the patient (eg, review of tests), Obtaining and/or reviewing separately obtained history, Documenting clinical information in the electronic or other health record, Independently interpreting results (not separately reported) and communicating results to the patient/family/caregiver, or Care coordination (not separately reported).         Each patient to whom he or she provides medical services by telemedicine is:  (1) informed of the relationship between the physician and patient and the respective role of any other health care provider with respect to management of the patient; and (2) notified that he or she may decline to receive medical services by telemedicine and may withdraw from such care at any time.    Notes:  home    Review of Systems   Constitutional:  Positive for diaphoresis. Negative for chills and fever.   HENT:  Positive for congestion, postnasal drip, rhinorrhea, sinus pressure, sneezing and sore throat. Negative for hearing loss.    Eyes:  Negative for photophobia.   Respiratory:  Negative for cough, choking, chest tightness, shortness of breath and wheezing.    Cardiovascular:  Negative for chest pain and palpitations.   Gastrointestinal:  Negative for blood in stool, nausea and vomiting.   Genitourinary:  Negative for dysuria and hematuria.   Musculoskeletal:  Negative for arthralgias and myalgias.   Skin:  Negative for pallor.   Neurological:  Positive for headaches. Negative for dizziness and numbness.   Hematological:  Does not bruise/bleed easily.    Psychiatric/Behavioral:  Negative for confusion and suicidal ideas. The patient is not nervous/anxious.     A review of systems was performed and is negative except as noted above.    Objective:  There were no vitals filed for this visit.   Physical Exam     Assessment/Plan:  1. Acute rhinosinusitis  -     doxycycline (VIBRAMYCIN) 100 MG Cap; Take 1 capsule (100 mg total) by mouth once daily. for 10 days  Dispense: 10 capsule; Refill: 0  -     methylPREDNISolone (MEDROL DOSEPACK) 4 mg tablet; use as directed  Dispense: 21 each; Refill: 0    Watch for sugar going up with steroids.  Call us if any issues    Answers submitted by the patient for this visit:  Sore Throat Questionnaire (Submitted on 3/23/2023)  Chief Complaint: Sore throat  Chronicity: new  Onset: in the past 7 days  Progression since onset: gradually worsening  Pain worse on: neither  Fever: no fever  Fever duration: 1 to 2 days  Pain - numeric: 8/10

## 2023-04-05 ENCOUNTER — HOSPITAL ENCOUNTER (EMERGENCY)
Facility: HOSPITAL | Age: 78
Discharge: HOME | End: 2023-04-05
Attending: SURGERY
Payer: MEDICARE

## 2023-04-05 VITALS
SYSTOLIC BLOOD PRESSURE: 140 MMHG | HEART RATE: 72 BPM | TEMPERATURE: 99 F | DIASTOLIC BLOOD PRESSURE: 82 MMHG | OXYGEN SATURATION: 98 % | RESPIRATION RATE: 18 BRPM
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
## 2023-04-05 DIAGNOSIS — M25.539 WRIST PAIN: ICD-10-CM

## 2023-04-05 DIAGNOSIS — S42.92XA CLOSED FRACTURE OF LEFT SHOULDER, INITIAL ENCOUNTER: Primary | ICD-10-CM

## 2023-04-05 PROCEDURE — 96372 THER/PROPH/DIAG INJ SC/IM: CPT | Performed by: SURGERY

## 2023-04-05 PROCEDURE — 63600175 PHARM REV CODE 636 W HCPCS: Mod: HCNC | Performed by: SURGERY

## 2023-04-05 PROCEDURE — 99285 EMERGENCY DEPT VISIT HI MDM: CPT | Mod: 25,HCNC

## 2023-04-05 RX ORDER — IBUPROFEN 400 MG/1
400 TABLET ORAL EVERY 6 HOURS PRN
Qty: 25 TABLET | Refills: 0 | Status: SHIPPED | OUTPATIENT
Start: 2023-04-05

## 2023-04-05 RX ORDER — KETOROLAC TROMETHAMINE 30 MG/ML
30 INJECTION, SOLUTION INTRAMUSCULAR; INTRAVENOUS
Status: COMPLETED | OUTPATIENT
Start: 2023-04-05 | End: 2023-04-05

## 2023-04-05 RX ADMIN — KETOROLAC TROMETHAMINE 30 MG: 30 INJECTION, SOLUTION INTRAMUSCULAR; INTRAVENOUS at 05:04

## 2023-04-05 NOTE — ED NOTES
Patient and family states they are waiting on x-ray results. Patient states pain has improved. NAD noted. Pt wearing sling.

## 2023-04-05 NOTE — ED TRIAGE NOTES
77 y.o. female presents to ER ED 01/ED 01B   Chief Complaint   Patient presents with    Fall   .   Here per [facility] after a slip and fall at home, c/o left shoulder pain, abrasions to both kness

## 2023-04-05 NOTE — DISCHARGE INSTRUCTIONS
He is take ibuprofen as needed for pain   Please follow-up with orthopedics  If develops any new worrisome symptoms please return to the ED

## 2023-04-05 NOTE — ED PROVIDER NOTES
Encounter Date: 4/5/2023       History     Chief Complaint   Patient presents with    Fall     Chief complaint:  Fall  Seven 7-year-old female with a history of diabetes presents to be evaluated after being brought in by EMS for mechanical fall.  Patient states she was walking through a door that caused her to trip and fall.  Patient states she has 9/10 deep aching pain her left shoulder as well as pain her left elbow left knee.  She states she may have hit her head but is unsure.  She denies use of blood thinners.  Patient has no deformity to shoulders arms hips or knees.  She is awake alert and oriented neurovascularly intact in ED day    Review of patient's allergies indicates:   Allergen Reactions    Percocet [oxycodone-acetaminophen] Itching    Percodan [oxycodone hcl-oxycodone-asa] Itching     Other reaction(s): Unknown    Latex, natural rubber Rash    Phenytoin sodium extended      Other reaction(s): Unknown    Sutures, catgut      Infections to sutures     Adhesive Rash    Adhesive tape-silicones Rash     Past Medical History:   Diagnosis Date    Abnormal Pap smear     DIONNE (acute kidney injury) 6/29/2014    DIONNE (acute kidney injury) 6/29/2014    Alzheimer's dementia 4/12/2018    Anemia     Breast cancer 1995    left breast    Breast disorder     Diabetes mellitus     Diabetes mellitus, type 2     ESSENTIAL HYPERTENSION 10/1/2012    Hypothyroid 6/29/2014    Pneumonia 01/16/2020     Past Surgical History:   Procedure Laterality Date    CATARACT EXTRACTION, BILATERAL      COLONOSCOPY N/A 8/10/2020    Procedure: COLONOSCOPY;  Surgeon: Guerda Perales MD;  Location: [facility];  Service: Endoscopy;  Laterality: N/A;    ESOPHAGOGASTRODUODENOSCOPY N/A 8/10/2020    Procedure: EGD (ESOPHAGOGASTRODUODENOSCOPY) WITH BIOPSY;  Surgeon: Guerda Perales MD;  Location: [facility];  Service: Endoscopy;  Laterality: N/A;    HYSTERECTOMY      masectomy      single left breast    MASTECTOMY Left     OOPHORECTOMY  1997     only 1     Family History   Problem Relation Age of Onset    Diabetes Mother     Hypertension Mother     Heart attack Mother 66    Uterine cancer Mother     Diabetes Sister     Heart disease Sister     Heart disease Brother     Hypertension Daughter     Thyroid disease Daughter     Heart attack Son 38    Heart disease Brother     Depression Daughter     Hypertension Son      Social History     Tobacco Use    Smoking status: Former     Packs/day: 1.00     Years: 10.00     Pack years: 10.00     Types: Cigarettes     Quit date: 1988     Years since quittin.2    Smokeless tobacco: Never   Substance Use Topics    Alcohol use: Yes     Alcohol/week: 1.0 standard drink     Types: 1 Glasses of wine per week     Comment: Occ.    Drug use: No     Review of Systems   Constitutional:  Negative for activity change.   Cardiovascular:  Negative for chest pain.   Gastrointestinal:  Negative for abdominal pain.   Musculoskeletal:  Positive for arthralgias and myalgias. Negative for back pain and neck pain.   Skin:  Positive for wound.   Neurological:  Negative for dizziness, weakness and headaches.     Physical Exam     Initial Vitals [23 1713]   BP Pulse Resp Temp SpO2   138/65 75 -- 98.6 °F (37 °C) 98 %      MAP       --         Physical Exam    Nursing note and vitals reviewed.  Constitutional: She appears well-developed and well-nourished.   Cardiovascular:  Normal rate, regular rhythm and normal heart sounds.           Pulmonary/Chest: Breath sounds normal. She has no wheezes. She has no rhonchi. She has no rales.   Musculoskeletal:         General: Tenderness present. No edema. Normal range of motion.      Comments: Tenderness to the anterior and lateral aspect of the left shoulder limited range of motion.  Patient is tenderness to the right knee with superficial abrasion noted no warmth no crepitus no erythema she does have range of motion     Neurological: She is alert and oriented to person, place, and time.    Skin: Skin is warm and dry. Capillary refill takes less than 2 seconds.       ED Course   Procedures  Labs Reviewed - No data to display       Imaging Results              X-Ray Elbow Complete Left (Final result)  Result time 04/05/23 17:48:19      Final result by Malcolm Madrid MD (04/05/23 17:48:19)                   Impression:      No acute osseous abnormality.      Electronically signed by: Malcolm Madrid MD  Date:    04/05/2023  Time:    17:48               Narrative:    EXAMINATION:  XR ELBOW COMPLETE 3 VIEW LEFT    CLINICAL HISTORY:  Unspecified fall, initial encounter    TECHNIQUE:  AP, lateral, and oblique views of the left elbow were performed.    COMPARISON:  None    FINDINGS:  There is no fracture, dislocation, or joint effusion.  The soft tissues are unremarkable.                                       X-Ray Shoulder 2 or More Views Left (Final result)  Result time 04/05/23 17:47:59      Final result by Malcolm Madrid MD (04/05/23 17:47:59)                   Impression:      Nondisplaced fracture of the left greater tuberosity.      Electronically signed by: Malcolm Madrid MD  Date:    04/05/2023  Time:    17:47               Narrative:    EXAMINATION:  XR SHOULDER COMPLETE 2 OR MORE VIEWS LEFT    CLINICAL HISTORY:  fall;    TECHNIQUE:  Two or three views of the left shoulder were performed.    COMPARISON:  None    FINDINGS:  There is a nondisplaced fracture through the greater tuberosity of the left humerus.                                       X-Ray Knee 1 or 2 View Left (Final result)  Result time 04/05/23 17:47:00   Procedure changed from X-Ray Knee 3 View Left     Final result by Malcolm Madrid MD (04/05/23 17:47:00)                   Impression:      No acute osseous abnormality      Electronically signed by: Malcolm Madrid MD  Date:    04/05/2023  Time:    17:47               Narrative:    EXAMINATION:  XR KNEE 1 OR 2 VIEW LEFT    CLINICAL HISTORY:  fall;   Unspecified fall, initial encounter    TECHNIQUE:  AP and cross-table lateral views of the left knee    COMPARISON:  None    FINDINGS:  There is no fracture, dislocation, or joint effusion                                       CT Head Without Contrast (Final result)  Result time 23 17:46:30      Final result by Malcolm Madrid MD (23 17:46:30)                   Impression:      No acute intracranial process.      Electronically signed by: Malcolm Madrid MD  Date:    2023  Time:    17:46               Narrative:    EXAMINATION:  CT HEAD WITHOUT CONTRAST    CLINICAL HISTORY:  Head trauma, minor (Age >= 65y);    TECHNIQUE:  Low dose axial images were obtained through the head.  Coronal and sagittal reformations were also performed. Contrast was not administered.    COMPARISON:  2023    FINDINGS:  Gray-white differentiation is well maintained.  There is no intracranial hemorrhage.  There is no mass or midline shift.  There is moderate generalized cerebral volume loss with compensatory ventricular enlargement.  Mild chronic white matter changes present.  The calvarium is intact.                                       Medications   ketorolac injection 30 mg (has no administration in time range)     Medical Decision Makin-year-old female with a slip & fall in the emergency room today  (-) head CT, (-) knee x-ray, (-) elbow x-ray in the ER this evening  Left shoulder x-ray showed a fracture of the greater tuberosity  Shoulder sling placed, Motrin for pain, ambulatory referral on DC  Ambulatory referral Orthopedics return with any concerns on DC                        Clinical Impression:   Final diagnoses:  [W19.XXXA] Fall  [S42.92XA] Closed fracture of left shoulder, initial encounter        ED Disposition Condition    Discharge Stable          ED Prescriptions       Medication Sig Dispense Start Date End Date Auth. Provider    ibuprofen (ADVIL,MOTRIN) 400 MG tablet Take 1 tablet (400  mg total) by mouth every 6 (six) hours as needed for Pain. 25 tablet 4/5/2023 -- Leonid Burroughs MD          Follow-up Information       Follow up With Specialties Details Why Contact Info    Sirena Coleman MD Internal Medicine Schedule an appointment as soon as possible for a visit in 2 days  [address]  [facility] [address]  [phone]               Leonid Burroughs MD  04/05/23 3731

## 2023-06-01 DIAGNOSIS — Z76.0 ENCOUNTER FOR MEDICATION REFILL: ICD-10-CM

## 2023-06-01 RX ORDER — OMEPRAZOLE 20 MG/1
CAPSULE, DELAYED RELEASE ORAL
Qty: 180 CAPSULE | Refills: 0 | Status: SHIPPED | OUTPATIENT
Start: 2023-06-01 | End: 2023-09-18

## 2023-06-01 RX ORDER — SITAGLIPTIN 100 MG/1
TABLET, FILM COATED ORAL
Qty: 90 TABLET | Refills: 0 | Status: SHIPPED | OUTPATIENT
Start: 2023-06-01 | End: 2023-09-18

## 2023-06-01 NOTE — TELEPHONE ENCOUNTER
Care Due:                  Date            Visit Type   Department     Provider  --------------------------------------------------------------------------------                                ESTABLISHED                              PATIENT -    [facility]  Last Visit: 03-      [facility]      MEDICINE II    Sirena Coleman  Next Visit: None Scheduled  None         None Found                                                            Last  Test          Frequency    Reason                     Performed    Due Date  --------------------------------------------------------------------------------    Lipid Panel.  12 months..  atorvastatin.............  09- 08-    [facility] Embedded Care Due Messages. Reference number: [id number].   6/01/2023 1:00:09 PM CDT

## 2023-06-29 ENCOUNTER — PES CALL (OUTPATIENT)
Dept: ADMINISTRATIVE | Facility: CLINIC | Age: 78
End: 2023-06-29
Payer: MEDICARE

## 2023-06-30 ENCOUNTER — HOSPITAL ENCOUNTER (EMERGENCY)
Facility: HOSPITAL | Age: 78
Discharge: HOME | End: 2023-06-30
Attending: STUDENT IN AN ORGANIZED HEALTH CARE EDUCATION/TRAINING PROGRAM
Payer: MEDICARE

## 2023-06-30 VITALS
DIASTOLIC BLOOD PRESSURE: 60 MMHG | OXYGEN SATURATION: 94 % | TEMPERATURE: 98 F | SYSTOLIC BLOOD PRESSURE: 145 MMHG | WEIGHT: 200 LBS | RESPIRATION RATE: 18 BRPM | BODY MASS INDEX: 36.58 KG/M2 | HEART RATE: 77 BPM

## 2023-06-30 DIAGNOSIS — R55 SYNCOPE: ICD-10-CM

## 2023-06-30 DIAGNOSIS — I95.1 ORTHOSTATIC HYPOTENSION: ICD-10-CM

## 2023-06-30 DIAGNOSIS — M25.552 ACUTE PAIN OF LEFT HIP: ICD-10-CM
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
## 2023-06-30 LAB
ALBUMIN SERPL BCP-MCNC: 3.8 G/DL (ref 3.5–5.2)
ALP SERPL-CCNC: 68 U/L (ref 55–135)
ALT SERPL W/O P-5'-P-CCNC: 12 U/L (ref 10–44)
ANION GAP SERPL CALC-SCNC: 10 MMOL/L (ref 8–16)
AST SERPL-CCNC: 17 U/L (ref 10–40)
BASOPHILS # BLD AUTO: 0.07 K/UL (ref 0–0.2)
BASOPHILS NFR BLD: 1.3 % (ref 0–1.9)
BILIRUB SERPL-MCNC: 0.4 MG/DL (ref 0.1–1)
BILIRUB UR QL STRIP: NEGATIVE
BUN SERPL-MCNC: 18 MG/DL (ref 8–23)
CALCIUM SERPL-MCNC: 9.1 MG/DL (ref 8.7–10.5)
CHLORIDE SERPL-SCNC: 107 MMOL/L (ref 95–110)
CK SERPL-CCNC: 62 U/L (ref 20–180)
CLARITY UR: CLEAR
CO2 SERPL-SCNC: 23 MMOL/L (ref 23–29)
COLOR UR: YELLOW
CREAT SERPL-MCNC: 1.1 MG/DL (ref 0.5–1.4)
DIFFERENTIAL METHOD: ABNORMAL
EOSINOPHIL # BLD AUTO: 0.1 K/UL (ref 0–0.5)
EOSINOPHIL NFR BLD: 2.4 % (ref 0–8)
ERYTHROCYTE [DISTWIDTH] IN BLOOD BY AUTOMATED COUNT: 15.1 % (ref 11.5–14.5)
EST. GFR  (NO RACE VARIABLE): 51 ML/MIN/1.73 M^2
GLUCOSE SERPL-MCNC: 142 MG/DL (ref 70–110)
GLUCOSE UR QL STRIP: NEGATIVE
HCT VFR BLD AUTO: 32.8 % (ref 37–48.5)
HGB BLD-MCNC: 9.9 G/DL (ref 12–16)
HGB UR QL STRIP: NEGATIVE
IMM GRANULOCYTES # BLD AUTO: 0.02 K/UL (ref 0–0.04)
IMM GRANULOCYTES NFR BLD AUTO: 0.4 % (ref 0–0.5)
KETONES UR QL STRIP: NEGATIVE
LEUKOCYTE ESTERASE UR QL STRIP: NEGATIVE
LYMPHOCYTES # BLD AUTO: 1.2 K/UL (ref 1–4.8)
LYMPHOCYTES NFR BLD: 23 % (ref 18–48)
MCH RBC QN AUTO: 23.5 PG (ref 27–31)
MCHC RBC AUTO-ENTMCNC: 30.2 G/DL (ref 32–36)
MCV RBC AUTO: 78 FL (ref 82–98)
MONOCYTES # BLD AUTO: 0.5 K/UL (ref 0.3–1)
MONOCYTES NFR BLD: 9.3 % (ref 4–15)
NEUTROPHILS # BLD AUTO: 3.4 K/UL (ref 1.8–7.7)
NEUTROPHILS NFR BLD: 63.6 % (ref 38–73)
NITRITE UR QL STRIP: NEGATIVE
NRBC BLD-RTO: 0 /100 WBC
PH UR STRIP: 6 [PH] (ref 5–8)
PLATELET # BLD AUTO: 220 K/UL (ref 150–450)
PMV BLD AUTO: 10.6 FL (ref 9.2–12.9)
POTASSIUM SERPL-SCNC: 4.3 MMOL/L (ref 3.5–5.1)
PROT SERPL-MCNC: 7 G/DL (ref 6–8.4)
PROT UR QL STRIP: NEGATIVE
RBC # BLD AUTO: 4.21 M/UL (ref 4–5.4)
SODIUM SERPL-SCNC: 140 MMOL/L (ref 136–145)
SP GR UR STRIP: 1.01 (ref 1–1.03)
TROPONIN I SERPL DL<=0.01 NG/ML-MCNC: 0.01 NG/ML (ref 0–0.03)
URN SPEC COLLECT METH UR: NORMAL
UROBILINOGEN UR STRIP-ACNC: NEGATIVE EU/DL
WBC # BLD AUTO: 5.35 K/UL (ref 3.9–12.7)

## 2023-06-30 PROCEDURE — 93005 ELECTROCARDIOGRAM TRACING: CPT | Mod: HCNC

## 2023-06-30 PROCEDURE — 36415 COLL VENOUS BLD VENIPUNCTURE: CPT | Mod: HCNC | Performed by: STUDENT IN AN ORGANIZED HEALTH CARE EDUCATION/TRAINING PROGRAM

## 2023-06-30 PROCEDURE — 85025 COMPLETE CBC W/AUTO DIFF WBC: CPT | Mod: HCNC | Performed by: STUDENT IN AN ORGANIZED HEALTH CARE EDUCATION/TRAINING PROGRAM

## 2023-06-30 PROCEDURE — 80053 COMPREHEN METABOLIC PANEL: CPT | Mod: HCNC | Performed by: STUDENT IN AN ORGANIZED HEALTH CARE EDUCATION/TRAINING PROGRAM

## 2023-06-30 PROCEDURE — 82550 ASSAY OF CK (CPK): CPT | Mod: HCNC | Performed by: STUDENT IN AN ORGANIZED HEALTH CARE EDUCATION/TRAINING PROGRAM

## 2023-06-30 PROCEDURE — 93010 EKG 12-LEAD: ICD-10-PCS | Mod: HCNC,,, | Performed by: INTERNAL MEDICINE

## 2023-06-30 PROCEDURE — 81003 URINALYSIS AUTO W/O SCOPE: CPT | Mod: HCNC | Performed by: STUDENT IN AN ORGANIZED HEALTH CARE EDUCATION/TRAINING PROGRAM

## 2023-06-30 PROCEDURE — 25000003 PHARM REV CODE 250: Mod: HCNC | Performed by: STUDENT IN AN ORGANIZED HEALTH CARE EDUCATION/TRAINING PROGRAM

## 2023-06-30 PROCEDURE — 84484 ASSAY OF TROPONIN QUANT: CPT | Mod: HCNC | Performed by: STUDENT IN AN ORGANIZED HEALTH CARE EDUCATION/TRAINING PROGRAM

## 2023-06-30 PROCEDURE — 93010 ELECTROCARDIOGRAM REPORT: CPT | Mod: HCNC,,, | Performed by: INTERNAL MEDICINE

## 2023-06-30 PROCEDURE — 99285 EMERGENCY DEPT VISIT HI MDM: CPT | Mod: 25,HCNC

## 2023-06-30 RX ADMIN — SODIUM CHLORIDE 1000 ML: 9 INJECTION, SOLUTION INTRAVENOUS at 04:06

## 2023-06-30 NOTE — ED PROVIDER NOTES
Encounter Date: 6/30/2023       History     Chief Complaint   Patient presents with    Weakness     PT PRESENTS TO THE ER WITH C/O GENERALIZED WEAKNESS. EMS STATES PT HAS POSITIVE ORTHOSTATIC VITAL SIGNS.      77-year-old female with a history of Alzheimer's dementia, diabetes, hypothyroidism, hypertension presents to the emergency department via EMS with generalized weakness.  Family called 911 the patient feeling weak today.  She apparently had a fall overnight that was unwitnessed.  She is at her mental baseline which is confused and would not normally know the year, date, president.  She does know her name and birthday.  EMS reports to the patient has had a few falls this year.  Family is interested in placing her hand assisted living or skilled nursing.  Patient currently feels fatigued and reports that she normally has diarrhea.  Denies any chest pain, palpitations, neck pain, back pain.  She does endorse some left hip pain. EMS reports that she was orthostatic and they gave her 500 cc of fluids as well as 4 mg of Zofran for nausea.    Daughter is at the bedside. States she has been more weak over the past several months. They have family with her and sitters around the clock. They are working on exploring placement for the patient.      Review of patient's allergies indicates:   Allergen Reactions    Percocet [oxycodone-acetaminophen] Itching    Percodan [oxycodone hcl-oxycodone-asa] Itching     Other reaction(s): Unknown    Latex, natural rubber Rash    Phenytoin sodium extended      Other reaction(s): Unknown    Sutures, catgut      Infections to sutures     Adhesive Rash    Adhesive tape-silicones Rash     Past Medical History:   Diagnosis Date    Abnormal Pap smear     DIONNE (acute kidney injury) 6/29/2014    DIONNE (acute kidney injury) 6/29/2014    Alzheimer's dementia 4/12/2018    Anemia     Breast cancer 1995    left breast    Breast disorder     Diabetes mellitus     Diabetes mellitus, type 2      ESSENTIAL HYPERTENSION 10/1/2012    Hypothyroid 2014    Pneumonia 2020     Past Surgical History:   Procedure Laterality Date    CATARACT EXTRACTION, BILATERAL      COLONOSCOPY N/A 8/10/2020    Procedure: COLONOSCOPY;  Surgeon: Guerda Perales MD;  Location: [facility];  Service: Endoscopy;  Laterality: N/A;    ESOPHAGOGASTRODUODENOSCOPY N/A 8/10/2020    Procedure: EGD (ESOPHAGOGASTRODUODENOSCOPY) WITH BIOPSY;  Surgeon: Guerda Perales MD;  Location: [facility];  Service: Endoscopy;  Laterality: N/A;    HYSTERECTOMY      masectomy      single left breast    MASTECTOMY Left     OOPHORECTOMY  1997    only 1     Family History   Problem Relation Age of Onset    Diabetes Mother     Hypertension Mother     Heart attack Mother 66    Uterine cancer Mother     Diabetes Sister     Heart disease Sister     Heart disease Brother     Hypertension Daughter     Thyroid disease Daughter     Heart attack Son 38    Heart disease Brother     Depression Daughter     Hypertension Son      Social History     Tobacco Use    Smoking status: Former     Packs/day: 1.00     Years: 10.00     Pack years: 10.00     Types: Cigarettes     Quit date: 1988     Years since quittin.5    Smokeless tobacco: Never   Substance Use Topics    Alcohol use: Yes     Alcohol/week: 1.0 standard drink     Types: 1 Glasses of wine per week     Comment: Occ.    Drug use: No     Review of Systems   Unable to perform ROS: Dementia     Physical Exam     Initial Vitals [23 1552]   BP Pulse Resp Temp SpO2   129/60 71 17 97.8 °F (36.6 °C) 98 %      MAP       --         Physical Exam    Nursing note and vitals reviewed.  Constitutional: She appears well-developed. She is not diaphoretic. No distress.   HENT:   Head: Normocephalic and atraumatic.   Right Ear: External ear normal.   Left Ear: External ear normal.   Eyes: Right eye exhibits no discharge. Left eye exhibits no discharge. No scleral icterus.   Neck: Neck supple.    Cardiovascular:  Normal rate and regular rhythm.           Pulmonary/Chest: Breath sounds normal. No stridor. No respiratory distress. She has no wheezes. She has no rhonchi. She has no rales.   Abdominal: Abdomen is soft. There is no abdominal tenderness. There is no guarding.   Musculoskeletal:         General: No edema.      Cervical back: Neck supple. No bony tenderness.      Thoracic back: No bony tenderness.      Lumbar back: No bony tenderness.     Neurological: She is alert.   Knows name, birthday    Able to lift both arms up, both legs off bed with some weakness bilaterally, able to pull forward with arms on the bed   Skin: Skin is warm and dry. Capillary refill takes less than 2 seconds.   Psychiatric: She has a normal mood and affect.       ED Course   Procedures  Labs Reviewed   CBC W/ AUTO DIFFERENTIAL - Abnormal; Notable for the following components:       Result Value    Hemoglobin 9.9 (*)     Hematocrit 32.8 (*)     MCV 78 (*)     MCH 23.5 (*)     MCHC 30.2 (*)     RDW 15.1 (*)     All other components within normal limits   COMPREHENSIVE METABOLIC PANEL - Abnormal; Notable for the following components:    Glucose 142 (*)     eGFR 51 (*)     All other components within normal limits   URINALYSIS, REFLEX TO URINE CULTURE    Narrative:     Specimen Source->Urine   CK   TROPONIN I     EKG Readings: (Independently Interpreted)   Previous EKG: Compared with most recent EKG Previous EKG Date: 2/2/2023.   NSR at 68 bpm. Normal axis. Normal intervals. No STEMI.     Imaging Results              X-Ray Hip 2 or 3 views Left (with Pelvis when performed) (Final result)  Result time 06/30/23 16:34:11      Final result by Reginaldo Arnold MD (06/30/23 16:34:11)                   Impression:      No significant radiographic abnormality of the left hip and pelvis.      Electronically signed by: Reginaldo Arnold MD  Date:    06/30/2023  Time:    16:34               Narrative:    EXAMINATION:  XR HIP WITH PELVIS WHEN  PERFORMED, 2 OR 3 VIEWS LEFT    CLINICAL HISTORY:  Unspecified fall, initial encounter    TECHNIQUE:  AP view of the pelvis and frog leg lateral view of the left hip were performed.    COMPARISON:  None    FINDINGS:  No fracture, subluxation, or other significant abnormality is identified.  Joints and soft tissues are unremarkable.  Degenerative changes are present in the lower lumbar spine.                                       X-Ray Chest AP Portable (Final result)  Result time 06/30/23 16:38:39      Final result by Reginaldo Arnold MD (06/30/23 16:38:39)                   Impression:      No radiographic evidence of active chest disease.      Electronically signed by: Reginaldo Arnold MD  Date:    06/30/2023  Time:    16:38               Narrative:    EXAMINATION:  XR CHEST AP PORTABLE    CLINICAL HISTORY:  Syncope and collapse    TECHNIQUE:  Single frontal view of the chest was performed.    COMPARISON:  06/28/2020    FINDINGS:  Heart size and pulmonary vessels are normal.  The lungs are clear.  No pleural effusion or pneumothorax are identified.  Skeletal structures are intact.  There has been no significant change.                                       CT Head Without Contrast (Final result)  Result time 06/30/23 16:48:26      Final result by Axel King Jr., MD (06/30/23 16:48:26)                   Impression:      Moderate brain atrophy with deep white matter ischemic changes.  Otherwise negative head CT.      Electronically signed by: Axel King MD  Date:    06/30/2023  Time:    16:48               Narrative:    EXAMINATION:  CT HEAD WITHOUT CONTRAST    CLINICAL HISTORY:  Head trauma, minor (Age >= 65y);    TECHNIQUE:  Low dose axial images were obtained through the head.  Coronal and sagittal reformations were also performed. Contrast was not administered.    COMPARISON:  CT head of April 5, 2023    FINDINGS:  No cranial fracture is identified.  Scalp edema or hematoma is not noted.    The  basal cisterns are enlarged but clear and symmetric.  There is no midline shift.  There is moderate enlargement of the lateral ventricles cerebral sulci and sylvian fissures consistent with brain atrophy.  Hypodensity in the central white matter of the cerebrum is consistent with deep white matter ischemic changes.  Focal areas of increased or decreased CT density consistent with tumor, edema, CVA or hemorrhage is not seen.  No intracranial hemorrhage or hematoma is noted.                                       CT Cervical Spine Without Contrast (Final result)  Result time 06/30/23 16:52:01      Final result by Axel King Jr., MD (06/30/23 16:52:01)                   Impression:      Acute fracture or subluxation is not seen.  Chronic degenerative disc disease and spondylosis is noted at C3-4, C4-5, C5-6 and C6-7 with spinal canal and neural foraminal stenosis at multiple levels as described.      Electronically signed by: Axel King MD  Date:    06/30/2023  Time:    16:52               Narrative:    EXAMINATION:  CT CERVICAL SPINE WITHOUT CONTRAST    CLINICAL HISTORY:  Neck trauma (Age >= 65y);    TECHNIQUE:  Low dose axial images, sagittal and coronal reformations were performed though the cervical spine.  Contrast was not administered.    COMPARISON:  None    FINDINGS:  There is reversal of the normal cervical lordosis from C2 to C6.  The vertebral bodies are intact without fracture or compression.  The posterior elements and dens are intact.  No subluxation is seen.    There is disc space narrowing at C3-4, C4-5, C5-6 and C6-7 consistent with degenerative disc disease.    At C3-4 there is disc central disc herniation with spinal canal stenosis.  There is also mild left neural foraminal stenosis.    At C4-5 there is disc herniation to the left of midline and spondylosis with spinal canal and bilateral neural foraminal stenosis.    At C5-6 there is disc herniation and spur formation centrally with  spinal canal and bilateral neural foraminal stenosis.    At C6-7 there is central disc herniation with calcification producing spinal canal and bilateral neural foraminal stenosis.                                       Medications   sodium chloride 0.9% bolus 1,000 mL 1,000 mL (1,000 mLs Intravenous New Bag 6/30/23 6098)     Medical Decision Making:   Differential Diagnosis:   Ddx: UTI, orthostatic hypotension, cardiac dysrhythmia, PNA, head bleed, rhabo, hip fx, cspine fx  ED Management:  Based on the patient's evaluation, the patient appears well for discharge home.  Imaging did not show any acute injuries.  Her ED workup was unremarkable.  Her orthostatic hypotension has resolved with IV fluids.  I personally sent her PCP and  a message on [employer] requesting they help the patient's family with the process of finding placement for the patient  - whether rehab, SNF, or nursing home since they don't know where to start.  Will discharge home and to family's care.  Daughter is in agreement with the plan.                        Clinical Impression:   Final diagnoses:  [W19.XXXA] Fall (Primary)  [R55] Syncope  [I95.1] Orthostatic hypotension  [M25.552] Acute pain of left hip        ED Disposition Condition    Discharge Stable          ED Prescriptions    None       Follow-up Information       Follow up With Specialties Details Why Contact Info    Sirena Coleman MD Internal Medicine Schedule an appointment as soon as possible for a visit in 1 week  [address]  [facility] [address]  [phone]               Vinod Estevez DO  06/30/23 5968     Anesthesia Type: 1% lidocaine with epinephrine

## 2023-07-03 ENCOUNTER — TELEPHONE (OUTPATIENT)
Dept: INTERNAL MEDICINE | Facility: CLINIC | Age: 78
End: 2023-07-03
Payer: MEDICARE

## 2023-07-03 ENCOUNTER — PATIENT OUTREACH (OUTPATIENT)
Dept: EMERGENCY MEDICINE | Facility: HOSPITAL | Age: 78
End: 2023-07-03
Payer: MEDICARE

## 2023-07-03 NOTE — TELEPHONE ENCOUNTER
Spoke to patient's daughter Pauline, to advise of scheduled appt 7/13 @ 1:15p to discuss concerns w/provider.  She verbalized confirmation.

## 2023-07-03 NOTE — PROGRESS NOTES
I spoke with daughter Marily who requested assistance with scheduling a post ED 7-day follow up with Dr Coleman for her mother Narda. Ms Calixto states that they would like assistance with getting her mom in a skilled nursing facility since her dementia has gotten out of control and her and her brother are having trouble keeping and eye on her with her falls and walking around the house giving herself insulin without anyone knowing.

## 2023-07-03 NOTE — TELEPHONE ENCOUNTER
----- Message from Kathy Desai MA sent at 7/3/2023  9:34 AM CDT -----  Regarding: ER follow up  Good morning, Pt needs a Post ED 7-day appt.  I do not see an appt available within that time frame.  Can you assist in getting this patient scheduled and advise of appt date and time so that I can contact the patient's daughter Marily ([phone]) .      Daughter Marily states that they would like assistance with getting her mom in a skilled nursing facility since her dementia has gotten out of control and her and her brother are having trouble keeping and eye on her with her falls and walking around the house giving herself insulin without anyone knowing.

## 2023-07-05 DIAGNOSIS — R41.3 MEMORY LOSS: ICD-10-CM

## 2023-07-05 NOTE — PROGRESS NOTES
Per chart Dr Coleman staff have spoken with daughter Marily and scheduled her ER follow up for 7/13/23 at 1:15 pm. Patient's daughter will receive an appointment reminder ([phone])

## 2023-07-05 NOTE — TELEPHONE ENCOUNTER
Medication phoned in during your absence.      Requested Prescriptions     Pending Prescriptions Disp Refills    donepeziL (ARICEPT) 10 MG tablet [Pharmacy Med Name: Donepezil HCl 10 MG Oral Tablet] 90 tablet 0     Sig: TAKE 1 TABLET BY MOUTH ONCE DAILY IN THE EVENING

## 2023-07-10 RX ORDER — DONEPEZIL HYDROCHLORIDE 10 MG/1
TABLET, FILM COATED ORAL
Qty: 90 TABLET | Refills: 0 | Status: SHIPPED | OUTPATIENT
Start: 2023-07-10 | End: 2024-01-08

## 2023-07-12 ENCOUNTER — PATIENT OUTREACH (OUTPATIENT)
Dept: EMERGENCY MEDICINE | Facility: HOSPITAL | Age: 78
End: 2023-07-12
Payer: MEDICARE

## 2023-07-12 NOTE — PROGRESS NOTES
ED Navigator reminded the patient's daughter Marily of scheduled appointment with Dr Coleman on 7/13/23 at 1:15 pm. Patient's daughter Marily agreed to appointment date and time. Follow up encounter closed.

## 2023-07-13 ENCOUNTER — OFFICE VISIT (OUTPATIENT)
Dept: INTERNAL MEDICINE | Facility: CLINIC | Age: 78
End: 2023-07-13
Payer: MEDICARE

## 2023-07-13 VITALS
HEART RATE: 86 BPM | HEIGHT: 62 IN | OXYGEN SATURATION: 97 % | RESPIRATION RATE: 16 BRPM | BODY MASS INDEX: 36.58 KG/M2 | DIASTOLIC BLOOD PRESSURE: 70 MMHG | SYSTOLIC BLOOD PRESSURE: 126 MMHG

## 2023-07-13 DIAGNOSIS — E11.3599 PROLIFERATIVE DIABETIC RETINOPATHY ASSOCIATED WITH TYPE 2 DIABETES MELLITUS, UNSPECIFIED LATERALITY, UNSPECIFIED PROLIFERATIVE RETINOPATHY TYPE: ICD-10-CM

## 2023-07-13 DIAGNOSIS — E66.01 SEVERE OBESITY (BMI 35.0-39.9) WITH COMORBIDITY: ICD-10-CM

## 2023-07-13 DIAGNOSIS — I70.0 AORTIC ATHEROSCLEROSIS: ICD-10-CM

## 2023-07-13 DIAGNOSIS — D33.3 VESTIBULAR SCHWANNOMA: ICD-10-CM

## 2023-07-13 DIAGNOSIS — N18.31 CHRONIC KIDNEY DISEASE, STAGE 3A: ICD-10-CM

## 2023-07-13 DIAGNOSIS — D50.9 IRON DEFICIENCY ANEMIA, UNSPECIFIED IRON DEFICIENCY ANEMIA TYPE: ICD-10-CM

## 2023-07-13 DIAGNOSIS — E11.65 UNCONTROLLED TYPE 2 DIABETES MELLITUS WITH HYPERGLYCEMIA: ICD-10-CM

## 2023-07-13 DIAGNOSIS — R53.81 DEBILITY: ICD-10-CM

## 2023-07-13 DIAGNOSIS — R29.6 RECURRENT FALLS: Primary | ICD-10-CM

## 2023-07-13 DIAGNOSIS — J84.10 CALCIFIED GRANULOMA OF LUNG: ICD-10-CM

## 2023-07-13 PROCEDURE — 99214 OFFICE O/P EST MOD 30 MIN: CPT | Mod: HCNC,S$GLB,, | Performed by: INTERNAL MEDICINE

## 2023-07-13 PROCEDURE — 3288F PR FALLS RISK ASSESSMENT DOCUMENTED: ICD-10-PCS | Mod: HCNC,CPTII,S$GLB, | Performed by: INTERNAL MEDICINE

## 2023-07-13 PROCEDURE — 3074F PR MOST RECENT SYSTOLIC BLOOD PRESSURE < 130 MM HG: ICD-10-PCS | Mod: HCNC,CPTII,S$GLB, | Performed by: INTERNAL MEDICINE

## 2023-07-13 PROCEDURE — 3078F PR MOST RECENT DIASTOLIC BLOOD PRESSURE < 80 MM HG: ICD-10-PCS | Mod: HCNC,CPTII,S$GLB, | Performed by: INTERNAL MEDICINE

## 2023-07-13 PROCEDURE — 1159F PR MEDICATION LIST DOCUMENTED IN MEDICAL RECORD: ICD-10-PCS | Mod: HCNC,CPTII,S$GLB, | Performed by: INTERNAL MEDICINE

## 2023-07-13 PROCEDURE — 1100F PR PT FALLS ASSESS DOC 2+ FALLS/FALL W/INJURY/YR: ICD-10-PCS | Mod: HCNC,CPTII,S$GLB, | Performed by: INTERNAL MEDICINE

## 2023-07-13 PROCEDURE — 99999 PR PBB SHADOW E&M-EST. PATIENT-LVL V: ICD-10-PCS | Mod: PBBFAC,HCNC,, | Performed by: INTERNAL MEDICINE

## 2023-07-13 PROCEDURE — 3074F SYST BP LT 130 MM HG: CPT | Mod: HCNC,CPTII,S$GLB, | Performed by: INTERNAL MEDICINE

## 2023-07-13 PROCEDURE — 1126F PR PAIN SEVERITY QUANTIFIED, NO PAIN PRESENT: ICD-10-PCS | Mod: HCNC,CPTII,S$GLB, | Performed by: INTERNAL MEDICINE

## 2023-07-13 PROCEDURE — 99999 PR PBB SHADOW E&M-EST. PATIENT-LVL V: CPT | Mod: PBBFAC,HCNC,, | Performed by: INTERNAL MEDICINE

## 2023-07-13 PROCEDURE — 1126F AMNT PAIN NOTED NONE PRSNT: CPT | Mod: HCNC,CPTII,S$GLB, | Performed by: INTERNAL MEDICINE

## 2023-07-13 PROCEDURE — 3078F DIAST BP <80 MM HG: CPT | Mod: HCNC,CPTII,S$GLB, | Performed by: INTERNAL MEDICINE

## 2023-07-13 PROCEDURE — 1100F PTFALLS ASSESS-DOCD GE2>/YR: CPT | Mod: HCNC,CPTII,S$GLB, | Performed by: INTERNAL MEDICINE

## 2023-07-13 PROCEDURE — 3288F FALL RISK ASSESSMENT DOCD: CPT | Mod: HCNC,CPTII,S$GLB, | Performed by: INTERNAL MEDICINE

## 2023-07-13 PROCEDURE — 1159F MED LIST DOCD IN RCRD: CPT | Mod: HCNC,CPTII,S$GLB, | Performed by: INTERNAL MEDICINE

## 2023-07-13 PROCEDURE — 99214 PR OFFICE/OUTPT VISIT, EST, LEVL IV, 30-39 MIN: ICD-10-PCS | Mod: HCNC,S$GLB,, | Performed by: INTERNAL MEDICINE

## 2023-07-13 RX ORDER — FERROUS SULFATE 325(65) MG
325 TABLET, DELAYED RELEASE (ENTERIC COATED) ORAL DAILY
Qty: 90 TABLET | Refills: 1 | Status: SHIPPED | OUTPATIENT
Start: 2023-07-13 | End: 2024-01-08

## 2023-07-13 NOTE — PROGRESS NOTES
HPI:  Narda Person is a 77 y.o. female here for Hospital Follow Up     Weakness       PT PRESENTS TO THE ER WITH C/O GENERALIZED WEAKNESS. EMS STATES PT HAS POSITIVE ORTHOSTATIC VITAL SIGNS.       77-year-old female with a history of Alzheimer's dementia, diabetes, hypothyroidism, hypertension presents to the emergency department via EMS with generalized weakness.  Family called 911 the patient feeling weak today.  She apparently had a fall overnight that was unwitnessed.  She is at her mental baseline which is confused and would not normally know the year, date, president.  She does know her name and birthday.  EMS reports to the patient has had a few falls this year.  Family is interested in placing her hand assisted living or skilled nursing.  Patient currently feels fatigued and reports that she normally has diarrhea.  Denies any chest pain, palpitations, neck pain, back pain.  She does endorse some left hip pain. EMS reports that she was orthostatic and they gave her 500 cc of fluids as well as 4 mg of Zofran for nausea.     Daughter is at the bedside. States she has been more weak over the past several months. They have family with her and sitters around the clock. They are working on exploring placement for the patient.              Review of patient's allergies indicates:   Allergen Reactions    Percocet [oxycodone-acetaminophen] Itching    Percodan [oxycodone hcl-oxycodone-asa] Itching       Other reaction(s): Unknown    Latex, natural rubber Rash    Phenytoin sodium extended         Other reaction(s): Unknown    Sutures, catgut         Infections to sutures     Adhesive Rash    Adhesive tape-silicones Rash           Past Medical History:   Diagnosis Date    Abnormal Pap smear      DIONNE (acute kidney injury) 6/29/2014    DIONNE (acute kidney injury) 6/29/2014    Alzheimer's dementia 4/12/2018    Anemia      Breast cancer 1995     left breast    Breast disorder      Diabetes mellitus      Diabetes mellitus,  type 2      ESSENTIAL HYPERTENSION 10/1/2012    Hypothyroid 2014    Pneumonia 2020            Past Surgical History:   Procedure Laterality Date    CATARACT EXTRACTION, BILATERAL        COLONOSCOPY N/A 8/10/2020     Procedure: COLONOSCOPY;  Surgeon: Guerda Perales MD;  Location: [facility];  Service: Endoscopy;  Laterality: N/A;    ESOPHAGOGASTRODUODENOSCOPY N/A 8/10/2020     Procedure: EGD (ESOPHAGOGASTRODUODENOSCOPY) WITH BIOPSY;  Surgeon: Guerda Perales MD;  Location: [facility];  Service: Endoscopy;  Laterality: N/A;    HYSTERECTOMY        masectomy         single left breast    MASTECTOMY Left      OOPHORECTOMY   1997     only 1            Family History   Problem Relation Age of Onset    Diabetes Mother      Hypertension Mother      Heart attack Mother 66    Uterine cancer Mother      Diabetes Sister      Heart disease Sister      Heart disease Brother      Hypertension Daughter      Thyroid disease Daughter      Heart attack Son 38    Heart disease Brother      Depression Daughter      Hypertension Son        Social History            Tobacco Use    Smoking status: Former       Packs/day: 1.00       Years: 10.00       Pack years: 10.00       Types: Cigarettes       Quit date: 1988       Years since quittin.5    Smokeless tobacco: Never   Substance Use Topics    Alcohol use: Yes       Alcohol/week: 1.0 standard drink       Types: 1 Glasses of wine per week       Comment: Occ.    Drug use: No      Review of Systems   Unable to perform ROS: Dementia      Physical Exam             Initial Vitals [23 1552]   BP Pulse Resp Temp SpO2   129/60 71 17 97.8 °F (36.6 °C) 98 %       MAP           --              Physical Exam     Nursing note and vitals reviewed.  Constitutional: She appears well-developed. She is not diaphoretic. No distress.   HENT:   Head: Normocephalic and atraumatic.   Right Ear: External ear normal.   Left Ear: External ear normal.   Eyes: Right eye exhibits no  discharge. Left eye exhibits no discharge. No scleral icterus.   Neck: Neck supple.   Cardiovascular:  Normal rate and regular rhythm.           Pulmonary/Chest: Breath sounds normal. No stridor. No respiratory distress. She has no wheezes. She has no rhonchi. She has no rales.   Abdominal: Abdomen is soft. There is no abdominal tenderness. There is no guarding.   Musculoskeletal:         General: No edema.      Cervical back: Neck supple. No bony tenderness.      Thoracic back: No bony tenderness.      Lumbar back: No bony tenderness.      Neurological: She is alert.   Knows name, birthday     Able to lift both arms up, both legs off bed with some weakness bilaterally, able to pull forward with arms on the bed   Skin: Skin is warm and dry. Capillary refill takes less than 2 seconds.   Psychiatric: She has a normal mood and affect.         ED Course   Procedures        Labs Reviewed   CBC W/ AUTO DIFFERENTIAL - Abnormal; Notable for the following components:       Result Value      Hemoglobin 9.9 (*)       Hematocrit 32.8 (*)       MCV 78 (*)       MCH 23.5 (*)       MCHC 30.2 (*)       RDW 15.1 (*)       All other components within normal limits   COMPREHENSIVE METABOLIC PANEL - Abnormal; Notable for the following components:     Glucose 142 (*)       eGFR 51 (*)       All other components within normal limits   URINALYSIS, REFLEX TO URINE CULTURE     Narrative:      Specimen Source->Urine   CK   TROPONIN I      EKG Readings: (Independently Interpreted)   Previous EKG: Compared with most recent EKG Previous EKG Date: 2/2/2023.   NSR at 68 bpm. Normal axis. Normal intervals. No STEMI.      Imaging Results                  X-Ray Hip 2 or 3 views Left (with Pelvis when performed) (Final result)  Result time 06/30/23 16:34:11            Final result by Reginaldo Arnold MD (06/30/23 16:34:11)                           Impression:        No significant radiographic abnormality of the left hip and pelvis.         Electronically signed by:        Reginaldo Arnold MD  Date:                                        06/30/2023  Time:                                                16:34                     Narrative:     EXAMINATION:  XR HIP WITH PELVIS WHEN PERFORMED, 2 OR 3 VIEWS LEFT     CLINICAL HISTORY:  Unspecified fall, initial encounter     TECHNIQUE:  AP view of the pelvis and frog leg lateral view of the left hip were performed.     COMPARISON:  None     FINDINGS:  No fracture, subluxation, or other significant abnormality is identified.  Joints and soft tissues are unremarkable.  Degenerative changes are present in the lower lumbar spine.                                                X-Ray Chest AP Portable (Final result)  Result time 06/30/23 16:38:39            Final result by Reginaldo Arnold MD (06/30/23 16:38:39)                           Impression:        No radiographic evidence of active chest disease.        Electronically signed by:        Reginaldo Arnold MD  Date:                                        06/30/2023  Time:                                                16:38                     Narrative:     EXAMINATION:  XR CHEST AP PORTABLE     CLINICAL HISTORY:  Syncope and collapse     TECHNIQUE:  Single frontal view of the chest was performed.     COMPARISON:  06/28/2020     FINDINGS:  Heart size and pulmonary vessels are normal.  The lungs are clear.  No pleural effusion or pneumothorax are identified.  Skeletal structures are intact.  There has been no significant change.                                                CT Head Without Contrast (Final result)  Result time 06/30/23 16:48:26            Final result by Axel King Jr., MD (06/30/23 16:48:26)                           Impression:        Moderate brain atrophy with deep white matter ischemic changes.  Otherwise negative head CT.        Electronically signed by:        Axel King MD  Date:                                         06/30/2023  Time:                                                16:48                     Narrative:     EXAMINATION:  CT HEAD WITHOUT CONTRAST     CLINICAL HISTORY:  Head trauma, minor (Age >= 65y);     TECHNIQUE:  Low dose axial images were obtained through the head.  Coronal and sagittal reformations were also performed. Contrast was not administered.     COMPARISON:  CT head of April 5, 2023     FINDINGS:  No cranial fracture is identified.  Scalp edema or hematoma is not noted.     The basal cisterns are enlarged but clear and symmetric.  There is no midline shift.  There is moderate enlargement of the lateral ventricles cerebral sulci and sylvian fissures consistent with brain atrophy.  Hypodensity in the central white matter of the cerebrum is consistent with deep white matter ischemic changes.  Focal areas of increased or decreased CT density consistent with tumor, edema, CVA or hemorrhage is not seen.  No intracranial hemorrhage or hematoma is noted.                                                CT Cervical Spine Without Contrast (Final result)  Result time 06/30/23 16:52:01            Final result by Axel King Jr., MD (06/30/23 16:52:01)                           Impression:        Acute fracture or subluxation is not seen.  Chronic degenerative disc disease and spondylosis is noted at C3-4, C4-5, C5-6 and C6-7 with spinal canal and neural foraminal stenosis at multiple levels as described.        Electronically signed by:        Axel King MD  Date:                                        06/30/2023  Time:                                                16:52                     Narrative:     EXAMINATION:  CT CERVICAL SPINE WITHOUT CONTRAST     CLINICAL HISTORY:  Neck trauma (Age >= 65y);     TECHNIQUE:  Low dose axial images, sagittal and coronal reformations were performed though the cervical spine.  Contrast was not administered.     COMPARISON:  None     FINDINGS:  There is reversal of  the normal cervical lordosis from C2 to C6.  The vertebral bodies are intact without fracture or compression.  The posterior elements and dens are intact.  No subluxation is seen.     There is disc space narrowing at C3-4, C4-5, C5-6 and C6-7 consistent with degenerative disc disease.     At C3-4 there is disc central disc herniation with spinal canal stenosis.  There is also mild left neural foraminal stenosis.     At C4-5 there is disc herniation to the left of midline and spondylosis with spinal canal and bilateral neural foraminal stenosis.     At C5-6 there is disc herniation and spur formation centrally with spinal canal and bilateral neural foraminal stenosis.     At C6-7 there is central disc herniation with calcification producing spinal canal and bilateral neural foraminal stenosis.                                               Medications   sodium chloride 0.9% bolus 1,000 mL 1,000 mL (1,000 mLs Intravenous New Bag 6/30/23 1320)      Medical Decision Making:   Differential Diagnosis:   Ddx: UTI, orthostatic hypotension, cardiac dysrhythmia, PNA, head bleed, rhabo, hip fx, cspine fx  ED Management:  Based on the patient's evaluation, the patient appears well for discharge home.  Imaging did not show any acute injuries.  Her ED workup was unremarkable.  Her orthostatic hypotension has resolved with IV fluids.  I personally sent her PCP and  a message on [employer] requesting they help the patient's family with the process of finding placement for the patient  - whether rehab, SNF, or nursing home since they don't know where to start.  Will discharge home and to family's care.  Daughter is in agreement with the plan.                    Clinical Impression:   Final diagnoses:  [W19.XXXA] Fall (Primary)  [R55] Syncope  [I95.1] Orthostatic hypotension  [M25.552] Acute pain of left hip       ED Disposition Condition     Discharge Stable          7/13/23  Meds reconciled  Problem list reviewed and  updated  Discharge summary reviewed  Discharge labs and inpatient radiology reports reviewed           Review of Systems   Constitutional:  Negative for chills and fever.   HENT:  Negative for hearing loss.    Eyes:  Negative for photophobia.   Respiratory:  Negative for cough, choking, chest tightness, shortness of breath and wheezing.    Cardiovascular:  Negative for chest pain and palpitations.   Gastrointestinal:  Negative for blood in stool, nausea and vomiting.   Genitourinary:  Negative for dysuria and hematuria.   Musculoskeletal:  Negative for arthralgias and myalgias.   Skin:  Negative for pallor.   Neurological:  Negative for dizziness and numbness.   Hematological:  Does not bruise/bleed easily.   Psychiatric/Behavioral:  Negative for confusion and suicidal ideas. The patient is not nervous/anxious.     A review of systems was performed and is negative except as noted above.    Objective:  Vitals:      Physical Exam  Vitals and nursing note reviewed.   Constitutional:       Appearance: She is well-developed.   HENT:      Head: Normocephalic and atraumatic.      Right Ear: External ear normal.      Left Ear: External ear normal.   Eyes:      Conjunctiva/sclera: Conjunctivae normal.      Pupils: Pupils are equal, round, and reactive to light.   Neck:      Thyroid: No thyromegaly.      Vascular: No JVD.      Trachea: No tracheal deviation.   Cardiovascular:      Rate and Rhythm: Normal rate and regular rhythm.      Heart sounds: Normal heart sounds.   Pulmonary:      Effort: Pulmonary effort is normal. No respiratory distress.      Breath sounds: Normal breath sounds. No wheezing or rales.   Chest:      Chest wall: No tenderness.   Abdominal:      General: Bowel sounds are normal. There is no distension.      Palpations: Abdomen is soft. There is no mass.      Tenderness: There is no abdominal tenderness. There is no guarding or rebound.   Musculoskeletal:         General: Normal range of motion.       "Cervical back: Normal range of motion and neck supple.   Lymphadenopathy:      Cervical: No cervical adenopathy.   Skin:     General: Skin is warm and dry.   Neurological:      Mental Status: She is alert and oriented to person, place, and time.      Cranial Nerves: No cranial nerve deficit.      Motor: No abnormal muscle tone.      Coordination: Coordination normal.      Deep Tendon Reflexes: Reflexes are normal and symmetric. Reflexes normal.      Comments: CN: Optic discs are flat with normal vasculature, PERRL, Extraoccular movements and visual fields are full. Normal facial sensation and strength, Hearing symmetric, Tongue and Palate are midline and strong. Shoulder Shrug symmetric and strong.        Assessment/Plan:  1. Recurrent falls  -     Ambulatory referral/consult to Home Health; Future; Expected date: 07/14/2023  She needs home PT   To get stronger     2. Vestibular schwannoma  She has seen neurology     3. Uncontrolled type 2 diabetes mellitus with hyperglycemia  Check a1c in 4 weeks.    Patient has uncontrolled Diabetes .  We discussed about diet ;low in calories. Avoid sweats, sodas.  Also increasing activity;walking 2-3 miles a day.  I also adjusted medications and gave patient  instructions about adherence to plan.  Goal of  A1c  less than 7 % stressed.  Also goal of LDL less than 70 highlighted to patient.   4. Aortic atherosclerosis  Overview:  /26/17 CT Abdomen "Calcified atheromatous disease affects the aorta and its major branch vessels."        5. Calcified granuloma of lung  Overview:  7/25/14 CT Abdomen "There is a calcified granuloma in the right lung base."         6. Proliferative diabetic retinopathy associated with type 2 diabetes mellitus, unspecified laterality, unspecified proliferative retinopathy type  Seeing eye doctor   Received shots in eye   She stopped them   7. Chronic kidney disease, stage 3a  Monitor BUN/SCr.  Work up for secondary complications of CKD (e.g., anemia, " secondary HPT).  Renal Ultrasound.  Avoid non-steroidal anti-inflammatory medications.   8. Severe obesity (BMI 35.0-39.9) with comorbidity    # The patient is asked to make an attempt to improve diet and exercise patterns to aid in medical management of this problem.     # Eat  5 small meals a day.     # Cut out high carbohydrate  foods : bread, rice, pasta, potatoes.     # Exercise/walk 5x/week for at least 30-45  minutes.       9. Iron deficiency anemia, unspecified iron deficiency anemia type  -     ferrous sulfate 325 (65 FE) MG EC tablet; Take 1 tablet (325 mg total) by mouth once daily.  Dispense: 90 tablet; Refill: 1  May need GI referral   Will check CBC cmp in 4 weeks.    10. Debility  -     Ambulatory referral/consult to Home Health; Future; Expected date: 07/14/2023

## 2023-07-18 PROCEDURE — G0180 PR HOME HEALTH MD CERTIFICATION: ICD-10-PCS | Mod: ,,, | Performed by: INTERNAL MEDICINE

## 2023-07-18 PROCEDURE — G0180 MD CERTIFICATION HHA PATIENT: HCPCS | Mod: ,,, | Performed by: INTERNAL MEDICINE

## 2023-07-28 DIAGNOSIS — R42 VERTIGO: ICD-10-CM

## 2023-07-28 NOTE — TELEPHONE ENCOUNTER
Care Due:                  Date            Visit Type   Department     Provider  --------------------------------------------------------------------------------                                EP -                              PRIMARY      [facility]  Last Visit: 07-      CARE ([facility])   MEDICINE II    Sirena Coleman                              EP -                              PRIMARY      [facility]  Next Visit: 08-      CARE ([facility])   MEDICINE II    Sirena Coleman                                                            Last  Test          Frequency    Reason                     Performed    Due Date  --------------------------------------------------------------------------------    HBA1C.......  6 months...  VANITA PEDRAZA,          09- 03-                             insulin, metFORMIN.......    [employer] Embedded Care Due Messages. Reference number: [id number].   7/28/2023 1:18:13 PM CDT

## 2023-07-28 NOTE — TELEPHONE ENCOUNTER
----- Message from Karla Herrera sent at 2023 12:48 PM CDT -----  Contact: joe/daughter  Narda Person  MRN: [id number]  : 1945  PCP: Sirena Coleman  Home Phone      [phone]  Work Phone      Not on file.  Mobile          [phone]      MESSAGE:   Rx refill     meclizine (ANTIVERT) 25 mg tablet    90 day    LOV:2023    [facility]    Please forward to provider in clinic, daughter inquiring if this is something they can get OTC as well.    [phone]

## 2023-07-28 NOTE — TELEPHONE ENCOUNTER
Medication called in during your absence.    LOV 7/13/2023  Scheduled visit 8/9/2023    Requested Prescriptions     Pending Prescriptions Disp Refills    meclizine (ANTIVERT) 25 mg tablet 90 tablet 0     Sig: Take 1 tablet by mouth three times daily as needed for dizziness or nausea

## 2023-07-31 RX ORDER — MECLIZINE HYDROCHLORIDE 25 MG/1
TABLET ORAL
Qty: 90 TABLET | Refills: 0 | Status: SHIPPED | OUTPATIENT
Start: 2023-07-31 | End: 2023-11-30

## 2023-08-09 ENCOUNTER — TELEPHONE (OUTPATIENT)
Dept: INTERNAL MEDICINE | Facility: CLINIC | Age: 78
End: 2023-08-09

## 2023-08-09 ENCOUNTER — EXTERNAL HOME HEALTH (OUTPATIENT)
Dept: HOME HEALTH SERVICES | Facility: HOSPITAL | Age: 78
End: 2023-08-09
Payer: MEDICARE

## 2023-08-09 ENCOUNTER — DOCUMENT SCAN (OUTPATIENT)
Dept: HOME HEALTH SERVICES | Facility: HOSPITAL | Age: 78
End: 2023-08-09
Payer: MEDICARE

## 2023-08-09 NOTE — TELEPHONE ENCOUNTER
----- Message from Marii Albarran sent at 2023 11:41 AM CDT -----  Contact: Barbi Person  MRN: [id number]  : 1945  PCP: Sirena Coleman  Home Phone      [phone]  Work Phone      Not on file.  Mobile          [phone]      MESSAGE: Barbi called to cancel/ reschedule her moms appt due to Barbi is sick and can't get her mom to the appt. Barbi states her mom is really doing good. the Home health nurse and Lavell Meeks states she is doing better. they tried getting blood work but Lucius couldn't get any but if you need to put orders in for next week, she can try to draw again she hasn't fallen and has more energy.   Please note In chart why the appt was canceled    Barbi [phone] if you have any questions

## 2023-08-09 NOTE — TELEPHONE ENCOUNTER
Reason for cancelled appt noted. Lab orders faxed to [facility] (to be drawn @ next nurse visit).

## 2023-08-11 ENCOUNTER — DOCUMENT SCAN (OUTPATIENT)
Dept: HOME HEALTH SERVICES | Facility: HOSPITAL | Age: 78
End: 2023-08-11
Payer: MEDICARE

## 2023-08-14 ENCOUNTER — PATIENT MESSAGE (OUTPATIENT)
Dept: ADMINISTRATIVE | Facility: HOSPITAL | Age: 78
End: 2023-08-14
Payer: MEDICARE

## 2023-08-14 ENCOUNTER — LAB VISIT (OUTPATIENT)
Dept: LAB | Facility: HOSPITAL | Age: 78
End: 2023-08-14
Attending: INTERNAL MEDICINE
Payer: MEDICARE

## 2023-08-14 DIAGNOSIS — E11.00 DM (DIABETES MELLITUS), TYPE 2, UNCONTROLLED, WITH HYPEROSMOLARITY: Primary | ICD-10-CM

## 2023-08-14 DIAGNOSIS — N18.31 STAGE 3A CHRONIC KIDNEY DISEASE: ICD-10-CM

## 2023-08-14 DIAGNOSIS — D50.9 IRON DEFICIENCY ANEMIA: ICD-10-CM

## 2023-08-14 LAB
ALBUMIN SERPL BCP-MCNC: 3.9 G/DL (ref 3.5–5.2)
ALP SERPL-CCNC: 70 U/L (ref 55–135)
ALT SERPL W/O P-5'-P-CCNC: 18 U/L (ref 10–44)
ANION GAP SERPL CALC-SCNC: 11 MMOL/L (ref 8–16)
AST SERPL-CCNC: 19 U/L (ref 10–40)
BASOPHILS # BLD AUTO: 0.07 K/UL (ref 0–0.2)
BASOPHILS NFR BLD: 1.3 % (ref 0–1.9)
BILIRUB SERPL-MCNC: 0.4 MG/DL (ref 0.1–1)
BUN SERPL-MCNC: 12 MG/DL (ref 8–23)
CALCIUM SERPL-MCNC: 9.5 MG/DL (ref 8.7–10.5)
CHLORIDE SERPL-SCNC: 103 MMOL/L (ref 95–110)
CO2 SERPL-SCNC: 25 MMOL/L (ref 23–29)
CREAT SERPL-MCNC: 0.8 MG/DL (ref 0.5–1.4)
DIFFERENTIAL METHOD: ABNORMAL
EOSINOPHIL # BLD AUTO: 0.1 K/UL (ref 0–0.5)
EOSINOPHIL NFR BLD: 2.3 % (ref 0–8)
ERYTHROCYTE [DISTWIDTH] IN BLOOD BY AUTOMATED COUNT: 17 % (ref 11.5–14.5)
EST. GFR  (NO RACE VARIABLE): >60 ML/MIN/1.73 M^2
GLUCOSE SERPL-MCNC: 138 MG/DL (ref 70–110)
HCT VFR BLD AUTO: 35.5 % (ref 37–48.5)
HGB BLD-MCNC: 10.8 G/DL (ref 12–16)
IMM GRANULOCYTES # BLD AUTO: 0.01 K/UL (ref 0–0.04)
IMM GRANULOCYTES NFR BLD AUTO: 0.2 % (ref 0–0.5)
LYMPHOCYTES # BLD AUTO: 1.6 K/UL (ref 1–4.8)
LYMPHOCYTES NFR BLD: 30.3 % (ref 18–48)
MCH RBC QN AUTO: 23.6 PG (ref 27–31)
MCHC RBC AUTO-ENTMCNC: 30.4 G/DL (ref 32–36)
MCV RBC AUTO: 78 FL (ref 82–98)
MONOCYTES # BLD AUTO: 0.5 K/UL (ref 0.3–1)
MONOCYTES NFR BLD: 10.2 % (ref 4–15)
NEUTROPHILS # BLD AUTO: 2.9 K/UL (ref 1.8–7.7)
NEUTROPHILS NFR BLD: 55.7 % (ref 38–73)
NRBC BLD-RTO: 0 /100 WBC
PLATELET # BLD AUTO: 253 K/UL (ref 150–450)
PMV BLD AUTO: 11.1 FL (ref 9.2–12.9)
POTASSIUM SERPL-SCNC: 3.9 MMOL/L (ref 3.5–5.1)
PROT SERPL-MCNC: 7.3 G/DL (ref 6–8.4)
RBC # BLD AUTO: 4.57 M/UL (ref 4–5.4)
SODIUM SERPL-SCNC: 139 MMOL/L (ref 136–145)
WBC # BLD AUTO: 5.21 K/UL (ref 3.9–12.7)

## 2023-08-14 PROCEDURE — 80053 COMPREHEN METABOLIC PANEL: CPT | Mod: HCNC

## 2023-08-14 PROCEDURE — 85025 COMPLETE CBC W/AUTO DIFF WBC: CPT | Mod: HCNC

## 2023-08-14 PROCEDURE — 83036 HEMOGLOBIN GLYCOSYLATED A1C: CPT | Mod: HCNC

## 2023-08-15 LAB
ESTIMATED AVG GLUCOSE: 157 MG/DL (ref 68–131)
HBA1C MFR BLD: 7.1 % (ref 4–5.6)

## 2023-08-22 DIAGNOSIS — F41.9 ANXIETY AND DEPRESSION: ICD-10-CM

## 2023-08-22 DIAGNOSIS — F32.A ANXIETY AND DEPRESSION: ICD-10-CM

## 2023-08-22 RX ORDER — ESCITALOPRAM OXALATE 10 MG/1
10 TABLET ORAL DAILY
Qty: 90 TABLET | Refills: 0 | Status: SHIPPED | OUTPATIENT
Start: 2023-08-22 | End: 2023-11-30

## 2023-08-22 NOTE — TELEPHONE ENCOUNTER
----- Message from Marii Albarran sent at 2023  2:38 PM CDT -----  Contact: Barbi  Narda Person  MRN: [id number]  : 1945  PCP: Sirena Coleman  Home Phone      [phone]  Work Phone      Not on file.  Mobile          [phone]      MESSAGE: Barbi state the pt needs the following prescription refilled    EScitalopram oxalate (LEXAPRO) 10 MG     [facility] JASON BEVERLY [address] [address]  Phone: [phone] Fax: [phone]  Hours: Not open 24 hours    [phone]    
Care Due:                  Date            Visit Type   Department     Provider  --------------------------------------------------------------------------------                                EP -                              PRIMARY      [facility]  Last Visit: 07-      CARE ([facility])   MEDICINE AMY Coleman  Next Visit: None Scheduled  None         None Found                                                            Last  Test          Frequency    Reason                     Performed    Due Date  --------------------------------------------------------------------------------    Lipid Panel.  12 months..  atorvastatin.............  09- 08-    [facility] Embedded Care Due Messages. Reference number: [id number].   8/22/2023 2:57:18 PM CDT  
86

## 2023-09-18 DIAGNOSIS — Z76.0 ENCOUNTER FOR MEDICATION REFILL: ICD-10-CM

## 2023-09-18 DIAGNOSIS — E11.69 TYPE 2 DIABETES MELLITUS WITH OTHER SPECIFIED COMPLICATION, WITH LONG-TERM CURRENT USE OF INSULIN: ICD-10-CM

## 2023-09-18 DIAGNOSIS — Z79.4 TYPE 2 DIABETES MELLITUS WITH OTHER SPECIFIED COMPLICATION, WITH LONG-TERM CURRENT USE OF INSULIN: ICD-10-CM

## 2023-09-18 RX ORDER — METFORMIN HYDROCHLORIDE 1000 MG/1
TABLET ORAL
Qty: 180 TABLET | Refills: 1 | Status: SHIPPED | OUTPATIENT
Start: 2023-09-18

## 2023-09-18 RX ORDER — OMEPRAZOLE 20 MG/1
CAPSULE, DELAYED RELEASE ORAL
Qty: 180 CAPSULE | Refills: 3 | Status: SHIPPED | OUTPATIENT
Start: 2023-09-18

## 2023-09-18 RX ORDER — SITAGLIPTIN 100 MG/1
TABLET, FILM COATED ORAL
Qty: 90 TABLET | Refills: 1 | Status: SHIPPED | OUTPATIENT
Start: 2023-09-18

## 2023-09-18 NOTE — TELEPHONE ENCOUNTER
No care due was identified.  [facility] Embedded Care Due Messages. Reference number: [id number].   9/18/2023 10:59:16 AM CDT

## 2023-09-19 RX ORDER — GABAPENTIN 300 MG/1
300 CAPSULE ORAL 2 TIMES DAILY
Qty: 180 CAPSULE | Refills: 0 | Status: SHIPPED | OUTPATIENT
Start: 2023-09-19 | End: 2024-01-04

## 2023-09-19 NOTE — TELEPHONE ENCOUNTER
Refill Routing Note   Medication(s) are not appropriate for processing by [facility] for the following reason(s):      Medication outside of protocol    [facility] action(s):  Route  Approve Care Due:  None identified          Appointments  past 12m or future 3m with PCP    Date Provider   Last Visit   7/13/2023 Sirena Coleman MD   Next Visit   Visit date not found Sirena Coleman MD   ED visits in past 90 days: 1        Note composed:8:11 PM 09/18/2023

## 2023-10-11 ENCOUNTER — PATIENT OUTREACH (OUTPATIENT)
Dept: ADMINISTRATIVE | Facility: HOSPITAL | Age: 78
End: 2023-10-11
Payer: MEDICARE

## 2023-10-13 DIAGNOSIS — I10 ESSENTIAL HYPERTENSION: ICD-10-CM

## 2023-10-13 NOTE — TELEPHONE ENCOUNTER
No care due was identified.  [facility] Embedded Care Due Messages. Reference number: [id number].   10/13/2023 3:01:15 PM CDT

## 2023-10-14 RX ORDER — RAMIPRIL 5 MG/1
5 CAPSULE ORAL
Qty: 90 CAPSULE | Refills: 2 | Status: SHIPPED | OUTPATIENT
Start: 2023-10-14

## 2023-10-14 NOTE — TELEPHONE ENCOUNTER
Refill Decision Note   Narda Naya  is requesting a refill authorization.  Brief Assessment and Rationale for Refill:  Approve     Medication Therapy Plan:         Comments:     Note composed:7:19 AM 10/14/2023

## 2023-11-27 ENCOUNTER — DOCUMENT SCAN (OUTPATIENT)
Dept: HOME HEALTH SERVICES | Facility: HOSPITAL | Age: 78
End: 2023-11-27
Payer: MEDICARE

## 2023-11-29 ENCOUNTER — OFFICE VISIT (OUTPATIENT)
Dept: HOME HEALTH SERVICES | Facility: CLINIC | Age: 78
End: 2023-11-29
Payer: MEDICARE

## 2023-11-29 VITALS
RESPIRATION RATE: 16 BRPM | OXYGEN SATURATION: 98 % | HEART RATE: 78 BPM | DIASTOLIC BLOOD PRESSURE: 62 MMHG | SYSTOLIC BLOOD PRESSURE: 91 MMHG | TEMPERATURE: 97 F

## 2023-11-29 DIAGNOSIS — D33.3 VESTIBULAR SCHWANNOMA: ICD-10-CM

## 2023-11-29 DIAGNOSIS — Z99.3 DEPENDENCE ON WHEELCHAIR: ICD-10-CM

## 2023-11-29 DIAGNOSIS — I70.0 AORTIC ATHEROSCLEROSIS: ICD-10-CM

## 2023-11-29 DIAGNOSIS — F02.83 DEMENTIA IN OTHER DISEASES CLASSIFIED ELSEWHERE, UNSPECIFIED SEVERITY, WITH MOOD DISTURBANCE: ICD-10-CM

## 2023-11-29 DIAGNOSIS — R42 VERTIGO: ICD-10-CM

## 2023-11-29 DIAGNOSIS — F41.9 ANXIETY AND DEPRESSION: ICD-10-CM

## 2023-11-29 DIAGNOSIS — Z00.00 ENCOUNTER FOR PREVENTIVE HEALTH EXAMINATION: Primary | ICD-10-CM

## 2023-11-29 DIAGNOSIS — G30.9 ALZHEIMER'S DISEASE, UNSPECIFIED (CODE): ICD-10-CM

## 2023-11-29 DIAGNOSIS — J84.10 CALCIFIED GRANULOMA OF LUNG: ICD-10-CM

## 2023-11-29 DIAGNOSIS — N18.31 CHRONIC KIDNEY DISEASE, STAGE 3A: ICD-10-CM

## 2023-11-29 DIAGNOSIS — E11.3599 PROLIFERATIVE DIABETIC RETINOPATHY ASSOCIATED WITH TYPE 2 DIABETES MELLITUS, UNSPECIFIED LATERALITY, UNSPECIFIED PROLIFERATIVE RETINOPATHY TYPE: ICD-10-CM

## 2023-11-29 DIAGNOSIS — E11.65 UNCONTROLLED TYPE 2 DIABETES MELLITUS WITH HYPERGLYCEMIA: ICD-10-CM

## 2023-11-29 DIAGNOSIS — F32.A ANXIETY AND DEPRESSION: ICD-10-CM

## 2023-11-29 DIAGNOSIS — E66.01 SEVERE OBESITY (BMI 35.0-39.9) WITH COMORBIDITY: ICD-10-CM

## 2023-11-29 PROCEDURE — 1159F MED LIST DOCD IN RCRD: CPT | Mod: CPTII,S$GLB,, | Performed by: NURSE PRACTITIONER

## 2023-11-29 PROCEDURE — 1160F RVW MEDS BY RX/DR IN RCRD: CPT | Mod: CPTII,S$GLB,, | Performed by: NURSE PRACTITIONER

## 2023-11-29 PROCEDURE — 3288F FALL RISK ASSESSMENT DOCD: CPT | Mod: CPTII,S$GLB,, | Performed by: NURSE PRACTITIONER

## 2023-11-29 PROCEDURE — G0439 PPPS, SUBSEQ VISIT: HCPCS | Mod: S$GLB,,, | Performed by: NURSE PRACTITIONER

## 2023-11-29 PROCEDURE — 3078F DIAST BP <80 MM HG: CPT | Mod: CPTII,S$GLB,, | Performed by: NURSE PRACTITIONER

## 2023-11-29 PROCEDURE — 3074F SYST BP LT 130 MM HG: CPT | Mod: CPTII,S$GLB,, | Performed by: NURSE PRACTITIONER

## 2023-11-29 PROCEDURE — 1100F PTFALLS ASSESS-DOCD GE2>/YR: CPT | Mod: CPTII,S$GLB,, | Performed by: NURSE PRACTITIONER

## 2023-11-29 NOTE — PROGRESS NOTES
Narda Person presented for a  Medicare AWV and comprehensive Health Risk Assessment today. The following components were reviewed and updated:    Medical history  Family History  Social history  Allergies and Current Medications  Health Risk Assessment  Health Maintenance  Care Team         ** See Completed Assessments for Annual Wellness Visit within the encounter summary.**         The following assessments were completed:  Living Situation  CAGE  Depression Screening  Timed Get Up and Go  Whisper Test  Cognitive Function Screening  Nutrition Screening  ADL Screening  PAQ Screening        Vitals:    11/29/23 1514   BP: 91/62   Pulse: 78   Resp: 16   Temp: 97.2 °F (36.2 °C)   TempSrc: Temporal   SpO2: 98%     There is no height or weight on file to calculate BMI.  Physical Exam  Vitals reviewed.   Constitutional:       General: She is not in acute distress.     Appearance: She is obese. She is not ill-appearing.   HENT:      Head: Normocephalic and atraumatic.      Nose: Nose normal.      Mouth/Throat:      Mouth: Mucous membranes are moist.   Eyes:      Pupils: Pupils are equal, round, and reactive to light.   Cardiovascular:      Rate and Rhythm: Normal rate and regular rhythm.      Pulses: Normal pulses.      Heart sounds: Murmur heard.   Pulmonary:      Effort: Pulmonary effort is normal.      Breath sounds: Normal breath sounds.   Abdominal:      General: Bowel sounds are normal.      Palpations: Abdomen is soft.   Musculoskeletal:         General: No swelling. Normal range of motion.      Cervical back: Normal range of motion.   Skin:     General: Skin is warm and dry.   Neurological:      Mental Status: She is alert. Mental status is at baseline.      Motor: Weakness present.      Comments: forgetful   Psychiatric:         Mood and Affect: Mood normal.         Behavior: Behavior normal.               Diagnoses and health risks identified today and associated recommendations/orders:    1. Encounter for  preventive health examination  Assessments completed.   recommendations reviewed.  F/u with PCP as instructed.     Patient is not on chronic opioids.   Risk factors reviewed for any potential opioid use disorder   Pain evaluated during visit.  Current treatment plan documented.  Will refer to specialist, as appropriate.      2. Chronic kidney disease, stage 3a  Chronic, stable on current regimen. Followed by PCP    3. Proliferative diabetic retinopathy associated with type 2 diabetes mellitus, unspecified laterality, unspecified proliferative retinopathy type  Chronic, stable on current regimen. Followed by PCP      4. Calcified granuloma of lung  Chronic, stable on current regimen. Followed by PCP    5. Aortic atherosclerosis  Chronic, stable on current regimen. Followed by PCP    6. Alzheimer's disease, unspecified (CODE)  Chronic, stable on current regimen. Followed by neurology    7. Dementia in other diseases classified elsewhere, unspecified severity, with mood disturbance  Chronic, stable on current regimen. Followed by neurology    8. Severe obesity (BMI 35.0-39.9) with comorbidity  Chronic, stable on current regimen. Followed by PCP    9. Vestibular schwannoma  Chronic, stable on current regimen. Followed by PCP    10. Uncontrolled type 2 diabetes mellitus with hyperglycemia  Chronic, stable on current regimen. Followed by endocrinology    11. 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 Recommendations were developed using the USPSTF age appropriate recommendations. Education, counseling, and referrals were provided as needed. After Visit Summary printed and given to patient which includes a list of additional screenings\tests needed.    Follow up in about 1 year (around 11/29/2024) for Medicare AWV.    Lisseth Duncan NP  I offered to discuss advanced care planning, including how to pick a person who  would make decisions for you if you were unable to make them for yourself, called a health care power of , and what kind of decisions you might make such as use of life sustaining treatments such as ventilators and tube feeding when faced with a life limiting illness recorded on a living will that they will need to know. (How you want to be cared for as you near the end of your natural life)     X  Patient is unwilling to engage in a discussion regarding advance directives at this time.

## 2023-11-29 NOTE — TELEPHONE ENCOUNTER
Care Due:                  Date            Visit Type   Department     Provider  --------------------------------------------------------------------------------                                EP -                              PRIMARY      [facility]  Last Visit: 07-      CARE ([facility])   MEDICINE AMY Coleman  Next Visit: None Scheduled  None         None Found                                                            Last  Test          Frequency    Reason                     Performed    Due Date  --------------------------------------------------------------------------------    HBA1C.......  6 months...  SITagliptinVANITA,      08-   02-                             insulin, metFORMIN.......    Lipid Panel.  12 months..  atorvastatin.............  09- 08-    Health [facility] Embedded Care Due Messages. Reference number: [id number].   11/29/2023 5:18:37 PM CST

## 2023-11-30 RX ORDER — ESCITALOPRAM OXALATE 10 MG/1
10 TABLET ORAL
Qty: 90 TABLET | Refills: 0 | Status: SHIPPED | OUTPATIENT
Start: 2023-11-30 | End: 2024-01-05

## 2023-11-30 RX ORDER — MECLIZINE HYDROCHLORIDE 25 MG/1
TABLET ORAL
Qty: 90 TABLET | Refills: 0 | Status: SHIPPED | OUTPATIENT
Start: 2023-11-30 | End: 2024-01-04

## 2023-11-30 NOTE — TELEPHONE ENCOUNTER
Refill Routing Note   Medication(s) are not appropriate for processing by [facility] for the following reason(s):        Drug-drug interaction  Outside of protocol    [facility] action(s):  Defer  Route     Requires labs : Yes      Medication Therapy Plan: Drug-Drug: donepeziL and EScitalopram oxalate      Appointments  past 12m or future 3m with PCP    Date Provider   Last Visit   7/13/2023 Sirena Coleman MD   Next Visit   Visit date not found Sirena Coleman MD   ED visits in past 90 days: 0        Note composed:12:28 PM 11/30/2023

## 2024-01-04 DIAGNOSIS — Z76.0 ENCOUNTER FOR MEDICATION REFILL: ICD-10-CM

## 2024-01-04 DIAGNOSIS — F41.9 ANXIETY AND DEPRESSION: ICD-10-CM

## 2024-01-04 DIAGNOSIS — R41.3 MEMORY LOSS: ICD-10-CM

## 2024-01-04 DIAGNOSIS — R42 VERTIGO: ICD-10-CM

## 2024-01-04 DIAGNOSIS — D50.9 IRON DEFICIENCY ANEMIA, UNSPECIFIED IRON DEFICIENCY ANEMIA TYPE: ICD-10-CM

## 2024-01-04 DIAGNOSIS — F32.A ANXIETY AND DEPRESSION: ICD-10-CM

## 2024-01-04 RX ORDER — GABAPENTIN 300 MG/1
300 CAPSULE ORAL 2 TIMES DAILY
Qty: 180 CAPSULE | Refills: 0 | Status: SHIPPED | OUTPATIENT
Start: 2024-01-04

## 2024-01-04 RX ORDER — MECLIZINE HYDROCHLORIDE 25 MG/1
TABLET ORAL
Qty: 90 TABLET | Refills: 0 | Status: SHIPPED | OUTPATIENT
Start: 2024-01-04

## 2024-01-04 NOTE — TELEPHONE ENCOUNTER
No care due was identified.  [facility] Embedded Care Due Messages. Reference number: [id number].   1/04/2024 3:45:51 PM CST

## 2024-01-04 NOTE — TELEPHONE ENCOUNTER
No care due was identified.  [facility] Embedded Care Due Messages. Reference number: [id number].   1/04/2024 3:46:25 PM CST

## 2024-01-05 RX ORDER — ESCITALOPRAM OXALATE 10 MG/1
10 TABLET ORAL
Qty: 90 TABLET | Refills: 1 | Status: SHIPPED | OUTPATIENT
Start: 2024-01-05

## 2024-01-05 NOTE — TELEPHONE ENCOUNTER
Refill Routing Note   Medication(s) are not appropriate for processing by [facility] for the following reason(s):        Outside of protocol    [facility] action(s):  Route  Approve             Pharmacist review requested: Yes   Extended chart review required: Yes     Appointments  past 12m or future 3m with PCP    Date Provider   Last Visit   7/13/2023 Sirena Coleman MD   Next Visit   1/4/2024 Sirena Coleman MD   ED visits in past 90 days: 0        Note composed:1:01 PM 01/05/2024

## 2024-01-05 NOTE — TELEPHONE ENCOUNTER
Refill Routing Note   Medication(s) are not appropriate for processing by [facility] for the following reason(s):        Drug-disease interaction  Outside of protocol    [facility] action(s):  Defer  Route        Medication Therapy Plan: donepeziL and EScitalopram oxalate    Pharmacist review requested: Yes     Appointments  past 12m or future 3m with PCP    Date Provider   Last Visit   7/13/2023 Sirena Coleman MD   Next Visit   1/4/2024 Sirena Coleman MD   ED visits in past 90 days: 0        Note composed:10:56 AM 01/05/2024

## 2024-01-08 RX ORDER — DONEPEZIL HYDROCHLORIDE 10 MG/1
TABLET, FILM COATED ORAL
Qty: 90 TABLET | Refills: 0 | Status: SHIPPED | OUTPATIENT
Start: 2024-01-08

## 2024-01-08 RX ORDER — FERROUS SULFATE 325(65) MG
TABLET, DELAYED RELEASE (ENTERIC COATED) ORAL
Qty: 90 TABLET | Refills: 0 | Status: SHIPPED | OUTPATIENT
Start: 2024-01-08

## 2024-01-08 RX ORDER — MECLIZINE HYDROCHLORIDE 25 MG/1
TABLET ORAL
Qty: 90 TABLET | Refills: 0 | Status: SHIPPED | OUTPATIENT
Start: 2024-01-08

## 2024-01-21 PROBLEM — F02.83 DEMENTIA IN OTHER DISEASES CLASSIFIED ELSEWHERE, UNSPECIFIED SEVERITY, WITH MOOD DISTURBANCE: Status: ACTIVE | Noted: 2024-01-21

## 2024-01-21 NOTE — PATIENT INSTRUCTIONS
Counseling and Referral of Other Preventative  (Italic type indicates deductible and co-insurance are waived)    Patient Name: Narda Person  Today's Date: 1/21/2024    Health Maintenance       Date Due Completion Date    Shingles Vaccine (1 of 2) Never done ---    RSV Vaccine (Age 60+ and Pregnant patients) (1 - 1-dose 60+ series) Never done ---    Eye Exam 06/12/2019 6/12/2018    DEXA Scan 04/23/2021 4/23/2018    Diabetes Urine Screening 04/14/2022 4/14/2021    Influenza Vaccine (1) 09/01/2023 11/1/2021    COVID-19 Vaccine (3 - 2023-24 season) 09/01/2023 5/12/2021    Lipid Panel 09/01/2023 9/1/2022    Hemoglobin A1c 02/14/2024 8/14/2023    TETANUS VACCINE 05/17/2025 5/17/2015        No orders of the defined types were placed in this encounter.    The following information is provided to all patients.  This information is to help you find resources for any of the problems found today that may be affecting your health:                  Living healthy guide: www.[facility].[place].gov      Understanding Diabetes: www.diabetes.org      Eating healthy: www.cdc.gov/healthyweight      CDC home safety checklist: www.cdc.gov/steadi/patient.html      Agency on Aging: www.goea.[place].[facility]      Alcoholics anonymous (AA): www.aa.org      Physical Activity: www.david.nih.gov/rs7gntl      Tobacco use: www.quitwithusla.org

## 2024-04-22 DIAGNOSIS — E11.65 UNCONTROLLED TYPE 2 DIABETES MELLITUS WITH HYPERGLYCEMIA: Primary | ICD-10-CM

## 2024-04-22 NOTE — TELEPHONE ENCOUNTER
Care Due:                  Date            Visit Type   Department     Provider  --------------------------------------------------------------------------------                                EP -                              PRIMARY      [facility]  Last Visit: 07-      CARE ([facility])   MEDICINE AMY Coleman  Next Visit: None Scheduled  None         None Found                                                            Last  Test          Frequency    Reason                     Performed    Due Date  --------------------------------------------------------------------------------    HBA1C.......  6 months...  SITagliptinVANITA,      08-   02-                             insulin, metFORMIN.......    Lipid Panel.  12 months..  atorvastatin.............  09- 08-    Health [facility] Embedded Care Due Messages. Reference number: [id number].   4/22/2024 7:02:48 AM CDT

## 2024-04-23 NOTE — TELEPHONE ENCOUNTER
Refill Routing Note   Medication(s) are not appropriate for processing by [facility] for the following reason(s):        Required labs outdated    [facility] action(s):  Defer     Requires labs : Yes             Appointments  past 12m or future 3m with PCP    Date Provider   Last Visit   7/13/2023 Sirena Coleman MD   Next Visit   Visit date not found Sirena Coleman MD   ED visits in past 90 days: 0        Note composed:11:15 PM 04/22/2024

## 2024-05-14 ENCOUNTER — PATIENT MESSAGE (OUTPATIENT)
Dept: ADMINISTRATIVE | Facility: HOSPITAL | Age: 79
End: 2024-05-14
Payer: MEDICARE

## 2024-06-03 ENCOUNTER — TELEPHONE (OUTPATIENT)
Dept: INTERNAL MEDICINE | Facility: CLINIC | Age: 79
End: 2024-06-03
Payer: MEDICARE

## 2024-06-03 NOTE — TELEPHONE ENCOUNTER
----- Message from Marii Deion sent at 6/3/2024  1:11 PM CDT -----  Contact: Barbi Person  MRN: [id number]  : 1945  PCP: Sirena Coleman  Home Phone      [phone]  Work Phone      Not on file.  Mobile          [phone]      MESSAGE: Barbi Brand left a message on the phone during lunch stating she really needed to talk to dr. Coleman's nurse.       [phone]

## 2024-06-03 NOTE — TELEPHONE ENCOUNTER
Patient has not been seen in clinic since 7/13/23, so she is past due for follow up. Barbi states that the patient has become w/c bound due to severe mobility issues, bladder and bowel incontinence. Transporting her here would be very difficult. She has not had any labs done recently. She would benefit from home health services, but needs a visit with Dr. Coleman in order to meet criteria for home health admission. I suggested a virtual visit would be easier than F2F. She will have someone assist her with setting up her patient portal and then call back to schedule an appt.

## 2024-06-19 ENCOUNTER — PATIENT OUTREACH (OUTPATIENT)
Dept: ADMINISTRATIVE | Facility: HOSPITAL | Age: 79
End: 2024-06-19
Payer: MEDICARE

## 2024-06-19 NOTE — PROGRESS NOTES
Spoke with Ms Narda daughter Pauline in reference to having labs and office visit  Daughter stated that it was impossible getting her mother out of the house to an office visit  She will have her other sister call and see if home health can come to house for labs

## 2024-06-25 ENCOUNTER — PATIENT OUTREACH (OUTPATIENT)
Dept: ADMINISTRATIVE | Facility: HOSPITAL | Age: 79
End: 2024-06-25
Payer: MEDICARE

## 2024-06-25 NOTE — PROGRESS NOTES
Spoke with daughter trying to set up virtual visit to have Home Health   Unable to schedule office visit due to mother's health

## 2024-06-27 ENCOUNTER — OFFICE VISIT (OUTPATIENT)
Dept: INTERNAL MEDICINE | Facility: CLINIC | Age: 79
End: 2024-06-27
Payer: MEDICARE

## 2024-06-27 DIAGNOSIS — F02.83 DEMENTIA IN OTHER DISEASES CLASSIFIED ELSEWHERE, UNSPECIFIED SEVERITY, WITH MOOD DISTURBANCE: ICD-10-CM

## 2024-06-27 DIAGNOSIS — J84.10 CALCIFIED GRANULOMA OF LUNG: ICD-10-CM

## 2024-06-27 DIAGNOSIS — E11.65 UNCONTROLLED TYPE 2 DIABETES MELLITUS WITH HYPERGLYCEMIA: ICD-10-CM

## 2024-06-27 DIAGNOSIS — E66.01 SEVERE OBESITY (BMI 35.0-39.9) WITH COMORBIDITY: ICD-10-CM

## 2024-06-27 DIAGNOSIS — I70.0 AORTIC ATHEROSCLEROSIS: ICD-10-CM

## 2024-06-27 DIAGNOSIS — E11.3599 PROLIFERATIVE DIABETIC RETINOPATHY ASSOCIATED WITH TYPE 2 DIABETES MELLITUS, UNSPECIFIED LATERALITY, UNSPECIFIED PROLIFERATIVE RETINOPATHY TYPE: ICD-10-CM

## 2024-06-27 DIAGNOSIS — G30.9 ALZHEIMER'S DISEASE, UNSPECIFIED (CODE): ICD-10-CM

## 2024-06-27 DIAGNOSIS — N18.31 CHRONIC KIDNEY DISEASE, STAGE 3A: ICD-10-CM

## 2024-06-27 DIAGNOSIS — D33.3 VESTIBULAR SCHWANNOMA: ICD-10-CM

## 2024-06-27 NOTE — PROGRESS NOTES
"HPI:  Narda Person is a 78 y.o. female here for No chief complaint on file.  Family requesting home health .  She has not been seen since last year ; no recent labs .    Virtual audio visit.  Daughter reports patient basically wheel chair to bed bound .  Difficult to bring for appointments     10 minutes          Review of Systems   Constitutional:  Negative for activity change, fatigue, fever and unexpected weight change.   HENT:  Negative for congestion, ear pain, hearing loss, rhinorrhea and sore throat.    Eyes:  Negative for redness and visual disturbance.   Respiratory:  Negative for cough, shortness of breath and wheezing.    Cardiovascular:  Negative for chest pain, palpitations and leg swelling.   Gastrointestinal:  Negative for abdominal pain, constipation, diarrhea, nausea and vomiting.   Genitourinary:  Negative for dysuria, frequency and urgency.   Musculoskeletal:  Negative for back pain, joint swelling, myalgias and neck pain.   Skin:  Negative for color change, pallor, rash and wound.   Neurological:  Positive for weakness. Negative for dizziness, tremors, light-headedness and headaches. Syncope: chronic, walks with cane.  Psychiatric/Behavioral:  Negative for confusion. The patient is not nervous/anxious.     A review of systems was performed and is negative except as noted above.    Objective:  There were no vitals filed for this visit.   Physical Exam     Assessment/Plan:  1. Calcified granuloma of lung  Overview:  7/25/14 CT Abdomen "There is a calcified granuloma in the right lung base."       Not an active issue at this moment     2. Proliferative diabetic retinopathy associated with type 2 diabetes mellitus, unspecified laterality, unspecified proliferative retinopathy type  Needs better control .  Lab Results   Component Value Date    HGBA1C 7.1 (H) 08/14/2023     Needs labs    3. Severe obesity (BMI 35.0-39.9) with comorbidity  Will have home helath monitor weight     4. Vestibular " "schwannoma  Chronic issue .    5. Alzheimer's disease, unspecified (CODE)  Slowly progressive .  On aricept     6. Chronic kidney disease, stage 3a  RESOLVED   BMP  Lab Results   Component Value Date     08/14/2023    K 3.9 08/14/2023     08/14/2023    CO2 25 08/14/2023    BUN 12 08/14/2023    CREATININE 0.8 08/14/2023    CALCIUM 9.5 08/14/2023    ANIONGAP 11 08/14/2023    EGFRNORACEVR >60 08/14/2023       7. Uncontrolled type 2 diabetes mellitus with hyperglycemia  Patient has uncontrolled Diabetes .  Goal of  A1c  less than 7 %.  Chcek labs   8. Aortic atherosclerosis  Overview:  /26/17 CT Abdomen "Calcified atheromatous disease affects the aorta and its major branch vessels."    Continue statin       9. Dementia in other diseases classified elsewhere, unspecified severity, with mood disturbance  -     Ambulatory referral/consult to Home Health; Future; Expected date: 06/28/2024       "

## 2024-07-08 ENCOUNTER — LAB VISIT (OUTPATIENT)
Dept: LAB | Facility: HOSPITAL | Age: 79
End: 2024-07-08
Attending: INTERNAL MEDICINE
Payer: MEDICARE

## 2024-07-08 DIAGNOSIS — E07.9 DISEASE OF THYROID GLAND: ICD-10-CM

## 2024-07-08 DIAGNOSIS — E11.65 INADEQUATELY CONTROLLED DIABETES MELLITUS: ICD-10-CM

## 2024-07-08 DIAGNOSIS — R42 VERTIGO: ICD-10-CM

## 2024-07-08 DIAGNOSIS — N18.31 CHRONIC KIDNEY DISEASE (CKD) STAGE G3A/A1, MODERATELY DECREASED GLOMERULAR FILTRATION RATE (GFR) BETWEEN 45-59 ML/MIN/1.73 SQUARE METER AND ALBUMINURIA CREATININE RATIO LESS THAN 30 MG/G: Primary | ICD-10-CM

## 2024-07-08 LAB
ALBUMIN SERPL BCP-MCNC: 3.6 G/DL (ref 3.5–5.2)
ALBUMIN/CREAT UR: 11.5 UG/MG (ref 0–30)
ALP SERPL-CCNC: 65 U/L (ref 55–135)
ALT SERPL W/O P-5'-P-CCNC: 14 U/L (ref 10–44)
ANION GAP SERPL CALC-SCNC: 10 MMOL/L (ref 8–16)
AST SERPL-CCNC: 17 U/L (ref 10–40)
BACTERIA #/AREA URNS HPF: ABNORMAL /HPF
BASOPHILS # BLD AUTO: 0.09 K/UL (ref 0–0.2)
BASOPHILS NFR BLD: 1.3 % (ref 0–1.9)
BILIRUB SERPL-MCNC: 0.4 MG/DL (ref 0.1–1)
BILIRUB UR QL STRIP: NEGATIVE
BUN SERPL-MCNC: 22 MG/DL (ref 8–23)
CALCIUM SERPL-MCNC: 9.5 MG/DL (ref 8.7–10.5)
CHLORIDE SERPL-SCNC: 102 MMOL/L (ref 95–110)
CHOLEST SERPL-MCNC: 229 MG/DL (ref 120–199)
CHOLEST/HDLC SERPL: 5.7 {RATIO} (ref 2–5)
CLARITY UR: CLEAR
CO2 SERPL-SCNC: 25 MMOL/L (ref 23–29)
COLOR UR: YELLOW
CREAT SERPL-MCNC: 0.9 MG/DL (ref 0.5–1.4)
CREAT UR-MCNC: 157.2 MG/DL (ref 15–325)
DIFFERENTIAL METHOD BLD: NORMAL
EOSINOPHIL # BLD AUTO: 0.2 K/UL (ref 0–0.5)
EOSINOPHIL NFR BLD: 3 % (ref 0–8)
ERYTHROCYTE [DISTWIDTH] IN BLOOD BY AUTOMATED COUNT: 12.7 % (ref 11.5–14.5)
EST. GFR  (NO RACE VARIABLE): >60 ML/MIN/1.73 M^2
ESTIMATED AVG GLUCOSE: 183 MG/DL (ref 68–131)
GLUCOSE SERPL-MCNC: 212 MG/DL (ref 70–110)
GLUCOSE UR QL STRIP: ABNORMAL
HBA1C MFR BLD: 8 % (ref 4–5.6)
HCT VFR BLD AUTO: 42.1 % (ref 37–48.5)
HDLC SERPL-MCNC: 40 MG/DL (ref 40–75)
HDLC SERPL: 17.5 % (ref 20–50)
HGB BLD-MCNC: 14.4 G/DL (ref 12–16)
HGB UR QL STRIP: NEGATIVE
HYALINE CASTS #/AREA URNS LPF: 4 /LPF
IMM GRANULOCYTES # BLD AUTO: 0.02 K/UL (ref 0–0.04)
IMM GRANULOCYTES NFR BLD AUTO: 0.3 % (ref 0–0.5)
KETONES UR QL STRIP: ABNORMAL
LDLC SERPL CALC-MCNC: 152.6 MG/DL (ref 63–159)
LEUKOCYTE ESTERASE UR QL STRIP: NEGATIVE
LYMPHOCYTES # BLD AUTO: 2.1 K/UL (ref 1–4.8)
LYMPHOCYTES NFR BLD: 30 % (ref 18–48)
MCH RBC QN AUTO: 29.9 PG (ref 27–31)
MCHC RBC AUTO-ENTMCNC: 34.2 G/DL (ref 32–36)
MCV RBC AUTO: 88 FL (ref 82–98)
MICROALBUMIN UR DL<=1MG/L-MCNC: 18 UG/ML
MICROSCOPIC COMMENT: ABNORMAL
MONOCYTES # BLD AUTO: 0.5 K/UL (ref 0.3–1)
MONOCYTES NFR BLD: 7.7 % (ref 4–15)
NEUTROPHILS # BLD AUTO: 4 K/UL (ref 1.8–7.7)
NEUTROPHILS NFR BLD: 57.7 % (ref 38–73)
NITRITE UR QL STRIP: NEGATIVE
NONHDLC SERPL-MCNC: 189 MG/DL
NRBC BLD-RTO: 0 /100 WBC
PH UR STRIP: 5 [PH] (ref 5–8)
PLATELET # BLD AUTO: 251 K/UL (ref 150–450)
PMV BLD AUTO: 10.9 FL (ref 9.2–12.9)
POTASSIUM SERPL-SCNC: 3.9 MMOL/L (ref 3.5–5.1)
PROT SERPL-MCNC: 7.1 G/DL (ref 6–8.4)
PROT UR QL STRIP: NEGATIVE
RBC # BLD AUTO: 4.81 M/UL (ref 4–5.4)
SODIUM SERPL-SCNC: 137 MMOL/L (ref 136–145)
SP GR UR STRIP: 1.02 (ref 1–1.03)
TRIGL SERPL-MCNC: 182 MG/DL (ref 30–150)
TSH SERPL DL<=0.005 MIU/L-ACNC: 3.8 UIU/ML (ref 0.4–4)
URN SPEC COLLECT METH UR: ABNORMAL
UROBILINOGEN UR STRIP-ACNC: NEGATIVE EU/DL
WBC # BLD AUTO: 6.91 K/UL (ref 3.9–12.7)
YEAST URNS QL MICRO: ABNORMAL

## 2024-07-08 PROCEDURE — 84443 ASSAY THYROID STIM HORMONE: CPT | Mod: HCNC

## 2024-07-08 PROCEDURE — 85025 COMPLETE CBC W/AUTO DIFF WBC: CPT | Mod: HCNC

## 2024-07-08 PROCEDURE — 81000 URINALYSIS NONAUTO W/SCOPE: CPT | Mod: HCNC | Performed by: INTERNAL MEDICINE

## 2024-07-08 PROCEDURE — 82043 UR ALBUMIN QUANTITATIVE: CPT | Mod: HCNC | Performed by: INTERNAL MEDICINE

## 2024-07-08 PROCEDURE — 83036 HEMOGLOBIN GLYCOSYLATED A1C: CPT | Mod: HCNC

## 2024-07-08 PROCEDURE — 80053 COMPREHEN METABOLIC PANEL: CPT | Mod: HCNC

## 2024-07-08 PROCEDURE — 82570 ASSAY OF URINE CREATININE: CPT | Mod: HCNC | Performed by: INTERNAL MEDICINE

## 2024-07-08 PROCEDURE — 80061 LIPID PANEL: CPT | Mod: HCNC

## 2024-07-08 PROCEDURE — G0180 MD CERTIFICATION HHA PATIENT: HCPCS | Mod: ,,, | Performed by: INTERNAL MEDICINE

## 2024-07-08 NOTE — TELEPHONE ENCOUNTER
Care Due:                  Date            Visit Type   Department     Provider  --------------------------------------------------------------------------------                                ESTABLISHED                              PATIENT -    [facility] INTER[facility]  Last Visit: 06-      [facility]      MEDICINE II    Sirena Coleman  Next Visit: None Scheduled  None         None Found                                                            Last  Test          Frequency    Reason                     Performed    Due Date  --------------------------------------------------------------------------------    HBA1C.......  6 months...  SITagliptinVANITA,      08-   02-                             insulin, metFORMIN.......    [facility] [employer] Embedded Care Due Messages. Reference number: [id number].   7/08/2024 2:01:50 PM CDT

## 2024-07-09 RX ORDER — MECLIZINE HYDROCHLORIDE 25 MG/1
TABLET ORAL
Qty: 90 TABLET | Refills: 0 | Status: SHIPPED | OUTPATIENT
Start: 2024-07-09

## 2024-07-20 DIAGNOSIS — E11.65 UNCONTROLLED TYPE 2 DIABETES MELLITUS WITH HYPERGLYCEMIA: ICD-10-CM

## 2024-07-20 RX ORDER — SITAGLIPTIN 100 MG/1
100 TABLET, FILM COATED ORAL
Qty: 90 TABLET | Refills: 1 | Status: SHIPPED | OUTPATIENT
Start: 2024-07-20

## 2024-07-20 NOTE — TELEPHONE ENCOUNTER
No care due was identified.  Health [facility] Embedded Care Due Messages. Reference number: [id number].   7/20/2024 7:02:45 AM CDT

## 2024-07-20 NOTE — TELEPHONE ENCOUNTER
Refill Decision Note   Narda Naya  is requesting a refill authorization.  Brief Assessment and Rationale for Refill:  Approve     Medication Therapy Plan:         Comments:     Note composed:11:40 AM 07/20/2024

## 2024-07-25 DIAGNOSIS — Z76.0 ENCOUNTER FOR MEDICATION REFILL: ICD-10-CM

## 2024-07-25 RX ORDER — ATORVASTATIN CALCIUM 20 MG/1
20 TABLET, FILM COATED ORAL DAILY
Qty: 90 TABLET | Refills: 3 | Status: SHIPPED | OUTPATIENT
Start: 2024-07-25

## 2024-07-25 NOTE — TELEPHONE ENCOUNTER
No care due was identified.  Health [facility] Embedded Care Due Messages. Reference number: [id number].   7/25/2024 12:00:40 PM CDT

## 2024-07-27 DIAGNOSIS — I10 ESSENTIAL HYPERTENSION: ICD-10-CM

## 2024-07-27 NOTE — TELEPHONE ENCOUNTER
No care due was identified.  Health [facility] Embedded Care Due Messages. Reference number: [id number].   7/27/2024 9:49:56 AM CDT

## 2024-07-29 RX ORDER — RAMIPRIL 5 MG/1
5 CAPSULE ORAL
Qty: 90 CAPSULE | Refills: 1 | Status: SHIPPED | OUTPATIENT
Start: 2024-07-29

## 2024-07-29 NOTE — TELEPHONE ENCOUNTER
Refill Decision Note   Narda Naya  is requesting a refill authorization.  Brief Assessment and Rationale for Refill:  Approve     Medication Therapy Plan:        Comments:     Note composed:9:59 AM 07/29/2024

## 2024-07-31 ENCOUNTER — TELEPHONE (OUTPATIENT)
Dept: INTERNAL MEDICINE | Facility: CLINIC | Age: 79
End: 2024-07-31
Payer: MEDICARE

## 2024-07-31 NOTE — TELEPHONE ENCOUNTER
----- Message from Marii Albarran sent at 2024 11:30 AM CDT -----  Contact: Barbi Person  MRN: [id number]  : 1945  PCP: Sirena Coleman  Home Phone      [phone]  Work Phone      Not on file.  Mobile          [phone]      MESSAGE: Barbi would like to ask some questions in regards to getting her mom in the nursing home.     Barbi [phone]

## 2024-07-31 NOTE — TELEPHONE ENCOUNTER
Barbi states that she would like to start the process of nursing home admission as she feels that she is unable to continue to care for her mother at home. I advised that she contact the facilities that she is interested to see if the patient meets criteria for admission. She will follow up with us when a decision is made so admission paperwork can be completed.

## 2024-08-03 ENCOUNTER — DOCUMENT SCAN (OUTPATIENT)
Dept: HOME HEALTH SERVICES | Facility: HOSPITAL | Age: 79
End: 2024-08-03
Payer: MEDICARE

## 2024-08-05 ENCOUNTER — EXTERNAL HOME HEALTH (OUTPATIENT)
Dept: HOME HEALTH SERVICES | Facility: HOSPITAL | Age: 79
End: 2024-08-05
Payer: MEDICARE

## 2024-08-09 ENCOUNTER — DOCUMENT SCAN (OUTPATIENT)
Dept: HOME HEALTH SERVICES | Facility: HOSPITAL | Age: 79
End: 2024-08-09
Payer: MEDICARE

## 2024-08-12 ENCOUNTER — TELEPHONE (OUTPATIENT)
Dept: INTERNAL MEDICINE | Facility: CLINIC | Age: 79
End: 2024-08-12
Payer: MEDICARE

## 2024-08-12 NOTE — TELEPHONE ENCOUNTER
----- Message from Suzie Hidalgo sent at 2024  2:17 PM CDT -----  Contact: La[facility]ne physical ronak  Narda Person  MRN: [id number]  : 1945  PCP: Sirena Coleman  Home Phone      [phone]  Work Phone      Not on file.  Mobile          [phone]      MESSAGE:     [facility] needs verbal orders to continue therapy for this week because the pt miss last week ..                        Laverne Ashley [phone]

## 2024-08-27 DIAGNOSIS — R41.3 MEMORY LOSS: ICD-10-CM

## 2024-08-28 RX ORDER — DONEPEZIL HYDROCHLORIDE 10 MG/1
TABLET, FILM COATED ORAL
Qty: 90 TABLET | Refills: 0 | Status: SHIPPED | OUTPATIENT
Start: 2024-08-28

## 2024-08-30 ENCOUNTER — DOCUMENT SCAN (OUTPATIENT)
Dept: HOME HEALTH SERVICES | Facility: HOSPITAL | Age: 79
End: 2024-08-30
Payer: MEDICARE

## 2024-10-11 ENCOUNTER — PATIENT MESSAGE (OUTPATIENT)
Dept: ADMINISTRATIVE | Facility: HOSPITAL | Age: 79
End: 2024-10-11
Payer: MEDICARE

## 2024-10-11 DIAGNOSIS — E11.69 TYPE 2 DIABETES MELLITUS WITH OTHER SPECIFIED COMPLICATION, WITH LONG-TERM CURRENT USE OF INSULIN: ICD-10-CM

## 2024-10-11 DIAGNOSIS — Z79.4 TYPE 2 DIABETES MELLITUS WITH OTHER SPECIFIED COMPLICATION, WITH LONG-TERM CURRENT USE OF INSULIN: ICD-10-CM

## 2024-10-11 RX ORDER — METFORMIN HYDROCHLORIDE 1000 MG/1
1000 TABLET ORAL 2 TIMES DAILY WITH MEALS
Qty: 180 TABLET | Refills: 0 | Status: SHIPPED | OUTPATIENT
Start: 2024-10-11

## 2024-10-11 NOTE — TELEPHONE ENCOUNTER
Care Due:                  Date            Visit Type   Department     Provider  --------------------------------------------------------------------------------                                ESTABLISHED                              PATIENT -    [facility] INTER[facility]  Last Visit: 06-      [facility]      MEDICINE II    Sirena Coleman  Next Visit: None Scheduled  None         None Found                                                            Last  Test          Frequency    Reason                     Performed    Due Date  --------------------------------------------------------------------------------    HBA1C.......  6 months...  KEILA metFORMIN.......  07- 01-    [facility] Embedded Care Due Messages. Reference number: [id number].   10/11/2024 7:03:02 AM CDT

## 2024-10-11 NOTE — TELEPHONE ENCOUNTER
Provider Staff:  Action required for this patient    Requires labs      Please see care gap opportunities below in Care Due Message.    Thanks!  [facility]     Appointments      Date Provider   Last Visit   6/27/2024 Sirena Coleman MD   Next Visit   Visit date not found Sirena Coleman MD     Refill Decision Note   Narda Person  is requesting a refill authorization.  Brief Assessment and Rationale for Refill:  Approve     Medication Therapy Plan:        Comments:     Note composed:7:59 AM 10/11/2024

## 2024-10-21 ENCOUNTER — PATIENT MESSAGE (OUTPATIENT)
Dept: ADMINISTRATIVE | Facility: HOSPITAL | Age: 79
End: 2024-10-21
Payer: MEDICARE

## 2024-10-24 ENCOUNTER — PATIENT OUTREACH (OUTPATIENT)
Dept: ADMINISTRATIVE | Facility: HOSPITAL | Age: 79
End: 2024-10-24
Payer: MEDICARE

## 2024-11-08 ENCOUNTER — PATIENT OUTREACH (OUTPATIENT)
Dept: ADMINISTRATIVE | Facility: HOSPITAL | Age: 79
End: 2024-11-08
Payer: MEDICARE

## 2024-11-11 DIAGNOSIS — R42 VERTIGO: ICD-10-CM

## 2024-11-11 DIAGNOSIS — Z76.0 ENCOUNTER FOR MEDICATION REFILL: ICD-10-CM

## 2024-11-11 RX ORDER — OMEPRAZOLE 20 MG/1
CAPSULE, DELAYED RELEASE ORAL
Qty: 180 CAPSULE | Refills: 0 | OUTPATIENT
Start: 2024-11-11

## 2024-11-11 NOTE — TELEPHONE ENCOUNTER
Requested Prescriptions     Pending Prescriptions Disp Refills    meclizine (ANTIVERT) 25 mg tablet 90 tablet 0     Sig: Take 1 tablet by mouth three times daily as needed for dizziness or nausea    omeprazole (PRILOSEC) 20 MG capsule 180 capsule 3     Sig: Take 1 capsule (20 mg total) by mouth 2 (two) times daily.

## 2024-11-11 NOTE — TELEPHONE ENCOUNTER
No care due was identified.  Health [facility] Embedded Care Due Messages. Reference number: [id number].   11/11/2024 4:06:12 PM CST

## 2024-11-11 NOTE — TELEPHONE ENCOUNTER
----- Message from Suzie sent at 2024  3:43 PM CST -----  Contact: pt  Narda Person  MRN: [id number]  : 1945  PCP: Sirena Coleman  Home Phone      [phone]  Work Phone      Not on file.  Mobile          [phone]      MESSAGE:     Pt need refill on omeprazole (PRILOSEC) 20 MG capsule  , and meclizine (ANTIVERT) 25 mg tablet                   Preferred Pharmacy    [address] [address]  Phone: [phone] Fax: [phone]  Hours: Not open 24 hours          [phone]

## 2024-11-11 NOTE — TELEPHONE ENCOUNTER
No care due was identified.  [facility] Embedded Care Due Messages. Reference number: [id number].   11/11/2024 3:37:48 PM CST

## 2024-11-12 RX ORDER — MECLIZINE HYDROCHLORIDE 25 MG/1
TABLET ORAL
Qty: 90 TABLET | Refills: 0 | Status: SHIPPED | OUTPATIENT
Start: 2024-11-12

## 2024-11-12 RX ORDER — OMEPRAZOLE 20 MG/1
20 CAPSULE, DELAYED RELEASE ORAL 2 TIMES DAILY
Qty: 180 CAPSULE | Refills: 3 | Status: SHIPPED | OUTPATIENT
Start: 2024-11-12

## 2024-11-12 NOTE — TELEPHONE ENCOUNTER
Refill Routing Note   Medication(s) are not appropriate for processing by [facility] for the following reason(s):        Outside of protocol    [facility] action(s):  Route             Appointments  past 12m or future 3m with PCP    Date Provider   Last Visit   6/27/2024 Sirena Coleman MD   Next Visit   11/11/2024 Sirena Coleman MD   ED visits in past 90 days: 0        Note composed:6:08 PM 11/11/2024

## 2024-12-09 ENCOUNTER — PATIENT OUTREACH (OUTPATIENT)
Dept: ADMINISTRATIVE | Facility: HOSPITAL | Age: 79
End: 2024-12-09
Payer: MEDICARE

## 2024-12-09 NOTE — PROGRESS NOTES
No answer voice mail full.  Patient due for dexa scan , eye exam , ha1c, and b/p check, med review and sdh

## 2024-12-10 DIAGNOSIS — R41.3 MEMORY LOSS: ICD-10-CM

## 2024-12-10 RX ORDER — DONEPEZIL HYDROCHLORIDE 10 MG/1
TABLET, FILM COATED ORAL
Qty: 90 TABLET | Refills: 0 | Status: ON HOLD | OUTPATIENT
Start: 2024-12-10

## 2024-12-12 ENCOUNTER — HOSPITAL ENCOUNTER (INPATIENT)
Facility: HOSPITAL | Age: 79
LOS: 7 days | Discharge: NURSING, REHAB OR LONG-TERM CARE | DRG: 057 | End: 2024-12-23
Attending: STUDENT IN AN ORGANIZED HEALTH CARE EDUCATION/TRAINING PROGRAM | Admitting: INTERNAL MEDICINE
Payer: MEDICARE

## 2024-12-12 DIAGNOSIS — D33.3 VESTIBULAR SCHWANNOMA: Primary | ICD-10-CM

## 2024-12-12 DIAGNOSIS — R44.3 HALLUCINATIONS: ICD-10-CM

## 2024-12-12 DIAGNOSIS — R41.82 AMS (ALTERED MENTAL STATUS): ICD-10-CM

## 2024-12-12 LAB
ALBUMIN SERPL BCP-MCNC: 4.2 G/DL (ref 3.5–5.2)
ALP SERPL-CCNC: 63 U/L (ref 40–150)
ALT SERPL W/O P-5'-P-CCNC: 18 U/L (ref 10–44)
AMMONIA PLAS-SCNC: 25 UMOL/L (ref 10–50)
AMPHET+METHAMPHET UR QL: NEGATIVE
ANION GAP SERPL CALC-SCNC: 17 MMOL/L (ref 8–16)
APAP SERPL-MCNC: <3 UG/ML (ref 10–20)
AST SERPL-CCNC: 21 U/L (ref 10–40)
BACTERIA #/AREA URNS HPF: ABNORMAL /HPF
BARBITURATES UR QL SCN>200 NG/ML: NEGATIVE
BASOPHILS # BLD AUTO: 0.06 K/UL (ref 0–0.2)
BASOPHILS NFR BLD: 0.8 % (ref 0–1.9)
BENZODIAZ UR QL SCN>200 NG/ML: NEGATIVE
BILIRUB SERPL-MCNC: 0.7 MG/DL (ref 0.1–1)
BILIRUB UR QL STRIP: NEGATIVE
BUN SERPL-MCNC: 13 MG/DL (ref 8–23)
BZE UR QL SCN: NEGATIVE
CALCIUM SERPL-MCNC: 8.9 MG/DL (ref 8.7–10.5)
CANNABINOIDS UR QL SCN: NEGATIVE
CHLORIDE SERPL-SCNC: 101 MMOL/L (ref 95–110)
CLARITY UR: CLEAR
CO2 SERPL-SCNC: 19 MMOL/L (ref 23–29)
COLOR UR: YELLOW
CREAT SERPL-MCNC: 0.8 MG/DL (ref 0.5–1.4)
CREAT UR-MCNC: 122 MG/DL (ref 15–325)
DIFFERENTIAL METHOD BLD: ABNORMAL
EOSINOPHIL # BLD AUTO: 0.1 K/UL (ref 0–0.5)
EOSINOPHIL NFR BLD: 0.7 % (ref 0–8)
ERYTHROCYTE [DISTWIDTH] IN BLOOD BY AUTOMATED COUNT: 12.8 % (ref 11.5–14.5)
EST. GFR  (NO RACE VARIABLE): >60 ML/MIN/1.73 M^2
ETHANOL SERPL-MCNC: <10 MG/DL
GLUCOSE SERPL-MCNC: 229 MG/DL (ref 70–110)
GLUCOSE UR QL STRIP: ABNORMAL
HCT VFR BLD AUTO: 39.6 % (ref 37–48.5)
HGB BLD-MCNC: 13.9 G/DL (ref 12–16)
HGB UR QL STRIP: NEGATIVE
HYALINE CASTS #/AREA URNS LPF: 0 /LPF
IMM GRANULOCYTES # BLD AUTO: 0.01 K/UL (ref 0–0.04)
IMM GRANULOCYTES NFR BLD AUTO: 0.1 % (ref 0–0.5)
KETONES UR QL STRIP: ABNORMAL
LEUKOCYTE ESTERASE UR QL STRIP: ABNORMAL
LYMPHOCYTES # BLD AUTO: 1.3 K/UL (ref 1–4.8)
LYMPHOCYTES NFR BLD: 17 % (ref 18–48)
MCH RBC QN AUTO: 30.5 PG (ref 27–31)
MCHC RBC AUTO-ENTMCNC: 35.1 G/DL (ref 32–36)
MCV RBC AUTO: 87 FL (ref 82–98)
METHADONE UR QL SCN>300 NG/ML: NEGATIVE
MICROSCOPIC COMMENT: ABNORMAL
MONOCYTES # BLD AUTO: 0.5 K/UL (ref 0.3–1)
MONOCYTES NFR BLD: 6.9 % (ref 4–15)
NEUTROPHILS # BLD AUTO: 5.7 K/UL (ref 1.8–7.7)
NEUTROPHILS NFR BLD: 74.5 % (ref 38–73)
NITRITE UR QL STRIP: NEGATIVE
NON-SQ EPI CELLS #/AREA URNS HPF: 1 /HPF
NRBC BLD-RTO: 0 /100 WBC
OPIATES UR QL SCN: NEGATIVE
PCP UR QL SCN>25 NG/ML: NEGATIVE
PH UR STRIP: 6 [PH] (ref 5–8)
PLATELET # BLD AUTO: 203 K/UL (ref 150–450)
PMV BLD AUTO: 10.8 FL (ref 9.2–12.9)
POCT GLUCOSE: 250 MG/DL (ref 70–110)
POTASSIUM SERPL-SCNC: 3.8 MMOL/L (ref 3.5–5.1)
PROT SERPL-MCNC: 7.5 G/DL (ref 6–8.4)
PROT UR QL STRIP: ABNORMAL
RBC # BLD AUTO: 4.56 M/UL (ref 4–5.4)
RBC #/AREA URNS HPF: 2 /HPF (ref 0–4)
SALICYLATES SERPL-MCNC: <5 MG/DL (ref 15–30)
SODIUM SERPL-SCNC: 137 MMOL/L (ref 136–145)
SP GR UR STRIP: 1.01 (ref 1–1.03)
SQUAMOUS #/AREA URNS HPF: 3 /HPF
TOXICOLOGY INFORMATION: NORMAL
TROPONIN I SERPL DL<=0.01 NG/ML-MCNC: <0.006 NG/ML (ref 0–0.03)
TSH SERPL DL<=0.005 MIU/L-ACNC: 2.21 UIU/ML (ref 0.4–4)
URN SPEC COLLECT METH UR: ABNORMAL
UROBILINOGEN UR STRIP-ACNC: NEGATIVE EU/DL
WBC # BLD AUTO: 7.58 K/UL (ref 3.9–12.7)
WBC #/AREA URNS HPF: 0 /HPF (ref 0–5)

## 2024-12-12 PROCEDURE — 93010 ELECTROCARDIOGRAM REPORT: CPT | Mod: HCNC,,, | Performed by: INTERNAL MEDICINE

## 2024-12-12 PROCEDURE — 84443 ASSAY THYROID STIM HORMONE: CPT | Mod: HCNC | Performed by: STUDENT IN AN ORGANIZED HEALTH CARE EDUCATION/TRAINING PROGRAM

## 2024-12-12 PROCEDURE — G0378 HOSPITAL OBSERVATION PER HR: HCPCS | Mod: HCNC

## 2024-12-12 PROCEDURE — 25000003 PHARM REV CODE 250: Mod: HCNC

## 2024-12-12 PROCEDURE — 82077 ASSAY SPEC XCP UR&BREATH IA: CPT | Mod: HCNC | Performed by: STUDENT IN AN ORGANIZED HEALTH CARE EDUCATION/TRAINING PROGRAM

## 2024-12-12 PROCEDURE — 96374 THER/PROPH/DIAG INJ IV PUSH: CPT | Mod: HCNC

## 2024-12-12 PROCEDURE — 82140 ASSAY OF AMMONIA: CPT | Mod: HCNC | Performed by: STUDENT IN AN ORGANIZED HEALTH CARE EDUCATION/TRAINING PROGRAM

## 2024-12-12 PROCEDURE — 99285 EMERGENCY DEPT VISIT HI MDM: CPT | Mod: 25,HCNC

## 2024-12-12 PROCEDURE — 80143 DRUG ASSAY ACETAMINOPHEN: CPT | Mod: HCNC | Performed by: STUDENT IN AN ORGANIZED HEALTH CARE EDUCATION/TRAINING PROGRAM

## 2024-12-12 PROCEDURE — 81000 URINALYSIS NONAUTO W/SCOPE: CPT | Mod: HCNC,59 | Performed by: STUDENT IN AN ORGANIZED HEALTH CARE EDUCATION/TRAINING PROGRAM

## 2024-12-12 PROCEDURE — 99900035 HC TECH TIME PER 15 MIN (STAT): Mod: HCNC

## 2024-12-12 PROCEDURE — 80307 DRUG TEST PRSMV CHEM ANLYZR: CPT | Mod: HCNC | Performed by: STUDENT IN AN ORGANIZED HEALTH CARE EDUCATION/TRAINING PROGRAM

## 2024-12-12 PROCEDURE — 82962 GLUCOSE BLOOD TEST: CPT | Mod: HCNC

## 2024-12-12 PROCEDURE — 84484 ASSAY OF TROPONIN QUANT: CPT | Mod: HCNC | Performed by: STUDENT IN AN ORGANIZED HEALTH CARE EDUCATION/TRAINING PROGRAM

## 2024-12-12 PROCEDURE — 80179 DRUG ASSAY SALICYLATE: CPT | Mod: HCNC | Performed by: STUDENT IN AN ORGANIZED HEALTH CARE EDUCATION/TRAINING PROGRAM

## 2024-12-12 PROCEDURE — 80053 COMPREHEN METABOLIC PANEL: CPT | Mod: HCNC | Performed by: STUDENT IN AN ORGANIZED HEALTH CARE EDUCATION/TRAINING PROGRAM

## 2024-12-12 PROCEDURE — 85025 COMPLETE CBC W/AUTO DIFF WBC: CPT | Mod: HCNC | Performed by: STUDENT IN AN ORGANIZED HEALTH CARE EDUCATION/TRAINING PROGRAM

## 2024-12-12 PROCEDURE — 93005 ELECTROCARDIOGRAM TRACING: CPT | Mod: HCNC

## 2024-12-12 RX ORDER — ERYTHROMYCIN 5 MG/G
OINTMENT OPHTHALMIC
Status: COMPLETED | OUTPATIENT
Start: 2024-12-12 | End: 2024-12-12

## 2024-12-12 RX ORDER — TALC
6 POWDER (GRAM) TOPICAL NIGHTLY PRN
Status: DISCONTINUED | OUTPATIENT
Start: 2024-12-12 | End: 2024-12-23 | Stop reason: HOSPADM

## 2024-12-12 RX ORDER — LORAZEPAM 1 MG/1
1 TABLET ORAL
Status: COMPLETED | OUTPATIENT
Start: 2024-12-12 | End: 2024-12-12

## 2024-12-12 RX ADMIN — ERYTHROMYCIN: 5 OINTMENT OPHTHALMIC at 08:12

## 2024-12-12 RX ADMIN — Medication 6 MG: at 08:12

## 2024-12-12 RX ADMIN — LORAZEPAM 1 MG: 1 TABLET ORAL at 08:12

## 2024-12-12 NOTE — ED PROVIDER NOTES
Encounter Date: 12/12/2024       History     Chief Complaint   Patient presents with    Hallucinations     Pt arrives with daughter with c/o hallucinations that started last night. Pt reports urgency and dribbling.     79-year-old female with history of diabetes, Alzheimer's, presenting with hallucinations that began today.  Patient reports that she is seeing young girls dance around her home, as well as in the waiting room of the emergency department.  Also reports urinary urgency, as well as lower abdominal pain.    Review of patient's allergies indicates:   Allergen Reactions    Percocet [oxycodone-acetaminophen] Itching    Percodan [oxycodone hcl-oxycodone-asa] Itching     Other reaction(s): Unknown    Latex, natural rubber Rash    Phenytoin sodium extended      Other reaction(s): Unknown    Sutures, catgut      Infections to sutures     Adhesive Rash    Adhesive tape-silicones Rash     Past Medical History:   Diagnosis Date    Abnormal Pap smear     DIONNE (acute kidney injury) 6/29/2014    DIONNE (acute kidney injury) 6/29/2014    Alzheimer's dementia 4/12/2018    Anemia     Breast cancer 1995    left breast    Breast disorder     Diabetes mellitus     Diabetes mellitus, type 2     ESSENTIAL HYPERTENSION 10/1/2012    Hypothyroid 6/29/2014    Pneumonia 01/16/2020     Past Surgical History:   Procedure Laterality Date    CATARACT EXTRACTION, BILATERAL      COLONOSCOPY N/A 8/10/2020    Procedure: COLONOSCOPY;  Surgeon: Guerda Perales MD;  Location: [facility];  Service: Endoscopy;  Laterality: N/A;    ESOPHAGOGASTRODUODENOSCOPY N/A 8/10/2020    Procedure: EGD (ESOPHAGOGASTRODUODENOSCOPY) WITH BIOPSY;  Surgeon: Guerda Perales MD;  Location: [facility];  Service: Endoscopy;  Laterality: N/A;    HYSTERECTOMY      masectomy      single left breast    MASTECTOMY Left     OOPHORECTOMY  1997    only 1     Family History   Problem Relation Name Age of Onset    Diabetes Mother       Hypertension Mother      Heart attack Mother  66    Uterine cancer Mother      Diabetes Sister      Heart disease Sister      Heart disease Brother      Hypertension Daughter      Thyroid disease Daughter      Heart attack Son adopted 38    Heart disease Brother      Depression Daughter      Hypertension Son       Social History     Tobacco Use    Smoking status: Former     Current packs/day: 0.00     Average packs/day: 1 pack/day for 10.0 years (10.0 ttl pk-yrs)     Types: Cigarettes     Start date: 1978     Quit date: 1988     Years since quittin.9    Smokeless tobacco: Never   Substance Use Topics    Alcohol use: Yes     Alcohol/week: 1.0 standard drink of alcohol     Types: 1 Glasses of wine per week     Comment: Occ.    Drug use: No     Review of Systems   Constitutional:  Negative for fever.   HENT:  Negative for sore throat.    Respiratory:  Negative for shortness of breath.    Cardiovascular:  Negative for chest pain.   Gastrointestinal:  Positive for abdominal pain. Negative for diarrhea, nausea and vomiting.   Genitourinary:  Positive for frequency. Negative for dysuria and flank pain.   Musculoskeletal:  Negative for back pain.   Skin:  Negative for rash.   Neurological:  Negative for weakness.   Hematological:  Does not bruise/bleed easily.   Psychiatric/Behavioral:  Positive for hallucinations.        Physical Exam     Initial Vitals [24 1645]   BP Pulse Resp Temp SpO2   (!) 150/70 87 20 98.3 °F (36.8 °C) 99 %      MAP       --         Physical Exam    Nursing note and vitals reviewed.  Constitutional: She appears well-developed.   HENT:   Head: Normocephalic.   Eyes: Pupils are equal, round, and reactive to light.   Neck:   Normal range of motion.  Cardiovascular:            No murmur heard.  Pulmonary/Chest: No respiratory distress.   Clear lungs bilaterally.  No respiratory distress.  No wheezing or rales.  Good air movement.       Abdominal: Abdomen is soft.   No  TTP diffusely. No guarding, rebound, or masses. Negative Robins's sign. No TTP at McBurney's point. No CVAT bilaterally.     Musculoskeletal:         General: No edema.      Cervical back: Normal range of motion.     Neurological: She is alert.   Alert and oriented to person place and time. No facial droop. CN 2-12 intact. Fluent speech with expression and comprehension. Strength 5/5 and sensation intact in upper and lower extremities.   Skin: Skin is warm.   Psychiatric: She has a normal mood and affect.       ED Course   Procedures  Labs Reviewed   URINALYSIS, REFLEX TO URINE CULTURE   ACETAMINOPHEN LEVEL   AMMONIA   CBC W/ AUTO DIFFERENTIAL   COMPREHENSIVE METABOLIC PANEL   DRUG SCREEN PANEL, URINE EMERGENCY   ALCOHOL,MEDICAL (ETHANOL)   SALICYLATE LEVEL   TSH   POCT GLUCOSE MONITORING CONTINUOUS     EKG Readings: (Independently Interpreted)   Initial Reading: No STEMI. Rhythm: Normal Sinus Rhythm. Heart Rate: 84. Ectopy: No Ectopy. Conduction: RBBB.       Imaging Results              X-Ray Chest AP Portable (In process)                      CT Head Without Contrast (In process)                      Medications - No data to display  Medical Decision Making  DDX: Altered mental status - r/o infection, hypoglycemia, electrolyte abnormality, metabolic abnormality, ICH/mass, intoxication.  DX: Fs. BMP, CBC, TSH, ASA level, tylenol level, EtOH level, Utox, UA. CTH.   TX: Analgesia PRN. Treatment/consult as indicated by studies.   DISPO: Pending studies.     Amount and/or Complexity of Data Reviewed  Labs: ordered.  Radiology: ordered.                                      Clinical Impression:  Final diagnoses:  [R41.82] AMS (altered mental status)                 Oscar Berry MD  12/12/24 6996       Oscar Berry MD  12/12/24 1702

## 2024-12-13 PROBLEM — Z71.89 ADVANCED CARE PLANNING/COUNSELING DISCUSSION: Status: ACTIVE | Noted: 2024-12-13

## 2024-12-13 PROBLEM — N18.2 CHRONIC KIDNEY DISEASE, STAGE 2 (MILD): Status: ACTIVE | Noted: 2024-12-13

## 2024-12-13 PROBLEM — D64.9 ANEMIA: Status: RESOLVED | Noted: 2020-06-28 | Resolved: 2024-12-13

## 2024-12-13 PROBLEM — R44.3 HALLUCINATIONS: Status: ACTIVE | Noted: 2024-12-13

## 2024-12-13 LAB
AMMONIA PLAS-SCNC: 33 UMOL/L (ref 10–50)
AMPHET+METHAMPHET UR QL: NEGATIVE
BARBITURATES UR QL SCN>200 NG/ML: NEGATIVE
BENZODIAZ UR QL SCN>200 NG/ML: NEGATIVE
BZE UR QL SCN: NEGATIVE
CANNABINOIDS UR QL SCN: NEGATIVE
CREAT UR-MCNC: 169.4 MG/DL (ref 15–325)
ESTIMATED AVG GLUCOSE: 194 MG/DL (ref 68–131)
HBA1C MFR BLD: 8.4 % (ref 4–5.6)
METHADONE UR QL SCN>300 NG/ML: NEGATIVE
OPIATES UR QL SCN: NEGATIVE
PCP UR QL SCN>25 NG/ML: NEGATIVE
POCT GLUCOSE: 223 MG/DL (ref 70–110)
POCT GLUCOSE: 234 MG/DL (ref 70–110)
POCT GLUCOSE: 246 MG/DL (ref 70–110)
TOXICOLOGY INFORMATION: NORMAL

## 2024-12-13 PROCEDURE — G0378 HOSPITAL OBSERVATION PER HR: HCPCS | Mod: HCNC

## 2024-12-13 PROCEDURE — 83036 HEMOGLOBIN GLYCOSYLATED A1C: CPT | Mod: HCNC | Performed by: PHYSICIAN ASSISTANT

## 2024-12-13 PROCEDURE — 63600175 PHARM REV CODE 636 W HCPCS: Mod: HCNC

## 2024-12-13 PROCEDURE — 82140 ASSAY OF AMMONIA: CPT | Mod: HCNC | Performed by: PHYSICIAN ASSISTANT

## 2024-12-13 PROCEDURE — 80307 DRUG TEST PRSMV CHEM ANLYZR: CPT | Mod: 91,HCNC | Performed by: STUDENT IN AN ORGANIZED HEALTH CARE EDUCATION/TRAINING PROGRAM

## 2024-12-13 PROCEDURE — 63600175 PHARM REV CODE 636 W HCPCS: Mod: HCNC | Performed by: PHYSICIAN ASSISTANT

## 2024-12-13 PROCEDURE — 96372 THER/PROPH/DIAG INJ SC/IM: CPT | Performed by: PHYSICIAN ASSISTANT

## 2024-12-13 PROCEDURE — 25000003 PHARM REV CODE 250: Mod: HCNC | Performed by: PHYSICIAN ASSISTANT

## 2024-12-13 PROCEDURE — 36415 COLL VENOUS BLD VENIPUNCTURE: CPT | Mod: HCNC | Performed by: PHYSICIAN ASSISTANT

## 2024-12-13 PROCEDURE — 80307 DRUG TEST PRSMV CHEM ANLYZR: CPT | Mod: HCNC | Performed by: PHYSICIAN ASSISTANT

## 2024-12-13 RX ORDER — LANOLIN ALCOHOL/MO/W.PET/CERES
1 CREAM (GRAM) TOPICAL DAILY
Status: DISCONTINUED | OUTPATIENT
Start: 2024-12-14 | End: 2024-12-23 | Stop reason: HOSPADM

## 2024-12-13 RX ORDER — IBUPROFEN 200 MG
16 TABLET ORAL
Status: DISCONTINUED | OUTPATIENT
Start: 2024-12-13 | End: 2024-12-23 | Stop reason: HOSPADM

## 2024-12-13 RX ORDER — IBUPROFEN 200 MG
24 TABLET ORAL
Status: DISCONTINUED | OUTPATIENT
Start: 2024-12-13 | End: 2024-12-23 | Stop reason: HOSPADM

## 2024-12-13 RX ORDER — PANTOPRAZOLE SODIUM 40 MG/1
40 TABLET, DELAYED RELEASE ORAL DAILY
Status: DISCONTINUED | OUTPATIENT
Start: 2024-12-13 | End: 2024-12-23 | Stop reason: HOSPADM

## 2024-12-13 RX ORDER — SODIUM CHLORIDE 0.9 % (FLUSH) 0.9 %
10 SYRINGE (ML) INJECTION
Status: DISCONTINUED | OUTPATIENT
Start: 2024-12-13 | End: 2024-12-23 | Stop reason: HOSPADM

## 2024-12-13 RX ORDER — ATORVASTATIN CALCIUM 20 MG/1
20 TABLET, FILM COATED ORAL DAILY
Status: DISCONTINUED | OUTPATIENT
Start: 2024-12-14 | End: 2024-12-23 | Stop reason: HOSPADM

## 2024-12-13 RX ORDER — ESCITALOPRAM OXALATE 10 MG/1
10 TABLET ORAL DAILY
Status: DISCONTINUED | OUTPATIENT
Start: 2024-12-14 | End: 2024-12-13

## 2024-12-13 RX ORDER — INSULIN ASPART 100 [IU]/ML
0-5 INJECTION, SOLUTION INTRAVENOUS; SUBCUTANEOUS
Status: DISCONTINUED | OUTPATIENT
Start: 2024-12-13 | End: 2024-12-23 | Stop reason: HOSPADM

## 2024-12-13 RX ORDER — DONEPEZIL HYDROCHLORIDE 5 MG/1
10 TABLET, FILM COATED ORAL NIGHTLY
Status: DISCONTINUED | OUTPATIENT
Start: 2024-12-13 | End: 2024-12-23 | Stop reason: HOSPADM

## 2024-12-13 RX ORDER — GLUCAGON 1 MG
1 KIT INJECTION
Status: DISCONTINUED | OUTPATIENT
Start: 2024-12-13 | End: 2024-12-23 | Stop reason: HOSPADM

## 2024-12-13 RX ORDER — LORAZEPAM 2 MG/ML
0.5 INJECTION INTRAMUSCULAR
Status: COMPLETED | OUTPATIENT
Start: 2024-12-13 | End: 2024-12-13

## 2024-12-13 RX ORDER — GABAPENTIN 300 MG/1
300 CAPSULE ORAL 2 TIMES DAILY
Status: DISCONTINUED | OUTPATIENT
Start: 2024-12-13 | End: 2024-12-23 | Stop reason: HOSPADM

## 2024-12-13 RX ADMIN — DONEPEZIL HYDROCHLORIDE 10 MG: 5 TABLET, FILM COATED ORAL at 10:12

## 2024-12-13 RX ADMIN — INSULIN ASPART 2 UNITS: 100 INJECTION, SOLUTION INTRAVENOUS; SUBCUTANEOUS at 12:12

## 2024-12-13 RX ADMIN — GABAPENTIN 300 MG: 300 CAPSULE ORAL at 10:12

## 2024-12-13 RX ADMIN — LORAZEPAM 0.5 MG: 2 INJECTION INTRAMUSCULAR; INTRAVENOUS at 12:12

## 2024-12-13 RX ADMIN — PANTOPRAZOLE SODIUM 40 MG: 40 TABLET, DELAYED RELEASE ORAL at 05:12

## 2024-12-13 RX ADMIN — INSULIN ASPART 2 UNITS: 100 INJECTION, SOLUTION INTRAVENOUS; SUBCUTANEOUS at 05:12

## 2024-12-13 NOTE — H&P
[facility] Medicine  History & Physical    Patient Name: Narda Person  MRN: [id number]  Patient Class: OP- Observation  Admission Date: 12/12/2024  Attending Physician: Tej Reaves MD   Primary Care Provider: Sirena Coleman MD         Patient information was obtained from relative(s) and ER records.     Subjective:     Principal Problem:Hallucinations    Chief Complaint:   Chief Complaint   Patient presents with    Hallucinations     Pt arrives with daughter with c/o hallucinations that started last night. Pt reports urgency and dribbling.        HPI: Patient is a 79 year old female with medical history of HTN, hypothyroidism, DM 2 and Dementia who presented to the ED With hallucinations and worsening confusion.  Daughter states she did have one eye brown higher than the other.   No other complaints per daughter who is the POA.      Admitted for hallucinations.      Past Medical History:   Diagnosis Date    Abnormal Pap smear     DIONNE (acute kidney injury) 6/29/2014    DIONNE (acute kidney injury) 6/29/2014    Alzheimer's dementia 4/12/2018    Anemia     Breast cancer 1995    left breast    Breast disorder     Diabetes mellitus     Diabetes mellitus, type 2     ESSENTIAL HYPERTENSION 10/1/2012    Hypothyroid 6/29/2014    Pneumonia 01/16/2020       Past Surgical History:   Procedure Laterality Date    CATARACT EXTRACTION, BILATERAL      COLONOSCOPY N/A 8/10/2020    Procedure: COLONOSCOPY;  Surgeon: Guerda Perales MD;  Location: [facility];  Service: Endoscopy;  Laterality: N/A;    ESOPHAGOGASTRODUODENOSCOPY N/A 8/10/2020    Procedure: EGD (ESOPHAGOGASTRODUODENOSCOPY) WITH BIOPSY;  Surgeon: Guerda Perales MD;  Location: [facility];  Service: Endoscopy;  Laterality: N/A;    HYSTERECTOMY      masectomy      single left breast    MASTECTOMY Left     OOPHORECTOMY  1997    only 1       Review of patient's allergies indicates:   Allergen Reactions    Percocet [oxycodone-acetaminophen]  Itching    Percodan [oxycodone hcl-oxycodone-asa] Itching     Other reaction(s): Unknown    Latex, natural rubber Rash    Phenytoin sodium extended      Other reaction(s): Unknown    Sutures, catgut      Infections to sutures     Adhesive Rash    Adhesive tape-silicones Rash       No current facility-administered medications on file prior to encounter.     Current Outpatient Medications on File Prior to Encounter   Medication Sig    atorvastatin (LIPITOR) 20 MG tablet Take 1 tablet (20 mg total) by mouth once daily.    blood sugar diagnostic (ACCU-CHEK GUIDE TEST STRIPS) Strp 1 strip by Misc.(Non-Drug; Combo Route) route 4 (four) times daily.    blood sugar diagnostic, drum (ACCU-CHEK COMPACT PLUS TEST) Strp USE TO CHECK BLOOD GLUCOSE FOUR TIMES DAILY    blood-glucose meter (ACCU-CHEK GUIDE GLUCOSE METER) Misc 1 each by Misc.(Non-Drug; Combo Route) route 4 (four) times daily.    diclofenac sodium (VOLTAREN) 1 % Gel Apply 2 g topically 2 (two) times daily. (Patient not taking: Reported on 11/29/2023)    donepeziL (ARICEPT) 10 MG tablet TAKE 1 TABLET BY MOUTH ONCE DAILY IN THE EVENING    EScitalopram oxalate (LEXAPRO) 10 MG tablet Take 1 tablet by mouth once daily    ferrous sulfate 325 (65 FE) MG EC tablet Take 1 tablet by mouth once daily    gabapentin (NEURONTIN) 300 MG capsule Take 1 capsule by mouth twice daily    ibuprofen (ADVIL,MOTRIN) 400 MG tablet Take 1 tablet (400 mg total) by mouth every 6 (six) hours as needed for Pain.    insulin aspart U-100 (NOVOLOG FLEXPEN U-100 INSULIN) 100 unit/mL (3 mL) InPn pen Inject 30 Units into the skin 3 (three) times daily with meals. (Patient not taking: Reported on 11/29/2023)    insulin syringe-needle U-100 0.3 mL 30 Syrg Use with Levemir vial BID (Patient not taking: Reported on 11/29/2023)    JANUVIA 100 mg Tab Take 1 tablet by mouth once daily    lancets (ACCU-CHEK SOFTCLIX LANCETS) Misc 1 each by Misc.(Non-Drug; Combo Route) route 4 (four) times daily.    meclizine  "(ANTIVERT) 25 mg tablet TAKE 1 TABLET BY MOUTH THREE TIMES DAILY AS NEEDED FOR DIZZINESS AND FOR NAUSEA    meclizine (ANTIVERT) 25 mg tablet Take 1 tablet by mouth three times daily as needed for dizziness or nausea    melatonin 5 mg Tab Take 10 mg by mouth nightly as needed.     metFORMIN (GLUCOPHAGE) 1000 MG tablet Take 1 tablet (1,000 mg total) by mouth 2 (two) times daily with meals.    methylPREDNISolone (MEDROL DOSEPACK) 4 mg tablet use as directed (Patient not taking: Reported on 2023)    omeprazole (PRILOSEC) 20 MG capsule Take 1 capsule (20 mg total) by mouth 2 (two) times daily.    pen needle, diabetic (LITE TOUCH INSULIN PEN NEEDLES) 31 gauge x 3/16" Ndle 1 application by Misc.(Non-Drug; Combo Route) route 4 (four) times daily.    pen needle, diabetic 32 gauge x 5/32" Ndle 1 Device by Misc.(Non-Drug; Combo Route) route as directed. Needles to be used with Novolog pens and Levemir pens daily.    ramipriL (ALTACE) 5 MG capsule Take 1 capsule by mouth once daily    TRESIBA FLEXTOUCH U-200 200 unit/mL (3 mL) insulin pen Inject 60 Units into the skin once daily. (Patient taking differently: Inject 40 Units into the skin once daily.)    [DISCONTINUED] clonazePAM (KLONOPIN) 0.5 MG tablet Take 1 tablet (0.5 mg total) by mouth 2 (two) times daily as needed for Anxiety.     Family History       Problem Relation (Age of Onset)    Depression Daughter    Diabetes Mother, Sister    Heart attack Mother (66), Son (38)    Heart disease Sister, Brother, Brother    Hypertension Mother, Daughter, Son    Thyroid disease Daughter    Uterine cancer Mother          Tobacco Use    Smoking status: Former     Current packs/day: 0.00     Average packs/day: 1 pack/day for 10.0 years (10.0 ttl pk-yrs)     Types: Cigarettes     Start date: 1978     Quit date: 1988     Years since quittin.9    Smokeless tobacco: Never   Substance and Sexual Activity    Alcohol use: Yes     Alcohol/week: 1.0 standard drink of alcohol " "    Types: 1 Glasses of wine per week     Comment: Occ.    Drug use: No    Sexual activity: Never     Review of Systems   Reason unable to perform ROS: dementia.     Objective:     Vital Signs (Most Recent):  Temp: 98.5 °F (36.9 °C) (12/13/24 1500)  Pulse: 79 (12/13/24 1500)  Resp: 18 (12/13/24 1500)  BP: (!) 110/55 (12/13/24 1500)  SpO2: (!) 94 % (12/13/24 1500) Vital Signs (24h Range):  Temp:  [98.2 °F (36.8 °C)-98.5 °F (36.9 °C)] 98.5 °F (36.9 °C)  Pulse:  [] 79  Resp:  [14-20] 18  SpO2:  [93 %-100 %] 94 %  BP: ()/() 110/55     Weight: 90.7 kg (200 lb)  Body mass index is 36.58 kg/m².     Physical Exam  Constitutional:       Comments: Lethargic  Doesn't open eyes to voice but moves appropriately to sternal rub and painful stimuli    HENT:      Head: Normocephalic and atraumatic.   Cardiovascular:      Rate and Rhythm: Normal rate and regular rhythm.   Pulmonary:      Effort: Respiratory distress present.      Breath sounds: Normal breath sounds.   Neurological:      Mental Status: She is disoriented.      Comments: Moves extremities equally                Significant Labs: A1C:   Recent Labs   Lab 07/08/24  1043 12/13/24  0845   HGBA1C 8.0* 8.4*     ABGs: No results for input(s): "PH", "PCO2", "HCO3", "POCSATURATED", "BE", "TOTALHB", "COHB", "METHB", "O2HB", "POCFIO2", "PO2" in the last 48 hours.  Bilirubin:   Recent Labs   Lab 12/12/24  1735   BILITOT 0.7     Blood Culture: No results for input(s): "LABBLOO" in the last 48 hours.  BMP:   Recent Labs   Lab 12/12/24  1735   *      K 3.8      CO2 19*   BUN 13   CREATININE 0.8   CALCIUM 8.9     CBC:   Recent Labs   Lab 12/12/24  1735   WBC 7.58   HGB 13.9   HCT 39.6        CMP:   Recent Labs   Lab 12/12/24  1735      K 3.8      CO2 19*   *   BUN 13   CREATININE 0.8   CALCIUM 8.9   PROT 7.5   ALBUMIN 4.2   BILITOT 0.7   ALKPHOS 63   AST 21   ALT 18   ANIONGAP 17*     Cardiac Markers: No results for " "input(s): "CKMB", "MYOGLOBIN", "BNP", "TROPISTAT" in the last 48 hours.  Coagulation: No results for input(s): "PT", "INR", "APTT" in the last 48 hours.  Lactic Acid: No results for input(s): "LACTATE" in the last 48 hours.  Lipase: No results for input(s): "LIPASE" in the last 48 hours.  Lipid Panel: No results for input(s): "CHOL", "HDL", "LDLCALC", "TRIG", "CHOLHDL" in the last 48 hours.  Magnesium: No results for input(s): "MG" in the last 48 hours.  POCT Glucose:   Recent Labs   Lab 12/12/24  1730 12/13/24  0815 12/13/24  1200   POCTGLUCOSE 250* 246* 223*     Prealbumin: No results for input(s): "PREALBUMIN" in the last 48 hours.  Respiratory Culture: No results for input(s): "GSRESP", "RESPIRATORYC" in the last 48 hours.  Troponin:   Recent Labs   Lab 12/12/24  1735   TROPONINI <0.006     TSH:   Recent Labs   Lab 12/12/24  1735   TSH 2.210     Urine Culture: No results for input(s): "LABURIN" in the last 48 hours.  Urine Studies:   Recent Labs   Lab 12/12/24  1653   COLORU Yellow   APPEARANCEUA Clear   PHUR 6.0   SPECGRAV 1.015   PROTEINUA 1+*   GLUCUA 2+*   KETONESU Trace*   BILIRUBINUA Negative   OCCULTUA Negative   NITRITE Negative   UROBILINOGEN Negative   LEUKOCYTESUR Trace*   RBCUA 2   WBCUA 0   BACTERIA Occasional   SQUAMEPITHEL 3   HYALINECASTS 0       Significant Imaging: I have reviewed all pertinent imaging results/findings within the past 24 hours.  Assessment/Plan:     * Hallucinations  Likely due to worsening dementia  Neurosurgery reviewed CT head with known schwannoma compressing on eric and basilar cisterns.  No further intervention.  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  Will get teleneurology to review.      U/A negative for UTI  CXR without pneumonia      Advanced care planning/counseling discussion  Discussed goals of care with POA daughter but will follow up after she reviews paperwork      Chronic kidney disease, stage 2 " (mild)  Creatine stable for now. BMP reviewed- noted Estimated Creatinine Clearance: 57.7 mL/min (based on SCr of 0.8 mg/dL). according to latest data. Based on current GFR, CKD stage is stage 2 - GFR 60-89.  Monitor UOP and serial BMP and adjust therapy as needed. Renally dose meds. Avoid nephrotoxic medications and procedures.    Uncontrolled type 2 diabetes mellitus with hyperglycemia  S/s insulin         VTE Risk Mitigation (From admission, onward)           Ordered     IP VTE HIGH RISK PATIENT  Once         12/13/24 0152     Place sequential compression device  Until discontinued         12/13/24 0152                         On 12/13/2024, patient should be placed in hospital observation services under my care in collaboration with Dr. Shelton.           Angella Price PA-C  Department of Hospital Medicine  [facility] - Emergency Dept

## 2024-12-13 NOTE — ED NOTES
Spoke with Corinne at the [facility]. She instructed me to call [phone] to set up a telemedicine general neurology consult. Left voicemail at this number

## 2024-12-13 NOTE — HPI
Patient is a 79 year old female with medical history of HTN, hypothyroidism, DM 2 and Dementia who presented to the ED With hallucinations and worsening confusion.  Daughter states she did have one eye brown higher than the other.   No other complaints per daughter who is the POA.    Admitted for hallucinations.

## 2024-12-13 NOTE — PROVIDER PROGRESS NOTES - EMERGENCY DEPT.
ED HAND-OFF NOTE:  12/12/2024 Received Sign Out    Chief Complaint   Patient presents with    Hallucinations     Pt arrives with daughter with c/o hallucinations that started last night. Pt reports urgency and dribbling.     Narda Person is a 79 y.o. female who presented to the ED on 12/12/2024 with chief complaint of hallucinations. I assumed care of patient from off-going ED physician team pending labs and neurosurgery teleconference.    On my evaluation, Narda Person appears well, hemodynamically stable and in NAD. Thus far, Narda Person has received:  Medications   melatonin tablet 6 mg (has no administration in time range)   LORazepam tablet 1 mg (has no administration in time range)   erythromycin 5 mg/gram (0.5 %) ophthalmic ointment (has no administration in time range)     Vital Signs:  BP (!) 153/108   Pulse 100   Temp 98.3 °F (36.8 °C)   Resp 20   LMP  (LMP Unknown)   SpO2 97%     Laboratory Studies:  Labs Reviewed   URINALYSIS, REFLEX TO URINE CULTURE - Abnormal       Result Value    Specimen UA Urine, Catheterized      Color, UA Yellow      Appearance, UA Clear      pH, UA 6.0      Specific Gravity, UA 1.015      Protein, UA 1+ (*)     Glucose, UA 2+ (*)     Ketones, UA Trace (*)     Bilirubin (UA) Negative      Occult Blood UA Negative      Nitrite, UA Negative      Urobilinogen, UA Negative      Leukocytes, UA Trace (*)     Narrative:     Specimen Source->Urine   ACETAMINOPHEN LEVEL - Abnormal    Acetaminophen (Tylenol), Serum <3.0 (*)    CBC W/ AUTO DIFFERENTIAL - Abnormal    WBC 7.58      RBC 4.56      Hemoglobin 13.9      Hematocrit 39.6      MCV 87      MCH 30.5      MCHC 35.1      RDW 12.8      Platelets 203      MPV 10.8      Immature Granulocytes 0.1      Gran # (ANC) 5.7      Immature Grans (Abs) 0.01      Lymph # 1.3      Mono # 0.5      Eos # 0.1      Baso # 0.06      nRBC 0      Gran % 74.5 (*)     Lymph % 17.0 (*)     Mono % 6.9      Eosinophil % 0.7      Basophil % 0.8       Differential Method Automated     COMPREHENSIVE METABOLIC PANEL - Abnormal    Sodium 137      Potassium 3.8      Chloride 101      CO2 19 (*)     Glucose 229 (*)     BUN 13      Creatinine 0.8      Calcium 8.9      Total Protein 7.5      Albumin 4.2      Total Bilirubin 0.7      Alkaline Phosphatase 63      AST 21      ALT 18      eGFR >60      Anion Gap 17 (*)    SALICYLATE LEVEL - Abnormal    Salicylate Lvl <5.0 (*)    URINALYSIS MICROSCOPIC - Abnormal    RBC, UA 2      WBC, UA 0      Bacteria Occasional      Squam Epithel, UA 3      Non-Squam Epith 1 (*)     Hyaline Casts, UA 0      Microscopic Comment SEE COMMENT      Narrative:     Specimen Source->Urine   POCT GLUCOSE - Abnormal    POCT Glucose 250 (*)    AMMONIA    Ammonia 25     ALCOHOL,MEDICAL (ETHANOL)    Alcohol, Serum <10     TSH    TSH 2.210     TROPONIN I    Troponin I <0.006     DRUG SCREEN PANEL, URINE EMERGENCY   DRUG SCREEN PANEL, URINE EMERGENCY    Benzodiazepines Negative      Methadone metabolites Negative      Cocaine (Metab.) Negative      Opiate Scrn, Ur Negative      Barbiturate Screen, Ur Negative      Amphetamine Screen, Ur Negative      THC Negative      Phencyclidine Negative      Creatinine, Urine 122.0      Toxicology Information SEE COMMENT      Narrative:     Specimen Source->Urine   POCT GLUCOSE MONITORING CONTINUOUS     MDM:  CT demonstrating enlargement of patient's known right-sided vestibular schwannoma.  Discussed with neurosurgery whether this could be contributing to patient's hallucinations or altered mental status, they stated it is very unlikely that this would be causing her symptoms that she can follow up in clinic.  Patient given neurosurgery referral.  Due to patient's continued hallucinations and altered mental status we will plan to admit for MRI in the morning and tele neurology evaluation.  Patient admitted to Hospital Medicine for further workup and treatment of her AMS and visual hallucinations    Discussed With:   Hospital medicine regarding admission, Neurosurgery regarding patient's vestibular schwannoma    Diagnostic Impression:    Final diagnoses:  [R41.82] AMS (altered mental status)  [D33.3] Vestibular schwannoma (Primary)  [R44.3] Hallucinations     ED Disposition Condition    Observation Stable          ______________________  Lemuel Babin MD  Emergency Medicine   12/12/2024

## 2024-12-13 NOTE — ASSESSMENT & PLAN NOTE
Likely due to worsening dementia  Neurosurgery reviewed CT head with known schwannoma compressing on eric and basilar cisterns.  No further intervention.  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  Will get teleneurology to review.

## 2024-12-13 NOTE — ASSESSMENT & PLAN NOTE
Creatine stable for now. BMP reviewed- noted Estimated Creatinine Clearance: 57.7 mL/min (based on SCr of 0.8 mg/dL). according to latest data. Based on current GFR, CKD stage is stage 2 - GFR 60-89.  Monitor UOP and serial BMP and adjust therapy as needed. Renally dose meds. Avoid nephrotoxic medications and procedures.

## 2024-12-13 NOTE — ASSESSMENT & PLAN NOTE
Discussed goals of care with POA daughter but will follow up after she reviews paperwork     Patient is a 89y old  Female who presents with a chief complaint of Hip fracture (27 May 2022 11:12)       HPI:  89 y/o female w/ PMHx of HTN, HF (unknown EF), hypertension, depression and anxiety presents after having a fall earlier today. Patient chocked on something, was coughing,  went to bathroom and suddenly fell. She hit head in the frontal side, L elbow and hip. Had an episode of urinary incontinence after fall. Patient's son helped to get out from the floor, denies LOC. Patient denies presyncopal symptoms. Patient has multiple falls in the last couple of weeks due to unstable gait, however patient refuse to use walker per son, last fall was 3 days ago. Patent endorses poor appetite has 2 meals per day. Has constipation, not able to remember when was the last BM. She has chronic SIDHU.   At this time she denies headaches, dizziness, nausea, vomiting, chest pain, abdominal pain, fever, hematuria, melena, hematochezia or other symptoms.   Patient has been living with her son for the last 3 months.     Renal consulted for GIL/CKD. S/p OR. +Rubin. Poor appetite. H/o of CKD per family      No SOB/N/V    PAST MEDICAL & SURGICAL HISTORY:  Hypothyroid      HTN (hypertension)      HF (heart failure)      Anxiety and depression      S/P hysterectomy      FH: cholecystectomy           FAMILY HISTORY:  FH: heart disease (Father)    NC    Social History:Non smoker    MEDICATIONS  (STANDING):  amLODIPine   Tablet 5 milliGRAM(s) Oral daily  citalopram 40 milliGRAM(s) Oral daily  dextrose 5% + sodium chloride 0.45%. 1000 milliLiter(s) (75 mL/Hr) IV Continuous <Continuous>  heparin   Injectable 5000 Unit(s) SubCutaneous every 8 hours  levothyroxine 50 MICROGram(s) Oral daily  melatonin 3 milliGRAM(s) Oral at bedtime  metoprolol succinate ER 50 milliGRAM(s) Oral daily  pantoprazole    Tablet 40 milliGRAM(s) Oral before breakfast  polyethylene glycol 3350 17 Gram(s) Oral at bedtime  senna 2 Tablet(s) Oral at bedtime    MEDICATIONS  (PRN):  benzocaine 15 mG/menthol 3.6 mG Lozenge 1 Lozenge Oral four times a day PRN Sore Throat  ondansetron    Tablet 4 milliGRAM(s) Oral three times a day PRN Nausea and/or Vomiting  traMADol 100 milliGRAM(s) Oral every 6 hours PRN Moderate Pain (4 - 6)  traMADol 50 milliGRAM(s) Oral every 6 hours PRN Mild Pain (1 - 3)   Meds reviewed    Allergies    sulfa drugs (Nausea)    Intolerances    codeine (Nausea)       REVIEW OF SYSTEMS: As per HPI, otherwise negative     Vital Signs Last 24 Hrs  T(C): 36.9 (31 May 2022 04:49), Max: 36.9 (31 May 2022 04:49)  T(F): 98.5 (31 May 2022 04:49), Max: 98.5 (31 May 2022 04:49)  HR: 68 (31 May 2022 04:49) (67 - 69)  BP: 160/67 (31 May 2022 04:49) (133/74 - 160/67)  BP(mean): --  RR: 17 (31 May 2022 04:49) (17 - 18)  SpO2: 93% (31 May 2022 04:49) (93% - 97%)        PHYSICAL EXAM:  GENERAL: NAD  HEENT:  anicteric, moist mucous membranes  CHEST/LUNG:  CTA b/l, no rales, wheezes, or rhonchi  HEART:  RRR, S1, S2  ABDOMEN:  BS+, soft, nontender, nondistended  EXTREMITIES:  no edema, moves UE spont  NERVOUS SYSTEM: answers questions and follows commands appropriately      LABS:                                           9.4    7.65  )-----------( 195      ( 30 May 2022 08:47 )             28.9     05-30    141  |  110<H>  |  45<H>  ----------------------------<  91  4.3   |  23  |  2.00<H>    Ca    7.7<L>      30 May 2022 08:47

## 2024-12-13 NOTE — SUBJECTIVE & OBJECTIVE
Past Medical History:   Diagnosis Date    Abnormal Pap smear     DIONNE (acute kidney injury) 6/29/2014    DIONNE (acute kidney injury) 6/29/2014    Alzheimer's dementia 4/12/2018    Anemia     Breast cancer 1995    left breast    Breast disorder     Diabetes mellitus     Diabetes mellitus, type 2     ESSENTIAL HYPERTENSION 10/1/2012    Hypothyroid 6/29/2014    Pneumonia 01/16/2020       Past Surgical History:   Procedure Laterality Date    CATARACT EXTRACTION, BILATERAL      COLONOSCOPY N/A 8/10/2020    Procedure: COLONOSCOPY;  Surgeon: Guerda Perales MD;  [facility];  Service: Endoscopy;  Laterality: N/A;    ESOPHAGOGASTRODUODENOSCOPY N/A 8/10/2020    Procedure: EGD (ESOPHAGOGASTRODUODENOSCOPY) WITH BIOPSY;  Surgeon: Guerda Perales MD;  [facility];  Service: Endoscopy;  Laterality: N/A;    HYSTERECTOMY      masectomy      single left breast    MASTECTOMY Left     OOPHORECTOMY  1997    only 1       Review of patient's allergies indicates:   Allergen Reactions    Percocet [oxycodone-acetaminophen] Itching    Percodan [oxycodone hcl-oxycodone-asa] Itching     Other reaction(s): Unknown    Latex, natural rubber Rash    Phenytoin sodium extended      Other reaction(s): Unknown    Sutures, catgut      Infections to sutures     Adhesive Rash    Adhesive tape-silicones Rash       No current facility-administered medications on file prior to encounter.     Current Outpatient Medications on File Prior to Encounter   Medication Sig    atorvastatin (LIPITOR) 20 MG tablet Take 1 tablet (20 mg total) by mouth once daily.    blood sugar diagnostic (ACCU-CHEK GUIDE TEST STRIPS) Strp 1 strip by Misc.(Non-Drug; Combo Route) route 4 (four) times daily.    blood sugar diagnostic, drum (ACCU-CHEK COMPACT PLUS TEST) Strp USE TO CHECK BLOOD GLUCOSE FOUR TIMES DAILY    blood-glucose meter (ACCU-CHEK GUIDE GLUCOSE METER) Misc 1 each by Misc.(Non-Drug; Combo Route) route 4 (four) times daily.    diclofenac sodium  "(VOLTAREN) 1 % Gel Apply 2 g topically 2 (two) times daily. (Patient not taking: Reported on 11/29/2023)    donepeziL (ARICEPT) 10 MG tablet TAKE 1 TABLET BY MOUTH ONCE DAILY IN THE EVENING    EScitalopram oxalate (LEXAPRO) 10 MG tablet Take 1 tablet by mouth once daily    ferrous sulfate 325 (65 FE) MG EC tablet Take 1 tablet by mouth once daily    gabapentin (NEURONTIN) 300 MG capsule Take 1 capsule by mouth twice daily    ibuprofen (ADVIL,MOTRIN) 400 MG tablet Take 1 tablet (400 mg total) by mouth every 6 (six) hours as needed for Pain.    insulin aspart U-100 (NOVOLOG FLEXPEN U-100 INSULIN) 100 unit/mL (3 mL) InPn pen Inject 30 Units into the skin 3 (three) times daily with meals. (Patient not taking: Reported on 11/29/2023)    insulin syringe-needle U-100 0.3 mL 30 Syrg Use with Levemir vial BID (Patient not taking: Reported on 11/29/2023)    JANUVIA 100 mg Tab Take 1 tablet by mouth once daily    lancets (ACCU-CHEK SOFTCLIX LANCETS) Misc 1 each by Misc.(Non-Drug; Combo Route) route 4 (four) times daily.    meclizine (ANTIVERT) 25 mg tablet TAKE 1 TABLET BY MOUTH THREE TIMES DAILY AS NEEDED FOR DIZZINESS AND FOR NAUSEA    meclizine (ANTIVERT) 25 mg tablet Take 1 tablet by mouth three times daily as needed for dizziness or nausea    melatonin 5 mg Tab Take 10 mg by mouth nightly as needed.     metFORMIN (GLUCOPHAGE) 1000 MG tablet Take 1 tablet (1,000 mg total) by mouth 2 (two) times daily with meals.    methylPREDNISolone (MEDROL DOSEPACK) 4 mg tablet use as directed (Patient not taking: Reported on 11/29/2023)    omeprazole (PRILOSEC) 20 MG capsule Take 1 capsule (20 mg total) by mouth 2 (two) times daily.    pen needle, diabetic (LITE TOUCH INSULIN PEN NEEDLES) 31 gauge x 3/16" Ndle 1 application by Misc.(Non-Drug; Combo Route) route 4 (four) times daily.    pen needle, diabetic 32 gauge x 5/32" Ndle 1 Device by Misc.(Non-Drug; Combo Route) route as directed. Needles to be used with Novolog pens and Levemir " pens daily.    ramipriL (ALTACE) 5 MG capsule Take 1 capsule by mouth once daily    TRESIBA FLEXTOUCH U-200 200 unit/mL (3 mL) insulin pen Inject 60 Units into the skin once daily. (Patient taking differently: Inject 40 Units into the skin once daily.)    [DISCONTINUED] clonazePAM (KLONOPIN) 0.5 MG tablet Take 1 tablet (0.5 mg total) by mouth 2 (two) times daily as needed for Anxiety.     Family History       Problem Relation (Age of Onset)    Depression Daughter    Diabetes Mother, Sister    Heart attack Mother (66), Son (38)    Heart disease Sister, Brother, Brother    Hypertension Mother, Daughter, Son    Thyroid disease Daughter    Uterine cancer Mother          Tobacco Use    Smoking status: Former     Current packs/day: 0.00     Average packs/day: 1 pack/day for 10.0 years (10.0 ttl pk-yrs)     Types: Cigarettes     Start date: 1978     Quit date: 1988     Years since quittin.9    Smokeless tobacco: Never   Substance and Sexual Activity    Alcohol use: Yes     Alcohol/week: 1.0 standard drink of alcohol     Types: 1 Glasses of wine per week     Comment: Occ.    Drug use: No    Sexual activity: Never     Review of Systems   Reason unable to perform ROS: dementia.     Objective:     Vital Signs (Most Recent):  Temp: 98.5 °F (36.9 °C) (24 1500)  Pulse: 79 (24 1500)  Resp: 18 (24 1500)  BP: (!) 110/55 (24 1500)  SpO2: (!) 94 % (24 1500) Vital Signs (24h Range):  Temp:  [98.2 °F (36.8 °C)-98.5 °F (36.9 °C)] 98.5 °F (36.9 °C)  Pulse:  [] 79  Resp:  [14-20] 18  SpO2:  [93 %-100 %] 94 %  BP: ()/() 110/55     Weight: 90.7 kg (200 lb)  Body mass index is 36.58 kg/m².     Physical Exam  Constitutional:       Comments: Lethargic  Doesn't open eyes to voice but moves appropriately to sternal rub and painful stimuli    HENT:      Head: Normocephalic and atraumatic.   Cardiovascular:      Rate and Rhythm: Normal rate and regular rhythm.   Pulmonary:      Effort:  "Respiratory distress present.      Breath sounds: Normal breath sounds.   Neurological:      Mental Status: She is disoriented.      Comments: Moves extremities equally                Significant Labs: A1C:   Recent Labs   Lab 07/08/24  1043 12/13/24  0845   HGBA1C 8.0* 8.4*     ABGs: No results for input(s): "PH", "PCO2", "HCO3", "POCSATURATED", "BE", "TOTALHB", "COHB", "METHB", "O2HB", "POCFIO2", "PO2" in the last 48 hours.  Bilirubin:   Recent Labs   Lab 12/12/24  1735   BILITOT 0.7     Blood Culture: No results for input(s): "LABBLOO" in the last 48 hours.  BMP:   Recent Labs   Lab 12/12/24  1735   *      K 3.8      CO2 19*   BUN 13   CREATININE 0.8   CALCIUM 8.9     CBC:   Recent Labs   Lab 12/12/24  1735   WBC 7.58   HGB 13.9   HCT 39.6        CMP:   Recent Labs   Lab 12/12/24  1735      K 3.8      CO2 19*   *   BUN 13   CREATININE 0.8   CALCIUM 8.9   PROT 7.5   ALBUMIN 4.2   BILITOT 0.7   ALKPHOS 63   AST 21   ALT 18   ANIONGAP 17*     Cardiac Markers: No results for input(s): "CKMB", "MYOGLOBIN", "BNP", "TROPISTAT" in the last 48 hours.  Coagulation: No results for input(s): "PT", "INR", "APTT" in the last 48 hours.  Lactic Acid: No results for input(s): "LACTATE" in the last 48 hours.  Lipase: No results for input(s): "LIPASE" in the last 48 hours.  Lipid Panel: No results for input(s): "CHOL", "HDL", "LDLCALC", "TRIG", "CHOLHDL" in the last 48 hours.  Magnesium: No results for input(s): "MG" in the last 48 hours.  POCT Glucose:   Recent Labs   Lab 12/12/24  1730 12/13/24  0815 12/13/24  1200   POCTGLUCOSE 250* 246* 223*     Prealbumin: No results for input(s): "PREALBUMIN" in the last 48 hours.  Respiratory Culture: No results for input(s): "GSRESP", "RESPIRATORYC" in the last 48 hours.  Troponin:   Recent Labs   Lab 12/12/24  1735   TROPONINI <0.006     TSH:   Recent Labs   Lab 12/12/24  1735   TSH 2.210     Urine Culture: No results for input(s): "LABURIN" in " the last 48 hours.  Urine Studies:   Recent Labs   Lab 12/12/24  1653   COLORU Yellow   APPEARANCEUA Clear   PHUR 6.0   SPECGRAV 1.015   PROTEINUA 1+*   GLUCUA 2+*   KETONESU Trace*   BILIRUBINUA Negative   OCCULTUA Negative   NITRITE Negative   UROBILINOGEN Negative   LEUKOCYTESUR Trace*   RBCUA 2   WBCUA 0   BACTERIA Occasional   SQUAMEPITHEL 3   HYALINECASTS 0       Significant Imaging: I have reviewed all pertinent imaging results/findings within the past 24 hours.

## 2024-12-14 LAB
AMPHET+METHAMPHET UR QL: NEGATIVE
BARBITURATES UR QL SCN>200 NG/ML: NEGATIVE
BENZODIAZ UR QL SCN>200 NG/ML: NEGATIVE
BZE UR QL SCN: NEGATIVE
CANNABINOIDS UR QL SCN: NEGATIVE
CREAT UR-MCNC: 163 MG/DL (ref 15–325)
ETHANOL UR-MCNC: <10 MG/DL
METHADONE UR QL SCN>300 NG/ML: NEGATIVE
OPIATES UR QL SCN: NEGATIVE
PCP UR QL SCN>25 NG/ML: NEGATIVE
POCT GLUCOSE: 233 MG/DL (ref 70–110)
POCT GLUCOSE: 237 MG/DL (ref 70–110)
POCT GLUCOSE: 309 MG/DL (ref 70–110)
POCT GLUCOSE: 330 MG/DL (ref 70–110)
POCT GLUCOSE: 447 MG/DL (ref 70–110)
TOXICOLOGY INFORMATION: NORMAL

## 2024-12-14 PROCEDURE — 25000003 PHARM REV CODE 250: Mod: HCNC | Performed by: PHYSICIAN ASSISTANT

## 2024-12-14 PROCEDURE — 25000003 PHARM REV CODE 250: Mod: HCNC | Performed by: FAMILY MEDICINE

## 2024-12-14 PROCEDURE — 99233 SBSQ HOSP IP/OBS HIGH 50: CPT | Mod: HCNC,,, | Performed by: FAMILY MEDICINE

## 2024-12-14 PROCEDURE — G0427 INPT/ED TELECONSULT70: HCPCS | Mod: HCNC,95,, | Performed by: STUDENT IN AN ORGANIZED HEALTH CARE EDUCATION/TRAINING PROGRAM

## 2024-12-14 PROCEDURE — G0378 HOSPITAL OBSERVATION PER HR: HCPCS | Mod: HCNC

## 2024-12-14 RX ORDER — MEMANTINE HYDROCHLORIDE 10 MG/1
10 TABLET ORAL DAILY
Status: DISCONTINUED | OUTPATIENT
Start: 2024-12-14 | End: 2024-12-23 | Stop reason: HOSPADM

## 2024-12-14 RX ADMIN — PANTOPRAZOLE SODIUM 40 MG: 40 TABLET, DELAYED RELEASE ORAL at 08:12

## 2024-12-14 RX ADMIN — QUETIAPINE FUMARATE 12.5 MG: 25 TABLET ORAL at 08:12

## 2024-12-14 RX ADMIN — INSULIN ASPART 2 UNITS: 100 INJECTION, SOLUTION INTRAVENOUS; SUBCUTANEOUS at 07:12

## 2024-12-14 RX ADMIN — INSULIN ASPART 5 UNITS: 100 INJECTION, SOLUTION INTRAVENOUS; SUBCUTANEOUS at 12:12

## 2024-12-14 RX ADMIN — GABAPENTIN 300 MG: 300 CAPSULE ORAL at 08:12

## 2024-12-14 RX ADMIN — MEMANTINE 10 MG: 10 TABLET ORAL at 04:12

## 2024-12-14 RX ADMIN — INSULIN ASPART 4 UNITS: 100 INJECTION, SOLUTION INTRAVENOUS; SUBCUTANEOUS at 05:12

## 2024-12-14 RX ADMIN — FERROUS SULFATE TAB 325 MG (65 MG ELEMENTAL FE) 1 EACH: 325 (65 FE) TAB at 08:12

## 2024-12-14 RX ADMIN — DONEPEZIL HYDROCHLORIDE 10 MG: 5 TABLET, FILM COATED ORAL at 08:12

## 2024-12-14 RX ADMIN — ATORVASTATIN CALCIUM 20 MG: 20 TABLET, FILM COATED ORAL at 08:12

## 2024-12-14 RX ADMIN — INSULIN ASPART 1 UNITS: 100 INJECTION, SOLUTION INTRAVENOUS; SUBCUTANEOUS at 12:12

## 2024-12-14 NOTE — NURSING
Pt up from ED via hospital bed. Report received from ENRIQUE Mckeon. ADELE. VSS. Plan of care reviewed with pt. Call light within reach. Bed alarm set.

## 2024-12-14 NOTE — TELEMEDICINE CONSULT
"[facility]   General Neurology  Consult Note      Consult Information  Inpatient consult to Telemedicine-General Neurology  Consult performed by: Zackary Eid MD  Consult ordered by: Angella Price PA-C  Reason for consult: Hallucination  Assessment/Recommendations: Given h/o similar hallucinations combined with memory decline and intermittent confusion with gait problems (?parkinsonism), I am suspecting lewy body variant of dementia, likely an advanced stage at this point. Aricept has not helped much according to daughter.     -Cont. Aricept at current dose  -Add Memantine 10mg qAM  -Start Seroquel 12.5 mg qhs for Hallucinations  -Establish appointment with memory clinic at main [place] and schedule Neuropsychological evaluation(To establish a baseline in order to follow the patient over time. To help differentiate among different forms of neurodegenerative dementias. To assess competencies and guide recommendations pertaining to driving, financial decisions, and need for increasing supervision. To identify opportunities for compensatory or rehabilitative treatment strategies.       If any immediate concerns, please seek ER attention.            Consulting Provider:    Current Providers  No providers found    Patient Location:  [address] SURGI* IP Unit    Spoke hospital nurse at bedside with patient assisting consultant.  Patient information was obtained from caregiver / friend.       Neurology Documentation       Blood pressure 134/73, pulse 84, temperature 98.8 °F (37.1 °C), temperature source Oral, resp. rate 20, height 5' 2" (1.575 m), weight 75.8 kg (167 lb 1.7 oz), SpO2 95%, not currently breastfeeding.    Medical Decision Making  HPI:  79 y.o. female with medical history of HTN, hypothyroidism, DM 2 and Dementia who presented to the ED With hallucinations and worsening confusion.  History is obtained from patient's daughter and granddaughter who are present by the bedside.  " "Patient started seeing "playing cards" around her and on the wall of her room starting Thursday.  Later, she also reported that she was seen "gets dancing" around her in when she would try to talk to them "they would disappear".     Per daughter, patient is diagnosed with unknown subtype of dementia in 2018 by neurology nurse practitioner.  Daughter denies patient having any prior neuropsychological evaluation.  She takes donepezil 10 mg q.h.s..  2018, patient has had steady decline in her memory.  She also have gait issues and therefore, she is usually on the wheelchair.  Her dementia has progressed to the point that she needs continuous assistance in bathing, dressing, feeding and managing finances.  She does not drive.  She lives with her daughter who is her primary care taker.      Images personally reviewed and interpreted:  Study: MRI Brain without contrast   Study Interpretation: No acute intracranial abnormalities. Increase in size of a right vestibular schwannoma with slight distortion of right middle cerebellar peduncle    Additional studies reviewed:   Study: Cardiac_Neuro: none  Study Interpretation:  N/A    Laboratory studies reviewed:  BMP:   Lab Results   Component Value Date     12/12/2024    K 3.8 12/12/2024     12/12/2024    CO2 19 (L) 12/12/2024    BUN 13 12/12/2024    CREATININE 0.8 12/12/2024    CALCIUM 8.9 12/12/2024     CBC:   Lab Results   Component Value Date    WBC 7.58 12/12/2024    RBC 4.56 12/12/2024    HGB 13.9 12/12/2024    HCT 39.6 12/12/2024    HCT 45 06/29/2014     12/12/2024    MCV 87 12/12/2024    MCH 30.5 12/12/2024    MCHC 35.1 12/12/2024     Lipid Panel:   Lab Results   Component Value Date    CHOL 229 (H) 07/08/2024    LDLCALC 152.6 07/08/2024    HDL 40 07/08/2024    TRIG 182 (H) 07/08/2024     Coagulation:   Lab Results   Component Value Date    INR 1.1 06/28/2020    APTT 28.6 06/28/2020     Hgb A1C:   Lab Results   Component Value Date    HGBA1C 8.4 (H) " 12/13/2024     TSH:   Lab Results   Component Value Date    TSH 2.210 12/12/2024       Documentation personally reviewed:  Notes: Notes: H&P      Post charge discharge plan:  Clinic follow up: Memory Clinic    Visit Type: in person or virtual  Timeframe: 2 weeks        Additional Physical Exam, History, & ROS  ROS  Physical Exam  Neurological:      Mental Status: She is alert. She is disoriented.      GCS: GCS eye subscore is 4. GCS verbal subscore is 5. GCS motor subscore is 6.      Cranial Nerves: Cranial nerves 2-12 are intact.      Motor: Motor function is intact.      Comments: Oriented to self and place but not to time (answers year as 2020) or situation       Past Medical History:   Diagnosis Date    Abnormal Pap smear     DIONNE (acute kidney injury) 6/29/2014    DIONNE (acute kidney injury) 6/29/2014    Alzheimer's dementia 4/12/2018    Anemia     Breast cancer 1995    left breast    Breast disorder     Diabetes mellitus     Diabetes mellitus, type 2     ESSENTIAL HYPERTENSION 10/1/2012    Hypothyroid 6/29/2014    Pneumonia 01/16/2020     Past Surgical History:   Procedure Laterality Date    CATARACT EXTRACTION, BILATERAL      COLONOSCOPY N/A 8/10/2020    Procedure: COLONOSCOPY;  Surgeon: Guerda Perales MD;  Location: [facility] [place] [facility];  Service: Endoscopy;  Laterality: N/A;    ESOPHAGOGASTRODUODENOSCOPY N/A 8/10/2020    Procedure: EGD (ESOPHAGOGASTRODUODENOSCOPY) WITH BIOPSY;  Surgeon: Guerda Perales MD;  Location: [facility] [place] [facility];  Service: Endoscopy;  Laterality: N/A;    HYSTERECTOMY      masectomy      single left breast    MASTECTOMY Left     OOPHORECTOMY  1997    only 1     Family History   Problem Relation Name Age of Onset    Diabetes Mother      Hypertension Mother      Heart attack Mother  66    Uterine cancer Mother      Diabetes Sister      Heart disease Sister      Heart disease Brother      Hypertension Daughter      Thyroid disease Daughter      Heart attack Son adopted 38    Heart disease Brother       Depression Daughter      Hypertension Son         Diagnoses  No problems updated.    Zackary Eid MD    Neurology consultation requested by spoke provider. Audiovisual encounter with the patient performed using a secure connection.  Results and impressions from the visit are documented on this note and were communicated to the consulting provider/team via direct communication. The note has been shared for addition to the patients electronic medical record.

## 2024-12-14 NOTE — PROGRESS NOTES
[facility] ([address] [place] [address] Medicine  Progress Note    Patient Name: Narda Person  MRN: [id number]  Patient Class: OP- Observation   Admission Date: 12/12/2024  Length of Stay: 0 days  Attending Physician: Santos Shelton MD  Primary Care Provider: Sirena Coleman MD        Subjective     Principal Problem:Hallucinations        HPI:  Patient is a 79 year old female with medical history of HTN, hypothyroidism, DM 2 and Dementia who presented to the ED With hallucinations and worsening confusion.  Daughter states she did have one eye brown higher than the other.   No other complaints per daughter who is the POA.    Admitted for hallucinations.      Overview/Hospital Course:  Patient had a lot of anxiety last night.  Required seroquel which calmed her down a little.  I evaluated the patient today with the patient's granddaughter and daughter.  They tell me they are unable to care for her at home.  During my evaluation the patient could barely stand up with maximal assistance, and required maximum assistance to get him back into the bed.  She has an extreme fall risk.  They would like patient to get admitted to a nursing home.  Neurology evaluated patient and recommended a low-dose Seroquel to help with her hallucinations.  She may have Lewy bodies dementia.  Namenda was recommended as well.  They want her to be evaluated in memory clinic    Interval History: anxiety, weakness    Review of Systems   HENT:  Negative for congestion.    Respiratory:  Negative for chest tightness.    Cardiovascular:  Negative for chest pain.   Gastrointestinal:  Negative for abdominal pain.   Musculoskeletal:  Positive for gait problem. Negative for back pain.   Neurological:  Positive for tremors and weakness.   Psychiatric/Behavioral:  Positive for agitation and hallucinations.      Objective:     Vital Signs (Most Recent):  Temp: 98.8 °F (37.1 °C) (12/14/24 1209)  Pulse: 84 (12/14/24 1209)  Resp: 20 (12/14/24 1209)  BP:  134/73 (12/14/24 1209)  SpO2: 95 % (12/14/24 1209) Vital Signs (24h Range):  Temp:  [97.5 °F (36.4 °C)-98.8 °F (37.1 °C)] 98.8 °F (37.1 °C)  Pulse:  [70-84] 84  Resp:  [15-20] 20  SpO2:  [94 %-99 %] 95 %  BP: (104-137)/(48-73) 134/73     Weight: 75.8 kg (167 lb 1.7 oz)  Body mass index is 30.56 kg/m².    Intake/Output Summary (Last 24 hours) at 12/14/2024 1404  Last data filed at 12/14/2024 1330  Gross per 24 hour   Intake 544 ml   Output 450 ml   Net 94 ml         Physical Exam  Constitutional:       Appearance: Normal appearance. She is well-developed.   Eyes:      Extraocular Movements: Extraocular movements intact.      Pupils: Pupils are equal, round, and reactive to light.   Cardiovascular:      Rate and Rhythm: Normal rate and regular rhythm.      Heart sounds: Normal heart sounds. No murmur heard.     No friction rub. No gallop.   Pulmonary:      Effort: Pulmonary effort is normal.      Breath sounds: Normal breath sounds.   Neurological:      Mental Status: She is disoriented.      Motor: Weakness present.      Gait: Gait abnormal.   Psychiatric:         Attention and Perception: She perceives auditory and visual hallucinations.         Mood and Affect: Mood is anxious.         Speech: Speech normal.         Cognition and Memory: Cognition is impaired. Memory is impaired.             Significant Labs: All pertinent labs within the past 24 hours have been reviewed.  BMP:   Recent Labs   Lab 12/12/24  1735   *      K 3.8      CO2 19*   BUN 13   CREATININE 0.8   CALCIUM 8.9     CBC:   Recent Labs   Lab 12/12/24  1735   WBC 7.58   HGB 13.9   HCT 39.6          Significant Imaging: I have reviewed all pertinent imaging results/findings within the past 24 hours.  I have reviewed and interpreted all pertinent imaging results/findings within the past 24 hours.  MRI and CT scan reviewed.    Assessment and Plan     * Hallucinations  Likely due to worsening dementia.  She probably has Lewy  body dementia.  Neurosurgery reviewed CT head with known schwannoma compressing on eric and basilar cisterns.  No further intervention.  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  Tele neurology recommends low-dose Seroquel and to start Namenda.  They recommend referral to memory Clinic.      Advanced care planning/counseling discussion  Discussed goals of care with POA daughter but will follow up after she reviews paperwork.  Daughter and granddaughter are unable to care for patient that their home.  They would like patient to get into a nursing home.  Recommend discussing this with .  Unable to discharge patient this time.      Chronic kidney disease, stage 2 (mild)  Creatine stable for now. BMP reviewed- noted Estimated Creatinine Clearance: 52.6 mL/min (based on SCr of 0.8 mg/dL). according to latest data. Based on current GFR, CKD stage is stage 2 - GFR 60-89.  Monitor UOP and serial BMP and adjust therapy as needed. Renally dose meds. Avoid nephrotoxic medications and procedures.    Uncontrolled type 2 diabetes mellitus with hyperglycemia  S/s insulin         VTE Risk Mitigation (From admission, onward)           Ordered     IP VTE HIGH RISK PATIENT  Once         12/13/24 0152     Place sequential compression device  Until discontinued         12/13/24 0152                    Discharge Planning   DWAYNE:      Code Status: Full Code   Medical Readiness for Discharge Date:                            Santos Shelton MD  Department of Hospital Medicine   [facility] Surg (3rd Fl)

## 2024-12-14 NOTE — ED NOTES
Secure chat started by Sarah Wick MA. Included [employer]. Telemedicine neuro consult will happen tomorrow.

## 2024-12-14 NOTE — ASSESSMENT & PLAN NOTE
She has a high-risk for falls.    Recommend starting physical therapy.  She was able to walk 15 ft with a walker this morning which is a big improvement.  She is able to get out of bed with a little help.  Only option that I can see is referral to nursing home/swing.  Discuss with

## 2024-12-14 NOTE — ASSESSMENT & PLAN NOTE
Likely due to worsening dementia.  She probably has Lewy body dementia.  Neurosurgery reviewed CT head with known schwannoma compressing on eric and basilar cisterns.  No further intervention.  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  Tele neurology recommends low-dose Seroquel and to start Namenda.  They recommend referral to memory Clinic.

## 2024-12-14 NOTE — ASSESSMENT & PLAN NOTE
Discussed goals of care with POA daughter but will follow up after she reviews paperwork.  Daughter and granddaughter are unable to care for patient that their home.  They would like patient to get into a nursing home.  Recommend discussing this with .  Unable to discharge patient this time.

## 2024-12-14 NOTE — SUBJECTIVE & OBJECTIVE
Interval History: anxiety, weakness    Review of Systems   HENT:  Negative for congestion.    Respiratory:  Negative for chest tightness.    Cardiovascular:  Negative for chest pain.   Gastrointestinal:  Negative for abdominal pain.   Musculoskeletal:  Positive for gait problem. Negative for back pain.   Neurological:  Positive for tremors and weakness.   Psychiatric/Behavioral:  Positive for agitation and hallucinations.      Objective:     Vital Signs (Most Recent):  Temp: 98.8 °F (37.1 °C) (12/14/24 1209)  Pulse: 84 (12/14/24 1209)  Resp: 20 (12/14/24 1209)  BP: 134/73 (12/14/24 1209)  SpO2: 95 % (12/14/24 1209) Vital Signs (24h Range):  Temp:  [97.5 °F (36.4 °C)-98.8 °F (37.1 °C)] 98.8 °F (37.1 °C)  Pulse:  [70-84] 84  Resp:  [15-20] 20  SpO2:  [94 %-99 %] 95 %  BP: (104-137)/(48-73) 134/73     Weight: 75.8 kg (167 lb 1.7 oz)  Body mass index is 30.56 kg/m².    Intake/Output Summary (Last 24 hours) at 12/14/2024 1404  Last data filed at 12/14/2024 1330  Gross per 24 hour   Intake 544 ml   Output 450 ml   Net 94 ml         Physical Exam  Constitutional:       Appearance: Normal appearance. She is well-developed.   Eyes:      Extraocular Movements: Extraocular movements intact.      Pupils: Pupils are equal, round, and reactive to light.   Cardiovascular:      Rate and Rhythm: Normal rate and regular rhythm.      Heart sounds: Normal heart sounds. No murmur heard.     No friction rub. No gallop.   Pulmonary:      Effort: Pulmonary effort is normal.      Breath sounds: Normal breath sounds.   Neurological:      Mental Status: She is disoriented.      Motor: Weakness present.      Gait: Gait abnormal.   Psychiatric:         Attention and Perception: She perceives auditory and visual hallucinations.         Mood and Affect: Mood is anxious.         Speech: Speech normal.         Cognition and Memory: Cognition is impaired. Memory is impaired.             Significant Labs: All pertinent labs within the past 24 hours  have been reviewed.  BMP:   Recent Labs   Lab 12/12/24  1735   *      K 3.8      CO2 19*   BUN 13   CREATININE 0.8   CALCIUM 8.9     CBC:   Recent Labs   Lab 12/12/24  1735   WBC 7.58   HGB 13.9   HCT 39.6          Significant Imaging: I have reviewed all pertinent imaging results/findings within the past 24 hours.  I have reviewed and interpreted all pertinent imaging results/findings within the past 24 hours.  MRI and CT scan reviewed.

## 2024-12-14 NOTE — ASSESSMENT & PLAN NOTE
Creatine stable for now. BMP reviewed- noted Estimated Creatinine Clearance: 52.6 mL/min (based on SCr of 0.8 mg/dL). according to latest data. Based on current GFR, CKD stage is stage 2 - GFR 60-89.  Monitor UOP and serial BMP and adjust therapy as needed. Renally dose meds. Avoid nephrotoxic medications and procedures.

## 2024-12-14 NOTE — HOSPITAL COURSE
Patient was started on Namenda and low-dose Seroquel last night.  She seemed to have a better night.  She is still confused.  She was able to work with physical therapy this morning.  She was able to walk 15 ft with a walker.  She is still a significant fall risk in the therapist feels her cognition puts her at severe risk for accidents.  She agrees that nursing home placement would be in her best interest.    Neurology evaluated patient and recommended a low-dose Seroquel to help with her hallucinations.  She may have Lewy bodies dementia.  Namenda was recommended as well.  They want her to be evaluated in memory clinic    12/16 PT HD stable on room air.  Plan to discharge to NH.  PT/OT ordered.  Hallucinations resolved.      12/17 Mg on lower level.  IV mg ordered.  Waiting placement to SNF.      12/18 NAEON.  Waiting placement     12/19 PT HD stable on room air.  Mg 1.6.  IV mg replacement.        12/20-12/22: hyperglycemia still present. Insulin has been adjusted. Family is bringing snacks accounting for fluctuations in glucose. Education provided on diabetic diet. No other acute events and remains stable for discharge pending placement.    12/24  Waiting for placement   Eats very well here ;sugars are high   Will increase insulin

## 2024-12-14 NOTE — SUBJECTIVE & OBJECTIVE
"HPI:  79 y.o. female with medical history of HTN, hypothyroidism, DM 2 and Dementia who presented to the ED With hallucinations and worsening confusion.  History is obtained from patient's daughter and granddaughter who are present by the bedside.  Patient started seeing "playing cards" around her and on the wall of her room starting Thursday.  Later, she also reported that she was seen "gets dancing" around her in when she would try to talk to them "they would disappear".     Per daughter, patient is diagnosed with unknown subtype of dementia in 2018 by neurology nurse practitioner.  Daughter denies patient having any prior neuropsychological evaluation.  She takes donepezil 10 mg q.h.s..  2018, patient has had steady decline in her memory.  She also have gait issues and therefore, she is usually on the wheelchair.  Her dementia has progressed to the point that she needs continuous assistance in bathing, dressing, feeding and managing finances.  She does not drive.  She lives with her daughter who is her primary care taker.      Images personally reviewed and interpreted:  Study: MRI Brain without contrast   Study Interpretation: No acute intracranial abnormalities. Increase in size of a right vestibular schwannoma with slight distortion of right middle cerebellar peduncle    Additional studies reviewed:   Study: Cardiac_Neuro: none  Study Interpretation:  N/A    Laboratory studies reviewed:  BMP:   Lab Results   Component Value Date     12/12/2024    K 3.8 12/12/2024     12/12/2024    CO2 19 (L) 12/12/2024    BUN 13 12/12/2024    CREATININE 0.8 12/12/2024    CALCIUM 8.9 12/12/2024     CBC:   Lab Results   Component Value Date    WBC 7.58 12/12/2024    RBC 4.56 12/12/2024    HGB 13.9 12/12/2024    HCT 39.6 12/12/2024    HCT 45 06/29/2014     12/12/2024    MCV 87 12/12/2024    MCH 30.5 12/12/2024    MCHC 35.1 12/12/2024     Lipid Panel:   Lab Results   Component Value Date    CHOL 229 (H) " 07/08/2024    LDLCALC 152.6 07/08/2024    HDL 40 07/08/2024    TRIG 182 (H) 07/08/2024     Coagulation:   Lab Results   Component Value Date    INR 1.1 06/28/2020    APTT 28.6 06/28/2020     Hgb A1C:   Lab Results   Component Value Date    HGBA1C 8.4 (H) 12/13/2024     TSH:   Lab Results   Component Value Date    TSH 2.210 12/12/2024       Documentation personally reviewed:  Notes: Notes: H&P      Post charge discharge plan:  Clinic follow up: Memory Clinic    Visit Type: in person or virtual  Timeframe: 2 weeks

## 2024-12-15 LAB
POCT GLUCOSE: 292 MG/DL (ref 70–110)
POCT GLUCOSE: 368 MG/DL (ref 70–110)
POCT GLUCOSE: 373 MG/DL (ref 70–110)
POCT GLUCOSE: 391 MG/DL (ref 70–110)

## 2024-12-15 PROCEDURE — 96372 THER/PROPH/DIAG INJ SC/IM: CPT | Performed by: FAMILY MEDICINE

## 2024-12-15 PROCEDURE — 97161 PT EVAL LOW COMPLEX 20 MIN: CPT | Mod: HCNC

## 2024-12-15 PROCEDURE — G0378 HOSPITAL OBSERVATION PER HR: HCPCS | Mod: HCNC

## 2024-12-15 PROCEDURE — 25000003 PHARM REV CODE 250: Mod: HCNC | Performed by: PHYSICIAN ASSISTANT

## 2024-12-15 PROCEDURE — 25000003 PHARM REV CODE 250: Mod: HCNC | Performed by: FAMILY MEDICINE

## 2024-12-15 PROCEDURE — 63600175 PHARM REV CODE 636 W HCPCS: Mod: HCNC | Performed by: FAMILY MEDICINE

## 2024-12-15 PROCEDURE — 99233 SBSQ HOSP IP/OBS HIGH 50: CPT | Mod: HCNC,,, | Performed by: FAMILY MEDICINE

## 2024-12-15 RX ORDER — INSULIN GLARGINE 100 [IU]/ML
18 INJECTION, SOLUTION SUBCUTANEOUS DAILY
Status: DISCONTINUED | OUTPATIENT
Start: 2024-12-15 | End: 2024-12-16

## 2024-12-15 RX ADMIN — GABAPENTIN 300 MG: 300 CAPSULE ORAL at 09:12

## 2024-12-15 RX ADMIN — INSULIN ASPART 3 UNITS: 100 INJECTION, SOLUTION INTRAVENOUS; SUBCUTANEOUS at 09:12

## 2024-12-15 RX ADMIN — INSULIN ASPART 5 UNITS: 100 INJECTION, SOLUTION INTRAVENOUS; SUBCUTANEOUS at 12:12

## 2024-12-15 RX ADMIN — INSULIN ASPART 3 UNITS: 100 INJECTION, SOLUTION INTRAVENOUS; SUBCUTANEOUS at 08:12

## 2024-12-15 RX ADMIN — INSULIN GLARGINE 18 UNITS: 100 INJECTION, SOLUTION SUBCUTANEOUS at 12:12

## 2024-12-15 RX ADMIN — MEMANTINE 10 MG: 10 TABLET ORAL at 09:12

## 2024-12-15 RX ADMIN — INSULIN ASPART 5 UNITS: 100 INJECTION, SOLUTION INTRAVENOUS; SUBCUTANEOUS at 05:12

## 2024-12-15 RX ADMIN — QUETIAPINE FUMARATE 12.5 MG: 25 TABLET ORAL at 08:12

## 2024-12-15 RX ADMIN — FERROUS SULFATE TAB 325 MG (65 MG ELEMENTAL FE) 1 EACH: 325 (65 FE) TAB at 09:12

## 2024-12-15 RX ADMIN — ATORVASTATIN CALCIUM 20 MG: 20 TABLET, FILM COATED ORAL at 09:12

## 2024-12-15 RX ADMIN — PANTOPRAZOLE SODIUM 40 MG: 40 TABLET, DELAYED RELEASE ORAL at 09:12

## 2024-12-15 RX ADMIN — DONEPEZIL HYDROCHLORIDE 10 MG: 5 TABLET, FILM COATED ORAL at 08:12

## 2024-12-15 NOTE — PROGRESS NOTES
[place] [facility] ([address] [place] [address] Medicine  Progress Note    Patient Name: Narda Person  MRN: [id number]  Patient Class: OP- Observation   Admission Date: 12/12/2024  Length of Stay: 0 days  Attending Physician: Santos Shelton MD  Primary Care Provider: Sirena Coleman MD        Subjective     Principal Problem:Hallucinations        HPI:  Patient is a 79 year old female with medical history of HTN, hypothyroidism, DM 2 and Dementia who presented to the ED With hallucinations and worsening confusion.  Daughter states she did have one eye brown higher than the other.   No other complaints per daughter who is the POA.    Admitted for hallucinations.      Overview/Hospital Course:  Patient was started on Namenda and low-dose Seroquel last night.  She seemed to have a better night.  She is still confused.  She was able to work with physical therapy this morning.  She was able to walk 15 ft with a walker.  She is still a significant fall risk in the therapist feels her cognition puts her at severe risk for accidents.  She agrees that nursing home placement would be in her best interest.    Neurology evaluated patient and recommended a low-dose Seroquel to help with her hallucinations.  She may have Lewy bodies dementia.  Namenda was recommended as well.  They want her to be evaluated in memory clinic    Interval History: anxiety, weakness    Review of Systems   HENT:  Negative for congestion.    Respiratory:  Negative for chest tightness.    Cardiovascular:  Negative for chest pain.   Gastrointestinal:  Negative for abdominal pain.   Musculoskeletal:  Positive for gait problem. Negative for back pain.   Neurological:  Positive for tremors and weakness.   Psychiatric/Behavioral:  Positive for agitation and hallucinations.      Objective:     Vital Signs (Most Recent):  Temp: 98.4 °F (36.9 °C) (12/15/24 0722)  Pulse: 78 (12/15/24 0722)  Resp: 18 (12/15/24 0722)  BP: (!) 132/58 (12/15/24 0722)  SpO2: 96 %  "(12/15/24 0722) Vital Signs (24h Range):  Temp:  [97.8 °F (36.6 °C)-98.8 °F (37.1 °C)] 98.4 °F (36.9 °C)  Pulse:  [78-84] 78  Resp:  [18-20] 18  SpO2:  [93 %-96 %] 96 %  BP: (128-150)/(58-73) 132/58     Weight: 75.8 kg (167 lb 1.7 oz)  Body mass index is 30.56 kg/m².    Intake/Output Summary (Last 24 hours) at 12/15/2024 0956  Last data filed at 12/15/2024 0555  Gross per 24 hour   Intake 444 ml   Output 1250 ml   Net -806 ml         Physical Exam  Constitutional:       Appearance: Normal appearance. She is well-developed.   Eyes:      Extraocular Movements: Extraocular movements intact.      Pupils: Pupils are equal, round, and reactive to light.   Cardiovascular:      Rate and Rhythm: Normal rate and regular rhythm.      Heart sounds: Normal heart sounds. No murmur heard.     No friction rub. No gallop.   Pulmonary:      Effort: Pulmonary effort is normal.      Breath sounds: Normal breath sounds.   Musculoskeletal:      Right lower leg: No edema.      Left lower leg: No edema.   Neurological:      Mental Status: Mental status is at baseline. She is disoriented.      Motor: Weakness present.      Gait: Gait abnormal.   Psychiatric:         Attention and Perception: She perceives auditory and visual hallucinations.         Mood and Affect: Mood is anxious.         Speech: Speech normal.         Cognition and Memory: Cognition is impaired. Memory is impaired.      Comments: Hallucination seemed to be a little better since starting Seroquel.             Significant Labs: All pertinent labs within the past 24 hours have been reviewed.  BMP:   No results for input(s): "GLU", "NA", "K", "CL", "CO2", "BUN", "CREATININE", "CALCIUM", "MG" in the last 48 hours.    CBC:   No results for input(s): "WBC", "HGB", "HCT", "PLT" in the last 48 hours.      Significant Imaging: I have reviewed all pertinent imaging results/findings within the past 24 hours.  I have reviewed and interpreted all pertinent imaging results/findings " within the past 24 hours.  MRI and CT scan reviewed.    Assessment and Plan     * Hallucinations  Likely due to worsening dementia.  She probably has Lewy body dementia.  Namenda and Seroquel started.  Last night was a better night.  Less agitation, confusion.  Hallucination seemed to be a little better.  She is still significantly cognitively impaired.  Neurosurgery reviewed CT head with known schwannoma compressing on eric and basilar cisterns.  No further intervention.  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  Tele neurology recommends low-dose Seroquel and to start Namenda.  They recommend referral to memory Clinic.  Family would like her admitted to a nursing home.      Advanced care planning/counseling discussion  Discussed goals of care with POA daughter but will follow up after she reviews paperwork.  Daughter and granddaughter are unable to care for patient that their home.  They would like patient to get into a nursing home.  Recommend discussing this with .  Unable to discharge patient this time.      Chronic kidney disease, stage 2 (mild)  Creatine stable for now. BMP reviewed- noted Estimated Creatinine Clearance: 52.6 mL/min (based on SCr of 0.8 mg/dL). according to latest data. Based on current GFR, CKD stage is stage 2 - GFR 60-89.  Monitor UOP and serial BMP and adjust therapy as needed. Renally dose meds. Avoid nephrotoxic medications and procedures.    Uncontrolled type 2 diabetes mellitus with hyperglycemia  S/s insulin       Gait abnormality  She has a high-risk for falls.    Recommend starting physical therapy.  She was able to walk 15 ft with a walker this morning which is a big improvement.  She is able to get out of bed with a little help.  Only option that I can see is referral to nursing home/swing.  Discuss with       Diabetes mellitus, type 2  Patient's FSGs are uncontrolled due to hyperglycemia on  current medication regimen.  Last A1c reviewed-   Lab Results   Component Value Date    HGBA1C 8.4 (H) 12/13/2024     Most recent fingerstick glucose reviewed-   Recent Labs   Lab 12/14/24  1214 12/14/24  1610 12/14/24  1910 12/15/24  0811   POCTGLUCOSE 447* 309* 330* 292*     Current correctional scale  Medium  Increase anti-hyperglycemic dose as follows-   Antihyperglycemics (From admission, onward)      Start     Stop Route Frequency Ordered    12/15/24 1115  insulin glargine U-100 (Lantus) pen 20 Units         -- SubQ Daily 12/15/24 1004    12/13/24 0928  insulin aspart U-100 pen 0-5 Units         -- SubQ Before meals & nightly PRN 12/13/24 0828          Hold Oral hypoglycemics while patient is in the hospital.  Patient takes Basaglar 60 units daily, NovoLog 30 units before each meal      VTE Risk Mitigation (From admission, onward)           Ordered     IP VTE HIGH RISK PATIENT  Once         12/13/24 0152     Place sequential compression device  Until discontinued         12/13/24 0152                    Discharge Planning   DWAYNE:      Code Status: Full Code   Medical Readiness for Discharge Date:                            Santos Shelton MD  Department of Hospital Medicine   [place] [facility] Surg (3rd Fl)

## 2024-12-15 NOTE — PLAN OF CARE
Problem: Physical Therapy  Goal: Physical Therapy Goal  Description: Goals to be met by: 2024    Patient will increase functional independence with mobility by performin. Supine to sit with Standby Assistance.  2. Sit to supine with Standby Assistance.  3. Bed to chair transfer with Standby Assistance with rolling walker using Step Transfer technique.  4. Sit to Stand with Standby Assistance with rolling walker.  5. Gait  x 100  feet with Standby Assistance with rolling walker.  6. Lower extremity exercise program x10 reps, with assistance as needed.     Outcome: Plan of care established, patient was incontinent o f bowel and bladder, strong foul smell; tolerated ambulation for about 50 feet with RW

## 2024-12-15 NOTE — SUBJECTIVE & OBJECTIVE
Interval History: anxiety, weakness    Review of Systems   HENT:  Negative for congestion.    Respiratory:  Negative for chest tightness.    Cardiovascular:  Negative for chest pain.   Gastrointestinal:  Negative for abdominal pain.   Musculoskeletal:  Positive for gait problem. Negative for back pain.   Neurological:  Positive for tremors and weakness.   Psychiatric/Behavioral:  Positive for agitation and hallucinations.      Objective:     Vital Signs (Most Recent):  Temp: 98.4 °F (36.9 °C) (12/15/24 0722)  Pulse: 78 (12/15/24 0722)  Resp: 18 (12/15/24 0722)  BP: (!) 132/58 (12/15/24 0722)  SpO2: 96 % (12/15/24 0722) Vital Signs (24h Range):  Temp:  [97.8 °F (36.6 °C)-98.8 °F (37.1 °C)] 98.4 °F (36.9 °C)  Pulse:  [78-84] 78  Resp:  [18-20] 18  SpO2:  [93 %-96 %] 96 %  BP: (128-150)/(58-73) 132/58     Weight: 75.8 kg (167 lb 1.7 oz)  Body mass index is 30.56 kg/m².    Intake/Output Summary (Last 24 hours) at 12/15/2024 0956  Last data filed at 12/15/2024 0555  Gross per 24 hour   Intake 444 ml   Output 1250 ml   Net -806 ml         Physical Exam  Constitutional:       Appearance: Normal appearance. She is well-developed.   Eyes:      Extraocular Movements: Extraocular movements intact.      Pupils: Pupils are equal, round, and reactive to light.   Cardiovascular:      Rate and Rhythm: Normal rate and regular rhythm.      Heart sounds: Normal heart sounds. No murmur heard.     No friction rub. No gallop.   Pulmonary:      Effort: Pulmonary effort is normal.      Breath sounds: Normal breath sounds.   Musculoskeletal:      Right lower leg: No edema.      Left lower leg: No edema.   Neurological:      Mental Status: Mental status is at baseline. She is disoriented.      Motor: Weakness present.      Gait: Gait abnormal.   Psychiatric:         Attention and Perception: She perceives auditory and visual hallucinations.         Mood and Affect: Mood is anxious.         Speech: Speech normal.         Cognition and Memory:  "Cognition is impaired. Memory is impaired.      Comments: Hallucination seemed to be a little better since starting Seroquel.             Significant Labs: All pertinent labs within the past 24 hours have been reviewed.  BMP:   No results for input(s): "GLU", "NA", "K", "CL", "CO2", "BUN", "CREATININE", "CALCIUM", "MG" in the last 48 hours.    CBC:   No results for input(s): "WBC", "HGB", "HCT", "PLT" in the last 48 hours.      Significant Imaging: I have reviewed all pertinent imaging results/findings within the past 24 hours.  I have reviewed and interpreted all pertinent imaging results/findings within the past 24 hours.  MRI and CT scan reviewed.  "

## 2024-12-15 NOTE — ASSESSMENT & PLAN NOTE
Patient's FSGs are uncontrolled due to hyperglycemia on current medication regimen.  Last A1c reviewed-   Lab Results   Component Value Date    HGBA1C 8.4 (H) 12/13/2024     Most recent fingerstick glucose reviewed-   Recent Labs   Lab 12/14/24  1214 12/14/24  1610 12/14/24  1910 12/15/24  0811   POCTGLUCOSE 447* 309* 330* 292*     Current correctional scale  Medium  Increase anti-hyperglycemic dose as follows-   Antihyperglycemics (From admission, onward)      Start     Stop Route Frequency Ordered    12/15/24 1115  insulin glargine U-100 (Lantus) pen 20 Units         -- SubQ Daily 12/15/24 1004    12/13/24 0928  insulin aspart U-100 pen 0-5 Units         -- SubQ Before meals & nightly PRN 12/13/24 0828          Hold Oral hypoglycemics while patient is in the hospital.  Patient takes Basaglar 60 units daily, NovoLog 30 units before each meal

## 2024-12-15 NOTE — ASSESSMENT & PLAN NOTE
Likely due to worsening dementia.  She probably has Lewy body dementia.  Namenda and Seroquel started.  Last night was a better night.  Less agitation, confusion.  Hallucination seemed to be a little better.  She is still significantly cognitively impaired.  Neurosurgery reviewed CT head with known schwannoma compressing on eric and basilar cisterns.  No further intervention.  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  Tele neurology recommends low-dose Seroquel and to start Namenda.  They recommend referral to memory Clinic.  Family would like her admitted to a nursing home.

## 2024-12-15 NOTE — PT/OT/SLP EVAL
Physical Therapy Evaluation     Patient Name: Narda Person   MRN: [id number]  Recent Surgery: * No surgery found *      Recommendations:     Discharge Recommendations: Moderate Intensity Therapy (SNF placement)   Discharge Equipment Recommendations: to be determined by next level of care   Barriers to discharge: Increased level of assist, Decreased caregiver support, and cognitive deficits     Assessment:     Narda Person is a 79 y.o. female admitted with a medical diagnosis of Hallucinations. She presents with the following impairments/functional limitations: weakness, impaired endurance, impaired cognition, decreased ROM, impaired muscle length, impaired self care skills, impaired functional mobility, decreased lower extremity function, decreased upper extremity function, decreased safety awareness, impaired balance, impaired cardiopulmonary response to activity, gait instability. Patient's prior level of function is unknown at this time, no family present in the room, patient is confused and has cognitive deficits.     At the time of evaluation, patient was found supine in bed, incontinent of bowel and bladder, strong foul urine smell with dark brown color on the Purewick canister.  Patient is confused, oriented to person only; she keeps looking for her daughters and she seems lost, she states she white snot know where she is.  Patient was re-oriented but unable to retain the information within a minute.  Patient required  min assist with supine <> sit, min assist with sit <> stand using a RW, ambulated ~10 feet and ~50 feet with RW with min assist  of 1, forward flexed posture..  Tolerated ~5 minutes with standing using a RW min assist of while trying to do perineal hygiene and change diaper and hospital gown.     Patient needs 24/7 supervision and assistance  due to  bowel and bladder incontinence and cognitive deficits.  She is a high risk for falls. Discussed case with MD and family is interested in nursing home  "placement which will will be appropriate at this time.     Rehab Prognosis: Fair; patient would benefit from acute PT services to address these deficits and reach maximum level of function.    Plan:     During this hospitalization, patient to be seen 5 x/week to address the above listed problems via gait training, therapeutic activities, therapeutic exercises, neuromuscular re-education    Plan of Care Expires: 12/20/24    Subjective     Chief Complaint: "Where am I? Where are my daughters?"  Patient Comments/Goals: unstated   Pain/Comfort:  Pain Rating 1: 0/10  Pain Rating Post-Intervention 1: 0/10    Social History:  Living Environment: Patient lives with their daughter   Prior Level of Function: Prior to admission, patient  unknown at this time, no family present and patient is unable to provide information due to  cognitive deficits.  Equipment Used at Home: other (see comments) (unknown at this time, unable to provide information)  DME owned (not currently used):  unknown   Assistance Upon Discharge: facility staff    Objective:     Communicated with nurse and patient  prior to session. Patient found supine with peripheral IV, PureWick upon PT entry to room.    General Precautions: Standard, fall   Orthopedic Precautions: N/A   Braces: N/A    Respiratory Status: Room air    Exams:  Cognition: Patient is oriented to Person, Not oriented to place, Not oriented to time, and Not oriented to situation  RLE ROM: WFL  RLE Strength:  grossly graded 3/5   LLE ROM: WFL  LLE Strength:  grossly graded 3/5   Gross Motor Coordination: WFL  Postural Exam: Patient presented with the following abnormalities:    -       Rounded shoulders  -       Forward head  -       Kyphosis  Sensation:    -       Intact  Skin Integrity/Edema:     -       Skin integrity: Visible skin intact    Functional Mobility:  Gait belt applied - Yes  Bed Mobility  Rolling Left: minimum assistance  Rolling Right: minimum assistance  Scooting: minimum " assistance  Supine to Sit: minimum assistance for LE management and trunk management  Sit to Supine: minimum assistance for LE management and trunk management  Transfers  Sit to Stand: minimum assistance with rolling walker and with cues for hand placement and foot placement  Toilet Transfer: minimum assistance with rolling walker and with cues for hand placement and foot placement using Step Transfer  Gait  Patient ambulated 10 feet and 50 feet  with rolling walker and minimum assistance. Patient demonstrates occasional unsteady gait, flexed posture, and decreased may. .. All lines remained intact throughout ambulation trail.  Balance  Sitting: stand by assistance  Standing: contact guard assistance      Therapeutic Activities and Exercises:   Patient educated on role of acute care PT and PT POC, safety while in hospital including calling nurse for mobility, and call light usage  Patient educated about importance of OOB mobility and remaining up in chair most of the day.      AM-PAC 6 CLICK MOBILITY  Total Score:18    Patient left HOB elevated with all lines intact, call button in reach, RN notified, and PCT present.    GOALS:   Multidisciplinary Problems       Physical Therapy Goals          Problem: Physical Therapy    Goal Priority Disciplines Outcome Interventions   Physical Therapy Goal     PT, PT/OT Progressing    Description: Goals to be met by: 2024    Patient will increase functional independence with mobility by performin. Supine to sit with Standby Assistance.  2. Sit to supine with Standby Assistance.  3. Bed to chair transfer with Standby Assistance with rolling walker using Step Transfer technique.  4. Sit to Stand with Standby Assistance with rolling walker.  5. Gait  x 100  feet with Standby Assistance with rolling walker.  6. Lower extremity exercise program x10 reps, with assistance as needed.                          History:     Past Medical History:   Diagnosis Date    Abnormal  Pap smear     DIONNE (acute kidney injury) 6/29/2014    DIONNE (acute kidney injury) 6/29/2014    Alzheimer's dementia 4/12/2018    Anemia     Breast cancer 1995    left breast    Breast disorder     Diabetes mellitus     Diabetes mellitus, type 2     ESSENTIAL HYPERTENSION 10/1/2012    Hypothyroid 6/29/2014    Pneumonia 01/16/2020       Past Surgical History:   Procedure Laterality Date    CATARACT EXTRACTION, BILATERAL      COLONOSCOPY N/A 8/10/2020    Procedure: COLONOSCOPY;  Surgeon: Guerda Perales MD;  Location: [facility];  Service: Endoscopy;  Laterality: N/A;    ESOPHAGOGASTRODUODENOSCOPY N/A 8/10/2020    Procedure: EGD (ESOPHAGOGASTRODUODENOSCOPY) WITH BIOPSY;  Surgeon: Guerda Perales MD;  Location: [facility];  Service: Endoscopy;  Laterality: N/A;    HYSTERECTOMY      masectomy      single left breast    MASTECTOMY Left     OOPHORECTOMY  1997    only 1       Time Tracking:     PT Received On: 12/15/24  PT Start Time: 0925  PT Stop Time: 0953  PT Total Time (min): 28 min     Billable Minutes: Evaluation low complexity     12/15/2024

## 2024-12-16 PROBLEM — E11.65 UNCONTROLLED TYPE 2 DIABETES MELLITUS WITH HYPERGLYCEMIA: Status: RESOLVED | Noted: 2021-04-20 | Resolved: 2024-12-16

## 2024-12-16 PROBLEM — E87.8 ELECTROLYTE ABNORMALITY: Status: ACTIVE | Noted: 2024-12-16

## 2024-12-16 LAB
ANION GAP SERPL CALC-SCNC: 13 MMOL/L (ref 8–16)
BASOPHILS # BLD AUTO: 0.08 K/UL (ref 0–0.2)
BASOPHILS NFR BLD: 1.6 % (ref 0–1.9)
BUN SERPL-MCNC: 17 MG/DL (ref 8–23)
CALCIUM SERPL-MCNC: 8.9 MG/DL (ref 8.7–10.5)
CHLORIDE SERPL-SCNC: 103 MMOL/L (ref 95–110)
CO2 SERPL-SCNC: 21 MMOL/L (ref 23–29)
CREAT SERPL-MCNC: 0.8 MG/DL (ref 0.5–1.4)
DIFFERENTIAL METHOD BLD: ABNORMAL
EOSINOPHIL # BLD AUTO: 0.1 K/UL (ref 0–0.5)
EOSINOPHIL NFR BLD: 2.6 % (ref 0–8)
ERYTHROCYTE [DISTWIDTH] IN BLOOD BY AUTOMATED COUNT: 12.4 % (ref 11.5–14.5)
EST. GFR  (NO RACE VARIABLE): >60 ML/MIN/1.73 M^2
GLUCOSE SERPL-MCNC: 301 MG/DL (ref 70–110)
HCT VFR BLD AUTO: 34.4 % (ref 37–48.5)
HGB BLD-MCNC: 12.4 G/DL (ref 12–16)
IMM GRANULOCYTES # BLD AUTO: 0.01 K/UL (ref 0–0.04)
IMM GRANULOCYTES NFR BLD AUTO: 0.2 % (ref 0–0.5)
LYMPHOCYTES # BLD AUTO: 1.7 K/UL (ref 1–4.8)
LYMPHOCYTES NFR BLD: 34.9 % (ref 18–48)
MAGNESIUM SERPL-MCNC: 1.5 MG/DL (ref 1.6–2.6)
MCH RBC QN AUTO: 30.7 PG (ref 27–31)
MCHC RBC AUTO-ENTMCNC: 36 G/DL (ref 32–36)
MCV RBC AUTO: 85 FL (ref 82–98)
MONOCYTES # BLD AUTO: 0.4 K/UL (ref 0.3–1)
MONOCYTES NFR BLD: 8.4 % (ref 4–15)
NEUTROPHILS # BLD AUTO: 2.6 K/UL (ref 1.8–7.7)
NEUTROPHILS NFR BLD: 52.3 % (ref 38–73)
NRBC BLD-RTO: 0 /100 WBC
OHS QRS DURATION: 136 MS
OHS QTC CALCULATION: 496 MS
PLATELET # BLD AUTO: 166 K/UL (ref 150–450)
PMV BLD AUTO: 10.8 FL (ref 9.2–12.9)
POCT GLUCOSE: 293 MG/DL (ref 70–110)
POCT GLUCOSE: 323 MG/DL (ref 70–110)
POCT GLUCOSE: 338 MG/DL (ref 70–110)
POCT GLUCOSE: 347 MG/DL (ref 70–110)
POTASSIUM SERPL-SCNC: 4.5 MMOL/L (ref 3.5–5.1)
RBC # BLD AUTO: 4.04 M/UL (ref 4–5.4)
SODIUM SERPL-SCNC: 137 MMOL/L (ref 136–145)
WBC # BLD AUTO: 4.99 K/UL (ref 3.9–12.7)

## 2024-12-16 PROCEDURE — 85025 COMPLETE CBC W/AUTO DIFF WBC: CPT | Mod: HCNC | Performed by: PHYSICIAN ASSISTANT

## 2024-12-16 PROCEDURE — 83735 ASSAY OF MAGNESIUM: CPT | Mod: HCNC | Performed by: PHYSICIAN ASSISTANT

## 2024-12-16 PROCEDURE — 97116 GAIT TRAINING THERAPY: CPT | Mod: HCNC

## 2024-12-16 PROCEDURE — 97530 THERAPEUTIC ACTIVITIES: CPT | Mod: HCNC

## 2024-12-16 PROCEDURE — 25000003 PHARM REV CODE 250: Mod: HCNC | Performed by: FAMILY MEDICINE

## 2024-12-16 PROCEDURE — 97165 OT EVAL LOW COMPLEX 30 MIN: CPT | Mod: HCNC

## 2024-12-16 PROCEDURE — 25000003 PHARM REV CODE 250: Mod: HCNC | Performed by: PHYSICIAN ASSISTANT

## 2024-12-16 PROCEDURE — 80048 BASIC METABOLIC PNL TOTAL CA: CPT | Mod: HCNC | Performed by: PHYSICIAN ASSISTANT

## 2024-12-16 PROCEDURE — 63600175 PHARM REV CODE 636 W HCPCS: Mod: HCNC | Performed by: PHYSICIAN ASSISTANT

## 2024-12-16 PROCEDURE — 11000001 HC ACUTE MED/SURG PRIVATE ROOM: Mod: HCNC

## 2024-12-16 PROCEDURE — 36415 COLL VENOUS BLD VENIPUNCTURE: CPT | Mod: HCNC | Performed by: PHYSICIAN ASSISTANT

## 2024-12-16 RX ORDER — MAGNESIUM SULFATE HEPTAHYDRATE 40 MG/ML
2 INJECTION, SOLUTION INTRAVENOUS ONCE
Status: COMPLETED | OUTPATIENT
Start: 2024-12-16 | End: 2024-12-16

## 2024-12-16 RX ORDER — INSULIN GLARGINE 100 [IU]/ML
15 INJECTION, SOLUTION SUBCUTANEOUS DAILY
Status: DISCONTINUED | OUTPATIENT
Start: 2024-12-17 | End: 2024-12-17

## 2024-12-16 RX ORDER — INSULIN ASPART 100 [IU]/ML
5 INJECTION, SOLUTION INTRAVENOUS; SUBCUTANEOUS
Status: DISCONTINUED | OUTPATIENT
Start: 2024-12-16 | End: 2024-12-17

## 2024-12-16 RX ADMIN — QUETIAPINE FUMARATE 12.5 MG: 25 TABLET ORAL at 08:12

## 2024-12-16 RX ADMIN — INSULIN ASPART 4 UNITS: 100 INJECTION, SOLUTION INTRAVENOUS; SUBCUTANEOUS at 04:12

## 2024-12-16 RX ADMIN — ATORVASTATIN CALCIUM 20 MG: 20 TABLET, FILM COATED ORAL at 08:12

## 2024-12-16 RX ADMIN — INSULIN GLARGINE 18 UNITS: 100 INJECTION, SOLUTION SUBCUTANEOUS at 08:12

## 2024-12-16 RX ADMIN — PANTOPRAZOLE SODIUM 40 MG: 40 TABLET, DELAYED RELEASE ORAL at 08:12

## 2024-12-16 RX ADMIN — INSULIN ASPART 5 UNITS: 100 INJECTION, SOLUTION INTRAVENOUS; SUBCUTANEOUS at 04:12

## 2024-12-16 RX ADMIN — MAGNESIUM SULFATE HEPTAHYDRATE 2 G: 40 INJECTION, SOLUTION INTRAVENOUS at 10:12

## 2024-12-16 RX ADMIN — MEMANTINE 10 MG: 10 TABLET ORAL at 08:12

## 2024-12-16 RX ADMIN — INSULIN ASPART 3 UNITS: 100 INJECTION, SOLUTION INTRAVENOUS; SUBCUTANEOUS at 08:12

## 2024-12-16 RX ADMIN — DONEPEZIL HYDROCHLORIDE 10 MG: 5 TABLET, FILM COATED ORAL at 08:12

## 2024-12-16 RX ADMIN — FERROUS SULFATE TAB 325 MG (65 MG ELEMENTAL FE) 1 EACH: 325 (65 FE) TAB at 08:12

## 2024-12-16 RX ADMIN — INSULIN ASPART 2 UNITS: 100 INJECTION, SOLUTION INTRAVENOUS; SUBCUTANEOUS at 08:12

## 2024-12-16 RX ADMIN — GABAPENTIN 300 MG: 300 CAPSULE ORAL at 08:12

## 2024-12-16 RX ADMIN — INSULIN ASPART 4 UNITS: 100 INJECTION, SOLUTION INTRAVENOUS; SUBCUTANEOUS at 12:12

## 2024-12-16 NOTE — ASSESSMENT & PLAN NOTE
Discussed goals of care with POA daughter but will follow up after she reviews paperwork.  Daughter and granddaughter are unable to care for patient that their home.  They would like patient to get into a nursing home.  Recommend discussing this with .  Unable to discharge patient this time.    Waiting placement to nursing home

## 2024-12-16 NOTE — SUBJECTIVE & OBJECTIVE
Interval History: anxiety, weakness    Review of Systems   HENT:  Negative for congestion.    Respiratory:  Negative for chest tightness.    Cardiovascular:  Negative for chest pain.   Gastrointestinal:  Negative for abdominal pain.   Musculoskeletal:  Positive for gait problem. Negative for back pain.   Neurological:  Negative for tremors and weakness.   Psychiatric/Behavioral:  Negative for agitation and hallucinations.      Objective:     Vital Signs (Most Recent):  Temp: 97.5 °F (36.4 °C) (12/16/24 0837)  Pulse: 75 (12/16/24 0837)  Resp: 18 (12/16/24 0837)  BP: 122/60 (12/16/24 0837)  SpO2: 97 % (12/16/24 0837) Vital Signs (24h Range):  Temp:  [97.5 °F (36.4 °C)-98.9 °F (37.2 °C)] 97.5 °F (36.4 °C)  Pulse:  [72-81] 75  Resp:  [18-20] 18  SpO2:  [94 %-98 %] 97 %  BP: (122-143)/(58-65) 122/60     Weight: 75.8 kg (167 lb 1.7 oz)  Body mass index is 30.56 kg/m².    Intake/Output Summary (Last 24 hours) at 12/16/2024 1033  Last data filed at 12/16/2024 0908  Gross per 24 hour   Intake 536 ml   Output 1900 ml   Net -1364 ml         Physical Exam  Constitutional:       Appearance: Normal appearance. She is well-developed.   Eyes:      Extraocular Movements: Extraocular movements intact.      Pupils: Pupils are equal, round, and reactive to light.   Cardiovascular:      Rate and Rhythm: Normal rate and regular rhythm.      Heart sounds: Normal heart sounds. No murmur heard.     No friction rub. No gallop.   Pulmonary:      Effort: Pulmonary effort is normal.      Breath sounds: Normal breath sounds.   Musculoskeletal:      Right lower leg: No edema.      Left lower leg: No edema.   Neurological:      Mental Status: Mental status is at baseline.      Comments: Oriented to month, location but not age (states she is in her 70's)   Psychiatric:         Attention and Perception: She perceives auditory and visual hallucinations.         Mood and Affect: Mood is anxious.         Speech: Speech normal.         Cognition and  Memory: Cognition is impaired. Memory is impaired.      Comments: Hallucination seemed to be a little better since starting Seroquel.             Significant Labs: All pertinent labs within the past 24 hours have been reviewed.  BMP:   Recent Labs   Lab 12/16/24  0819   *      K 4.5      CO2 21*   BUN 17   CREATININE 0.8   CALCIUM 8.9   MG 1.5*       CBC:   Recent Labs   Lab 12/16/24  0904   WBC 4.99   HGB 12.4   HCT 34.4*            Significant Imaging: I have reviewed all pertinent imaging results/findings within the past 24 hours.  I have reviewed and interpreted all pertinent imaging results/findings within the past 24 hours.  MRI and CT scan reviewed.

## 2024-12-16 NOTE — PLAN OF CARE
12/16/24 1029   Rounds   Attendance Provider;Nurse ;   Discharge Plan A New Nursing Home placement - care home care facility   Why the patient remains in the hospital Requires continued medical care   Transition of Care Barriers   (New Nursing Home Placement)     Care team at bedside, discussed plan of care with patient. Will continue to follow for duration of stay.

## 2024-12-16 NOTE — PLAN OF CARE
Problem: Physical Therapy  Goal: Physical Therapy Goal  Description: Goals to be met by: 2024    Patient will increase functional independence with mobility by performin. Supine to sit with Standby Assistance.  2. Sit to supine with Standby Assistance.  3. Bed to chair transfer with Standby Assistance with rolling walker using Step Transfer technique.  4. Sit to Stand with Standby Assistance with rolling walker.  5. Gait  x 100  feet with Standby Assistance with rolling walker.  6. Lower extremity exercise program x10 reps, with assistance as needed.     Outcome: Progressing

## 2024-12-16 NOTE — ASSESSMENT & PLAN NOTE
Patient's FSGs are uncontrolled due to hyperglycemia on current medication regimen.  Last A1c reviewed-   Lab Results   Component Value Date    HGBA1C 8.4 (H) 12/13/2024     Most recent fingerstick glucose reviewed-   Recent Labs   Lab 12/15/24  1202 12/15/24  1716 12/15/24  1916 12/16/24  0735   POCTGLUCOSE 373* 391* 368* 293*       Current correctional scale  Medium  Increase anti-hyperglycemic dose as follows-   Antihyperglycemics (From admission, onward)      Start     Stop Route Frequency Ordered    12/15/24 1115  insulin glargine U-100 (Lantus) pen 18 Units         -- SubQ Daily 12/15/24 1004    12/13/24 0928  insulin aspart U-100 pen 0-5 Units         -- SubQ Before meals & nightly PRN 12/13/24 0828

## 2024-12-16 NOTE — PROGRESS NOTES
[facility] ([address] [place] [address] Medicine  Progress Note    Patient Name: Narda Person  MRN: [id number]  Patient Class: OP- Observation   Admission Date: 12/12/2024  Length of Stay: 0 days  Attending Physician: Santos Shelton MD  Primary Care Provider: Sirena Coleman MD        Principal Problem:Hallucinations        HPI:  Patient is a 79 year old female with medical history of HTN, hypothyroidism, DM 2 and Dementia who presented to the ED With hallucinations and worsening confusion.  Daughter states she did have one eye brown higher than the other.   No other complaints per daughter who is the POA.    Admitted for hallucinations.      Overview/Hospital Course:  Patient was started on Namenda and low-dose Seroquel last night.  She seemed to have a better night.  She is still confused.  She was able to work with physical therapy this morning.  She was able to walk 15 ft with a walker.  She is still a significant fall risk in the therapist feels her cognition puts her at severe risk for accidents.  She agrees that nursing home placement would be in her best interest.    Neurology evaluated patient and recommended a low-dose Seroquel to help with her hallucinations.  She may have Lewy bodies dementia.  Namenda was recommended as well.  They want her to be evaluated in memory clinic    12/16 PT HD stable on room air.  Plan to discharge to NH.  PT/OT ordered.  Hallucinations resolved.      Interval History: anxiety, weakness    Review of Systems   HENT:  Negative for congestion.    Respiratory:  Negative for chest tightness.    Cardiovascular:  Negative for chest pain.   Gastrointestinal:  Negative for abdominal pain.   Musculoskeletal:  Positive for gait problem. Negative for back pain.   Neurological:  Negative for tremors and weakness.   Psychiatric/Behavioral:  Negative for agitation and hallucinations.      Objective:     Vital Signs (Most Recent):  Temp: 97.5 °F (36.4 °C) (12/16/24 0837)  Pulse: 75  (12/16/24 0837)  Resp: 18 (12/16/24 0837)  BP: 122/60 (12/16/24 0837)  SpO2: 97 % (12/16/24 0837) Vital Signs (24h Range):  Temp:  [97.5 °F (36.4 °C)-98.9 °F (37.2 °C)] 97.5 °F (36.4 °C)  Pulse:  [72-81] 75  Resp:  [18-20] 18  SpO2:  [94 %-98 %] 97 %  BP: (122-143)/(58-65) 122/60     Weight: 75.8 kg (167 lb 1.7 oz)  Body mass index is 30.56 kg/m².    Intake/Output Summary (Last 24 hours) at 12/16/2024 1033  Last data filed at 12/16/2024 0908  Gross per 24 hour   Intake 536 ml   Output 1900 ml   Net -1364 ml         Physical Exam  Constitutional:       Appearance: Normal appearance. She is well-developed.   Eyes:      Extraocular Movements: Extraocular movements intact.      Pupils: Pupils are equal, round, and reactive to light.   Cardiovascular:      Rate and Rhythm: Normal rate and regular rhythm.      Heart sounds: Normal heart sounds. No murmur heard.     No friction rub. No gallop.   Pulmonary:      Effort: Pulmonary effort is normal.      Breath sounds: Normal breath sounds.   Musculoskeletal:      Right lower leg: No edema.      Left lower leg: No edema.   Neurological:      Mental Status: Mental status is at baseline.      Comments: Oriented to month, location but not age (states she is in her 70's)   Psychiatric:         Attention and Perception: She perceives auditory and visual hallucinations.         Mood and Affect: Mood is anxious.         Speech: Speech normal.         Cognition and Memory: Cognition is impaired. Memory is impaired.      Comments: Hallucination seemed to be a little better since starting Seroquel.             Significant Labs: All pertinent labs within the past 24 hours have been reviewed.  BMP:   Recent Labs   Lab 12/16/24  0819   *      K 4.5      CO2 21*   BUN 17   CREATININE 0.8   CALCIUM 8.9   MG 1.5*       CBC:   Recent Labs   Lab 12/16/24  0904   WBC 4.99   HGB 12.4   HCT 34.4*            Significant Imaging: I have reviewed all pertinent imaging  results/findings within the past 24 hours.  I have reviewed and interpreted all pertinent imaging results/findings within the past 24 hours.  MRI and CT scan reviewed.    Assessment and Plan     * Hallucinations  Likely due to worsening dementia.  She probably has Lewy body dementia.  Namenda and Seroquel started.  Last night was a better night.  Less agitation, confusion.  Hallucination seemed to be a little better.  She is still significantly cognitively impaired.  Neurosurgery reviewed CT head with known schwannoma compressing on eric and basilar cisterns.  No further intervention.  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  Tele neurology recommends low-dose Seroquel and to start Namenda.  They recommend referral to memory Clinic.  Family would like her admitted to a nursing home.    Currently resolved       Electrolyte abnormality  Magnesium 1.5  IV mg       Advanced care planning/counseling discussion  Discussed goals of care with POA daughter but will follow up after she reviews paperwork.  Daughter and granddaughter are unable to care for patient that their home.  They would like patient to get into a nursing home.  Recommend discussing this with .  Unable to discharge patient this time.    Waiting placement to nursing home       Chronic kidney disease, stage 2 (mild)  Creatine stable for now. BMP reviewed- noted Estimated Creatinine Clearance: 52.6 mL/min (based on SCr of 0.8 mg/dL). according to latest data. Based on current GFR, CKD stage is stage 2 - GFR 60-89.  Monitor UOP and serial BMP and adjust therapy as needed. Renally dose meds. Avoid nephrotoxic medications and procedures.    stable    Gait abnormality  She has a high-risk for falls.    Recommend starting physical therapy.  She was able to walk 15 ft with a walker this morning which is a big improvement.  She is able to get out of bed with a little help.  Only option that I can  see is referral to nursing home/swing.  Discuss with     PT/OT ordered       Diabetes mellitus, type 2  Patient's FSGs are uncontrolled due to hyperglycemia on current medication regimen.  Last A1c reviewed-   Lab Results   Component Value Date    HGBA1C 8.4 (H) 12/13/2024     Most recent fingerstick glucose reviewed-   Recent Labs   Lab 12/15/24  1202 12/15/24  1716 12/15/24  1916 12/16/24  0735   POCTGLUCOSE 373* 391* 368* 293*       Current correctional scale  Medium  Increase anti-hyperglycemic dose as follows-   Antihyperglycemics (From admission, onward)      Start     Stop Route Frequency Ordered    12/15/24 1115  insulin glargine U-100 (Lantus) pen 18 Units         -- SubQ Daily 12/15/24 1004    12/13/24 0928  insulin aspart U-100 pen 0-5 Units         -- SubQ Before meals & nightly PRN 12/13/24 0828              VTE Risk Mitigation (From admission, onward)           Ordered     IP VTE HIGH RISK PATIENT  Once         12/13/24 0152     Place sequential compression device  Until discontinued         12/13/24 0152                    Discharge Planning   DWAYNE:      Code Status: Full Code   Medical Readiness for Discharge Date:   Discharge Plan A: New Nursing Home placement - [facility] care facility                Angella Price PA-C  Department of Hospital Medicine   [facility] S[facility] (3rd Fl)

## 2024-12-16 NOTE — ASSESSMENT & PLAN NOTE
Creatine stable for now. BMP reviewed- noted Estimated Creatinine Clearance: 52.6 mL/min (based on SCr of 0.8 mg/dL). according to latest data. Based on current GFR, CKD stage is stage 2 - GFR 60-89.  Monitor UOP and serial BMP and adjust therapy as needed. Renally dose meds. Avoid nephrotoxic medications and procedures.    stable

## 2024-12-16 NOTE — PT/OT/SLP PROGRESS
Physical Therapy Treatment    Patient Name:  Narda Person   MRN:  [id number]    Recommendations:     Discharge Recommendations: Moderate Intensity Therapy (SNF)  Discharge Equipment Recommendations: walker, rolling  Barriers to discharge: Inaccessible home and Decreased caregiver support    Assessment:     Narda Person is a 79 y.o. female admitted with a medical diagnosis of Hallucinations.  She presents with the following impairments/functional limitations: weakness, impaired endurance, impaired self care skills, impaired functional mobility, gait instability, impaired balance, decreased lower extremity function, decreased safety awareness, decreased coordination, impaired cardiopulmonary response to activity. Patient tolerated sitting up at edge of the bed and gait functions using RW x ~30 feet with contact guard assistance and cues for balance and safety. No sing of fatigue.     Rehab Prognosis: Fair; patient would benefit from acute skilled PT services to address these deficits and reach maximum level of function.    Recent Surgery: * No surgery found *      Plan:     During this hospitalization, patient to be seen 5 x/week to address the identified rehab impairments via gait training, therapeutic activities, therapeutic exercises and progress toward the following goals:    Plan of Care Expires:  12/20/24    Subjective     Chief Complaint: none   Patient/Family Comments/goals: none stated.  Pain/Comfort:  Pain Rating 1: 0/10      Objective:     Communicated with nursing , patient and daughter prior to session.  Patient found HOB elevated with peripheral IV, PureWick upon PT entry to room.     General Precautions: Standard, fall  Orthopedic Precautions: N/A  Braces: N/A  Respiratory Status: Room air     Functional Mobility:  Bed Mobility:     Rolling Left:  supervision  Rolling Right: supervision  Scooting: supervision  Supine to Sit: contact guard assistance  Sit to Supine: contact guard assistance  Transfers:      Sit to Stand:  contact guard assistance with rolling walker  Gait: contact guard assistance x ~30 feet using RW. Dec foot/floor clearacne, dec stride, dec weight shifting and dec may.  Balance: Sitting: Standby Assistance; Standing with RW: Contact guard assistance.      AM-PAC 6 CLICK MOBILITY  Turning over in bed (including adjusting bedclothes, sheets and blankets)?: 3  Sitting down on and standing up from a chair with arms (e.g., wheelchair, bedside commode, etc.): 3  Moving from lying on back to sitting on the side of the bed?: 3  Moving to and from a bed to a chair (including a wheelchair)?: 3  Need to walk in hospital room?: 3  Climbing 3-5 steps with a railing?: 3  Basic Mobility Total Score: 18       Treatment & Education:  B LE strengthening ROM ex x 10 reps on each involving, ankle pumps, heel slides, abd/add, LAQ, rolling to sides, sit to stand with RW and gait trng using RW x ~30 feet with contact guard assistance. Educated pt with body sequence on bed mobility and transfers for inc safety.    Patient left HOB elevated with all lines intact, call button in reach, bed alarm on, nursing  notified, and daughter present..    GOALS:   Multidisciplinary Problems       Physical Therapy Goals          Problem: Physical Therapy    Goal Priority Disciplines Outcome Interventions   Physical Therapy Goal     PT, PT/OT Progressing    Description: Goals to be met by: 2024    Patient will increase functional independence with mobility by performin. Supine to sit with Standby Assistance.  2. Sit to supine with Standby Assistance.  3. Bed to chair transfer with Standby Assistance with rolling walker using Step Transfer technique.  4. Sit to Stand with Standby Assistance with rolling walker.  5. Gait  x 100  feet with Standby Assistance with rolling walker.  6. Lower extremity exercise program x10 reps, with assistance as needed.                          Time Tracking:     PT Received On: 24  PT  Start Time: 1110     PT Stop Time: 1145  PT Total Time (min): 35 min     Billable Minutes: Gait Training 15 and Therapeutic Activity 20    Treatment Type: Treatment  PT/PTA: PT           12/16/2024

## 2024-12-16 NOTE — PT/OT/SLP EVAL
"Occupational Therapy   Evaluation    Name: Narda Person  MRN: [id number]  Admitting Diagnosis: Hallucinations  Recent Surgery: * No surgery found *      Recommendations:     Discharge Recommendations: Moderate Intensity Therapy (SNF)  Discharge Equipment Recommendations:  walker, rolling  Barriers to discharge:  Decreased caregiver support    Assessment:     Narda Person is a 79 y.o. female with a medical diagnosis of Hallucinations.  She presents with performance deficits affecting function: weakness, gait instability, impaired functional mobility, impaired self care skills, impaired balance, decreased lower extremity function, decreased coordination, decreased safety awareness.      Rehab Prognosis: Fair; patient would benefit from acute skilled OT services to address these deficits and reach maximum level of function.       Plan:     Patient to be seen 5 x/week to address the above listed problems via self-care/home management, therapeutic activities  Plan of Care Expires: 12/30/24  Plan of Care Reviewed with: patient    Subjective     Chief Complaint: "I'm really dizzy."  Patient/Family Comments/goals: None stated    Occupational Profile:  Living Environment: Pt lives in [facility] with daughter  Previous level of function: Pt reporting independence with ADL prior  Equipment Used at Home: wheelchair, walker, rolling, other (see comments) (Walk in Shower)  Assistance upon Discharge: Daughters    Pain/Comfort:  Pain Rating 1: 0/10  Pain Rating Post-Intervention 1: 0/10    Patients cultural, spiritual, Mosque conflicts given the current situation: no    Objective:     Communicated with: Nursing prior to session.  Patient found HOB elevated with peripheral IV, PureWick upon OT entry to room.    General Precautions: Standard, fall  Orthopedic Precautions: N/A  Braces: N/A  Respiratory Status: Room air    Occupational Performance:    Bed Mobility:    Patient completed Rolling/Turning to Right with supervision  Patient " completed Scooting/Bridging with minimum assistance  Patient completed Supine to Sit with minimum assistance  Patient completed Sit to Supine with stand by assistance    Functional Mobility/Transfers:  Patient completed Sit <> Stand Transfer with minimum assistance  with  rolling walker   Functional Mobility: Pt refusing ambulation today. She reported she felt too dizzy to participate.     Activities of Daily Living:  Grooming: minimum assistance and moderate assistance to maintain upright posture with grooming at table level. Pt with tendency for posterior lean favoring standing on heels and losing balance backward.Pt requiring 1 rest break mid ADL due to dizziness and fatigue.   Upper Body Dressing: minimum assistance to perform upper extremity dressing with assist to bring gown over shoulders and thread right upper extremity through sleeve.   Lower Body Dressing: dependence to don/doff socks seated EOB with Pt demonstrating right lateral and posterior lean with Min > Mod to remain upright without hand rail assist.     Cognitive/Visual Perceptual:  Cognitive/Psychosocial Skills:     -       Oriented to: Person, Place, Time, and Situation   -       Follows Commands/attention:Follows multistep  commands  -       Communication: clear/fluent    Physical Exam:  Balance:    -       fair- seated and standing balance noted throughout with support.   Postural examination/scapula alignment:    -       Rounded shoulders  -       Forward head  Upper Extremity Range of Motion:     -       Right Upper Extremity: WFL  -       Left Upper Extremity: WFL  Upper Extremity Strength:    -       Right Upper Extremity: WFL  -       Left Upper Extremity: WFL   Strength:    -       Right Upper Extremity: WFL  -       Left Upper Extremity: WFL    AMPAC 6 Click ADL:  AMPAC Total Score: 17    Treatment & Education:  OT evaluation completed with Pt. She was educated on role of OT and POC.     Patient left HOB elevated with all lines  intact, call button in reach, bed alarm on, and nursing notified    GOALS:   Multidisciplinary Problems       Occupational Therapy Goals          Problem: Occupational Therapy    Goal Priority Disciplines Outcome Interventions   Occupational Therapy Goal     OT, PT/OT Progressing    Description: Pt to perform UB dressing with MOD I seated EOB.  Pt to perform LB dressing with SBA seated EOB.   Pt to perform self toileting with MIN A using standard commode.   Pt to perform level functional transfers required for ADL's with SBA, RW level.  Pt to consistently demonstrate adherence to orthopedic precautions during all ADL's as instructed by OT.  Pt to demonstrate  fair +dynamic standing balance as required to perform ADL's from standing level.  Pt to complete standing ADL at sink level for ~3 minutes for improved safety and activity tolerance upon discharge.                          History:     Past Medical History:   Diagnosis Date    Abnormal Pap smear     DIONNE (acute kidney injury) 6/29/2014    DIONNE (acute kidney injury) 6/29/2014    Alzheimer's dementia 4/12/2018    Anemia     Breast cancer 1995    left breast    Breast disorder     Diabetes mellitus     Diabetes mellitus, type 2     ESSENTIAL HYPERTENSION 10/1/2012    Hypothyroid 6/29/2014    Pneumonia 01/16/2020         Past Surgical History:   Procedure Laterality Date    CATARACT EXTRACTION, BILATERAL      COLONOSCOPY N/A 8/10/2020    Procedure: COLONOSCOPY;  Surgeon: Guerda Perales MD;  Location: [facility];  Service: Endoscopy;  Laterality: N/A;    ESOPHAGOGASTRODUODENOSCOPY N/A 8/10/2020    Procedure: EGD (ESOPHAGOGASTRODUODENOSCOPY) WITH BIOPSY;  Surgeon: Guerda Perales MD;  Location: [facility];  Service: Endoscopy;  Laterality: N/A;    HYSTERECTOMY      masectomy      single left breast    MASTECTOMY Left     OOPHORECTOMY  1997    only 1       Time Tracking:     OT Date of Treatment:    OT Start Time: 1310  OT Stop Time: 1332  OT Total Time (min): 22  min    Billable Minutes:Evaluation 22 12/16/2024

## 2024-12-16 NOTE — PLAN OF CARE
[facility] - Med Surg (3rd Fl)  Initial Discharge Assessment       Primary Care Provider: Sirena Coleman MD    Admission Diagnosis: Hallucinations [R44.3]  Vestibular schwannoma [D33.3]  AMS (altered mental status) [R41.82]    Admission Date: 12/12/2024  Expected Discharge Date:     Transition of Care Barriers: (P) None    Payor: HU[employer] MANAGED MEDICARE / Plan: HU[employer] MEDICARE HMO / Product Type: Capitation /     Extended Emergency Contact Information  Primary Emergency Contact: Marily Person  Mobile Phone: [phone]  Relation: Daughter  Preferred language: English   needed? No  Secondary Emergency Contact: Barbi Brand  Address: [address] [place] [address] [id number] [place] [address]  Home Phone: [phone]  Mobile Phone: [phone]  Relation: Daughter    Discharge Plan A: (P) Skilled Nursing Facility  Discharge Plan B: (P) New Nursing Home placement - senior living care facility      [facility] [address] [place] [address] [facility]  [address] [place] [address]  Phone: [phone] Fax: [phone]      Initial Assessment (most recent)       Adult Discharge Assessment - 12/16/24 1222          Discharge Assessment    Assessment Type Discharge Planning Assessment     Confirmed/corrected address, phone number and insurance Yes     Confirmed Demographics Correct on Facesheet     Source of Information patient;family     Communicated DWAYNE with patient/caregiver Yes     Reason For Admission Hallucinations     People in Home child(lou), adult     Facility Arrived From: [facility]     Do you expect to return to your current living situation? No     Prior to hospitilization cognitive status: Inappropriate Behavior (P)    Hallucinations    Current cognitive status: -- (P)    Oriented to place and month but not age    Walking or Climbing Stairs Difficulty yes     Walking or Climbing Stairs ambulation difficulty, requires equipment     Mobility Management Wheelchair and walker      Dressing/Bathing Difficulty no     Home Accessibility stairs to enter home     Stair Railings, Main Entrance railings safe and in good condition     Home Layout Able to live on 1st floor     Equipment Currently Used at Home wheelchair;walker, rolling (P)      Readmission within 30 days? No (P)      Patient currently being followed by outpatient case management? No (P)      Do you currently have service(s) that help you manage your care at home? No (P)      Do you take prescription medications? Yes (P)      Do you have prescription coverage? Yes (P)      Coverage [employer] MGD MCARE (P)      Do you have any problems affording any of your prescribed medications? No (P)      Is the patient taking medications as prescribed? yes (P)      Who is going to help you get home at discharge? Family/agency transport (P)      How do you get to doctors appointments? car, drives self;family or friend will provide (P)      Are you on dialysis? No (P)      Do you take coumadin? No (P)      Discharge Plan A Skilled Nursing Facility (P)      Discharge Plan B New Nursing Home placement - alf care facility (P)      DME Needed Upon Discharge  none (P)      Discharge Plan discussed with: Patient;Adult children (P)      Transition of Care Barriers None (P)                         Discharge assessment completed at bedside with patient and daughter. Patient use to live at home with daughter and granddaughter. Family can no longer care for patient. Patient and daughter, Barbi (POA), would like patient placed into a nursing home. PT has recommended SNF. Awaiting OT eval.

## 2024-12-16 NOTE — ASSESSMENT & PLAN NOTE
She has a high-risk for falls.    Recommend starting physical therapy.  She was able to walk 15 ft with a walker this morning which is a big improvement.  She is able to get out of bed with a little help.  Only option that I can see is referral to nursing home/swing.  Discuss with     PT/OT ordered

## 2024-12-16 NOTE — ASSESSMENT & PLAN NOTE
Likely due to worsening dementia.  She probably has Lewy body dementia.  Namenda and Seroquel started.  Last night was a better night.  Less agitation, confusion.  Hallucination seemed to be a little better.  She is still significantly cognitively impaired.  Neurosurgery reviewed CT head with known schwannoma compressing on eric and basilar cisterns.  No further intervention.  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  Tele neurology recommends low-dose Seroquel and to start Namenda.  They recommend referral to memory Clinic.  Family would like her admitted to a nursing home.    Currently resolved

## 2024-12-17 LAB
MAGNESIUM SERPL-MCNC: 1.6 MG/DL (ref 1.6–2.6)
POCT GLUCOSE: 236 MG/DL (ref 70–110)
POCT GLUCOSE: 297 MG/DL (ref 70–110)
POCT GLUCOSE: 308 MG/DL (ref 70–110)
POCT GLUCOSE: 319 MG/DL (ref 70–110)

## 2024-12-17 PROCEDURE — 25000003 PHARM REV CODE 250: Mod: HCNC | Performed by: FAMILY MEDICINE

## 2024-12-17 PROCEDURE — 25000003 PHARM REV CODE 250: Mod: HCNC | Performed by: PHYSICIAN ASSISTANT

## 2024-12-17 PROCEDURE — 83735 ASSAY OF MAGNESIUM: CPT | Mod: HCNC | Performed by: PHYSICIAN ASSISTANT

## 2024-12-17 PROCEDURE — 97530 THERAPEUTIC ACTIVITIES: CPT | Mod: HCNC

## 2024-12-17 PROCEDURE — 25000003 PHARM REV CODE 250: Mod: HCNC

## 2024-12-17 PROCEDURE — 11000001 HC ACUTE MED/SURG PRIVATE ROOM: Mod: HCNC

## 2024-12-17 PROCEDURE — 97535 SELF CARE MNGMENT TRAINING: CPT | Mod: HCNC,CO

## 2024-12-17 PROCEDURE — 97530 THERAPEUTIC ACTIVITIES: CPT | Mod: HCNC,CO

## 2024-12-17 PROCEDURE — 97116 GAIT TRAINING THERAPY: CPT | Mod: HCNC

## 2024-12-17 PROCEDURE — 36415 COLL VENOUS BLD VENIPUNCTURE: CPT | Mod: HCNC | Performed by: PHYSICIAN ASSISTANT

## 2024-12-17 PROCEDURE — 63600175 PHARM REV CODE 636 W HCPCS: Mod: HCNC | Performed by: PHYSICIAN ASSISTANT

## 2024-12-17 RX ORDER — INSULIN GLARGINE 100 [IU]/ML
18 INJECTION, SOLUTION SUBCUTANEOUS DAILY
Status: DISCONTINUED | OUTPATIENT
Start: 2024-12-18 | End: 2024-12-20

## 2024-12-17 RX ORDER — MAGNESIUM SULFATE HEPTAHYDRATE 40 MG/ML
2 INJECTION, SOLUTION INTRAVENOUS ONCE
Status: COMPLETED | OUTPATIENT
Start: 2024-12-17 | End: 2024-12-17

## 2024-12-17 RX ORDER — INSULIN ASPART 100 [IU]/ML
6 INJECTION, SOLUTION INTRAVENOUS; SUBCUTANEOUS
Status: DISCONTINUED | OUTPATIENT
Start: 2024-12-17 | End: 2024-12-20

## 2024-12-17 RX ADMIN — INSULIN ASPART 5 UNITS: 100 INJECTION, SOLUTION INTRAVENOUS; SUBCUTANEOUS at 09:12

## 2024-12-17 RX ADMIN — FERROUS SULFATE TAB 325 MG (65 MG ELEMENTAL FE) 1 EACH: 325 (65 FE) TAB at 09:12

## 2024-12-17 RX ADMIN — INSULIN GLARGINE 15 UNITS: 100 INJECTION, SOLUTION SUBCUTANEOUS at 09:12

## 2024-12-17 RX ADMIN — GABAPENTIN 300 MG: 300 CAPSULE ORAL at 08:12

## 2024-12-17 RX ADMIN — INSULIN ASPART 4 UNITS: 100 INJECTION, SOLUTION INTRAVENOUS; SUBCUTANEOUS at 08:12

## 2024-12-17 RX ADMIN — PANTOPRAZOLE SODIUM 40 MG: 40 TABLET, DELAYED RELEASE ORAL at 09:12

## 2024-12-17 RX ADMIN — INSULIN ASPART 2 UNITS: 100 INJECTION, SOLUTION INTRAVENOUS; SUBCUTANEOUS at 09:12

## 2024-12-17 RX ADMIN — MAGNESIUM SULFATE HEPTAHYDRATE 2 G: 40 INJECTION, SOLUTION INTRAVENOUS at 11:12

## 2024-12-17 RX ADMIN — QUETIAPINE FUMARATE 12.5 MG: 25 TABLET ORAL at 08:12

## 2024-12-17 RX ADMIN — MEMANTINE 10 MG: 10 TABLET ORAL at 09:12

## 2024-12-17 RX ADMIN — Medication 6 MG: at 08:12

## 2024-12-17 RX ADMIN — INSULIN ASPART 3 UNITS: 100 INJECTION, SOLUTION INTRAVENOUS; SUBCUTANEOUS at 11:12

## 2024-12-17 RX ADMIN — DONEPEZIL HYDROCHLORIDE 10 MG: 5 TABLET, FILM COATED ORAL at 08:12

## 2024-12-17 RX ADMIN — GABAPENTIN 300 MG: 300 CAPSULE ORAL at 09:12

## 2024-12-17 RX ADMIN — INSULIN ASPART 4 UNITS: 100 INJECTION, SOLUTION INTRAVENOUS; SUBCUTANEOUS at 05:12

## 2024-12-17 RX ADMIN — MAGNESIUM SULFATE HEPTAHYDRATE 2 G: 40 INJECTION, SOLUTION INTRAVENOUS at 01:12

## 2024-12-17 RX ADMIN — INSULIN ASPART 6 UNITS: 100 INJECTION, SOLUTION INTRAVENOUS; SUBCUTANEOUS at 05:12

## 2024-12-17 RX ADMIN — ATORVASTATIN CALCIUM 20 MG: 20 TABLET, FILM COATED ORAL at 09:12

## 2024-12-17 NOTE — PT/OT/SLP PROGRESS
"Occupational Therapy   Treatment    Name: Narda Person  MRN: [id number]  Admitting Diagnosis:  Hallucinations       Recommendations:     Discharge Recommendations: Moderate Intensity Therapy  Discharge Equipment Recommendations:  walker, rolling  Barriers to discharge:  Decreased caregiver support    Assessment:     Narda Person is a 79 y.o. female with a medical diagnosis of Hallucinations.  Performance deficits affecting function are weakness, impaired endurance, gait instability, impaired balance, impaired self care skills, impaired functional mobility, impaired cognition, decreased lower extremity function, decreased safety awareness, decreased coordination.     Rehab Prognosis:  Fair; patient would benefit from acute skilled OT services to address these deficits and reach maximum level of function.       Plan:     Patient to be seen 5 x/week to address the above listed problems via self-care/home management, therapeutic activities, therapeutic exercises  Plan of Care Expires: 12/30/24  Plan of Care Reviewed with: patient, family    Subjective     Chief Complaint:"I am so sleepy"  Patient/Family Comments/goals: none  Pain/Comfort:  Pain Rating 1: other (see comments) (Did not quantify)  Location - Side 1: Right  Location - Orientation 1: upper  Location 1: thigh  Pain Addressed 1: Cessation of Activity  Pain Rating Post-Intervention 1: other (see comments) (Did not quantify)    Objective:     Communicated with: RN prior to session.  Patient found supine with peripheral IV, PureWick upon OT entry to room.    General Precautions: Standard, fall    Orthopedic Precautions:N/A  Braces: N/A  Respiratory Status: Room air     Occupational Performance:     Bed Mobility:    Did not perform    Functional Mobility/Transfers:  Did not perform    Activities of Daily Living:  Feeding: Min A      AMPAC 6 Click ADL: 17    Treatment & Education:  Patient was asleep upon entering room with breakfast sitting on bedside table. Patient " was aroused enough to eat and was assisted with positioning in bed. Patient was too lethargic to sit on EOB to eat. Patient required min A to feed self exhibiting decreased coordination for manipulation of utensils. Patient had min to moderate food spillage with self feeding. After eating patient performed oral hygiene with SBA. Patient mayur tx fair this date.    Patient left HOB elevated with all lines intact, call button in reach, bed alarm on, RN notified.    GOALS:   Multidisciplinary Problems       Occupational Therapy Goals          Problem: Occupational Therapy    Goal Priority Disciplines Outcome Interventions   Occupational Therapy Goal     OT, PT/OT Progressing    Description: Pt to perform UB dressing with MOD I seated EOB.  Pt to perform LB dressing with SBA seated EOB.   Pt to perform self toileting with MIN A using standard commode.   Pt to perform level functional transfers required for ADL's with SBA, RW level.  Pt to consistently demonstrate adherence to orthopedic precautions during all ADL's as instructed by OT.  Pt to demonstrate  fair +dynamic standing balance as required to perform ADL's from standing level.  Pt to complete standing ADL at sink level for ~3 minutes for improved safety and activity tolerance upon discharge.                          Time Tracking:     OT Date of Treatment: 12/17/24  OT Start Time: 0801  OT Stop Time: 0821  OT Total Time (min): 20 min    Billable Minutes:ADL's 20 min    OT/COURT: COURT     Number of COURT visits since last OT visit: 1 12/17/2024

## 2024-12-17 NOTE — ASSESSMENT & PLAN NOTE
Patient's FSGs are uncontrolled due to hyperglycemia on current medication regimen.  Last A1c reviewed-   Lab Results   Component Value Date    HGBA1C 8.4 (H) 12/13/2024     Most recent fingerstick glucose reviewed-   Recent Labs   Lab 12/16/24  1150 12/16/24  1651 12/16/24  1920 12/17/24  0722   POCTGLUCOSE 338* 347* 323* 236*     Current correctional scale  Medium  Increase anti-hyperglycemic dose as follows-   Antihyperglycemics (From admission, onward)      Start     Stop Route Frequency Ordered    12/18/24 0900  insulin glargine U-100 (Lantus) pen 18 Units         -- SubQ Daily 12/17/24 1135    12/17/24 1645  insulin aspart U-100 pen 6 Units         -- SubQ 3 times daily with meals 12/17/24 1135    12/13/24 0928  insulin aspart U-100 pen 0-5 Units         -- SubQ Before meals & nightly PRN 12/13/24 0828           Increased insulin due to hyperglycemia

## 2024-12-17 NOTE — PLAN OF CARE
Care team at bedside, discussed plan of care with patient and family.  Discharge planning initiated. Patient provided with Case Management contact information and encouraged to call with concerns or questions. Discharge Planning Begins on Admission Pamphlet provided.  Will continue to follow for duration of stay.

## 2024-12-17 NOTE — PROGRESS NOTES
[facility] ([address] [place] [address] Medicine  Progress Note    Patient Name: Narda Person  MRN: [id number]  Patient Class: IP- Inpatient   Admission Date: 12/12/2024  Length of Stay: 1 days  Attending Physician: Alisa Beckwith MD  Primary Care Provider: Sirena Coleman MD          Principal Problem:Hallucinations        HPI:  Patient is a 79 year old female with medical history of HTN, hypothyroidism, DM 2 and Dementia who presented to the ED With hallucinations and worsening confusion.  Daughter states she did have one eye brown higher than the other.   No other complaints per daughter who is the POA.    Admitted for hallucinations.      Overview/Hospital Course:  Patient was started on Namenda and low-dose Seroquel last night.  She seemed to have a better night.  She is still confused.  She was able to work with physical therapy this morning.  She was able to walk 15 ft with a walker.  She is still a significant fall risk in the therapist feels her cognition puts her at severe risk for accidents.  She agrees that nursing home placement would be in her best interest.    Neurology evaluated patient and recommended a low-dose Seroquel to help with her hallucinations.  She may have Lewy bodies dementia.  Namenda was recommended as well.  They want her to be evaluated in memory clinic    12/16 PT HD stable on room air.  Plan to discharge to NH.  PT/OT ordered.  Hallucinations resolved.      12/17 Mg on lower level.  IV mg ordered.  Waiting placement to SNF.      Interval History: anxiety, weakness    Review of Systems   HENT:  Negative for congestion.    Respiratory:  Negative for chest tightness.    Cardiovascular:  Negative for chest pain.   Gastrointestinal:  Negative for abdominal pain.   Musculoskeletal:  Positive for gait problem. Negative for back pain.   Neurological:  Negative for tremors and weakness.   Psychiatric/Behavioral:  Negative for agitation and hallucinations.      Objective:     Vital Signs  (Most Recent):  Temp: 97.7 °F (36.5 °C) (12/17/24 0724)  Pulse: 73 (12/17/24 0724)  Resp: 18 (12/17/24 0724)  BP: (!) 146/67 (12/17/24 0724)  SpO2: 97 % (12/17/24 0724) Vital Signs (24h Range):  Temp:  [97.6 °F (36.4 °C)-98.8 °F (37.1 °C)] 97.7 °F (36.5 °C)  Pulse:  [73-81] 73  Resp:  [16-20] 18  SpO2:  [95 %-98 %] 97 %  BP: (112-151)/(53-84) 146/67     Weight: 75.8 kg (167 lb 1.7 oz)  Body mass index is 30.56 kg/m².    Intake/Output Summary (Last 24 hours) at 12/17/2024 1131  Last data filed at 12/17/2024 0539  Gross per 24 hour   Intake 296 ml   Output 1200 ml   Net -904 ml         Physical Exam  Constitutional:       Appearance: Normal appearance. She is well-developed.   Eyes:      Extraocular Movements: Extraocular movements intact.      Pupils: Pupils are equal, round, and reactive to light.   Cardiovascular:      Rate and Rhythm: Normal rate and regular rhythm.      Heart sounds: Normal heart sounds. No murmur heard.     No friction rub. No gallop.   Pulmonary:      Effort: Pulmonary effort is normal.      Breath sounds: Normal breath sounds.   Musculoskeletal:      Right lower leg: No edema.      Left lower leg: No edema.   Neurological:      Mental Status: Mental status is at baseline.      Comments: Oriented to month, location but not age (states she is in her 70's)   Psychiatric:         Attention and Perception: She perceives auditory and visual hallucinations.         Mood and Affect: Mood is anxious.         Speech: Speech normal.         Behavior: Behavior normal.         Cognition and Memory: Cognition is impaired. Memory is impaired.             Significant Labs: All pertinent labs within the past 24 hours have been reviewed.  BMP:   Recent Labs   Lab 12/16/24  0819 12/17/24  0903   *  --      --    K 4.5  --      --    CO2 21*  --    BUN 17  --    CREATININE 0.8  --    CALCIUM 8.9  --    MG 1.5* 1.6       CBC:   Recent Labs   Lab 12/16/24  0904   WBC 4.99   HGB 12.4   HCT 34.4*             Significant Imaging: I have reviewed all pertinent imaging results/findings within the past 24 hours.  I have reviewed and interpreted all pertinent imaging results/findings within the past 24 hours.  MRI and CT scan reviewed.    Assessment and Plan     * Hallucinations  Likely due to worsening dementia.  She probably has Lewy body dementia.  Namenda and Seroquel started.  Last night was a better night.  Less agitation, confusion.  Hallucination seemed to be a little better.  She is still significantly cognitively impaired.  Neurosurgery reviewed CT head with known schwannoma compressing on eric and basilar cisterns.  No further intervention.  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  Tele neurology recommends low-dose Seroquel and to start Namenda.  They recommend referral to memory Clinic.  Family would like her admitted to a nursing home.    Currently resolved       Electrolyte abnormality  Magnesium 1.5  IV mg     IV 1.6. 4 grams of IV magnesium today.  F/u outpatient labs      Advanced care planning/counseling discussion  Discussed goals of care with POA daughter but will follow up after she reviews paperwork.  Daughter and granddaughter are unable to care for patient that their home.  They would like patient to get into a nursing home.  Recommend discussing this with .  Unable to discharge patient this time.    Waiting placement to nursing home       Chronic kidney disease, stage 2 (mild)  Creatine stable for now. BMP reviewed- noted Estimated Creatinine Clearance: 52.6 mL/min (based on SCr of 0.8 mg/dL). according to latest data. Based on current GFR, CKD stage is stage 2 - GFR 60-89.  Monitor UOP and serial BMP and adjust therapy as needed. Renally dose meds. Avoid nephrotoxic medications and procedures.    stable    Gait abnormality  She has a high-risk for falls.    Recommend starting physical therapy.  She was able to  walk 15 ft with a walker this morning which is a big improvement.  She is able to get out of bed with a little help.  Only option that I can see is referral to nursing home/swing.  Discuss with     PT/OT ordered       Diabetes mellitus, type 2  Patient's FSGs are uncontrolled due to hyperglycemia on current medication regimen.  Last A1c reviewed-   Lab Results   Component Value Date    HGBA1C 8.4 (H) 12/13/2024     Most recent fingerstick glucose reviewed-   Recent Labs   Lab 12/16/24  1150 12/16/24  1651 12/16/24  1920 12/17/24  0722   POCTGLUCOSE 338* 347* 323* 236*     Current correctional scale  Medium  Increase anti-hyperglycemic dose as follows-   Antihyperglycemics (From admission, onward)      Start     Stop Route Frequency Ordered    12/18/24 0900  insulin glargine U-100 (Lantus) pen 18 Units         -- SubQ Daily 12/17/24 1135    12/17/24 1645  insulin aspart U-100 pen 6 Units         -- SubQ 3 times daily with meals 12/17/24 1135    12/13/24 0928  insulin aspart U-100 pen 0-5 Units         -- SubQ Before meals & nightly PRN 12/13/24 0828           Increased insulin due to hyperglycemia      VTE Risk Mitigation (From admission, onward)           Ordered     IP VTE HIGH RISK PATIENT  Once         12/13/24 0152     Place sequential compression device  Until discontinued         12/13/24 0152                    Discharge Planning   DWAYNE:      Code Status: Full Code   Medical Readiness for Discharge Date:   Discharge Plan A: New Nursing Home placement - [facility] care facility                Please place Justification for DME        Angella Price PA-C  Department of Hospital Medicine   [facility] (3rd Fl)

## 2024-12-17 NOTE — PLAN OF CARE
12/17/24 1442   Post-Acute Status   Post-Acute Authorization Placement   Post-Acute Placement Status Referrals Sent   Coverage [employer] MGD MCARHIANNA   Patient choice form signed by patient/caregiver List from [facility] Post-Acute Care;List from CMS Compare   Discharge Delays (!) Post-Acute Set-up   Discharge Plan   Discharge Plan A Skilled Nursing Facility   Discharge Plan B New Nursing Home placement - prison care facility     [place] has denied patient. SW notified patient's daughter of denial. Daughter request referral sent to  [facility]  [facility].     Awaiting response.

## 2024-12-17 NOTE — PLAN OF CARE
12/17/24 1248   Post-Acute Status   Post-Acute Authorization Placement   Post-Acute Placement Status Referrals Sent   Coverage [employer] [facility]AR[facility]ANNA   Patient choice form signed by patient/caregiver List from [facility] Post-Acute Care;List from CMS Compare   Discharge Delays (!) Post-Acute Set-up   Discharge Plan   Discharge Plan A Skilled Nursing Facility   Discharge Plan B New Nursing Home placement - [facility] care facility     Referrals sent to patient's choices for SNF:   [facility] ([facility])  [facility]    Awaiting responses     LOCET complete, PASRR faxed. Awaiting 142.

## 2024-12-17 NOTE — PT/OT/SLP PROGRESS
Physical Therapy Treatment    Patient Name:  Narda Person   MRN:  [id number]    Recommendations:     Discharge Recommendations: Moderate Intensity Therapy  Discharge Equipment Recommendations: walker, rolling  Barriers to discharge: Inaccessible home and Decreased caregiver support    Assessment:     Narda Person is a 79 y.o. female admitted with a medical diagnosis of Hallucinations.  She presents with the following impairments/functional limitations: weakness, impaired endurance, gait instability, impaired balance, impaired self care skills, impaired functional mobility, impaired cognition, decreased lower extremity function, decreased safety awareness, decreased coordination. Patient noticed inc confusion today but able to follow simple directions. Pateitn tolerated out of bed activity and gait functions using RW x ~30 feet and 20 feet with minimal assistance and cues for inc balance and safety. Pt gets tired easily.    Rehab Prognosis: Fair; patient would benefit from acute skilled PT services to address these deficits and reach maximum level of function.    Recent Surgery: * No surgery found *      Plan:     During this hospitalization, patient to be seen 5 x/week to address the identified rehab impairments via gait training, therapeutic activities, therapeutic exercises and progress toward the following goals:    Plan of Care Expires:  12/20/24    Subjective     Chief Complaint: confusion  Patient/Family Comments/goals: none stated  Pain/Comfort:  Pain Rating 1: 0/10      Objective:     Communicated with nursing and patient prior to session.  Patient found HOB elevated with bed alarm, PureWick, peripheral IV, Other (comments) (avasys camera) upon PT entry to room.     General Precautions: Standard, fall  Orthopedic Precautions: N/A  Braces: N/A  Respiratory Status: Room air     Functional Mobility:  Bed Mobility:     Rolling Left:  supervision  Rolling Right: supervision  Scooting: supervision  Supine to Sit:  contact guard assistance  Sit to Supine: contact guard assistance  Transfers:     Sit to Stand:  contact guard assistance with rolling walker  Gait: minimal assistance x 30 feet and 20 feet using RW. Dec foot/floor clearance, dec stride and dec may.   Balance: Sitting: Standby Assistance; Standing with RW: Contact Guard Assistance.      AM-PAC 6 CLICK MOBILITY  Turning over in bed (including adjusting bedclothes, sheets and blankets)?: 3  Sitting down on and standing up from a chair with arms (e.g., wheelchair, bedside commode, etc.): 3  Moving from lying on back to sitting on the side of the bed?: 3  Moving to and from a bed to a chair (including a wheelchair)?: 3  Need to walk in hospital room?: 3  Climbing 3-5 steps with a railing?: 3  Basic Mobility Total Score: 18       Treatment & Education:  B LE AAROM - AROM Ex x 10 reps on each involving ankle pumps. heel slides, abd/add, rolling to sides, LAQ, sit to stand x 3 using RW and gait trng using RW x 30 feet and 20 feet with Candy and cues    Patient left HOB elevated with all lines intact, call button in reach, bed alarm on, nursing notified, and avasys present..    GOALS:   Multidisciplinary Problems       Physical Therapy Goals          Problem: Physical Therapy    Goal Priority Disciplines Outcome Interventions   Physical Therapy Goal     PT, PT/OT Progressing    Description: Goals to be met by: 2024    Patient will increase functional independence with mobility by performin. Supine to sit with Standby Assistance.  2. Sit to supine with Standby Assistance.  3. Bed to chair transfer with Standby Assistance with rolling walker using Step Transfer technique.  4. Sit to Stand with Standby Assistance with rolling walker.  5. Gait  x 100  feet with Standby Assistance with rolling walker.  6. Lower extremity exercise program x10 reps, with assistance as needed.                          Time Tracking:     PT Received On: 24  PT Start Time: 825      PT Stop Time: 0850  PT Total Time (min): 25 min     Billable Minutes: Gait Training 10 and Therapeutic Activity 15    Treatment Type: Treatment  PT/PTA: PT           12/17/2024

## 2024-12-17 NOTE — PLAN OF CARE
Problem: Occupational Therapy  Goal: Occupational Therapy Goal  Description: Pt to perform UB dressing with MOD I seated EOB.  Pt to perform LB dressing with SBA seated EOB.   Pt to perform self toileting with MIN A using standard commode.   Pt to perform level functional transfers required for ADL's with SBA, RW level.  Pt to consistently demonstrate adherence to orthopedic precautions during all ADL's as instructed by OT.  Pt to demonstrate  fair +dynamic standing balance as required to perform ADL's from standing level.  Pt to complete standing ADL at sink level for ~3 minutes for improved safety and activity tolerance upon discharge.     Outcome: Progressing   Cont with occupational therapy plan of care

## 2024-12-17 NOTE — SUBJECTIVE & OBJECTIVE
Interval History: anxiety, weakness    Review of Systems   HENT:  Negative for congestion.    Respiratory:  Negative for chest tightness.    Cardiovascular:  Negative for chest pain.   Gastrointestinal:  Negative for abdominal pain.   Musculoskeletal:  Positive for gait problem. Negative for back pain.   Neurological:  Negative for tremors and weakness.   Psychiatric/Behavioral:  Negative for agitation and hallucinations.      Objective:     Vital Signs (Most Recent):  Temp: 97.7 °F (36.5 °C) (12/17/24 0724)  Pulse: 73 (12/17/24 0724)  Resp: 18 (12/17/24 0724)  BP: (!) 146/67 (12/17/24 0724)  SpO2: 97 % (12/17/24 0724) Vital Signs (24h Range):  Temp:  [97.6 °F (36.4 °C)-98.8 °F (37.1 °C)] 97.7 °F (36.5 °C)  Pulse:  [73-81] 73  Resp:  [16-20] 18  SpO2:  [95 %-98 %] 97 %  BP: (112-151)/(53-84) 146/67     Weight: 75.8 kg (167 lb 1.7 oz)  Body mass index is 30.56 kg/m².    Intake/Output Summary (Last 24 hours) at 12/17/2024 1131  Last data filed at 12/17/2024 0539  Gross per 24 hour   Intake 296 ml   Output 1200 ml   Net -904 ml         Physical Exam  Constitutional:       Appearance: Normal appearance. She is well-developed.   Eyes:      Extraocular Movements: Extraocular movements intact.      Pupils: Pupils are equal, round, and reactive to light.   Cardiovascular:      Rate and Rhythm: Normal rate and regular rhythm.      Heart sounds: Normal heart sounds. No murmur heard.     No friction rub. No gallop.   Pulmonary:      Effort: Pulmonary effort is normal.      Breath sounds: Normal breath sounds.   Musculoskeletal:      Right lower leg: No edema.      Left lower leg: No edema.   Neurological:      Mental Status: Mental status is at baseline.      Comments: Oriented to month, location but not age (states she is in her 70's)   Psychiatric:         Attention and Perception: She perceives auditory and visual hallucinations.         Mood and Affect: Mood is anxious.         Speech: Speech normal.         Behavior:  Behavior normal.         Cognition and Memory: Cognition is impaired. Memory is impaired.             Significant Labs: All pertinent labs within the past 24 hours have been reviewed.  BMP:   Recent Labs   Lab 12/16/24  0819 12/17/24  0903   *  --      --    K 4.5  --      --    CO2 21*  --    BUN 17  --    CREATININE 0.8  --    CALCIUM 8.9  --    MG 1.5* 1.6       CBC:   Recent Labs   Lab 12/16/24  0904   WBC 4.99   HGB 12.4   HCT 34.4*            Significant Imaging: I have reviewed all pertinent imaging results/findings within the past 24 hours.  I have reviewed and interpreted all pertinent imaging results/findings within the past 24 hours.  MRI and CT scan reviewed.

## 2024-12-18 LAB
POCT GLUCOSE: 255 MG/DL (ref 70–110)
POCT GLUCOSE: 291 MG/DL (ref 70–110)
POCT GLUCOSE: 344 MG/DL (ref 70–110)
POCT GLUCOSE: 404 MG/DL (ref 70–110)

## 2024-12-18 PROCEDURE — 25000003 PHARM REV CODE 250: Mod: HCNC | Performed by: PHYSICIAN ASSISTANT

## 2024-12-18 PROCEDURE — 25000003 PHARM REV CODE 250: Mod: HCNC | Performed by: FAMILY MEDICINE

## 2024-12-18 PROCEDURE — 97116 GAIT TRAINING THERAPY: CPT | Mod: HCNC

## 2024-12-18 PROCEDURE — 97530 THERAPEUTIC ACTIVITIES: CPT | Mod: HCNC

## 2024-12-18 PROCEDURE — 97530 THERAPEUTIC ACTIVITIES: CPT | Mod: HCNC,CO

## 2024-12-18 PROCEDURE — 11000001 HC ACUTE MED/SURG PRIVATE ROOM: Mod: HCNC

## 2024-12-18 PROCEDURE — 99231 SBSQ HOSP IP/OBS SF/LOW 25: CPT | Mod: HCNC,,, | Performed by: PHYSICIAN ASSISTANT

## 2024-12-18 RX ADMIN — DONEPEZIL HYDROCHLORIDE 10 MG: 5 TABLET, FILM COATED ORAL at 09:12

## 2024-12-18 RX ADMIN — MEMANTINE 10 MG: 10 TABLET ORAL at 08:12

## 2024-12-18 RX ADMIN — ATORVASTATIN CALCIUM 20 MG: 20 TABLET, FILM COATED ORAL at 08:12

## 2024-12-18 RX ADMIN — INSULIN ASPART 6 UNITS: 100 INJECTION, SOLUTION INTRAVENOUS; SUBCUTANEOUS at 05:12

## 2024-12-18 RX ADMIN — INSULIN GLARGINE 18 UNITS: 100 INJECTION, SOLUTION SUBCUTANEOUS at 08:12

## 2024-12-18 RX ADMIN — INSULIN ASPART 3 UNITS: 100 INJECTION, SOLUTION INTRAVENOUS; SUBCUTANEOUS at 05:12

## 2024-12-18 RX ADMIN — INSULIN ASPART 5 UNITS: 100 INJECTION, SOLUTION INTRAVENOUS; SUBCUTANEOUS at 12:12

## 2024-12-18 RX ADMIN — INSULIN ASPART 3 UNITS: 100 INJECTION, SOLUTION INTRAVENOUS; SUBCUTANEOUS at 08:12

## 2024-12-18 RX ADMIN — QUETIAPINE FUMARATE 12.5 MG: 25 TABLET ORAL at 09:12

## 2024-12-18 RX ADMIN — INSULIN ASPART 6 UNITS: 100 INJECTION, SOLUTION INTRAVENOUS; SUBCUTANEOUS at 08:12

## 2024-12-18 RX ADMIN — FERROUS SULFATE TAB 325 MG (65 MG ELEMENTAL FE) 1 EACH: 325 (65 FE) TAB at 08:12

## 2024-12-18 RX ADMIN — INSULIN ASPART 2 UNITS: 100 INJECTION, SOLUTION INTRAVENOUS; SUBCUTANEOUS at 09:12

## 2024-12-18 RX ADMIN — INSULIN ASPART 6 UNITS: 100 INJECTION, SOLUTION INTRAVENOUS; SUBCUTANEOUS at 12:12

## 2024-12-18 RX ADMIN — PANTOPRAZOLE SODIUM 40 MG: 40 TABLET, DELAYED RELEASE ORAL at 08:12

## 2024-12-18 NOTE — PT/OT/SLP PROGRESS
"Occupational Therapy   Treatment    Name: Narda Person  MRN: [id number]  Admitting Diagnosis:  Hallucinations       Recommendations:     Discharge Recommendations: Moderate Intensity Therapy  Discharge Equipment Recommendations:  walker, rolling  Barriers to discharge:  Decreased caregiver support    Assessment:     Narda Person is a 79 y.o. female with a medical diagnosis of Hallucinations.   Performance deficits affecting function are weakness, impaired endurance, impaired self care skills, impaired functional mobility, gait instability, impaired balance, impaired cognition, decreased lower extremity function, decreased safety awareness.     Rehab Prognosis:  Fair; patient would benefit from acute skilled OT services to address these deficits and reach maximum level of function.       Plan:     Patient to be seen 5 x/week to address the above listed problems via self-care/home management, therapeutic activities, therapeutic exercises  Plan of Care Expires: 12/30/24  Plan of Care Reviewed with: patient    Subjective     Chief Complaint: "I want to go home"  Patient/Family Comments/goals: "Do they still have that alarm on (referring to bed alarm)  Pain/Comfort:  Pain Rating 1: 0/10    Objective:     Communicated with: RN prior to session.  Patient found HOB elevated with bed alarm, PureWick, peripheral IV, Other (comments) (avaysis camera) upon OT entry to room.    General Precautions: Standard, fall    Orthopedic Precautions:N/A  Braces: N/A  Respiratory Status: Room air     Occupational Performance:     Bed Mobility:    Patient completed Rolling/Turning to Right with modified independence  Patient completed Scooting/Bridging with modified independence  Patient completed Supine to Sit with modified independence  Patient completed Sit to Supine with independence     Functional Mobility/Transfers:  Patient completed Sit <> Stand Transfer with contact guard assistance  with  no assistive device   Functional Mobility: " patient was able to stand from sitting on EOB     Activities of Daily Living:  Did not perform      AMPAC 6 Click ADL: 17    Treatment & Education:  Patient rolled to right side and up to sitting on EOB with Mod I. Patient stood from EOB x 5 times with CGA. Patient was able to get back into the bed independently and scoot to the HOB with Mod I and verbal cues for hand placement. Patient declined any further tx so session ended. Patient mayur tx well.     Patient left HOB elevated with all lines intact, call button in reach, bed alarm on, and RN notified    GOALS:   Multidisciplinary Problems       Occupational Therapy Goals          Problem: Occupational Therapy    Goal Priority Disciplines Outcome Interventions   Occupational Therapy Goal     OT, PT/OT Progressing    Description: Pt to perform UB dressing with MOD I seated EOB.  Pt to perform LB dressing with SBA seated EOB.   Pt to perform self toileting with MIN A using standard commode.   Pt to perform level functional transfers required for ADL's with SBA, RW level.  Pt to consistently demonstrate adherence to orthopedic precautions during all ADL's as instructed by OT.  Pt to demonstrate  fair +dynamic standing balance as required to perform ADL's from standing level.  Pt to complete standing ADL at sink level for ~3 minutes for improved safety and activity tolerance upon discharge.                          Time Tracking:     OT Date of Treatment: 12/18/24  OT Start Time: 1434  OT Stop Time: 1444  OT Total Time (min): 10 min    Billable Minutes:Therapeutic Activity 10    OT/COURT: COURT     Number of COURT visits since last OT visit: 2    12/18/2024

## 2024-12-18 NOTE — PLAN OF CARE
12/18/24 1003   Rounds   Attendance Provider;Nurse ;   Discharge Plan A New Nursing Home placement - halfway care facility   Why the patient remains in the hospital Requires continued medical care   Transition of Care Barriers   (Skilled Nursing Placement)     Care team at bedside, discussed plan of care with patient and family. Will continue to follow for duration of stay.

## 2024-12-18 NOTE — PLAN OF CARE
Pt has remained pain and fever free throughout shift. She has been asleep, she wakes easily when touched.

## 2024-12-18 NOTE — PT/OT/SLP PROGRESS
Physical Therapy Treatment    Patient Name:  Narda Person   MRN:  [id number]    Recommendations:     Discharge Recommendations: Moderate Intensity Therapy  Discharge Equipment Recommendations: walker, rolling  Barriers to discharge: Inaccessible home and Decreased caregiver support    Assessment:     Narda Person is a 79 y.o. female admitted with a medical diagnosis of Hallucinations.  She presents with the following impairments/functional limitations: weakness, impaired endurance, gait instability, impaired self care skills, impaired functional mobility, impaired balance, impaired cognition, decreased safety awareness, decreased lower extremity function. Patient tolerated gait functions using RW x ~25 feet with minimal assistance and cues for balance and safety. Pt gets tired easily.    Rehab Prognosis: Fair; patient would benefit from acute skilled PT services to address these deficits and reach maximum level of function.    Recent Surgery: * No surgery found *      Plan:     During this hospitalization, patient to be seen 5 x/week to address the identified rehab impairments via gait training, therapeutic activities, therapeutic exercises and progress toward the following goals:    Plan of Care Expires:  12/20/24    Subjective     Chief Complaint: no new complain  Patient/Family Comments/goals: none  Pain/Comfort:  Pain Rating 1: 0/10      Objective:     Communicated with nursing and patient prior to session.  Patient found HOB elevated with bed alarm, PureWick, peripheral IV, Other (comments) (avasys camera) upon PT entry to room.     General Precautions: Standard, fall  Orthopedic Precautions: N/A  Braces: N/A  Respiratory Status: Room air     Functional Mobility:  Bed Mobility:     Rolling Left:  supervision  Rolling Right: supervision  Scooting: supervision  Supine to Sit: contact guard assistance  Sit to Supine: contact guard assistance  Transfers:     Sit to Stand:  contact guard assistance with rolling  walker  Gait: minimal assistance x ~25 feet x 2 using RW. Dec foot/floor clearance, dec stride, leaning forward and dec may.  Balance: Sitting: Standby Assistance; Standing with RW: Contact Guard Assistance.      AM-PAC 6 CLICK MOBILITY  Turning over in bed (including adjusting bedclothes, sheets and blankets)?: 3  Sitting down on and standing up from a chair with arms (e.g., wheelchair, bedside commode, etc.): 3  Moving from lying on back to sitting on the side of the bed?: 3  Moving to and from a bed to a chair (including a wheelchair)?: 3  Need to walk in hospital room?: 3  Climbing 3-5 steps with a railing?: 3  Basic Mobility Total Score: 18       Treatment & Education:  B LE AROM ex x 10 reps on each at supine and sitting on edge of the bed, sit to stand x 3 with RW and gait trng using RW x ~25 feet x 2 with minimal assistance and cues    Patient left HOB elevated with all lines intact, call button in reach, bed alarm on, nursing  notified, and avasys camera present..    GOALS:   Multidisciplinary Problems       Physical Therapy Goals          Problem: Physical Therapy    Goal Priority Disciplines Outcome Interventions   Physical Therapy Goal     PT, PT/OT Progressing    Description: Goals to be met by: 2024    Patient will increase functional independence with mobility by performin. Supine to sit with Standby Assistance.  2. Sit to supine with Standby Assistance.  3. Bed to chair transfer with Standby Assistance with rolling walker using Step Transfer technique.  4. Sit to Stand with Standby Assistance with rolling walker.  5. Gait  x 100  feet with Standby Assistance with rolling walker.  6. Lower extremity exercise program x10 reps, with assistance as needed.                          Time Tracking:     PT Received On: 24  PT Start Time: 905     PT Stop Time: 930  PT Total Time (min): 25 min     Billable Minutes: Gait Training 10 and Therapeutic Activity 15    Treatment Type:  Treatment  PT/PTA: PT           12/18/2024

## 2024-12-18 NOTE — ASSESSMENT & PLAN NOTE
Magnesium 1.5  IV mg     IV 1.6. 4 grams of IV magnesium today.  F/u outpatient labs    F/u mg 12/19

## 2024-12-18 NOTE — ASSESSMENT & PLAN NOTE
Patient's FSGs are uncontrolled due to hyperglycemia on current medication regimen.  Last A1c reviewed-   Lab Results   Component Value Date    HGBA1C 8.4 (H) 12/13/2024     Most recent fingerstick glucose reviewed-   Recent Labs   Lab 12/17/24  1626 12/17/24  1943/24  0711 12/18/24  1154   POCTGLUCOSE 308* 319* 255* 404*       Current correctional scale  Medium  Increase anti-hyperglycemic dose as follows-   Antihyperglycemics (From admission, onward)    Start     Stop Route Frequency Ordered    12/18/24 0900  insulin glargine U-100 (Lantus) pen 18 Units         -- SubQ Daily 12/17/24 1135    12/17/24 1645  insulin aspart U-100 pen 6 Units         -- SubQ 3 times daily with meals 12/17/24 1135    12/13/24 0928  insulin aspart U-100 pen 0-5 Units         -- SubQ Before meals & nightly PRN 12/13/24 0828         Increased insulin due to hyperglycemia

## 2024-12-18 NOTE — PROGRESS NOTES
[place] [facility] ([address] [place] [address] Medicine  Progress Note    Patient Name: Narda Person  MRN: [id number]  Patient Class: IP- Inpatient   Admission Date: 12/12/2024  Length of Stay: 2 days  Attending Physician: Alisa Beckwith MD  Primary Care Provider: Sirena Coleman MD      Principal Problem:Hallucinations        HPI:  Patient is a 79 year old female with medical history of HTN, hypothyroidism, DM 2 and Dementia who presented to the ED With hallucinations and worsening confusion.  Daughter states she did have one eye brown higher than the other.   No other complaints per daughter who is the POA.    Admitted for hallucinations.      Overview/Hospital Course:  Patient was started on Namenda and low-dose Seroquel last night.  She seemed to have a better night.  She is still confused.  She was able to work with physical therapy this morning.  She was able to walk 15 ft with a walker.  She is still a significant fall risk in the therapist feels her cognition puts her at severe risk for accidents.  She agrees that nursing home placement would be in her best interest.    Neurology evaluated patient and recommended a low-dose Seroquel to help with her hallucinations.  She may have Lewy bodies dementia.  Namenda was recommended as well.  They want her to be evaluated in memory clinic    12/16 PT HD stable on room air.  Plan to discharge to NH.  PT/OT ordered.  Hallucinations resolved.      12/17 Mg on lower level.  IV mg ordered.  Waiting placement to SNF.      12/18 NAEON.  Waiting placement     Interval History: anxiety, weakness    Review of Systems   HENT:  Negative for congestion.    Respiratory:  Negative for chest tightness.    Cardiovascular:  Negative for chest pain.   Gastrointestinal:  Negative for abdominal pain.   Musculoskeletal:  Positive for gait problem. Negative for back pain.   Neurological:  Negative for tremors and weakness.   Psychiatric/Behavioral:  Negative for agitation and hallucinations.   "    Objective:     Vital Signs (Most Recent):  Temp: 97.8 °F (36.6 °C) (12/18/24 1119)  Pulse: 72 (12/18/24 1119)  Resp: 20 (12/18/24 1119)  BP: (!) 144/73 (12/18/24 1119)  SpO2: 98 % (12/18/24 1119) Vital Signs (24h Range):  Temp:  [97.4 °F (36.3 °C)-98.4 °F (36.9 °C)] 97.8 °F (36.6 °C)  Pulse:  [67-82] 72  Resp:  [16-20] 20  SpO2:  [93 %-98 %] 98 %  BP: (116-148)/(56-73) 144/73     Weight: 75.8 kg (167 lb 1.7 oz)  Body mass index is 30.56 kg/m².    Intake/Output Summary (Last 24 hours) at 12/18/2024 1248  Last data filed at 12/18/2024 0853  Gross per 24 hour   Intake 296 ml   Output 600 ml   Net -304 ml         Physical Exam  Constitutional:       Appearance: Normal appearance. She is well-developed.   Eyes:      Extraocular Movements: Extraocular movements intact.      Pupils: Pupils are equal, round, and reactive to light.   Cardiovascular:      Rate and Rhythm: Normal rate and regular rhythm.      Heart sounds: Normal heart sounds. No murmur heard.     No friction rub. No gallop.   Pulmonary:      Effort: Pulmonary effort is normal.      Breath sounds: Normal breath sounds.   Musculoskeletal:      Right lower leg: No edema.      Left lower leg: No edema.   Neurological:      Mental Status: Mental status is at baseline.      Comments: Oriented to month, location but not age (states she is in her 70's)   Psychiatric:         Attention and Perception: She perceives auditory and visual hallucinations.         Mood and Affect: Mood is anxious.         Speech: Speech normal.         Behavior: Behavior normal.         Cognition and Memory: Cognition is impaired. Memory is impaired.             Significant Labs: All pertinent labs within the past 24 hours have been reviewed.  BMP:   Recent Labs   Lab 12/17/24  0903   MG 1.6       CBC:   No results for input(s): "WBC", "HGB", "HCT", "PLT" in the last 48 hours.        Significant Imaging: I have reviewed all pertinent imaging results/findings within the past 24 hours.  I " have reviewed and interpreted all pertinent imaging results/findings within the past 24 hours.  MRI and CT scan reviewed.    Assessment and Plan     * Hallucinations  Likely due to worsening dementia.  She probably has Lewy body dementia.  Namenda and Seroquel started.  Last night was a better night.  Less agitation, confusion.  Hallucination seemed to be a little better.  She is still significantly cognitively impaired.  Neurosurgery reviewed CT head with known schwannoma compressing on eric and basilar cisterns.  No further intervention.  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  Tele neurology recommends low-dose Seroquel and to start Namenda.  They recommend referral to memory Clinic.  Family would like her admitted to a nursing home.    Currently resolved       Electrolyte abnormality  Magnesium 1.5  IV mg     IV 1.6. 4 grams of IV magnesium today.  F/u outpatient labs    F/u mg 12/19      Advanced care planning/counseling discussion  Discussed goals of care with POA daughter but will follow up after she reviews paperwork.  Daughter and granddaughter are unable to care for patient that their home.  They would like patient to get into a nursing home.  Recommend discussing this with .  Unable to discharge patient this time.    Waiting placement to nursing home       Chronic kidney disease, stage 2 (mild)  Creatine stable for now. BMP reviewed- noted Estimated Creatinine Clearance: 52.6 mL/min (based on SCr of 0.8 mg/dL). according to latest data. Based on current GFR, CKD stage is stage 2 - GFR 60-89.  Monitor UOP and serial BMP and adjust therapy as needed. Renally dose meds. Avoid nephrotoxic medications and procedures.    stable    Gait abnormality  She has a high-risk for falls.    Recommend starting physical therapy.  She was able to walk 15 ft with a walker this morning which is a big improvement.  She is able to get out of bed with a  little help.  Only option that I can see is referral to nursing home/swing.  Discuss with     PT/OT ordered and waiting placement to SNF      Diabetes mellitus, type 2  Patient's FSGs are uncontrolled due to hyperglycemia on current medication regimen.  Last A1c reviewed-   Lab Results   Component Value Date    HGBA1C 8.4 (H) 12/13/2024     Most recent fingerstick glucose reviewed-   Recent Labs   Lab 12/17/24  1626 12/17/24  1943/24  0711 12/18/24  1154   POCTGLUCOSE 308* 319* 255* 404*       Current correctional scale  Medium  Increase anti-hyperglycemic dose as follows-   Antihyperglycemics (From admission, onward)      Start     Stop Route Frequency Ordered    12/18/24 0900  insulin glargine U-100 (Lantus) pen 18 Units         -- SubQ Daily 12/17/24 1135    12/17/24 1645  insulin aspart U-100 pen 6 Units         -- SubQ 3 times daily with meals 12/17/24 1135    12/13/24 0928  insulin aspart U-100 pen 0-5 Units         -- SubQ Before meals & nightly PRN 12/13/24 0828           Increased insulin due to hyperglycemia      VTE Risk Mitigation (From admission, onward)           Ordered     IP VTE HIGH RISK PATIENT  Once         12/13/24 0152     Place sequential compression device  Until discontinued         12/13/24 0152                    Discharge Planning   DWAYNE:      Code Status: Full Code   Medical Readiness for Discharge Date:   Discharge Plan A: New Nursing Home placement - [facility] care facility   Discharge Delays: (!) Post-Acute Set-up              Angella Price PA-C  Department of Hospital Medicine   [place] [facility] ([facility])

## 2024-12-18 NOTE — ASSESSMENT & PLAN NOTE
She has a high-risk for falls.    Recommend starting physical therapy.  She was able to walk 15 ft with a walker this morning which is a big improvement.  She is able to get out of bed with a little help.  Only option that I can see is referral to nursing home/swing.  Discuss with     PT/OT ordered and waiting placement to SNF

## 2024-12-18 NOTE — SUBJECTIVE & OBJECTIVE
Interval History: anxiety, weakness    Review of Systems   HENT:  Negative for congestion.    Respiratory:  Negative for chest tightness.    Cardiovascular:  Negative for chest pain.   Gastrointestinal:  Negative for abdominal pain.   Musculoskeletal:  Positive for gait problem. Negative for back pain.   Neurological:  Negative for tremors and weakness.   Psychiatric/Behavioral:  Negative for agitation and hallucinations.      Objective:     Vital Signs (Most Recent):  Temp: 97.8 °F (36.6 °C) (12/18/24 1119)  Pulse: 72 (12/18/24 1119)  Resp: 20 (12/18/24 1119)  BP: (!) 144/73 (12/18/24 1119)  SpO2: 98 % (12/18/24 1119) Vital Signs (24h Range):  Temp:  [97.4 °F (36.3 °C)-98.4 °F (36.9 °C)] 97.8 °F (36.6 °C)  Pulse:  [67-82] 72  Resp:  [16-20] 20  SpO2:  [93 %-98 %] 98 %  BP: (116-148)/(56-73) 144/73     Weight: 75.8 kg (167 lb 1.7 oz)  Body mass index is 30.56 kg/m².    Intake/Output Summary (Last 24 hours) at 12/18/2024 1248  Last data filed at 12/18/2024 0853  Gross per 24 hour   Intake 296 ml   Output 600 ml   Net -304 ml         Physical Exam  Constitutional:       Appearance: Normal appearance. She is well-developed.   Eyes:      Extraocular Movements: Extraocular movements intact.      Pupils: Pupils are equal, round, and reactive to light.   Cardiovascular:      Rate and Rhythm: Normal rate and regular rhythm.      Heart sounds: Normal heart sounds. No murmur heard.     No friction rub. No gallop.   Pulmonary:      Effort: Pulmonary effort is normal.      Breath sounds: Normal breath sounds.   Musculoskeletal:      Right lower leg: No edema.      Left lower leg: No edema.   Neurological:      Mental Status: Mental status is at baseline.      Comments: Oriented to month, location but not age (states she is in her 70's)   Psychiatric:         Attention and Perception: She perceives auditory and visual hallucinations.         Mood and Affect: Mood is anxious.         Speech: Speech normal.         Behavior:  "Behavior normal.         Cognition and Memory: Cognition is impaired. Memory is impaired.             Significant Labs: All pertinent labs within the past 24 hours have been reviewed.  BMP:   Recent Labs   Lab 12/17/24  0903   MG 1.6       CBC:   No results for input(s): "WBC", "HGB", "HCT", "PLT" in the last 48 hours.        Significant Imaging: I have reviewed all pertinent imaging results/findings within the past 24 hours.  I have reviewed and interpreted all pertinent imaging results/findings within the past 24 hours.  MRI and CT scan reviewed.  " No

## 2024-12-19 LAB
ANION GAP SERPL CALC-SCNC: 11 MMOL/L (ref 8–16)
BASOPHILS # BLD AUTO: 0.08 K/UL (ref 0–0.2)
BASOPHILS NFR BLD: 1.4 % (ref 0–1.9)
BUN SERPL-MCNC: 16 MG/DL (ref 8–23)
CALCIUM SERPL-MCNC: 8.8 MG/DL (ref 8.7–10.5)
CHLORIDE SERPL-SCNC: 103 MMOL/L (ref 95–110)
CO2 SERPL-SCNC: 22 MMOL/L (ref 23–29)
CREAT SERPL-MCNC: 0.9 MG/DL (ref 0.5–1.4)
DIFFERENTIAL METHOD BLD: NORMAL
EOSINOPHIL # BLD AUTO: 0.2 K/UL (ref 0–0.5)
EOSINOPHIL NFR BLD: 2.9 % (ref 0–8)
ERYTHROCYTE [DISTWIDTH] IN BLOOD BY AUTOMATED COUNT: 12.6 % (ref 11.5–14.5)
EST. GFR  (NO RACE VARIABLE): >60 ML/MIN/1.73 M^2
GLUCOSE SERPL-MCNC: 308 MG/DL (ref 70–110)
HCT VFR BLD AUTO: 38.8 % (ref 37–48.5)
HGB BLD-MCNC: 13.6 G/DL (ref 12–16)
IMM GRANULOCYTES # BLD AUTO: 0.01 K/UL (ref 0–0.04)
IMM GRANULOCYTES NFR BLD AUTO: 0.2 % (ref 0–0.5)
LYMPHOCYTES # BLD AUTO: 2 K/UL (ref 1–4.8)
LYMPHOCYTES NFR BLD: 34.3 % (ref 18–48)
MAGNESIUM SERPL-MCNC: 1.6 MG/DL (ref 1.6–2.6)
MCH RBC QN AUTO: 30.5 PG (ref 27–31)
MCHC RBC AUTO-ENTMCNC: 35.1 G/DL (ref 32–36)
MCV RBC AUTO: 87 FL (ref 82–98)
MONOCYTES # BLD AUTO: 0.5 K/UL (ref 0.3–1)
MONOCYTES NFR BLD: 9 % (ref 4–15)
NEUTROPHILS # BLD AUTO: 3 K/UL (ref 1.8–7.7)
NEUTROPHILS NFR BLD: 52.2 % (ref 38–73)
NRBC BLD-RTO: 0 /100 WBC
PLATELET # BLD AUTO: 176 K/UL (ref 150–450)
PMV BLD AUTO: 11 FL (ref 9.2–12.9)
POCT GLUCOSE: 310 MG/DL (ref 70–110)
POCT GLUCOSE: 331 MG/DL (ref 70–110)
POCT GLUCOSE: 335 MG/DL (ref 70–110)
POCT GLUCOSE: 343 MG/DL (ref 70–110)
POTASSIUM SERPL-SCNC: 4.1 MMOL/L (ref 3.5–5.1)
RBC # BLD AUTO: 4.46 M/UL (ref 4–5.4)
SODIUM SERPL-SCNC: 136 MMOL/L (ref 136–145)
WBC # BLD AUTO: 5.78 K/UL (ref 3.9–12.7)

## 2024-12-19 PROCEDURE — 85025 COMPLETE CBC W/AUTO DIFF WBC: CPT | Mod: HCNC | Performed by: PHYSICIAN ASSISTANT

## 2024-12-19 PROCEDURE — 83735 ASSAY OF MAGNESIUM: CPT | Mod: HCNC | Performed by: PHYSICIAN ASSISTANT

## 2024-12-19 PROCEDURE — 25000003 PHARM REV CODE 250: Mod: HCNC

## 2024-12-19 PROCEDURE — 97530 THERAPEUTIC ACTIVITIES: CPT | Mod: HCNC,CO

## 2024-12-19 PROCEDURE — 97535 SELF CARE MNGMENT TRAINING: CPT | Mod: HCNC,CO

## 2024-12-19 PROCEDURE — 97530 THERAPEUTIC ACTIVITIES: CPT | Mod: HCNC

## 2024-12-19 PROCEDURE — 90833 PSYTX W PT W E/M 30 MIN: CPT | Mod: HCNC,,, | Performed by: PSYCHIATRY & NEUROLOGY

## 2024-12-19 PROCEDURE — 63600175 PHARM REV CODE 636 W HCPCS: Mod: HCNC | Performed by: INTERNAL MEDICINE

## 2024-12-19 PROCEDURE — 25000003 PHARM REV CODE 250: Mod: HCNC | Performed by: PHYSICIAN ASSISTANT

## 2024-12-19 PROCEDURE — 11000001 HC ACUTE MED/SURG PRIVATE ROOM: Mod: HCNC

## 2024-12-19 PROCEDURE — 99221 1ST HOSP IP/OBS SF/LOW 40: CPT | Mod: HCNC,,, | Performed by: PSYCHIATRY & NEUROLOGY

## 2024-12-19 PROCEDURE — 25000003 PHARM REV CODE 250: Mod: HCNC | Performed by: FAMILY MEDICINE

## 2024-12-19 PROCEDURE — 99231 SBSQ HOSP IP/OBS SF/LOW 25: CPT | Mod: HCNC,,, | Performed by: PHYSICIAN ASSISTANT

## 2024-12-19 PROCEDURE — 80048 BASIC METABOLIC PNL TOTAL CA: CPT | Mod: HCNC | Performed by: PHYSICIAN ASSISTANT

## 2024-12-19 PROCEDURE — 36415 COLL VENOUS BLD VENIPUNCTURE: CPT | Mod: HCNC | Performed by: PHYSICIAN ASSISTANT

## 2024-12-19 RX ORDER — MAGNESIUM SULFATE HEPTAHYDRATE 40 MG/ML
2 INJECTION, SOLUTION INTRAVENOUS ONCE
Status: COMPLETED | OUTPATIENT
Start: 2024-12-19 | End: 2024-12-19

## 2024-12-19 RX ORDER — MAGNESIUM SULFATE HEPTAHYDRATE 40 MG/ML
4 INJECTION, SOLUTION INTRAVENOUS ONCE
Status: DISCONTINUED | OUTPATIENT
Start: 2024-12-19 | End: 2024-12-19 | Stop reason: SDUPTHER

## 2024-12-19 RX ADMIN — DONEPEZIL HYDROCHLORIDE 10 MG: 5 TABLET, FILM COATED ORAL at 08:12

## 2024-12-19 RX ADMIN — PANTOPRAZOLE SODIUM 40 MG: 40 TABLET, DELAYED RELEASE ORAL at 08:12

## 2024-12-19 RX ADMIN — INSULIN ASPART 2 UNITS: 100 INJECTION, SOLUTION INTRAVENOUS; SUBCUTANEOUS at 09:12

## 2024-12-19 RX ADMIN — MEMANTINE 10 MG: 10 TABLET ORAL at 08:12

## 2024-12-19 RX ADMIN — Medication 6 MG: at 10:12

## 2024-12-19 RX ADMIN — MAGNESIUM SULFATE HEPTAHYDRATE 2 G: 40 INJECTION, SOLUTION INTRAVENOUS at 11:12

## 2024-12-19 RX ADMIN — FERROUS SULFATE TAB 325 MG (65 MG ELEMENTAL FE) 1 EACH: 325 (65 FE) TAB at 08:12

## 2024-12-19 RX ADMIN — ATORVASTATIN CALCIUM 20 MG: 20 TABLET, FILM COATED ORAL at 08:12

## 2024-12-19 RX ADMIN — MAGNESIUM SULFATE HEPTAHYDRATE 2 G: 40 INJECTION, SOLUTION INTRAVENOUS at 09:12

## 2024-12-19 RX ADMIN — QUETIAPINE FUMARATE 12.5 MG: 25 TABLET ORAL at 08:12

## 2024-12-19 RX ADMIN — GABAPENTIN 300 MG: 300 CAPSULE ORAL at 08:12

## 2024-12-19 RX ADMIN — INSULIN ASPART 4 UNITS: 100 INJECTION, SOLUTION INTRAVENOUS; SUBCUTANEOUS at 12:12

## 2024-12-19 RX ADMIN — INSULIN ASPART 4 UNITS: 100 INJECTION, SOLUTION INTRAVENOUS; SUBCUTANEOUS at 08:12

## 2024-12-19 RX ADMIN — INSULIN ASPART 6 UNITS: 100 INJECTION, SOLUTION INTRAVENOUS; SUBCUTANEOUS at 12:12

## 2024-12-19 RX ADMIN — INSULIN ASPART 6 UNITS: 100 INJECTION, SOLUTION INTRAVENOUS; SUBCUTANEOUS at 04:12

## 2024-12-19 RX ADMIN — INSULIN ASPART 6 UNITS: 100 INJECTION, SOLUTION INTRAVENOUS; SUBCUTANEOUS at 08:12

## 2024-12-19 RX ADMIN — INSULIN ASPART 4 UNITS: 100 INJECTION, SOLUTION INTRAVENOUS; SUBCUTANEOUS at 04:12

## 2024-12-19 RX ADMIN — INSULIN GLARGINE 18 UNITS: 100 INJECTION, SOLUTION SUBCUTANEOUS at 08:12

## 2024-12-19 NOTE — ASSESSMENT & PLAN NOTE
She has a high-risk for falls.    Recommend starting physical therapy.  She was able to walk 15 ft with a walker this morning which is a big improvement.  She is able to get out of bed with a little help.  Only option that I can see is referral to nursing home/swing.  Discuss with     PT/OT ordered and waiting placement to SNF  Waiting for psych clearance

## 2024-12-19 NOTE — PLAN OF CARE
12/19/24 0926   Rounds   Attendance Provider;Nurse ;   Discharge Plan A New Nursing Home placement - snf care facility   Why the patient remains in the hospital Requires continued medical care   Transition of Care Barriers   (New Nursing Home Placement)     Care team at bedside, discussed plan of care with patient. Will continue to follow for duration of stay.

## 2024-12-19 NOTE — ASSESSMENT & PLAN NOTE
Discussed goals of care with POA daughter but will follow up after she reviews paperwork.  Daughter and granddaughter are unable to care for patient that their home.  They would like patient to get into a nursing home.  Recommend discussing this with .  Unable to discharge patient this time.    Waiting placement to skilled nursing home

## 2024-12-19 NOTE — ASSESSMENT & PLAN NOTE
Patient's FSGs are uncontrolled due to hyperglycemia on current medication regimen.  Last A1c reviewed-   Lab Results   Component Value Date    HGBA1C 8.4 (H) 12/13/2024     Most recent fingerstick glucose reviewed-   Recent Labs   Lab 12/18/24  1154 12/18/24  1610 12/18/24  1936 12/19/24  0730   POCTGLUCOSE 404* 291* 344* 310*       Current correctional scale  Medium  Increase anti-hyperglycemic dose as follows-   Antihyperglycemics (From admission, onward)    Start     Stop Route Frequency Ordered    12/18/24 0900  insulin glargine U-100 (Lantus) pen 18 Units         -- SubQ Daily 12/17/24 1135    12/17/24 1645  insulin aspart U-100 pen 6 Units         -- SubQ 3 times daily with meals 12/17/24 1135    12/13/24 0928  insulin aspart U-100 pen 0-5 Units         -- SubQ Before meals & nightly PRN 12/13/24 0828         Increased insulin due to hyperglycemia

## 2024-12-19 NOTE — PT/OT/SLP PROGRESS
Physical Therapy Treatment    Patient Name:  Narda Person   MRN:  [id number]    Recommendations:     Discharge Recommendations: Moderate Intensity Therapy  Discharge Equipment Recommendations: bedside commode, walker, rolling  Barriers to discharge: Inaccessible home and Decreased caregiver support    Assessment:     Narda Person is a 79 y.o. female admitted with a medical diagnosis of Hallucinations.  She presents with the following impairments/functional limitations: weakness, impaired endurance, impaired self care skills, impaired functional mobility, gait instability, impaired balance, decreased safety awareness, impaired cognition, decreased lower extremity function. Patient tolerated sitting up at edge of the bed and gait functions uing RW x ~25 feet with minimal assistance and cues for balance and safety. Gets tired easily and tends to lean forward.    Rehab Prognosis: Fair; patient would benefit from acute skilled PT services to address these deficits and reach maximum level of function.    Recent Surgery: * No surgery found *      Plan:     During this hospitalization, patient to be seen 5 x/week to address the identified rehab impairments via gait training, therapeutic activities, therapeutic exercises and progress toward the following goals:    Plan of Care Expires:  12/20/24    Subjective     Chief Complaint: none   Patient/Family Comments/goals: waiting for placement  Pain/Comfort:  Pain Rating 1: 0/10      Objective:     Communicated with nursing and patient prior to session.  Patient found HOB elevated with bed alarm, peripheral IV, PureWick, Other (comments) (avasys camera) upon PT entry to room.     General Precautions: Standard, fall  Orthopedic Precautions: N/A  Braces: N/A  Respiratory Status: Room air     Functional Mobility:  Bed Mobility:     Rolling Left:  supervision  Rolling Right: supervision  Scooting: contact guard assistance  Supine to Sit: contact guard assistance  Sit to Supine: contact  guard assistance  Transfers:     Sit to Stand:  minimum assistance with rolling walker  Gait: minimal assistance x ~25 feet using RW. Dec foot/floor clearance, dec stride, dec may and gets tired easily.  Balance: Sitting: Standby Assistance; Standing with RW: contact  guard assistance      AM-PAC 6 CLICK MOBILITY  Turning over in bed (including adjusting bedclothes, sheets and blankets)?: 3  Sitting down on and standing up from a chair with arms (e.g., wheelchair, bedside commode, etc.): 3  Moving from lying on back to sitting on the side of the bed?: 3  Moving to and from a bed to a chair (including a wheelchair)?: 3  Need to walk in hospital room?: 3  Climbing 3-5 steps with a railing?: 3  Basic Mobility Total Score: 18       Treatment & Education:  B LE AROM ex x 10 rpes on each involving ankle pumps/rotation, heel slides, abd/add, rolling to sides, LAQ, standing balance activity and gait tnrg using RW x ~25 feet    Patient left HOB elevated with all lines intact, call button in reach, bed alarm on, nursing  notified, and avasys camera present..    GOALS:   Multidisciplinary Problems       Physical Therapy Goals          Problem: Physical Therapy    Goal Priority Disciplines Outcome Interventions   Physical Therapy Goal     PT, PT/OT Progressing    Description: Goals to be met by: 2024    Patient will increase functional independence with mobility by performin. Supine to sit with Standby Assistance.  2. Sit to supine with Standby Assistance.  3. Bed to chair transfer with Standby Assistance with rolling walker using Step Transfer technique.  4. Sit to Stand with Standby Assistance with rolling walker.  5. Gait  x 100  feet with Standby Assistance with rolling walker.  6. Lower extremity exercise program x10 reps, with assistance as needed.                          Time Tracking:     PT Received On: 24  PT Start Time: 1015     PT Stop Time: 1040  PT Total Time (min): 25 min     Billable  Minutes: Therapeutic Activity 25    Treatment Type: Treatment  PT/PTA: PT           12/19/2024

## 2024-12-19 NOTE — SUBJECTIVE & OBJECTIVE
Interval History: anxiety, weakness    Review of Systems   HENT:  Negative for congestion.    Respiratory:  Negative for chest tightness.    Cardiovascular:  Negative for chest pain.   Gastrointestinal:  Negative for abdominal pain.   Musculoskeletal:  Positive for gait problem. Negative for back pain.   Neurological:  Negative for tremors and weakness.   Psychiatric/Behavioral:  Negative for agitation and hallucinations.      Objective:     Vital Signs (Most Recent):  Temp: 98.6 °F (37 °C) (12/19/24 0724)  Pulse: 77 (12/19/24 0724)  Resp: 18 (12/19/24 0724)  BP: 120/67 (12/19/24 0724)  SpO2: 98 % (12/19/24 0724) Vital Signs (24h Range):  Temp:  [97 °F (36.1 °C)-98.6 °F (37 °C)] 98.6 °F (37 °C)  Pulse:  [72-81] 77  Resp:  [18-20] 18  SpO2:  [94 %-98 %] 98 %  BP: (120-144)/(58-73) 120/67     Weight: 75.8 kg (167 lb 1.7 oz)  Body mass index is 30.56 kg/m².    Intake/Output Summary (Last 24 hours) at 12/19/2024 0919  Last data filed at 12/19/2024 0855  Gross per 24 hour   Intake 592 ml   Output 2150 ml   Net -1558 ml         Physical Exam  Constitutional:       Appearance: Normal appearance. She is well-developed.   Eyes:      Extraocular Movements: Extraocular movements intact.      Pupils: Pupils are equal, round, and reactive to light.   Cardiovascular:      Rate and Rhythm: Normal rate and regular rhythm.      Heart sounds: Normal heart sounds. No murmur heard.     No friction rub. No gallop.   Pulmonary:      Effort: Pulmonary effort is normal.      Breath sounds: Normal breath sounds.   Musculoskeletal:      Right lower leg: No edema.      Left lower leg: No edema.   Neurological:      Mental Status: Mental status is at baseline.      Comments: Oriented to month, location but not age (states she is in her 70's)   Psychiatric:         Attention and Perception: She perceives auditory and visual hallucinations.         Mood and Affect: Mood is anxious.         Speech: Speech normal.         Behavior: Behavior  normal.         Cognition and Memory: Cognition is impaired. Memory is impaired.             Significant Labs: All pertinent labs within the past 24 hours have been reviewed.  BMP:   Recent Labs   Lab 12/19/24  0442   *      K 4.1      CO2 22*   BUN 16   CREATININE 0.9   CALCIUM 8.8   MG 1.6       CBC:   Recent Labs   Lab 12/19/24  0442   WBC 5.78   HGB 13.6   HCT 38.8              Significant Imaging: I have reviewed all pertinent imaging results/findings within the past 24 hours.  I have reviewed and interpreted all pertinent imaging results/findings within the past 24 hours.  MRI and CT scan reviewed.

## 2024-12-19 NOTE — PT/OT/SLP PROGRESS
"Occupational Therapy   Treatment    Name: Narda Person  MRN: [id number]  Admitting Diagnosis:  Hallucinations       Recommendations:     Discharge Recommendations: Moderate Intensity Therapy  Discharge Equipment Recommendations:  walker, rolling  Barriers to discharge:  Decreased caregiver support    Assessment:     Narda Person is a 79 y.o. female with a medical diagnosis of Hallucinations. Performance deficits affecting function are weakness, impaired endurance, impaired self care skills, impaired functional mobility, gait instability, impaired balance, impaired cognition, decreased lower extremity function, decreased safety awareness, visual deficits.     Rehab Prognosis:  Fair; patient would benefit from acute skilled OT services to address these deficits and reach maximum level of function.       Plan:     Patient to be seen 5 x/week to address the above listed problems via self-care/home management, therapeutic activities, therapeutic exercises  Plan of Care Expires: 12/30/24  Plan of Care Reviewed with: patient    Subjective     Chief Complaint: none  Patient/Family Comments/goals: "Am I still in the hospital? I am so confused"  Pain/Comfort:  Pain Rating 1: 0/10    Objective:     Communicated with: RN prior to session.  Patient found HOB elevated with bed alarm, PureWick, peripheral IV, Other (comments) ([employer] camera) upon OT entry to room.    General Precautions: Standard, fall    Orthopedic Precautions:N/A  Braces: N/A  Respiratory Status: Room air     Occupational Performance:     Bed Mobility:    Patient completed Rolling/Turning to Left with  modified independence  Patient completed Scooting/Bridging with modified independence  Patient completed Supine to Sit with minimum assistance  Patient completed Sit to Supine with independence     Functional Mobility/Transfers:  Patient completed Sit <> Stand Transfer with contact guard assistance  with  hand-held assist   Functional Mobility: patient was able to " stand from sitting on EOB with CGA with hand-held assistance    Activities of Daily Living:  Feeding:  stand by assistance for opening packets  Grooming: independence washing face   Upper Body Dressing: independence to donna and doff gown      [facility] 6 Click ADL: 18    Treatment & Education:  Patient fed self with stand by assistance for opening packets. Patient rolled to L side with Mod I and up to sitting on EOB with Min A. Patient had one loss of sitting balance which she required assistance to regain balance. Patient stood from EOB with CGA with hand-held assistance and was able to side step 6 steps to the HOB. Patient was able to return to the bed into supine independently. Patient mayur tx well this date.     Patient left HOB elevated with all lines intact, call button in reach, bed alarm on, and RN notified    GOALS:   Multidisciplinary Problems       Occupational Therapy Goals          Problem: Occupational Therapy    Goal Priority Disciplines Outcome Interventions   Occupational Therapy Goal     OT, PT/OT Progressing    Description: Pt to perform UB dressing with MOD I seated EOB.  Pt to perform LB dressing with SBA seated EOB.   Pt to perform self toileting with MIN A using standard commode.   Pt to perform level functional transfers required for ADL's with SBA, RW level.  Pt to consistently demonstrate adherence to orthopedic precautions during all ADL's as instructed by OT.  Pt to demonstrate  fair +dynamic standing balance as required to perform ADL's from standing level.  Pt to complete standing ADL at sink level for ~3 minutes for improved safety and activity tolerance upon discharge.                          Time Tracking:     OT Date of Treatment: 12/19/24  OT Start Time: 0805  OT Stop Time: 0839  OT Total Time (min): 34 min    Billable Minutes:Self Care/Home Management 20  Therapeutic Activity 12    OT/COURT: COURT     Number of COURT visits since last OT visit: 3    12/19/2024

## 2024-12-19 NOTE — ASSESSMENT & PLAN NOTE
Magnesium 1.5  IV mg     IV 1.6. 4 grams of IV magnesium today.  F/u outpatient labs    Mg 1.6 today 12/19 IV mg replacement

## 2024-12-19 NOTE — PROGRESS NOTES
[facility] ([address] [place] [address] Medicine  Progress Note    Patient Name: Narda Person  MRN: [id number]  Patient Class: IP- Inpatient   Admission Date: 12/12/2024  Length of Stay: 3 days  Attending Physician: Alisa Beckwith MD  Primary Care Provider: Sirena Coleman MD        Principal Problem:Hallucinations        HPI:  Patient is a 79 year old female with medical history of HTN, hypothyroidism, DM 2 and Dementia who presented to the ED With hallucinations and worsening confusion.  Daughter states she did have one eye brown higher than the other.   No other complaints per daughter who is the POA.    Admitted for hallucinations.      Overview/Hospital Course:  Patient was started on Namenda and low-dose Seroquel last night.  She seemed to have a better night.  She is still confused.  She was able to work with physical therapy this morning.  She was able to walk 15 ft with a walker.  She is still a significant fall risk in the therapist feels her cognition puts her at severe risk for accidents.  She agrees that nursing home placement would be in her best interest.    Neurology evaluated patient and recommended a low-dose Seroquel to help with her hallucinations.  She may have Lewy bodies dementia.  Namenda was recommended as well.  They want her to be evaluated in memory clinic    12/16 PT HD stable on room air.  Plan to discharge to NH.  PT/OT ordered.  Hallucinations resolved.      12/17 Mg on lower level.  IV mg ordered.  Waiting placement to SNF.      12/18 NAEON.  Waiting placement     12/19 PT HD stable on room air.  Mg 1.6.  IV mg replacement.      Interval History: anxiety, weakness    Review of Systems   HENT:  Negative for congestion.    Respiratory:  Negative for chest tightness.    Cardiovascular:  Negative for chest pain.   Gastrointestinal:  Negative for abdominal pain.   Musculoskeletal:  Positive for gait problem. Negative for back pain.   Neurological:  Negative for tremors and weakness.    Psychiatric/Behavioral:  Negative for agitation and hallucinations.      Objective:     Vital Signs (Most Recent):  Temp: 98.6 °F (37 °C) (12/19/24 0724)  Pulse: 77 (12/19/24 0724)  Resp: 18 (12/19/24 0724)  BP: 120/67 (12/19/24 0724)  SpO2: 98 % (12/19/24 0724) Vital Signs (24h Range):  Temp:  [97 °F (36.1 °C)-98.6 °F (37 °C)] 98.6 °F (37 °C)  Pulse:  [72-81] 77  Resp:  [18-20] 18  SpO2:  [94 %-98 %] 98 %  BP: (120-144)/(58-73) 120/67     Weight: 75.8 kg (167 lb 1.7 oz)  Body mass index is 30.56 kg/m².    Intake/Output Summary (Last 24 hours) at 12/19/2024 0919  Last data filed at 12/19/2024 0855  Gross per 24 hour   Intake 592 ml   Output 2150 ml   Net -1558 ml         Physical Exam  Constitutional:       Appearance: Normal appearance. She is well-developed.   Eyes:      Extraocular Movements: Extraocular movements intact.      Pupils: Pupils are equal, round, and reactive to light.   Cardiovascular:      Rate and Rhythm: Normal rate and regular rhythm.      Heart sounds: Normal heart sounds. No murmur heard.     No friction rub. No gallop.   Pulmonary:      Effort: Pulmonary effort is normal.      Breath sounds: Normal breath sounds.   Musculoskeletal:      Right lower leg: No edema.      Left lower leg: No edema.   Neurological:      Mental Status: Mental status is at baseline.      Comments: Oriented to month, location but not age (states she is in her 70's)   Psychiatric:         Attention and Perception: She perceives auditory and visual hallucinations.         Mood and Affect: Mood is anxious.         Speech: Speech normal.         Behavior: Behavior normal.         Cognition and Memory: Cognition is impaired. Memory is impaired.             Significant Labs: All pertinent labs within the past 24 hours have been reviewed.  BMP:   Recent Labs   Lab 12/19/24 0442   *      K 4.1      CO2 22*   BUN 16   CREATININE 0.9   CALCIUM 8.8   MG 1.6       CBC:   Recent Labs   Lab 12/19/24 0442    WBC 5.78   HGB 13.6   HCT 38.8              Significant Imaging: I have reviewed all pertinent imaging results/findings within the past 24 hours.  I have reviewed and interpreted all pertinent imaging results/findings within the past 24 hours.  MRI and CT scan reviewed.    Assessment and Plan     * Hallucinations  Likely due to worsening dementia.  She probably has Lewy body dementia.  Namenda and Seroquel started.  Last night was a better night.  Less agitation, confusion.  Hallucination seemed to be a little better.  She is still significantly cognitively impaired.  Neurosurgery reviewed CT head with known schwannoma compressing on eric and basilar cisterns.  No further intervention.  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  Tele neurology recommends low-dose Seroquel and to start Namenda.  They recommend referral to memory Clinic.  Family would like her admitted to a nursing home.    Currently resolved       Electrolyte abnormality  Magnesium 1.5  IV mg     IV 1.6. 4 grams of IV magnesium today.  F/u outpatient labs    Mg 1.6 today 12/19 IV mg replacement       Advanced care planning/counseling discussion  Discussed goals of care with POA daughter but will follow up after she reviews paperwork.  Daughter and granddaughter are unable to care for patient that their home.  They would like patient to get into a nursing home.  Recommend discussing this with .  Unable to discharge patient this time.    Waiting placement to skilled nursing home       Chronic kidney disease, stage 2 (mild)  Creatine stable for now. BMP reviewed- noted Estimated Creatinine Clearance: 46.7 mL/min (based on SCr of 0.9 mg/dL). according to latest data. Based on current GFR, CKD stage is stage 2 - GFR 60-89.  Monitor UOP and serial BMP and adjust therapy as needed. Renally dose meds. Avoid nephrotoxic medications and procedures.    stable    Gait abnormality  She  has a high-risk for falls.    Recommend starting physical therapy.  She was able to walk 15 ft with a walker this morning which is a big improvement.  She is able to get out of bed with a little help.  Only option that I can see is referral to nursing home/swing.  Discuss with     PT/OT ordered and waiting placement to SNF  Waiting for psych clearance       Diabetes mellitus, type 2  Patient's FSGs are uncontrolled due to hyperglycemia on current medication regimen.  Last A1c reviewed-   Lab Results   Component Value Date    HGBA1C 8.4 (H) 12/13/2024     Most recent fingerstick glucose reviewed-   Recent Labs   Lab 12/18/24  1154 12/18/24  1610 12/18/24  1936 12/19/24  0730   POCTGLUCOSE 404* 291* 344* 310*       Current correctional scale  Medium  Increase anti-hyperglycemic dose as follows-   Antihyperglycemics (From admission, onward)      Start     Stop Route Frequency Ordered    12/18/24 0900  insulin glargine U-100 (Lantus) pen 18 Units         -- SubQ Daily 12/17/24 1135    12/17/24 1645  insulin aspart U-100 pen 6 Units         -- SubQ 3 times daily with meals 12/17/24 1135    12/13/24 0928  insulin aspart U-100 pen 0-5 Units         -- SubQ Before meals & nightly PRN 12/13/24 0828           Increased insulin due to hyperglycemia      VTE Risk Mitigation (From admission, onward)           Ordered     IP VTE HIGH RISK PATIENT  Once         12/13/24 0152     Place sequential compression device  Until discontinued         12/13/24 0152                    Discharge Planning   DWAYNE:      Code Status: Full Code   Medical Readiness for Discharge Date:   Discharge Plan A: New Nursing Home placement - half-way care facility   Discharge Delays: (!) Post-Acute Set-up                Angella Price PA-C  Department of Hospital Medicine   [facility] (3rd Fl)

## 2024-12-19 NOTE — CONSULTS
PSYCHIATRY INPATIENT CONSULT NOTE      12/19/2024 8:34 AM   Narda Person   1945   [id number]         DATE OF ADMISSION: 12/12/2024  4:45 PM    SITE: [facility]    CURRENT LEGAL STATUS: voluntary      HISTORY    CHIEF COMPLAINT   Narda Person is a 79 y.o. female with a past psychiatric history of dementia and psychosis currently admitted to the inpatient unit with the following chief complaint: psychosis/confusion    Psychiatry was consulted to NH screening and for evaluation of psychosis.     HPI   The patient was seen and examined. The chart was reviewed.    The patient presented to the ER on 12/12/2024 . Per staff notes:  Patient is a 79 year old female with medical history of HTN, hypothyroidism, DM 2 and Dementia who presented to the ED With hallucinations and worsening confusion.  Daughter states she did have one eye brown higher than the other.   No other complaints per daughter who is the POA.   Admitted for hallucinations.     Overview/Hospital Course:  Patient was started on Namenda and low-dose Seroquel last night.  She seemed to have a better night.  She is still confused.  She was able to work with physical therapy this morning.  She was able to walk 15 ft with a walker.  She is still a significant fall risk in the therapist feels her cognition puts her at severe risk for accidents.  She agrees that nursing home placement would be in her best interest.    Neurology evaluated patient and recommended a low-dose Seroquel to help with her hallucinations.  She may have Lewy bodies dementia.  Namenda was recommended as well.  They want her to be evaluated in memory clinic   12/16 PT HD stable on room air.  Plan to discharge to NH.  PT/OT ordered.  Hallucinations resolved.     12/17 Mg on lower level.  IV mg ordered.  Waiting placement to SNF.     12/18 NAEON.  Waiting placement    Interval History: anxiety, weakness      Today, the patient reports that she feels weak, otherwise she is doing well. She  reports that she is here for confusion, but she noted improvements since admission.  -She reports a h/o depression/anxiety, but this resolved years ago and remains in remission without pharmacotherapy.  She has dementia (+noted confabulations and memory deficits) with recent hallucinations- this is currently controlled with low dose Seroquel- no current issues were reported or noted at this time.       Symptoms of Depression: diminished mood - No, loss of interest/anhedonia - No;  recurrent - No, >14 days - No, diminished energy - No, change in sleep - No, change in appetite - No, diminished concentration or cognition or indecisiveness - No, PMA/R -  No, excessive guilt or hopelessness or worthlessness - No, suicidal ideations - No    Changes in Sleep: trouble with initiation- No, maintenance, - No early morning awakening with inability to return to sleep - No, hypersomnolence - No    Suicidal- active/passive ideations - No, organized plans, future intentions - No    Homicidal ideations: active/passive ideations - No, organized plans, future intentions - No    Symptoms of psychosis: hallucinations - No, delusions - No, disorganized speech - No, disorganized behavior or abnormal motor behavior - No, or negative symptoms (diminshed emotional expression, avolition, anhedonia, alogia, asociality) - No, active phase symptoms >1 month - No, continuous signs of illness > 6 months - No, since onset of illness decreased level of functioning present - No    Symptoms of ros or hypomania: elevated, expansive, or irritable mood with increased energy or activity - No; > 4 days - No,  >7 days - No; with inflated self-esteem or grandiosity - No, decreased need for sleep - No, increased rate of speech - No, FOI or racing thoughts - No, distractibility - No, increased goal directed activity or PMA - No, risky/disinhibited behavior - No    Symptoms of GIAN: excessive anxiety/worry/fear, more days than not, about numerous issues - No,  "ongoing for >6 months - No, difficult to control - No, with restlessness - No, fatigue - No, poor concentration - No, irritability - No, muscle tension - No, sleep disturbance - No; causes functionally impairing distress - No    Symptoms of Panic Disorder: recurrent panic attacks (palpitations/heart racing, sweating, shakiness, dyspnea, choking, chest pain/discomfort, Gi symptoms, dizzy/lightheadedness, hot/col flashes, paresthesias, derealization, fear of losing control or fear of dying or fear of "going crazy") - No, precipitated - No, un-precipitated - No, source of worry and/or behavioral changes secondary for 1 month or longer- No, agoraphobia - No    Symptoms of PTSD: h/o trauma exposure - No; re-experiencing/intrusive symptoms - No, avoidant behavior - No, 2 or more negative alterations in cognition or mood - No, 2 or more hyperarousal symptoms - No; with dissociative symptoms - No, ongoing for 1 or more  months - No    Symptoms of OCD: obsessions (recurrent thoughts/urges/images; intrusive and/or unwanted; uses other thoughts/actions to suppress) - No; compulsions (repetitive behaviors used to lower distress/anxiety/obsessions) - No, time-consuming (over 1 hour per day) or cause significant distress/impairment - - No    Symptoms of Anorexia: restriction of caloric intake leading to significantly low body weight - No, intense fear of gaining weight or persistent behavior that interferes with weight gain even thought at a significantly low weight - No, disturbance in the way in which one's body weight or shape is experienced, undue influence of body weight or shape on self evaluation, or persistent lack of recognition of the seriousness of the current low body weight - No    Symptoms of Bulimia: recurrent episodes of binge eating (definitely larger amount  than what others would eat and lack of a sense of control over eating during episode) - No, recurrent inappropriate compensatory behaviors in order to " prevent weight gain (fasting, medications, exercise, vomiting) - No, binges and compensatory behaviors both occur on average at least once a week for 3 months - No, self evaluations is unduly influenced by body shape/weight- - No    Symptoms of Binge eating: recurrent episodes of binge eating (definitely larger amount than what others would eat and lack of a sense of control over eating during episode) - No, 3 or more of following (eating much more rapidly, eating until uncomfortably full, large amounts when not hungry, eating alone because of embarrassed by how much,  feeling disgusted with oneself, depressed or very guilty afterward) - No, distress regarding binges - No, binges occur on average at least once a week for 3 months - No      Substance/s:  Taken in larger amounts or over longer periods than intended: No,  Persistent desire or unsuccessful attempts to cut down or stop: No,  Great deal of time spent seeking, using or recovering from: No,  Craving or strong desire to use: No,  Recurrent use despite failure to meet major role obligation: No,  Continued use despite persistent or recurrent social/interparsonal issues due to use: No,  Important social/work/recreational activities given up due to use: No,  Recurrent use in physically hazardous situations: No,  Continued use despite knowledge of persistent physical or psychological problem: No,  Tolerance (either increased need or diminished effect): No,    PSYCHOTHERAPY ADD-ON +[id number]   16-37 minutes  Time: 16 minutes  Participants: Met with patient  Therapeutic Intervention Type: insight oriented psychotherapy, behavior modifying psychotherapy, supportive psychotherapy, interactive psychotherapy  Why chosen therapy is appropriate versus another modality: relevant to diagnosis, patient responds to this modality, evidence based practice  Target symptoms: confusion  Primary focus: confusion, psychosocial stressors  Psychotherapeutic techniques: supportive  techniques  Outcome monitoring methods: self-report, observation  Patient's response to intervention:  The patient's response to intervention is accepting.  Progress toward goals:  The patient's progress toward goals is limited.        PAST PSYCHIATRIC HISTORY  Previous Psychiatric Hospitalizations: No  Previous SI/HI: No,  Previous Suicide Attempts: No,   Previous Medication Trials: Yes, not current  Psychiatric Care (current & past): No,  History of Psychotherapy: No,  History of Violence: No,  History of sexual/physical abuse: No,    PAST MEDICAL & SURGICAL HISTORY   Past Medical History:   Diagnosis Date    Abnormal Pap smear     DIONNE (acute kidney injury) 6/29/2014    DIONNE (acute kidney injury) 6/29/2014    Alzheimer's dementia 4/12/2018    Anemia     Breast cancer 1995    left breast    Breast disorder     Diabetes mellitus     Diabetes mellitus, type 2     ESSENTIAL HYPERTENSION 10/1/2012    Hypothyroid 6/29/2014    Pneumonia 01/16/2020     Past Surgical History:   Procedure Laterality Date    CATARACT EXTRACTION, BILATERAL      COLONOSCOPY N/A 8/10/2020    Procedure: COLONOSCOPY;  Surgeon: Guerda Perales MD;  Location: [facility];  Service: Endoscopy;  Laterality: N/A;    ESOPHAGOGASTRODUODENOSCOPY N/A 8/10/2020    Procedure: EGD (ESOPHAGOGASTRODUODENOSCOPY) WITH BIOPSY;  Surgeon: Guerda Perales MD;  Location: [facility];  Service: Endoscopy;  Laterality: N/A;    HYSTERECTOMY      masectomy      single left breast    MASTECTOMY Left     OOPHORECTOMY  1997    only 1         CURRENT PSYCH MEDICATION REGIMEN   Seroquel 12.5 mg po q HS  Current Medication side effects:  no  Current Medication compliance:  no    Previous psych meds trials  Yes- unable to name (last taken decades ago)    Home Meds:   Prior to Admission medications    Medication Sig Start Date End Date Taking? Authorizing Provider   atorvastatin (LIPITOR) 20 MG tablet Take 1 tablet (20 mg total) by mouth once daily. 7/25/24   Sirena Coleman  MD [place]   blood sugar diagnostic (ACCU-CHEK GUIDE TEST STRIPS) Strp 1 strip by Misc.(Non-Drug; Combo Route) route 4 (four) times daily. 3/7/22   Sirena Coleman MD   blood sugar diagnostic, drum (ACCU-CHEK COMPACT PLUS TEST) Strp USE TO CHECK BLOOD GLUCOSE FOUR TIMES DAILY 3/2/22   Sirena Coleman MD   blood-glucose meter (ACCU-CHEK GUIDE GLUCOSE METER) Misc 1 each by Misc.(Non-Drug; Combo Route) route 4 (four) times daily. 3/7/22 3/7/23  Sirena Coleman MD   diclofenac sodium (VOLTAREN) 1 % Gel Apply 2 g topically 2 (two) times daily.  Patient not taking: Reported on 11/29/2023 3/2/22   Sirena Coleman MD   donepeziL (ARICEPT) 10 MG tablet TAKE 1 TABLET BY MOUTH ONCE DAILY IN THE EVENING 12/10/24   Alisa Beckwith MD   EScitalopram oxalate (LEXAPRO) 10 MG tablet Take 1 tablet by mouth once daily 1/5/24   Sirena Coleman MD   ferrous sulfate 325 (65 FE) MG EC tablet Take 1 tablet by mouth once daily 1/8/24   Sirena Coleman MD   gabapentin (NEURONTIN) 300 MG capsule Take 1 capsule by mouth twice daily 1/4/24   Sirena Coleman MD   ibuprofen (ADVIL,MOTRIN) 400 MG tablet Take 1 tablet (400 mg total) by mouth every 6 (six) hours as needed for Pain. 4/5/23   Leonid Burroughs MD   insulin aspart U-100 (NOVOLOG FLEXPEN U-100 INSULIN) 100 unit/mL (3 mL) InPn pen Inject 30 Units into the skin 3 (three) times daily with meals.  Patient not taking: Reported on 11/29/2023 9/7/22   Sirena Coleman MD   insulin syringe-needle U-100 0.3 mL 30 Syrg Use with Levemir vial BID  Patient not taking: Reported on 11/29/2023 3/2/22   Sirena Coleman MD   JANUVIA 100 mg Tab Take 1 tablet by mouth once daily 7/20/24   Sirena Coleman MD   lancets (ACCU-CHEK SOFTCLIX LANCETS) Misc 1 each by Misc.(Non-Drug; Combo Route) route 4 (four) times daily. 3/7/22   Sirena Coleman MD   meclizine (ANTIVERT) 25 mg tablet TAKE 1 TABLET BY MOUTH THREE TIMES DAILY AS NEEDED FOR DIZZINESS AND FOR NAUSEA 11/12/24    "Sirena Coleman MD   meclizine (ANTIVERT) 25 mg tablet Take 1 tablet by mouth three times daily as needed for dizziness or nausea 11/12/24   Sirena Coleman MD   melatonin 5 mg Tab Take 10 mg by mouth nightly as needed.     Provider, Historical   metFORMIN (GLUCOPHAGE) 1000 MG tablet Take 1 tablet (1,000 mg total) by mouth 2 (two) times daily with meals. 10/11/24   Sirena Coleman MD   omeprazole (PRILOSEC) 20 MG capsule Take 1 capsule (20 mg total) by mouth 2 (two) times daily. 11/12/24   Sirena Coleman MD   pen needle, diabetic (LITE TOUCH INSULIN PEN NEEDLES) 31 gauge x 3/16" Ndle 1 application by Misc.(Non-Drug; Combo Route) route 4 (four) times daily. 1/31/17   Apolonia Sullivan, NP   pen needle, diabetic 32 gauge x 5/32" Ndle 1 Device by Misc.(Non-Drug; Combo Route) route as directed. Needles to be used with Novolog pens and Levemir pens daily. 1/6/16   Alisa Beckwith MD   ramipriL (ALTACE) 5 MG capsule Take 1 capsule by mouth once daily 7/29/24   Sirena Coleman MD   TRESIBA FLEXTOUCH U-200 200 unit/mL (3 mL) insulin pen Inject 60 Units into the skin once daily.  Patient taking differently: Inject 40 Units into the skin once daily. 9/7/22   Sirena Coleman MD   clonazePAM (KLONOPIN) 0.5 MG tablet Take 1 tablet (0.5 mg total) by mouth 2 (two) times daily as needed for Anxiety. 6/28/20 6/28/20  Leonid Burroughs MD         OTC Meds: none    Scheduled Meds:    atorvastatin  20 mg Oral Daily    donepeziL  10 mg Oral QHS    ferrous sulfate  1 tablet Oral Daily    gabapentin  300 mg Oral BID    insulin aspart U-100  6 Units Subcutaneous TIDWM    insulin glargine U-100  18 Units Subcutaneous Daily    magnesium sulfate IVPB  2 g Intravenous Once    Followed by    magnesium sulfate IVPB  2 g Intravenous Once    memantine  10 mg Oral Daily    pantoprazole  40 mg Oral Daily    QUEtiapine  12.5 mg Oral QHS      PRN Meds:   Current Facility-Administered Medications:     dextrose 10%, 12.5 g, " Intravenous, PRN    dextrose 10%, 25 g, Intravenous, PRN    glucagon (human recombinant), 1 mg, Intramuscular, PRN    glucose, 16 g, Oral, PRN    glucose, 24 g, Oral, PRN    insulin aspart U-100, 0-5 Units, Subcutaneous, QID (AC + HS) PRN    melatonin, 6 mg, Oral, Nightly PRN    sodium chloride 0.9%, 10 mL, Intravenous, PRN   Psychotherapeutics (From admission, onward)      Start     Stop Route Frequency Ordered    12/14/24 2100  quetiapine split tablet 12.5 mg         -- Oral Nightly 12/14/24 1354            ALLERGIES   Review of patient's allergies indicates:   Allergen Reactions    Percocet [oxycodone-acetaminophen] Itching    Percodan [oxycodone hcl-oxycodone-asa] Itching     Other reaction(s): Unknown    Latex, natural rubber Rash    Phenytoin sodium extended      Other reaction(s): Unknown    Sutures, catgut      Infections to sutures     Adhesive Rash    Adhesive tape-silicones Rash       NEUROLOGIC HISTORY  Seizures: No  Head trauma: No    SOCIAL HISTORY:  Developmental/Childhood:Achieved all developmental milestones timely  Education:High School Diploma  Employment Status/Finances:Retired   Relationship Status/Sexual Orientation:   Children: 3  Housing Status: Home    history:  NO   Access to Firearms: NO ;  Locked up? n/a  Catholic:Actively participates in organized Holiness  Recreational activities:Time with family    SUBSTANCE ABUSE HISTORY   Recreational Drugs:  denied    Use of Alcohol: denied  Rehab History:no   Tobacco Use:no    LEGAL HISTORY:   Past charges/incarcerations: NO  Pending charges:NO      FAMILY PSYCHIATRIC HISTORY   Family History   Problem Relation Name Age of Onset    Diabetes Mother      Hypertension Mother      Heart attack Mother  66    Uterine cancer Mother      Diabetes Sister      Heart disease Sister      Heart disease Brother      Hypertension Daughter      Thyroid disease Daughter      Heart attack Son adopted 38    Heart disease Brother      Depression  Daughter      Hypertension Son            ROS  General ROS: negative  Ophthalmic ROS: negative  ENT ROS: negative  Allergy and Immunology ROS: negative  Hematological and Lymphatic ROS: negative  Endocrine ROS: negative  Respiratory ROS: no cough, shortness of breath, or wheezing  Cardiovascular ROS: no chest pain or dyspnea on exertion  Gastrointestinal ROS: no abdominal pain, change in bowel habits, or black or bloody stools  Genito-Urinary ROS: no dysuria, trouble voiding, or hematuria  Musculoskeletal ROS: positive for - muscular weakness  Neurological ROS: no TIA or stroke symptoms  Dermatological ROS: negative        EXAMINATION    PHYSICAL EXAM  Reviewed note/exam by LINNETTE Price from 12/18/24 at 12:48 PM    VITALS   Vitals:    12/19/24 0724   BP: 120/67   Pulse: 77   Resp: 18   Temp: 98.6 °F (37 °C)        Body mass index is 30.56 kg/m².        PAIN  0/10  Subjective report of pain matches objective signs and symptoms: Yes    LABORATORY DATA   Recent Results (from the past 72 hours)   CBC auto differential    Collection Time: 12/16/24  9:04 AM   Result Value Ref Range    WBC 4.99 3.90 - 12.70 K/uL    RBC 4.04 4.00 - 5.40 M/uL    Hemoglobin 12.4 12.0 - 16.0 g/dL    Hematocrit 34.4 (L) 37.0 - 48.5 %    MCV 85 82 - 98 fL    MCH 30.7 27.0 - 31.0 pg    MCHC 36.0 32.0 - 36.0 g/dL    RDW 12.4 11.5 - 14.5 %    Platelets 166 150 - 450 K/uL    MPV 10.8 9.2 - 12.9 fL    Immature Granulocytes 0.2 0.0 - 0.5 %    Gran # (ANC) 2.6 1.8 - 7.7 K/uL    Immature Grans (Abs) 0.01 0.00 - 0.04 K/uL    Lymph # 1.7 1.0 - 4.8 K/uL    Mono # 0.4 0.3 - 1.0 K/uL    Eos # 0.1 0.0 - 0.5 K/uL    Baso # 0.08 0.00 - 0.20 K/uL    nRBC 0 0 /100 WBC    Gran % 52.3 38.0 - 73.0 %    Lymph % 34.9 18.0 - 48.0 %    Mono % 8.4 4.0 - 15.0 %    Eosinophil % 2.6 0.0 - 8.0 %    Basophil % 1.6 0.0 - 1.9 %    Differential Method Automated    POCT glucose    Collection Time: 12/16/24 11:50 AM   Result Value Ref Range    POCT Glucose 338 (H) 70 - 110 mg/dL    POCT glucose    Collection Time: 12/16/24  4:51 PM   Result Value Ref Range    POCT Glucose 347 (H) 70 - 110 mg/dL   POCT glucose    Collection Time: 12/16/24  7:20 PM   Result Value Ref Range    POCT Glucose 323 (H) 70 - 110 mg/dL   POCT glucose    Collection Time: 12/17/24  7:22 AM   Result Value Ref Range    POCT Glucose 236 (H) 70 - 110 mg/dL   Magnesium    Collection Time: 12/17/24  9:03 AM   Result Value Ref Range    Magnesium 1.6 1.6 - 2.6 mg/dL   POCT glucose    Collection Time: 12/17/24 11:45 AM   Result Value Ref Range    POCT Glucose 297 (H) 70 - 110 mg/dL   POCT glucose    Collection Time: 12/17/24  4:26 PM   Result Value Ref Range    POCT Glucose 308 (H) 70 - 110 mg/dL   POCT glucose    Collection Time: 12/17/24  7:43 PM   Result Value Ref Range    POCT Glucose 319 (H) 70 - 110 mg/dL   POCT glucose    Collection Time: 12/18/24  7:11 AM   Result Value Ref Range    POCT Glucose 255 (H) 70 - 110 mg/dL   POCT glucose    Collection Time: 12/18/24 11:54 AM   Result Value Ref Range    POCT Glucose 404 (H) 70 - 110 mg/dL   POCT glucose    Collection Time: 12/18/24  4:10 PM   Result Value Ref Range    POCT Glucose 291 (H) 70 - 110 mg/dL   POCT glucose    Collection Time: 12/18/24  7:36 PM   Result Value Ref Range    POCT Glucose 344 (H) 70 - 110 mg/dL   CBC auto differential    Collection Time: 12/19/24  4:42 AM   Result Value Ref Range    WBC 5.78 3.90 - 12.70 K/uL    RBC 4.46 4.00 - 5.40 M/uL    Hemoglobin 13.6 12.0 - 16.0 g/dL    Hematocrit 38.8 37.0 - 48.5 %    MCV 87 82 - 98 fL    MCH 30.5 27.0 - 31.0 pg    MCHC 35.1 32.0 - 36.0 g/dL    RDW 12.6 11.5 - 14.5 %    Platelets 176 150 - 450 K/uL    MPV 11.0 9.2 - 12.9 fL    Immature Granulocytes 0.2 0.0 - 0.5 %    Gran # (ANC) 3.0 1.8 - 7.7 K/uL    Immature Grans (Abs) 0.01 0.00 - 0.04 K/uL    Lymph # 2.0 1.0 - 4.8 K/uL    Mono # 0.5 0.3 - 1.0 K/uL    Eos # 0.2 0.0 - 0.5 K/uL    Baso # 0.08 0.00 - 0.20 K/uL    nRBC 0 0 /100 WBC    Gran % 52.2 38.0 - 73.0 %     "Lymph % 34.3 18.0 - 48.0 %    Mono % 9.0 4.0 - 15.0 %    Eosinophil % 2.9 0.0 - 8.0 %    Basophil % 1.4 0.0 - 1.9 %    Differential Method Automated    Magnesium    Collection Time: 12/19/24  4:42 AM   Result Value Ref Range    Magnesium 1.6 1.6 - 2.6 mg/dL   Basic metabolic panel    Collection Time: 12/19/24  4:42 AM   Result Value Ref Range    Sodium 136 136 - 145 mmol/L    Potassium 4.1 3.5 - 5.1 mmol/L    Chloride 103 95 - 110 mmol/L    CO2 22 (L) 23 - 29 mmol/L    Glucose 308 (H) 70 - 110 mg/dL    BUN 16 8 - 23 mg/dL    Creatinine 0.9 0.5 - 1.4 mg/dL    Calcium 8.8 8.7 - 10.5 mg/dL    Anion Gap 11 8 - 16 mmol/L    eGFR >60 >60 mL/min/1.73 m^2   POCT glucose    Collection Time: 12/19/24  7:30 AM   Result Value Ref Range    POCT Glucose 310 (H) 70 - 110 mg/dL      No results found for: "PHENYTOIN", "PHENOBARB", "VALPROATE", "CBMZ"        CONSTITUTIONAL  General Appearance: unremarkable, age appropriate    MUSCULOSKELETAL  Muscle Strength and Tone:no dyskinesia, no dystonia, no tremor, no tic  Abnormal Involuntary Movements: No  Gait and Station: unable to assess    PSYCHIATRIC   Level of Consciousness: awake and alert   Orientation: person, place, time, and situation  Grooming: Hospital garb and Well groomed  Psychomotor Behavior: normal, cooperative, friendly and cooperative, eye contact normal, no PMA/R  Speech: normal tone, normal rate, normal pitch, normal volume, spontaneous  Language: grossly intact, able to name, able to repeat  Mood: neutral  Affect: Appropriate, Consistent with mood, and Congruent with thought  Thought Process: linear,concrete  Associations: intact   Thought Content: denies SI, denies HI, and no delusions  Perceptions: denies AH and denies  VH  Memory: Impaired to some degree, Remote impaired, and Recent impaired; 3/3 immediate; 0/3 at 3 minutes  Attention:Decreased and Impaired to some degree; WORLD- [place].. RLD.. I can't do it."  Fund of Knowledge: Impaired; 0/4 recent " presidents  Estimate if Intelligence:  Average based on work/education history, vocabulary and mental status exam  Insight: limited awareness of illness- impaired by memory issues  Judgment: behavior is adequate to circumstances, age appropriate- fair      PSYCHOSOCIAL    PSYCHOSOCIAL STRESSORS   health    FUNCTIONING RELATIONSHIPS   good support system    STRENGTHS AND LIABILITIES   Strength: Patient accepts guidance/feedback, Strength: Patient has positive support network., Liability: Patient has poor health., Liability: Patient has possible cognitive impairment.    Is the patient aware of the biomedical complications associated with substance abuse and mental illness? yes    Does the patient have an Advance Directive for Mental Health treatment? 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: pt counseled  -continue medications as scheduled  -although antipsychotic usage has risks, currently the B>R given the severe impairment stemming form untreated psychosis   Continue to adjust as indicated     Psychosocial stressors: pt counseled  -SW consulted to assist with resources    Medical issues: pt counseled  -[facility] per primary team      PRESCRIPTION DRUG MANAGEMENT  Compliance: yes  Side Effects: no  Regimen Adjustments: see above    Discussed diagnosis, risks and benefits of proposed treatment vs alternative treatments vs no treatment, potential side effects of these treatments and the inherent  unpredictability of treatment. The patient expresses understanding of the above and displays the capacity to agree with this treatment given said understanding. Patient also agrees that, currently, the benefits outweigh the risks and would like to pursue/continue treatment at this time.    Any medications being used off-label were discussed with the patient inclusive of the evidence base for the use of the medications and consent was obtained for the off-label use of the medication.       DIAGNOSTIC TESTING  Labs reviewed with patient; follow up pending labs    Disposition:  -Will attempt to obtain outside psychiatric records if available  - to assist with aftercare planning and collateral  -Once medically stable discharge to NH as scheduled      Tej Serrato MD  Psychiatry

## 2024-12-19 NOTE — ASSESSMENT & PLAN NOTE
Creatine stable for now. BMP reviewed- noted Estimated Creatinine Clearance: 46.7 mL/min (based on SCr of 0.9 mg/dL). according to latest data. Based on current GFR, CKD stage is stage 2 - GFR 60-89.  Monitor UOP and serial BMP and adjust therapy as needed. Renally dose meds. Avoid nephrotoxic medications and procedures.    stable

## 2024-12-20 LAB
POCT GLUCOSE: 295 MG/DL (ref 70–110)
POCT GLUCOSE: 405 MG/DL (ref 70–110)
POCT GLUCOSE: 412 MG/DL (ref 70–110)
POCT GLUCOSE: 431 MG/DL (ref 70–110)

## 2024-12-20 PROCEDURE — 11000001 HC ACUTE MED/SURG PRIVATE ROOM: Mod: HCNC

## 2024-12-20 PROCEDURE — 25000003 PHARM REV CODE 250: Mod: HCNC | Performed by: PHYSICIAN ASSISTANT

## 2024-12-20 PROCEDURE — 99232 SBSQ HOSP IP/OBS MODERATE 35: CPT | Mod: HCNC,,, | Performed by: INTERNAL MEDICINE

## 2024-12-20 PROCEDURE — 25000003 PHARM REV CODE 250: Mod: HCNC | Performed by: FAMILY MEDICINE

## 2024-12-20 PROCEDURE — 97535 SELF CARE MNGMENT TRAINING: CPT | Mod: HCNC,CO

## 2024-12-20 PROCEDURE — 97530 THERAPEUTIC ACTIVITIES: CPT | Mod: HCNC

## 2024-12-20 PROCEDURE — 97116 GAIT TRAINING THERAPY: CPT | Mod: HCNC

## 2024-12-20 RX ORDER — INSULIN ASPART 100 [IU]/ML
10 INJECTION, SOLUTION INTRAVENOUS; SUBCUTANEOUS
Status: DISCONTINUED | OUTPATIENT
Start: 2024-12-21 | End: 2024-12-21

## 2024-12-20 RX ORDER — INSULIN GLARGINE 100 [IU]/ML
22 INJECTION, SOLUTION SUBCUTANEOUS DAILY
Status: DISCONTINUED | OUTPATIENT
Start: 2024-12-20 | End: 2024-12-21

## 2024-12-20 RX ORDER — INSULIN ASPART 100 [IU]/ML
8 INJECTION, SOLUTION INTRAVENOUS; SUBCUTANEOUS
Status: DISCONTINUED | OUTPATIENT
Start: 2024-12-20 | End: 2024-12-20

## 2024-12-20 RX ADMIN — INSULIN GLARGINE 22 UNITS: 100 INJECTION, SOLUTION SUBCUTANEOUS at 08:12

## 2024-12-20 RX ADMIN — GABAPENTIN 300 MG: 300 CAPSULE ORAL at 08:12

## 2024-12-20 RX ADMIN — FERROUS SULFATE TAB 325 MG (65 MG ELEMENTAL FE) 1 EACH: 325 (65 FE) TAB at 08:12

## 2024-12-20 RX ADMIN — INSULIN ASPART 8 UNITS: 100 INJECTION, SOLUTION INTRAVENOUS; SUBCUTANEOUS at 11:12

## 2024-12-20 RX ADMIN — INSULIN ASPART 5 UNITS: 100 INJECTION, SOLUTION INTRAVENOUS; SUBCUTANEOUS at 04:12

## 2024-12-20 RX ADMIN — ATORVASTATIN CALCIUM 20 MG: 20 TABLET, FILM COATED ORAL at 08:12

## 2024-12-20 RX ADMIN — PANTOPRAZOLE SODIUM 40 MG: 40 TABLET, DELAYED RELEASE ORAL at 08:12

## 2024-12-20 RX ADMIN — INSULIN ASPART 8 UNITS: 100 INJECTION, SOLUTION INTRAVENOUS; SUBCUTANEOUS at 04:12

## 2024-12-20 RX ADMIN — INSULIN ASPART 5 UNITS: 100 INJECTION, SOLUTION INTRAVENOUS; SUBCUTANEOUS at 11:12

## 2024-12-20 RX ADMIN — MEMANTINE 10 MG: 10 TABLET ORAL at 08:12

## 2024-12-20 RX ADMIN — INSULIN ASPART 8 UNITS: 100 INJECTION, SOLUTION INTRAVENOUS; SUBCUTANEOUS at 08:12

## 2024-12-20 RX ADMIN — INSULIN ASPART 3 UNITS: 100 INJECTION, SOLUTION INTRAVENOUS; SUBCUTANEOUS at 08:12

## 2024-12-20 RX ADMIN — QUETIAPINE FUMARATE 12.5 MG: 25 TABLET ORAL at 08:12

## 2024-12-20 RX ADMIN — DONEPEZIL HYDROCHLORIDE 10 MG: 5 TABLET, FILM COATED ORAL at 08:12

## 2024-12-20 NOTE — ASSESSMENT & PLAN NOTE
Patient's FSGs are uncontrolled due to hyperglycemia on current medication regimen.  Last A1c reviewed-   Lab Results   Component Value Date    HGBA1C 8.4 (H) 12/13/2024     Most recent fingerstick glucose reviewed-   Recent Labs   Lab 12/19/24  0730 12/19/24  1142 12/19/24  1640 12/19/24  1905   POCTGLUCOSE 310* 331* 343* 335*       Current correctional scale  Medium  Increase anti-hyperglycemic dose as follows-   Antihyperglycemics (From admission, onward)      Start     Stop Route Frequency Ordered    12/20/24 0900  insulin glargine U-100 (Lantus) pen 22 Units         -- SubQ Daily 12/20/24 0621    12/20/24 0715  insulin aspart U-100 pen 8 Units         -- SubQ 3 times daily with meals 12/20/24 0621    12/13/24 0928  insulin aspart U-100 pen 0-5 Units         -- SubQ Before meals & nightly PRN 12/13/24 0828           12/20: increased Lantus to 22 units daily and aspart 8 units TID with meals. Still with glucose 300s. However, this can be further managed outpatient and does not require further inpatient stay.

## 2024-12-20 NOTE — ASSESSMENT & PLAN NOTE
Magnesium 1.5  IV mg     IV 1.6. 4 grams of IV magnesium today.  F/u outpatient labs    Mg 1.6 today 12/19 IV mg replacement     12/20: stable. No further labs. Patient medically stable and ready for discharge pending placement

## 2024-12-20 NOTE — PROGRESS NOTES
[place] [facility] ([address] [place] [address] Medicine  Progress Note    Patient Name: Narda Person  MRN: [id number]  Patient Class: IP- Inpatient   Admission Date: 12/12/2024  Length of Stay: 4 days  Attending Physician: Alisa Beckwith MD  Primary Care Provider: Sirena Coleman MD        Subjective     Principal Problem:Hallucinations        HPI:  Patient is a 79 year old female with medical history of HTN, hypothyroidism, DM 2 and Dementia who presented to the ED With hallucinations and worsening confusion.  Daughter states she did have one eye brown higher than the other.   No other complaints per daughter who is the POA.    Admitted for hallucinations.      Overview/Hospital Course:  Patient was started on Namenda and low-dose Seroquel last night.  She seemed to have a better night.  She is still confused.  She was able to work with physical therapy this morning.  She was able to walk 15 ft with a walker.  She is still a significant fall risk in the therapist feels her cognition puts her at severe risk for accidents.  She agrees that nursing home placement would be in her best interest.    Neurology evaluated patient and recommended a low-dose Seroquel to help with her hallucinations.  She may have Lewy bodies dementia.  Namenda was recommended as well.  They want her to be evaluated in memory clinic    12/16 PT HD stable on room air.  Plan to discharge to NH.  PT/OT ordered.  Hallucinations resolved.      12/17 Mg on lower level.  IV mg ordered.  Waiting placement to SNF.      12/18 NAEON.  Waiting placement     12/19 PT HD stable on room air.  Mg 1.6.  IV mg replacement.      12/20: no further labs. Medically stable. VSS. Awaiting placement. PT/OT notes reviewed.     Interval History: anxiety, weakness    Review of Systems   HENT:  Negative for congestion.    Respiratory:  Negative for chest tightness.    Cardiovascular:  Negative for chest pain.   Gastrointestinal:  Negative for abdominal pain.   Musculoskeletal:   Positive for gait problem (weakness). Negative for back pain.   Neurological:  Negative for tremors.   Psychiatric/Behavioral:  Negative for agitation and hallucinations.      Objective:     Vital Signs (Most Recent):  Temp: 97.6 °F (36.4 °C) (12/20/24 0537)  Pulse: 74 (12/20/24 0537)  Resp: 20 (12/20/24 0537)  BP: 138/64 (12/20/24 0537)  SpO2: 97 % (12/20/24 0537) Vital Signs (24h Range):  Temp:  [97.6 °F (36.4 °C)-98.7 °F (37.1 °C)] 97.6 °F (36.4 °C)  Pulse:  [74-80] 74  Resp:  [18-20] 20  SpO2:  [93 %-98 %] 97 %  BP: (115-138)/(58-69) 138/64     Weight: 75.8 kg (167 lb 1.7 oz)  Body mass index is 30.56 kg/m².    Intake/Output Summary (Last 24 hours) at 12/20/2024 0622  Last data filed at 12/20/2024 0547  Gross per 24 hour   Intake 1067.63 ml   Output 2950 ml   Net -1882.37 ml         Physical Exam  Vitals and nursing note reviewed.   Constitutional:       Appearance: Normal appearance. She is well-developed.   Eyes:      Extraocular Movements: Extraocular movements intact.      Pupils: Pupils are equal, round, and reactive to light.   Cardiovascular:      Rate and Rhythm: Normal rate and regular rhythm.      Heart sounds: Normal heart sounds. No murmur heard.  Pulmonary:      Effort: Pulmonary effort is normal.      Breath sounds: Normal breath sounds.   Musculoskeletal:      Right lower leg: No edema.      Left lower leg: No edema.   Neurological:      Mental Status: Mental status is at baseline.      Motor: Weakness (generalized) present.   Psychiatric:         Attention and Perception: She perceives auditory and visual hallucinations.         Mood and Affect: Mood is anxious.         Speech: Speech normal.         Behavior: Behavior normal.         Cognition and Memory: Cognition is impaired. Memory is impaired.             Significant Labs: All pertinent labs within the past 24 hours have been reviewed.  BMP:   Recent Labs   Lab 12/19/24  0442   *      K 4.1      CO2 22*   BUN 16    CREATININE 0.9   CALCIUM 8.8   MG 1.6       CBC:   Recent Labs   Lab 12/19/24  0442   WBC 5.78   HGB 13.6   HCT 38.8              Significant Imaging: I have reviewed all pertinent imaging results/findings within the past 24 hours.  I have reviewed and interpreted all pertinent imaging results/findings within the past 24 hours.  MRI and CT scan reviewed.    Assessment and Plan     * Hallucinations  Likely due to worsening dementia.  She probably has Lewy body dementia.  Namenda and Seroquel started.  Last night was a better night.  Less agitation, confusion.  Hallucination seemed to be a little better.  She is still significantly cognitively impaired.  Neurosurgery reviewed CT head with known schwannoma compressing on eric and basilar cisterns.  No further intervention.  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  Tele neurology recommends low-dose Seroquel and to start Namenda.  They recommend referral to memory Clinic.  Family would like her admitted to a nursing home.    Currently resolved       Electrolyte abnormality  Magnesium 1.5  IV mg     IV 1.6. 4 grams of IV magnesium today.  F/u outpatient labs    Mg 1.6 today 12/19 IV mg replacement     12/20: stable. No further labs. Patient medically stable and ready for discharge pending placement      Advanced care planning/counseling discussion  Discussed goals of care with POA daughter but will follow up after she reviews paperwork.  Daughter and granddaughter are unable to care for patient that their home.  They would like patient to get into a nursing home.  Recommend discussing this with .  Unable to discharge patient this time.    Waiting placement to skilled nursing home       Chronic kidney disease, stage 2 (mild)  Creatine stable for now. BMP reviewed- noted Estimated Creatinine Clearance: 46.7 mL/min (based on SCr of 0.9 mg/dL). according to latest data. Based on current GFR, CKD  stage is stage 2 - GFR 60-89.  Monitor UOP and serial BMP and adjust therapy as needed. Renally dose meds. Avoid nephrotoxic medications and procedures.    stable    Gait abnormality  She has a high-risk for falls.    Recommend starting physical therapy.  She was able to walk 15 ft with a walker this morning which is a big improvement.  She is able to get out of bed with a little help.  Only option that I can see is referral to nursing home/swing.  Discuss with     12/20: PT/OT ordered; notes reviewed and waiting placement to SNF        Diabetes mellitus, type 2  Patient's FSGs are uncontrolled due to hyperglycemia on current medication regimen.  Last A1c reviewed-   Lab Results   Component Value Date    HGBA1C 8.4 (H) 12/13/2024     Most recent fingerstick glucose reviewed-   Recent Labs   Lab 12/19/24  0730 12/19/24  1142 12/19/24  1640 12/19/24  1905   POCTGLUCOSE 310* 331* 343* 335*       Current correctional scale  Medium  Increase anti-hyperglycemic dose as follows-   Antihyperglycemics (From admission, onward)      Start     Stop Route Frequency Ordered    12/20/24 0900  insulin glargine U-100 (Lantus) pen 22 Units         -- SubQ Daily 12/20/24 0621    12/20/24 0715  insulin aspart U-100 pen 8 Units         -- SubQ 3 times daily with meals 12/20/24 0621    12/13/24 0928  insulin aspart U-100 pen 0-5 Units         -- SubQ Before meals & nightly PRN 12/13/24 0828           12/20: increased Lantus to 22 units daily and aspart 8 units TID with meals. Still with glucose 300s. However, this can be further managed outpatient and does not require further inpatient stay.       VTE Risk Mitigation (From admission, onward)           Ordered     IP VTE HIGH RISK PATIENT  Once         12/13/24 0152     Place sequential compression device  Until discontinued         12/13/24 0152                    Discharge Planning   DWAYNE:      Code Status: Full Code   Medical Readiness for Discharge Date:   Discharge Plan  A: New Nursing Home placement - correction care facility   Discharge Delays: (!) Post-Acute Set-up            Please place Justification for DME        Alisa Beckwith MD  Department of Hospital Medicine   [place] [facility] (3rd Fl)

## 2024-12-20 NOTE — PT/OT/SLP PROGRESS
Physical Therapy Treatment    Patient Name:  Narda Person   MRN:  [id number]    Recommendations:     Discharge Recommendations: Moderate Intensity Therapy  Discharge Equipment Recommendations: walker, rolling, bedside commode  Barriers to discharge: Inaccessible home and Decreased caregiver support    Assessment:     Narda Person is a 79 y.o. female admitted with a medical diagnosis of Hallucinations.  She presents with the following impairments/functional limitations: weakness, impaired endurance, impaired self care skills, impaired functional mobility, gait instability, impaired balance, decreased lower extremity function, decreased safety awareness. Patient tolerated inc gait distance using RW x 20 feet  and 40 feet with minimal/contact guard assistance and cues for inc balance and safety. No sign of fatigue.    Rehab Prognosis: Fair; patient would benefit from acute skilled PT services to address these deficits and reach maximum level of function.    Recent Surgery: * No surgery found *      Plan:     During this hospitalization, patient to be seen 5 x/week to address the identified rehab impairments via gait training, therapeutic activities, therapeutic exercises and progress toward the following goals:    Plan of Care Expires:  12/20/24    Subjective     Chief Complaint: none  Patient/Family Comments/goals: waiting for placement  Pain/Comfort:  Pain Rating 1: 0/10      Objective:     Communicated with patient prior to session.  Patient found supine with bed alarm, peripheral IV, PureWick, Other (comments) (avasys camera) upon PT entry to room.     General Precautions: Standard, fall  Orthopedic Precautions: N/A  Braces: N/A  Respiratory Status: Room air     Functional Mobility:  Bed Mobility:     Rolling Left:  modified independence  Rolling Right: modified independence  Scooting: modified independence  Supine to Sit: supervision  Sit to Supine: supervision  Transfers:     Sit to Stand:  stand by assistance with  rolling walker  Gait: minimal/contact guard assistance x 20 feet and 40 feet using RW. Dec foot/floor clearance, dec stride  and dec may.   Balance: Sitting: Standby Assistance ; Standing with RW: Contact Guard Assistance.      AM-PAC 6 CLICK MOBILITY  Turning over in bed (including adjusting bedclothes, sheets and blankets)?: 4  Sitting down on and standing up from a chair with arms (e.g., wheelchair, bedside commode, etc.): 3  Moving from lying on back to sitting on the side of the bed?: 3  Moving to and from a bed to a chair (including a wheelchair)?: 3  Need to walk in hospital room?: 3  Climbing 3-5 steps with a railing?: 3  Basic Mobility Total Score: 19       Treatment & Education:  B LE strengthening ROM ex x 10 reps involving ankle pumps/rotations, heel slides, abd/add, rolling to sides, LAQ, standing balance activity and gait trng x 20 feet and 40 feet with RW    Patient left HOB elevated with all lines intact, call button in reach, bed alarm on, nursing  notified, and avasys present..    GOALS:   Multidisciplinary Problems       Physical Therapy Goals          Problem: Physical Therapy    Goal Priority Disciplines Outcome Interventions   Physical Therapy Goal     PT, PT/OT Progressing    Description: Goals to be met by: 2024    Patient will increase functional independence with mobility by performin. Supine to sit with Standby Assistance.  2. Sit to supine with Standby Assistance.  3. Bed to chair transfer with Standby Assistance with rolling walker using Step Transfer technique.  4. Sit to Stand with Standby Assistance with rolling walker.  5. Gait  x 100  feet with Standby Assistance with rolling walker.  6. Lower extremity exercise program x10 reps, with assistance as needed.                          Time Tracking:     PT Received On: 24  PT Start Time: 905     PT Stop Time: 935  PT Total Time (min): 30 min     Billable Minutes: Gait Training 15 and Therapeutic Activity  15    Treatment Type: Treatment  PT/PTA: PT           12/20/2024

## 2024-12-20 NOTE — ASSESSMENT & PLAN NOTE
She has a high-risk for falls.    Recommend starting physical therapy.  She was able to walk 15 ft with a walker this morning which is a big improvement.  She is able to get out of bed with a little help.  Only option that I can see is referral to nursing home/swing.  Discuss with     12/20: PT/OT ordered; notes reviewed and waiting placement to SNF

## 2024-12-20 NOTE — PT/OT/SLP PROGRESS
Occupational Therapy   Treatment    Name: Narda Person  MRN: [id number]  Admitting Diagnosis:  Hallucinations       Recommendations:     Discharge Recommendations: Moderate Intensity Therapy  Discharge Equipment Recommendations:  bedside commode, walker, rolling  Barriers to discharge:  Decreased caregiver support    Assessment:     Narda Person is a 79 y.o. female with a medical diagnosis of Hallucinations.   Performance deficits affecting function are weakness, impaired endurance, impaired self care skills, impaired functional mobility, gait instability, impaired balance, decreased lower extremity function, decreased safety awareness.     Rehab Prognosis:  Fair; patient would benefit from acute skilled OT services to address these deficits and reach maximum level of function.       Plan:     Patient to be seen 5 x/week to address the above listed problems via self-care/home management, therapeutic activities, therapeutic exercises  Plan of Care Expires: 12/30/24  Plan of Care Reviewed with: patient    Subjective     Chief Complaint: none  Patient/Family Comments/goals: none  Pain/Comfort:  Pain Rating 1: 0/10    Objective:     Communicated with: RN prior to session.  Patient found HOB elevated with bed alarm, peripheral IV, PureWick, Other (comments) ([employer] Camera) upon OT entry to room.    General Precautions: Standard, fall    Orthopedic Precautions:N/A  Braces: N/A  Respiratory Status: Room air     Occupational Performance:     Bed Mobility:    Patient completed Rolling/Turning to Right with modified independence  Patient completed Scooting/Bridging with modified independence     Functional Mobility/Transfers:  Did not perform    Activities of Daily Living:  Grooming: independence combing hair      [facility] 6 Click ADL: 18    Treatment & Education:  Patient was able to scoot self up to the HOB with Mod I using bed rails and pushing with legs. Patient combed hair independently. Patient rolled to right side with Mod  I. And was able to reposition self for comfort lying on right side independently. Patient declined to sit EOB this date. Patient mayur tx well.     Patient left right sidelying with all lines intact, call button in reach, bed alarm on, and rn notified    GOALS:   Multidisciplinary Problems       Occupational Therapy Goals          Problem: Occupational Therapy    Goal Priority Disciplines Outcome Interventions   Occupational Therapy Goal     OT, PT/OT Progressing    Description: Pt to perform UB dressing with MOD I seated EOB.  Pt to perform LB dressing with SBA seated EOB.   Pt to perform self toileting with MIN A using standard commode.   Pt to perform level functional transfers required for ADL's with SBA, RW level.  Pt to consistently demonstrate adherence to orthopedic precautions during all ADL's as instructed by OT.  Pt to demonstrate  fair +dynamic standing balance as required to perform ADL's from standing level.  Pt to complete standing ADL at sink level for ~3 minutes for improved safety and activity tolerance upon discharge.                          Time Tracking:     OT Date of Treatment: 12/20/24  OT Start Time: 0839  OT Stop Time: 0857  OT Total Time (min): 18 min    Billable Minutes:Self Care/Home Management 18    OT/COURT: COURT     Number of COURT visits since last OT visit: 4    12/20/2024

## 2024-12-20 NOTE — PLAN OF CARE
Problem: Adult Inpatient Plan of Care  Goal: Plan of Care Review  Outcome: Progressing  Goal: Patient-Specific Goal (Individualized)  Outcome: Progressing  Goal: Absence of Hospital-Acquired Illness or Injury  Outcome: Progressing  Goal: Optimal Comfort and Wellbeing  Outcome: Progressing  Goal: Readiness for Transition of Care  Outcome: Progressing     Problem: Skin Injury Risk Increased  Goal: Skin Health and Integrity  Outcome: Progressing     Problem: Diabetes Comorbidity  Goal: Blood Glucose Level Within Targeted Range  Outcome: Progressing

## 2024-12-20 NOTE — SUBJECTIVE & OBJECTIVE
Interval History: anxiety, weakness    Review of Systems   HENT:  Negative for congestion.    Respiratory:  Negative for chest tightness.    Cardiovascular:  Negative for chest pain.   Gastrointestinal:  Negative for abdominal pain.   Musculoskeletal:  Positive for gait problem (weakness). Negative for back pain.   Neurological:  Negative for tremors.   Psychiatric/Behavioral:  Negative for agitation and hallucinations.      Objective:     Vital Signs (Most Recent):  Temp: 97.6 °F (36.4 °C) (12/20/24 0537)  Pulse: 74 (12/20/24 0537)  Resp: 20 (12/20/24 0537)  BP: 138/64 (12/20/24 0537)  SpO2: 97 % (12/20/24 0537) Vital Signs (24h Range):  Temp:  [97.6 °F (36.4 °C)-98.7 °F (37.1 °C)] 97.6 °F (36.4 °C)  Pulse:  [74-80] 74  Resp:  [18-20] 20  SpO2:  [93 %-98 %] 97 %  BP: (115-138)/(58-69) 138/64     Weight: 75.8 kg (167 lb 1.7 oz)  Body mass index is 30.56 kg/m².    Intake/Output Summary (Last 24 hours) at 12/20/2024 0622  Last data filed at 12/20/2024 0547  Gross per 24 hour   Intake 1067.63 ml   Output 2950 ml   Net -1882.37 ml         Physical Exam  Vitals and nursing note reviewed.   Constitutional:       Appearance: Normal appearance. She is well-developed.   Eyes:      Extraocular Movements: Extraocular movements intact.      Pupils: Pupils are equal, round, and reactive to light.   Cardiovascular:      Rate and Rhythm: Normal rate and regular rhythm.      Heart sounds: Normal heart sounds. No murmur heard.  Pulmonary:      Effort: Pulmonary effort is normal.      Breath sounds: Normal breath sounds.   Musculoskeletal:      Right lower leg: No edema.      Left lower leg: No edema.   Neurological:      Mental Status: Mental status is at baseline.      Motor: Weakness (generalized) present.   Psychiatric:         Attention and Perception: She perceives auditory and visual hallucinations.         Mood and Affect: Mood is anxious.         Speech: Speech normal.         Behavior: Behavior normal.         Cognition and  Memory: Cognition is impaired. Memory is impaired.             Significant Labs: All pertinent labs within the past 24 hours have been reviewed.  BMP:   Recent Labs   Lab 12/19/24  0442   *      K 4.1      CO2 22*   BUN 16   CREATININE 0.9   CALCIUM 8.8   MG 1.6       CBC:   Recent Labs   Lab 12/19/24 0442   WBC 5.78   HGB 13.6   HCT 38.8              Significant Imaging: I have reviewed all pertinent imaging results/findings within the past 24 hours.  I have reviewed and interpreted all pertinent imaging results/findings within the past 24 hours.  MRI and CT scan reviewed.

## 2024-12-21 LAB
POCT GLUCOSE: 298 MG/DL (ref 70–110)
POCT GLUCOSE: 347 MG/DL (ref 70–110)
POCT GLUCOSE: 443 MG/DL (ref 70–110)
POCT GLUCOSE: 464 MG/DL (ref 70–110)
POCT GLUCOSE: 481 MG/DL (ref 70–110)

## 2024-12-21 PROCEDURE — 99232 SBSQ HOSP IP/OBS MODERATE 35: CPT | Mod: HCNC,,, | Performed by: INTERNAL MEDICINE

## 2024-12-21 PROCEDURE — 21400001 HC TELEMETRY ROOM: Mod: HCNC

## 2024-12-21 PROCEDURE — 25000003 PHARM REV CODE 250: Mod: HCNC | Performed by: PHYSICIAN ASSISTANT

## 2024-12-21 PROCEDURE — 25000003 PHARM REV CODE 250: Mod: HCNC | Performed by: FAMILY MEDICINE

## 2024-12-21 PROCEDURE — 63600175 PHARM REV CODE 636 W HCPCS: Mod: HCNC | Performed by: INTERNAL MEDICINE

## 2024-12-21 RX ORDER — INSULIN ASPART 100 [IU]/ML
12 INJECTION, SOLUTION INTRAVENOUS; SUBCUTANEOUS
Status: DISCONTINUED | OUTPATIENT
Start: 2024-12-22 | End: 2024-12-22

## 2024-12-21 RX ORDER — INSULIN GLARGINE 100 [IU]/ML
25 INJECTION, SOLUTION SUBCUTANEOUS DAILY
Status: DISCONTINUED | OUTPATIENT
Start: 2024-12-22 | End: 2024-12-22

## 2024-12-21 RX ORDER — INSULIN ASPART 100 [IU]/ML
5 INJECTION, SOLUTION INTRAVENOUS; SUBCUTANEOUS ONCE
Status: COMPLETED | OUTPATIENT
Start: 2024-12-21 | End: 2024-12-21

## 2024-12-21 RX ADMIN — FERROUS SULFATE TAB 325 MG (65 MG ELEMENTAL FE) 1 EACH: 325 (65 FE) TAB at 08:12

## 2024-12-21 RX ADMIN — INSULIN ASPART 10 UNITS: 100 INJECTION, SOLUTION INTRAVENOUS; SUBCUTANEOUS at 12:12

## 2024-12-21 RX ADMIN — INSULIN ASPART 10 UNITS: 100 INJECTION, SOLUTION INTRAVENOUS; SUBCUTANEOUS at 08:12

## 2024-12-21 RX ADMIN — INSULIN ASPART 5 UNITS: 100 INJECTION, SOLUTION INTRAVENOUS; SUBCUTANEOUS at 12:12

## 2024-12-21 RX ADMIN — INSULIN ASPART 5 UNITS: 100 INJECTION, SOLUTION INTRAVENOUS; SUBCUTANEOUS at 08:12

## 2024-12-21 RX ADMIN — GABAPENTIN 300 MG: 300 CAPSULE ORAL at 08:12

## 2024-12-21 RX ADMIN — INSULIN ASPART 10 UNITS: 100 INJECTION, SOLUTION INTRAVENOUS; SUBCUTANEOUS at 04:12

## 2024-12-21 RX ADMIN — INSULIN GLARGINE 22 UNITS: 100 INJECTION, SOLUTION SUBCUTANEOUS at 08:12

## 2024-12-21 RX ADMIN — MEMANTINE 10 MG: 10 TABLET ORAL at 08:12

## 2024-12-21 RX ADMIN — ATORVASTATIN CALCIUM 20 MG: 20 TABLET, FILM COATED ORAL at 08:12

## 2024-12-21 RX ADMIN — INSULIN ASPART 5 UNITS: 100 INJECTION, SOLUTION INTRAVENOUS; SUBCUTANEOUS at 04:12

## 2024-12-21 RX ADMIN — PANTOPRAZOLE SODIUM 40 MG: 40 TABLET, DELAYED RELEASE ORAL at 08:12

## 2024-12-21 RX ADMIN — INSULIN ASPART 3 UNITS: 100 INJECTION, SOLUTION INTRAVENOUS; SUBCUTANEOUS at 08:12

## 2024-12-21 RX ADMIN — DONEPEZIL HYDROCHLORIDE 10 MG: 5 TABLET, FILM COATED ORAL at 08:12

## 2024-12-21 RX ADMIN — QUETIAPINE FUMARATE 12.5 MG: 25 TABLET ORAL at 08:12

## 2024-12-21 NOTE — SUBJECTIVE & OBJECTIVE
Interval History: anxiety, weakness    Review of Systems   HENT:  Negative for congestion.    Respiratory:  Negative for chest tightness.    Cardiovascular:  Negative for chest pain.   Gastrointestinal:  Negative for abdominal pain.   Musculoskeletal:  Positive for gait problem (weakness). Negative for back pain.   Neurological:  Negative for tremors.   Psychiatric/Behavioral:  Negative for agitation and hallucinations.      Objective:     Vital Signs (Most Recent):  Temp: 98.7 °F (37.1 °C) (12/21/24 0747)  Pulse: 68 (12/21/24 0747)  Resp: 18 (12/21/24 0747)  BP: (!) 115/56 (12/21/24 0747)  SpO2: (!) 90 % (12/21/24 0747) Vital Signs (24h Range):  Temp:  [97.1 °F (36.2 °C)-98.7 °F (37.1 °C)] 98.7 °F (37.1 °C)  Pulse:  [68-83] 68  Resp:  [18-20] 18  SpO2:  [90 %-97 %] 90 %  BP: (115-164)/(55-68) 115/56     Weight: 75.8 kg (167 lb 1.7 oz)  Body mass index is 30.56 kg/m².    Intake/Output Summary (Last 24 hours) at 12/21/2024 0804  Last data filed at 12/21/2024 0504  Gross per 24 hour   Intake 888 ml   Output 1750 ml   Net -862 ml         Physical Exam  Vitals and nursing note reviewed.   Constitutional:       Appearance: Normal appearance. She is well-developed.   Eyes:      Extraocular Movements: Extraocular movements intact.      Pupils: Pupils are equal, round, and reactive to light.   Cardiovascular:      Rate and Rhythm: Normal rate and regular rhythm.      Heart sounds: Normal heart sounds. No murmur heard.  Pulmonary:      Effort: Pulmonary effort is normal.      Breath sounds: Normal breath sounds.   Musculoskeletal:      Right lower leg: No edema.      Left lower leg: No edema.   Neurological:      Mental Status: Mental status is at baseline.      Motor: Weakness (generalized) present.   Psychiatric:         Attention and Perception: She perceives auditory and visual hallucinations.         Mood and Affect: Mood is anxious.         Speech: Speech normal.         Behavior: Behavior normal.         Cognition and  "Memory: Cognition is impaired. Memory is impaired.             Significant Labs: All pertinent labs within the past 24 hours have been reviewed.  BMP:   No results for input(s): "GLU", "NA", "K", "CL", "CO2", "BUN", "CREATININE", "CALCIUM", "MG" in the last 48 hours.      CBC:   No results for input(s): "WBC", "HGB", "HCT", "PLT" in the last 48 hours.          Significant Imaging: I have reviewed all pertinent imaging results/findings within the past 24 hours.  I have reviewed and interpreted all pertinent imaging results/findings within the past 24 hours.  MRI and CT scan reviewed.  "

## 2024-12-21 NOTE — ASSESSMENT & PLAN NOTE
Patient's FSGs are uncontrolled due to hyperglycemia on current medication regimen.  Last A1c reviewed-   Lab Results   Component Value Date    HGBA1C 8.4 (H) 12/13/2024     Most recent fingerstick glucose reviewed-   Recent Labs   Lab 12/20/24  1140 12/20/24  1649 12/20/24  1954 12/21/24  0719   POCTGLUCOSE 412* 405* 431* 298*       Current correctional scale  Medium  Increase anti-hyperglycemic dose as follows-   Antihyperglycemics (From admission, onward)      Start     Stop Route Frequency Ordered    12/21/24 0715  insulin aspart U-100 pen 10 Units         -- SubQ 3 times daily with meals 12/20/24 1736    12/20/24 0900  insulin glargine U-100 (Lantus) pen 22 Units         -- SubQ Daily 12/20/24 0621    12/13/24 0928  insulin aspart U-100 pen 0-5 Units         -- SubQ Before meals & nightly PRN 12/13/24 0828           12/20: increased Lantus to 22 units daily and aspart 8 units TID with meals. Still with glucose 300s. However, this can be further managed outpatient and does not require further inpatient stay.   12/20: Lantus 22 units and aspart increased to 10 units TID with meals. Diabetic diet education provided to family who is bringing outside food/snacks.

## 2024-12-21 NOTE — PROGRESS NOTES
[place] - [facility] ([address] [place] [address] Medicine  Progress Note    Patient Name: Narda Person  MRN: [id number]  Patient Class: IP- Inpatient   Admission Date: 12/12/2024  Length of Stay: 5 days  Attending Physician: Alisa Beckwith MD  Primary Care Provider: Sirena Coleman MD        Subjective     Principal Problem:Hallucinations        HPI:  Patient is a 79 year old female with medical history of HTN, hypothyroidism, DM 2 and Dementia who presented to the ED With hallucinations and worsening confusion.  Daughter states she did have one eye brown higher than the other.   No other complaints per daughter who is the POA.    Admitted for hallucinations.      Overview/Hospital Course:  Patient was started on Namenda and low-dose Seroquel last night.  She seemed to have a better night.  She is still confused.  She was able to work with physical therapy this morning.  She was able to walk 15 ft with a walker.  She is still a significant fall risk in the therapist feels her cognition puts her at severe risk for accidents.  She agrees that nursing home placement would be in her best interest.    Neurology evaluated patient and recommended a low-dose Seroquel to help with her hallucinations.  She may have Lewy bodies dementia.  Namenda was recommended as well.  They want her to be evaluated in memory clinic    12/16 PT HD stable on room air.  Plan to discharge to NH.  PT/OT ordered.  Hallucinations resolved.      12/17 Mg on lower level.  IV mg ordered.  Waiting placement to SNF.      12/18 NAEON.  Waiting placement     12/19 PT HD stable on room air.  Mg 1.6.  IV mg replacement.      12/20: no further labs. Medically stable. VSS. Awaiting placement. PT/OT notes reviewed.     12/21: hyperglycemia still present. Insulin has been adjusted. Family is bringing snacks accounting for fluctuations in glucose. Education provided on diabetic diet. No other acute events and remains stable for discharge pending placement    Interval  History: anxiety, weakness    Review of Systems   HENT:  Negative for congestion.    Respiratory:  Negative for chest tightness.    Cardiovascular:  Negative for chest pain.   Gastrointestinal:  Negative for abdominal pain.   Musculoskeletal:  Positive for gait problem (weakness). Negative for back pain.   Neurological:  Negative for tremors.   Psychiatric/Behavioral:  Negative for agitation and hallucinations.      Objective:     Vital Signs (Most Recent):  Temp: 98.7 °F (37.1 °C) (12/21/24 0747)  Pulse: 68 (12/21/24 0747)  Resp: 18 (12/21/24 0747)  BP: (!) 115/56 (12/21/24 0747)  SpO2: (!) 90 % (12/21/24 0747) Vital Signs (24h Range):  Temp:  [97.1 °F (36.2 °C)-98.7 °F (37.1 °C)] 98.7 °F (37.1 °C)  Pulse:  [68-83] 68  Resp:  [18-20] 18  SpO2:  [90 %-97 %] 90 %  BP: (115-164)/(55-68) 115/56     Weight: 75.8 kg (167 lb 1.7 oz)  Body mass index is 30.56 kg/m².    Intake/Output Summary (Last 24 hours) at 12/21/2024 0804  Last data filed at 12/21/2024 0504  Gross per 24 hour   Intake 888 ml   Output 1750 ml   Net -862 ml         Physical Exam  Vitals and nursing note reviewed.   Constitutional:       Appearance: Normal appearance. She is well-developed.   Eyes:      Extraocular Movements: Extraocular movements intact.      Pupils: Pupils are equal, round, and reactive to light.   Cardiovascular:      Rate and Rhythm: Normal rate and regular rhythm.      Heart sounds: Normal heart sounds. No murmur heard.  Pulmonary:      Effort: Pulmonary effort is normal.      Breath sounds: Normal breath sounds.   Musculoskeletal:      Right lower leg: No edema.      Left lower leg: No edema.   Neurological:      Mental Status: Mental status is at baseline.      Motor: Weakness (generalized) present.   Psychiatric:         Attention and Perception: She perceives auditory and visual hallucinations.         Mood and Affect: Mood is anxious.         Speech: Speech normal.         Behavior: Behavior normal.         Cognition and Memory:  "Cognition is impaired. Memory is impaired.             Significant Labs: All pertinent labs within the past 24 hours have been reviewed.  BMP:   No results for input(s): "GLU", "NA", "K", "CL", "CO2", "BUN", "CREATININE", "CALCIUM", "MG" in the last 48 hours.      CBC:   No results for input(s): "WBC", "HGB", "HCT", "PLT" in the last 48 hours.          Significant Imaging: I have reviewed all pertinent imaging results/findings within the past 24 hours.  I have reviewed and interpreted all pertinent imaging results/findings within the past 24 hours.  MRI and CT scan reviewed.    Assessment and Plan     * Hallucinations  Likely due to worsening dementia.  She probably has Lewy body dementia.  Namenda and Seroquel started.  Last night was a better night.  Less agitation, confusion.  Hallucination seemed to be a little better.  She is still significantly cognitively impaired.  Neurosurgery reviewed CT head with known schwannoma compressing on eric and basilar cisterns.  No further intervention.  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  Tele neurology recommends low-dose Seroquel and to start Namenda.  They recommend referral to memory Clinic.  Family would like her admitted to a nursing home.    Currently resolved       Electrolyte abnormality  Magnesium 1.5  IV mg     IV 1.6. 4 grams of IV magnesium today.  F/u outpatient labs    Mg 1.6 today 12/19 IV mg replacement     12/20: stable. No further labs. Patient medically stable and ready for discharge pending placement      Advanced care planning/counseling discussion  Discussed goals of care with POA daughter but will follow up after she reviews paperwork.  Daughter and granddaughter are unable to care for patient that their home.  They would like patient to get into a nursing home.  Recommend discussing this with .  Unable to discharge patient this time.    Waiting placement to skilled nursing home "       Chronic kidney disease, stage 2 (mild)  Creatine stable for now. BMP reviewed- noted Estimated Creatinine Clearance: 46.7 mL/min (based on SCr of 0.9 mg/dL). according to latest data. Based on current GFR, CKD stage is stage 2 - GFR 60-89.  Monitor UOP and serial BMP and adjust therapy as needed. Renally dose meds. Avoid nephrotoxic medications and procedures.    stable    Gait abnormality  She has a high-risk for falls.    Recommend starting physical therapy.  She was able to walk 15 ft with a walker this morning which is a big improvement.  She is able to get out of bed with a little help.  Only option that I can see is referral to nursing home/swing.  Discuss with     12/20: PT/OT ordered; notes reviewed and waiting placement to SNF        Diabetes mellitus, type 2  Patient's FSGs are uncontrolled due to hyperglycemia on current medication regimen.  Last A1c reviewed-   Lab Results   Component Value Date    HGBA1C 8.4 (H) 12/13/2024     Most recent fingerstick glucose reviewed-   Recent Labs   Lab 12/20/24  1140 12/20/24  1649 12/20/24  1954 12/21/24  0719   POCTGLUCOSE 412* 405* 431* 298*       Current correctional scale  Medium  Increase anti-hyperglycemic dose as follows-   Antihyperglycemics (From admission, onward)      Start     Stop Route Frequency Ordered    12/21/24 0715  insulin aspart U-100 pen 10 Units         -- SubQ 3 times daily with meals 12/20/24 1736    12/20/24 0900  insulin glargine U-100 (Lantus) pen 22 Units         -- SubQ Daily 12/20/24 0621    12/13/24 0928  insulin aspart U-100 pen 0-5 Units         -- SubQ Before meals & nightly PRN 12/13/24 0828           12/20: increased Lantus to 22 units daily and aspart 8 units TID with meals. Still with glucose 300s. However, this can be further managed outpatient and does not require further inpatient stay.   12/20: Lantus 22 units and aspart increased to 10 units TID with meals. Diabetic diet education provided to family who  is bringing outside food/snacks.       VTE Risk Mitigation (From admission, onward)           Ordered     IP VTE HIGH RISK PATIENT  Once         12/13/24 0152     Place sequential compression device  Until discontinued         12/13/24 0152                    Discharge Planning   DWAYNE:      Code Status: Full Code   Medical Readiness for Discharge Date:   Discharge Plan A: New Nursing Home placement - senior living care facility   Discharge Delays: (!) Post-Acute Set-up            Please place Justification for DME        Alisa Beckwith MD  Department of Hospital Medicine   [place] - [facility] (3rd Fl)

## 2024-12-22 LAB
POCT GLUCOSE: 311 MG/DL (ref 70–110)
POCT GLUCOSE: 346 MG/DL (ref 70–110)
POCT GLUCOSE: 357 MG/DL (ref 70–110)
POCT GLUCOSE: 411 MG/DL (ref 70–110)

## 2024-12-22 PROCEDURE — 99232 SBSQ HOSP IP/OBS MODERATE 35: CPT | Mod: HCNC,,, | Performed by: INTERNAL MEDICINE

## 2024-12-22 PROCEDURE — 21400001 HC TELEMETRY ROOM: Mod: HCNC

## 2024-12-22 PROCEDURE — 25000003 PHARM REV CODE 250: Mod: HCNC

## 2024-12-22 PROCEDURE — 25000003 PHARM REV CODE 250: Mod: HCNC | Performed by: FAMILY MEDICINE

## 2024-12-22 PROCEDURE — 25000003 PHARM REV CODE 250: Mod: HCNC | Performed by: PHYSICIAN ASSISTANT

## 2024-12-22 PROCEDURE — 63600175 PHARM REV CODE 636 W HCPCS: Mod: HCNC | Performed by: PHYSICIAN ASSISTANT

## 2024-12-22 RX ORDER — INSULIN ASPART 100 [IU]/ML
15 INJECTION, SOLUTION INTRAVENOUS; SUBCUTANEOUS
Status: DISCONTINUED | OUTPATIENT
Start: 2024-12-22 | End: 2024-12-23

## 2024-12-22 RX ORDER — INSULIN GLARGINE 100 [IU]/ML
30 INJECTION, SOLUTION SUBCUTANEOUS DAILY
Status: DISCONTINUED | OUTPATIENT
Start: 2024-12-22 | End: 2024-12-23

## 2024-12-22 RX ADMIN — INSULIN GLARGINE 30 UNITS: 100 INJECTION, SOLUTION SUBCUTANEOUS at 08:12

## 2024-12-22 RX ADMIN — INSULIN ASPART 5 UNITS: 100 INJECTION, SOLUTION INTRAVENOUS; SUBCUTANEOUS at 08:12

## 2024-12-22 RX ADMIN — GABAPENTIN 300 MG: 300 CAPSULE ORAL at 08:12

## 2024-12-22 RX ADMIN — INSULIN ASPART 15 UNITS: 100 INJECTION, SOLUTION INTRAVENOUS; SUBCUTANEOUS at 08:12

## 2024-12-22 RX ADMIN — QUETIAPINE FUMARATE 12.5 MG: 25 TABLET ORAL at 08:12

## 2024-12-22 RX ADMIN — INSULIN ASPART 4 UNITS: 100 INJECTION, SOLUTION INTRAVENOUS; SUBCUTANEOUS at 04:12

## 2024-12-22 RX ADMIN — INSULIN ASPART 15 UNITS: 100 INJECTION, SOLUTION INTRAVENOUS; SUBCUTANEOUS at 11:12

## 2024-12-22 RX ADMIN — INSULIN ASPART 5 UNITS: 100 INJECTION, SOLUTION INTRAVENOUS; SUBCUTANEOUS at 11:12

## 2024-12-22 RX ADMIN — PANTOPRAZOLE SODIUM 40 MG: 40 TABLET, DELAYED RELEASE ORAL at 08:12

## 2024-12-22 RX ADMIN — ATORVASTATIN CALCIUM 20 MG: 20 TABLET, FILM COATED ORAL at 08:12

## 2024-12-22 RX ADMIN — MEMANTINE 10 MG: 10 TABLET ORAL at 08:12

## 2024-12-22 RX ADMIN — FERROUS SULFATE TAB 325 MG (65 MG ELEMENTAL FE) 1 EACH: 325 (65 FE) TAB at 08:12

## 2024-12-22 RX ADMIN — INSULIN ASPART 2 UNITS: 100 INJECTION, SOLUTION INTRAVENOUS; SUBCUTANEOUS at 08:12

## 2024-12-22 RX ADMIN — DONEPEZIL HYDROCHLORIDE 10 MG: 5 TABLET, FILM COATED ORAL at 08:12

## 2024-12-22 RX ADMIN — Medication 6 MG: at 11:12

## 2024-12-22 RX ADMIN — INSULIN ASPART 15 UNITS: 100 INJECTION, SOLUTION INTRAVENOUS; SUBCUTANEOUS at 04:12

## 2024-12-22 NOTE — PLAN OF CARE
Problem: Diabetes Comorbidity  Goal: Blood Glucose Level Within Targeted Range  Outcome: Progressing     Problem: Adult Inpatient Plan of Care  Goal: Plan of Care Review  Outcome: Progressing  Flowsheets (Taken 12/22/2024 0133)  Plan of Care Reviewed With: patient  Goal: Absence of Hospital-Acquired Illness or Injury  Outcome: Progressing  Goal: Optimal Comfort and Wellbeing  Outcome: Progressing  Goal: Readiness for Transition of Care  Outcome: Progressing     Problem: Skin Injury Risk Increased  Goal: Skin Health and Integrity  Outcome: Progressing     Problem: Fall Injury Risk  Goal: Absence of Fall and Fall-Related Injury  Outcome: Progressing

## 2024-12-22 NOTE — ASSESSMENT & PLAN NOTE
She has a high-risk for falls.    Recommend starting physical therapy.  She was able to walk 15 ft with a walker this morning which is a big improvement.  She is able to get out of bed with a little help.  Only option that I can see is referral to nursing home/swing.  Discuss with     12/22: PT/OT ordered; notes reviewed and waiting placement to SNF

## 2024-12-22 NOTE — ASSESSMENT & PLAN NOTE
Patient's FSGs are uncontrolled due to hyperglycemia on current medication regimen.  Last A1c reviewed-   Lab Results   Component Value Date    HGBA1C 8.4 (H) 12/13/2024     Most recent fingerstick glucose reviewed-   Recent Labs   Lab 12/21/24  1150 12/21/24  1608 12/21/24  1826 12/21/24 2015   POCTGLUCOSE 347* 481* 464* 443*       Current correctional scale  Medium  Increase anti-hyperglycemic dose as follows-   Antihyperglycemics (From admission, onward)      Start     Stop Route Frequency Ordered    12/22/24 0900  insulin glargine U-100 (Lantus) pen 30 Units         -- SubQ Daily 12/22/24 0647    12/22/24 0715  insulin aspart U-100 pen 15 Units         -- SubQ 3 times daily with meals 12/22/24 0647    12/13/24 0928  insulin aspart U-100 pen 0-5 Units         -- SubQ Before meals & nightly PRN 12/13/24 0828             12/22: increased Lantus 30 units and aspart increased to 15 units TID with meals. Diabetic diet education provided to family who is bringing outside food/snacks.   Continue SSI  May consider resuming home PO meds as this patient is only in house awaiting placement and will resume these meds at discharge. Will review placement status with team Monday morning and can decide if appropriate to resume.

## 2024-12-22 NOTE — PROGRESS NOTES
[place] - [facility] ([address] [place] [address] Medicine  Progress Note    Patient Name: Narda Person  MRN: [id number]  Patient Class: IP- Inpatient   Admission Date: 12/12/2024  Length of Stay: 6 days  Attending Physician: Alisa Beckwith MD  Primary Care Provider: Sirena Coleman MD        Subjective     Principal Problem:Hallucinations        HPI:  Patient is a 79 year old female with medical history of HTN, hypothyroidism, DM 2 and Dementia who presented to the ED With hallucinations and worsening confusion.  Daughter states she did have one eye brown higher than the other.   No other complaints per daughter who is the POA.    Admitted for hallucinations.      Overview/Hospital Course:  Patient was started on Namenda and low-dose Seroquel last night.  She seemed to have a better night.  She is still confused.  She was able to work with physical therapy this morning.  She was able to walk 15 ft with a walker.  She is still a significant fall risk in the therapist feels her cognition puts her at severe risk for accidents.  She agrees that nursing home placement would be in her best interest.    Neurology evaluated patient and recommended a low-dose Seroquel to help with her hallucinations.  She may have Lewy bodies dementia.  Namenda was recommended as well.  They want her to be evaluated in memory clinic    12/16 PT HD stable on room air.  Plan to discharge to NH.  PT/OT ordered.  Hallucinations resolved.      12/17 Mg on lower level.  IV mg ordered.  Waiting placement to SNF.      12/18 NAEON.  Waiting placement     12/19 PT HD stable on room air.  Mg 1.6.  IV mg replacement.        12/20-12/22: hyperglycemia still present. Insulin has been adjusted. Family is bringing snacks accounting for fluctuations in glucose. Education provided on diabetic diet. No other acute events and remains stable for discharge pending placement    Interval History: anxiety, weakness    Review of Systems   HENT:  Negative for congestion.     Respiratory:  Negative for chest tightness.    Cardiovascular:  Negative for chest pain.   Gastrointestinal:  Negative for abdominal pain.   Musculoskeletal:  Positive for gait problem (weakness). Negative for back pain.   Neurological:  Negative for tremors.   Psychiatric/Behavioral:  Negative for agitation and hallucinations.    Limited ROS due to memory loss    Objective:     Vital Signs (Most Recent):  Temp: 97.8 °F (36.6 °C) (12/22/24 0325)  Pulse: 90 (12/22/24 0325)  Resp: 16 (12/22/24 0325)  BP: (!) 112/53 (12/22/24 0325)  SpO2: (!) 94 % (12/22/24 0325) Vital Signs (24h Range):  Temp:  [97.4 °F (36.3 °C)-98.7 °F (37.1 °C)] 97.8 °F (36.6 °C)  Pulse:  [68-94] 90  Resp:  [16-22] 16  SpO2:  [90 %-94 %] 94 %  BP: (112-168)/(53-68) 112/53     Weight: 75.8 kg (167 lb 1.7 oz)  Body mass index is 30.56 kg/m².    Intake/Output Summary (Last 24 hours) at 12/22/2024 0646  Last data filed at 12/22/2024 0532  Gross per 24 hour   Intake --   Output 1650 ml   Net -1650 ml         Physical Exam  Vitals and nursing note reviewed.   Constitutional:       Appearance: Normal appearance. She is well-developed.   Eyes:      Extraocular Movements: Extraocular movements intact.      Pupils: Pupils are equal, round, and reactive to light.   Cardiovascular:      Rate and Rhythm: Normal rate and regular rhythm.      Heart sounds: Normal heart sounds. No murmur heard.  Pulmonary:      Effort: Pulmonary effort is normal.      Breath sounds: Normal breath sounds.   Musculoskeletal:      Right lower leg: No edema.      Left lower leg: No edema.   Neurological:      Mental Status: Mental status is at baseline.      Motor: Weakness (generalized) present.   Psychiatric:         Attention and Perception: She perceives auditory and visual hallucinations.         Mood and Affect: Mood is anxious.         Speech: Speech normal.         Behavior: Behavior normal.         Cognition and Memory: Cognition is impaired. Memory is impaired.              Significant Labs: All pertinent labs within the past 24 hours have been reviewed.  BMP:   No new results  Glucose 300-400      Significant Imaging: I have reviewed all pertinent imaging results/findings within the past 24 hours.  I have reviewed and interpreted all pertinent imaging results/findings within the past 24 hours.  MRI and CT scan reviewed.    Assessment and Plan     * Hallucinations  Likely due to worsening dementia.  She probably has Lewy body dementia.  Namenda and Seroquel started.  Last night was a better night.  Less agitation, confusion.  Hallucination seemed to be a little better.  She is still significantly cognitively impaired.  Neurosurgery reviewed CT head with known schwannoma compressing on eric and basilar cisterns.  No further intervention.  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  Tele neurology recommends low-dose Seroquel and to start Namenda.  They recommend referral to memory Clinic.  Family would like her admitted to a nursing home.    Currently resolved       Electrolyte abnormality  Magnesium 1.5  IV mg     IV 1.6. 4 grams of IV magnesium today.  F/u outpatient labs    Mg 1.6 today 12/19 IV mg replacement     12/20: stable. No further labs. Patient medically stable and ready for discharge pending placement      Advanced care planning/counseling discussion  Discussed goals of care with POA daughter but will follow up after she reviews paperwork.  Daughter and granddaughter are unable to care for patient that their home.  They would like patient to get into a nursing home.  Recommend discussing this with .  Unable to discharge patient this time.    Waiting placement to skilled nursing home       Chronic kidney disease, stage 2 (mild)  Creatine stable for now. BMP reviewed- noted Estimated Creatinine Clearance: 46.7 mL/min (based on SCr of 0.9 mg/dL). according to latest data. Based on current GFR, CKD stage is  stage 2 - GFR 60-89.  Monitor UOP and serial BMP and adjust therapy as needed. Renally dose meds. Avoid nephrotoxic medications and procedures.    stable    Gait abnormality  She has a high-risk for falls.    Recommend starting physical therapy.  She was able to walk 15 ft with a walker this morning which is a big improvement.  She is able to get out of bed with a little help.  Only option that I can see is referral to nursing home/swing.  Discuss with     12/22: PT/OT ordered; notes reviewed and waiting placement to SNF        Diabetes mellitus, type 2  Patient's FSGs are uncontrolled due to hyperglycemia on current medication regimen.  Last A1c reviewed-   Lab Results   Component Value Date    HGBA1C 8.4 (H) 12/13/2024     Most recent fingerstick glucose reviewed-   Recent Labs   Lab 12/21/24  1150 12/21/24  1608 12/21/24  1826 12/21/24 2015   POCTGLUCOSE 347* 481* 464* 443*       Current correctional scale  Medium  Increase anti-hyperglycemic dose as follows-   Antihyperglycemics (From admission, onward)      Start     Stop Route Frequency Ordered    12/22/24 0900  insulin glargine U-100 (Lantus) pen 30 Units         -- SubQ Daily 12/22/24 0647    12/22/24 0715  insulin aspart U-100 pen 15 Units         -- SubQ 3 times daily with meals 12/22/24 0647    12/13/24 0928  insulin aspart U-100 pen 0-5 Units         -- SubQ Before meals & nightly PRN 12/13/24 0828             12/22: increased Lantus 30 units and aspart increased to 15 units TID with meals. Diabetic diet education provided to family who is bringing outside food/snacks.   Continue SSI  May consider resuming home PO meds as this patient is only in house awaiting placement and will resume these meds at discharge. Will review placement status with team Monday morning and can decide if appropriate to resume.       VTE Risk Mitigation (From admission, onward)           Ordered     IP VTE HIGH RISK PATIENT  Once         12/13/24 0152     Place  sequential compression device  Until discontinued         12/13/24 0152                    Discharge Planning   DWAYNE:      Code Status: Full Code   Medical Readiness for Discharge Date:   Discharge Plan A: New Nursing Home placement - halfway care facility   Discharge Delays: (!) Post-Acute Set-up            Please place Justification for DME        Alisa Beckwith MD  Department of Hospital Medicine   [place] - [facility] (3rd Fl)

## 2024-12-22 NOTE — SUBJECTIVE & OBJECTIVE
Interval History: anxiety, weakness    Review of Systems   HENT:  Negative for congestion.    Respiratory:  Negative for chest tightness.    Cardiovascular:  Negative for chest pain.   Gastrointestinal:  Negative for abdominal pain.   Musculoskeletal:  Positive for gait problem (weakness). Negative for back pain.   Neurological:  Negative for tremors.   Psychiatric/Behavioral:  Negative for agitation and hallucinations.    Limited ROS due to memory loss    Objective:     Vital Signs (Most Recent):  Temp: 97.8 °F (36.6 °C) (12/22/24 0325)  Pulse: 90 (12/22/24 0325)  Resp: 16 (12/22/24 0325)  BP: (!) 112/53 (12/22/24 0325)  SpO2: (!) 94 % (12/22/24 0325) Vital Signs (24h Range):  Temp:  [97.4 °F (36.3 °C)-98.7 °F (37.1 °C)] 97.8 °F (36.6 °C)  Pulse:  [68-94] 90  Resp:  [16-22] 16  SpO2:  [90 %-94 %] 94 %  BP: (112-168)/(53-68) 112/53     Weight: 75.8 kg (167 lb 1.7 oz)  Body mass index is 30.56 kg/m².    Intake/Output Summary (Last 24 hours) at 12/22/2024 0646  Last data filed at 12/22/2024 0532  Gross per 24 hour   Intake --   Output 1650 ml   Net -1650 ml         Physical Exam  Vitals and nursing note reviewed.   Constitutional:       Appearance: Normal appearance. She is well-developed.   Eyes:      Extraocular Movements: Extraocular movements intact.      Pupils: Pupils are equal, round, and reactive to light.   Cardiovascular:      Rate and Rhythm: Normal rate and regular rhythm.      Heart sounds: Normal heart sounds. No murmur heard.  Pulmonary:      Effort: Pulmonary effort is normal.      Breath sounds: Normal breath sounds.   Musculoskeletal:      Right lower leg: No edema.      Left lower leg: No edema.   Neurological:      Mental Status: Mental status is at baseline.      Motor: Weakness (generalized) present.   Psychiatric:         Attention and Perception: She perceives auditory and visual hallucinations.         Mood and Affect: Mood is anxious.         Speech: Speech normal.         Behavior: Behavior  normal.         Cognition and Memory: Cognition is impaired. Memory is impaired.             Significant Labs: All pertinent labs within the past 24 hours have been reviewed.  BMP:   No new results  Glucose 300-400      Significant Imaging: I have reviewed all pertinent imaging results/findings within the past 24 hours.  I have reviewed and interpreted all pertinent imaging results/findings within the past 24 hours.  MRI and CT scan reviewed.

## 2024-12-23 VITALS
SYSTOLIC BLOOD PRESSURE: 130 MMHG | HEIGHT: 62 IN | TEMPERATURE: 98 F | DIASTOLIC BLOOD PRESSURE: 65 MMHG | OXYGEN SATURATION: 97 % | WEIGHT: 167.13 LBS | BODY MASS INDEX: 30.76 KG/M2 | HEART RATE: 74 BPM | RESPIRATION RATE: 16 BRPM

## 2024-12-23 LAB
POCT GLUCOSE: 369 MG/DL (ref 70–110)
POCT GLUCOSE: 382 MG/DL (ref 70–110)

## 2024-12-23 PROCEDURE — 30200315 PPD INTRADERMAL TEST REV CODE 302: Mod: HCNC | Performed by: INTERNAL MEDICINE

## 2024-12-23 PROCEDURE — 97116 GAIT TRAINING THERAPY: CPT | Mod: HCNC

## 2024-12-23 PROCEDURE — 97530 THERAPEUTIC ACTIVITIES: CPT | Mod: HCNC

## 2024-12-23 PROCEDURE — 25000003 PHARM REV CODE 250: Mod: HCNC | Performed by: PHYSICIAN ASSISTANT

## 2024-12-23 PROCEDURE — 86580 TB INTRADERMAL TEST: CPT | Mod: HCNC | Performed by: INTERNAL MEDICINE

## 2024-12-23 PROCEDURE — 25000003 PHARM REV CODE 250: Mod: HCNC | Performed by: FAMILY MEDICINE

## 2024-12-23 PROCEDURE — 99232 SBSQ HOSP IP/OBS MODERATE 35: CPT | Mod: HCNC,,, | Performed by: INTERNAL MEDICINE

## 2024-12-23 RX ORDER — INSULIN GLARGINE 100 [IU]/ML
35 INJECTION, SOLUTION SUBCUTANEOUS DAILY
Status: DISCONTINUED | OUTPATIENT
Start: 2024-12-24 | End: 2024-12-23 | Stop reason: HOSPADM

## 2024-12-23 RX ORDER — INSULIN ASPART 100 [IU]/ML
18 INJECTION, SOLUTION INTRAVENOUS; SUBCUTANEOUS
Status: DISCONTINUED | OUTPATIENT
Start: 2024-12-23 | End: 2024-12-23 | Stop reason: HOSPADM

## 2024-12-23 RX ADMIN — GABAPENTIN 300 MG: 300 CAPSULE ORAL at 08:12

## 2024-12-23 RX ADMIN — INSULIN GLARGINE 30 UNITS: 100 INJECTION, SOLUTION SUBCUTANEOUS at 08:12

## 2024-12-23 RX ADMIN — TUBERCULIN PURIFIED PROTEIN DERIVATIVE 5 UNITS: 5 INJECTION, SOLUTION INTRADERMAL at 11:12

## 2024-12-23 RX ADMIN — FERROUS SULFATE TAB 325 MG (65 MG ELEMENTAL FE) 1 EACH: 325 (65 FE) TAB at 08:12

## 2024-12-23 RX ADMIN — ATORVASTATIN CALCIUM 20 MG: 20 TABLET, FILM COATED ORAL at 08:12

## 2024-12-23 RX ADMIN — INSULIN ASPART 5 UNITS: 100 INJECTION, SOLUTION INTRAVENOUS; SUBCUTANEOUS at 11:12

## 2024-12-23 RX ADMIN — PANTOPRAZOLE SODIUM 40 MG: 40 TABLET, DELAYED RELEASE ORAL at 08:12

## 2024-12-23 RX ADMIN — MEMANTINE 10 MG: 10 TABLET ORAL at 08:12

## 2024-12-23 RX ADMIN — INSULIN ASPART 18 UNITS: 100 INJECTION, SOLUTION INTRAVENOUS; SUBCUTANEOUS at 11:12

## 2024-12-23 RX ADMIN — INSULIN ASPART 15 UNITS: 100 INJECTION, SOLUTION INTRAVENOUS; SUBCUTANEOUS at 08:12

## 2024-12-23 NOTE — SUBJECTIVE & OBJECTIVE
Review of Systems   Constitutional:  Negative for fatigue.   HENT:  Negative for congestion.    Respiratory:  Negative for chest tightness.    Cardiovascular:  Negative for chest pain.   Gastrointestinal:  Negative for abdominal pain.   Musculoskeletal:  Positive for gait problem (weakness). Negative for back pain.   Neurological:  Negative for tremors.   Psychiatric/Behavioral:  Negative for agitation and hallucinations.      Objective:     Vital Signs (Most Recent):  Temp: 97.6 °F (36.4 °C) (12/23/24 0710)  Pulse: 72 (12/23/24 0710)  Resp: 18 (12/23/24 0710)  BP: 134/63 (12/23/24 0710)  SpO2: (!) 94 % (12/23/24 0710) Vital Signs (24h Range):  Temp:  [97.6 °F (36.4 °C)-99 °F (37.2 °C)] 97.6 °F (36.4 °C)  Pulse:  [72-91] 72  Resp:  [16-20] 18  SpO2:  [92 %-95 %] 94 %  BP: (114-173)/(63-80) 134/63     Weight: 75.8 kg (167 lb 1.7 oz)  Body mass index is 30.56 kg/m².    Intake/Output Summary (Last 24 hours) at 12/23/2024 0843  Last data filed at 12/23/2024 0026  Gross per 24 hour   Intake 414 ml   Output 750 ml   Net -336 ml         Physical Exam  Vitals and nursing note reviewed.   Constitutional:       Appearance: Normal appearance. She is well-developed.   Eyes:      Extraocular Movements: Extraocular movements intact.      Pupils: Pupils are equal, round, and reactive to light.   Cardiovascular:      Rate and Rhythm: Normal rate and regular rhythm.      Heart sounds: Normal heart sounds. No murmur heard.  Pulmonary:      Effort: Pulmonary effort is normal.      Breath sounds: Normal breath sounds.   Musculoskeletal:      Right lower leg: No edema.      Left lower leg: No edema.   Neurological:      Mental Status: She is oriented to person, place, and time. Mental status is at baseline.      Motor: Weakness (generalized) present.   Psychiatric:         Attention and Perception: She perceives auditory and visual hallucinations.         Mood and Affect: Mood is anxious.         Speech: Speech normal.          Behavior: Behavior normal.         Cognition and Memory: Cognition is impaired. Memory is impaired.           Lab Results   Component Value Date    HGBA1C 8.4 (H) 12/13/2024

## 2024-12-23 NOTE — DISCHARGE SUMMARY
[facility] ([facility])  [facility] Medicine  Discharge Summary      Patient Name: Narda Person  MRN: [id number]  DANIELE: [id number]  Patient Class: IP- Inpatient  Admission Date: 12/12/2024  Hospital Length of Stay: 7 days  Discharge Date and Time:  12/23/2024 10:21 AM  Attending Physician: Alisa Beckwith MD   Discharging Provider: Sirena Coleman MD  Primary Care Provider: Sirena Coleman MD    Primary Care Team: Networked reference to record PCT     HPI:   Patient is a 79 year old female with medical history of HTN, hypothyroidism, DM 2 and Dementia who presented to the ED With hallucinations and worsening confusion.  Daughter states she did have one eye brown higher than the other.   No other complaints per daughter who is the POA.    Admitted for hallucinations.      * No surgery found *      Hospital Course:   Patient was started on Namenda and low-dose Seroquel last night.  She seemed to have a better night.  She is still confused.  She was able to work with physical therapy this morning.  She was able to walk 15 ft with a walker.  She is still a significant fall risk in the therapist feels her cognition puts her at severe risk for accidents.  She agrees that nursing home placement would be in her best interest.    Neurology evaluated patient and recommended a low-dose Seroquel to help with her hallucinations.  She may have Lewy bodies dementia.  Namenda was recommended as well.  They want her to be evaluated in memory clinic    12/16 PT HD stable on room air.  Plan to discharge to NH.  PT/OT ordered.  Hallucinations resolved.      12/17 Mg on lower level.  IV mg ordered.  Waiting placement to SNF.      12/18 NAEON.  Waiting placement     12/19 PT HD stable on room air.  Mg 1.6.  IV mg replacement.        12/20-12/22: hyperglycemia still present. Insulin has been adjusted. Family is bringing snacks accounting for fluctuations in glucose. Education provided on diabetic diet. No other acute events and  remains stable for discharge pending placement.    12/24  Waiting for placement   Eats very well here ;sugars are high   Will increase insulin          Goals of Care Treatment Preferences:  Code Status: Full Code      SDOH Screening:  The patient was screened for utility difficulties, food insecurity, transport difficulties, housing insecurity, and interpersonal safety and there were no concerns identified this admission.     Consults:   Consults (From admission, onward)          Status Ordering Provider     Inpatient consult to Psychiatry  Once        Provider:  Tej Serrato MD    Completed LINDSEY TOUSSAINT     Inpatient consult to Telemedicine-General Neurology  Once        Provider:  (Not yet assigned)    Completed LINDSEY TOUSSAINT            * Hallucinations  Likely due to worsening dementia.  She probably has Lewy body dementia.  Namenda and Seroquel started.  Last night was a better night.  Less agitation, confusion.  Hallucination seemed to be a little better.  She is still significantly cognitively impaired.  Neurosurgery reviewed CT head with known schwannoma compressing on eric and basilar cisterns.  No further intervention.  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  Tele neurology recommends low-dose Seroquel and to start Namenda.  They recommend referral to memory Clinic.  Family would like her admitted to a nursing home.    Currently resolved .      12/23/24  Will DC TO NH       Electrolyte abnormality  Magnesium 1.5  IV mg     IV 1.6. 4 grams of IV magnesium today.  F/u outpatient labs    Mg 1.6 today 12/19 IV mg replacement     12/20: stable. No further labs. Patient medically stable and ready for discharge pending placement.      Advanced care planning/counseling discussion  Discussed goals of care with POA daughter but will follow up after she reviews paperwork.  Daughter and granddaughter are unable to care for patient that  their home.  They would like patient to get into a nursing home.  Recommend discussing this with .  Unable to discharge patient this time.    Waiting placement to skilled nursing home .      Chronic kidney disease, stage 2 (mild)  Creatine stable for now. BMP reviewed- noted Estimated Creatinine Clearance: 46.7 mL/min (based on SCr of 0.9 mg/dL). according to latest data. Based on current GFR, CKD stage is stage 2 - GFR 60-89.  Monitor UOP and serial BMP and adjust therapy as needed. Renally dose meds. Avoid nephrotoxic medications and procedures.    Stable.    Gait abnormality  She has a high-risk for falls.    Recommend starting physical therapy.  She was able to walk 15 ft with a walker this morning which is a big improvement.  She is able to get out of bed with a little help.  Only option that I can see is referral to nursing home/swing.  Discuss with     12/22: PT/OT ordered; notes reviewed and waiting placement to SNF    12/24  Alert tome this am       Diabetes mellitus, type 2  Patient's FSGs are uncontrolled due to hyperglycemia on current medication regimen.  Last A1c reviewed-   Lab Results   Component Value Date    HGBA1C 8.4 (H) 12/13/2024     Most recent fingerstick glucose reviewed-   Recent Labs   Lab 12/22/24  1110 12/22/24  1616 12/22/24  1944 12/23/24  0714   POCTGLUCOSE 411* 311* 346* 382*       Current correctional scale  Medium  Increase anti-hyperglycemic dose as follows-   Antihyperglycemics (From admission, onward)      Start     Stop Route Frequency Ordered    12/24/24 0900  insulin glargine U-100 (Lantus) pen 35 Units         -- SubQ Daily 12/23/24 0839    12/23/24 1130  insulin aspart U-100 pen 18 Units         -- SubQ 3 times daily with meals 12/23/24 0840    12/13/24 0928  insulin aspart U-100 pen 0-5 Units         -- SubQ Before meals & nightly PRN 12/13/24 0828             12/22: increased Lantus 30 units and aspart increased to 15 units TID with meals.  Diabetic diet education provided to family who is bringing outside food/snacks.   Continue SSI  May consider resuming home PO meds as this patient is only in house awaiting placement and will resume these meds at discharge. Will review placement status with team Monday morning and can decide if appropriate to resume.         12/23/24    Will increase both long acting and also meal time insulin           Final Active Diagnoses:    Diagnosis Date Noted POA    PRINCIPAL PROBLEM:  Hallucinations [R44.3] 12/13/2024 Yes    Electrolyte abnormality [E87.8] 12/16/2024 No    Chronic kidney disease, stage 2 (mild) [N18.2] 12/13/2024 Yes    Advanced care planning/counseling discussion [Z71.89] 12/13/2024 Not Applicable    Gait abnormality [R26.9] 04/12/2018 Yes    Diabetes mellitus, type 2 [E11.9] 10/01/2012 Yes      Problems Resolved During this Admission:    Diagnosis Date Noted Date Resolved POA    Uncontrolled type 2 diabetes mellitus with hyperglycemia [E11.65] 04/20/2021 12/16/2024 Yes    Anemia [D64.9] 06/28/2020 12/13/2024 Yes       Discharged Condition: fair    Disposition: Skilled Nursing Facility    Follow Up:    Patient Instructions:      Ambulatory referral/consult to Neurosurgery   Standing Status: Future   Referral Priority: Routine Referral Type: Consultation   Referral Reason: Specialty Services Required   Requested Specialty: Neurosurgery   Number of Visits Requested: 1       Significant Diagnostic Studies:     Pending Diagnostic Studies:       None           Medications:  Reconciled Home Medications:      Medication List        CHANGE how you take these medications      meclizine 25 mg tablet  Commonly known as: ANTIVERT  TAKE 1 TABLET BY MOUTH THREE TIMES DAILY AS NEEDED FOR DIZZINESS AND FOR NAUSEA  What changed: Another medication with the same name was removed. Continue taking this medication, and follow the directions you see here.     TRESIBA FLEXTOUCH U-200 200 unit/mL (3 mL) insulin pen  Generic drug:  insulin degludec  Inject 60 Units into the skin once daily.  What changed: how much to take            CONTINUE taking these medications      ACCU-CHEK COMPACT PLUS TEST Strp  Generic drug: blood sugar diagnostic, drum  USE TO CHECK BLOOD GLUCOSE FOUR TIMES DAILY     atorvastatin 20 MG tablet  Commonly known as: LIPITOR  Take 1 tablet (20 mg total) by mouth once daily.     blood sugar diagnostic Strp  Commonly known as: ACCU-CHEK GUIDE TEST STRIPS  1 strip by Misc.(Non-Drug; Combo Route) route 4 (four) times daily.     blood-glucose meter Misc  Commonly known as: ACCU-CHEK GUIDE GLUCOSE METER  1 each by Misc.(Non-Drug; Combo Route) route 4 (four) times daily.     diclofenac sodium 1 % Gel  Commonly known as: VOLTAREN  Apply 2 g topically 2 (two) times daily.     donepeziL 10 MG tablet  Commonly known as: ARICEPT  TAKE 1 TABLET BY MOUTH ONCE DAILY IN THE EVENING     EScitalopram oxalate 10 MG tablet  Commonly known as: LEXAPRO  Take 1 tablet by mouth once daily     ferrous sulfate 325 (65 FE) MG EC tablet  Take 1 tablet by mouth once daily     gabapentin 300 MG capsule  Commonly known as: NEURONTIN  Take 1 capsule by mouth twice daily     ibuprofen 400 MG tablet  Commonly known as: ADVIL,MOTRIN  Take 1 tablet (400 mg total) by mouth every 6 (six) hours as needed for Pain.     insulin syringe-needle U-100 0.3 mL 30 Syrg  Use with Levemir vial BID     JANUVIA 100 mg Tab  Generic drug: SITagliptin phosphate  Take 1 tablet by mouth once daily     lancets Misc  Commonly known as: ACCU-CHEK SOFTCLIX LANCETS  1 each by Misc.(Non-Drug; Combo Route) route 4 (four) times daily.     melatonin 5 mg  Commonly known as: MELATIN  Take 10 mg by mouth nightly as needed.     metFORMIN 1000 MG tablet  Commonly known as: GLUCOPHAGE  Take 1 tablet (1,000 mg total) by mouth 2 (two) times daily with meals.     omeprazole 20 MG capsule  Commonly known as: PRILOSEC  Take 1 capsule (20 mg total) by mouth 2 (two) times daily.     pen needle,  "diabetic 31 gauge x 3/16" Ndle  Commonly known as: LITE TOUCH INSULIN PEN NEEDLES  1 application by Misc.(Non-Drug; Combo Route) route 4 (four) times daily.     pen needle, diabetic 32 gauge x 5/32" Ndle  1 Device by Misc.(Non-Drug; Combo Route) route as directed. Needles to be used with Novolog pens and Levemir pens daily.     ramipriL 5 MG capsule  Commonly known as: ALTACE  Take 1 capsule by mouth once daily            ASK your doctor about these medications      insulin aspart U-100 100 unit/mL (3 mL) Inpn pen  Commonly known as: NovoLOG Flexpen U-100 Insulin  Inject 30 Units into the skin 3 (three) times daily with meals.              Indwelling Lines/Drains at time of discharge:   Lines/Drains/Airways       Drain  Duration             Female External Urinary Catheter w/ Suction 12/21/24 1905 1 day                    Time spent on the discharge of patient: 35 minutes         Sirena Coleman MD  Department of Hospital Medicine  [facility] Surg (3rd Fl)  "

## 2024-12-23 NOTE — ASSESSMENT & PLAN NOTE
Magnesium 1.5  IV mg     IV 1.6. 4 grams of IV magnesium today.  F/u outpatient labs    Mg 1.6 today 12/19 IV mg replacement     12/20: stable. No further labs. Patient medically stable and ready for discharge pending placement.

## 2024-12-23 NOTE — ASSESSMENT & PLAN NOTE
Patient's FSGs are uncontrolled due to hyperglycemia on current medication regimen.  Last A1c reviewed-   Lab Results   Component Value Date    HGBA1C 8.4 (H) 12/13/2024     Most recent fingerstick glucose reviewed-   Recent Labs   Lab 12/22/24  1110 12/22/24  1616 12/22/24  1944 12/23/24  0714   POCTGLUCOSE 411* 311* 346* 382*       Current correctional scale  Medium  Increase anti-hyperglycemic dose as follows-   Antihyperglycemics (From admission, onward)      Start     Stop Route Frequency Ordered    12/24/24 0900  insulin glargine U-100 (Lantus) pen 35 Units         -- SubQ Daily 12/23/24 0839    12/23/24 1130  insulin aspart U-100 pen 18 Units         -- SubQ 3 times daily with meals 12/23/24 0840    12/13/24 0928  insulin aspart U-100 pen 0-5 Units         -- SubQ Before meals & nightly PRN 12/13/24 0828             12/22: increased Lantus 30 units and aspart increased to 15 units TID with meals. Diabetic diet education provided to family who is bringing outside food/snacks.   Continue SSI  May consider resuming home PO meds as this patient is only in house awaiting placement and will resume these meds at discharge. Will review placement status with team Monday morning and can decide if appropriate to resume.         12/23/24    Will increase both long acting and also meal time insulin

## 2024-12-23 NOTE — ASSESSMENT & PLAN NOTE
Discussed goals of care with POA daughter but will follow up after she reviews paperwork.  Daughter and granddaughter are unable to care for patient that their home.  They would like patient to get into a nursing home.  Recommend discussing this with .  Unable to discharge patient this time.    Waiting placement to skilled nursing home .

## 2024-12-23 NOTE — PT/OT/SLP PROGRESS
Physical Therapy Treatment    Patient Name:  Narda Person   MRN:  [id number]    Recommendations:     Discharge Recommendations: Moderate Intensity Therapy  Discharge Equipment Recommendations: walker, rolling, bedside commode  Barriers to discharge: Inaccessible home and Decreased caregiver support    Assessment:     Narda Person is a 79 y.o. female admitted with a medical diagnosis of Hallucinations.  She presents with the following impairments/functional limitations: weakness, impaired endurance, impaired self care skills, impaired functional mobility, gait instability, decreased lower extremity function, decreased safety awareness, impaired balance. Patient maintained gait functions using RW x ~4 feet (twice) with minimal/contact guard assistance. No sign of fatigue.    Rehab Prognosis: Fair; patient would benefit from acute skilled PT services to address these deficits and reach maximum level of function.    Recent Surgery: * No surgery found *      Plan:     During this hospitalization, patient to be seen 5 x/week to address the identified rehab impairments via gait training, therapeutic activities, therapeutic exercises and progress toward the following goals:    Plan of Care Expires:  12/20/24    Subjective     Chief Complaint: none  Patient/Family Comments/goals: waiting for placement  Pain/Comfort:  Pain Rating 1: 0/10      Objective:     Communicated with patient prior to session.  Patient found HOB elevated with bed alarm, peripheral IV, PureWick, Other (comments) (avasys camera) upon PT entry to room.     General Precautions: Standard, fall  Orthopedic Precautions: N/A  Braces: N/A  Respiratory Status: Room air     Functional Mobility:  Bed Mobility:     Rolling Left:  modified independence  Rolling Right: modified independence  Scooting: supervision  Supine to Sit: supervision  Sit to Supine: supervision  Transfers:     Sit to Stand:  stand by assistance with rolling walker  Gait: minimal/contact guard  assistance x ~4 feet(twice) using RW. Dec foot/floor clearance. Dec stride ,dec may and leans forward at the end distance.  Balance: Sitting: Standby Assistance; Standing with RW: Contact guard assistance      AM-PAC 6 CLICK MOBILITY  Turning over in bed (including adjusting bedclothes, sheets and blankets)?: 4  Sitting down on and standing up from a chair with arms (e.g., wheelchair, bedside commode, etc.): 3  Moving from lying on back to sitting on the side of the bed?: 3  Moving to and from a bed to a chair (including a wheelchair)?: 3  Need to walk in hospital room?: 3  Climbing 3-5 steps with a railing?: 3  Basic Mobility Total Score: 19       Treatment & Education:  B LE Strengthening ROM ex x 10 reps on each involving ankle pumps/rotations, heel slides, abd /add, SLR with assistance, rolling to sides, LAQ; standing balance activity with RW and gait trng using RW x ~40 feet(twice) with min/contact guard assistance.    Patient left HOB elevated with all lines intact, call button in reach, bed alarm on, nursing  notified, and avasys camera present..    GOALS:   Multidisciplinary Problems       Physical Therapy Goals          Problem: Physical Therapy    Goal Priority Disciplines Outcome Interventions   Physical Therapy Goal     PT, PT/OT Progressing    Description: Goals to be met by: 2024    Patient will increase functional independence with mobility by performin. Supine to sit with Standby Assistance.  2. Sit to supine with Standby Assistance.  3. Bed to chair transfer with Standby Assistance with rolling walker using Step Transfer technique.  4. Sit to Stand with Standby Assistance with rolling walker.  5. Gait  x 100  feet with Standby Assistance with rolling walker.  6. Lower extremity exercise program x10 reps, with assistance as needed.                          Time Tracking:     PT Received On: 24  PT Start Time: 0945     PT Stop Time: 1015  PT Total Time (min): 30 min     Billable  Minutes: Gait Training 15 and Therapeutic Activity 15    Treatment Type: Treatment  PT/PTA: PT           12/23/2024

## 2024-12-23 NOTE — ASSESSMENT & PLAN NOTE
She has a high-risk for falls.    Recommend starting physical therapy.  She was able to walk 15 ft with a walker this morning which is a big improvement.  She is able to get out of bed with a little help.  Only option that I can see is referral to nursing home/swing.  Discuss with     12/22: PT/OT ordered; notes reviewed and waiting placement to SNF    12/24  Alert subhae this am

## 2024-12-23 NOTE — ASSESSMENT & PLAN NOTE
Likely due to worsening dementia.  She probably has Lewy body dementia.  Namenda and Seroquel started.  Last night was a better night.  Less agitation, confusion.  Hallucination seemed to be a little better.  She is still significantly cognitively impaired.  Neurosurgery reviewed CT head with known schwannoma compressing on eric and basilar cisterns.  No further intervention.  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  Tele neurology recommends low-dose Seroquel and to start Namenda.  They recommend referral to memory Clinic.  Family would like her admitted to a nursing home.    Currently resolved .

## 2024-12-23 NOTE — ASSESSMENT & PLAN NOTE
Creatine stable for now. BMP reviewed- noted Estimated Creatinine Clearance: 46.7 mL/min (based on SCr of 0.9 mg/dL). according to latest data. Based on current GFR, CKD stage is stage 2 - GFR 60-89.  Monitor UOP and serial BMP and adjust therapy as needed. Renally dose meds. Avoid nephrotoxic medications and procedures.    Stable.

## 2024-12-23 NOTE — PLAN OF CARE
Pt alert and oriented x 3 (not to situation). Pt has a 20g to the right posterior forearm that is saline locked. Vitals stable through night. Glucose corrected with sliding scale insulin per orders. Pt has bed alarm set and is video monitored. Pt took off her diaper and purewic on own and requested a diaper put on, pt was cleaned and changed into a diaper. Pt is absent of auditory or visual hallucinations.

## 2024-12-23 NOTE — PLAN OF CARE
[facility] - Med Surg (3rd Fl)  Discharge Final Note    Primary Care Provider: Sirena Coleman MD    Expected Discharge Date: 12/23/2024    Final Discharge Note (most recent)       Final Note - 12/23/24 1516          Final Note    Assessment Type Final Discharge Note (P)      Anticipated Discharge Disposition Skilled Nursing Facility (P)      Hospital Resources/Appts/Education Provided Provided education on problems/symptoms using teachback;Provided patient/caregiver with written discharge plan information (P)         Post-Acute Status    Post-Acute Authorization Placement (P)      Post-Acute Placement Status Set-up Complete/Auth obtained (P)      Coverage [employer] MCARE (P)      Patient choice form signed by patient/caregiver List from System Post-Acute Care;List from CMS Compare (P)      Discharge Delays None known at this time (P)                      Important Message from Medicare               Patient will discharge today to [facility] for skilled services. [facility] notified of discharge. Nurse notified to call report.

## 2024-12-23 NOTE — NURSING
Gave report to Christelle Morgan, patient resting in bed, no sign of acute distress. VSS stable. Bed is in lowest ,locked position and call bell in reach. Patient waiting to be transferred to the Nursing Home.

## 2024-12-23 NOTE — NURSING
Patient discharged note was updated by physician from [facility]. Gave report to Christelle (nurse) at [facility] Patient transferred to [facility] via wheel chair with staff. Patient VSS stable. Discharge paperwork in tow.

## 2024-12-23 NOTE — PROGRESS NOTES
[facility] ([address] [place] [address] Medicine  Progress Note    Patient Name: Narda Person  MRN: [id number]  Patient Class: IP- Inpatient   Admission Date: 12/12/2024  Length of Stay: 7 days  Attending Physician: Alisa Beckwith MD  Primary Care Provider: Sirena Coleman MD        Subjective     Principal Problem:Hallucinations        HPI:  Patient is a 79 year old female with medical history of HTN, hypothyroidism, DM 2 and Dementia who presented to the ED With hallucinations and worsening confusion.  Daughter states she did have one eye brown higher than the other.   No other complaints per daughter who is the POA.    Admitted for hallucinations.      Overview/Hospital Course:  Patient was started on Namenda and low-dose Seroquel last night.  She seemed to have a better night.  She is still confused.  She was able to work with physical therapy this morning.  She was able to walk 15 ft with a walker.  She is still a significant fall risk in the therapist feels her cognition puts her at severe risk for accidents.  She agrees that nursing home placement would be in her best interest.    Neurology evaluated patient and recommended a low-dose Seroquel to help with her hallucinations.  She may have Lewy bodies dementia.  Namenda was recommended as well.  They want her to be evaluated in memory clinic    12/16 PT HD stable on room air.  Plan to discharge to NH.  PT/OT ordered.  Hallucinations resolved.      12/17 Mg on lower level.  IV mg ordered.  Waiting placement to SNF.      12/18 NAEON.  Waiting placement     12/19 PT HD stable on room air.  Mg 1.6.  IV mg replacement.        12/20-12/22: hyperglycemia still present. Insulin has been adjusted. Family is bringing snacks accounting for fluctuations in glucose. Education provided on diabetic diet. No other acute events and remains stable for discharge pending placement.    12/24  Waiting for placement   Eats very well here ;sugars are high   Will increase insulin              Review of Systems   Constitutional:  Negative for fatigue.   HENT:  Negative for congestion.    Respiratory:  Negative for chest tightness.    Cardiovascular:  Negative for chest pain.   Gastrointestinal:  Negative for abdominal pain.   Musculoskeletal:  Positive for gait problem (weakness). Negative for back pain.   Neurological:  Negative for tremors.   Psychiatric/Behavioral:  Negative for agitation and hallucinations.      Objective:     Vital Signs (Most Recent):  Temp: 97.6 °F (36.4 °C) (12/23/24 0710)  Pulse: 72 (12/23/24 0710)  Resp: 18 (12/23/24 0710)  BP: 134/63 (12/23/24 0710)  SpO2: (!) 94 % (12/23/24 0710) Vital Signs (24h Range):  Temp:  [97.6 °F (36.4 °C)-99 °F (37.2 °C)] 97.6 °F (36.4 °C)  Pulse:  [72-91] 72  Resp:  [16-20] 18  SpO2:  [92 %-95 %] 94 %  BP: (114-173)/(63-80) 134/63     Weight: 75.8 kg (167 lb 1.7 oz)  Body mass index is 30.56 kg/m².    Intake/Output Summary (Last 24 hours) at 12/23/2024 0843  Last data filed at 12/23/2024 0026  Gross per 24 hour   Intake 414 ml   Output 750 ml   Net -336 ml         Physical Exam  Vitals and nursing note reviewed.   Constitutional:       Appearance: Normal appearance. She is well-developed.   Eyes:      Extraocular Movements: Extraocular movements intact.      Pupils: Pupils are equal, round, and reactive to light.   Cardiovascular:      Rate and Rhythm: Normal rate and regular rhythm.      Heart sounds: Normal heart sounds. No murmur heard.  Pulmonary:      Effort: Pulmonary effort is normal.      Breath sounds: Normal breath sounds.   Musculoskeletal:      Right lower leg: No edema.      Left lower leg: No edema.   Neurological:      Mental Status: She is oriented to person, place, and time. Mental status is at baseline.      Motor: Weakness (generalized) present.   Psychiatric:         Attention and Perception: She perceives auditory and visual hallucinations.         Mood and Affect: Mood is anxious.         Speech: Speech normal.          Behavior: Behavior normal.         Cognition and Memory: Cognition is impaired. Memory is impaired.           Lab Results   Component Value Date    HGBA1C 8.4 (H) 12/13/2024         Assessment and Plan     * Hallucinations  Likely due to worsening dementia.  She probably has Lewy body dementia.  Namenda and Seroquel started.  Last night was a better night.  Less agitation, confusion.  Hallucination seemed to be a little better.  She is still significantly cognitively impaired.  Neurosurgery reviewed CT head with known schwannoma compressing on eric and basilar cisterns.  No further intervention.  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  Tele neurology recommends low-dose Seroquel and to start Namenda.  They recommend referral to memory Clinic.  Family would like her admitted to a nursing home.    Currently resolved .      Electrolyte abnormality  Magnesium 1.5  IV mg     IV 1.6. 4 grams of IV magnesium today.  F/u outpatient labs    Mg 1.6 today 12/19 IV mg replacement     12/20: stable. No further labs. Patient medically stable and ready for discharge pending placement.      Advanced care planning/counseling discussion  Discussed goals of care with POA daughter but will follow up after she reviews paperwork.  Daughter and granddaughter are unable to care for patient that their home.  They would like patient to get into a nursing home.  Recommend discussing this with .  Unable to discharge patient this time.    Waiting placement to skilled nursing home .      Chronic kidney disease, stage 2 (mild)  Creatine stable for now. BMP reviewed- noted Estimated Creatinine Clearance: 46.7 mL/min (based on SCr of 0.9 mg/dL). according to latest data. Based on current GFR, CKD stage is stage 2 - GFR 60-89.  Monitor UOP and serial BMP and adjust therapy as needed. Renally dose meds. Avoid nephrotoxic medications and procedures.    Stable.    Gait  abnormality  She has a high-risk for falls.    Recommend starting physical therapy.  She was able to walk 15 ft with a walker this morning which is a big improvement.  She is able to get out of bed with a little help.  Only option that I can see is referral to nursing home/swing.  Discuss with     12/22: PT/OT ordered; notes reviewed and waiting placement to SNF    12/24  Alert tome this am       Diabetes mellitus, type 2  Patient's FSGs are uncontrolled due to hyperglycemia on current medication regimen.  Last A1c reviewed-   Lab Results   Component Value Date    HGBA1C 8.4 (H) 12/13/2024     Most recent fingerstick glucose reviewed-   Recent Labs   Lab 12/22/24  1110 12/22/24  1616 12/22/24  1944 12/23/24  0714   POCTGLUCOSE 411* 311* 346* 382*       Current correctional scale  Medium  Increase anti-hyperglycemic dose as follows-   Antihyperglycemics (From admission, onward)      Start     Stop Route Frequency Ordered    12/24/24 0900  insulin glargine U-100 (Lantus) pen 35 Units         -- SubQ Daily 12/23/24 0839    12/23/24 1130  insulin aspart U-100 pen 18 Units         -- SubQ 3 times daily with meals 12/23/24 0840    12/13/24 0928  insulin aspart U-100 pen 0-5 Units         -- SubQ Before meals & nightly PRN 12/13/24 0828             12/22: increased Lantus 30 units and aspart increased to 15 units TID with meals. Diabetic diet education provided to family who is bringing outside food/snacks.   Continue SSI  May consider resuming home PO meds as this patient is only in house awaiting placement and will resume these meds at discharge. Will review placement status with team Monday morning and can decide if appropriate to resume.         12/23/24    Will increase both long acting and also meal time insulin           VTE Risk Mitigation (From admission, onward)           Ordered     IP VTE HIGH RISK PATIENT  Once         12/13/24 0152     Place sequential compression device  Until discontinued          12/13/24 0152                    Discharge Planning   DWAYNE:      Code Status: Full Code   Medical Readiness for Discharge Date:   Discharge Plan A: New Nursing Home placement - [facility] care facility   Discharge Delays: (!) Post-Acute Set-up            Please place Justification for DME        Sirena Coleman MD  Department of Hospital Medicine   [facility] - Med Surg (3rd Fl)

## 2024-12-23 NOTE — ASSESSMENT & PLAN NOTE
Likely due to worsening dementia.  She probably has Lewy body dementia.  Namenda and Seroquel started.  Last night was a better night.  Less agitation, confusion.  Hallucination seemed to be a little better.  She is still significantly cognitively impaired.  Neurosurgery reviewed CT head with known schwannoma compressing on eric and basilar cisterns.  No further intervention.  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  Tele neurology recommends low-dose Seroquel and to start Namenda.  They recommend referral to memory Clinic.  Family would like her admitted to a nursing home.    Currently resolved .      12/23/24  Will DC TO NH

## 2024-12-24 NOTE — PT/OT/SLP DISCHARGE
Occupational Therapy Discharge Summary    Narda Person  MRN: [id number]   Principal Problem: Hallucinations      Patient Discharged from acute Occupational Therapy on 12/23/2024.  Please refer to prior OT note dated 12/20/2024 for functional status.    Assessment:      Patient appropriate for care in another setting.    Objective:     GOALS:   Multidisciplinary Problems       Occupational Therapy Goals          Problem: Occupational Therapy    Goal Priority Disciplines Outcome Interventions   Occupational Therapy Goal     OT, PT/OT Progressing    Description: Pt to perform UB dressing with MOD I seated EOB.  Pt to perform LB dressing with SBA seated EOB.   Pt to perform self toileting with MIN A using standard commode.   Pt to perform level functional transfers required for ADL's with SBA, RW level.  Pt to consistently demonstrate adherence to orthopedic precautions during all ADL's as instructed by OT.  Pt to demonstrate  fair +dynamic standing balance as required to perform ADL's from standing level.  Pt to complete standing ADL at sink level for ~3 minutes for improved safety and activity tolerance upon discharge.                          Reasons for Discontinuation of Therapy Services  Transfer to alternate level of care.      Plan:     Patient Discharged to: Skilled Nursing Facility    12/24/2024

## 2024-12-24 NOTE — PT/OT/SLP DISCHARGE
Physical Therapy Discharge Summary    Name: Narda Person  MRN: [id number]   Principal Problem: Hallucinations     Patient Discharged from acute Physical Therapy on 24.  Please refer to prior PT noted date on 24 for functional status.     Assessment:     Patient appropriate for care in another setting.    Objective:     GOALS:   Multidisciplinary Problems       Physical Therapy Goals          Problem: Physical Therapy    Goal Priority Disciplines Outcome Interventions   Physical Therapy Goal     PT, PT/OT Progressing    Description: Goals to be met by: 2024    Patient will increase functional independence with mobility by performin. Supine to sit with Standby Assistance.  2. Sit to supine with Standby Assistance.  3. Bed to chair transfer with Standby Assistance with rolling walker using Step Transfer technique.  4. Sit to Stand with Standby Assistance with rolling walker.  5. Gait  x 100  feet with Standby Assistance with rolling walker.  6. Lower extremity exercise program x10 reps, with assistance as needed.                          Reasons for Discontinuation of Therapy Services  Transfer to alternate level of care.      Plan:     Patient Discharged to: Skilled Nursing Facility.      2024

## 2025-01-14 DIAGNOSIS — Z00.00 ENCOUNTER FOR MEDICARE ANNUAL WELLNESS EXAM: ICD-10-CM

## 2025-01-28 ENCOUNTER — TELEPHONE (OUTPATIENT)
Dept: INTERNAL MEDICINE | Facility: CLINIC | Age: 80
End: 2025-01-28
Payer: MEDICARE

## 2025-01-28 NOTE — TELEPHONE ENCOUNTER
Brii states that the patient was recently discharged from NH rehab and is now receiving home health services. She states that the patient's daughter told her that she was going to resume all meds that the patient was taking prior to NH admit. I advised that the patient needs an appointment for NH follow up, so Dr. Coleman can review meds and orders with her caregivers. He has not seen this patient in clinic since June 2024. Brii will discuss with her daughter.    12-Jan-2022

## 2025-01-28 NOTE — TELEPHONE ENCOUNTER
----- Message from Irma sent at 2025  2:10 PM CST -----  Contact: Brii/[facility]  Narda Person  MRN: [id number]  : 1945  PCP: Sirena Coleman  Home Phone      [phone]  Work Phone      Not on file.  Mobile          [phone]      MESSAGE:     Brii with [facility] called states she has questions about medication and virtual appts.       Please advise   [phone]

## 2025-02-04 DIAGNOSIS — E11.69 TYPE 2 DIABETES MELLITUS WITH OTHER SPECIFIED COMPLICATION, WITH LONG-TERM CURRENT USE OF INSULIN: ICD-10-CM

## 2025-02-04 DIAGNOSIS — Z79.4 TYPE 2 DIABETES MELLITUS WITH OTHER SPECIFIED COMPLICATION, WITH LONG-TERM CURRENT USE OF INSULIN: ICD-10-CM

## 2025-02-04 RX ORDER — METFORMIN HYDROCHLORIDE 1000 MG/1
1000 TABLET ORAL 2 TIMES DAILY WITH MEALS
Qty: 180 TABLET | Refills: 0 | Status: SHIPPED | OUTPATIENT
Start: 2025-02-04

## 2025-02-04 NOTE — TELEPHONE ENCOUNTER
No care due was identified.  [facility] Embedded Care Due Messages. Reference number: [id number].   2/04/2025 7:03:53 AM CST

## 2025-02-05 ENCOUNTER — OFFICE VISIT (OUTPATIENT)
Dept: INTERNAL MEDICINE | Facility: CLINIC | Age: 80
End: 2025-02-05
Payer: MEDICARE

## 2025-02-05 VITALS
HEART RATE: 86 BPM | HEIGHT: 62 IN | SYSTOLIC BLOOD PRESSURE: 106 MMHG | BODY MASS INDEX: 29.13 KG/M2 | RESPIRATION RATE: 16 BRPM | WEIGHT: 158.31 LBS | DIASTOLIC BLOOD PRESSURE: 50 MMHG | OXYGEN SATURATION: 99 %

## 2025-02-05 DIAGNOSIS — F41.1 GENERALIZED ANXIETY DISORDER: ICD-10-CM

## 2025-02-05 DIAGNOSIS — D33.3 VESTIBULAR SCHWANNOMA: ICD-10-CM

## 2025-02-05 DIAGNOSIS — E11.69 TYPE 2 DIABETES MELLITUS WITH OTHER SPECIFIED COMPLICATION, WITH LONG-TERM CURRENT USE OF INSULIN: ICD-10-CM

## 2025-02-05 DIAGNOSIS — E66.01 SEVERE OBESITY (BMI 35.0-39.9) WITH COMORBIDITY: ICD-10-CM

## 2025-02-05 DIAGNOSIS — Z79.4 TYPE 2 DIABETES MELLITUS WITH OTHER SPECIFIED COMPLICATION, WITH LONG-TERM CURRENT USE OF INSULIN: ICD-10-CM

## 2025-02-05 DIAGNOSIS — I10 ESSENTIAL HYPERTENSION: Primary | ICD-10-CM

## 2025-02-05 DIAGNOSIS — E11.3599 PROLIFERATIVE DIABETIC RETINOPATHY ASSOCIATED WITH TYPE 2 DIABETES MELLITUS, UNSPECIFIED LATERALITY, UNSPECIFIED PROLIFERATIVE RETINOPATHY TYPE: ICD-10-CM

## 2025-02-05 DIAGNOSIS — G31.83 NEUROCOGNITIVE DISORDER WITH LEWY BODIES (CODE): ICD-10-CM

## 2025-02-05 DIAGNOSIS — N18.31 CHRONIC KIDNEY DISEASE, STAGE 3A: ICD-10-CM

## 2025-02-05 DIAGNOSIS — R41.3 MEMORY LOSS: ICD-10-CM

## 2025-02-05 PROCEDURE — 99999 PR PBB SHADOW E&M-EST. PATIENT-LVL V: CPT | Mod: PBBFAC,HCNC,, | Performed by: INTERNAL MEDICINE

## 2025-02-05 PROCEDURE — 3288F FALL RISK ASSESSMENT DOCD: CPT | Mod: HCNC,CPTII,S$GLB, | Performed by: INTERNAL MEDICINE

## 2025-02-05 PROCEDURE — 99214 OFFICE O/P EST MOD 30 MIN: CPT | Mod: HCNC,S$GLB,, | Performed by: INTERNAL MEDICINE

## 2025-02-05 PROCEDURE — 1126F AMNT PAIN NOTED NONE PRSNT: CPT | Mod: HCNC,CPTII,S$GLB, | Performed by: INTERNAL MEDICINE

## 2025-02-05 PROCEDURE — 3074F SYST BP LT 130 MM HG: CPT | Mod: HCNC,CPTII,S$GLB, | Performed by: INTERNAL MEDICINE

## 2025-02-05 PROCEDURE — 1159F MED LIST DOCD IN RCRD: CPT | Mod: HCNC,CPTII,S$GLB, | Performed by: INTERNAL MEDICINE

## 2025-02-05 PROCEDURE — 1157F ADVNC CARE PLAN IN RCRD: CPT | Mod: HCNC,CPTII,S$GLB, | Performed by: INTERNAL MEDICINE

## 2025-02-05 PROCEDURE — 1100F PTFALLS ASSESS-DOCD GE2>/YR: CPT | Mod: HCNC,CPTII,S$GLB, | Performed by: INTERNAL MEDICINE

## 2025-02-05 PROCEDURE — 3078F DIAST BP <80 MM HG: CPT | Mod: HCNC,CPTII,S$GLB, | Performed by: INTERNAL MEDICINE

## 2025-02-05 PROCEDURE — G2211 COMPLEX E/M VISIT ADD ON: HCPCS | Mod: HCNC,S$GLB,, | Performed by: INTERNAL MEDICINE

## 2025-02-05 RX ORDER — DONEPEZIL HYDROCHLORIDE 10 MG/1
10 TABLET, FILM COATED ORAL NIGHTLY
Qty: 90 TABLET | Refills: 0 | Status: SHIPPED | OUTPATIENT
Start: 2025-02-05

## 2025-02-05 RX ORDER — ALPRAZOLAM 0.25 MG/1
0.25 TABLET ORAL NIGHTLY PRN
Qty: 30 TABLET | Refills: 0 | Status: SHIPPED | OUTPATIENT
Start: 2025-02-05 | End: 2025-03-07

## 2025-02-05 NOTE — PROGRESS NOTES
Subjective     Patient ID: Narda Person is a 79 y.o. female.    Chief Complaint: medication changes    HPI  Ms. Person is a 79 year old female with medical history of HTN, hypothyroidism, T2DM, and dementia. She was recently hospitalized     Review of Systems       Objective     Physical Exam       Assessment and Plan     {There are no diagnoses linked to this encounter. (Refresh or delete this SmartLink)}    ***         No follow-ups on file.

## 2025-02-05 NOTE — PROGRESS NOTES
Subjective:   History of Present Illness    Ms. Person is a 79 year old female with medical history of HTN, hypothyroidism, T2DM, and dementia. She was recently hospitalized in Dec 2024 for hallucinations. She is here for medication review post-hospitalization with her daughter Barbi.    She is currently living with her daughter. Has home health to help with therapy and care.     Diabetes: Takes metformin 1000mg, januvia 100mg, and insulin 40 units. No issues with meds. Take blood sugar at home, this morning was 220.     Hyperlipidemia: Takes atorvastatin 20mg, no issues    GERD:  Takes omeprazole 20mg twice daily. Reports some food regurgitation. No heartburn reported.    Hypertension: Measures blood pressure at home, average is 120s/60s. Takes ramipril 5mg, no issues. No cough. No swelling.    Neuropathy: Takes gabapentin as needed. Usually 2-3 days per episode. Numbness in hands and feet. Comes on once in awhile.    Vestibular schwannoma, R side: Reports dizziness, hearing loss. Takes meclizine 25mg twice a day for the dizziness, no issues.    Alzheimer's: Cognition is off and on. Takes donepezil 10mg, no issues. Reports hallucinations (ex. playing cards on the wall, little girls dancing, baby in the bed with her, on a boat in a storm). Has anxiety and panic attacks - occurs 2-3 times per week. Takes lexapro 10mg, does help with anxiety to some extent. Does not want seroquel due to sedation effects. Interested in xanax as needed for anxiety.      Review of Systems   Constitutional:  Positive for fatigue. Negative for fever.   HENT:  Negative for congestion and sore throat.    Eyes:  Positive for visual disturbance.   Respiratory:  Negative for cough, shortness of breath, wheezing and stridor.    Cardiovascular:  Negative for chest pain and leg swelling.   Gastrointestinal:  Positive for constipation. Negative for abdominal distention, abdominal pain, diarrhea, nausea and vomiting.   Genitourinary:  Negative for  difficulty urinating, dysuria and hematuria.   Musculoskeletal:  Negative for arthralgias and myalgias.   Neurological:  Negative for headaches.   Psychiatric/Behavioral:  Positive for confusion and hallucinations.         Objective:   Physical Exam  Exam conducted with a chaperone present.   Constitutional:       Comments: Oriented to name, place, and situation. Not to date   Eyes:      Comments: Constricted pupils, not reactive to light   Cardiovascular:      Rate and Rhythm: Normal rate and regular rhythm.      Heart sounds: Normal heart sounds.   Pulmonary:      Effort: No respiratory distress.      Breath sounds: Normal breath sounds. No wheezing.   Abdominal:      General: Bowel sounds are normal. There is no distension.      Palpations: Abdomen is soft. There is no mass.      Tenderness: There is abdominal tenderness. There is no guarding or rebound.      Comments: Right upper quadrant        Assessment/Plan:     1. Essential hypertension    Well controlled.  Continue same medication and dose.  Keep weight close to ideal body weight.     Avoid high salt foods (olives, pickles, smoked meats, salted potato chips, etc.).   Do not add salt to your food at the table.   Use only small amounts of salt when cooking.   Begin an exercise program. Discuss with your doctor what type of exercise program would be best for you. It doesn't have to be difficult. Even brisk walking for 20 minutes three times a week is a good form of exercise.   Avoid medicines which contain heart stimulants. This includes many cold and sinus decongestant pills and sprays as well as diet pills. Check the warnings about hypertension on the label. Stimulants such as amphetamine or cocaine could be lethal for someone with hypertension. Never take these.        2. Proliferative diabetic retinopathy associated with type 2 diabetes mellitus, unspecified laterality, unspecified proliferative retinopathy type  CBC Auto Differential    Comprehensive  Metabolic Panel    Hemoglobin A1C    Lipid Panel    Microalbumin/Creatinine Ratio, Urine    TSH      3. Severe obesity (BMI 35.0-39.9) with comorbidity     BMI better .     4. Vestibular schwannoma     It grew a little ;will need to be followed   Continue home PT      5. Neurocognitive disorder with Lewy bodies (CODE)  donepeziL (ARICEPT) 10 MG tablet      6. Chronic kidney disease, stage 3a  RESOLVED       BMP  Lab Results   Component Value Date     12/19/2024    K 4.1 12/19/2024     12/19/2024    CO2 22 (L) 12/19/2024    BUN 16 12/19/2024    CREATININE 0.9 12/19/2024    CALCIUM 8.8 12/19/2024    ANIONGAP 11 12/19/2024    EGFRNORACEVR >60 12/19/2024        7. Generalized anxiety disorder  ALPRAZolam (XANAX) 0.25 MG tablet      8. Memory loss  donepeziL (ARICEPT) 10 MG tablet      9. Type 2 diabetes mellitus with other specified complication, with long-term current use of insulin  CBC Auto Differential    Comprehensive Metabolic Panel    Hemoglobin A1C    Lipid Panel    Microalbumin/Creatinine Ratio, Urine    TSH   Patient has uncontrolled Diabetes .  Lab Results   Component Value Date    HGBA1C 8.4 (H) 12/13/2024       We discussed about diet ;low in calories. Avoid sweats, sodas.  Also increasing activity;walking 2-3 miles a day.  I also adjusted medications and gave patient  instructions about adherence to plan.  Goal of  A1c  less than 7 % stressed.  Also goal of LDL less than 70 highlighted to patient.

## 2025-02-10 ENCOUNTER — PATIENT OUTREACH (OUTPATIENT)
Dept: ADMINISTRATIVE | Facility: HOSPITAL | Age: 80
End: 2025-02-10
Payer: MEDICARE

## 2025-02-24 ENCOUNTER — TELEPHONE (OUTPATIENT)
Dept: INTERNAL MEDICINE | Facility: CLINIC | Age: 80
End: 2025-02-24
Payer: MEDICARE

## 2025-02-24 NOTE — TELEPHONE ENCOUNTER
----- Message from Angella sent at 2/24/2025  2:52 PM CST -----  Contact: Lavell / [facility]  Narda Person BMRN: [id number]: 1945PCP:Osmar: Lavell with  phoned,  Lavell states he is needing orders to continue PT's physical therapy.Lavell  [phone]

## 2025-02-26 ENCOUNTER — TELEPHONE (OUTPATIENT)
Dept: INTERNAL MEDICINE | Facility: CLINIC | Age: 80
End: 2025-02-26
Payer: MEDICARE

## 2025-02-26 NOTE — TELEPHONE ENCOUNTER
----- Message from Irma sent at 2/26/2025  3:15 PM CST -----  Contact: Brii/ceci PatelRN: [id number]: 1945PCP: Sirena ColemanHome Phone      [phone] Phone      Not on file.Mobile          [phone]: Brii with [facility] states pt daughter is requesting a UA due to pt wearing a diaper. Pt is not complaining of symptoms. Brii [phone]

## 2025-02-27 NOTE — TELEPHONE ENCOUNTER
Spoke with maru. Patient c/o discomfort but does not specify. She will do ua on next nurse visit next week.

## 2025-03-05 ENCOUNTER — TELEPHONE (OUTPATIENT)
Dept: INTERNAL MEDICINE | Facility: CLINIC | Age: 80
End: 2025-03-05
Payer: MEDICARE

## 2025-03-05 NOTE — TELEPHONE ENCOUNTER
----- Message from Marii sent at 3/5/2025  3:14 PM CST -----  Contact: alexandra BurgessN: [id number]: 1945PCP: Sirena Coleman.Home Phone      [phone] Phone      Not on file.Mobile          [phone]: Brii needs a verbal to use an in and out catheter because they can't get a clean catch urine. Please advise [phone]

## 2025-03-11 ENCOUNTER — DOCUMENT SCAN (OUTPATIENT)
Dept: HOME HEALTH SERVICES | Facility: HOSPITAL | Age: 80
End: 2025-03-11
Payer: MEDICARE

## 2025-03-12 ENCOUNTER — LAB VISIT (OUTPATIENT)
Dept: LAB | Facility: HOSPITAL | Age: 80
End: 2025-03-12
Attending: INTERNAL MEDICINE
Payer: MEDICARE

## 2025-03-12 ENCOUNTER — DOCUMENT SCAN (OUTPATIENT)
Dept: HOME HEALTH SERVICES | Facility: HOSPITAL | Age: 80
End: 2025-03-12
Payer: MEDICARE

## 2025-03-12 DIAGNOSIS — N18.2 CHRONIC KIDNEY DISEASE, STAGE II (MILD): ICD-10-CM

## 2025-03-12 DIAGNOSIS — E11.22 TYPE 2 DIABETES MELLITUS WITH END-STAGE RENAL DISEASE: ICD-10-CM

## 2025-03-12 DIAGNOSIS — N18.6 TYPE 2 DIABETES MELLITUS WITH END-STAGE RENAL DISEASE: ICD-10-CM

## 2025-03-12 DIAGNOSIS — E11.65 INADEQUATELY CONTROLLED DIABETES MELLITUS: Primary | ICD-10-CM

## 2025-03-12 DIAGNOSIS — E11.65 INADEQUATELY CONTROLLED DIABETES MELLITUS: ICD-10-CM

## 2025-03-12 LAB
BACTERIA #/AREA URNS HPF: ABNORMAL /HPF
BILIRUB UR QL STRIP: NEGATIVE
CLARITY UR: CLEAR
COLOR UR: YELLOW
GLUCOSE UR QL STRIP: ABNORMAL
HGB UR QL STRIP: NEGATIVE
KETONES UR QL STRIP: NEGATIVE
LEUKOCYTE ESTERASE UR QL STRIP: ABNORMAL
MICROSCOPIC COMMENT: ABNORMAL
NITRITE UR QL STRIP: NEGATIVE
PH UR STRIP: 6 [PH] (ref 5–8)
PROT UR QL STRIP: NEGATIVE
SP GR UR STRIP: <=1.005 (ref 1–1.03)
SQUAMOUS #/AREA URNS HPF: 2 /HPF
URN SPEC COLLECT METH UR: ABNORMAL
UROBILINOGEN UR STRIP-ACNC: NEGATIVE EU/DL
WBC #/AREA URNS HPF: 10 /HPF (ref 0–5)
YEAST URNS QL MICRO: ABNORMAL

## 2025-03-12 PROCEDURE — 81000 URINALYSIS NONAUTO W/SCOPE: CPT | Mod: HCNC | Performed by: INTERNAL MEDICINE

## 2025-03-13 ENCOUNTER — RESULTS FOLLOW-UP (OUTPATIENT)
Dept: INTERNAL MEDICINE | Facility: CLINIC | Age: 80
End: 2025-03-13

## 2025-03-13 ENCOUNTER — TELEPHONE (OUTPATIENT)
Dept: INTERNAL MEDICINE | Facility: CLINIC | Age: 80
End: 2025-03-13
Payer: MEDICARE

## 2025-03-13 RX ORDER — NITROFURANTOIN MACROCRYSTALS 50 MG/1
50 CAPSULE ORAL DAILY
Qty: 10 CAPSULE | Refills: 0 | Status: SHIPPED | OUTPATIENT
Start: 2025-03-13 | End: 2025-03-23

## 2025-03-13 NOTE — TELEPHONE ENCOUNTER
Attempted to contact patient's daughter Barbi, but no answer. Mailbox full; unable to leave a message.

## 2025-03-13 NOTE — TELEPHONE ENCOUNTER
Patient has UTI  Started on antibiotics.  Drink plenty of water.  If high fevers and chills call.  macrobid

## 2025-03-18 ENCOUNTER — HOSPITAL ENCOUNTER (INPATIENT)
Facility: HOSPITAL | Age: 80
LOS: 6 days | Discharge: HOME HEALTH | DRG: 392 | End: 2025-03-24
Attending: HOSPITALIST | Admitting: HOSPITALIST
Payer: MEDICARE

## 2025-03-18 ENCOUNTER — HOSPITAL ENCOUNTER (EMERGENCY)
Facility: HOSPITAL | Age: 80
Discharge: ANOTHER HOSPITAL | End: 2025-03-18
Attending: FAMILY MEDICINE
Payer: MEDICARE

## 2025-03-18 VITALS
HEART RATE: 90 BPM | DIASTOLIC BLOOD PRESSURE: 75 MMHG | OXYGEN SATURATION: 95 % | BODY MASS INDEX: 31.09 KG/M2 | WEIGHT: 170 LBS | RESPIRATION RATE: 18 BRPM | SYSTOLIC BLOOD PRESSURE: 174 MMHG | TEMPERATURE: 99 F

## 2025-03-18 DIAGNOSIS — K92.2 GI BLEED: ICD-10-CM

## 2025-03-18 DIAGNOSIS — E11.42 DIABETIC POLYNEUROPATHY ASSOCIATED WITH TYPE 2 DIABETES MELLITUS: Primary | ICD-10-CM

## 2025-03-18 DIAGNOSIS — Z16.12 UTI DUE TO EXTENDED-SPECTRUM BETA LACTAMASE (ESBL) PRODUCING ESCHERICHIA COLI: ICD-10-CM

## 2025-03-18 DIAGNOSIS — N18.31 ACUTE RENAL FAILURE SUPERIMPOSED ON STAGE 3A CHRONIC KIDNEY DISEASE, UNSPECIFIED ACUTE RENAL FAILURE TYPE: ICD-10-CM

## 2025-03-18 DIAGNOSIS — K52.9 COLITIS: ICD-10-CM

## 2025-03-18 DIAGNOSIS — Z79.4 TYPE 2 DIABETES MELLITUS WITH OTHER SPECIFIED COMPLICATION, WITH LONG-TERM CURRENT USE OF INSULIN: ICD-10-CM

## 2025-03-18 DIAGNOSIS — R31.9 HEMATURIA, UNSPECIFIED TYPE: Primary | ICD-10-CM

## 2025-03-18 DIAGNOSIS — B96.29 UTI DUE TO EXTENDED-SPECTRUM BETA LACTAMASE (ESBL) PRODUCING ESCHERICHIA COLI: ICD-10-CM

## 2025-03-18 DIAGNOSIS — R07.9 CHEST PAIN: ICD-10-CM

## 2025-03-18 DIAGNOSIS — E11.69 TYPE 2 DIABETES MELLITUS WITH OTHER SPECIFIED COMPLICATION, WITH LONG-TERM CURRENT USE OF INSULIN: ICD-10-CM

## 2025-03-18 DIAGNOSIS — N17.9 ACUTE RENAL FAILURE SUPERIMPOSED ON STAGE 3A CHRONIC KIDNEY DISEASE, UNSPECIFIED ACUTE RENAL FAILURE TYPE: ICD-10-CM

## 2025-03-18 DIAGNOSIS — M47.22 OSTEOARTHRITIS OF SPINE WITH RADICULOPATHY, CERVICAL REGION: ICD-10-CM

## 2025-03-18 DIAGNOSIS — N39.0 UTI DUE TO EXTENDED-SPECTRUM BETA LACTAMASE (ESBL) PRODUCING ESCHERICHIA COLI: ICD-10-CM

## 2025-03-18 LAB
ABO + RH BLD: NORMAL
ALBUMIN SERPL BCP-MCNC: 4 G/DL (ref 3.5–5.2)
ALP SERPL-CCNC: 111 U/L (ref 40–150)
ALT SERPL W/O P-5'-P-CCNC: 18 U/L (ref 10–44)
ANION GAP SERPL CALC-SCNC: 14 MMOL/L (ref 8–16)
AST SERPL-CCNC: 16 U/L (ref 10–40)
B-OH-BUTYR BLD STRIP-SCNC: 0.7 MMOL/L (ref 0–0.5)
BACTERIA #/AREA URNS HPF: ABNORMAL /HPF
BASOPHILS # BLD AUTO: 0.04 K/UL (ref 0–0.2)
BASOPHILS NFR BLD: 0.3 % (ref 0–1.9)
BILIRUB SERPL-MCNC: 0.7 MG/DL (ref 0.1–1)
BILIRUB UR QL STRIP: NEGATIVE
BLD GP AB SCN CELLS X3 SERPL QL: NORMAL
BUN SERPL-MCNC: 19 MG/DL (ref 8–23)
CALCIUM SERPL-MCNC: 9.6 MG/DL (ref 8.7–10.5)
CHLORIDE SERPL-SCNC: 102 MMOL/L (ref 95–110)
CLARITY UR: ABNORMAL
CO2 SERPL-SCNC: 21 MMOL/L (ref 23–29)
COLOR UR: ABNORMAL
CREAT SERPL-MCNC: 1 MG/DL (ref 0.5–1.4)
DIFFERENTIAL METHOD BLD: ABNORMAL
EOSINOPHIL # BLD AUTO: 0 K/UL (ref 0–0.5)
EOSINOPHIL NFR BLD: 0 % (ref 0–8)
ERYTHROCYTE [DISTWIDTH] IN BLOOD BY AUTOMATED COUNT: 12.7 % (ref 11.5–14.5)
EST. GFR  (NO RACE VARIABLE): 57 ML/MIN/1.73 M^2
GLUCOSE SERPL-MCNC: 478 MG/DL (ref 70–110)
GLUCOSE UR QL STRIP: ABNORMAL
HCT VFR BLD AUTO: 43.8 % (ref 37–48.5)
HGB BLD-MCNC: 15.4 G/DL (ref 12–16)
HGB UR QL STRIP: ABNORMAL
HYALINE CASTS #/AREA URNS LPF: 0 /LPF
IMM GRANULOCYTES # BLD AUTO: 0.03 K/UL (ref 0–0.04)
IMM GRANULOCYTES NFR BLD AUTO: 0.2 % (ref 0–0.5)
INFLUENZA A, MOLECULAR: NEGATIVE
INFLUENZA B, MOLECULAR: NEGATIVE
KETONES UR QL STRIP: ABNORMAL
LACTATE SERPL-SCNC: 2 MMOL/L (ref 0.5–2.2)
LEUKOCYTE ESTERASE UR QL STRIP: ABNORMAL
LIPASE SERPL-CCNC: 11 U/L (ref 4–60)
LYMPHOCYTES # BLD AUTO: 0.8 K/UL (ref 1–4.8)
LYMPHOCYTES NFR BLD: 5.3 % (ref 18–48)
MCH RBC QN AUTO: 30.2 PG (ref 27–31)
MCHC RBC AUTO-ENTMCNC: 35.2 G/DL (ref 32–36)
MCV RBC AUTO: 86 FL (ref 82–98)
MICROSCOPIC COMMENT: ABNORMAL
MONOCYTES # BLD AUTO: 0.4 K/UL (ref 0.3–1)
MONOCYTES NFR BLD: 2.6 % (ref 4–15)
NEUTROPHILS # BLD AUTO: 13.6 K/UL (ref 1.8–7.7)
NEUTROPHILS NFR BLD: 91.6 % (ref 38–73)
NITRITE UR QL STRIP: NEGATIVE
NRBC BLD-RTO: 0 /100 WBC
OB PNL STL: POSITIVE
PH UR STRIP: 6 [PH] (ref 5–8)
PLATELET # BLD AUTO: 230 K/UL (ref 150–450)
PMV BLD AUTO: 10.6 FL (ref 9.2–12.9)
POC PH VENOUS: 7.37
POCT GLUCOSE: 354 MG/DL (ref 70–110)
POCT GLUCOSE: 406 MG/DL (ref 70–110)
POCT GLUCOSE: 423 MG/DL (ref 70–110)
POCT GLUCOSE: 480 MG/DL (ref 70–110)
POTASSIUM SERPL-SCNC: 4 MMOL/L (ref 3.5–5.1)
PROT SERPL-MCNC: 7.9 G/DL (ref 6–8.4)
PROT UR QL STRIP: ABNORMAL
RBC # BLD AUTO: 5.1 M/UL (ref 4–5.4)
RBC #/AREA URNS HPF: >100 /HPF (ref 0–4)
SARS-COV-2 RDRP RESP QL NAA+PROBE: NEGATIVE
SODIUM SERPL-SCNC: 137 MMOL/L (ref 136–145)
SP GR UR STRIP: 1.01 (ref 1–1.03)
SPECIMEN OUTDATE: NORMAL
SPECIMEN SOURCE: NORMAL
SQUAMOUS #/AREA URNS HPF: 3 /HPF
URN SPEC COLLECT METH UR: ABNORMAL
UROBILINOGEN UR STRIP-ACNC: NEGATIVE EU/DL
WBC # BLD AUTO: 14.83 K/UL (ref 3.9–12.7)
WBC #/AREA URNS HPF: 5 /HPF (ref 0–5)
YEAST URNS QL MICRO: ABNORMAL

## 2025-03-18 PROCEDURE — 87635 SARS-COV-2 COVID-19 AMP PRB: CPT | Mod: HCNC | Performed by: FAMILY MEDICINE

## 2025-03-18 PROCEDURE — 81000 URINALYSIS NONAUTO W/SCOPE: CPT | Mod: HCNC | Performed by: FAMILY MEDICINE

## 2025-03-18 PROCEDURE — 83605 ASSAY OF LACTIC ACID: CPT | Mod: HCNC | Performed by: FAMILY MEDICINE

## 2025-03-18 PROCEDURE — 93010 ELECTROCARDIOGRAM REPORT: CPT | Mod: HCNC,,, | Performed by: INTERNAL MEDICINE

## 2025-03-18 PROCEDURE — 86850 RBC ANTIBODY SCREEN: CPT | Mod: HCNC | Performed by: FAMILY MEDICINE

## 2025-03-18 PROCEDURE — 99900035 HC TECH TIME PER 15 MIN (STAT): Mod: HCNC

## 2025-03-18 PROCEDURE — 83036 HEMOGLOBIN GLYCOSYLATED A1C: CPT | Mod: HCNC | Performed by: FAMILY MEDICINE

## 2025-03-18 PROCEDURE — 82272 OCCULT BLD FECES 1-3 TESTS: CPT | Mod: HCNC | Performed by: FAMILY MEDICINE

## 2025-03-18 PROCEDURE — 99285 EMERGENCY DEPT VISIT HI MDM: CPT | Mod: 25,HCNC

## 2025-03-18 PROCEDURE — 25000003 PHARM REV CODE 250: Mod: HCNC | Performed by: FAMILY MEDICINE

## 2025-03-18 PROCEDURE — 82010 KETONE BODYS QUAN: CPT | Mod: HCNC | Performed by: FAMILY MEDICINE

## 2025-03-18 PROCEDURE — 93005 ELECTROCARDIOGRAM TRACING: CPT | Mod: HCNC

## 2025-03-18 PROCEDURE — 96375 TX/PRO/DX INJ NEW DRUG ADDON: CPT | Mod: HCNC

## 2025-03-18 PROCEDURE — 63600175 PHARM REV CODE 636 W HCPCS: Mod: HCNC | Performed by: FAMILY MEDICINE

## 2025-03-18 PROCEDURE — 82800 BLOOD PH: CPT | Mod: HCNC | Performed by: FAMILY MEDICINE

## 2025-03-18 PROCEDURE — 63600175 PHARM REV CODE 636 W HCPCS: Mod: HCNC | Performed by: STUDENT IN AN ORGANIZED HEALTH CARE EDUCATION/TRAINING PROGRAM

## 2025-03-18 PROCEDURE — 36415 COLL VENOUS BLD VENIPUNCTURE: CPT | Mod: HCNC | Performed by: FAMILY MEDICINE

## 2025-03-18 PROCEDURE — 87502 INFLUENZA DNA AMP PROBE: CPT | Mod: HCNC | Performed by: FAMILY MEDICINE

## 2025-03-18 PROCEDURE — 96361 HYDRATE IV INFUSION ADD-ON: CPT | Mod: HCNC

## 2025-03-18 PROCEDURE — 82962 GLUCOSE BLOOD TEST: CPT | Mod: HCNC,91

## 2025-03-18 PROCEDURE — 25500020 PHARM REV CODE 255: Mod: HCNC | Performed by: FAMILY MEDICINE

## 2025-03-18 PROCEDURE — 83690 ASSAY OF LIPASE: CPT | Mod: HCNC | Performed by: FAMILY MEDICINE

## 2025-03-18 PROCEDURE — 80053 COMPREHEN METABOLIC PANEL: CPT | Mod: HCNC | Performed by: FAMILY MEDICINE

## 2025-03-18 PROCEDURE — 85025 COMPLETE CBC W/AUTO DIFF WBC: CPT | Mod: HCNC | Performed by: FAMILY MEDICINE

## 2025-03-18 PROCEDURE — 96365 THER/PROPH/DIAG IV INF INIT: CPT | Mod: HCNC

## 2025-03-18 PROCEDURE — 11000001 HC ACUTE MED/SURG PRIVATE ROOM: Mod: HCNC

## 2025-03-18 PROCEDURE — 96372 THER/PROPH/DIAG INJ SC/IM: CPT | Performed by: FAMILY MEDICINE

## 2025-03-18 PROCEDURE — 96367 TX/PROPH/DG ADDL SEQ IV INF: CPT | Mod: HCNC

## 2025-03-18 RX ORDER — TALC
6 POWDER (GRAM) TOPICAL NIGHTLY PRN
Status: DISCONTINUED | OUTPATIENT
Start: 2025-03-19 | End: 2025-03-24 | Stop reason: HOSPADM

## 2025-03-18 RX ORDER — GLUCAGON 1 MG
1 KIT INJECTION
Status: DISCONTINUED | OUTPATIENT
Start: 2025-03-19 | End: 2025-03-24 | Stop reason: HOSPADM

## 2025-03-18 RX ORDER — BISACODYL 10 MG/1
10 SUPPOSITORY RECTAL DAILY PRN
Status: DISCONTINUED | OUTPATIENT
Start: 2025-03-19 | End: 2025-03-24 | Stop reason: HOSPADM

## 2025-03-18 RX ORDER — INSULIN ASPART 100 [IU]/ML
0-10 INJECTION, SOLUTION INTRAVENOUS; SUBCUTANEOUS
Status: DISCONTINUED | OUTPATIENT
Start: 2025-03-19 | End: 2025-03-24 | Stop reason: HOSPADM

## 2025-03-18 RX ORDER — ACETAMINOPHEN 325 MG/1
650 TABLET ORAL EVERY 8 HOURS PRN
Status: DISCONTINUED | OUTPATIENT
Start: 2025-03-19 | End: 2025-03-24 | Stop reason: HOSPADM

## 2025-03-18 RX ORDER — IBUPROFEN 200 MG
16 TABLET ORAL
Status: DISCONTINUED | OUTPATIENT
Start: 2025-03-19 | End: 2025-03-24 | Stop reason: HOSPADM

## 2025-03-18 RX ORDER — NALOXONE HCL 0.4 MG/ML
0.02 VIAL (ML) INJECTION
Status: DISCONTINUED | OUTPATIENT
Start: 2025-03-19 | End: 2025-03-24 | Stop reason: HOSPADM

## 2025-03-18 RX ORDER — SODIUM,POTASSIUM PHOSPHATES 280-250MG
2 POWDER IN PACKET (EA) ORAL
Status: DISCONTINUED | OUTPATIENT
Start: 2025-03-19 | End: 2025-03-19

## 2025-03-18 RX ORDER — GLUCAGON 1 MG
1 KIT INJECTION
Status: DISCONTINUED | OUTPATIENT
Start: 2025-03-18 | End: 2025-03-18 | Stop reason: HOSPADM

## 2025-03-18 RX ORDER — SODIUM CHLORIDE 0.9 % (FLUSH) 0.9 %
10 SYRINGE (ML) INJECTION EVERY 12 HOURS PRN
Status: DISCONTINUED | OUTPATIENT
Start: 2025-03-19 | End: 2025-03-24 | Stop reason: HOSPADM

## 2025-03-18 RX ORDER — AMOXICILLIN 250 MG
1 CAPSULE ORAL DAILY PRN
Status: DISCONTINUED | OUTPATIENT
Start: 2025-03-19 | End: 2025-03-24 | Stop reason: HOSPADM

## 2025-03-18 RX ORDER — ACETAMINOPHEN 325 MG/1
650 TABLET ORAL EVERY 4 HOURS PRN
Status: DISCONTINUED | OUTPATIENT
Start: 2025-03-19 | End: 2025-03-24 | Stop reason: HOSPADM

## 2025-03-18 RX ORDER — LANOLIN ALCOHOL/MO/W.PET/CERES
800 CREAM (GRAM) TOPICAL
Status: DISCONTINUED | OUTPATIENT
Start: 2025-03-19 | End: 2025-03-19

## 2025-03-18 RX ORDER — IBUPROFEN 200 MG
24 TABLET ORAL
Status: DISCONTINUED | OUTPATIENT
Start: 2025-03-19 | End: 2025-03-24 | Stop reason: HOSPADM

## 2025-03-18 RX ORDER — SIMETHICONE 80 MG
1 TABLET,CHEWABLE ORAL 4 TIMES DAILY PRN
Status: DISCONTINUED | OUTPATIENT
Start: 2025-03-19 | End: 2025-03-24 | Stop reason: HOSPADM

## 2025-03-18 RX ORDER — CIPROFLOXACIN 2 MG/ML
400 INJECTION, SOLUTION INTRAVENOUS
Status: COMPLETED | OUTPATIENT
Start: 2025-03-18 | End: 2025-03-18

## 2025-03-18 RX ORDER — INSULIN ASPART 100 [IU]/ML
0-5 INJECTION, SOLUTION INTRAVENOUS; SUBCUTANEOUS EVERY 6 HOURS PRN
Status: DISCONTINUED | OUTPATIENT
Start: 2025-03-18 | End: 2025-03-18 | Stop reason: HOSPADM

## 2025-03-18 RX ORDER — ALUMINUM HYDROXIDE, MAGNESIUM HYDROXIDE, AND SIMETHICONE 1200; 120; 1200 MG/30ML; MG/30ML; MG/30ML
30 SUSPENSION ORAL 4 TIMES DAILY PRN
Status: DISCONTINUED | OUTPATIENT
Start: 2025-03-19 | End: 2025-03-24 | Stop reason: HOSPADM

## 2025-03-18 RX ORDER — SODIUM CHLORIDE 9 MG/ML
1000 INJECTION, SOLUTION INTRAVENOUS
Status: COMPLETED | OUTPATIENT
Start: 2025-03-18 | End: 2025-03-18

## 2025-03-18 RX ORDER — METRONIDAZOLE 500 MG/100ML
500 INJECTION, SOLUTION INTRAVENOUS
Status: COMPLETED | OUTPATIENT
Start: 2025-03-18 | End: 2025-03-18

## 2025-03-18 RX ORDER — ONDANSETRON HYDROCHLORIDE 2 MG/ML
4 INJECTION, SOLUTION INTRAVENOUS EVERY 8 HOURS PRN
Status: DISCONTINUED | OUTPATIENT
Start: 2025-03-19 | End: 2025-03-24 | Stop reason: HOSPADM

## 2025-03-18 RX ADMIN — CIPROFLOXACIN 400 MG: 2 INJECTION, SOLUTION INTRAVENOUS at 03:03

## 2025-03-18 RX ADMIN — IOHEXOL 75 ML: 350 INJECTION, SOLUTION INTRAVENOUS at 01:03

## 2025-03-18 RX ADMIN — PROMETHAZINE HYDROCHLORIDE 6.25 MG: 25 INJECTION INTRAMUSCULAR; INTRAVENOUS at 11:03

## 2025-03-18 RX ADMIN — SODIUM CHLORIDE 1000 ML: 9 INJECTION, SOLUTION INTRAVENOUS at 04:03

## 2025-03-18 RX ADMIN — METRONIDAZOLE 500 MG: 500 INJECTION, SOLUTION INTRAVENOUS at 05:03

## 2025-03-18 RX ADMIN — SODIUM CHLORIDE 1000 ML: 9 INJECTION, SOLUTION INTRAVENOUS at 11:03

## 2025-03-18 RX ADMIN — INSULIN ASPART 5 UNITS: 100 INJECTION, SOLUTION INTRAVENOUS; SUBCUTANEOUS at 05:03

## 2025-03-18 RX ADMIN — HUMAN INSULIN 5 UNITS: 100 INJECTION, SOLUTION SUBCUTANEOUS at 07:03

## 2025-03-18 NOTE — PROVIDER TRANSFER
(Physician in Lead of Transfers)  Outside Transfer Acceptance Note / Regional Referral Center      Upon patient arrival, please contact Hospital Medicine on call.    Referring facility: [facility]  Referring provider: FLAQUITO PINA  Accepting facility: [facility]  Accepting provider: ZEUS MARTINES  Admitting provider: IVAN KEATING  Reason for transfer:  treatment of colitis  Transfer diagnosis: colitis  Transfer specialty requested: General Surgery  Transfer specialty notified: Yes  Transfer level: NUMBER 1-5: 2  Bed type requested: tele  Isolation status: No active isolations   Admission class or status: IP- Inpatient      Narrative     79-year-old female with a history of hypertension, diabetes, vestibular schwannoma, hypothyroidism, neurocognitive disorder, and CKD presented to [facility] on March 18 nausea and vomiting. She was recently diagnosed UTI as an outpatient, and she was placed on antibiotics (nitrofurantoin). She was given Zofran by EMS and felt better in the ED.  History was difficult to obtain due to dementia. She had blood in her diaper (etiology uncertain).   She was hypertensive and hyperglycemic in the emergency department.  Abdominal exam was unremarkable.  She had leukocytosis.  CT of the abdomen and pelvis had findings concerning for colitis.  In the emergency department she received nausea medication, IV fluid, and ciprofloxacin.  Most recent glucose at 4:00 p.m. was 423.  ED is adding sliding scale insulin.  They will also add metronidazole.  Requesting transfer to Hospital Medicine at [facility] for further treatment of potential colitis, hyperglycemia, and elevated blood pressure.    White blood cells 14.83, hemoglobin 15.4, hematocrit 43.8, platelets 230, beta hydroxybutyrate 0.7, sodium 137, potassium 4, chloride 102, CO2 21, BUN 19, creatinine 1, glucose 478, albumin 4, AST 16, ALT 18, lipase 11, influenza negative, COVID  negative, stool for occult blood positive, lactate 2, venous pH 7.37  -urinalysis with 2+ protein, 3+ glucose, 2+ ketone, 3+ blood, trace leukocytes, greater than 100 red blood cells, 5 WBCs, occasional bacteria    CT abdomen and pelvis with contrast was limited by motion.  Findings were consistent with colitis.  No evidence of bowel infarction or portal venous gas.  Few distal colonic diverticula.  Trace free fluid in the pelvis.  Oval soft tissue density structure with central calcification along the right pelvic sidewall which likely corresponds to the ovary.    EKG showed normal sinus rhythm with right bundle-branch block.    VS:  Temperature 99.2°, pulse 97, respirations 18, blood pressure 184//88, O2 sats 97%    Objective     Vitals: Temp: 99.2 °F (37.3 °C) (03/18/25 1029)  Pulse: 97 (03/18/25 1348)  Resp: 18 (03/18/25 1348)  BP: (!) 195/88 (03/18/25 1348)  SpO2: 97 % (03/18/25 1348)  Recent Labs: CBC:   Recent Labs   Lab 03/18/25  1058   WBC 14.83*   HGB 15.4   HCT 43.8        CMP:   Recent Labs   Lab 03/18/25  1058      K 4.0      CO2 21*   *   BUN 19   CREATININE 1.0   CALCIUM 9.6   PROT 7.9   ALBUMIN 4.0   BILITOT 0.7   ALKPHOS 111   AST 16   ALT 18   ANIONGAP 14     Lactic Acid:   Recent Labs   Lab 03/18/25  1412   LACTATE 2.0         Instructions    Admit to Hospital Medicine      PATSY Alicea MD  Hospital Medicine Staff  Cell: [phone]

## 2025-03-18 NOTE — ED PROVIDER NOTES
Encounter Date: 3/18/2025       History     Chief Complaint   Patient presents with    Nausea     TOED VIA EMS FOR C/O N/V/D X2 DAYS. FAMILY REPORTS BLOOD IN [place]. CURRENTLY ON ABX FOR UTI x3 DAYS. EMS REPORTS . EMS GAVE 4MG ZOFRAN IV PTA.     HPI  79-year-old female with a history of DIONNE, dementia, left-sided breast cancer, hypertension, hypothyroidism, type 2 diabetes presents to the ED with nausea, vomiting.  Patient unable to give full medical history however information obtained from EMS.  She has been on antibiotics for UTI x3 days.  Patient was given Zofran in route.  Patient denies any current complaints during my evaluation.  Review of patient's allergies indicates:   Allergen Reactions    Percocet [oxycodone-acetaminophen] Itching    Percodan [oxycodone hcl-oxycodone-asa] Itching     Other reaction(s): Unknown    Latex, natural rubber Rash    Phenytoin sodium extended      Other reaction(s): Unknown    Sutures, catgut      Infections to sutures     Adhesive Rash    Adhesive tape-silicones Rash     Past Medical History:   Diagnosis Date    Abnormal Pap smear     DIONNE (acute kidney injury) 6/29/2014    DIONNE (acute kidney injury) 6/29/2014    Alzheimer's dementia 4/12/2018    Anemia     Breast cancer 1995    left breast    Breast disorder     Diabetes mellitus     Diabetes mellitus, type 2     ESSENTIAL HYPERTENSION 10/1/2012    Hypothyroid 6/29/2014    Pneumonia 01/16/2020     Past Surgical History:   Procedure Laterality Date    CATARACT EXTRACTION, BILATERAL      COLONOSCOPY N/A 8/10/2020    Procedure: COLONOSCOPY;  Surgeon: Guerda Perales MD;  Location: [facility];  Service: Endoscopy;  Laterality: N/A;    ESOPHAGOGASTRODUODENOSCOPY N/A 8/10/2020    Procedure: EGD (ESOPHAGOGASTRODUODENOSCOPY) WITH BIOPSY;  Surgeon: Guerda Perales MD;  Location: [facility];  Service: Endoscopy;  Laterality: N/A;    HYSTERECTOMY      masectomy      single left breast    MASTECTOMY Left     OOPHORECTOMY   1997    only 1     Family History   Problem Relation Name Age of Onset    Diabetes Mother      Hypertension Mother      Heart attack Mother  66    Uterine cancer Mother      Diabetes Sister      Heart disease Sister      Heart disease Brother      Hypertension Daughter      Thyroid disease Daughter      Heart attack Son adopted 38    Heart disease Brother      Depression Daughter      Hypertension Son       Social History[1]  Review of Systems   Unable to perform ROS: Dementia       Physical Exam     Initial Vitals [03/18/25 1029]   BP Pulse Resp Temp SpO2   (!) 184/79 95 18 99.2 °F (37.3 °C) 100 %      MAP       --         Physical Exam    Constitutional: She appears well-developed and well-nourished. She is not diaphoretic. No distress.   HENT:   Head: Normocephalic and atraumatic.   Right Ear: External ear normal.   Left Ear: External ear normal.   Eyes: EOM are normal. Pupils are equal, round, and reactive to light.   Neck:   Normal range of motion.  Cardiovascular:            S1, S2, no murmurs   Pulmonary/Chest: Breath sounds normal. No respiratory distress. She has no wheezes.   Left-sided mastectomy   Abdominal: Abdomen is soft. She exhibits no distension. There is no abdominal tenderness.   Musculoskeletal:      Cervical back: Normal range of motion.     Neurological: She is alert. GCS score is 15. GCS eye subscore is 4. GCS verbal subscore is 5. GCS motor subscore is 6.   Skin: Skin is warm and dry. No rash noted.         ED Course   Critical Care    Date/Time: 3/18/2025 5:57 PM    Performed by: Anila Pate MD  Authorized by: Anila Pate MD  Direct patient critical care time: 20 minutes  Additional history critical care time: 5 minutes  Ordering / reviewing critical care time: 5 minutes  Documentation critical care time: 5 minutes  Consulting other physicians critical care time: 5 minutes  Total critical care time (exclusive of procedural time) : 40 minutes  Critical care was time spent  personally by me on the following activities: examination of patient, obtaining history from patient or surrogate, ordering and review of laboratory studies, ordering and review of radiographic studies, re-evaluation of patient's condition and discussions with consultants.        Labs Reviewed   CBC W/ AUTO DIFFERENTIAL - Abnormal       Result Value    WBC 14.83 (*)     RBC 5.10      Hemoglobin 15.4      Hematocrit 43.8      MCV 86      MCH 30.2      MCHC 35.2      RDW 12.7      Platelets 230      MPV 10.6      Immature Granulocytes 0.2      Gran # (ANC) 13.6 (*)     Immature Grans (Abs) 0.03      Lymph # 0.8 (*)     Mono # 0.4      Eos # 0.0      Baso # 0.04      nRBC 0      Gran % 91.6 (*)     Lymph % 5.3 (*)     Mono % 2.6 (*)     Eosinophil % 0.0      Basophil % 0.3      Differential Method Automated     COMPREHENSIVE METABOLIC PANEL - Abnormal    Sodium 137      Potassium 4.0      Chloride 102      CO2 21 (*)     Glucose 478 (*)     BUN 19      Creatinine 1.0      Calcium 9.6      Total Protein 7.9      Albumin 4.0      Total Bilirubin 0.7      Alkaline Phosphatase 111      AST 16      ALT 18      eGFR 57 (*)     Anion Gap 14      Narrative:     glucose  critical result(s) called and verbal readback obtained from   Joaquina Mayorga RN by [facility] 03/18/2025 11:50   URINALYSIS, REFLEX TO URINE CULTURE - Abnormal    Specimen UA Urine, Clean Catch      Color, UA Red (*)     Appearance, UA Cloudy (*)     pH, UA 6.0      Specific Gravity, UA 1.015      Protein, UA 2+ (*)     Glucose, UA 3+ (*)     Ketones, UA 2+ (*)     Bilirubin (UA) Negative      Occult Blood UA 3+ (*)     Nitrite, UA Negative      Urobilinogen, UA Negative      Leukocytes, UA Trace (*)     Narrative:     Specimen Source->Urine   BETA - HYDROXYBUTYRATE, SERUM - Abnormal    Beta-Hydroxybutyrate 0.7 (*)    URINALYSIS MICROSCOPIC - Abnormal    RBC, UA >100 (*)     WBC, UA 5      Bacteria Occasional      Yeast, UA None      Squam Epithel, UA 3      Hyaline  Casts, UA 0      Microscopic Comment SEE COMMENT      Narrative:     Specimen Source->Urine   OCCULT BLOOD X 1, STOOL - Abnormal    Occult Blood Positive (*)    POCT GLUCOSE - Abnormal    POCT Glucose 480 (*)    POCT GLUCOSE - Abnormal    POCT Glucose 406 (*)    POCT GLUCOSE - Abnormal    POCT Glucose 423 (*)    INFLUENZA A & B BY MOLECULAR    Influenza A, Molecular Negative      Influenza B, Molecular Negative      Flu A & B Source Nasal swab     LIPASE    Lipase 11     SARS-COV-2 RNA AMPLIFICATION, QUAL    SARS-CoV-2 RNA, Amplification, Qual Negative     LACTIC ACID, PLASMA    Lactate (Lactic Acid) 2.0     HEMOGLOBIN A1C   HEMOGLOBIN A1C   TYPE & SCREEN    Group & Rh A POS      Indirect Roland NEG      Specimen Outdate 03/21/2025 23:59     POCT GLUCOSE MONITORING CONTINUOUS   POCT GLUCOSE MONITORING CONTINUOUS          Imaging Results              CT Abdomen Pelvis With IV Contrast NO Oral Contrast (Final result)  Result time 03/18/25 13:46:56      Final result by Judith Faulkner MD (03/18/25 13:46:56)                   Impression:      Study limited by motion, demonstrating findings consistent with colitis.  This may be infectious, inflammatory or ischemic in etiology.  There is no evidence of bowel infarction or portal venous gas.    Additional incidental findings outlined above.      Electronically signed by: Judith Faulkner  Date:    03/18/2025  Time:    13:46               Narrative:    EXAMINATION:  CT ABDOMEN PELVIS WITH IV CONTRAST    CLINICAL HISTORY:  Nausea, vomiting and bloody diarrhea    TECHNIQUE:  Low dose axial images, sagittal and coronal reformations were obtained from the lung bases to the pubic symphysis following the IV administration of 75 mL of Omnipaque 350 .  No oral contrast was administered    COMPARISON:  02/18/2023    FINDINGS:  Patient is status post left mastectomy.  There is mild bronchiectasis in the right lower lobe as well as nonspecific subpleural interlobular septal  thickening.    The images of the upper abdomen are blurred by motion.  The liver, gallbladder, spleen, adrenal glands, pancreas are grossly normal but detailed visualization is limited by the motion artifacts present.    There is extensive calcific plaque in the aorta, no aneurysm.    The urinary bladder is thick-walled.  The uterus is surgically absent.  There is a small amount of pelvic free fluid.    There is diffuse wall thickening and edema throughout the wall of the colon which begins in the mid transverse colon and extends distally through the rectum.  There is no perforation or abscess.  No bowel obstruction.  There are few distal colonic diverticula.  There is trace free fluid in the pelvis.    There is an oval soft tissue density structure with central calcification along the right pelvic sidewall which likely corresponds to the ovary measuring 3.3 x 2.0 cm on series 2, image 124.  This is prominent in size for the patient's age, but is stable on CT dating back to 2017 and therefore likely benign.                                       Medications   dextrose 50% injection 12.5 g (has no administration in time range)   glucagon (human recombinant) injection 1 mg (has no administration in time range)   insulin aspart U-100 pen 0-5 Units (5 Units Subcutaneous Given 3/18/25 1718)   metronidazole IVPB 500 mg (500 mg Intravenous New Bag 3/18/25 1718)   promethazine (PHENERGAN) 6.25 mg in 0.9% NaCl 50 mL IVPB (0 mg Intravenous Stopped 3/18/25 1151)   0.9% NaCl infusion (0 mLs Intravenous Stopped 3/18/25 1245)   iohexoL (OMNIPAQUE 350) injection 75 mL (75 mLs Intravenous Given 3/18/25 1328)   ciprofloxacin (CIPRO)400mg/200ml D5W IVPB 400 mg (0 mg Intravenous Stopped 3/18/25 1609)   0.9% NaCl infusion (1,000 mLs Intravenous New Bag 3/18/25 1609)     Medical Decision Making  Differential diagnosis:  DKA, HHS, gastroenteritis, UTI, GI bleed    79-year-old female who presents to the ED with nausea, vomiting, blood  mixed with stool per family report.  Patient with history of dementia and unable to give own history.  Reviewed all labs.  Reviewed CT scan.  Lactic 2.0.  Leukocytosis noted.  CT scan with concerns of infectious versus ischemic colitis.  Antibiotic therapy started.  Patient discussed with Dr. Marshall general surgery at [facility] and who recommends that patient be transferred to outside facility if she does have ischemic colitis.  Discussed with  at [facility] who is agreeable for patient to come and patient will be admitted to medicine service.  Sliding scale insulin given.  Discussed with  Dr. Alicea who is agreeable for transfer.  Awaiting transfer at this time.    Amount and/or Complexity of Data Reviewed  Labs: ordered.  Radiology: ordered.    Risk  Prescription drug management.               ED Course as of 03/18/25 1758   Tue Mar 18, 2025   1335 EKG  Normal sinus rhythm  Heart rate 96    No acute ST elevations  Right bundle-branch block  Reviewed interpreted by myself [FP]   1614 Discussed case with Dr. [facility] who was agreeable for patient to be transferred to his facility after discussion with hospital medicine. [FP]      ED Course User Index  [FP] Anila Pate MD                           Clinical Impression:  Final diagnoses:  [K92.2] GI bleed  [R31.9] Hematuria, unspecified type (Primary)  [K52.9] Colitis          ED Disposition Condition    Transfer to Another Facility Stable                  Anila Pate MD  25 1750         [1]   Social History  Tobacco Use    Smoking status: Former     Current packs/day: 0.00     Average packs/day: 1 pack/day for 10.0 years (10.0 ttl pk-yrs)     Types: Cigarettes     Start date: 1978     Quit date: 1988     Years since quittin.2    Smokeless tobacco: Never   Substance Use Topics    Alcohol use: Yes     Alcohol/week: 1.0 standard drink of alcohol     Types: 1 Glasses of wine per week     Comment: Occ.    Drug use: No         Anila Pate MD  03/18/25 8038

## 2025-03-19 ENCOUNTER — EXTERNAL HOME HEALTH (OUTPATIENT)
Dept: HOME HEALTH SERVICES | Facility: HOSPITAL | Age: 80
End: 2025-03-19
Payer: MEDICARE

## 2025-03-19 PROBLEM — D50.9 IRON DEFICIENCY ANEMIA: Status: RESOLVED | Noted: 2020-07-27 | Resolved: 2025-03-19

## 2025-03-19 PROBLEM — E83.42 HYPOMAGNESEMIA: Status: ACTIVE | Noted: 2025-03-19

## 2025-03-19 PROBLEM — E87.8 ELECTROLYTE ABNORMALITY: Status: RESOLVED | Noted: 2024-12-16 | Resolved: 2025-03-19

## 2025-03-19 LAB
ALBUMIN SERPL BCP-MCNC: 3.1 G/DL (ref 3.5–5.2)
ALP SERPL-CCNC: 77 U/L (ref 40–150)
ALT SERPL W/O P-5'-P-CCNC: 13 U/L (ref 10–44)
ANION GAP SERPL CALC-SCNC: 11 MMOL/L (ref 8–16)
ANION GAP SERPL CALC-SCNC: 17 MMOL/L (ref 8–16)
AST SERPL-CCNC: 12 U/L (ref 10–40)
B-OH-BUTYR BLD STRIP-SCNC: 0.1 MMOL/L (ref 0–0.5)
BASOPHILS # BLD AUTO: 0.06 K/UL (ref 0–0.2)
BASOPHILS NFR BLD: 0.4 % (ref 0–1.9)
BILIRUB SERPL-MCNC: 0.8 MG/DL (ref 0.1–1)
BUN SERPL-MCNC: 15 MG/DL (ref 8–23)
BUN SERPL-MCNC: 15 MG/DL (ref 8–23)
CALCIUM SERPL-MCNC: 7.9 MG/DL (ref 8.7–10.5)
CALCIUM SERPL-MCNC: 8.6 MG/DL (ref 8.7–10.5)
CHLORIDE SERPL-SCNC: 105 MMOL/L (ref 95–110)
CHLORIDE SERPL-SCNC: 97 MMOL/L (ref 95–110)
CO2 SERPL-SCNC: 21 MMOL/L (ref 23–29)
CO2 SERPL-SCNC: 22 MMOL/L (ref 23–29)
CREAT SERPL-MCNC: 0.8 MG/DL (ref 0.5–1.4)
CREAT SERPL-MCNC: 0.9 MG/DL (ref 0.5–1.4)
DIFFERENTIAL METHOD BLD: ABNORMAL
EOSINOPHIL # BLD AUTO: 0 K/UL (ref 0–0.5)
EOSINOPHIL NFR BLD: 0.2 % (ref 0–8)
ERYTHROCYTE [DISTWIDTH] IN BLOOD BY AUTOMATED COUNT: 12.9 % (ref 11.5–14.5)
EST. GFR  (NO RACE VARIABLE): >60 ML/MIN/1.73 M^2
EST. GFR  (NO RACE VARIABLE): >60 ML/MIN/1.73 M^2
ESTIMATED AVG GLUCOSE: 220 MG/DL (ref 68–131)
GLUCOSE SERPL-MCNC: 268 MG/DL (ref 70–110)
GLUCOSE SERPL-MCNC: 544 MG/DL (ref 70–110)
HBA1C MFR BLD: 9.3 % (ref 4–5.6)
HCT VFR BLD AUTO: 39.6 % (ref 37–48.5)
HGB BLD-MCNC: 13.5 G/DL (ref 12–16)
IMM GRANULOCYTES # BLD AUTO: 0.03 K/UL (ref 0–0.04)
IMM GRANULOCYTES NFR BLD AUTO: 0.2 % (ref 0–0.5)
LYMPHOCYTES # BLD AUTO: 1.9 K/UL (ref 1–4.8)
LYMPHOCYTES NFR BLD: 14 % (ref 18–48)
MAGNESIUM SERPL-MCNC: 1 MG/DL (ref 1.6–2.6)
MCH RBC QN AUTO: 30.6 PG (ref 27–31)
MCHC RBC AUTO-ENTMCNC: 34.1 G/DL (ref 32–36)
MCV RBC AUTO: 90 FL (ref 82–98)
MONOCYTES # BLD AUTO: 1 K/UL (ref 0.3–1)
MONOCYTES NFR BLD: 7.2 % (ref 4–15)
NEUTROPHILS # BLD AUTO: 10.4 K/UL (ref 1.8–7.7)
NEUTROPHILS NFR BLD: 78 % (ref 38–73)
NRBC BLD-RTO: 0 /100 WBC
OHS QRS DURATION: 134 MS
OHS QTC CALCULATION: 495 MS
PHOSPHATE SERPL-MCNC: 3.2 MG/DL (ref 2.7–4.5)
PLATELET # BLD AUTO: 197 K/UL (ref 150–450)
PMV BLD AUTO: 10.5 FL (ref 9.2–12.9)
POCT GLUCOSE: 200 MG/DL (ref 70–110)
POCT GLUCOSE: 255 MG/DL (ref 70–110)
POCT GLUCOSE: 263 MG/DL (ref 70–110)
POCT GLUCOSE: 276 MG/DL (ref 70–110)
POCT GLUCOSE: 388 MG/DL (ref 70–110)
POCT GLUCOSE: 389 MG/DL (ref 70–110)
POCT GLUCOSE: 490 MG/DL (ref 70–110)
POTASSIUM SERPL-SCNC: 3.4 MMOL/L (ref 3.5–5.1)
POTASSIUM SERPL-SCNC: 4 MMOL/L (ref 3.5–5.1)
PROT SERPL-MCNC: 6.3 G/DL (ref 6–8.4)
RBC # BLD AUTO: 4.41 M/UL (ref 4–5.4)
SODIUM SERPL-SCNC: 135 MMOL/L (ref 136–145)
SODIUM SERPL-SCNC: 138 MMOL/L (ref 136–145)
WBC # BLD AUTO: 13.39 K/UL (ref 3.9–12.7)

## 2025-03-19 PROCEDURE — 83735 ASSAY OF MAGNESIUM: CPT | Mod: HCNC | Performed by: STUDENT IN AN ORGANIZED HEALTH CARE EDUCATION/TRAINING PROGRAM

## 2025-03-19 PROCEDURE — 36415 COLL VENOUS BLD VENIPUNCTURE: CPT | Mod: HCNC | Performed by: STUDENT IN AN ORGANIZED HEALTH CARE EDUCATION/TRAINING PROGRAM

## 2025-03-19 PROCEDURE — 80048 BASIC METABOLIC PNL TOTAL CA: CPT | Mod: HCNC,XB | Performed by: HOSPITALIST

## 2025-03-19 PROCEDURE — 97530 THERAPEUTIC ACTIVITIES: CPT | Mod: HCNC

## 2025-03-19 PROCEDURE — 84100 ASSAY OF PHOSPHORUS: CPT | Mod: HCNC | Performed by: STUDENT IN AN ORGANIZED HEALTH CARE EDUCATION/TRAINING PROGRAM

## 2025-03-19 PROCEDURE — 97166 OT EVAL MOD COMPLEX 45 MIN: CPT | Mod: HCNC

## 2025-03-19 PROCEDURE — 97535 SELF CARE MNGMENT TRAINING: CPT | Mod: HCNC

## 2025-03-19 PROCEDURE — 63600175 PHARM REV CODE 636 W HCPCS: Mod: HCNC | Performed by: STUDENT IN AN ORGANIZED HEALTH CARE EDUCATION/TRAINING PROGRAM

## 2025-03-19 PROCEDURE — 99223 1ST HOSP IP/OBS HIGH 75: CPT | Mod: HCNC,,, | Performed by: SURGERY

## 2025-03-19 PROCEDURE — 11000001 HC ACUTE MED/SURG PRIVATE ROOM: Mod: HCNC

## 2025-03-19 PROCEDURE — 63600175 PHARM REV CODE 636 W HCPCS: Mod: HCNC | Performed by: HOSPITALIST

## 2025-03-19 PROCEDURE — 82010 KETONE BODYS QUAN: CPT | Mod: HCNC | Performed by: HOSPITALIST

## 2025-03-19 PROCEDURE — 36415 COLL VENOUS BLD VENIPUNCTURE: CPT | Mod: HCNC | Performed by: HOSPITALIST

## 2025-03-19 PROCEDURE — 97161 PT EVAL LOW COMPLEX 20 MIN: CPT | Mod: HCNC

## 2025-03-19 PROCEDURE — 25000003 PHARM REV CODE 250: Mod: HCNC | Performed by: STUDENT IN AN ORGANIZED HEALTH CARE EDUCATION/TRAINING PROGRAM

## 2025-03-19 PROCEDURE — 85025 COMPLETE CBC W/AUTO DIFF WBC: CPT | Mod: HCNC | Performed by: STUDENT IN AN ORGANIZED HEALTH CARE EDUCATION/TRAINING PROGRAM

## 2025-03-19 PROCEDURE — 80053 COMPREHEN METABOLIC PANEL: CPT | Mod: HCNC | Performed by: STUDENT IN AN ORGANIZED HEALTH CARE EDUCATION/TRAINING PROGRAM

## 2025-03-19 RX ORDER — INSULIN GLARGINE 100 [IU]/ML
30 INJECTION, SOLUTION SUBCUTANEOUS NIGHTLY
Status: DISCONTINUED | OUTPATIENT
Start: 2025-03-19 | End: 2025-03-20

## 2025-03-19 RX ORDER — INSULIN ASPART 100 [IU]/ML
10 INJECTION, SOLUTION INTRAVENOUS; SUBCUTANEOUS
Status: DISCONTINUED | OUTPATIENT
Start: 2025-03-19 | End: 2025-03-20

## 2025-03-19 RX ORDER — MECLIZINE HYDROCHLORIDE 25 MG/1
25 TABLET ORAL 3 TIMES DAILY PRN
Status: DISCONTINUED | OUTPATIENT
Start: 2025-03-19 | End: 2025-03-24 | Stop reason: HOSPADM

## 2025-03-19 RX ORDER — INSULIN ASPART 100 [IU]/ML
5 INJECTION, SOLUTION INTRAVENOUS; SUBCUTANEOUS
Status: DISCONTINUED | OUTPATIENT
Start: 2025-03-19 | End: 2025-03-19

## 2025-03-19 RX ORDER — PANTOPRAZOLE SODIUM 40 MG/10ML
40 INJECTION, POWDER, LYOPHILIZED, FOR SOLUTION INTRAVENOUS 2 TIMES DAILY
Status: DISCONTINUED | OUTPATIENT
Start: 2025-03-19 | End: 2025-03-24 | Stop reason: HOSPADM

## 2025-03-19 RX ORDER — DONEPEZIL HYDROCHLORIDE 10 MG/1
10 TABLET, FILM COATED ORAL NIGHTLY
Status: DISCONTINUED | OUTPATIENT
Start: 2025-03-19 | End: 2025-03-24 | Stop reason: HOSPADM

## 2025-03-19 RX ORDER — LISINOPRIL 20 MG/1
20 TABLET ORAL DAILY
Status: DISCONTINUED | OUTPATIENT
Start: 2025-03-19 | End: 2025-03-20

## 2025-03-19 RX ORDER — ATORVASTATIN CALCIUM 10 MG/1
20 TABLET, FILM COATED ORAL DAILY
Status: DISCONTINUED | OUTPATIENT
Start: 2025-03-19 | End: 2025-03-24 | Stop reason: HOSPADM

## 2025-03-19 RX ORDER — ESCITALOPRAM OXALATE 10 MG/1
10 TABLET ORAL DAILY
Status: DISCONTINUED | OUTPATIENT
Start: 2025-03-19 | End: 2025-03-24 | Stop reason: HOSPADM

## 2025-03-19 RX ORDER — MAGNESIUM SULFATE 1 G/100ML
1 INJECTION INTRAVENOUS ONCE
Status: COMPLETED | OUTPATIENT
Start: 2025-03-19 | End: 2025-03-19

## 2025-03-19 RX ORDER — GABAPENTIN 300 MG/1
300 CAPSULE ORAL 2 TIMES DAILY
Status: DISCONTINUED | OUTPATIENT
Start: 2025-03-19 | End: 2025-03-19

## 2025-03-19 RX ORDER — MAGNESIUM SULFATE HEPTAHYDRATE 40 MG/ML
2 INJECTION, SOLUTION INTRAVENOUS ONCE
Status: COMPLETED | OUTPATIENT
Start: 2025-03-19 | End: 2025-03-19

## 2025-03-19 RX ORDER — INSULIN GLARGINE 100 [IU]/ML
20 INJECTION, SOLUTION SUBCUTANEOUS NIGHTLY
Status: DISCONTINUED | OUTPATIENT
Start: 2025-03-19 | End: 2025-03-19

## 2025-03-19 RX ADMIN — MAGNESIUM SULFATE IN DEXTROSE 1 G: 10 INJECTION, SOLUTION INTRAVENOUS at 10:03

## 2025-03-19 RX ADMIN — PIPERACILLIN SODIUM AND TAZOBACTAM SODIUM 4.5 G: 4; .5 INJECTION, POWDER, FOR SOLUTION INTRAVENOUS at 05:03

## 2025-03-19 RX ADMIN — ATORVASTATIN CALCIUM 20 MG: 10 TABLET, FILM COATED ORAL at 10:03

## 2025-03-19 RX ADMIN — INSULIN ASPART 10 UNITS: 100 INJECTION, SOLUTION INTRAVENOUS; SUBCUTANEOUS at 11:03

## 2025-03-19 RX ADMIN — INSULIN ASPART 5 UNITS: 100 INJECTION, SOLUTION INTRAVENOUS; SUBCUTANEOUS at 11:03

## 2025-03-19 RX ADMIN — MAGNESIUM SULFATE HEPTAHYDRATE 2 G: 40 INJECTION, SOLUTION INTRAVENOUS at 11:03

## 2025-03-19 RX ADMIN — PANTOPRAZOLE SODIUM 40 MG: 40 INJECTION, POWDER, FOR SOLUTION INTRAVENOUS at 09:03

## 2025-03-19 RX ADMIN — LISINOPRIL 20 MG: 20 TABLET ORAL at 10:03

## 2025-03-19 RX ADMIN — DONEPEZIL HYDROCHLORIDE 10 MG: 10 TABLET ORAL at 10:03

## 2025-03-19 RX ADMIN — Medication 6 MG: at 11:03

## 2025-03-19 RX ADMIN — PIPERACILLIN SODIUM AND TAZOBACTAM SODIUM 4.5 G: 4; .5 INJECTION, POWDER, FOR SOLUTION INTRAVENOUS at 10:03

## 2025-03-19 RX ADMIN — PANTOPRAZOLE SODIUM 40 MG: 40 INJECTION, POWDER, FOR SOLUTION INTRAVENOUS at 01:03

## 2025-03-19 RX ADMIN — ESCITALOPRAM OXALATE 10 MG: 10 TABLET ORAL at 10:03

## 2025-03-19 RX ADMIN — INSULIN ASPART 10 UNITS: 100 INJECTION, SOLUTION INTRAVENOUS; SUBCUTANEOUS at 04:03

## 2025-03-19 RX ADMIN — INSULIN ASPART 1 UNITS: 100 INJECTION, SOLUTION INTRAVENOUS; SUBCUTANEOUS at 11:03

## 2025-03-19 RX ADMIN — PIPERACILLIN SODIUM AND TAZOBACTAM SODIUM 4.5 G: 4; .5 INJECTION, POWDER, FOR SOLUTION INTRAVENOUS at 01:03

## 2025-03-19 RX ADMIN — INSULIN ASPART 10 UNITS: 100 INJECTION, SOLUTION INTRAVENOUS; SUBCUTANEOUS at 05:03

## 2025-03-19 RX ADMIN — PANTOPRAZOLE SODIUM 40 MG: 40 INJECTION, POWDER, FOR SOLUTION INTRAVENOUS at 10:03

## 2025-03-19 RX ADMIN — INSULIN GLARGINE 30 UNITS: 100 INJECTION, SOLUTION SUBCUTANEOUS at 11:03

## 2025-03-19 RX ADMIN — DONEPEZIL HYDROCHLORIDE 10 MG: 10 TABLET ORAL at 01:03

## 2025-03-19 RX ADMIN — ONDANSETRON 4 MG: 2 INJECTION INTRAMUSCULAR; INTRAVENOUS at 01:03

## 2025-03-19 RX ADMIN — INSULIN GLARGINE 20 UNITS: 100 INJECTION, SOLUTION SUBCUTANEOUS at 01:03

## 2025-03-19 NOTE — ASSESSMENT & PLAN NOTE
Patient's FSGs are uncontrolled due to hyperglycemia on current medication regimen.  Last A1c reviewed-   Lab Results   Component Value Date    HGBA1C 9.3 (H) 03/18/2025     Most recent fingerstick glucose reviewed-   Recent Labs   Lab 03/19/25  0056 03/19/25  0604 03/19/25  0612 03/19/25  0805   POCTGLUCOSE 263* 255* 389* 276*     Current correctional scale  Medium  Maintain anti-hyperglycemic dose as follows-   Antihyperglycemics (From admission, onward)      Start     Stop Route Frequency Ordered    03/19/25 1130  insulin aspart U-100 pen 5 Units         -- SubQ 3 times daily with meals 03/19/25 0829    03/19/25 0115  insulin glargine U-100 (Lantus) pen 20 Units         -- SubQ Nightly 03/19/25 0006    03/19/25 0030  insulin aspart U-100 pen 0-10 Units         -- SubQ Before meals & nightly PRN 03/18/25 2330          Hold Oral hypoglycemics while patient is in the hospital.

## 2025-03-19 NOTE — ASSESSMENT & PLAN NOTE
Presents with vomiting/blood in stool  CT abdomen and pelvis showed findings consistent with colitis.  Continue Zosyn   Symptomatic control   Protonix 40 mg IV BID   Consider GI consult if ongoing bleeding

## 2025-03-19 NOTE — NURSING
[facility] [place]es [place] -- 4 Eyes      3/19/2025       Skin assessed on: Q Shift      [x] No Pressure Injuries Present    []Prevention Measures Documented    [] Yes LDA  for Pressure Injury Previously documented     [] Yes New Pressure Injury Discovered   [] LDA for New Pressure Injury Added      Attending RN:  Cnidy Saldaña LPN     Second RN:  ALINA Thibodeaux

## 2025-03-19 NOTE — HOSPITAL COURSE
Ms Narda Person is a 79 y.o. woman with dementia, DM admitted with vomiting and diarrhea. Recently took nitrofurantoin for UTI. In ED, noted findings of colitis on CT. She was admitted and started zosyn. She still has LLQ pain but no vomiting, no diarrhea, and tolerating PO intake.  Stool studies are negative for infection.  She had hematuria, now improving.  Urine culture does have Gram-negative anastacia which were subsequently revealed to be ESBL E coli. She developed DIONNE on 3/20/25; started IVF with resolution of DIONNE.     She also has R arm sling in place- daughter states this is from shoulder out of place/broken after a fall about a month ago. Repeat XR 3/20 showed comminuted right proximal humerus fracture.  Orthopedics consulted. They recommend sling for comfort.  Must do physical therapy daily for range of motion of her elbow.  Okay for gentle pendulums of proximal humerus.  Patient should follow-up in clinic in 2 weeks.  Patient received PT OT while inpatient; did develop an episode of hypoglycemia which resolved before discharge.  Patient was discharged home with home health on IV Ertapenem.  General condition markedly improved before discharge.  Patient was instructed to come back to the hospital in the event of fever, chills, or difficulty urination.  Patient was asked to follow up with PCP upon discharge.  All questions were answered to their satisfaction and they verbalized understanding.

## 2025-03-19 NOTE — H&P
[facility]  History & Physical    Patient Name: Narda Person  MRN: [id number]  Patient Class: IP- Inpatient  Admission Date: 3/18/2025  Attending Physician: Verónica Charles MD   Primary Care Provider: Sirena Coleman MD         Patient information was obtained from patient, past medical records, and ER records.     Subjective:     Principal Problem:Colitis    Chief Complaint: No chief complaint on file.       HPI: This is an 79-year-old female with a past medical history of dementia, hypertension, type 2 diabetes, hyperlipidemia, anxiety, depression, who presents with vomiting.     Patient presented to [place] ED for evaluation of vomiting and diarrhea that started 2 days prior to presentation.  Patient was prescribed nitrofurantoin for UTI on 03/13, after which she developed nausea, vomiting and diarrhea.  Additional symptoms include blood in stool.  Patient is unable to contribute to the history, states that she does not remember what happened.  I was unable to reach both of her daughters on numbers listed in the chart.     In the ED, the patient was hypertensive (170s-190s/70s-80s). Labs were remarkable for leukocytosi (14.8), hyperglycemia (478 > 354) and positive stool occult blood. UA showed trace, >100 RBC, +2 protein, +3 glucose, +2 ketones, +3 blood.  CT abdomen and pelvis showed findings consistent with colitis.  Patient was given 2L of NS, ciprofloxacin, flagyl 500 mg IV, regular insulin 5 units IV, Phenergan 6.25 mg IV. She was admitted to [facility] for further management.     Past Medical History:   Diagnosis Date    Abnormal Pap smear     DIONNE (acute kidney injury) 6/29/2014    DIONNE (acute kidney injury) 6/29/2014    Alzheimer's dementia 4/12/2018    Anemia     Breast cancer 1995    left breast    Breast disorder     Diabetes mellitus     Diabetes mellitus, type 2     ESSENTIAL HYPERTENSION 10/1/2012    Hypothyroid 6/29/2014    Pneumonia 01/16/2020       Past  Surgical History:   Procedure Laterality Date    CATARACT EXTRACTION, BILATERAL      COLONOSCOPY N/A 8/10/2020    Procedure: COLONOSCOPY;  Surgeon: Guerda Perales MD;  Location: [facility];  Service: Endoscopy;  Laterality: N/A;    ESOPHAGOGASTRODUODENOSCOPY N/A 8/10/2020    Procedure: EGD (ESOPHAGOGASTRODUODENOSCOPY) WITH BIOPSY;  Surgeon: Guerda Perales MD;  Location: [facility];  Service: Endoscopy;  Laterality: N/A;    HYSTERECTOMY      masectomy      single left breast    MASTECTOMY Left     OOPHORECTOMY  1997    only 1       Review of patient's allergies indicates:   Allergen Reactions    Percocet [oxycodone-acetaminophen] Itching    Percodan [oxycodone hcl-oxycodone-asa] Itching     Other reaction(s): Unknown    Latex, natural rubber Rash    Phenytoin sodium extended      Other reaction(s): Unknown    Sutures, catgut      Infections to sutures     Adhesive Rash    Adhesive tape-silicones Rash       Current Facility-Administered Medications on File Prior to Encounter   Medication    [COMPLETED] 0.9% NaCl infusion    [COMPLETED] 0.9% NaCl infusion    [COMPLETED] ciprofloxacin (CIPRO)400mg/200ml D5W IVPB 400 mg    [COMPLETED] insulin regular injection 5 Units 0.05 mL    [COMPLETED] iohexoL (OMNIPAQUE 350) injection 75 mL    [COMPLETED] metronidazole IVPB 500 mg    [COMPLETED] promethazine (PHENERGAN) 6.25 mg in 0.9% NaCl 50 mL IVPB    [DISCONTINUED] dextrose 50% injection 12.5 g    [DISCONTINUED] glucagon (human recombinant) injection 1 mg    [DISCONTINUED] insulin aspart U-100 pen 0-5 Units     Current Outpatient Medications on File Prior to Encounter   Medication Sig    ALPRAZolam (XANAX) 0.25 MG tablet Take 1 tablet (0.25 mg total) by mouth nightly as needed for Anxiety.    atorvastatin (LIPITOR) 20 MG tablet Take 1 tablet (20 mg total) by mouth once daily.    blood sugar diagnostic (ACCU-CHEK GUIDE TEST STRIPS) Strp 1 strip by Misc.(Non-Drug; Combo Route) route 4 (four) times daily.    blood sugar  "diagnostic, drum (ACCU-CHEK COMPACT PLUS TEST) Strp USE TO CHECK BLOOD GLUCOSE FOUR TIMES DAILY    blood-glucose meter (ACCU-CHEK GUIDE GLUCOSE METER) Misc 1 each by Misc.(Non-Drug; Combo Route) route 4 (four) times daily.    diclofenac sodium (VOLTAREN) 1 % Gel Apply 2 g topically 2 (two) times daily.    donepeziL (ARICEPT) 10 MG tablet Take 1 tablet (10 mg total) by mouth every evening.    EScitalopram oxalate (LEXAPRO) 10 MG tablet Take 1 tablet by mouth once daily    ferrous sulfate 325 (65 FE) MG EC tablet Take 1 tablet by mouth once daily (Patient not taking: Reported on 2/5/2025)    gabapentin (NEURONTIN) 300 MG capsule Take 1 capsule by mouth twice daily    ibuprofen (ADVIL,MOTRIN) 400 MG tablet Take 1 tablet (400 mg total) by mouth every 6 (six) hours as needed for Pain.    insulin aspart U-100 (NOVOLOG FLEXPEN U-100 INSULIN) 100 unit/mL (3 mL) InPn pen Inject 30 Units into the skin 3 (three) times daily with meals. (Patient not taking: Reported on 2/5/2025)    insulin syringe-needle U-100 0.3 mL 30 Syrg Use with Levemir vial BID    JANUVIA 100 mg Tab Take 1 tablet by mouth once daily    lancets (ACCU-CHEK SOFTCLIX LANCETS) Misc 1 each by Misc.(Non-Drug; Combo Route) route 4 (four) times daily.    meclizine (ANTIVERT) 25 mg tablet TAKE 1 TABLET BY MOUTH THREE TIMES DAILY AS NEEDED FOR DIZZINESS AND FOR NAUSEA    melatonin 5 mg Tab Take 10 mg by mouth nightly as needed.     metFORMIN (GLUCOPHAGE) 1000 MG tablet TAKE 1 TABLET BY MOUTH TWICE DAILY WITH MEALS (Patient taking differently: Take 1,000 mg by mouth daily with breakfast.)    nitrofurantoin (MACRODANTIN) 50 MG capsule Take 1 capsule (50 mg total) by mouth once daily. for 10 days    omeprazole (PRILOSEC) 20 MG capsule Take 1 capsule (20 mg total) by mouth 2 (two) times daily.    pen needle, diabetic (LITE TOUCH INSULIN PEN NEEDLES) 31 gauge x 3/16" Ndle 1 application by Misc.(Non-Drug; Combo Route) route 4 (four) times daily.    pen needle, diabetic 32 " "gauge x 5/32" Ndle 1 Device by Misc.(Non-Drug; Combo Route) route as directed. Needles to be used with Novolog pens and Levemir pens daily.    ramipriL (ALTACE) 5 MG capsule Take 1 capsule by mouth once daily    TRESIBA FLEXTOUCH U-200 200 unit/mL (3 mL) insulin pen Inject 60 Units into the skin once daily. (Patient taking differently: Inject 40 Units into the skin once daily.)    [DISCONTINUED] clonazePAM (KLONOPIN) 0.5 MG tablet Take 1 tablet (0.5 mg total) by mouth 2 (two) times daily as needed for Anxiety.     Family History       Problem Relation (Age of Onset)    Depression Daughter    Diabetes Mother, Sister    Heart attack Mother (66), Son (38)    Heart disease Sister, Brother, Brother    Hypertension Mother, Daughter, Son    Thyroid disease Daughter    Uterine cancer Mother          Tobacco Use    Smoking status: Former     Current packs/day: 0.00     Average packs/day: 1 pack/day for 10.0 years (10.0 ttl pk-yrs)     Types: Cigarettes     Start date: 1978     Quit date: 1988     Years since quittin.2    Smokeless tobacco: Never   Substance and Sexual Activity    Alcohol use: Yes     Alcohol/week: 1.0 standard drink of alcohol     Types: 1 Glasses of wine per week     Comment: Occ.    Drug use: No    Sexual activity: Never     Review of Systems   Respiratory: Negative.     Cardiovascular: Negative.    Gastrointestinal:  Positive for blood in stool, diarrhea, nausea and vomiting.   Genitourinary: Negative.    Musculoskeletal: Negative.    Neurological: Negative.      Objective:     Vital Signs (Most Recent):  Temp: 98.8 °F (37.1 °C) (25)  Pulse: 91 (25)  Resp: 20 (25)  BP: (!) 158/70 (25)  SpO2: 98 % (25) Vital Signs (24h Range):  Temp:  [98.7 °F (37.1 °C)-99.2 °F (37.3 °C)] 98.8 °F (37.1 °C)  Pulse:  [90-97] 91  Resp:  [18-20] 20  SpO2:  [95 %-100 %] 98 %  BP: (158-195)/(70-88) 158/70     Weight: 71.3 kg (157 lb 3 oz)  Body mass index is " 28.75 kg/m².     Physical Exam  Vitals and nursing note reviewed.   Constitutional:       General: She is not in acute distress.     Appearance: She is not ill-appearing.   HENT:      Mouth/Throat:      Mouth: Mucous membranes are moist.   Cardiovascular:      Rate and Rhythm: Normal rate.   Pulmonary:      Effort: Pulmonary effort is normal.   Abdominal:      General: Abdomen is flat.   Skin:     General: Skin is warm.   Neurological:      Mental Status: She is alert.      Comments: A&Ox2  Forgetful   Able to move all extremities                 Significant Labs: All pertinent labs within the past 24 hours have been reviewed.    Significant Imaging: I have reviewed all pertinent imaging results/findings within the past 24 hours.  Assessment/Plan:     * Colitis  Presents with vomiting/blood in stool  CT abdomen and pelvis showed findings consistent with colitis.  Continue Zosyn   Symptomatic control   Protonix 40 mg IV BID   Consider GI consult if ongoing bleeding       Chronic kidney disease, stage 3a  Creatine stable for now. BMP reviewed- noted Estimated Creatinine Clearance: 40.8 mL/min (based on SCr of 1 mg/dL). according to latest data. Based on current GFR, CKD stage is stage 3 - GFR 30-59.  Monitor UOP and serial BMP and adjust therapy as needed. Renally dose meds. Avoid nephrotoxic medications and procedures.    Anxiety and depression  Continue Lexapro     Alzheimer's disease, unspecified (CODE)  Continue home Aricept    Gastroesophageal reflux disease without esophagitis  Protonix       Essential hypertension  Patient's blood pressure range in the last 24 hours was: BP  Min: 158/70  Max: 195/88.The patient's inpatient anti-hypertensive regimen is listed below:  Current Antihypertensives  lisinopriL tablet 20 mg, Daily, Oral    Plan  - BP is controlled, no changes needed to their regimen    Diabetes mellitus, type 2  Patient's FSGs are uncontrolled due to hyperglycemia on current medication regimen.  Last  A1c reviewed-   Lab Results   Component Value Date    HGBA1C 8.4 (H) 12/13/2024     Most recent fingerstick glucose reviewed-   Recent Labs   Lab 03/18/25  1121 03/18/25  1246 03/18/25  1600 03/18/25  1832   POCTGLUCOSE 480* 406* 423* 354*     Current correctional scale  Medium  Maintain anti-hyperglycemic dose as follows-   Antihyperglycemics (From admission, onward)      Start     Stop Route Frequency Ordered    03/19/25 0115  insulin glargine U-100 (Lantus) pen 20 Units         -- SubQ Nightly 03/19/25 0006    03/19/25 0030  insulin aspart U-100 pen 0-10 Units         -- SubQ Before meals & nightly PRN 03/18/25 2330          Hold Oral hypoglycemics while patient is in the hospital.      VTE Risk Mitigation (From admission, onward)           Ordered     IP VTE HIGH RISK PATIENT  Once         03/18/25 2330     Place sequential compression device  Until discontinued         03/18/25 2330                                    Ronnie Feliz MD  Department of Hospital Medicine  [facility]

## 2025-03-19 NOTE — ASSESSMENT & PLAN NOTE
Patient's blood pressure range in the last 24 hours was: BP  Min: 158/70  Max: 195/88.The patient's inpatient anti-hypertensive regimen is listed below:  Current Antihypertensives  lisinopriL tablet 20 mg, Daily, Oral    Plan  - BP is controlled, no changes needed to their regimen

## 2025-03-19 NOTE — CONSULTS
Consult note    SUBJECTIVE:     History of Present Illness:  Patient is a 79 y.o. female with PMH of dementia, hypertension, type 2 diabetes, hyperlipidemia, anxiety, depression, who presents with vomiting. Patient denied abdominal pain. No nausea or vomiting today. However, she had recent episodes of diarrhea per her chart. Recent history of UTI, treated with nitrofurantoin. WBCs 14.8 on presentation.   CT abdomen and pelvis noted concerns over colitis. Surgery consulted for evaluation.     No chief complaint on file.      Review of patient's allergies indicates:   Allergen Reactions    Percocet [oxycodone-acetaminophen] Itching    Percodan [oxycodone hcl-oxycodone-asa] Itching     Other reaction(s): Unknown    Latex, natural rubber Rash    Phenytoin sodium extended      Other reaction(s): Unknown    Sutures, catgut      Infections to sutures     Adhesive Rash    Adhesive tape-silicones Rash       Current Medications[1]    Past Medical History:   Diagnosis Date    Abnormal Pap smear     DIONNE (acute kidney injury) 6/29/2014    DIONNE (acute kidney injury) 6/29/2014    Alzheimer's dementia 4/12/2018    Anemia     Breast cancer 1995    left breast    Breast disorder     Diabetes mellitus     Diabetes mellitus, type 2     ESSENTIAL HYPERTENSION 10/1/2012    Hypothyroid 6/29/2014    Pneumonia 01/16/2020     Past Surgical History:   Procedure Laterality Date    CATARACT EXTRACTION, BILATERAL      COLONOSCOPY N/A 8/10/2020    Procedure: COLONOSCOPY;  Surgeon: Guerda Perales MD;  Location: [facility];  Service: Endoscopy;  Laterality: N/A;    ESOPHAGOGASTRODUODENOSCOPY N/A 8/10/2020    Procedure: EGD (ESOPHAGOGASTRODUODENOSCOPY) WITH BIOPSY;  Surgeon: Guerda Perales MD;  Location: [facility];  Service: Endoscopy;  Laterality: N/A;    HYSTERECTOMY      masectomy      single left breast    MASTECTOMY Left     OOPHORECTOMY  1997    only 1     Family History   Problem Relation Name Age of Onset    Diabetes Mother       "Hypertension Mother      Heart attack Mother  66    Uterine cancer Mother      Diabetes Sister      Heart disease Sister      Heart disease Brother      Hypertension Daughter      Thyroid disease Daughter      Heart attack Son adopted 38    Heart disease Brother      Depression Daughter      Hypertension Son       Social History[2]     Review of Systems:  Review of Systems    Not completed.     OBJECTIVE:     Vital Signs (Most Recent)  Temp: 98.4 °F (36.9 °C) (03/19/25 1140)  Pulse: 85 (03/19/25 1140)  Resp: 18 (03/19/25 0802)  BP: 108/68 (03/19/25 1140)  SpO2: (!) 94 % (03/19/25 1140)  5' 2" (1.575 m)  71.3 kg (157 lb 3 oz)     Physical Exam:  Physical Exam    NAD  Normal work of breathing.   Regular heart rate.  Abdomen soft, non distended. Mild left lower quadrant tenderness. No rebound tenderness.     Laboratory  CBC: Reviewed  CMP: Reviewed    Diagnostic Results:  Labs: Reviewed  CT: Reviewed      Significant Diagnostic Studies:   No orders to display         ASSESSMENT/PLAN:   Patient is a 79 y.o. female with PMH of dementia, hypertension, type 2 diabetes, hyperlipidemia, anxiety, depression, who presents with vomiting. WBCs 14.8 on presentation. CT abdomen and pelvis noted concerns over colitis. Surgery consulted for evaluation.   Abdomen is soft with mild tenderness at the left lower quadrant.     Plan   - No acute surgical interventions at this time.   - Serial abdominal examinations.   - Daily CBC, CMP  - Ok for DVT ppx  - Surgery will continue to follow.      Marjan Phillip MD, PGY-II  [facility].            [1]   Current Facility-Administered Medications   Medication Dose Route Frequency Provider Last Rate Last Admin    acetaminophen tablet 650 mg  650 mg Oral Q8H PRN Ronnie Feliz MD        acetaminophen tablet 650 mg  650 mg Oral Q4H PRN Ronnie Feliz MD        aluminum-magnesium hydroxide-simethicone 200-200-20 mg/5 mL suspension 30 mL  30 mL Oral QID PRN Ronnie Feliz MD        atorvastatin " tablet 20 mg  20 mg Oral Daily Ronnie Feliz MD   20 mg at 03/19/25 1002    bisacodyL suppository 10 mg  10 mg Rectal Daily PRN Ronnie Feliz MD        donepeziL tablet 10 mg  10 mg Oral QHS Ronnie Feliz MD   10 mg at 03/19/25 0110    EScitalopram oxalate tablet 10 mg  10 mg Oral Daily Ronnie Feliz MD   10 mg at 03/19/25 1002    glucagon (human recombinant) injection 1 mg  1 mg Intramuscular PRN Ronnie Feliz MD        glucose chewable tablet 16 g  16 g Oral PRN Ronnie Feliz MD        glucose chewable tablet 24 g  24 g Oral PRN Ronnie Feliz MD        insulin aspart U-100 pen 0-10 Units  0-10 Units Subcutaneous QID (AC + HS) PRN Ronnie Feliz MD        insulin aspart U-100 pen 5 Units  5 Units Subcutaneous TIDWM Verónica Charles MD        insulin glargine U-100 (Lantus) pen 20 Units  20 Units Subcutaneous QHS Ronnie Feliz MD   20 Units at 03/19/25 0115    lisinopriL tablet 20 mg  20 mg Oral Daily Ronnie Feliz MD   20 mg at 03/19/25 1002    magnesium sulfate 2g in water 50mL IVPB (premix)  2 g Intravenous Once Verónica Charles MD 25 mL/hr at 03/19/25 1125 2 g at 03/19/25 1125    meclizine tablet 25 mg  25 mg Oral TID PRN Ronnie Feliz MD        melatonin tablet 6 mg  6 mg Oral Nightly PRN Ronnie Feliz MD        naloxone 0.4 mg/mL injection 0.02 mg  0.02 mg Intravenous PRN Ronnie Feliz MD        ondansetron injection 4 mg  4 mg Intravenous Q8H PRN Ronnie Feliz MD   4 mg at 03/19/25 0109    pantoprazole injection 40 mg  40 mg Intravenous BID Ronnie Feliz MD   40 mg at 03/19/25 1002    piperacillin-tazobactam (ZOSYN) 4.5 g in D5W 100 mL IVPB (MB+)  4.5 g Intravenous Q8H Ronnie Feliz MD 25 mL/hr at 03/19/25 1006 4.5 g at 03/19/25 1006    senna-docusate 8.6-50 mg per tablet 1 tablet  1 tablet Oral Daily PRN Ronnie Feliz MD        simethicone chewable tablet 80 mg  1 tablet Oral QID PRN Ronnie Feliz MD        sodium chloride 0.9% flush 10 mL  10 mL Intravenous Q12H PRN Ronnie Feliz MD       [2]   Social  History  Tobacco Use    Smoking status: Former     Current packs/day: 0.00     Average packs/day: 1 pack/day for 10.0 years (10.0 ttl pk-yrs)     Types: Cigarettes     Start date: 1978     Quit date: 1988     Years since quittin.2    Smokeless tobacco: Never   Substance Use Topics    Alcohol use: Yes     Alcohol/week: 1.0 standard drink of alcohol     Types: 1 Glasses of wine per week     Comment: Occ.    Drug use: No

## 2025-03-19 NOTE — NURSING
Patient arrived to floor via ambulance from [facility] ED. Patient transferred to bed via x 2 person assist. Patient was oriented to room, information on whiteboard, and med regimen. Bed low, adequate lighting provided, side rails x 2 up, call bell within reach. Admission assessment completed. IVF started. Patient was disoriented to time, situation. Pt denied acute distress at this time. Care ongoing.     [facility]  Nurses Note -- 4 Eyes      3/18/2025       Skin assessed on: Admit      [x] No Pressure Injuries Present    [x]Prevention Measures Documented    [] Yes LDA  for Pressure Injury Previously documented     [] Yes New Pressure Injury Discovered   [] LDA for New Pressure Injury Added      Attending RN:  Stephanie Barthelemy, RN     Second RN:  ALINA Thibodeaux

## 2025-03-19 NOTE — SUBJECTIVE & OBJECTIVE
Interval History: No specific complaints.  Says that she was awake all night she was up and walking around.  She is hungry and is asking for coffee this morning.  No nausea, vomiting.  Nursing reports that she has not had any diarrhea or vomiting overnight.    Review of Systems   Constitutional:  Negative for fever.   Respiratory:  Negative for shortness of breath.    Cardiovascular:  Negative for chest pain.   Gastrointestinal:  Positive for abdominal pain. Negative for diarrhea, nausea and vomiting.   Genitourinary:  Positive for hematuria.   Psychiatric/Behavioral:  Positive for confusion and sleep disturbance.      Objective:     Vital Signs (Most Recent):  Temp: 98.6 °F (37 °C) (03/19/25 0802)  Pulse: 86 (03/19/25 0802)  Resp: 18 (03/19/25 0401)  BP: 112/69 (03/19/25 0802)  SpO2: 95 % (03/19/25 0802) Vital Signs (24h Range):  Temp:  [98.4 °F (36.9 °C)-99.2 °F (37.3 °C)] 98.6 °F (37 °C)  Pulse:  [81-97] 86  Resp:  [18-20] 18  SpO2:  [95 %-100 %] 95 %  BP: (112-195)/(69-88) 112/69     Weight: 71.3 kg (157 lb 3 oz)  Body mass index is 28.75 kg/m².  No intake or output data in the 24 hours ending 03/19/25 0831      Physical Exam  Vitals and nursing note reviewed.   HENT:      Head: Normocephalic and atraumatic.      Nose: Nose normal.      Mouth/Throat:      Mouth: Mucous membranes are dry.   Cardiovascular:      Rate and Rhythm: Normal rate and regular rhythm.   Pulmonary:      Effort: Pulmonary effort is normal.      Breath sounds: Normal breath sounds.      Comments: Room air  Abdominal:      General: Bowel sounds are normal. There is no distension.      Palpations: Abdomen is soft.      Tenderness: There is abdominal tenderness (With deep palpation in left lower quadrant). There is no right CVA tenderness, left CVA tenderness, guarding or rebound.   Genitourinary:     Comments: Noted to have hematuria in diaper  Musculoskeletal:      Right lower leg: No edema.      Left lower leg: No edema.      Comments: Her  right arm is in a sling   Skin:     General: Skin is warm and dry.   Neurological:      Mental Status: She is alert.      Comments: Oriented to self.  Able to name her daughters.  Not able to say where she is but then does recognize that she is a long way away from [place] which is home.  She thinks it is 1923.               Significant Labs: All pertinent labs within the past 24 hours have been reviewed.    Significant Imaging: I have reviewed all pertinent imaging results/findings within the past 24 hours.

## 2025-03-19 NOTE — SUBJECTIVE & OBJECTIVE
Past Medical History:   Diagnosis Date    Abnormal Pap smear     DIONNE (acute kidney injury) 6/29/2014    DIONNE (acute kidney injury) 6/29/2014    Alzheimer's dementia 4/12/2018    Anemia     Breast cancer 1995    left breast    Breast disorder     Diabetes mellitus     Diabetes mellitus, type 2     ESSENTIAL HYPERTENSION 10/1/2012    Hypothyroid 6/29/2014    Pneumonia 01/16/2020       Past Surgical History:   Procedure Laterality Date    CATARACT EXTRACTION, BILATERAL      COLONOSCOPY N/A 8/10/2020    Procedure: COLONOSCOPY;  Surgeon: Guerda Perales MD;  Location: [facility];  Service: Endoscopy;  Laterality: N/A;    ESOPHAGOGASTRODUODENOSCOPY N/A 8/10/2020    Procedure: EGD (ESOPHAGOGASTRODUODENOSCOPY) WITH BIOPSY;  Surgeon: Guerda Perales MD;  Location: [facility];  Service: Endoscopy;  Laterality: N/A;    HYSTERECTOMY      masectomy      single left breast    MASTECTOMY Left     OOPHORECTOMY  1997    only 1       Review of patient's allergies indicates:   Allergen Reactions    Percocet [oxycodone-acetaminophen] Itching    Percodan [oxycodone hcl-oxycodone-asa] Itching     Other reaction(s): Unknown    Latex, natural rubber Rash    Phenytoin sodium extended      Other reaction(s): Unknown    Sutures, catgut      Infections to sutures     Adhesive Rash    Adhesive tape-silicones Rash       Current Facility-Administered Medications on File Prior to Encounter   Medication    [COMPLETED] 0.9% NaCl infusion    [COMPLETED] 0.9% NaCl infusion    [COMPLETED] ciprofloxacin (CIPRO)400mg/200ml D5W IVPB 400 mg    [COMPLETED] insulin regular injection 5 Units 0.05 mL    [COMPLETED] iohexoL (OMNIPAQUE 350) injection 75 mL    [COMPLETED] metronidazole IVPB 500 mg    [COMPLETED] promethazine (PHENERGAN) 6.25 mg in 0.9% NaCl 50 mL IVPB    [DISCONTINUED] dextrose 50% injection 12.5 g    [DISCONTINUED] glucagon (human recombinant) injection 1 mg    [DISCONTINUED] insulin aspart U-100 pen 0-5 Units     Current Outpatient  Medications on File Prior to Encounter   Medication Sig    ALPRAZolam (XANAX) 0.25 MG tablet Take 1 tablet (0.25 mg total) by mouth nightly as needed for Anxiety.    atorvastatin (LIPITOR) 20 MG tablet Take 1 tablet (20 mg total) by mouth once daily.    blood sugar diagnostic (ACCU-CHEK GUIDE TEST STRIPS) Strp 1 strip by Misc.(Non-Drug; Combo Route) route 4 (four) times daily.    blood sugar diagnostic, drum (ACCU-CHEK COMPACT PLUS TEST) Strp USE TO CHECK BLOOD GLUCOSE FOUR TIMES DAILY    blood-glucose meter (ACCU-CHEK GUIDE GLUCOSE METER) Misc 1 each by Misc.(Non-Drug; Combo Route) route 4 (four) times daily.    diclofenac sodium (VOLTAREN) 1 % Gel Apply 2 g topically 2 (two) times daily.    donepeziL (ARICEPT) 10 MG tablet Take 1 tablet (10 mg total) by mouth every evening.    EScitalopram oxalate (LEXAPRO) 10 MG tablet Take 1 tablet by mouth once daily    ferrous sulfate 325 (65 FE) MG EC tablet Take 1 tablet by mouth once daily (Patient not taking: Reported on 2/5/2025)    gabapentin (NEURONTIN) 300 MG capsule Take 1 capsule by mouth twice daily    ibuprofen (ADVIL,MOTRIN) 400 MG tablet Take 1 tablet (400 mg total) by mouth every 6 (six) hours as needed for Pain.    insulin aspart U-100 (NOVOLOG FLEXPEN U-100 INSULIN) 100 unit/mL (3 mL) InPn pen Inject 30 Units into the skin 3 (three) times daily with meals. (Patient not taking: Reported on 2/5/2025)    insulin syringe-needle U-100 0.3 mL 30 Syrg Use with Levemir vial BID    JANUVIA 100 mg Tab Take 1 tablet by mouth once daily    lancets (ACCU-CHEK SOFTCLIX LANCETS) Misc 1 each by Misc.(Non-Drug; Combo Route) route 4 (four) times daily.    meclizine (ANTIVERT) 25 mg tablet TAKE 1 TABLET BY MOUTH THREE TIMES DAILY AS NEEDED FOR DIZZINESS AND FOR NAUSEA    melatonin 5 mg Tab Take 10 mg by mouth nightly as needed.     metFORMIN (GLUCOPHAGE) 1000 MG tablet TAKE 1 TABLET BY MOUTH TWICE DAILY WITH MEALS (Patient taking differently: Take 1,000 mg by mouth daily with  "breakfast.)    nitrofurantoin (MACRODANTIN) 50 MG capsule Take 1 capsule (50 mg total) by mouth once daily. for 10 days    omeprazole (PRILOSEC) 20 MG capsule Take 1 capsule (20 mg total) by mouth 2 (two) times daily.    pen needle, diabetic (LITE TOUCH INSULIN PEN NEEDLES) 31 gauge x 3/16" Ndle 1 application by Misc.(Non-Drug; Combo Route) route 4 (four) times daily.    pen needle, diabetic 32 gauge x 5/32" Ndle 1 Device by Misc.(Non-Drug; Combo Route) route as directed. Needles to be used with Novolog pens and Levemir pens daily.    ramipriL (ALTACE) 5 MG capsule Take 1 capsule by mouth once daily    TRESIBA FLEXTOUCH U-200 200 unit/mL (3 mL) insulin pen Inject 60 Units into the skin once daily. (Patient taking differently: Inject 40 Units into the skin once daily.)    [DISCONTINUED] clonazePAM (KLONOPIN) 0.5 MG tablet Take 1 tablet (0.5 mg total) by mouth 2 (two) times daily as needed for Anxiety.     Family History       Problem Relation (Age of Onset)    Depression Daughter    Diabetes Mother, Sister    Heart attack Mother (66), Son (38)    Heart disease Sister, Brother, Brother    Hypertension Mother, Daughter, Son    Thyroid disease Daughter    Uterine cancer Mother          Tobacco Use    Smoking status: Former     Current packs/day: 0.00     Average packs/day: 1 pack/day for 10.0 years (10.0 ttl pk-yrs)     Types: Cigarettes     Start date: 1978     Quit date: 1988     Years since quittin.2    Smokeless tobacco: Never   Substance and Sexual Activity    Alcohol use: Yes     Alcohol/week: 1.0 standard drink of alcohol     Types: 1 Glasses of wine per week     Comment: Occ.    Drug use: No    Sexual activity: Never     Review of Systems   Respiratory: Negative.     Cardiovascular: Negative.    Gastrointestinal:  Positive for blood in stool, diarrhea, nausea and vomiting.   Genitourinary: Negative.    Musculoskeletal: Negative.    Neurological: Negative.      Objective:     Vital Signs (Most " Recent):  Temp: 98.8 °F (37.1 °C) (03/18/25 2318)  Pulse: 91 (03/18/25 2318)  Resp: 20 (03/18/25 2318)  BP: (!) 158/70 (03/18/25 2318)  SpO2: 98 % (03/18/25 2318) Vital Signs (24h Range):  Temp:  [98.7 °F (37.1 °C)-99.2 °F (37.3 °C)] 98.8 °F (37.1 °C)  Pulse:  [90-97] 91  Resp:  [18-20] 20  SpO2:  [95 %-100 %] 98 %  BP: (158-195)/(70-88) 158/70     Weight: 71.3 kg (157 lb 3 oz)  Body mass index is 28.75 kg/m².     Physical Exam  Vitals and nursing note reviewed.   Constitutional:       General: She is not in acute distress.     Appearance: She is not ill-appearing.   HENT:      Mouth/Throat:      Mouth: Mucous membranes are moist.   Cardiovascular:      Rate and Rhythm: Normal rate.   Pulmonary:      Effort: Pulmonary effort is normal.   Abdominal:      General: Abdomen is flat.   Skin:     General: Skin is warm.   Neurological:      Mental Status: She is alert.      Comments: A&Ox2  Forgetful   Able to move all extremities                 Significant Labs: All pertinent labs within the past 24 hours have been reviewed.    Significant Imaging: I have reviewed all pertinent imaging results/findings within the past 24 hours.

## 2025-03-19 NOTE — ASSESSMENT & PLAN NOTE
Patient's FSGs are uncontrolled due to hyperglycemia on current medication regimen.  Last A1c reviewed-   Lab Results   Component Value Date    HGBA1C 8.4 (H) 12/13/2024     Most recent fingerstick glucose reviewed-   Recent Labs   Lab 03/18/25  1121 03/18/25  1246 03/18/25  1600 03/18/25  1832   POCTGLUCOSE 480* 406* 423* 354*     Current correctional scale  Medium  Maintain anti-hyperglycemic dose as follows-   Antihyperglycemics (From admission, onward)      Start     Stop Route Frequency Ordered    03/19/25 0115  insulin glargine U-100 (Lantus) pen 20 Units         -- SubQ Nightly 03/19/25 0006    03/19/25 0030  insulin aspart U-100 pen 0-10 Units         -- SubQ Before meals & nightly PRN 03/18/25 2330          Hold Oral hypoglycemics while patient is in the hospital.

## 2025-03-19 NOTE — PROGRESS NOTES
[facility] Medicine  Progress Note    Patient Name: Narda Person  MRN: [id number]  Patient Class: IP- Inpatient   Admission Date: 3/18/2025  Length of Stay: 1 days  Attending Physician: Verónica Charles MD  Primary Care Provider: Sirena Coleman MD        Subjective     Principal Problem:Colitis        HPI:  This is an 79-year-old female with a past medical history of dementia, hypertension, type 2 diabetes, hyperlipidemia, anxiety, depression, who presents with vomiting.     Patient presented to [facility] ED for evaluation of vomiting and diarrhea that started 2 days prior to presentation.  Patient was prescribed nitrofurantoin for UTI on 03/13, after which she developed nausea, vomiting and diarrhea.  Additional symptoms include blood in stool.  Patient is unable to contribute to the history, states that she does not remember what happened.  I was unable to reach both of her daughters on numbers listed in the chart.     In the ED, the patient was hypertensive (170s-190s/70s-80s). Labs were remarkable for leukocytosi (14.8), hyperglycemia (478 > 354) and positive stool occult blood. UA showed trace, >100 RBC, +2 protein, +3 glucose, +2 ketones, +3 blood.  CT abdomen and pelvis showed findings consistent with colitis.  Patient was given 2L of NS, ciprofloxacin, flagyl 500 mg IV, regular insulin 5 units IV, Phenergan 6.25 mg IV. She was admitted to [facility] for further management.     Overview/Hospital Course:  Ms Narda Person is a 79 y.o. woman with dementia, DM admitted with vomiting and diarrhea. Recently took nitrofurantoin for UTI. In ED, noted findings of colitis on CT. She was admitted and started zosyn. She still has LLQ. She has hematuria. Cultures are in process.     Interval History: No specific complaints.  Says that she was awake all night she was up and walking around.  She is hungry and is asking for coffee this morning.  No nausea, vomiting.  Nursing reports that she  has not had any diarrhea or vomiting overnight.    Review of Systems   Constitutional:  Negative for fever.   Respiratory:  Negative for shortness of breath.    Cardiovascular:  Negative for chest pain.   Gastrointestinal:  Positive for abdominal pain. Negative for diarrhea, nausea and vomiting.   Genitourinary:  Positive for hematuria.   Psychiatric/Behavioral:  Positive for confusion and sleep disturbance.      Objective:     Vital Signs (Most Recent):  Temp: 98.6 °F (37 °C) (03/19/25 0802)  Pulse: 86 (03/19/25 0802)  Resp: 18 (03/19/25 0401)  BP: 112/69 (03/19/25 0802)  SpO2: 95 % (03/19/25 0802) Vital Signs (24h Range):  Temp:  [98.4 °F (36.9 °C)-99.2 °F (37.3 °C)] 98.6 °F (37 °C)  Pulse:  [81-97] 86  Resp:  [18-20] 18  SpO2:  [95 %-100 %] 95 %  BP: (112-195)/(69-88) 112/69     Weight: 71.3 kg (157 lb 3 oz)  Body mass index is 28.75 kg/m².  No intake or output data in the 24 hours ending 03/19/25 0831      Physical Exam  Vitals and nursing note reviewed.   HENT:      Head: Normocephalic and atraumatic.      Nose: Nose normal.      Mouth/Throat:      Mouth: Mucous membranes are dry.   Cardiovascular:      Rate and Rhythm: Normal rate and regular rhythm.   Pulmonary:      Effort: Pulmonary effort is normal.      Breath sounds: Normal breath sounds.      Comments: Room air  Abdominal:      General: Bowel sounds are normal. There is no distension.      Palpations: Abdomen is soft.      Tenderness: There is abdominal tenderness (With deep palpation in left lower quadrant). There is no right CVA tenderness, left CVA tenderness, guarding or rebound.   Genitourinary:     Comments: Noted to have hematuria in diaper  Musculoskeletal:      Right lower leg: No edema.      Left lower leg: No edema.      Comments: Her right arm is in a sling   Skin:     General: Skin is warm and dry.   Neurological:      Mental Status: She is alert.      Comments: Oriented to self.  Able to name her daughters.  Not able to say where she is  but then does recognize that she is a long way away from [place] which is home.  She thinks it is 1923.               Significant Labs: All pertinent labs within the past 24 hours have been reviewed.    Significant Imaging: I have reviewed all pertinent imaging results/findings within the past 24 hours.      Assessment & Plan  Colitis  - Presents with vomiting/blood in stool after recent antibiotics  - CT abdomen and pelvis showed findings consistent with colitis.  - noted hematuria this morning though that may have been blood in very watery stool instead  - Continue Zosyn   - check stool studies      Diabetes mellitus, type 2  Patient's FSGs are uncontrolled due to hyperglycemia on current medication regimen.  Last A1c reviewed-   Lab Results   Component Value Date    HGBA1C 9.3 (H) 03/18/2025     Most recent fingerstick glucose reviewed-   Recent Labs   Lab 03/19/25  0056 03/19/25  0604 03/19/25  0612 03/19/25  0805   POCTGLUCOSE 263* 255* 389* 276*     Current correctional scale  Medium  Maintain anti-hyperglycemic dose as follows-   Antihyperglycemics (From admission, onward)      Start     Stop Route Frequency Ordered    03/19/25 1130  insulin aspart U-100 pen 5 Units         -- SubQ 3 times daily with meals 03/19/25 0829    03/19/25 0115  insulin glargine U-100 (Lantus) pen 20 Units         -- SubQ Nightly 03/19/25 0006    03/19/25 0030  insulin aspart U-100 pen 0-10 Units         -- SubQ Before meals & nightly PRN 03/18/25 2330          Hold Oral hypoglycemics while patient is in the hospital.  Essential hypertension  Patient's blood pressure range in the last 24 hours was: BP  Min: 112/69  Max: 195/88.The patient's inpatient anti-hypertensive regimen is listed below:  Current Antihypertensives  lisinopriL tablet 20 mg, Daily, Oral    Plan  - BP is controlled, no changes needed to their regimen  Gastroesophageal reflux disease without esophagitis  - continue Protonix     Alzheimer's disease, unspecified  (CODE)  - unknown baseline status.  Reviewed primary care notes which suggests that she is typically oriented and able to move around and eat but does have hallucinations  - currently no hallucinations  - called both of her daughters and unable to reach either of them  - Continue home Aricept  Anxiety and depression  - Continue Lexapro   Chronic kidney disease, stage 3a  Creatine stable for now. BMP reviewed- noted Estimated Creatinine Clearance: 51 mL/min (based on SCr of 0.8 mg/dL). according to latest data. Based on current GFR, CKD stage is stage 3 - GFR 30-59.  Monitor UOP and serial BMP and adjust therapy as needed. Renally dose meds. Avoid nephrotoxic medications and procedures.  - renal function is at baseline  Hypomagnesemia  Patient has Abnormal Magnesium: hypomagnesemia. Will continue to monitor electrolytes closely. Will replace the affected electrolytes and repeat labs to be done after interventions completed. The patient's magnesium results have been reviewed and are listed below.  Recent Labs   Lab 03/19/25  0502   MG 1.0*      VTE Risk Mitigation (From admission, onward)           Ordered     IP VTE HIGH RISK PATIENT  Once         03/18/25 2330     Place sequential compression device  Until discontinued         03/18/25 2330                    Discharge Planning   DWAYNE:      Code Status: Full Code   Medical Readiness for Discharge Date:          8:37 AM   Call jeffery Landon.  Voice mailbox full.  Unable to leave a message.  Called jeffery Calixto.  No answer.  Left a voice message requesting call back    1:18 PM  Called jeffery Calixto again- again forwarded to voicemail, left a message. Called jeffery Landon- no answer, unable to leave message due to full mailbox. Called sister Elyssa Miller- she did answer and said Barbi is on the way to the hospital. Will meet her when she arrives.     3:52 PM  No family at bedside throughout the day today. No further blood loss per nursing. Will continue to  monitor with current plan of care.       Verónica Charles MD  Department of Hospital Medicine   [facility]

## 2025-03-19 NOTE — ASSESSMENT & PLAN NOTE
Creatine stable for now. BMP reviewed- noted Estimated Creatinine Clearance: 51 mL/min (based on SCr of 0.8 mg/dL). according to latest data. Based on current GFR, CKD stage is stage 3 - GFR 30-59.  Monitor UOP and serial BMP and adjust therapy as needed. Renally dose meds. Avoid nephrotoxic medications and procedures.  - renal function is at baseline

## 2025-03-19 NOTE — PT/OT/SLP EVAL
"Occupational Therapy   Evaluation    Name: Narda Person  MRN: [id number]  Admitting Diagnosis: Colitis  Recent Surgery: * No surgery found *      Recommendations:     Discharge Recommendations: Low Intensity Therapy (and 24 hr significant assist)  Discharge Equipment Recommendations:  wheelchair, bedside commode, hospital bed  Barriers to discharge:  Decreased caregiver support    Assessment:     Narda Person is a 79 y.o. female with a medical diagnosis of Colitis.  She presents with Diagnoses of Colitis and Chest pain were pertinent to this visit. . Performance deficits affecting function: weakness, impaired endurance, impaired self care skills, impaired functional mobility, impaired balance, decreased upper extremity function, impaired cognition, decreased safety awareness, orthopedic precautions.      OT initial eval completed. Pt presenting with increased need of assist 2/2 dominant RUE found in sling and maintaining NWB; pt unable to ambulate on eval 2/2 dizziness sitting upright. Pt would benefit from skilled OT to maximize function prior to d/c. Recommend LIT once medically stable and 24 hr significant assist with w/c and hospital bed. Further DME recs ongoing, pending pt progress.    Rehab Prognosis: Good; patient would benefit from acute skilled OT services to address these deficits and reach maximum level of function.       Plan:     Patient to be seen 2 x/week to address the above listed problems via self-care/home management, therapeutic activities, therapeutic exercises  Plan of Care Expires: 04/02/25  Plan of Care Reviewed with: patient    Subjective     Chief Complaint: dizziness upon upright posture  Patient/Family Comments/goals: "I need to lie back down now"    Occupational Profile:  Living Environment: Lives with daughter who works during the day in [facility], [place]  Previous level of function: Mod I, per pt, although poor historian. Pt reporting was using rollator and SPC PRN, but not using either since " recent fall and RUE fx. Pt was d/baljit to SNF after d/c on 12/23/24  Equipment Used at Home: rollator, cane, straight  Assistance upon Discharge: daughter?    Pain/Comfort:  Pain Rating 1: 0/10  Pain Addressed 1: Reposition  Pain Rating Post-Intervention 1: 0/10    Objective:     Communicated with: RN prior to session.  Patient found HOB elevated with bed alarm, peripheral IV, PureWick upon OT entry to room.    General Precautions: Standard, fall  Orthopedic Precautions: RUE non weight bearing, per order. No clarification on protocol from ortho or family  Braces: UE Sling noted to be wet on arrival; RN notified of need of new sling  Respiratory Status: Room air    Occupational Performance:    Bed Mobility:    Patient completed Rolling/Turning to Left with  maximal assistance  Patient completed Scooting/Bridging with maximal assistance and 2 persons seated eob anteriorly and Mod A in supine to hob with vc/tcs on bridging technique  Patient completed Supine to Sit with maximal assistance and 2 persons  Patient completed Sit to Supine with maximal assistance    Functional Mobility/Transfers:  Functional Mobility: Upon sitting eob ~15 min for assessment, pt reporting dizziness. Instruction on forward gaze, PLB with symptoms not resolving and pt began to present diaphoretic appearance; pt transitioned with assist back into supine for OT to retrieve vitals machine. /97, HR 93, SpO2 95%    Activities of Daily Living:  Feeding:  contact guard assistance with setup and cut up food and positioned in front and to L for ease; towel placed with mod spillage noted and hand-under-hand PRN  Upper Body Dressing: maximal assistance to doff/re-don sling and reposition and change soiled gown with hemidressing technique  Lower Body Dressing: maximal assistance to fix socks    Cognitive/Visual Perceptual:  Cognitive/Psychosocial Skills:     -       Oriented to: Person and pleasantly confused   -       Follows  Commands/attention:Follows one-step commands and Follows two-step commands  -       Communication: clear/fluent  -       Memory: Impaired STM  -       Mood/Affect/Coping skills/emotional control: Cooperative and Pleasant    Physical Exam:  Balance:    -       Fair+ sit initially prior to dizziness  Postural examination/scapula alignment:    -       Rounded shoulders  -       Forward head  Skin integrity: Visible skin intact  Dominant hand:    -       R  Upper Extremity Range of Motion:     -       Right Upper Extremity: NT  -       Left Upper Extremity: WFL  Upper Extremity Strength:    -       Right Upper Extremity: NT  -       Left Upper Extremity: WFL   Strength:    -       Right Upper Extremity: WFL  -       Left Upper Extremity: WFL  Fine Motor Coordination:    -       Pt with fairly good LUE coordination although nondominant UE since RUE is NWB and in sling  Gross motor coordination: pt with fairly good coordination during reaching/grasp activity on 2/3 opportunities although undershooting 1/3    AMPAC 6 Click ADL:  AMPAC Total Score: 14    Treatment & Education:  Patient educated on role of OT/ POC development. Co-eval with PT to maximize function and safety. Occupational profile developed via interview. Patient guided to transition eob for assessment. Initiated ADL / functional mobility training as above with instruction on hand/feet placement and upright posture with safe use of LUE to assist with RUE NWB. Stand unable to be attempted this date, but will be attempted next visit. Educated patient on importance of out of bed mobility, movement/repositioning throughout the day, and call button use. Answered questions within scope, and all needs met.     Patient left left sidelying with all lines intact, call button in reach, bed alarm on, RN notified, and pillow to offload sacrum/trunk on R    GOALS:   Multidisciplinary Problems       Occupational Therapy Goals          Problem: Occupational Therapy     Goal Priority Disciplines Outcome Interventions   Occupational Therapy Goal     OT, PT/OT Progressing    Description: Goals to be met by: 4/2/2025     Patient will increase functional independence with ADLs by performing:    Feeding with Modified [place].  UE Dressing with Stand-by Assistance.  LE Dressing with Contact Guard Assistance.  Grooming while seated with Modified [place].  Toileting from bedside commode with Contact Guard Assistance for hygiene and clothing management.   Step transfer with Contact Guard Assistance  Toilet transfer to bedside commode with Contact Guard Assistance.                         DME Justifications:   Narda requires a commode for home use because she is confined to a single room.  Narda requires a hospital bed due to her requiring positioning of the body in ways not feasible with an ordinary bed due to limited ability and cannot independently make changes in body position without the use of the bed.The positioning of the body cannot be sufficiently resolved by the use of pillows and wedges.  Narda Person has a mobility limitation that significantly impairs her ability to participate in one or more mobility related activities of daily living (MRADLs) such as toileting, feeding, dressing, grooming, and bathing in customary locations in the home.  The mobility limitation cannot be sufficiently resolved by the use of a cane or walker.   The use of a manual wheelchair will significantly improve the patients ability to participate in MRADLS and the patient will use it on regular basis in the home.  Narda Person has expressed her willingness to use a manual wheelchair in the home. Patients upper body strength is sufficient for propulsion.  She also has a caregiver who is available, willing, and able to provide assistance with the wheelchair when needed.      History:     Past Medical History:   Diagnosis Date    Abnormal Pap smear     DIONNE (acute kidney injury) 6/29/2014    DIONNE  (acute kidney injury) 6/29/2014    Alzheimer's dementia 4/12/2018    Anemia     Breast cancer 1995    left breast    Breast disorder     Diabetes mellitus     Diabetes mellitus, type 2     ESSENTIAL HYPERTENSION 10/1/2012    Hypothyroid 6/29/2014    Pneumonia 01/16/2020         Past Surgical History:   Procedure Laterality Date    CATARACT EXTRACTION, BILATERAL      COLONOSCOPY N/A 8/10/2020    Procedure: COLONOSCOPY;  Surgeon: Guerda Perales MD;  Location: [facility];  Service: Endoscopy;  Laterality: N/A;    ESOPHAGOGASTRODUODENOSCOPY N/A 8/10/2020    Procedure: EGD (ESOPHAGOGASTRODUODENOSCOPY) WITH BIOPSY;  Surgeon: Guerda Perales MD;  Location: [facility];  Service: Endoscopy;  Laterality: N/A;    HYSTERECTOMY      masectomy      single left breast    MASTECTOMY Left     OOPHORECTOMY  1997    only 1       Time Tracking:     OT Date of Treatment: 03/19/25  OT Start Time: 1146  OT Stop Time: 1223  OT Total Time (min): 37 min PT coeval overlap    Billable Minutes:Evaluation 13  Self Care/Home Management 10  Therapeutic Activity 12    3/19/2025

## 2025-03-19 NOTE — ASSESSMENT & PLAN NOTE
- Presents with vomiting/blood in stool after recent antibiotics  - CT abdomen and pelvis showed findings consistent with colitis.  - noted hematuria this morning though that may have been blood in very watery stool instead  - Continue Zosyn   - check stool studies

## 2025-03-19 NOTE — ASSESSMENT & PLAN NOTE
- unknown baseline status.  Reviewed primary care notes which suggests that she is typically oriented and able to move around and eat but does have hallucinations  - currently no hallucinations  - called both of her daughters and unable to reach either of them  - Continue home Aricept

## 2025-03-19 NOTE — PLAN OF CARE
Problem: Physical Therapy  Goal: Physical Therapy Goal  Description: Goals to be met by: 25     Patient will increase functional independence with mobility by performin. Supine to sit with MInimal Assistance  2. Rolling to Left and Right with Minimal Assistance.  3. Bed to chair transfer with Minimal Assistance    4. Wheelchair propulsion x25 feet with Stand-by Assistance using bilateral uppper extremities  5. Lower extremity exercise program x10 reps per handout, with assistance as needed    Outcome: Progressing   Low Intensity therapy with 24hr care at home or at NH, W/C, and Hospital bed recommended.

## 2025-03-19 NOTE — HPI
This is an 79-year-old female with a past medical history of dementia, hypertension, type 2 diabetes, hyperlipidemia, anxiety, depression, who presents with vomiting.     Patient presented to [facility] ED for evaluation of vomiting and diarrhea that started 2 days prior to presentation.  Patient was prescribed nitrofurantoin for UTI on 03/13, after which she developed nausea, vomiting and diarrhea.  Additional symptoms include blood in stool.  Patient is unable to contribute to the history, states that she does not remember what happened.  I was unable to reach both of her daughters on numbers listed in the chart.     In the ED, the patient was hypertensive (170s-190s/70s-80s). Labs were remarkable for leukocytosi (14.8), hyperglycemia (478 > 354) and positive stool occult blood. UA showed trace, >100 RBC, +2 protein, +3 glucose, +2 ketones, +3 blood.  CT abdomen and pelvis showed findings consistent with colitis.  Patient was given 2L of NS, ciprofloxacin, flagyl 500 mg IV, regular insulin 5 units IV, Phenergan 6.25 mg IV. She was admitted to [facility] for further management.

## 2025-03-19 NOTE — ASSESSMENT & PLAN NOTE
Creatine stable for now. BMP reviewed- noted Estimated Creatinine Clearance: 40.8 mL/min (based on SCr of 1 mg/dL). according to latest data. Based on current GFR, CKD stage is stage 3 - GFR 30-59.  Monitor UOP and serial BMP and adjust therapy as needed. Renally dose meds. Avoid nephrotoxic medications and procedures.

## 2025-03-19 NOTE — NURSING
Patient arrived to floor via stretcher via transporter from [place]. Patient transferred to bed via x2 person assist. Alert & oriented. Patient was oriented to room, information on whiteboard, and medication regimen.  Bed low, adequate lighting provided, side rails x2 up, call bell within reach.  Admission assessment completed. VSS. Patient denied having any acute distress at this time. None observed. Will continue to monitor and follow treatment plan.     [facility]  Nurses Note -- 4 Eyes      3/19/2025       Skin assessed on: Admit      [x] No Pressure Injuries Present    [x]Prevention Measures Documented    [] Yes LDA  for Pressure Injury Previously documented     [] Yes New Pressure Injury Discovered   [] LDA for New Pressure Injury Added      Attending RN:  Carlos Eduardo Alvares LPN     Second RN:  ALINA Monroe

## 2025-03-19 NOTE — ASSESSMENT & PLAN NOTE
Patient's blood pressure range in the last 24 hours was: BP  Min: 112/69  Max: 195/88.The patient's inpatient anti-hypertensive regimen is listed below:  Current Antihypertensives  lisinopriL tablet 20 mg, Daily, Oral    Plan  - BP is controlled, no changes needed to their regimen

## 2025-03-19 NOTE — PLAN OF CARE
Problem: Adult Inpatient Plan of Care  Goal: Plan of Care Review  Outcome: Progressing  Goal: Patient-Specific Goal (Individualized)  Outcome: Progressing     Problem: Diabetes Comorbidity  Goal: Blood Glucose Level Within Targeted Range  Outcome: Progressing     Problem: Infection  Goal: Absence of Infection Signs and Symptoms  Outcome: Progressing     Problem: Fall Injury Risk  Goal: Absence of Fall and Fall-Related Injury  Outcome: Progressing

## 2025-03-19 NOTE — PLAN OF CARE
CM attempted to call both of pt's daughters for dc planning assessment, but neither answered. Voicemail left for Marily.     On chart review, it appears pt receives services with [facility] and lives with her daughter, Barbi.     03/19/25 1324   Discharge Assessment   Assessment Type Discharge Planning Assessment

## 2025-03-19 NOTE — PLAN OF CARE
Problem: Occupational Therapy  Goal: Occupational Therapy Goal  Description: Goals to be met by: 4/2/2025     Patient will increase functional independence with ADLs by performing:    Feeding with Modified [place].  UE Dressing with Stand-by Assistance.  LE Dressing with Contact Guard Assistance.  Grooming while seated with Modified [place].  Toileting from bedside commode with Contact Guard Assistance for hygiene and clothing management.   Step transfer with Contact Guard Assistance  Toilet transfer to bedside commode with Contact Guard Assistance.    Outcome: Progressing     OT initial eval completed. Pt presenting with increased need of assist 2/2 dominant RUE found in sling and maintaining NWB; pt unable to ambulate on eval 2/2 dizziness sitting upright. Pt would benefit from skilled OT to maximize function prior to d/c. Recommend LIT once medically stable and 24 hr significant assist with w/c and hospital bed. Further DME recs ongoing, pending pt progress.

## 2025-03-19 NOTE — PLAN OF CARE
Problem: Adult Inpatient Plan of Care  Goal: Plan of Care Review  Outcome: Progressing     Problem: Diabetes Comorbidity  Goal: Blood Glucose Level Within Targeted Range  Outcome: Progressing     Problem: Infection  Goal: Absence of Infection Signs and Symptoms  Outcome: Progressing     Problem: Fall Injury Risk  Goal: Absence of Fall and Fall-Related Injury  Outcome: Progressing

## 2025-03-19 NOTE — ASSESSMENT & PLAN NOTE
Patient has Abnormal Magnesium: hypomagnesemia. Will continue to monitor electrolytes closely. Will replace the affected electrolytes and repeat labs to be done after interventions completed. The patient's magnesium results have been reviewed and are listed below.  Recent Labs   Lab 03/19/25  0502   MG 1.0*

## 2025-03-19 NOTE — PT/OT/SLP EVAL
"Physical Therapy Evaluation    Patient Name:  Narda Person   MRN:  [id number]    Recommendations:     Discharge Recommendations: Low Intensity Therapy (and 24hr care at home or at NH)   Discharge Equipment Recommendations: wheelchair, hospital bed   Barriers to discharge:  Pt will require significant care at next level, Ortho consult?    Assessment:     Narda Person is a 79 y.o. female admitted with a medical diagnosis of Colitis.  She presents with the following impairments/functional limitations: weakness, decreased upper extremity function, decreased ROM, impaired endurance, impaired coordination, impaired balance, decreased safety awareness, pain, impaired self care skills, impaired cognition, impaired functional mobility .    Rehab Prognosis: Fair; patient would benefit from acute skilled PT services to address these deficits and reach maximum level of function.    Recent Surgery: * No surgery found *      Plan:     During this hospitalization, patient to be seen 3 x/week to address the identified rehab impairments via therapeutic exercises, neuromuscular re-education, therapeutic activities, wheelchair management/training and progress toward the following goals:    Plan of Care Expires:  04/02/25    Subjective     Chief Complaint: pain, dizziness sitting at EOB with OT  Patient/Family Comments/goals: Pt agreeable to evaluation, pleasantly confused, "I love David Alvares" playing softly in background  Pain/Comfort:  Pain Rating 1: 0/10    Patients cultural, spiritual, Muslim conflicts given the current situation: no    Living Environment:  Pt lives with her Daughter in a [place] with [place] per pt?  Discharged to SNF after D/C on 12/23/24  Prior to admission, patients level of function was Unknown.  Equipment used at home:  (Unknown, pt states that she wan't using anything).  DME owned (not currently used):  Unknown .  Upon discharge, patient will have assistance from Daughter?.    Objective:     Communicated with " Nsg prior to session.  Patient found HOB elevated with bed alarm, peripheral IV, PureWick  upon PT entry to room.    General Precautions: Standard, fall  Orthopedic Precautions: (R UE NWB per hospitalist Order, No clarification from family or Ortho)   Braces: UE Sling (Wet upon arrival, Nursing notified of need for new sling)  Respiratory Status: Room air    Exams:  Cognitive Exam:  Patient is oriented to Person, pleasantly confused, able to follow commands, decreased Short term memory  Gross Motor Coordination:  impaired 2/2 pain, R UE sling and weakness  Postural Exam:  Patient presented with the following abnormalities:    -       Rounded shoulders  -       Forward head  Skin Integrity/Edema:      -       Skin integrity: Visible skin intact  -       Edema: None noted   RLE ROM: WFL  RLE Strength: WFL  LLE ROM: WFL  LLE Strength: WFL    Functional Mobility:  Bed Mobility:     Rolling Left:  maximal assistance  Scooting: maximal assistance and of 2 persons  Supine to Sit: maximal assistance and of 2 persons  Sit to Supine: Per OT  Balance: Fair+/Fair sit      AM-PAC 6 CLICK MOBILITY  Total Score:8       Treatment & Education:  Music Therapy throughout session, pt able to sing and recite lyrics appropriately.  Pt sat at EOB with CGA while sling removed for skin inspection and to place dry sling.  Nursing notified of need for new sling meanwhile pt became dizzy and diaphoretic  sitting at EOB with OT.  OT returned pt to supine with /97, HR 93, SPO2 95%.  Pt repositioned to HOB with pillow on Right for offloading sacrum and supporting R UE.  Educated pt to perform AP's while in bed throughout the day and on PT POC    Patient left HOB elevated with all lines intact, call button in reach, and OT present.    GOALS:   Multidisciplinary Problems       Physical Therapy Goals          Problem: Physical Therapy    Goal Priority Disciplines Outcome Interventions   Physical Therapy Goal     PT, PT/OT Progressing     Description: Goals to be met by: 25     Patient will increase functional independence with mobility by performin. Supine to sit with MInimal Assistance  2. Rolling to Left and Right with Minimal Assistance.  3. Bed to chair transfer with Minimal Assistance    4. Wheelchair propulsion x25 feet with Stand-by Assistance using bilateral uppper extremities  5. Lower extremity exercise program x10 reps per handout, with assistance as needed                         DME Justifications:  Narda requires a hospital bed due to her requiring positioning of the body in ways not feasible with an ordinary bed due to limited ability and cannot independently make changes in body position without the use of the bed.The positioning of the body cannot be sufficiently resolved by the use of pillows and wedges.  Narda Person has a mobility limitation that significantly impairs her ability to participate in one or more mobility related activities of daily living (MRADLs) such as toileting, feeding, dressing, grooming, and bathing in customary locations in the home.  The mobility limitation cannot be sufficiently resolved by the use of a cane or walker.   The use of a manual wheelchair will significantly improve the patients ability to participate in MRADLS and the patient will use it on regular basis in the home.  Narda Person has expressed her willingness to use a manual wheelchair in the home. Patients upper body strength is sufficient for propulsion.  She also has a caregiver who is available, willing, and able to provide assistance with the wheelchair when needed.      History:     Past Medical History:   Diagnosis Date    Abnormal Pap smear     DIONNE (acute kidney injury) 2014    DIONNE (acute kidney injury) 2014    Alzheimer's dementia 2018    Anemia     Breast cancer 1995    left breast    Breast disorder     Diabetes mellitus     Diabetes mellitus, type 2     ESSENTIAL HYPERTENSION 10/1/2012    Hypothyroid  6/29/2014    Pneumonia 01/16/2020       Past Surgical History:   Procedure Laterality Date    CATARACT EXTRACTION, BILATERAL      COLONOSCOPY N/A 8/10/2020    Procedure: COLONOSCOPY;  Surgeon: Guerda Perales MD;  Location: [facility];  Service: Endoscopy;  Laterality: N/A;    ESOPHAGOGASTRODUODENOSCOPY N/A 8/10/2020    Procedure: EGD (ESOPHAGOGASTRODUODENOSCOPY) WITH BIOPSY;  Surgeon: Guerda Perales MD;  Location: [facility];  Service: Endoscopy;  Laterality: N/A;    HYSTERECTOMY      masectomy      single left breast    MASTECTOMY Left     OOPHORECTOMY  1997    only 1       Time Tracking:     PT Received On: 03/19/25  PT Start Time: 1144     PT Stop Time: 1213  PT Total Time (min): 29 min     Billable Minutes: Evaluation 15 and Therapeutic Activity 14      03/19/2025

## 2025-03-20 PROBLEM — E83.42 HYPOMAGNESEMIA: Status: RESOLVED | Noted: 2025-03-19 | Resolved: 2025-03-20

## 2025-03-20 PROBLEM — N17.9 ACUTE RENAL FAILURE SUPERIMPOSED ON STAGE 3A CHRONIC KIDNEY DISEASE: Status: ACTIVE | Noted: 2022-09-07

## 2025-03-20 LAB
ANION GAP SERPL CALC-SCNC: 9 MMOL/L (ref 8–16)
BACTERIA #/AREA URNS HPF: ABNORMAL /HPF
BASOPHILS # BLD AUTO: 0.08 K/UL (ref 0–0.2)
BASOPHILS NFR BLD: 0.8 % (ref 0–1.9)
BILIRUB UR QL STRIP: NEGATIVE
BUN SERPL-MCNC: 36 MG/DL (ref 8–23)
C DIFF GDH STL QL: NEGATIVE
C DIFF TOX A+B STL QL IA: NEGATIVE
CALCIUM SERPL-MCNC: 8 MG/DL (ref 8.7–10.5)
CAOX CRY URNS QL MICRO: ABNORMAL
CHLORIDE SERPL-SCNC: 101 MMOL/L (ref 95–110)
CK SERPL-CCNC: 19 U/L (ref 20–180)
CLARITY UR: ABNORMAL
CO2 SERPL-SCNC: 22 MMOL/L (ref 23–29)
COLOR UR: YELLOW
CREAT SERPL-MCNC: 1.7 MG/DL (ref 0.5–1.4)
CREAT UR-MCNC: 169.2 MG/DL (ref 15–325)
DIFFERENTIAL METHOD BLD: ABNORMAL
EOSINOPHIL # BLD AUTO: 0.1 K/UL (ref 0–0.5)
EOSINOPHIL NFR BLD: 1.4 % (ref 0–8)
ERYTHROCYTE [DISTWIDTH] IN BLOOD BY AUTOMATED COUNT: 12.7 % (ref 11.5–14.5)
EST. GFR  (NO RACE VARIABLE): 30 ML/MIN/1.73 M^2
FERRITIN SERPL-MCNC: 89 NG/ML (ref 20–300)
FOLATE SERPL-MCNC: 11.6 NG/ML (ref 4–24)
GLUCOSE SERPL-MCNC: 252 MG/DL (ref 70–110)
GLUCOSE UR QL STRIP: ABNORMAL
HCT VFR BLD AUTO: 33.6 % (ref 37–48.5)
HGB BLD-MCNC: 11.5 G/DL (ref 12–16)
HGB UR QL STRIP: ABNORMAL
HYALINE CASTS #/AREA URNS LPF: 0 /LPF
IMM GRANULOCYTES # BLD AUTO: 0.03 K/UL (ref 0–0.04)
IMM GRANULOCYTES NFR BLD AUTO: 0.3 % (ref 0–0.5)
IRON SERPL-MCNC: 56 UG/DL (ref 30–160)
KETONES UR QL STRIP: ABNORMAL
LEUKOCYTE ESTERASE UR QL STRIP: ABNORMAL
LYMPHOCYTES # BLD AUTO: 2 K/UL (ref 1–4.8)
LYMPHOCYTES NFR BLD: 20.1 % (ref 18–48)
MAGNESIUM SERPL-MCNC: 1.9 MG/DL (ref 1.6–2.6)
MCH RBC QN AUTO: 30.7 PG (ref 27–31)
MCHC RBC AUTO-ENTMCNC: 34.2 G/DL (ref 32–36)
MCV RBC AUTO: 90 FL (ref 82–98)
MICROSCOPIC COMMENT: ABNORMAL
MONOCYTES # BLD AUTO: 0.7 K/UL (ref 0.3–1)
MONOCYTES NFR BLD: 7.3 % (ref 4–15)
NEUTROPHILS # BLD AUTO: 7.1 K/UL (ref 1.8–7.7)
NEUTROPHILS NFR BLD: 70.1 % (ref 38–73)
NITRITE UR QL STRIP: NEGATIVE
NON-SQ EPI CELLS #/AREA URNS HPF: 2 /HPF
NRBC BLD-RTO: 0 /100 WBC
PH UR STRIP: 5 [PH] (ref 5–8)
PLATELET # BLD AUTO: 168 K/UL (ref 150–450)
PMV BLD AUTO: 10.6 FL (ref 9.2–12.9)
POCT GLUCOSE: 139 MG/DL (ref 70–110)
POCT GLUCOSE: 151 MG/DL (ref 70–110)
POCT GLUCOSE: 245 MG/DL (ref 70–110)
POCT GLUCOSE: 253 MG/DL (ref 70–110)
POTASSIUM SERPL-SCNC: 3.4 MMOL/L (ref 3.5–5.1)
PROT UR QL STRIP: ABNORMAL
PROT UR-MCNC: 49 MG/DL
PROT/CREAT UR: 0.29 MG/G{CREAT} (ref 0–0.2)
RBC # BLD AUTO: 3.75 M/UL (ref 4–5.4)
RBC #/AREA URNS HPF: 37 /HPF (ref 0–4)
RETICS/RBC NFR AUTO: 1.6 % (ref 0.5–2.5)
RV AG STL QL IA.RAPID: NEGATIVE
SATURATED IRON: 23 % (ref 20–50)
SODIUM SERPL-SCNC: 132 MMOL/L (ref 136–145)
SP GR UR STRIP: 1.02 (ref 1–1.03)
SQUAMOUS #/AREA URNS HPF: 2 /HPF
TOTAL IRON BINDING CAPACITY: 243 UG/DL (ref 250–450)
TRANSFERRIN SERPL-MCNC: 164 MG/DL (ref 200–375)
URN SPEC COLLECT METH UR: ABNORMAL
UROBILINOGEN UR STRIP-ACNC: NEGATIVE EU/DL
VIT B12 SERPL-MCNC: 357 PG/ML (ref 210–950)
WBC # BLD AUTO: 10.07 K/UL (ref 3.9–12.7)
WBC #/AREA STL HPF: NORMAL /[HPF]
WBC #/AREA URNS HPF: 47 /HPF (ref 0–5)

## 2025-03-20 PROCEDURE — 87088 URINE BACTERIA CULTURE: CPT | Mod: HCNC | Performed by: HOSPITALIST

## 2025-03-20 PROCEDURE — 87324 CLOSTRIDIUM AG IA: CPT | Mod: HCNC | Performed by: HOSPITALIST

## 2025-03-20 PROCEDURE — 82550 ASSAY OF CK (CPK): CPT | Mod: HCNC | Performed by: HOSPITALIST

## 2025-03-20 PROCEDURE — 87507 IADNA-DNA/RNA PROBE TQ 12-25: CPT | Mod: HCNC | Performed by: NURSE PRACTITIONER

## 2025-03-20 PROCEDURE — 82746 ASSAY OF FOLIC ACID SERUM: CPT | Mod: HCNC | Performed by: HOSPITALIST

## 2025-03-20 PROCEDURE — 87046 STOOL CULTR AEROBIC BACT EA: CPT | Mod: HCNC | Performed by: HOSPITALIST

## 2025-03-20 PROCEDURE — 97110 THERAPEUTIC EXERCISES: CPT | Mod: HCNC

## 2025-03-20 PROCEDURE — 82728 ASSAY OF FERRITIN: CPT | Mod: HCNC | Performed by: HOSPITALIST

## 2025-03-20 PROCEDURE — 63600175 PHARM REV CODE 636 W HCPCS: Mod: HCNC | Performed by: STUDENT IN AN ORGANIZED HEALTH CARE EDUCATION/TRAINING PROGRAM

## 2025-03-20 PROCEDURE — 83735 ASSAY OF MAGNESIUM: CPT | Mod: HCNC | Performed by: HOSPITALIST

## 2025-03-20 PROCEDURE — 80048 BASIC METABOLIC PNL TOTAL CA: CPT | Mod: HCNC | Performed by: HOSPITALIST

## 2025-03-20 PROCEDURE — 87045 FECES CULTURE AEROBIC BACT: CPT | Mod: HCNC | Performed by: HOSPITALIST

## 2025-03-20 PROCEDURE — 87329 GIARDIA AG IA: CPT | Mod: HCNC | Performed by: HOSPITALIST

## 2025-03-20 PROCEDURE — 89055 LEUKOCYTE ASSESSMENT FECAL: CPT | Mod: HCNC | Performed by: HOSPITALIST

## 2025-03-20 PROCEDURE — 36415 COLL VENOUS BLD VENIPUNCTURE: CPT | Mod: HCNC | Performed by: HOSPITALIST

## 2025-03-20 PROCEDURE — 97530 THERAPEUTIC ACTIVITIES: CPT | Mod: HCNC

## 2025-03-20 PROCEDURE — 84156 ASSAY OF PROTEIN URINE: CPT | Mod: HCNC | Performed by: HOSPITALIST

## 2025-03-20 PROCEDURE — 87186 SC STD MICRODIL/AGAR DIL: CPT | Mod: 59,HCNC | Performed by: HOSPITALIST

## 2025-03-20 PROCEDURE — 11000001 HC ACUTE MED/SURG PRIVATE ROOM: Mod: HCNC

## 2025-03-20 PROCEDURE — 87209 SMEAR COMPLEX STAIN: CPT | Mod: HCNC | Performed by: HOSPITALIST

## 2025-03-20 PROCEDURE — 25000003 PHARM REV CODE 250: Mod: HCNC | Performed by: STUDENT IN AN ORGANIZED HEALTH CARE EDUCATION/TRAINING PROGRAM

## 2025-03-20 PROCEDURE — 82607 VITAMIN B-12: CPT | Mod: HCNC | Performed by: HOSPITALIST

## 2025-03-20 PROCEDURE — 84466 ASSAY OF TRANSFERRIN: CPT | Mod: HCNC | Performed by: HOSPITALIST

## 2025-03-20 PROCEDURE — 25000003 PHARM REV CODE 250: Mod: HCNC | Performed by: HOSPITALIST

## 2025-03-20 PROCEDURE — 87427 SHIGA-LIKE TOXIN AG IA: CPT | Mod: 59,HCNC | Performed by: HOSPITALIST

## 2025-03-20 PROCEDURE — 81000 URINALYSIS NONAUTO W/SCOPE: CPT | Mod: HCNC | Performed by: HOSPITALIST

## 2025-03-20 PROCEDURE — 87425 ROTAVIRUS AG IA: CPT | Mod: HCNC | Performed by: HOSPITALIST

## 2025-03-20 PROCEDURE — 87449 NOS EACH ORGANISM AG IA: CPT | Mod: 91,HCNC | Performed by: HOSPITALIST

## 2025-03-20 PROCEDURE — 85025 COMPLETE CBC W/AUTO DIFF WBC: CPT | Mod: HCNC | Performed by: HOSPITALIST

## 2025-03-20 PROCEDURE — 87086 URINE CULTURE/COLONY COUNT: CPT | Mod: HCNC | Performed by: HOSPITALIST

## 2025-03-20 PROCEDURE — 99223 1ST HOSP IP/OBS HIGH 75: CPT | Mod: HCNC,,, | Performed by: NURSE PRACTITIONER

## 2025-03-20 PROCEDURE — 94761 N-INVAS EAR/PLS OXIMETRY MLT: CPT | Mod: HCNC

## 2025-03-20 PROCEDURE — 85045 AUTOMATED RETICULOCYTE COUNT: CPT | Mod: HCNC | Performed by: HOSPITALIST

## 2025-03-20 RX ORDER — INSULIN GLARGINE 100 [IU]/ML
35 INJECTION, SOLUTION SUBCUTANEOUS NIGHTLY
Status: DISCONTINUED | OUTPATIENT
Start: 2025-03-20 | End: 2025-03-20

## 2025-03-20 RX ORDER — POTASSIUM CHLORIDE 20 MEQ/1
40 TABLET, EXTENDED RELEASE ORAL ONCE
Status: COMPLETED | OUTPATIENT
Start: 2025-03-20 | End: 2025-03-20

## 2025-03-20 RX ORDER — SODIUM CHLORIDE 9 MG/ML
INJECTION, SOLUTION INTRAVENOUS CONTINUOUS
Status: DISCONTINUED | OUTPATIENT
Start: 2025-03-20 | End: 2025-03-21

## 2025-03-20 RX ORDER — INSULIN ASPART 100 [IU]/ML
12 INJECTION, SOLUTION INTRAVENOUS; SUBCUTANEOUS
Status: DISCONTINUED | OUTPATIENT
Start: 2025-03-20 | End: 2025-03-21

## 2025-03-20 RX ORDER — INSULIN GLARGINE 100 [IU]/ML
40 INJECTION, SOLUTION SUBCUTANEOUS NIGHTLY
Status: DISCONTINUED | OUTPATIENT
Start: 2025-03-20 | End: 2025-03-23

## 2025-03-20 RX ADMIN — POTASSIUM CHLORIDE 40 MEQ: 1500 TABLET, EXTENDED RELEASE ORAL at 10:03

## 2025-03-20 RX ADMIN — ATORVASTATIN CALCIUM 20 MG: 10 TABLET, FILM COATED ORAL at 08:03

## 2025-03-20 RX ADMIN — INSULIN ASPART 10 UNITS: 100 INJECTION, SOLUTION INTRAVENOUS; SUBCUTANEOUS at 08:03

## 2025-03-20 RX ADMIN — PANTOPRAZOLE SODIUM 40 MG: 40 INJECTION, POWDER, FOR SOLUTION INTRAVENOUS at 09:03

## 2025-03-20 RX ADMIN — ESCITALOPRAM OXALATE 10 MG: 10 TABLET ORAL at 08:03

## 2025-03-20 RX ADMIN — INSULIN ASPART 2 UNITS: 100 INJECTION, SOLUTION INTRAVENOUS; SUBCUTANEOUS at 04:03

## 2025-03-20 RX ADMIN — SODIUM CHLORIDE: 9 INJECTION, SOLUTION INTRAVENOUS at 08:03

## 2025-03-20 RX ADMIN — INSULIN ASPART 12 UNITS: 100 INJECTION, SOLUTION INTRAVENOUS; SUBCUTANEOUS at 12:03

## 2025-03-20 RX ADMIN — Medication 6 MG: at 09:03

## 2025-03-20 RX ADMIN — SODIUM CHLORIDE: 9 INJECTION, SOLUTION INTRAVENOUS at 10:03

## 2025-03-20 RX ADMIN — PIPERACILLIN SODIUM AND TAZOBACTAM SODIUM 4.5 G: 4; .5 INJECTION, POWDER, FOR SOLUTION INTRAVENOUS at 05:03

## 2025-03-20 RX ADMIN — ONDANSETRON 4 MG: 2 INJECTION INTRAMUSCULAR; INTRAVENOUS at 02:03

## 2025-03-20 RX ADMIN — INSULIN ASPART 4 UNITS: 100 INJECTION, SOLUTION INTRAVENOUS; SUBCUTANEOUS at 08:03

## 2025-03-20 RX ADMIN — PIPERACILLIN SODIUM AND TAZOBACTAM SODIUM 4.5 G: 4; .5 INJECTION, POWDER, FOR SOLUTION INTRAVENOUS at 12:03

## 2025-03-20 RX ADMIN — PIPERACILLIN SODIUM AND TAZOBACTAM SODIUM 4.5 G: 4; .5 INJECTION, POWDER, FOR SOLUTION INTRAVENOUS at 08:03

## 2025-03-20 RX ADMIN — PANTOPRAZOLE SODIUM 40 MG: 40 INJECTION, POWDER, FOR SOLUTION INTRAVENOUS at 08:03

## 2025-03-20 RX ADMIN — INSULIN ASPART 12 UNITS: 100 INJECTION, SOLUTION INTRAVENOUS; SUBCUTANEOUS at 04:03

## 2025-03-20 RX ADMIN — INSULIN ASPART 6 UNITS: 100 INJECTION, SOLUTION INTRAVENOUS; SUBCUTANEOUS at 12:03

## 2025-03-20 RX ADMIN — INSULIN GLARGINE 40 UNITS: 100 INJECTION, SOLUTION SUBCUTANEOUS at 09:03

## 2025-03-20 RX ADMIN — DONEPEZIL HYDROCHLORIDE 10 MG: 10 TABLET ORAL at 09:03

## 2025-03-20 NOTE — ASSESSMENT & PLAN NOTE
Patient's blood pressure range in the last 24 hours was: BP  Min: 91/56  Max: 108/68.The patient's inpatient anti-hypertensive regimen is listed below:  Current Antihypertensives       Plan  - BP is controlled, no changes needed to their regimen

## 2025-03-20 NOTE — ASSESSMENT & PLAN NOTE
- Presents with vomiting/blood in stool after recent antibiotics  - CT abdomen and pelvis showed findings consistent with colitis.  - Continue Zosyn   - no further stool to check stool studies  - at some point in time will need to discuss potential C scope with GI if this is within goals of care

## 2025-03-20 NOTE — PLAN OF CARE
Case Management Assessment     PCP: Sirena Coleman MD    Pharmacy:   [id number] - [place] - [address] [facility] [address]  [address] [address]  [place] [address]  Phone: [phone] Fax: [phone]      Patient Arrived From: Home  Existing Help at Home: pt's daughters    Barriers to Discharge: Limited communication with family    Discharge Plan:    A. Home health resumption w/ [facility]   B. Home with family    CM attempted to contact pt's daughters Marily & Barbi, but there was no answer. I met with pt for dc planning assessment; pt able to answer all questions appropriately. Pt lives with her daughter, Barbi, and needs assistance with most ADLs. She uses a cane or walker for ambulation and her daughters assist with bathing and dressing. Her daughters or her son will provide transportation home on discharge. CM will continue to follow.    Of note - I asked pt what happened to her arm, & she stated she fell. I asked if she broke anything and she stated she thinks she broke her arm below her elbow. Notified Dr. Charles.     03/20/25 1350   Discharge Assessment   Assessment Type Discharge Planning Assessment   Confirmed/corrected address, phone number and insurance Yes   Confirmed Demographics Correct on Facesheet   Source of Information patient   Communicated DWAYNE with patient/caregiver Date not available/Unable to determine   People in Home child(lou), adult   Do you expect to return to your current living situation? Yes   Do you have help at home or someone to help you manage your care at home? Yes   Who are your caregiver(s) and their phone number(s)? daughters, Barbi & Marily   Current cognitive status: Alert/Oriented   Walking or Climbing Stairs Difficulty yes   Walking or Climbing Stairs ambulation difficulty, requires equipment   Mobility Management cane, rolling walker   Dressing/Bathing Difficulty yes   Dressing/Bathing bathing difficulty, assistance 1 person;dressing difficulty, assistance 1  person   Dressing/Bathing Management daughters assist   Equipment Currently Used at Home cane, straight;walker, rolling   Readmission within 30 days? No   Patient currently being followed by outpatient case management? No   Do you currently have service(s) that help you manage your care at home? Yes   Name and Contact number of agency [facility]   Is the pt/caregiver preference to resume services with current agency Yes   Do you take prescription medications? Yes   Do you have prescription coverage? Yes   Coverage [employer]   Do you have any problems affording any of your prescribed medications? No   Is the patient taking medications as prescribed? yes   Who is going to help you get home at discharge? family   How do you get to doctors appointments? family or friend will provide   Are you on dialysis? No   Do you take coumadin? No   Discharge Plan A Home Health   Discharge Plan B Home with family   DME Needed Upon Discharge  other (see comments)  (TBD)   Discharge Plan discussed with: Patient   Transition of Care Barriers Other (see comments)  (limited communication with family)

## 2025-03-20 NOTE — PROGRESS NOTES
Surgery Progress Note    with PMH of dementia, hypertension, type 2 diabetes, hyperlipidemia, anxiety, depression, who presents with vomiting. Patient denied abdominal pain. No nausea or vomiting today. However, she had recent episodes of diarrhea per her chart. Recent history of UTI, treated with nitrofurantoin. WBCs 14.8 on presentation.   CT abdomen and pelvis noted concerns over colitis. Surgery consulted for evaluation.     Interval history:  Limited by the patient dementia.     ROS:  Negative except above    PE:  Vitals:    03/19/25 2300 03/20/25 0420 03/20/25 0803 03/20/25 1204   BP: 105/62 (!) 91/56 (!) 99/54 108/68   BP Location: Left arm Left arm Left arm Right arm   Patient Position: Lying Lying Lying Lying   Pulse: 97 78 76 73   Resp: 18 18  18   Temp:   97.9 °F (36.6 °C) 98 °F (36.7 °C)   TempSrc:   Oral Oral   SpO2: (!) 91% 96% (!) 91% (!) 93%   Weight:       Height:           NAD  No belabored breathing  No skin changes  Abd soft. Left lower quadrant tenderness to deep palpation. No rebound tenderness.     Significant Diagnostic Studies: N/A    A/P:  Patient is a 79 y.o. female with PMH of dementia, hypertension, type 2 diabetes, hyperlipidemia, anxiety, depression, who presents with vomiting. WBCs 14.8 on presentation. CT abdomen and pelvis noted concerns over colitis. Surgery consulted for evaluation.   Abdomen is soft with mild tenderness at the left lower quadrant.      Plan  - No acute surgical interventions at this time.   - Serial abdominal examinations.   - Daily CBC, CMP  - Ok for DVT ppx  - Consult GI while inpatient for evaluation of the lower GI bleeding with colonoscopy.   - If the patient has persistent left lower quadrant abdominal pain, consider repeating CT abdomen and pelvis with rectal contrast.   - Surgery will continue to follow.        [facility] Surgery - [facility]  3/20/2025      '

## 2025-03-20 NOTE — ASSESSMENT & PLAN NOTE
Patient has Abnormal Magnesium: hypomagnesemia. Will continue to monitor electrolytes closely. Will replace the affected electrolytes and repeat labs to be done after interventions completed. The patient's magnesium results have been reviewed and are listed below.  Recent Labs   Lab 03/20/25  0717   MG 1.9

## 2025-03-20 NOTE — PROGRESS NOTES
[facility] Medicine  Progress Note    Patient Name: Narda Person  MRN: [id number]  Patient Class: IP- Inpatient   Admission Date: 3/18/2025  Length of Stay: 2 days  Attending Physician: Verónica Charles MD  Primary Care Provider: Sirena Coleman MD        Subjective     Principal Problem:Colitis        HPI:  This is an 79-year-old female with a past medical history of dementia, hypertension, type 2 diabetes, hyperlipidemia, anxiety, depression, who presents with vomiting.     Patient presented to [place] ED for evaluation of vomiting and diarrhea that started 2 days prior to presentation.  Patient was prescribed nitrofurantoin for UTI on 03/13, after which she developed nausea, vomiting and diarrhea.  Additional symptoms include blood in stool.  Patient is unable to contribute to the history, states that she does not remember what happened.  I was unable to reach both of her daughters on numbers listed in the chart.     In the ED, the patient was hypertensive (170s-190s/70s-80s). Labs were remarkable for leukocytosi (14.8), hyperglycemia (478 > 354) and positive stool occult blood. UA showed trace, >100 RBC, +2 protein, +3 glucose, +2 ketones, +3 blood.  CT abdomen and pelvis showed findings consistent with colitis.  Patient was given 2L of NS, ciprofloxacin, flagyl 500 mg IV, regular insulin 5 units IV, Phenergan 6.25 mg IV. She was admitted to [facility] for further management.     Overview/Hospital Course:  Ms Narda Person is a 79 y.o. woman with dementia, DM admitted with vomiting and diarrhea. Recently took nitrofurantoin for UTI. In ED, noted findings of colitis on CT. She was admitted and started zosyn. She still has LLQ pain but no vomiting, no diarrhea, and tolerating PO intake. She had hematuria, now improving. She developed DIONNE on 3/20/25; started IVF.     She also has R arm sling in place- daughter states this is from shoulder out of place/broken after a fall about a month  ago. Repeat XR ordered. NWB RUE until this has returned. PT, OT consulted.     Interval History:  This morning but awakens easily.  There were no issues overnight.  Nursing reports no vomiting and no diarrhea.  No further blood loss noted.  She is oriented to self but not to location or situation.  She does have abdominal pain and nausea.     Daughter Barbi at bedside.  She states that her mother was hospitalized for about 2 weeks for hallucinations that have since been attributed to dementia.  After that she went to skilled nursing facility and then home with home health.  She had regained strength but started walking especially at night and fell hitting her face and her right shoulder.  This was about a month ago.  The shoulder was found to be broken in 3 places per her report and she has been in the sling ever since.  Then she developed urinary tract symptoms and was started on antibiotics.  She then had nausea vomiting and diarrhea with blood loss from an unknown location-unknown if urinary or stool or vaginal loss.  This prompted them coming to the hospital.    Review of Systems   Constitutional:  Negative for fever.   Respiratory:  Negative for shortness of breath.    Cardiovascular:  Negative for chest pain.   Gastrointestinal:  Positive for abdominal pain and nausea. Negative for diarrhea and vomiting.   Psychiatric/Behavioral:  Positive for confusion.      Objective:     Vital Signs (Most Recent):  Temp: 97.9 °F (36.6 °C) (03/20/25 0803)  Pulse: 76 (03/20/25 0803)  Resp: 18 (03/20/25 0420)  BP: (!) 99/54 (03/20/25 0803)  SpO2: (!) 91 % (03/20/25 0803) Vital Signs (24h Range):  Temp:  [97.9 °F (36.6 °C)-98.9 °F (37.2 °C)] 97.9 °F (36.6 °C)  Pulse:  [76-97] 76  Resp:  [18] 18  SpO2:  [91 %-96 %] 91 %  BP: ()/(48-68) 99/54     Weight: 71.3 kg (157 lb 3 oz)  Body mass index is 28.75 kg/m².    Intake/Output Summary (Last 24 hours) at 3/20/2025 1027  Last data filed at 3/20/2025 0949  Gross per 24 hour    Intake 804.16 ml   Output --   Net 804.16 ml         Physical Exam  Vitals and nursing note reviewed.   HENT:      Head: Normocephalic and atraumatic.      Nose: Nose normal.      Mouth/Throat:      Mouth: Mucous membranes are moist.   Cardiovascular:      Rate and Rhythm: Normal rate and regular rhythm.   Pulmonary:      Effort: Pulmonary effort is normal.      Breath sounds: Normal breath sounds.      Comments: Room air  Abdominal:      General: Bowel sounds are normal. There is no distension.      Palpations: Abdomen is soft.      Tenderness: There is abdominal tenderness (With deep palpation in left lower quadrant). There is no right CVA tenderness, left CVA tenderness, guarding or rebound.   Musculoskeletal:      Right lower leg: No edema.      Left lower leg: No edema.      Comments: Her right arm is in a sling   Skin:     General: Skin is warm and dry.   Neurological:      Mental Status: She is alert.      Comments: Oriented to self.  Recognizes her daughter.  Not oriented to location or situation or date               Significant Labs: All pertinent labs within the past 24 hours have been reviewed.    Significant Imaging: I have reviewed all pertinent imaging results/findings within the past 24 hours.      Assessment & Plan  Colitis  - Presents with vomiting/blood in stool after recent antibiotics  - CT abdomen and pelvis showed findings consistent with colitis.  - Continue Zosyn   - no further stool to check stool studies  - at some point in time will need to discuss potential C scope with GI if this is within goals of care    Diabetes mellitus, type 2  Patient's FSGs are uncontrolled due to hyperglycemia on current medication regimen.  Last A1c reviewed-   Lab Results   Component Value Date    HGBA1C 9.3 (H) 03/18/2025     Most recent fingerstick glucose reviewed-   Recent Labs   Lab 03/19/25  1135 03/19/25  1711 03/19/25  1946 03/20/25  0759   POCTGLUCOSE 490* 388* 200* 245*     Current correctional  scale  Medium  Maintain anti-hyperglycemic dose as follows-   Antihyperglycemics (From admission, onward)      Start     Stop Route Frequency Ordered    03/20/25 2100  insulin glargine U-100 (Lantus) pen 40 Units         -- SubQ Nightly 03/20/25 1029    03/20/25 1130  insulin aspart U-100 pen 12 Units         -- SubQ 3 times daily with meals 03/20/25 0848    03/19/25 0030  insulin aspart U-100 pen 0-10 Units         -- SubQ Before meals & nightly PRN 03/18/25 2330          Hold Oral hypoglycemics while patient is in the hospital.  Gastroesophageal reflux disease without esophagitis  - continue Protonix   - no prior EGD to review    Alzheimer's disease, unspecified (CODE)  - at baseline she is oriented to self and recognizes family.  Reviewed primary care notes which suggests that she is typically oriented and able to move around and eat but does have hallucinations  - Continue home Aricept  Anxiety and depression  - Continue Lexapro   Acute renal failure superimposed on stage 3a chronic kidney disease  Cr on admit: 1. Baseline Cr 1   - developed DIONNE on 03/20 with creatinine rise to 1.7  - Suspect due to colitis    This is currently worsening. Current: Estimated Creatinine Clearance: 24 mL/min (A) (based on SCr of 1.7 mg/dL (H)).  - Check UA, CK, UPC  - IVF x24 hours  - Avoid nephrotoxins, renal dose all meds.   - Monitor with BMP in AM    Hypomagnesemia (Resolved: 3/20/2025)  Patient has Abnormal Magnesium: hypomagnesemia. Will continue to monitor electrolytes closely. Will replace the affected electrolytes and repeat labs to be done after interventions completed. The patient's magnesium results have been reviewed and are listed below.  Recent Labs   Lab 03/20/25  0717   MG 1.9      Hypokalemia  Patient's most recent potassium results are listed below.   Recent Labs     03/19/25  0502 03/19/25  0710 03/20/25  0717   K 3.4* 4.0 3.4*     Plan  - Replete potassium per protocol  - Monitor potassium Daily  - Patient's  hypokalemia is improving  - Mag now normal  Normocytic anemia  Anemia is likely due to  unknown.  Suspect she was hemoconcentrated and dehydrated upon admission.  Hemoglobin has dropped daily.  She did have prior signs of blood loss. . Most recent hemoglobin and hematocrit are listed below.  Recent Labs     03/18/25  1058 03/19/25  0502 03/20/25  0717   HGB 15.4 13.5 11.5*   HCT 43.8 39.6 33.6*     Plan  - Monitor serial CBC: Daily  - Transfuse PRBC if patient becomes hemodynamically unstable, symptomatic or H/H drops below 7/21.  - Patient has not received any PRBC transfusions to date  - Patient's anemia is currently worsening. Will continue current treatment  - check iron, B12, folate, retics  - monitor for further signs of blood loss  VTE Risk Mitigation (From admission, onward)           Ordered     IP VTE HIGH RISK PATIENT  Once         03/18/25 2330     Place sequential compression device  Until discontinued         03/18/25 2330                    Discharge Planning   DWAYNE:      Code Status: Full Code   Medical Readiness for Discharge Date:            Updated daughter Barbi at bedside      12:29 PM  XR reviewed. Ortho consulted.     Verónica Charles MD  Department of Hospital Medicine   [facility] - Med Surg

## 2025-03-20 NOTE — SUBJECTIVE & OBJECTIVE
Interval History:  This morning but awakens easily.  There were no issues overnight.  Nursing reports no vomiting and no diarrhea.  No further blood loss noted.  She is oriented to self but not to location or situation.  She does have abdominal pain and nausea.     Daughter Barbi at bedside.  She states that her mother was hospitalized for about 2 weeks for hallucinations that have since been attributed to dementia.  After that she went to skilled nursing facility and then home with home health.  She had regained strength but started walking especially at night and fell hitting her face and her right shoulder.  This was about a month ago.  The shoulder was found to be broken in 3 places per her report and she has been in the sling ever since.  Then she developed urinary tract symptoms and was started on antibiotics.  She then had nausea vomiting and diarrhea with blood loss from an unknown location-unknown if urinary or stool or vaginal loss.  This prompted them coming to the hospital.    Review of Systems   Constitutional:  Negative for fever.   Respiratory:  Negative for shortness of breath.    Cardiovascular:  Negative for chest pain.   Gastrointestinal:  Positive for abdominal pain and nausea. Negative for diarrhea and vomiting.   Psychiatric/Behavioral:  Positive for confusion.      Objective:     Vital Signs (Most Recent):  Temp: 97.9 °F (36.6 °C) (03/20/25 0803)  Pulse: 76 (03/20/25 0803)  Resp: 18 (03/20/25 0420)  BP: (!) 99/54 (03/20/25 0803)  SpO2: (!) 91 % (03/20/25 0803) Vital Signs (24h Range):  Temp:  [97.9 °F (36.6 °C)-98.9 °F (37.2 °C)] 97.9 °F (36.6 °C)  Pulse:  [76-97] 76  Resp:  [18] 18  SpO2:  [91 %-96 %] 91 %  BP: ()/(48-68) 99/54     Weight: 71.3 kg (157 lb 3 oz)  Body mass index is 28.75 kg/m².    Intake/Output Summary (Last 24 hours) at 3/20/2025 1027  Last data filed at 3/20/2025 0949  Gross per 24 hour   Intake 804.16 ml   Output --   Net 804.16 ml         Physical Exam  Vitals and  nursing note reviewed.   HENT:      Head: Normocephalic and atraumatic.      Nose: Nose normal.      Mouth/Throat:      Mouth: Mucous membranes are moist.   Cardiovascular:      Rate and Rhythm: Normal rate and regular rhythm.   Pulmonary:      Effort: Pulmonary effort is normal.      Breath sounds: Normal breath sounds.      Comments: Room air  Abdominal:      General: Bowel sounds are normal. There is no distension.      Palpations: Abdomen is soft.      Tenderness: There is abdominal tenderness (With deep palpation in left lower quadrant). There is no right CVA tenderness, left CVA tenderness, guarding or rebound.   Musculoskeletal:      Right lower leg: No edema.      Left lower leg: No edema.      Comments: Her right arm is in a sling   Skin:     General: Skin is warm and dry.   Neurological:      Mental Status: She is alert.      Comments: Oriented to self.  Recognizes her daughter.  Not oriented to location or situation or date               Significant Labs: All pertinent labs within the past 24 hours have been reviewed.    Significant Imaging: I have reviewed all pertinent imaging results/findings within the past 24 hours.

## 2025-03-20 NOTE — ASSESSMENT & PLAN NOTE
Patient's FSGs are uncontrolled due to hyperglycemia on current medication regimen.  Last A1c reviewed-   Lab Results   Component Value Date    HGBA1C 9.3 (H) 03/18/2025     Most recent fingerstick glucose reviewed-   Recent Labs   Lab 03/19/25  1135 03/19/25  1711 03/19/25  1946 03/20/25  0759   POCTGLUCOSE 490* 388* 200* 245*     Current correctional scale  Medium  Maintain anti-hyperglycemic dose as follows-   Antihyperglycemics (From admission, onward)      Start     Stop Route Frequency Ordered    03/20/25 2100  insulin glargine U-100 (Lantus) pen 40 Units         -- SubQ Nightly 03/20/25 1029    03/20/25 1130  insulin aspart U-100 pen 12 Units         -- SubQ 3 times daily with meals 03/20/25 0848    03/19/25 0030  insulin aspart U-100 pen 0-10 Units         -- SubQ Before meals & nightly PRN 03/18/25 2330          Hold Oral hypoglycemics while patient is in the hospital.

## 2025-03-20 NOTE — NURSING
[facility] [place]  Nurses Note -- 4 Eyes      3/20/2025       Skin assessed on: Q Shift      [x] No Pressure Injuries Present    []Prevention Measures Documented    [] Yes LDA  for Pressure Injury Previously documented     [] Yes New Pressure Injury Discovered   [] LDA for New Pressure Injury Added      Attending RN:  Cindy Saldaña LPN     Second RN:  ALINA Thibodeaux

## 2025-03-20 NOTE — PLAN OF CARE
Problem: Adult Inpatient Plan of Care  Goal: Plan of Care Review  3/20/2025 0749 by Carlos Eduardo Alvares LPN  Outcome: Progressing  3/20/2025 0740 by Carlos Eduardo Alvares LPN  Outcome: Progressing  Goal: Patient-Specific Goal (Individualized)  3/20/2025 0749 by Carlos Eduardo Alvares LPN  Outcome: Progressing  3/20/2025 0740 by Carlos Eduardo Alvares LPN  Outcome: Progressing  Goal: Absence of Hospital-Acquired Illness or Injury  3/20/2025 0749 by Carlos Eduardo Alvares LPN  Outcome: Progressing  3/20/2025 0740 by Carlos Eduardo Alvares LPN  Outcome: Progressing  Goal: Optimal Comfort and Wellbeing  3/20/2025 0749 by Carlos Eduardo Alvares LPN  Outcome: Progressing  3/20/2025 0740 by Carlos Eduardo Alvares LPN  Outcome: Progressing  Goal: Readiness for Transition of Care  3/20/2025 0749 by Carlos Eduardo Alvares LPN  Outcome: Progressing  3/20/2025 0740 by Carlos Eduardo Alvares LPN  Outcome: Progressing     Problem: Diabetes Comorbidity  Goal: Blood Glucose Level Within Targeted Range  3/20/2025 0749 by Carlos Eduardo Alvares LPN  Outcome: Progressing  3/20/2025 0740 by Carlos Eduardo Alvares LPN  Outcome: Progressing     Problem: Fall Injury Risk  Goal: Absence of Fall and Fall-Related Injury  3/20/2025 0749 by Carlos Eduardo Alvares LPN  Outcome: Progressing  3/20/2025 0740 by Carlos Eduardo Alvares LPN  Outcome: Progressing

## 2025-03-20 NOTE — NURSING
[facility] [place]  Nurses Note -- 4 Eyes      3/19/2025       Skin assessed on: Q Shift      [x] No Pressure Injuries Present    [x]Prevention Measures Documented    [] Yes LDA  for Pressure Injury Previously documented     [] Yes New Pressure Injury Discovered   [] LDA for New Pressure Injury Added      Attending RN:  Carlos Eduardo Alvares LPN     Second RN:  Cindy Saldaña LPN

## 2025-03-20 NOTE — ASSESSMENT & PLAN NOTE
Cr on admit: 1. Baseline Cr 1   - developed DIONNE on 03/20 with creatinine rise to 1.7  - Suspect due to colitis    This is currently worsening. Current: Estimated Creatinine Clearance: 24 mL/min (A) (based on SCr of 1.7 mg/dL (H)).  - Check UA, CK, UPC  - IVF x24 hours  - Avoid nephrotoxins, renal dose all meds.   - Monitor with BMP in AM

## 2025-03-20 NOTE — CONSULTS
[facility] Gastroenterology Consultation Note    Reason for consult: Eval for colonscopy    PCP:   Sirena Coleman       LOS: 2        Initial History of Present Illness (HPI):  This is a 79 y.o. female consulted to GI service for eval for colonoscopy. PMH dementia, hypertension, type 2 diabetes, hyperlipidemia, anxiety, depression.  Patient with dementia, medical hx information limited. Patient confirms abdominal pain, denies any stools on today.   Patient presented to [facility] ED for evaluation of vomiting and diarrhea that started 2 days prior to presentation. Patient was prescribed nitrofurantoin for UTI on 03/13, after which she developed nausea, vomiting and diarrhea. Additional symptoms include blood in stool.     Wbc 14.8 now 10, hgb 15.4 now 11.5    Imaging notable for diffuse wall thickening and edema throughout the wall of the colon which begins in the mid transverse colon and extends distally through the rectum.     Medical History:  has a past medical history of Abnormal Pap smear, DIONNE (acute kidney injury) (6/29/2014), DIONNE (acute kidney injury) (6/29/2014), Alzheimer's dementia (4/12/2018), Anemia, Breast cancer (1995), Breast disorder, Diabetes mellitus, Diabetes mellitus, type 2, ESSENTIAL HYPERTENSION (10/1/2012), Hypothyroid (6/29/2014), and Pneumonia (01/16/2020).    Surgical History:  has a past surgical history that includes Hysterectomy; masectomy; Oophorectomy (1997); Mastectomy (Left); Cataract extraction, bilateral; Esophagogastroduodenoscopy (N/A, 8/10/2020); and Colonoscopy (N/A, 8/10/2020).      Objective Findings:    Vital Signs:  Temp:  [97.9 °F (36.6 °C)-98.9 °F (37.2 °C)]   Pulse:  [73-97]   Resp:  [18]   BP: ()/(48-68)   SpO2:  [91 %-96 %]   Body mass index is 28.75 kg/m².      Physical Exam  Vitals and nursing note reviewed.   Constitutional:       Appearance: Normal appearance.   HENT:      Head: Normocephalic.   Pulmonary:      Effort: Pulmonary effort is normal.    Abdominal:      General: Bowel sounds are normal.      Palpations: Abdomen is soft.   Skin:     General: Skin is warm and dry.   Neurological:      Mental Status: She is alert and oriented to person, place, and time.   Psychiatric:         Mood and Affect: Mood normal.         Behavior: Behavior normal.         Thought Content: Thought content normal.         Judgment: Judgment normal.               Labs:  Lab Results   Component Value Date    WBC 10.07 03/20/2025    HGB 11.5 (L) 03/20/2025    HCT 33.6 (L) 03/20/2025     03/20/2025    CHOL 229 (H) 07/08/2024    TRIG 182 (H) 07/08/2024    HDL 40 07/08/2024    ALT 13 03/19/2025    AST 12 03/19/2025     (L) 03/20/2025    K 3.4 (L) 03/20/2025     03/20/2025    CREATININE 1.7 (H) 03/20/2025    BUN 36 (H) 03/20/2025    CO2 22 (L) 03/20/2025    TSH 2.210 12/12/2024    INR 1.1 06/28/2020    HGBA1C 9.3 (H) 03/18/2025             Imaging: 3/18 CT abdomen pelvis w/ IV contrast- Study limited by motion, demonstrating findings consistent with colitis. This may be infectious, inflammatory or ischemic in etiology. There is no evidence of bowel infarction or portal venous gas.               ABLA. GI bleed. LLQ pain. Bloody Diarrhea. Nausea and vomiting. Abnormal CT abdomen. Colitis. Leukocytosis. Shoulder fracture.   Plan/ Recommendations:  1.  Still reports some LLQ. Nursing staff denies any bloody stool today. CT notes diffuse inflammation consistent with colitis. Patient with increase in wbc on admission, currently on zosyn and now normalized. Suspect infectious process in this 78 yo f with UTI just prior to presentation. Hgb 15 on admit, however likely hemoconcentration as baseline is ~13. Today hgb 11.5. No plan for urgent inpatient endoscopy as bleeding has subsided and patient with increase perf risk d/t colitis. Rec continue antibiotic therapy, monitor counts, add on GI pathogens panel.  2. N/v subsided and patient tolerating diet  3. Right arm in  SAL[facility]ADOR alejandre has consulted ortho surgery.         Thank you so much for allowing us to participate in the care of Narda Person . Please contact us if you have any additional questions.    Shannon Chu NP  Gastroenterology  [facility] [place] [facility] S[facility]

## 2025-03-20 NOTE — ASSESSMENT & PLAN NOTE
Anemia is likely due to unknown.  Suspect she was hemoconcentrated and dehydrated upon admission.  Hemoglobin has dropped daily.  She did have prior signs of blood loss.. Most recent hemoglobin and hematocrit are listed below.  Recent Labs     03/18/25  1058 03/19/25  0502 03/20/25  0717   HGB 15.4 13.5 11.5*   HCT 43.8 39.6 33.6*     Plan  - Monitor serial CBC: Daily  - Transfuse PRBC if patient becomes hemodynamically unstable, symptomatic or H/H drops below 7/21.  - Patient has not received any PRBC transfusions to date  - Patient's anemia is currently worsening. Will continue current treatment  - check iron, B12, folate, retics  - monitor for further signs of blood loss

## 2025-03-20 NOTE — PT/OT/SLP PROGRESS
Physical Therapy Treatment    Patient Name:  Narda Person   MRN:  [id number]    Recommendations:     Discharge Recommendations: Low Intensity Therapy (24 hr care at home or NH)  Discharge Equipment Recommendations: hospital bed, wheelchair  Barriers to discharge:  Ortho Consult?    Assessment:     Narda Person is a 79 y.o. female admitted with a medical diagnosis of Colitis.  She presents with the following impairments/functional limitations: weakness, decreased upper extremity function, decreased ROM, impaired endurance, impaired coordination, decreased safety awareness, pain, impaired cognition, impaired self care skills, impaired fine motor, impaired functional mobility, decreased coordination .    Rehab Prognosis: Fair; patient would benefit from acute skilled PT services to address these deficits and reach maximum level of function.    Recent Surgery: * No surgery found *      Plan:     During this hospitalization, patient to be seen 3 x/week to address the identified rehab impairments via therapeutic activities, therapeutic exercises, neuromuscular re-education, wheelchair management/training and progress toward the following goals:    Plan of Care Expires:  04/02/25    Subjective     Chief Complaint: pain, lightheaded, nauseous, hungry   Patient/Family Comments/goals: Pt agreeable to treatment, to eat lunch  Pain/Comfort:  Pain Rating 1:  (not rated, LLQ and R UE)  Pain Addressed 1: Reposition, Distraction, Cessation of Activity      Objective:     Communicated with nsg prior to session.  Patient found supine with bed alarm, peripheral IV, PureWick upon PT entry to room.     General Precautions: Standard, fall  Orthopedic Precautions:  (awaiting clarification, treated NWB R UE)  Braces: UE Sling  Respiratory Status: Room air     Functional Mobility:  Bed Mobility:     Rolling Left:  contact guard assistance  Scooting: maximal assistance to EOB in sitting, Mod A with VC/TC for bridging to HOB with bed in  "trendelenburg.  Bridging: contact guard assistance  Supine to Sit: maximal assistance  Sit to Supine: maximal assistance  Transfers:     Sit to Stand:  maximal assistance with no AD  Scoot transfer: Mod A with Vc/TC for forward weight shift over KEN  Gait: Unable  Balance: Fair+/Fair sit      AM-PAC 6 CLICK MOBILITY  Turning over in bed (including adjusting bedclothes, sheets and blankets)?: 2  Sitting down on and standing up from a chair with arms (e.g., wheelchair, bedside commode, etc.): 2  Moving from lying on back to sitting on the side of the bed?: 2  Moving to and from a bed to a chair (including a wheelchair)?: 2  Need to walk in hospital room?: 1  Climbing 3-5 steps with a railing?: 1  Basic Mobility Total Score: 10       Treatment & Education:  Pt performed Bridging/GS, ankle pumps, HS's, TKE's, Hip ABd x 10 reps each in supine.    BP in sup 111/56  Sitting up in bed 88/48 with c/o "light headedness, HOB declined to approx 60* with /56  Pt sat at EOB with SBA<>Min A for feeding with B LE's supported.  Pt requires Max A for feeding, but is able to reach and grasp cup and drink with L hand.     Patient left  HOB elevated with pillow on Right to offload sacrum and support R UE  with all lines intact, call button in reach, and PCT present..    GOALS:   Multidisciplinary Problems       Physical Therapy Goals          Problem: Physical Therapy    Goal Priority Disciplines Outcome Interventions   Physical Therapy Goal     PT, PT/OT Progressing    Description: Goals to be met by: 25     Patient will increase functional independence with mobility by performin. Supine to sit with MInimal Assistance  2. Rolling to Left and Right with Minimal Assistance.  3. Bed to chair transfer with Minimal Assistance    4. Wheelchair propulsion x25 feet with Stand-by Assistance using bilateral uppper extremities  5. Lower extremity exercise program x10 reps per handout, with assistance as needed                   "       DME Justifications:  Narda requires a hospital bed due to her requiring positioning of the body in ways not feasible with an ordinary bed due to limited ability and cannot independently make changes in body position without the use of the bed.The positioning of the body cannot be sufficiently resolved by the use of pillows and wedges.  Narda Person has a mobility limitation that significantly impairs her ability to participate in one or more mobility related activities of daily living (MRADLs) such as toileting, feeding, dressing, grooming, and bathing in customary locations in the home.  The mobility limitation cannot be sufficiently resolved by the use of a cane or walker.   The use of a manual wheelchair will significantly improve the patients ability to participate in MRADLS and the patient will use it on regular basis in the home.  Narda Person has expressed her willingness to use a manual wheelchair in the home. Patients upper body strength is sufficient for propulsion.  She also has a caregiver who is available, willing, and able to provide assistance with the wheelchair when needed.      Time Tracking:     PT Received On: 03/20/25  PT Start Time: 1205     PT Stop Time: 1252  PT Total Time (min): 47 min     Billable Minutes: Therapeutic Activity 32 and Therapeutic Exercise 15    Treatment Type: Treatment  PT/PTA: PT     Number of PTA visits since last PT visit: 0     03/20/2025

## 2025-03-20 NOTE — ASSESSMENT & PLAN NOTE
Patient's most recent potassium results are listed below.   Recent Labs     03/19/25  0502 03/19/25  0710 03/20/25  0717   K 3.4* 4.0 3.4*     Plan  - Replete potassium per protocol  - Monitor potassium Daily  - Patient's hypokalemia is improving  - Mag now normal

## 2025-03-20 NOTE — ASSESSMENT & PLAN NOTE
- at baseline she is oriented to self and recognizes family.  Reviewed primary care notes which suggests that she is typically oriented and able to move around and eat but does have hallucinations  - Continue home Aricept

## 2025-03-21 PROBLEM — N30.01 ACUTE CYSTITIS WITH HEMATURIA: Status: ACTIVE | Noted: 2025-03-21

## 2025-03-21 PROBLEM — S42.301A FRACTURE OF RIGHT HUMERUS: Status: ACTIVE | Noted: 2025-03-21

## 2025-03-21 LAB
ANION GAP SERPL CALC-SCNC: 8 MMOL/L (ref 8–16)
ASTROVIRUS: NOT DETECTED
BASOPHILS # BLD AUTO: 0.06 K/UL (ref 0–0.2)
BASOPHILS NFR BLD: 0.7 % (ref 0–1.9)
BUN SERPL-MCNC: 22 MG/DL (ref 8–23)
C COLI+JEJ+UPSA DNA STL QL NAA+NON-PROBE: NOT DETECTED
CALCIUM SERPL-MCNC: 7.4 MG/DL (ref 8.7–10.5)
CHLORIDE SERPL-SCNC: 110 MMOL/L (ref 95–110)
CO2 SERPL-SCNC: 20 MMOL/L (ref 23–29)
CREAT SERPL-MCNC: 0.8 MG/DL (ref 0.5–1.4)
CRYPTOSP AG STL QL IA: NEGATIVE
CYCLOSPORA CAYETANENSIS: NOT DETECTED
DIFFERENTIAL METHOD BLD: ABNORMAL
E COLI SXT1 STL QL IA: NEGATIVE
E COLI SXT2 STL QL IA: NEGATIVE
ENTEROAGGREGATIVE E COLI: NOT DETECTED
ENTEROPATHOGENIC E COLI: NOT DETECTED
EOSINOPHIL # BLD AUTO: 0.2 K/UL (ref 0–0.5)
EOSINOPHIL NFR BLD: 2.3 % (ref 0–8)
ERYTHROCYTE [DISTWIDTH] IN BLOOD BY AUTOMATED COUNT: 12.6 % (ref 11.5–14.5)
EST. GFR  (NO RACE VARIABLE): >60 ML/MIN/1.73 M^2
G LAMBLIA AG STL QL IA: NEGATIVE
GLUCOSE SERPL-MCNC: 167 MG/DL (ref 70–110)
GPP - ADENOVIRUS 40/41: NOT DETECTED
GPP - CRYPTOSPORIDIUM: NOT DETECTED
GPP - ENTAMOEBA HISTOLYTICA: NOT DETECTED
GPP - ENTEROTOXIGENIC E COLI (ETEC): NOT DETECTED
GPP - GIARDIA LAMBLIA: NOT DETECTED
GPP - NOROVIRUS GI/GII: NOT DETECTED
GPP - ROTAVIRUS A: NOT DETECTED
GPP - SALMONELLA: NOT DETECTED
GPP - VIBRIO CHOLERA: NOT DETECTED
GPP - YERSINIA ENTEROCOLITICA: NOT DETECTED
HCT VFR BLD AUTO: 32.4 % (ref 37–48.5)
HGB BLD-MCNC: 11.1 G/DL (ref 12–16)
IMM GRANULOCYTES # BLD AUTO: 0.04 K/UL (ref 0–0.04)
IMM GRANULOCYTES NFR BLD AUTO: 0.4 % (ref 0–0.5)
LACTATE PLASV-SCNC: NOT DETECTED MMOL/L
LYMPHOCYTES # BLD AUTO: 1.5 K/UL (ref 1–4.8)
LYMPHOCYTES NFR BLD: 17.1 % (ref 18–48)
MAGNESIUM SERPL-MCNC: 1.6 MG/DL (ref 1.6–2.6)
MCH RBC QN AUTO: 31 PG (ref 27–31)
MCHC RBC AUTO-ENTMCNC: 34.3 G/DL (ref 32–36)
MCV RBC AUTO: 91 FL (ref 82–98)
MONOCYTES # BLD AUTO: 0.7 K/UL (ref 0.3–1)
MONOCYTES NFR BLD: 7.7 % (ref 4–15)
NEUTROPHILS # BLD AUTO: 6.4 K/UL (ref 1.8–7.7)
NEUTROPHILS NFR BLD: 71.8 % (ref 38–73)
NRBC BLD-RTO: 0 /100 WBC
PLATELET # BLD AUTO: 162 K/UL (ref 150–450)
PLESIOMONAS SHIGELLOIDES: NOT DETECTED
PMV BLD AUTO: 11 FL (ref 9.2–12.9)
POCT GLUCOSE: 103 MG/DL (ref 70–110)
POCT GLUCOSE: 149 MG/DL (ref 70–110)
POCT GLUCOSE: 167 MG/DL (ref 70–110)
POCT GLUCOSE: 243 MG/DL (ref 70–110)
POTASSIUM SERPL-SCNC: 3.5 MMOL/L (ref 3.5–5.1)
RBC # BLD AUTO: 3.58 M/UL (ref 4–5.4)
SAPOVIRUS: NOT DETECTED
SHIGELLA SP+EIEC IPAH STL QL NAA+PROBE: NOT DETECTED
SODIUM SERPL-SCNC: 138 MMOL/L (ref 136–145)
VIBRIO: NOT DETECTED
WBC # BLD AUTO: 8.95 K/UL (ref 3.9–12.7)

## 2025-03-21 PROCEDURE — 99223 1ST HOSP IP/OBS HIGH 75: CPT | Mod: HCNC,,, | Performed by: INTERNAL MEDICINE

## 2025-03-21 PROCEDURE — 99233 SBSQ HOSP IP/OBS HIGH 50: CPT | Mod: HCNC,,, | Performed by: SURGERY

## 2025-03-21 PROCEDURE — 97110 THERAPEUTIC EXERCISES: CPT | Mod: HCNC

## 2025-03-21 PROCEDURE — 36415 COLL VENOUS BLD VENIPUNCTURE: CPT | Mod: HCNC | Performed by: HOSPITALIST

## 2025-03-21 PROCEDURE — 25000003 PHARM REV CODE 250: Mod: HCNC | Performed by: HOSPITALIST

## 2025-03-21 PROCEDURE — 83735 ASSAY OF MAGNESIUM: CPT | Mod: HCNC | Performed by: HOSPITALIST

## 2025-03-21 PROCEDURE — 80048 BASIC METABOLIC PNL TOTAL CA: CPT | Mod: HCNC | Performed by: HOSPITALIST

## 2025-03-21 PROCEDURE — 97530 THERAPEUTIC ACTIVITIES: CPT | Mod: HCNC

## 2025-03-21 PROCEDURE — 11000001 HC ACUTE MED/SURG PRIVATE ROOM: Mod: HCNC

## 2025-03-21 PROCEDURE — 25000003 PHARM REV CODE 250: Mod: HCNC | Performed by: NURSE PRACTITIONER

## 2025-03-21 PROCEDURE — 63600175 PHARM REV CODE 636 W HCPCS: Mod: HCNC | Performed by: HOSPITALIST

## 2025-03-21 PROCEDURE — 99233 SBSQ HOSP IP/OBS HIGH 50: CPT | Mod: HCNC,,, | Performed by: NURSE PRACTITIONER

## 2025-03-21 PROCEDURE — 63600175 PHARM REV CODE 636 W HCPCS: Mod: HCNC | Performed by: STUDENT IN AN ORGANIZED HEALTH CARE EDUCATION/TRAINING PROGRAM

## 2025-03-21 PROCEDURE — 97535 SELF CARE MNGMENT TRAINING: CPT | Mod: HCNC

## 2025-03-21 PROCEDURE — 25000003 PHARM REV CODE 250: Mod: HCNC | Performed by: STUDENT IN AN ORGANIZED HEALTH CARE EDUCATION/TRAINING PROGRAM

## 2025-03-21 PROCEDURE — 85025 COMPLETE CBC W/AUTO DIFF WBC: CPT | Mod: HCNC | Performed by: HOSPITALIST

## 2025-03-21 RX ORDER — MAGNESIUM SULFATE HEPTAHYDRATE 40 MG/ML
2 INJECTION, SOLUTION INTRAVENOUS ONCE
Status: COMPLETED | OUTPATIENT
Start: 2025-03-21 | End: 2025-03-21

## 2025-03-21 RX ORDER — LOPERAMIDE HYDROCHLORIDE 2 MG/1
2 CAPSULE ORAL 2 TIMES DAILY
Status: DISCONTINUED | OUTPATIENT
Start: 2025-03-21 | End: 2025-03-24 | Stop reason: HOSPADM

## 2025-03-21 RX ORDER — INSULIN ASPART 100 [IU]/ML
15 INJECTION, SOLUTION INTRAVENOUS; SUBCUTANEOUS
Status: DISCONTINUED | OUTPATIENT
Start: 2025-03-21 | End: 2025-03-23

## 2025-03-21 RX ORDER — ENOXAPARIN SODIUM 100 MG/ML
40 INJECTION SUBCUTANEOUS EVERY 24 HOURS
Status: DISCONTINUED | OUTPATIENT
Start: 2025-03-21 | End: 2025-03-24 | Stop reason: HOSPADM

## 2025-03-21 RX ADMIN — INSULIN ASPART 2 UNITS: 100 INJECTION, SOLUTION INTRAVENOUS; SUBCUTANEOUS at 04:03

## 2025-03-21 RX ADMIN — ESCITALOPRAM OXALATE 10 MG: 10 TABLET ORAL at 09:03

## 2025-03-21 RX ADMIN — INSULIN ASPART 15 UNITS: 100 INJECTION, SOLUTION INTRAVENOUS; SUBCUTANEOUS at 04:03

## 2025-03-21 RX ADMIN — PIPERACILLIN SODIUM AND TAZOBACTAM SODIUM 4.5 G: 4; .5 INJECTION, POWDER, FOR SOLUTION INTRAVENOUS at 09:03

## 2025-03-21 RX ADMIN — PANTOPRAZOLE SODIUM 40 MG: 40 INJECTION, POWDER, FOR SOLUTION INTRAVENOUS at 09:03

## 2025-03-21 RX ADMIN — PIPERACILLIN SODIUM AND TAZOBACTAM SODIUM 4.5 G: 4; .5 INJECTION, POWDER, FOR SOLUTION INTRAVENOUS at 02:03

## 2025-03-21 RX ADMIN — PIPERACILLIN SODIUM AND TAZOBACTAM SODIUM 4.5 G: 4; .5 INJECTION, POWDER, FOR SOLUTION INTRAVENOUS at 04:03

## 2025-03-21 RX ADMIN — LOPERAMIDE HYDROCHLORIDE 2 MG: 2 CAPSULE ORAL at 09:03

## 2025-03-21 RX ADMIN — ATORVASTATIN CALCIUM 20 MG: 10 TABLET, FILM COATED ORAL at 09:03

## 2025-03-21 RX ADMIN — INSULIN GLARGINE 40 UNITS: 100 INJECTION, SOLUTION SUBCUTANEOUS at 08:03

## 2025-03-21 RX ADMIN — MAGNESIUM SULFATE HEPTAHYDRATE 2 G: 40 INJECTION, SOLUTION INTRAVENOUS at 09:03

## 2025-03-21 RX ADMIN — INSULIN ASPART 12 UNITS: 100 INJECTION, SOLUTION INTRAVENOUS; SUBCUTANEOUS at 11:03

## 2025-03-21 RX ADMIN — SODIUM CHLORIDE: 9 INJECTION, SOLUTION INTRAVENOUS at 06:03

## 2025-03-21 RX ADMIN — POTASSIUM BICARBONATE 25 MEQ: 977.5 TABLET, EFFERVESCENT ORAL at 09:03

## 2025-03-21 RX ADMIN — INSULIN ASPART 12 UNITS: 100 INJECTION, SOLUTION INTRAVENOUS; SUBCUTANEOUS at 09:03

## 2025-03-21 RX ADMIN — INSULIN ASPART 4 UNITS: 100 INJECTION, SOLUTION INTRAVENOUS; SUBCUTANEOUS at 11:03

## 2025-03-21 RX ADMIN — LOPERAMIDE HYDROCHLORIDE 2 MG: 2 CAPSULE ORAL at 08:03

## 2025-03-21 RX ADMIN — ENOXAPARIN SODIUM 40 MG: 40 INJECTION SUBCUTANEOUS at 04:03

## 2025-03-21 RX ADMIN — DONEPEZIL HYDROCHLORIDE 10 MG: 10 TABLET ORAL at 08:03

## 2025-03-21 NOTE — ASSESSMENT & PLAN NOTE
- on admission UA had hematuria but no growth on culture  - repeated UA on 03/20 for worsening DIONNE.  Now has many bacteria but with improving hematuria  - urine culture with Gram-negative anastacia  - continue Zosyn to treat for both UTI and colitis while awaiting final culture results

## 2025-03-21 NOTE — ASSESSMENT & PLAN NOTE
- GI consulted and following; mgmt per their team and primary. Pt said abdominal pain is improving and she tolerated breakfast.

## 2025-03-21 NOTE — ASSESSMENT & PLAN NOTE
Patient's FSGs are uncontrolled due to hyperglycemia on current medication regimen.  Last A1c reviewed-   Lab Results   Component Value Date    HGBA1C 9.3 (H) 03/18/2025     Most recent fingerstick glucose reviewed-   Recent Labs   Lab 03/20/25  1200 03/20/25  1619 03/20/25  1917   POCTGLUCOSE 253* 151* 139*     Current correctional scale  Medium  Maintain anti-hyperglycemic dose as follows-   Antihyperglycemics (From admission, onward)      Start     Stop Route Frequency Ordered    03/20/25 2100  insulin glargine U-100 (Lantus) pen 40 Units         -- SubQ Nightly 03/20/25 1029    03/20/25 1130  insulin aspart U-100 pen 12 Units         -- SubQ 3 times daily with meals 03/20/25 0848    03/19/25 0030  insulin aspart U-100 pen 0-10 Units         -- SubQ Before meals & nightly PRN 03/18/25 2330          Hold Oral hypoglycemics while patient is in the hospital.

## 2025-03-21 NOTE — ASSESSMENT & PLAN NOTE
Anemia is likely due to unknown.  Suspect she was hemoconcentrated and dehydrated upon admission.  Hemoglobin has dropped daily.  She did have prior signs of blood loss.. Most recent hemoglobin and hematocrit are listed below.  Recent Labs     03/19/25  0502 03/20/25  0717 03/21/25  0431   HGB 13.5 11.5* 11.1*   HCT 39.6 33.6* 32.4*     Plan  - Monitor serial CBC: Daily  - Transfuse PRBC if patient becomes hemodynamically unstable, symptomatic or H/H drops below 7/21.  - Patient has not received any PRBC transfusions to date  - Patient's anemia is currently worsening. Will continue current treatment  - iron replete.  B12 and folate normal.  - monitor for further signs of blood loss

## 2025-03-21 NOTE — SUBJECTIVE & OBJECTIVE
Past Medical History:   Diagnosis Date    Abnormal Pap smear     DIONNE (acute kidney injury) 6/29/2014    DIONNE (acute kidney injury) 6/29/2014    Alzheimer's dementia 4/12/2018    Anemia     Breast cancer 1995    left breast    Breast disorder     Diabetes mellitus     Diabetes mellitus, type 2     ESSENTIAL HYPERTENSION 10/1/2012    Hypothyroid 6/29/2014    Pneumonia 01/16/2020       Past Surgical History:   Procedure Laterality Date    CATARACT EXTRACTION, BILATERAL      COLONOSCOPY N/A 8/10/2020    Procedure: COLONOSCOPY;  Surgeon: Guerda Perales MD;  Location: [facility];  Service: Endoscopy;  Laterality: N/A;    ESOPHAGOGASTRODUODENOSCOPY N/A 8/10/2020    Procedure: EGD (ESOPHAGOGASTRODUODENOSCOPY) WITH BIOPSY;  Surgeon: Guerda Perales MD;  Location: [facility];  Service: Endoscopy;  Laterality: N/A;    HYSTERECTOMY      masectomy      single left breast    MASTECTOMY Left     OOPHORECTOMY  1997    only 1       Review of patient's allergies indicates:   Allergen Reactions    Percocet [oxycodone-acetaminophen] Itching    Percodan [oxycodone hcl-oxycodone-asa] Itching     Other reaction(s): Unknown    Latex, natural rubber Rash    Phenytoin sodium extended      Other reaction(s): Unknown    Sutures, catgut      Infections to sutures     Adhesive Rash    Adhesive tape-silicones Rash       Medications:  Continuous Infusions:  Scheduled Meds:   atorvastatin  20 mg Oral Daily    donepeziL  10 mg Oral QHS    enoxparin  40 mg Subcutaneous Q24H (prophylaxis, 1700)    EScitalopram oxalate  10 mg Oral Daily    insulin aspart U-100  15 Units Subcutaneous TIDWM    insulin glargine U-100 (Lantus)  40 Units Subcutaneous QHS    loperamide  2 mg Oral BID    pantoprazole  40 mg Intravenous BID    piperacillin-tazobactam (Zosyn) IV (PEDS and ADULTS) (extended infusion is not appropriate)  4.5 g Intravenous Q8H     PRN Meds:  Current Facility-Administered Medications:     acetaminophen, 650 mg, Oral, Q8H PRN     acetaminophen, 650 mg, Oral, Q4H PRN    aluminum-magnesium hydroxide-simethicone, 30 mL, Oral, QID PRN    bisacodyL, 10 mg, Rectal, Daily PRN    glucagon (human recombinant), 1 mg, Intramuscular, PRN    glucose, 16 g, Oral, PRN    glucose, 24 g, Oral, PRN    insulin aspart U-100, 0-10 Units, Subcutaneous, QID (AC + HS) PRN    meclizine, 25 mg, Oral, TID PRN    melatonin, 6 mg, Oral, Nightly PRN    naloxone, 0.02 mg, Intravenous, PRN    ondansetron, 4 mg, Intravenous, Q8H PRN    senna-docusate 8.6-50 mg, 1 tablet, Oral, Daily PRN    simethicone, 1 tablet, Oral, QID PRN    sodium chloride 0.9%, 10 mL, Intravenous, Q12H PRN    Family History       Problem Relation (Age of Onset)    Depression Daughter    Diabetes Mother, Sister    Heart attack Mother (66), Son (38)    Heart disease Sister, Brother, Brother    Hypertension Mother, Daughter, Son    Thyroid disease Daughter    Uterine cancer Mother          Tobacco Use    Smoking status: Former     Current packs/day: 0.00     Average packs/day: 1 pack/day for 10.0 years (10.0 ttl pk-yrs)     Types: Cigarettes     Start date: 1978     Quit date: 1988     Years since quittin.2    Smokeless tobacco: Never   Substance and Sexual Activity    Alcohol use: Yes     Alcohol/week: 1.0 standard drink of alcohol     Types: 1 Glasses of wine per week     Comment: Occ.    Drug use: No    Sexual activity: Never       Review of Systems   Gastrointestinal:  Positive for abdominal pain and diarrhea.        (Improving)      Objective:     Vital Signs (Most Recent):  Temp: 98.3 °F (36.8 °C) (25 1037)  Pulse: 78 (25 1037)  Resp: 18 (25 1037)  BP: 115/72 (25 1037)  SpO2: 95 % (25 1037) Vital Signs (24h Range):  Temp:  [97.5 °F (36.4 °C)-98.6 °F (37 °C)] 98.3 °F (36.8 °C)  Pulse:  [73-88] 78  Resp:  [18-19] 18  SpO2:  [92 %-96 %] 95 %  BP: (108-137)/(68-77) 115/72     Weight: 71.3 kg (157 lb 3 oz)  Body mass index is 28.75 kg/m².       Physical  Exam  Constitutional:       Comments: Alert, sitting up in bed, pleasant and conversational        Significant Labs: All pertinent labs within the past 24 hours have been reviewed.  CBC:   Recent Labs   Lab 03/21/25  0431   WBC 8.95   HGB 11.1*   HCT 32.4*   MCV 91        BMP:  Recent Labs   Lab 03/21/25  0431   *      K 3.5      CO2 20*   BUN 22   CREATININE 0.8   CALCIUM 7.4*   MG 1.6     LFT:  Lab Results   Component Value Date    AST 12 03/19/2025    ALKPHOS 77 03/19/2025    BILITOT 0.8 03/19/2025     Albumin:   Albumin   Date Value Ref Range Status   03/19/2025 3.1 (L) 3.5 - 5.2 g/dL Final     Protein:   Total Protein   Date Value Ref Range Status   03/19/2025 6.3 6.0 - 8.4 g/dL Final     Lactic acid:   Lab Results   Component Value Date    LACTATE 2.0 03/18/2025    LACTATE 2.8 (H) 06/28/2020       Significant Imaging: I have reviewed all pertinent imaging results/findings within the past 24 hours.

## 2025-03-21 NOTE — PLAN OF CARE
Problem: Adult Inpatient Plan of Care  Goal: Plan of Care Review  Outcome: Progressing  Flowsheets (Taken 3/21/2025 1711)  Plan of Care Reviewed With: patient  Goal: Patient-Specific Goal (Individualized)  Outcome: Progressing  Goal: Absence of Hospital-Acquired Illness or Injury  Outcome: Progressing  Intervention: Identify and Manage Fall Risk  Flowsheets (Taken 3/21/2025 1711)  Safety Promotion/Fall Prevention:   assistive device/personal item within reach   bed alarm set   Fall Risk reviewed with patient/family   high risk medications identified   medications reviewed   nonskid shoes/socks when out of bed   side rails raised x 2  Intervention: Prevent Skin Injury  Flowsheets (Taken 3/21/2025 1711)  Body Position:   weight shifting   turned  Skin Protection: incontinence pads utilized  Device Skin Pressure Protection: absorbent pad utilized/changed  Intervention: Prevent and Manage VTE (Venous Thromboembolism) Risk  Flowsheets (Taken 3/21/2025 1711)  VTE Prevention/Management:   bleeding precautions maintained   bleeding risk assessed   fluids promoted  Intervention: Prevent Infection  Flowsheets (Taken 3/21/2025 1711)  Infection Prevention:   hand hygiene promoted   single patient room provided  Goal: Optimal Comfort and Wellbeing  Outcome: Progressing  Intervention: Monitor Pain and Promote Comfort  Flowsheets (Taken 3/21/2025 1711)  Pain Management Interventions:   quiet environment facilitated   relaxation techniques promoted   pain management plan reviewed with patient/caregiver  Intervention: Provide Person-Centered Care  Flowsheets (Taken 3/21/2025 1711)  Trust Relationship/Rapport:   care explained   choices provided   emotional support provided   empathic listening provided   questions answered   questions encouraged   reassurance provided   thoughts/feelings acknowledged  Goal: Readiness for Transition of Care  Outcome: Progressing     Problem: Diabetes Comorbidity  Goal: Blood Glucose Level Within  Targeted Range  Outcome: Progressing  Intervention: Monitor and Manage Glycemia  Flowsheets (Taken 3/21/2025 1711)  Glycemic Management: blood glucose monitored     Problem: Infection  Goal: Absence of Infection Signs and Symptoms  Outcome: Progressing  Intervention: Prevent or Manage Infection  Flowsheets (Taken 3/21/2025 1711)  Infection Management: aseptic technique maintained     Problem: Fall Injury Risk  Goal: Absence of Fall and Fall-Related Injury  Outcome: Progressing  Intervention: Identify and Manage Contributors  Flowsheets (Taken 3/21/2025 1711)  Self-Care Promotion:   independence encouraged   BADL personal objects within reach   BADL personal routines maintained   meal set-up provided   adaptive equipment use encouraged  Medication Review/Management: medications reviewed  Intervention: Promote Injury-Free Environment  Flowsheets (Taken 3/21/2025 1711)  Safety Promotion/Fall Prevention:   assistive device/personal item within reach   bed alarm set   Fall Risk reviewed with patient/family   high risk medications identified   medications reviewed   nonskid shoes/socks when out of bed   side rails raised x 2     Problem: Acute Kidney Injury/Impairment  Goal: Fluid and Electrolyte Balance  Outcome: Progressing  Goal: Improved Oral Intake  Outcome: Progressing  Goal: Effective Renal Function  Outcome: Progressing  Intervention: Monitor and Support Renal Function  Flowsheets (Taken 3/21/2025 1711)  Medication Review/Management: medications reviewed     Problem: Skin Injury Risk Increased  Goal: Skin Health and Integrity  Outcome: Progressing  Intervention: Optimize Skin Protection  Flowsheets (Taken 3/21/2025 1711)  Pressure Reduction Techniques: weight shift assistance provided  Pressure Reduction Devices: pressure-redistributing mattress utilized  Skin Protection: incontinence pads utilized  Activity Management:   Rolling - L1   Arm raise - L1  Head of Bed (HOB) Positioning: HOB at 30-45 degrees      Problem: Coping Ineffective  Goal: Effective Coping  Outcome: Progressing    Pt alert but disoriented to situation and time. Pt reoriented. Able to make needs known. IV abx in progress, oral meds tolerated well. Bed in lowest position, call light within reach, and instructed to call for any assistance. Continue with plan of care.

## 2025-03-21 NOTE — PT/OT/SLP PROGRESS
"Occupational Therapy   Treatment    Name: Narda Person  MRN: [id number]  Admitting Diagnosis:  Colitis       Recommendations:     Discharge Recommendations: Low Intensity Therapy (and 24 hr significant assist)  Discharge Equipment Recommendations:  wheelchair, bedside commode, hospital bed  Barriers to discharge:  Decreased caregiver support    Assessment:     Narda Person is a 79 y.o. female with a medical diagnosis of Colitis.  She presents with Diagnoses of Colitis and Chest pain were pertinent to this visit. . Performance deficits affecting function are weakness, impaired endurance, impaired self care skills, impaired functional mobility, impaired balance, decreased upper extremity function, impaired cognition, decreased safety awareness, orthopedic precautions.     Rehab Prognosis:  Good; patient would benefit from acute skilled OT services to address these deficits and reach maximum level of function.       Plan:     Patient to be seen 2 x/week to address the above listed problems via self-care/home management, therapeutic activities, therapeutic exercises  Plan of Care Expires: 04/02/25  Plan of Care Reviewed with: patient    Subjective     Chief Complaint: dizziness mild upon upright position that quickly resolved  Patient/Family Comments/goals: pt agreeable for standing trials  Pain/Comfort:  Pain Rating 1: 0/10  Pain Addressed 1: Reposition  Pain Rating Post-Intervention 1: 0/10    Objective:     Communicated with: RN prior to session.  Patient found HOB elevated with bed alarm, peripheral IV upon OT entry to room.    General Precautions: Standard, fall    Orthopedic Precautions: (Per ortho: "sling for comfort, encourage rom of elbow, gentle pendulum of arm. no weight bearing R arm")  Braces: UE Sling  Respiratory Status: Room air     Occupational Performance:     Bed Mobility:    Patient completed Scooting/Bridging with moderate assistance seated eob anteriorly and in supine to hob with vc/tcs on bridging " technique  Patient completed Supine to Sit with moderate assistance for trunk  Patient completed Sit to Supine with maximal assistance for trunk and LLE    Functional Mobility/Transfers:  Patient completed 2x Sit <> Stand Transfer with moderate assistance  with  hand-held assist   Functional Mobility: Bed placed into chair position for regulation of systems for upright position prep. Pt then assisted to sitting eob as above; reporting minimal dizziness with upright posture that quickly resolved. 2 standing trials as above with side steps on 2nd trial with Max A with assist for weightshifting, stepping, LUE HHA    Activities of Daily Living:  Feeding: Min A for feeding with setup and chaining/grasp assist for nondominant L hand to facilitate feeding and reduction of spillage; SBA with setup to drink from cup and straw  Upper Body Dressing: maximal assistance to reposition sling and assess fit; new sling found on table although different variety and not as supportive for distal RUE and lacking wrist and palmar arch support as pt's current sling provides. Current sling left on   Lower Body Dressing: maximal assistance to don L sock with vc/tcs on initiation and knee flex for technique      [facility] 6 Click ADL: 14    Treatment & Education:  ADL/functional mobility training as above.  Secure chat from Dr. Charles re ortho consult and recs.  All needs met.    Patient left HOB elevated with all lines intact, call button in reach, bed alarm on, and RN notified    GOALS:   Multidisciplinary Problems       Occupational Therapy Goals          Problem: Occupational Therapy    Goal Priority Disciplines Outcome Interventions   Occupational Therapy Goal     OT, PT/OT Progressing    Description: Goals to be met by: 4/2/2025     Patient will increase functional independence with ADLs by performing:    Feeding with Modified [place].  UE Dressing with Stand-by Assistance.  LE Dressing with Contact Guard Assistance.  Grooming  while seated with Modified [place].  Toileting from bedside commode with Contact Guard Assistance for hygiene and clothing management.   Step transfer with Contact Guard Assistance  Toilet transfer to bedside commode with Contact Guard Assistance.                         DME Justifications:   Narda requires a commode for home use because she is confined to a single room.  Narda requires a hospital bed due to her requiring positioning of the body in ways not feasible with an ordinary bed due to limited ability and cannot independently make changes in body position without the use of the bed.The positioning of the body cannot be sufficiently resolved by the use of pillows and wedges.  Narda Person has a mobility limitation that significantly impairs her ability to participate in one or more mobility related activities of daily living (MRADLs) such as toileting, feeding, dressing, grooming, and bathing in customary locations in the home.  The mobility limitation cannot be sufficiently resolved by the use of a cane or walker.   The use of a manual wheelchair will significantly improve the patients ability to participate in MRADLS and the patient will use it on regular basis in the home.  Narda Person has expressed her willingness to use a manual wheelchair in the home. Patients upper body strength is sufficient for propulsion.  She also has a caregiver who is available, willing, and able to provide assistance with the wheelchair when needed.      Time Tracking:     OT Date of Treatment: 03/21/25  OT Start Time: 1044  OT Stop Time: 1104  OT Total Time (min): 20 min    Billable Minutes:Self Care/Home Management 8  Therapeutic Activity 12    OT/COURT: OT          3/21/2025

## 2025-03-21 NOTE — PROGRESS NOTES
Surgery Progress Note    Narda Person is a 79 y.o. year old female in hospital for colitis.  Having mild pain. Tolerating diet and having Bms.  No f/c/n/v/sob/cp    ROS:  Negative except above    PE:  Vitals:    03/20/25 2306 03/21/25 0400 03/21/25 0754 03/21/25 1037   BP: 137/74 125/69 119/73 115/72   BP Location: Left arm Left arm Left arm Left arm   Patient Position: Lying Lying Lying Lying   Pulse: 88 76 74 78   Resp: 19 18 18 18   Temp: 97.5 °F (36.4 °C) 97.7 °F (36.5 °C) 98.2 °F (36.8 °C) 98.3 °F (36.8 °C)   TempSrc: Oral Oral Oral Oral   SpO2: (!) 92% 95% (!) 92% 95%   Weight:       Height:           NAD  No belabored breathing  No skin changes  Abd soft. Mildly ttp left side. No peritonitis    Significant Diagnostic Studies: Labs: BMP:   Recent Labs   Lab 03/20/25  0717 03/21/25  0431   * 167*   * 138   K 3.4* 3.5    110   CO2 22* 20*   BUN 36* 22   CREATININE 1.7* 0.8   CALCIUM 8.0* 7.4*   MG 1.9 1.6    and CBC   Recent Labs   Lab 03/20/25  0717 03/21/25  0431   WBC 10.07 8.95   HGB 11.5* 11.1*   HCT 33.6* 32.4*    162       A/P:  Narda Person is a 79 y.o. year old female  with left sided pain and colitis    -no surgical intervention.  -f/u GI recs  -ok for diet  -surgery to sign off call with questions    Jose Seymour  General Surgery - [facility]  3/21/2025         None

## 2025-03-21 NOTE — NURSING
[facility]  Nurses Note -- 4 Eyes      3/21/2025       Skin assessed on: Q Shift      [x] No Pressure Injuries Present    [x]Prevention Measures Documented    [] Yes LDA  for Pressure Injury Previously documented     [] Yes New Pressure Injury Discovered   [] LDA for New Pressure Injury Added      Attending RN:  Ivana Forman RN     Second RN:  ENRIQUE Porras

## 2025-03-21 NOTE — ASSESSMENT & PLAN NOTE
- Presents with vomiting/blood in stool after recent antibiotics  - CT abdomen and pelvis showed findings consistent with colitis.  - Continue Zosyn   - stool studies are negative at this point  - on 03/21 she does still have some left lower quadrant abdominal pain though it is improved from admission  - GI consulted.  No current plans for colonoscopy.

## 2025-03-21 NOTE — PLAN OF CARE
Patient is awake alert confused but answers simple questions appropriately; needs reorienting to location and situation. IV fluids and IV antibiotics as ordered. No complaints of pain N/V, SOB. Frequent incontinence care. Bed alarm on and call light in reach. Free of falls. Continue with plan of care as ordered. No distress noted.   Problem: Adult Inpatient Plan of Care  Goal: Plan of Care Review  Outcome: Progressing  Goal: Patient-Specific Goal (Individualized)  Outcome: Progressing  Goal: Optimal Comfort and Wellbeing  Outcome: Progressing  Goal: Readiness for Transition of Care  Outcome: Progressing     Problem: Diabetes Comorbidity  Goal: Blood Glucose Level Within Targeted Range  Outcome: Progressing     Problem: Infection  Goal: Absence of Infection Signs and Symptoms  Outcome: Progressing     Problem: Fall Injury Risk  Goal: Absence of Fall and Fall-Related Injury  Outcome: Progressing     Problem: Acute Kidney Injury/Impairment  Goal: Fluid and Electrolyte Balance  Outcome: Progressing

## 2025-03-21 NOTE — ASSESSMENT & PLAN NOTE
- presented with right arm in sling.  Daughter says that she fell about a month ago and was diagnosed with a broken shoulder in multiple places  - XR 3/20 shows comminuted right proxy humerus fracture  - nonweightbearing right upper extremity until seen by orthopedic  - orthopedics consulted; awaiting recommendation  - PT OT consult  - she does not have any pain in the shoulder

## 2025-03-21 NOTE — PROGRESS NOTES
[facility] Progress Note    Reason for consult: Eval for colonscopy    PCP:   Sirena Coleman       LOS: 3        Initial History of Present Illness (HPI):  This is a 79 y.o. female consulted to GI service for eval for colonoscopy. PMH dementia, hypertension, type 2 diabetes, hyperlipidemia, anxiety, depression.  Patient with dementia, medical hx information limited. Patient confirms abdominal pain, denies any stools on today.   Patient presented to [place] ED for evaluation of vomiting and diarrhea that started 2 days prior to presentation. Patient was prescribed nitrofurantoin for UTI on 03/13, after which she developed nausea, vomiting and diarrhea. Additional symptoms include blood in stool.     Wbc 14.8 now 10, hgb 15.4 now 11.5    Imaging notable for diffuse wall thickening and edema throughout the wall of the colon which begins in the mid transverse colon and extends distally through the rectum.     Interval hx  Patient reports abdominal pain is improving. Staff denies any episodes of bloody diarrhea. Patient did have 4 episodes of diarrhea on yesterday per I&O.     Medical History:  has a past medical history of Abnormal Pap smear, DIONNE (acute kidney injury) (6/29/2014), DIONNE (acute kidney injury) (6/29/2014), Alzheimer's dementia (4/12/2018), Anemia, Breast cancer (1995), Breast disorder, Diabetes mellitus, Diabetes mellitus, type 2, ESSENTIAL HYPERTENSION (10/1/2012), Hypothyroid (6/29/2014), and Pneumonia (01/16/2020).    Surgical History:  has a past surgical history that includes Hysterectomy; masectomy; Oophorectomy (1997); Mastectomy (Left); Cataract extraction, bilateral; Esophagogastroduodenoscopy (N/A, 8/10/2020); and Colonoscopy (N/A, 8/10/2020).      Objective Findings:    Vital Signs:  Temp:  [97.5 °F (36.4 °C)-98.6 °F (37 °C)]   Pulse:  [74-88]   Resp:  [18-19]   BP: (111-137)/(69-77)   SpO2:  [92 %-96 %]   Body mass index is 28.75 kg/m².      Physical Exam  Vitals and  nursing note reviewed.   Constitutional:       Appearance: Normal appearance.   HENT:      Head: Normocephalic.   Pulmonary:      Effort: Pulmonary effort is normal.   Abdominal:      General: Bowel sounds are normal.      Palpations: Abdomen is soft.   Skin:     General: Skin is warm and dry.   Neurological:      Mental Status: She is alert and oriented to person, place, and time.   Psychiatric:         Mood and Affect: Mood normal.         Behavior: Behavior normal.         Thought Content: Thought content normal.         Judgment: Judgment normal.               Labs:  Lab Results   Component Value Date    WBC 8.95 03/21/2025    HGB 11.1 (L) 03/21/2025    HCT 32.4 (L) 03/21/2025     03/21/2025    CHOL 229 (H) 07/08/2024    TRIG 182 (H) 07/08/2024    HDL 40 07/08/2024    ALT 13 03/19/2025    AST 12 03/19/2025     03/21/2025    K 3.5 03/21/2025     03/21/2025    CREATININE 0.8 03/21/2025    BUN 22 03/21/2025    CO2 20 (L) 03/21/2025    TSH 2.210 12/12/2024    INR 1.1 06/28/2020    HGBA1C 9.3 (H) 03/18/2025             Imaging: 3/18 CT abdomen pelvis w/ IV contrast- Study limited by motion, demonstrating findings consistent with colitis. This may be infectious, inflammatory or ischemic in etiology. There is no evidence of bowel infarction or portal venous gas.               ABLA. GI bleed. LLQ pain. Bloody Diarrhea. Nausea and vomiting. Abnormal CT abdomen. Colitis. Leukocytosis. Shoulder fracture.   Plan/ Recommendations:  1.  Reports improvement in LLQ pain. Nursing staff denies any bloody stools in last 2 days.   CT notes diffuse inflammation consistent with colitis. Patient with increase in wbc on admission now normalized.  Hgb 15 on admit, however likely hemoconcentration as baseline is ~13. Today hgb 11.1. No plan for urgent inpatient endoscopy as bleeding has subsided and patient with increase perf risk d/t colitis.  GI pathogens panel, gi stool cultures and cdiff negative. Patient had 4 non  bloody diarrhea stools on yesterday, rec imodium bid. Rec f/u in GI clinic.  2. N/v subsided and patient tolerating diet     Will sign off    Thank you so much for allowing us to participate in the care of Narda Person . Please contact us if you have any additional questions.    Shannon Chu NP  Gastroenterology  [facility]

## 2025-03-21 NOTE — PT/OT/SLP PROGRESS
Physical Therapy Treatment    Patient Name:  Narda Person   MRN:  [id number]    Recommendations:     Discharge Recommendations: Low Intensity Therapy (with close supervision/assist PRN)  Discharge Equipment Recommendations: hospital bed, wheelchair  Barriers to discharge: Decreased caregiver support    Assessment:     Narda Person is a 79 y.o. female admitted with a medical diagnosis of Colitis.  She presents with the following impairments/functional limitations: weakness, gait instability, impaired balance, decreased safety awareness, pain, impaired self care skills, impaired functional mobility, decreased coordination .    Rehab Prognosis: Fair; patient would benefit from acute skilled PT services to address these deficits and reach maximum level of function.    Recent Surgery: * No surgery found *      Plan:     During this hospitalization, patient to be seen 3 x/week to address the identified rehab impairments via therapeutic activities, therapeutic exercises, neuromuscular re-education, wheelchair management/training and progress toward the following goals:    Plan of Care Expires:  04/02/25    Subjective     Chief Complaint: pain  Patient/Family Comments/goals: Pt agreeable to treatment  Pain/Comfort:  Pain Rating 1:  (not rated, R shoulder)  Pain Addressed 1: Reposition, Distraction, Cessation of Activity      Objective:     Communicated with nsg prior to session.  Patient found  slid down in bed  with bed alarm, peripheral IV upon PT entry to room.     General Precautions: Standard, fall  Orthopedic Precautions: RUE non weight bearing (sling for comfort, OK for ROM wrist/hand/elbow and pendulum exercises)  Braces: UE Sling  Respiratory Status: Room air     Functional Mobility:  Bed Mobility:     Scooting: minimum assistance  Supine to Sit: minimum assistance  Sit to Supine: minimum assistance  Transfers:     Sit to Stand:  moderate assistance with no AD  Gait: 2 sidesteps with Max A  Balance: Fair+ sit,  fair-/Poor+ stand      AM-PAC 6 CLICK MOBILITY  Turning over in bed (including adjusting bedclothes, sheets and blankets)?: 3  Sitting down on and standing up from a chair with arms (e.g., wheelchair, bedside commode, etc.): 2  Moving from lying on back to sitting on the side of the bed?: 2  Moving to and from a bed to a chair (including a wheelchair)?: 2  Need to walk in hospital room?: 1  Climbing 3-5 steps with a railing?: 1  Basic Mobility Total Score: 11       Treatment & Education:  Pt performed AP's, bridging, TKE's x 10 reps prior to mobilization.  Pt sat at EOB and performed AP's and LAQ's x 10 reps.      Patient left HOB elevated with all lines intact, call button in reach, and bed alarm on..    GOALS:   Multidisciplinary Problems       Physical Therapy Goals          Problem: Physical Therapy    Goal Priority Disciplines Outcome Interventions   Physical Therapy Goal     PT, PT/OT Progressing    Description: Goals to be met by: 25     Patient will increase functional independence with mobility by performin. Supine to sit with MInimal Assistance  2. Rolling to Left and Right with Minimal Assistance.  3. Bed to chair transfer with Minimal Assistance    4. Wheelchair propulsion x25 feet with Stand-by Assistance using bilateral uppper extremities  5. Lower extremity exercise program x10 reps per handout, with assistance as needed                         DME Justifications:    Narda Person has a mobility limitation that significantly impairs her ability to participate in one or more mobility related activities of daily living (MRADLs) such as toileting, feeding, dressing, grooming, and bathing in customary locations in the home.  The mobility limitation cannot be sufficiently resolved by the use of a cane or walker.   The use of a manual wheelchair will significantly improve the patients ability to participate in MRADLS and the patient will use it on regular basis in the home.  Narda Person has  expressed her willingness to use a manual wheelchair in the home. Patients L upper body strength and B Lower body strength is sufficient for propulsion.  She also has a caregiver who is available, willing, and able to provide assistance with the wheelchair when needed.     MARIA LUISA Laboy requires a hospital bed due to her requiring positioning of the body in ways not feasible with an ordinary bed due to limited ability and cannot independently make changes in body position without the use of the bed.The positioning of the body cannot be sufficiently resolved by the use of pillows and wedges.    Time Tracking:     PT Received On: 03/21/25  PT Start Time: 1430     PT Stop Time: 1500  PT Total Time (min): 30 min     Billable Minutes: Therapeutic Activity 15 and Therapeutic Exercise 15    Treatment Type: Treatment  PT/PTA: PT     Number of PTA visits since last PT visit: 0     03/21/2025

## 2025-03-21 NOTE — ASSESSMENT & PLAN NOTE
- Seems mild to moderate; would benefit from increased family supervision due to history of falls. She is open to her son Manny moving in with her, and will further discuss with family.

## 2025-03-21 NOTE — CONSULTS
Chief Complaint:  Right proximal humerus fracture    History of Present Illness:  Narda Person is a very pleasant 79 y.o. female who presents after a fall from standing height with right proximal humerus pain.  Patient reports he is happened 2 weeks ago patient's daughter reports longer than 2 weeks ago.  She denies any numbness tingling weakness.  Review of Systems:    Noncontributory except for above      Past Medical History:   Diagnosis Date    Abnormal Pap smear     DIONNE (acute kidney injury) 6/29/2014    DIONNE (acute kidney injury) 6/29/2014    Alzheimer's dementia 4/12/2018    Anemia     Breast cancer 1995    left breast    Breast disorder     Diabetes mellitus     Diabetes mellitus, type 2     ESSENTIAL HYPERTENSION 10/1/2012    Hypothyroid 6/29/2014    Pneumonia 01/16/2020       Past Surgical History:   Past Surgical History:   Procedure Laterality Date    CATARACT EXTRACTION, BILATERAL      COLONOSCOPY N/A 8/10/2020    Procedure: COLONOSCOPY;  Surgeon: Guerda Perales MD;  Location: [facility];  Service: Endoscopy;  Laterality: N/A;    ESOPHAGOGASTRODUODENOSCOPY N/A 8/10/2020    Procedure: EGD (ESOPHAGOGASTRODUODENOSCOPY) WITH BIOPSY;  Surgeon: Guerda Perales MD;  Location: [facility];  Service: Endoscopy;  Laterality: N/A;    HYSTERECTOMY      masectomy      single left breast    MASTECTOMY Left     OOPHORECTOMY  1997    only 1       Social History:  negative tobacco abuse    Allergies:  Review of patient's allergies indicates:   Allergen Reactions    Percocet [oxycodone-acetaminophen] Itching    Percodan [oxycodone hcl-oxycodone-asa] Itching     Other reaction(s): Unknown    Latex, natural rubber Rash    Phenytoin sodium extended      Other reaction(s): Unknown    Sutures, catgut      Infections to sutures     Adhesive Rash    Adhesive tape-silicones Rash       Medications:  Current Medications[1]    Physical Exam:   General:  Well developed and well nourished age appropriate male in no acute  distress  Cardiovascular: regular rate and rhythm  Respiratory:  non-labored breathing, no wheezing  Abdomen: soft, non-tender, non-distended  Musculoskeletal:  No ecchymosis about right upper extremity f  No laceration or abrasion palpable radial pulse  Full range of motion of elbow with no pain  Neuro:  Sensation motor intact throughout right upper extremity median ain radial PI and in ulnar intact     Imaging:  Imaging revealed:  Comminuted proximal humerus fracture on the right no dislocation     Diagnosis:  79-year-old female with right proximal humerus fracture.     Plan:   1. Had extensive conversation with the patient regarding the risks benefits and alternatives as well as anticipated convalescence of surgical versus nonsurgical management.  Given his comorbidities in his age will proceed with conservative management.  Recommend sling for comfort.  Must do physical therapy daily for range of motion of her elbow.  Okay for gentle pendulums of proximal humerus.  Patient should follow-up in clinic in 2 weeks.     Please call with questions     Uriah Beltran MD  [facility]             [1]   Current Facility-Administered Medications   Medication Dose Route Frequency Provider Last Rate Last Admin    acetaminophen tablet 650 mg  650 mg Oral Q8H PRN Ronnie Feliz MD        acetaminophen tablet 650 mg  650 mg Oral Q4H PRN Ronnie Feliz MD        aluminum-magnesium hydroxide-simethicone 200-200-20 mg/5 mL suspension 30 mL  30 mL Oral QID PRN Ronnie Feliz MD        atorvastatin tablet 20 mg  20 mg Oral Daily Ronnie Feliz MD   20 mg at 03/21/25 0916    bisacodyL suppository 10 mg  10 mg Rectal Daily PRN Ronnie Feliz MD        donepeziL tablet 10 mg  10 mg Oral QHS Ronnie Feliz MD   10 mg at 03/20/25 2145    enoxaparin injection 40 mg  40 mg Subcutaneous Q24H (prophylaxis, 1700) Verónica Charles MD        EScitalopram oxalate tablet 10 mg  10 mg Oral Daily Ronnie Feliz MD   10 mg at 03/21/25 0916     glucagon (human recombinant) injection 1 mg  1 mg Intramuscular PRN Ronnie Feliz MD        glucose chewable tablet 16 g  16 g Oral BHARGAVIN Ronnie Feliz MD        glucose chewable tablet 24 g  24 g Oral PRN Ronnie Feliz MD        insulin aspart U-100 pen 0-10 Units  0-10 Units Subcutaneous QID (AC + HS) Ronnie Lundberg MD   4 Units at 03/21/25 1135    insulin aspart U-100 pen 15 Units  15 Units Subcutaneous TIDWM Verónica Charles MD        insulin glargine U-100 (Lantus) pen 40 Units  40 Units Subcutaneous QHS Verónica Charles MD   40 Units at 03/20/25 2145    loperamide capsule 2 mg  2 mg Oral BID Shannon Chu NP   2 mg at 03/21/25 0916    meclizine tablet 25 mg  25 mg Oral TID PRN Ronnie Feliz MD        melatonin tablet 6 mg  6 mg Oral Nightly PRN Ronnie Feliz MD   6 mg at 03/20/25 2145    naloxone 0.4 mg/mL injection 0.02 mg  0.02 mg Intravenous PRN Ronnie Feliz MD        ondansetron injection 4 mg  4 mg Intravenous Q8H PRN Ronnie Feliz MD   4 mg at 03/20/25 1412    pantoprazole injection 40 mg  40 mg Intravenous BID Ronnie Feliz MD   40 mg at 03/21/25 0916    piperacillin-tazobactam (ZOSYN) 4.5 g in D5W 100 mL IVPB (MB+)  4.5 g Intravenous Q8H Ronnie Feliz MD   Stopped at 03/21/25 1320    senna-docusate 8.6-50 mg per tablet 1 tablet  1 tablet Oral Daily PRN Ronnie Feliz MD        simethicone chewable tablet 80 mg  1 tablet Oral QID PRRonnie Ponce MD        sodium chloride 0.9% flush 10 mL  10 mL Intravenous Q12H PRRonnie Ponce MD

## 2025-03-21 NOTE — PROGRESS NOTES
[facility] Medicine  Progress Note    Patient Name: Narda Person  MRN: [id number]  Patient Class: IP- Inpatient   Admission Date: 3/18/2025  Length of Stay: 3 days  Attending Physician: Verónica Charles MD  Primary Care Provider: Sirena Coleman MD        Subjective     Principal Problem:Colitis        HPI:  This is an 79-year-old female with a past medical history of dementia, hypertension, type 2 diabetes, hyperlipidemia, anxiety, depression, who presents with vomiting.     Patient presented to [facility] ED for evaluation of vomiting and diarrhea that started 2 days prior to presentation.  Patient was prescribed nitrofurantoin for UTI on 03/13, after which she developed nausea, vomiting and diarrhea.  Additional symptoms include blood in stool.  Patient is unable to contribute to the history, states that she does not remember what happened.  I was unable to reach both of her daughters on numbers listed in the chart.     In the ED, the patient was hypertensive (170s-190s/70s-80s). Labs were remarkable for leukocytosi (14.8), hyperglycemia (478 > 354) and positive stool occult blood. UA showed trace, >100 RBC, +2 protein, +3 glucose, +2 ketones, +3 blood.  CT abdomen and pelvis showed findings consistent with colitis.  Patient was given 2L of NS, ciprofloxacin, flagyl 500 mg IV, regular insulin 5 units IV, Phenergan 6.25 mg IV. She was admitted to [facility] for further management.     Overview/Hospital Course:  Ms Narda Person is a 79 y.o. woman with dementia, DM admitted with vomiting and diarrhea. Recently took nitrofurantoin for UTI. In ED, noted findings of colitis on CT. She was admitted and started zosyn. She still has LLQ pain but no vomiting, no diarrhea, and tolerating PO intake.  Stool studies are negative for infection.  She had hematuria, now improving.  Urine culture does have Gram-negative anastacia. She developed DIONNE on 3/20/25; started IVF with resolution of DIONNE.     She also  has R arm sling in place- daughter states this is from shoulder out of place/broken after a fall about a month ago. Repeat XR 3/20 showed comminuted right proximal humerus fracture.  Orthopedics consulted. PT, OT consulted.     Interval History:  She is awake.  She is listening to music on the TV.  She has no complaints.  She has no abdominal pain until I pressed left lower quadrant.  She is tolerating diet.  Review of Systems   Constitutional:  Negative for fever.   Respiratory:  Negative for shortness of breath.    Cardiovascular:  Negative for chest pain.   Gastrointestinal:  Negative for abdominal pain, diarrhea, nausea and vomiting.   Psychiatric/Behavioral:  Positive for confusion.      Objective:     Vital Signs (Most Recent):  Temp: 98.2 °F (36.8 °C) (03/21/25 0754)  Pulse: 74 (03/21/25 0754)  Resp: 18 (03/21/25 0754)  BP: 119/73 (03/21/25 0754)  SpO2: (!) 92 % (03/21/25 0754) Vital Signs (24h Range):  Temp:  [97.5 °F (36.4 °C)-98.6 °F (37 °C)] 98.2 °F (36.8 °C)  Pulse:  [73-88] 74  Resp:  [18-19] 18  SpO2:  [92 %-96 %] 92 %  BP: (108-137)/(68-77) 119/73     Weight: 71.3 kg (157 lb 3 oz)  Body mass index is 28.75 kg/m².    Intake/Output Summary (Last 24 hours) at 3/21/2025 1004  Last data filed at 3/21/2025 0855  Gross per 24 hour   Intake 3035.33 ml   Output 350 ml   Net 2685.33 ml         Physical Exam  Vitals and nursing note reviewed.   HENT:      Head: Normocephalic and atraumatic.      Nose: Nose normal.      Mouth/Throat:      Mouth: Mucous membranes are moist.   Cardiovascular:      Rate and Rhythm: Normal rate and regular rhythm.   Pulmonary:      Effort: Pulmonary effort is normal.      Breath sounds: Normal breath sounds.      Comments: Room air  Abdominal:      General: Bowel sounds are normal. There is no distension.      Palpations: Abdomen is soft.      Tenderness: There is abdominal tenderness (With deep palpation in left lower quadrant). There is no right CVA tenderness, left CVA tenderness,  guarding or rebound.   Musculoskeletal:      Right lower leg: No edema.      Left lower leg: No edema.      Comments: Her right arm is in a sling   Skin:     General: Skin is warm and dry.   Neurological:      Mental Status: She is alert.      Comments: Oriented to self.  Not oriented to location or situation or date               Significant Labs: All pertinent labs within the past 24 hours have been reviewed.    Significant Imaging: I have reviewed all pertinent imaging results/findings within the past 24 hours.      Assessment & Plan  Colitis  - Presents with vomiting/blood in stool after recent antibiotics  - CT abdomen and pelvis showed findings consistent with colitis.  - Continue Zosyn   - stool studies are negative at this point  - on 03/21 she does still have some left lower quadrant abdominal pain though it is improved from admission  - GI consulted.  No current plans for colonoscopy.    Diabetes mellitus, type 2  Patient's FSGs are uncontrolled due to hyperglycemia on current medication regimen.  Last A1c reviewed-   Lab Results   Component Value Date    HGBA1C 9.3 (H) 03/18/2025     Most recent fingerstick glucose reviewed-   Recent Labs   Lab 03/20/25  1200 03/20/25  1619 03/20/25  1917   POCTGLUCOSE 253* 151* 139*     Current correctional scale  Medium  Maintain anti-hyperglycemic dose as follows-   Antihyperglycemics (From admission, onward)      Start     Stop Route Frequency Ordered    03/20/25 2100  insulin glargine U-100 (Lantus) pen 40 Units         -- SubQ Nightly 03/20/25 1029    03/20/25 1130  insulin aspart U-100 pen 12 Units         -- SubQ 3 times daily with meals 03/20/25 0848    03/19/25 0030  insulin aspart U-100 pen 0-10 Units         -- SubQ Before meals & nightly PRN 03/18/25 2330          Hold Oral hypoglycemics while patient is in the hospital.  Gastroesophageal reflux disease without esophagitis  - continue Protonix   - no prior EGD to review    Alzheimer's disease, unspecified  (CODE)  - at baseline she is oriented to self and recognizes family.  Reviewed primary care notes which suggests that she is typically oriented and able to move around and eat but does have hallucinations  - Continue home Aricept  Anxiety and depression  - Continue Lexapro   Acute renal failure superimposed on stage 3a chronic kidney disease  Cr on admit: 1. Baseline Cr 1   - developed DIONNE on 03/20 with creatinine rise to 1.7  - Suspect due to colitis/UTI  - investigated with UA which now shows many bacteria as well as 3+ blood.  This is less blood than when she came into the hospital.  - treated with IV fluids- stopped IVF  - DIONNE resolved on 03/21  - now back at baseline CKD 3A  Hypokalemia  Patient's most recent potassium results are listed below.   Recent Labs     03/19/25  0710 03/20/25  0717 03/21/25  0431   K 4.0 3.4* 3.5     Plan  - Replete potassium per protocol  - Monitor potassium Daily  - Patient's hypokalemia is improving  Normocytic anemia  Anemia is likely due to  unknown.  Suspect she was hemoconcentrated and dehydrated upon admission.  Hemoglobin has dropped daily.  She did have prior signs of blood loss. . Most recent hemoglobin and hematocrit are listed below.  Recent Labs     03/19/25  0502 03/20/25  0717 03/21/25  0431   HGB 13.5 11.5* 11.1*   HCT 39.6 33.6* 32.4*     Plan  - Monitor serial CBC: Daily  - Transfuse PRBC if patient becomes hemodynamically unstable, symptomatic or H/H drops below 7/21.  - Patient has not received any PRBC transfusions to date  - Patient's anemia is currently worsening. Will continue current treatment  - iron replete.  B12 and folate normal.  - monitor for further signs of blood loss  Acute cystitis with hematuria  - on admission UA had hematuria but no growth on culture  - repeated UA on 03/20 for worsening DIONNE.  Now has many bacteria but with improving hematuria  - urine culture with Gram-negative anastacia  - continue Zosyn to treat for both UTI and colitis while  awaiting final culture results    Fracture of right humerus  - presented with right arm in sling.  Daughter says that she fell about a month ago and was diagnosed with a broken shoulder in multiple places  - XR 3/20 shows comminuted right proxy humerus fracture  - nonweightbearing right upper extremity until seen by orthopedic  - orthopedics consulted; awaiting recommendation  - PT OT consult  - she does not have any pain in the shoulder    VTE Risk Mitigation (From admission, onward)           Ordered     IP VTE HIGH RISK PATIENT  Once         03/18/25 2330     Place sequential compression device  Until discontinued         03/18/25 2330                    Discharge Planning   DWAYNE: 3/23/2025     Code Status: Full Code   Medical Readiness for Discharge Date:   Discharge Plan A: Home Health              Verónica Charles MD  Department of Hospital Medicine   [facility] - Med Surg

## 2025-03-21 NOTE — ASSESSMENT & PLAN NOTE
Cr on admit: 1. Baseline Cr 1   - developed DIONNE on 03/20 with creatinine rise to 1.7  - Suspect due to colitis/UTI  - investigated with UA which now shows many bacteria as well as 3+ blood.  This is less blood than when she came into the hospital.  - treated with IV fluids- stopped IVF  - DIONNE resolved on 03/21  - now back at baseline CKD 3A

## 2025-03-21 NOTE — ASSESSMENT & PLAN NOTE
- Noted recently; PT/OT consulted   - Pt is open to SNF if this is recommended, though is hoping to avoid [facility] NH placement at this time. She will discuss having family member move in with her (likely would be her son, Manny).

## 2025-03-21 NOTE — NURSING
[facility] [place]  Nurses Note -- 4 Eyes      3-20-25      Skin assessed on: Q Shift      [x] No Pressure Injuries Present    [x]Prevention Measures Documented    [] Yes LDA  for Pressure Injury Previously documented     [] Yes New Pressure Injury Discovered   [] LDA for New Pressure Injury Added      Attending RN:  Chiquita Simmons, RN     Second RN:  Cindy Saldaña LPN

## 2025-03-21 NOTE — SUBJECTIVE & OBJECTIVE
Interval History:  She is awake.  She is listening to music on the TV.  She has no complaints.  She has no abdominal pain until I pressed left lower quadrant.  She is tolerating diet.  Review of Systems   Constitutional:  Negative for fever.   Respiratory:  Negative for shortness of breath.    Cardiovascular:  Negative for chest pain.   Gastrointestinal:  Negative for abdominal pain, diarrhea, nausea and vomiting.   Psychiatric/Behavioral:  Positive for confusion.      Objective:     Vital Signs (Most Recent):  Temp: 98.2 °F (36.8 °C) (03/21/25 0754)  Pulse: 74 (03/21/25 0754)  Resp: 18 (03/21/25 0754)  BP: 119/73 (03/21/25 0754)  SpO2: (!) 92 % (03/21/25 0754) Vital Signs (24h Range):  Temp:  [97.5 °F (36.4 °C)-98.6 °F (37 °C)] 98.2 °F (36.8 °C)  Pulse:  [73-88] 74  Resp:  [18-19] 18  SpO2:  [92 %-96 %] 92 %  BP: (108-137)/(68-77) 119/73     Weight: 71.3 kg (157 lb 3 oz)  Body mass index is 28.75 kg/m².    Intake/Output Summary (Last 24 hours) at 3/21/2025 1004  Last data filed at 3/21/2025 0855  Gross per 24 hour   Intake 3035.33 ml   Output 350 ml   Net 2685.33 ml         Physical Exam  Vitals and nursing note reviewed.   HENT:      Head: Normocephalic and atraumatic.      Nose: Nose normal.      Mouth/Throat:      Mouth: Mucous membranes are moist.   Cardiovascular:      Rate and Rhythm: Normal rate and regular rhythm.   Pulmonary:      Effort: Pulmonary effort is normal.      Breath sounds: Normal breath sounds.      Comments: Room air  Abdominal:      General: Bowel sounds are normal. There is no distension.      Palpations: Abdomen is soft.      Tenderness: There is abdominal tenderness (With deep palpation in left lower quadrant). There is no right CVA tenderness, left CVA tenderness, guarding or rebound.   Musculoskeletal:      Right lower leg: No edema.      Left lower leg: No edema.      Comments: Her right arm is in a sling   Skin:     General: Skin is warm and dry.   Neurological:      Mental Status:  She is alert.      Comments: Oriented to self.  Not oriented to location or situation or date               Significant Labs: All pertinent labs within the past 24 hours have been reviewed.    Significant Imaging: I have reviewed all pertinent imaging results/findings within the past 24 hours.

## 2025-03-21 NOTE — ASSESSMENT & PLAN NOTE
- Consult for introduction to palliative medicine and support in pt with underlying dementia (reportedly moderate) transferred from outside hospital for evaluation of colitis and UTI; on abx, and GI consulted. Chart reviewed, and discussed pt in person with primary staff, Dr Charles.   - During visit to bedside, pt alert, pleasant, and readily conversational. Introduced role of palliative medicine, and learned more about pt outside of hospital. She is , and has 4 children total. She worked for many years as a  and ; her favorite flower is a very dark red karla, which represents love and passion. She enjoyed her job, as well as spending time with her daughters who helped her with this.   - Currently, she lives by herself; while independent in ADLs, she's had falls recently due to dizziness/loss of balance. She is willing to work with PT/OT, and even to go to SNF if recommended. She recognizes that she would benefit from increased family supervision, and is planning on asking her son Manny to move in with her. She would like to avoid senior living NH placement if possible. Told her idea to have family move in with her is a great idea; while she enjoys her independence, she needs to also prioritize safety - she agreed with this.   - While agreeable to continuing with ongoing medical care as needed, her care preferences in an emergency are aligned with DNR/DNI. She does not want to undergo CPR and/or intubation, and would prefer to go peacefully when it is her time. I encouraged her to discuss this with her family, to ensure they have a good understanding of her beliefs. Will try to complete [place] prior to discharge.   - She requested that an update be given to her daughter Barbi, so I told her either Dr Charles or I will give her a call.   - Pt identifies as Episcopalian, so will ask spiritual care to place her on visitation list for    - Support provided during visit; let her know  our team will continue to check in on her throughout hospital stay.

## 2025-03-21 NOTE — ASSESSMENT & PLAN NOTE
Patient's most recent potassium results are listed below.   Recent Labs     03/19/25  0710 03/20/25  0717 03/21/25  0431   K 4.0 3.4* 3.5     Plan  - Replete potassium per protocol  - Monitor potassium Daily  - Patient's hypokalemia is improving

## 2025-03-21 NOTE — CONSULTS
[facility] Surg  Palliative Medicine  Consult Note    Patient Name: Narda Person  MRN: [id number]  Admission Date: 3/18/2025  Hospital Length of Stay: 3 days  Code Status: DNR   Attending Provider: Verónica Charles MD  Consulting Provider: Damaris Alvares MD  Primary Care Physician: Sirena Coleman MD  Principal Problem:Colitis    Patient information was obtained from patient, past medical records, ER records, and primary team.      Inpatient consult to Palliative Care  Consult performed by: Damaris Alvares MD  Consult ordered by: Verónica Charles MD  Reason for consult: Advance Care Planning        Assessment/Plan:     Advance Care Planning    - Consult for introduction to palliative medicine and support in pt with underlying dementia (reportedly moderate) transferred from outside hospital for evaluation of colitis and UTI; on abx, and GI consulted. Chart reviewed, and discussed pt in person with primary staff, Dr Charles.   - During visit to bedside, pt alert, pleasant, and readily conversational. Introduced role of palliative medicine, and learned more about pt outside of hospital. She is , and has 4 children total. She worked for many years as a  and ; her favorite flower is a very dark red karla, which represents love and passion. She enjoyed her job, as well as spending time with her daughters who helped her with this.   - Currently, she lives by herself; while independent in ADLs, she's had falls recently due to dizziness/loss of balance. She is willing to work with PT/OT, and even to go to SNF if recommended. She recognizes that she would benefit from increased family supervision, and is planning on asking her son Manny to move in with her. She would like to avoid [facility] NH placement if possible. Told her idea to have family move in with her is a great idea; while she enjoys her independence, she needs to also prioritize safety - she agreed with this.   -  While agreeable to continuing with ongoing medical care as needed, her care preferences in an emergency are aligned with DNR/DNI. She does not want to undergo CPR and/or intubation, and would prefer to go peacefully when it is her time. I encouraged her to discuss this with her family, to ensure they have a good understanding of her beliefs. Will try to complete LaPOST prior to discharge.   - She requested that an update be given to her daughter Barbi, so I told her either Dr Charles or I will give her a call.   - Pt identifies as Episcopal, so will ask spiritual care to place her on visitation list for    - Support provided during visit; let her know our team will continue to check in on her throughout hospital stay.     Other  Alzheimer's disease, unspecified   - Seems mild to moderate; would benefit from increased family supervision due to history of falls. She is open to her son Manny moving in with her, and will further discuss with family.      Falls  - Noted recently; PT/OT consulted   - Pt is open to SNF if this is recommended, though is hoping to avoid [facility] NH placement at this time. She will discuss having family member move in with her (likely would be her son, Manny).     Renal/  Acute cystitis with hematuria  - Abx per primary team     Acute renal failure superimposed on stage 3a chronic kidney disease  - Resolved with fluids     GI  * Colitis  - GI consulted and following; mgmt per their team and primary. Pt said abdominal pain is improving and she tolerated breakfast.     Orthopedic  Fracture of right humerus  - Mgmt per primary team and ortho     Thank you for your consult. I will follow-up with patient. Please contact us if you have any additional questions.    ANITRA Godfrey  Palliative Medicine Staff   [phone]    > 50% of 70 min visit spent in chart review, face to face discussion of symptom assessment, coordination of care with other specialists, goals of care,  "emotional support, formulating and communicating prognosis, exploring burden/ benefit of various approaches of treatment.      Subjective:     HPI:   (From H&P) "This is an 79-year-old female with a past medical history of dementia, hypertension, type 2 diabetes, hyperlipidemia, anxiety, depression, who presents with vomiting.      Patient presented to [place] ED for evaluation of vomiting and diarrhea that started 2 days prior to presentation.  Patient was prescribed nitrofurantoin for UTI on 03/13, after which she developed nausea, vomiting and diarrhea.  Additional symptoms include blood in stool.  Patient is unable to contribute to the history, states that she does not remember what happened.  I was unable to reach both of her daughters on numbers listed in the chart.      In the ED, the patient was hypertensive (170s-190s/70s-80s). Labs were remarkable for leukocytosi (14.8), hyperglycemia (478 > 354) and positive stool occult blood. UA showed trace, >100 RBC, +2 protein, +3 glucose, +2 ketones, +3 blood.  CT abdomen and pelvis showed findings consistent with colitis.  Patient was given 2L of NS, ciprofloxacin, flagyl 500 mg IV, regular insulin 5 units IV, Phenergan 6.25 mg IV. She was admitted to [facility] for further management."    Palliative medicine is consulted for support; see ACP section of plan.     Past Medical History:   Diagnosis Date    Abnormal Pap smear     DIONNE (acute kidney injury) 6/29/2014    DIONNE (acute kidney injury) 6/29/2014    Alzheimer's dementia 4/12/2018    Anemia     Breast cancer 1995    left breast    Breast disorder     Diabetes mellitus     Diabetes mellitus, type 2     ESSENTIAL HYPERTENSION 10/1/2012    Hypothyroid 6/29/2014    Pneumonia 01/16/2020       Past Surgical History:   Procedure Laterality Date    CATARACT EXTRACTION, BILATERAL      COLONOSCOPY N/A 8/10/2020    Procedure: COLONOSCOPY;  Surgeon: Guerda Perales MD;  Location: [facility];  Service: Endoscopy;  " Laterality: N/A;    ESOPHAGOGASTRODUODENOSCOPY N/A 8/10/2020    Procedure: EGD (ESOPHAGOGASTRODUODENOSCOPY) WITH BIOPSY;  Surgeon: Guerda Perales MD;  Location: [facility];  Service: Endoscopy;  Laterality: N/A;    HYSTERECTOMY      masectomy      single left breast    MASTECTOMY Left     OOPHORECTOMY  1997    only 1       Review of patient's allergies indicates:   Allergen Reactions    Percocet [oxycodone-acetaminophen] Itching    Percodan [oxycodone hcl-oxycodone-asa] Itching     Other reaction(s): Unknown    Latex, natural rubber Rash    Phenytoin sodium extended      Other reaction(s): Unknown    Sutures, catgut      Infections to sutures     Adhesive Rash    Adhesive tape-silicones Rash       Medications:  Continuous Infusions:  Scheduled Meds:   atorvastatin  20 mg Oral Daily    donepeziL  10 mg Oral QHS    enoxparin  40 mg Subcutaneous Q24H (prophylaxis, 1700)    EScitalopram oxalate  10 mg Oral Daily    insulin aspart U-100  15 Units Subcutaneous TIDWM    insulin glargine U-100 (Lantus)  40 Units Subcutaneous QHS    loperamide  2 mg Oral BID    pantoprazole  40 mg Intravenous BID    piperacillin-tazobactam (Zosyn) IV (PEDS and ADULTS) (extended infusion is not appropriate)  4.5 g Intravenous Q8H     PRN Meds:  Current Facility-Administered Medications:     acetaminophen, 650 mg, Oral, Q8H PRN    acetaminophen, 650 mg, Oral, Q4H PRN    aluminum-magnesium hydroxide-simethicone, 30 mL, Oral, QID PRN    bisacodyL, 10 mg, Rectal, Daily PRN    glucagon (human recombinant), 1 mg, Intramuscular, PRN    glucose, 16 g, Oral, PRN    glucose, 24 g, Oral, PRN    insulin aspart U-100, 0-10 Units, Subcutaneous, QID (AC + HS) PRN    meclizine, 25 mg, Oral, TID PRN    melatonin, 6 mg, Oral, Nightly PRN    naloxone, 0.02 mg, Intravenous, PRN    ondansetron, 4 mg, Intravenous, Q8H PRN    senna-docusate 8.6-50 mg, 1 tablet, Oral, Daily PRN    simethicone, 1 tablet, Oral, QID PRN    sodium chloride 0.9%, 10 mL, Intravenous,  Q12H PRN    Family History       Problem Relation (Age of Onset)    Depression Daughter    Diabetes Mother, Sister    Heart attack Mother (66), Son (38)    Heart disease Sister, Brother, Brother    Hypertension Mother, Daughter, Son    Thyroid disease Daughter    Uterine cancer Mother          Tobacco Use    Smoking status: Former     Current packs/day: 0.00     Average packs/day: 1 pack/day for 10.0 years (10.0 ttl pk-yrs)     Types: Cigarettes     Start date: 1978     Quit date: 1988     Years since quittin.2    Smokeless tobacco: Never   Substance and Sexual Activity    Alcohol use: Yes     Alcohol/week: 1.0 standard drink of alcohol     Types: 1 Glasses of wine per week     Comment: Occ.    Drug use: No    Sexual activity: Never       Review of Systems   Gastrointestinal:  Positive for abdominal pain and diarrhea.        (Improving)      Objective:     Vital Signs (Most Recent):  Temp: 98.3 °F (36.8 °C) (25 1037)  Pulse: 78 (25 1037)  Resp: 18 (25 1037)  BP: 115/72 (25 1037)  SpO2: 95 % (25 1037) Vital Signs (24h Range):  Temp:  [97.5 °F (36.4 °C)-98.6 °F (37 °C)] 98.3 °F (36.8 °C)  Pulse:  [73-88] 78  Resp:  [18-19] 18  SpO2:  [92 %-96 %] 95 %  BP: (108-137)/(68-77) 115/72     Weight: 71.3 kg (157 lb 3 oz)  Body mass index is 28.75 kg/m².       Physical Exam  Constitutional:       Comments: Alert, sitting up in bed, pleasant and conversational        Significant Labs: All pertinent labs within the past 24 hours have been reviewed.  CBC:   Recent Labs   Lab 25  0431   WBC 8.95   HGB 11.1*   HCT 32.4*   MCV 91        BMP:  Recent Labs   Lab 25  0431   *      K 3.5      CO2 20*   BUN 22   CREATININE 0.8   CALCIUM 7.4*   MG 1.6     LFT:  Lab Results   Component Value Date    AST 12 2025    ALKPHOS 77 2025    BILITOT 0.8 2025     Albumin:   Albumin   Date Value Ref Range Status   2025 3.1 (L) 3.5 - 5.2 g/dL Final      Protein:   Total Protein   Date Value Ref Range Status   03/19/2025 6.3 6.0 - 8.4 g/dL Final     Lactic acid:   Lab Results   Component Value Date    LACTATE 2.0 03/18/2025    LACTATE 2.8 (H) 06/28/2020       Significant Imaging: I have reviewed all pertinent imaging results/findings within the past 24 hours.

## 2025-03-22 LAB
ABSOLUTE EOSINOPHIL (OHS): 0.25 K/UL
ABSOLUTE MONOCYTE (OHS): 0.55 K/UL (ref 0.3–1)
ABSOLUTE NEUTROPHIL COUNT (OHS): 3.59 K/UL (ref 1.8–7.7)
ANION GAP (OHS): 8 MMOL/L (ref 8–16)
BACTERIA STL CULT: NORMAL
BACTERIA UR CULT: ABNORMAL
BASOPHILS # BLD AUTO: 0.09 K/UL
BASOPHILS NFR BLD AUTO: 1.5 %
BUN SERPL-MCNC: 11 MG/DL (ref 8–23)
CALCIUM SERPL-MCNC: 8.1 MG/DL (ref 8.7–10.5)
CHLORIDE SERPL-SCNC: 108 MMOL/L (ref 95–110)
CO2 SERPL-SCNC: 20 MMOL/L (ref 23–29)
CREAT SERPL-MCNC: 0.8 MG/DL (ref 0.5–1.4)
ERYTHROCYTE [DISTWIDTH] IN BLOOD BY AUTOMATED COUNT: 12.5 % (ref 11.5–14.5)
GFR SERPLBLD CREATININE-BSD FMLA CKD-EPI: >60 ML/MIN/1.73/M2
GLUCOSE SERPL-MCNC: 170 MG/DL (ref 70–110)
HCT VFR BLD AUTO: 36 % (ref 37–48.5)
HGB BLD-MCNC: 12.1 GM/DL (ref 12–16)
IMM GRANULOCYTES # BLD AUTO: 0.03 K/UL (ref 0–0.04)
IMM GRANULOCYTES NFR BLD AUTO: 0.5 % (ref 0–0.5)
LYMPHOCYTES # BLD AUTO: 1.49 K/UL (ref 1–4.8)
MAGNESIUM SERPL-MCNC: 1.5 MG/DL (ref 1.6–2.6)
MCH RBC QN AUTO: 30.6 PG (ref 27–50)
MCHC RBC AUTO-ENTMCNC: 33.6 G/DL (ref 32–36)
MCV RBC AUTO: 91 FL (ref 82–98)
NUCLEATED RBC (/100WBC) (OHS): 0 /100 WBC
PLATELET # BLD AUTO: 149 K/UL (ref 150–450)
PMV BLD AUTO: 10.6 FL (ref 9.2–12.9)
POCT GLUCOSE: 148 MG/DL (ref 70–110)
POTASSIUM SERPL-SCNC: 3.9 MMOL/L (ref 3.5–5.1)
RBC # BLD AUTO: 3.95 M/UL (ref 4–5.4)
RELATIVE EOSINOPHIL (OHS): 4.2 %
RELATIVE LYMPHOCYTE (OHS): 24.8 % (ref 18–48)
RELATIVE MONOCYTE (OHS): 9.2 % (ref 4–15)
RELATIVE NEUTROPHIL (OHS): 59.8 % (ref 38–73)
SODIUM SERPL-SCNC: 136 MMOL/L (ref 136–145)
WBC # BLD AUTO: 6 K/UL (ref 3.9–12.7)

## 2025-03-22 PROCEDURE — 63600175 PHARM REV CODE 636 W HCPCS: Mod: HCNC | Performed by: HOSPITALIST

## 2025-03-22 PROCEDURE — 36415 COLL VENOUS BLD VENIPUNCTURE: CPT | Mod: HCNC | Performed by: HOSPITALIST

## 2025-03-22 PROCEDURE — 63600175 PHARM REV CODE 636 W HCPCS: Mod: HCNC | Performed by: STUDENT IN AN ORGANIZED HEALTH CARE EDUCATION/TRAINING PROGRAM

## 2025-03-22 PROCEDURE — 25000003 PHARM REV CODE 250: Mod: HCNC | Performed by: STUDENT IN AN ORGANIZED HEALTH CARE EDUCATION/TRAINING PROGRAM

## 2025-03-22 PROCEDURE — 25000003 PHARM REV CODE 250: Mod: HCNC | Performed by: NURSE PRACTITIONER

## 2025-03-22 PROCEDURE — 82435 ASSAY OF BLOOD CHLORIDE: CPT | Mod: HCNC | Performed by: HOSPITALIST

## 2025-03-22 PROCEDURE — 83735 ASSAY OF MAGNESIUM: CPT | Mod: HCNC | Performed by: HOSPITALIST

## 2025-03-22 PROCEDURE — 11000001 HC ACUTE MED/SURG PRIVATE ROOM: Mod: HCNC

## 2025-03-22 PROCEDURE — 85025 COMPLETE CBC W/AUTO DIFF WBC: CPT | Mod: HCNC | Performed by: HOSPITALIST

## 2025-03-22 RX ADMIN — PIPERACILLIN SODIUM AND TAZOBACTAM SODIUM 4.5 G: 4; .5 INJECTION, POWDER, FOR SOLUTION INTRAVENOUS at 02:03

## 2025-03-22 RX ADMIN — PIPERACILLIN SODIUM AND TAZOBACTAM SODIUM 4.5 G: 4; .5 INJECTION, POWDER, FOR SOLUTION INTRAVENOUS at 08:03

## 2025-03-22 RX ADMIN — LOPERAMIDE HYDROCHLORIDE 2 MG: 2 CAPSULE ORAL at 08:03

## 2025-03-22 RX ADMIN — INSULIN ASPART 15 UNITS: 100 INJECTION, SOLUTION INTRAVENOUS; SUBCUTANEOUS at 11:03

## 2025-03-22 RX ADMIN — PANTOPRAZOLE SODIUM 40 MG: 40 INJECTION, POWDER, FOR SOLUTION INTRAVENOUS at 08:03

## 2025-03-22 RX ADMIN — ESCITALOPRAM OXALATE 10 MG: 10 TABLET ORAL at 08:03

## 2025-03-22 RX ADMIN — DONEPEZIL HYDROCHLORIDE 10 MG: 10 TABLET ORAL at 08:03

## 2025-03-22 RX ADMIN — ERTAPENEM 1 G: 1 INJECTION INTRAMUSCULAR; INTRAVENOUS at 11:03

## 2025-03-22 RX ADMIN — ENOXAPARIN SODIUM 40 MG: 40 INJECTION SUBCUTANEOUS at 04:03

## 2025-03-22 RX ADMIN — INSULIN GLARGINE 40 UNITS: 100 INJECTION, SOLUTION SUBCUTANEOUS at 08:03

## 2025-03-22 RX ADMIN — ATORVASTATIN CALCIUM 20 MG: 10 TABLET, FILM COATED ORAL at 08:03

## 2025-03-22 RX ADMIN — INSULIN ASPART 15 UNITS: 100 INJECTION, SOLUTION INTRAVENOUS; SUBCUTANEOUS at 04:03

## 2025-03-22 RX ADMIN — INSULIN ASPART 15 UNITS: 100 INJECTION, SOLUTION INTRAVENOUS; SUBCUTANEOUS at 08:03

## 2025-03-22 NOTE — ASSESSMENT & PLAN NOTE
Patient's most recent potassium results are listed below.   Recent Labs     03/20/25  0717 03/21/25  0431 03/22/25  0429   K 3.4* 3.5 3.9     Plan  - Replete potassium per protocol  - Monitor potassium Daily  - Patient's hypokalemia is improving

## 2025-03-22 NOTE — ASSESSMENT & PLAN NOTE
Anemia is likely due to unknown.  Suspect she was hemoconcentrated and dehydrated upon admission.  Hemoglobin has dropped daily.  She did have prior signs of blood loss.. Most recent hemoglobin and hematocrit are listed below.  Recent Labs     03/20/25  0717 03/21/25  0431 03/22/25  0630   HGB 11.5* 11.1* 12.1   HCT 33.6* 32.4* 36.0*     Plan  - Monitor serial CBC: Daily  - Transfuse PRBC if patient becomes hemodynamically unstable, symptomatic or H/H drops below 7/21.  - Patient has not received any PRBC transfusions to date  - Patient's anemia is currently worsening. Will continue current treatment  - iron replete.  B12 and folate normal.  - monitor for further signs of blood loss

## 2025-03-22 NOTE — ASSESSMENT & PLAN NOTE
Patient's FSGs are uncontrolled due to hyperglycemia on current medication regimen.  Last A1c reviewed-   Lab Results   Component Value Date    HGBA1C 9.3 (H) 03/18/2025     Most recent fingerstick glucose reviewed-   Recent Labs   Lab 03/21/25  1036 03/21/25  1539 03/21/25  1938   POCTGLUCOSE 243* 167* 149*     Current correctional scale  Medium  Maintain anti-hyperglycemic dose as follows-   Antihyperglycemics (From admission, onward)      Start     Stop Route Frequency Ordered    03/21/25 1645  insulin aspart U-100 pen 15 Units         -- SubQ 3 times daily with meals 03/21/25 1137    03/20/25 2100  insulin glargine U-100 (Lantus) pen 40 Units         -- SubQ Nightly 03/20/25 1029    03/19/25 0030  insulin aspart U-100 pen 0-10 Units         -- SubQ Before meals & nightly PRN 03/18/25 2330          Hold Oral hypoglycemics while patient is in the hospital.

## 2025-03-22 NOTE — NURSING
[facility] [place]  Nurses Note -- 4 Eyes      3/21/2025       Skin assessed on: Q Shift      [x] No Pressure Injuries Present    []Prevention Measures Documented    [] Yes LDA  for Pressure Injury Previously documented     [] Yes New Pressure Injury Discovered   [] LDA for New Pressure Injury Added      Attending RN:  Cindy Saldaña LPN     Second RN:  ALINA Christianson/ Ivana RN

## 2025-03-22 NOTE — PROGRESS NOTES
[facility] Medicine  Progress Note    Patient Name: Narda Person  MRN: [id number]  Patient Class: IP- Inpatient   Admission Date: 3/18/2025  Length of Stay: 4 days  Attending Physician: Santy Garduno MD  Primary Care Provider: Sirena Coleman MD        Subjective     Principal Problem:Colitis        HPI:  This is an 79-year-old female with a past medical history of dementia, hypertension, type 2 diabetes, hyperlipidemia, anxiety, depression, who presents with vomiting.     Patient presented to [place] ED for evaluation of vomiting and diarrhea that started 2 days prior to presentation.  Patient was prescribed nitrofurantoin for UTI on 03/13, after which she developed nausea, vomiting and diarrhea.  Additional symptoms include blood in stool.  Patient is unable to contribute to the history, states that she does not remember what happened.  I was unable to reach both of her daughters on numbers listed in the chart.     In the ED, the patient was hypertensive (170s-190s/70s-80s). Labs were remarkable for leukocytosi (14.8), hyperglycemia (478 > 354) and positive stool occult blood. UA showed trace, >100 RBC, +2 protein, +3 glucose, +2 ketones, +3 blood.  CT abdomen and pelvis showed findings consistent with colitis.  Patient was given 2L of NS, ciprofloxacin, flagyl 500 mg IV, regular insulin 5 units IV, Phenergan 6.25 mg IV. She was admitted to [facility] for further management.     Overview/Hospital Course:  Ms Narda Person is a 79 y.o. woman with dementia, DM admitted with vomiting and diarrhea. Recently took nitrofurantoin for UTI. In ED, noted findings of colitis on CT. She was admitted and started zosyn. She still has LLQ pain but no vomiting, no diarrhea, and tolerating PO intake.  Stool studies are negative for infection.  She had hematuria, now improving.  Urine culture does have Gram-negative anastacia. She developed DIONNE on 3/20/25; started IVF with resolution of DIONNE.     She also has  R arm sling in place- daughter states this is from shoulder out of place/broken after a fall about a month ago. Repeat XR 3/20 showed comminuted right proximal humerus fracture.  Orthopedics consulted. They recommend sling for comfort.  Must do physical therapy daily for range of motion of her elbow.  Okay for gentle pendulums of proximal humerus.  Patient should follow-up in clinic in 2 weeks.    PT, OT consulted. Home health.     Interval History:  NAEON.  No new issues.   Still awaiting spec/sens, suspect home w HH tomorrow 3/23  CC- nausea, diarrhea  All questions answered and updates on care given.       ROS:  General: Negative for fevers   Cardiac: Negative for chest pain   Pulmonary: Negative for wheezing  GI: Negative for abdominal distention      Vitals:    03/21/25 1939 03/21/25 2320 03/22/25 0529 03/22/25 0750   BP: 134/86 (!) 157/77 (!) 159/82 (!) 154/79   BP Location: Left arm Left arm Left arm Left arm   Patient Position: Lying Lying Lying Lying   Pulse: 79 80 77 73   Resp: 16 16 17 18   Temp: 98.6 °F (37 °C) 98.3 °F (36.8 °C) 98.5 °F (36.9 °C) 98.4 °F (36.9 °C)   TempSrc: Oral Oral Oral Oral   SpO2: 100% 95% 95% 97%   Weight:       Height:              Body mass index is 28.75 kg/m².      PHYSICAL EXAM:  GENERAL APPEARANCE: alert and cooperative.     HEAD: NC/AT  CARDIAC: There is no cyanosis or pallor.   LUNGS: No apparent wheezing or stridor.  ABDOMEN: Non-distended. No guarding.  MSK: Sling  EXTREMITIES: No significant new deformity or new joint abnormality.   NEUROLOGICAL: CN II-XII grossly intact.   SKIN: No lesions or eruptions.  PSYCHIATRIC: No tangential speech. No Hyperactive features.        Recent Results (from the past 24 hours)   POCT glucose    Collection Time: 03/21/25 10:36 AM   Result Value Ref Range    POCT Glucose 243 (H) 70 - 110 mg/dL   POCT glucose    Collection Time: 03/21/25  3:39 PM   Result Value Ref Range    POCT Glucose 167 (H) 70 - 110 mg/dL   POCT glucose    Collection  Time: 03/21/25  7:38 PM   Result Value Ref Range    POCT Glucose 149 (H) 70 - 110 mg/dL   Basic Metabolic Panel    Collection Time: 03/22/25  4:29 AM   Result Value Ref Range    Sodium 136 136 - 145 mmol/L    Potassium 3.9 3.5 - 5.1 mmol/L    Chloride 108 95 - 110 mmol/L    CO2 20 (L) 23 - 29 mmol/L    Glucose 170 (H) 70 - 110 mg/dL    BUN 11 8 - 23 mg/dL    Creatinine 0.8 0.5 - 1.4 mg/dL    Calcium 8.1 (L) 8.7 - 10.5 mg/dL    Anion Gap 8 8 - 16 mmol/L    eGFR >60 >60 mL/min/1.73/m2   Magnesium    Collection Time: 03/22/25  4:29 AM   Result Value Ref Range    Magnesium  1.5 (L) 1.6 - 2.6 mg/dL   CBC with Differential    Collection Time: 03/22/25  6:30 AM   Result Value Ref Range    WBC 6.00 3.90 - 12.70 K/uL    RBC 3.95 (L) 4.00 - 5.40 M/uL    HGB 12.1 12.0 - 16.0 gm/dL    HCT 36.0 (L) 37.0 - 48.5 %    MCV 91 82 - 98 fL    MCH 30.6 27.0 - 50.0 pg    MCHC 33.6 32.0 - 36.0 g/dL    RDW 12.5 11.5 - 14.5 %    Platelet Count 149 (L) 150 - 450 K/uL    MPV 10.6 9.2 - 12.9 fL    Nucleated RBC 0 <=0 /100 WBC    Neut % 59.8 38 - 73 %    Lymph % 24.8 18 - 48 %    Mono % 9.2 4 - 15 %    Eos % 4.2 <=8 %    Basophil % 1.5 <=1.9 %    Imm Grans % 0.5 0.0 - 0.5 %    Neut # 3.59 1.8 - 7.7 K/uL    Lymph # 1.49 1 - 4.8 K/uL    Mono # 0.55 0.3 - 1 K/uL    Eos # 0.25 <=0.5 K/uL    Baso # 0.09 <=0.2 K/uL    Imm Grans # 0.03 0.00 - 0.04 K/uL       Microbiology Results (last 7 days)       Procedure Component Value Units Date/Time    Stool culture [[id number]] Collected: 03/20/25 1113    Order Status: Completed Specimen: Stool Updated: 03/22/25 0634     Stool Culture No Salmonella,Shigella,Vibrio,Campylobacter,Yersinia isolated.    Urine culture [[id number]]  (Abnormal) Collected: 03/20/25 1713    Order Status: Completed Specimen: Urine Updated: 03/21/25 0832     Urine Culture, Routine GRAM NEGATIVE LANA  >100,000 cfu/ml  Identification and susceptibility pending      Narrative:      Specimen Source->Urine    E. coli 0157 antigen [[id number]]  Collected: 03/20/25 1113    Order Status: Completed Specimen: Stool Updated: 03/21/25 0830     Shiga Toxin 1 E.coli Negative     Shiga Toxin 2 E.coli Negative    Clostridium difficile EIA [[id number]] Collected: 03/20/25 1113    Order Status: Completed Specimen: Stool Updated: 03/20/25 1316     C. diff Antigen Negative     C difficile Toxins A+B, EIA Negative     Comment: Testing not recommended for children <24 months old.       Narrative:      If specimen collected today, this will be a community onset  CDIFF case. If specimen cannot be collected by hospital day  3, discontinue test and follow Diarrhea Decision Tree to  determine if a ULICES CDIFF order is appropriate. The order will  automatically discontinue if specimen not collected within  48 hours.  [email or web address]                          Assessment & Plan  Colitis  - Presents with vomiting/blood in stool after recent antibiotics  - CT abdomen and pelvis showed findings consistent with colitis.  - Continue Zosyn   - stool studies are negative at this point  - on 03/21 she does still have some left lower quadrant abdominal pain though it is improved from admission  - GI consulted.  No current plans for colonoscopy.    Diabetes mellitus, type 2  Patient's FSGs are uncontrolled due to hyperglycemia on current medication regimen.  Last A1c reviewed-   Lab Results   Component Value Date    HGBA1C 9.3 (H) 03/18/2025     Most recent fingerstick glucose reviewed-   Recent Labs   Lab 03/21/25  1036 03/21/25  1539 03/21/25  1938   POCTGLUCOSE 243* 167* 149*     Current correctional scale  Medium  Maintain anti-hyperglycemic dose as follows-   Antihyperglycemics (From admission, onward)      Start     Stop Route Frequency Ordered    03/21/25 1645  insulin aspart U-100 pen 15 Units         -- SubQ 3 times daily with meals 03/21/25 1137    03/20/25 2100  insulin glargine U-100 (Lantus) pen 40 Units         --  SubQ Nightly 03/20/25 1029    03/19/25 0030  insulin aspart U-100 pen 0-10 Units         -- SubQ Before meals & nightly PRN 03/18/25 2330          Hold Oral hypoglycemics while patient is in the hospital.  Gastroesophageal reflux disease without esophagitis  - continue Protonix   - no prior EGD to review    Alzheimer's disease, unspecified (CODE)  - at baseline she is oriented to self and recognizes family.  Reviewed primary care notes which suggests that she is typically oriented and able to move around and eat but does have hallucinations  - Continue home Aricept  Anxiety and depression  - Continue Lexapro   Acute renal failure superimposed on stage 3a chronic kidney disease  Cr on admit: 1. Baseline Cr 1   - developed DIONNE on 03/20 with creatinine rise to 1.7  - Suspect due to colitis/UTI  - investigated with UA which now shows many bacteria as well as 3+ blood.  This is less blood than when she came into the hospital.  - treated with IV fluids- stopped IVF  - DIONNE resolved on 03/21  - now back at baseline CKD 3A  Hypokalemia  Patient's most recent potassium results are listed below.   Recent Labs     03/20/25  0717 03/21/25  0431 03/22/25  0429   K 3.4* 3.5 3.9     Plan  - Replete potassium per protocol  - Monitor potassium Daily  - Patient's hypokalemia is improving  Normocytic anemia  Anemia is likely due to  unknown.  Suspect she was hemoconcentrated and dehydrated upon admission.  Hemoglobin has dropped daily.  She did have prior signs of blood loss. . Most recent hemoglobin and hematocrit are listed below.  Recent Labs     03/20/25  0717 03/21/25  0431 03/22/25  0630   HGB 11.5* 11.1* 12.1   HCT 33.6* 32.4* 36.0*     Plan  - Monitor serial CBC: Daily  - Transfuse PRBC if patient becomes hemodynamically unstable, symptomatic or H/H drops below 7/21.  - Patient has not received any PRBC transfusions to date  - Patient's anemia is currently worsening. Will continue current treatment  - iron replete.  B12 and  folate normal.  - monitor for further signs of blood loss  Acute cystitis with hematuria  - on admission UA had hematuria but no growth on culture  - repeated UA on 03/20 for worsening DIONNE.  Now has many bacteria but with improving hematuria  - urine culture with Gram-negative anastacia  - continue Zosyn to treat for both UTI and colitis while awaiting final culture results    Fracture of right humerus  - presented with right arm in sling.  Daughter says that she fell about a month ago and was diagnosed with a broken shoulder in multiple places  - XR 3/20 shows comminuted right proxy humerus fracture  - nonweightbearing right upper extremity until seen by orthopedic  - orthopedics consulted; awaiting recommendation  - PT OT consult  - she does not have any pain in the shoulder    Falls      ACP (advance care planning)      VTE Risk Mitigation (From admission, onward)           Ordered     enoxaparin injection 40 mg  Every 24 hours         03/21/25 1009     IP VTE HIGH RISK PATIENT  Once         03/18/25 2330     Place sequential compression device  Until discontinued         03/18/25 2330                    Discharge Planning   DWAYNE: 3/23/2025     Code Status: DNR   Medical Readiness for Discharge Date:   Discharge Plan A: Home Health                Please place Justification for DME        Santy Garduno MD  Department of Hospital Medicine   [facility] [place] [facility]

## 2025-03-22 NOTE — PLAN OF CARE
Problem: Adult Inpatient Plan of Care  Goal: Plan of Care Review  Outcome: Progressing  Flowsheets (Taken 3/22/2025 1325)  Plan of Care Reviewed With: patient  Goal: Patient-Specific Goal (Individualized)  Outcome: Progressing  Goal: Absence of Hospital-Acquired Illness or Injury  Outcome: Progressing  Intervention: Identify and Manage Fall Risk  Flowsheets (Taken 3/22/2025 1325)  Safety Promotion/Fall Prevention:   assistive device/personal item within reach   bed alarm set   Fall Risk reviewed with patient/family   high risk medications identified   instructed to call staff for mobility   medications reviewed   nonskid shoes/socks when out of bed   room near unit station   side rails raised x 2  Intervention: Prevent Skin Injury  Flowsheets (Taken 3/22/2025 1325)  Body Position:   turned   weight shifting  Skin Protection: incontinence pads utilized  Device Skin Pressure Protection:   absorbent pad utilized/changed   adhesive use limited   pressure points protected   tubing/devices free from skin contact  Intervention: Prevent and Manage VTE (Venous Thromboembolism) Risk  Flowsheets (Taken 3/22/2025 1325)  VTE Prevention/Management:   ambulation promoted   bleeding precautions maintained   bleeding risk assessed  Intervention: Prevent Infection  Flowsheets (Taken 3/22/2025 1325)  Infection Prevention: hand hygiene promoted  Goal: Optimal Comfort and Wellbeing  Outcome: Progressing  Goal: Readiness for Transition of Care  Outcome: Progressing     Problem: Diabetes Comorbidity  Goal: Blood Glucose Level Within Targeted Range  Outcome: Progressing     Problem: Infection  Goal: Absence of Infection Signs and Symptoms  Outcome: Progressing  Intervention: Prevent or Manage Infection  Flowsheets (Taken 3/22/2025 1325)  Infection Management: aseptic technique maintained     Problem: Fall Injury Risk  Goal: Absence of Fall and Fall-Related Injury  Outcome: Progressing  Intervention: Identify and Manage  Contributors  Flowsheets (Taken 3/22/2025 1325)  Self-Care Promotion:   independence encouraged   BADL personal objects within reach   BADL personal routines maintained   meal set-up provided   adaptive equipment use encouraged  Medication Review/Management:   medications reviewed   high-risk medications identified  Intervention: Promote Injury-Free Environment  Flowsheets (Taken 3/22/2025 1325)  Safety Promotion/Fall Prevention:   assistive device/personal item within reach   bed alarm set   Fall Risk reviewed with patient/family   high risk medications identified   instructed to call staff for mobility   medications reviewed   nonskid shoes/socks when out of bed   room near unit station   side rails raised x 2     Problem: Acute Kidney Injury/Impairment  Goal: Fluid and Electrolyte Balance  Outcome: Progressing  Goal: Improved Oral Intake  Outcome: Progressing  Goal: Effective Renal Function  Outcome: Progressing     Problem: Skin Injury Risk Increased  Goal: Skin Health and Integrity  Outcome: Progressing  Intervention: Optimize Skin Protection  Flowsheets (Taken 3/22/2025 1325)  Pressure Reduction Techniques:   frequent weight shift encouraged   weight shift assistance provided  Pressure Reduction Devices: pressure-redistributing mattress utilized  Skin Protection: incontinence pads utilized  Activity Management:   Arm raise - L1   Ankle pumps - L1   Rolling - L1   Up in stretcher chair - L1  Head of Bed (HOB) Positioning: HOB at 30-45 degrees     Problem: Coping Ineffective  Goal: Effective Coping  Outcome: Progressing   Pt alert able to make needs known,,IV abx remains in progress,no s/s adverse reaction noted,reposition self q 2hrs,no c/o pain  at this time,POC explained,remains free from falls and pressure injuries,safety maintained,continue monitoring.

## 2025-03-23 PROBLEM — N39.0 UTI DUE TO EXTENDED-SPECTRUM BETA LACTAMASE (ESBL) PRODUCING ESCHERICHIA COLI: Status: ACTIVE | Noted: 2025-03-21

## 2025-03-23 PROBLEM — Z16.12 UTI DUE TO EXTENDED-SPECTRUM BETA LACTAMASE (ESBL) PRODUCING ESCHERICHIA COLI: Status: ACTIVE | Noted: 2025-03-21

## 2025-03-23 PROBLEM — E16.2 HYPOGLYCEMIA: Status: ACTIVE | Noted: 2025-03-23

## 2025-03-23 PROBLEM — B96.29 UTI DUE TO EXTENDED-SPECTRUM BETA LACTAMASE (ESBL) PRODUCING ESCHERICHIA COLI: Status: ACTIVE | Noted: 2025-03-21

## 2025-03-23 LAB
ABSOLUTE EOSINOPHIL (OHS): 0.24 K/UL
ABSOLUTE MONOCYTE (OHS): 0.7 K/UL (ref 0.3–1)
ABSOLUTE NEUTROPHIL COUNT (OHS): 3.5 K/UL (ref 1.8–7.7)
ANION GAP (OHS): 9 MMOL/L (ref 8–16)
BASOPHILS # BLD AUTO: 0.09 K/UL
BASOPHILS NFR BLD AUTO: 1.4 %
BUN SERPL-MCNC: 8 MG/DL (ref 8–23)
CALCIUM SERPL-MCNC: 8.4 MG/DL (ref 8.7–10.5)
CHLORIDE SERPL-SCNC: 106 MMOL/L (ref 95–110)
CO2 SERPL-SCNC: 26 MMOL/L (ref 23–29)
CREAT SERPL-MCNC: 0.7 MG/DL (ref 0.5–1.4)
ERYTHROCYTE [DISTWIDTH] IN BLOOD BY AUTOMATED COUNT: 12.7 % (ref 11.5–14.5)
GFR SERPLBLD CREATININE-BSD FMLA CKD-EPI: >60 ML/MIN/1.73/M2
GLUCOSE SERPL-MCNC: 59 MG/DL (ref 70–110)
HCT VFR BLD AUTO: 35.7 % (ref 37–48.5)
HGB BLD-MCNC: 12.1 GM/DL (ref 12–16)
IMM GRANULOCYTES # BLD AUTO: 0.04 K/UL (ref 0–0.04)
IMM GRANULOCYTES NFR BLD AUTO: 0.6 % (ref 0–0.5)
LYMPHOCYTES # BLD AUTO: 1.9 K/UL (ref 1–4.8)
MAGNESIUM SERPL-MCNC: 1.4 MG/DL (ref 1.6–2.6)
MCH RBC QN AUTO: 30.5 PG (ref 27–50)
MCHC RBC AUTO-ENTMCNC: 33.9 G/DL (ref 32–36)
MCV RBC AUTO: 90 FL (ref 82–98)
NUCLEATED RBC (/100WBC) (OHS): 0 /100 WBC
PLATELET # BLD AUTO: 191 K/UL (ref 150–450)
PMV BLD AUTO: 10.9 FL (ref 9.2–12.9)
POCT GLUCOSE: 116 MG/DL (ref 70–110)
POCT GLUCOSE: 132 MG/DL (ref 70–110)
POCT GLUCOSE: 134 MG/DL (ref 70–110)
POCT GLUCOSE: 142 MG/DL (ref 70–110)
POCT GLUCOSE: 240 MG/DL (ref 70–110)
POCT GLUCOSE: 64 MG/DL (ref 70–110)
POCT GLUCOSE: 65 MG/DL (ref 70–110)
POTASSIUM SERPL-SCNC: 3.4 MMOL/L (ref 3.5–5.1)
RBC # BLD AUTO: 3.97 M/UL (ref 4–5.4)
RELATIVE EOSINOPHIL (OHS): 3.7 %
RELATIVE LYMPHOCYTE (OHS): 29.4 % (ref 18–48)
RELATIVE MONOCYTE (OHS): 10.8 % (ref 4–15)
RELATIVE NEUTROPHIL (OHS): 54.1 % (ref 38–73)
SODIUM SERPL-SCNC: 141 MMOL/L (ref 136–145)
WBC # BLD AUTO: 6.47 K/UL (ref 3.9–12.7)

## 2025-03-23 PROCEDURE — 63600175 PHARM REV CODE 636 W HCPCS: Mod: HCNC | Performed by: STUDENT IN AN ORGANIZED HEALTH CARE EDUCATION/TRAINING PROGRAM

## 2025-03-23 PROCEDURE — 85025 COMPLETE CBC W/AUTO DIFF WBC: CPT | Mod: HCNC | Performed by: HOSPITALIST

## 2025-03-23 PROCEDURE — 80048 BASIC METABOLIC PNL TOTAL CA: CPT | Mod: HCNC | Performed by: HOSPITALIST

## 2025-03-23 PROCEDURE — 25000003 PHARM REV CODE 250: Mod: HCNC | Performed by: STUDENT IN AN ORGANIZED HEALTH CARE EDUCATION/TRAINING PROGRAM

## 2025-03-23 PROCEDURE — 83735 ASSAY OF MAGNESIUM: CPT | Mod: HCNC | Performed by: HOSPITALIST

## 2025-03-23 PROCEDURE — 63600175 PHARM REV CODE 636 W HCPCS: Mod: HCNC | Performed by: HOSPITALIST

## 2025-03-23 PROCEDURE — 25000003 PHARM REV CODE 250: Mod: HCNC | Performed by: NURSE PRACTITIONER

## 2025-03-23 PROCEDURE — 11000001 HC ACUTE MED/SURG PRIVATE ROOM: Mod: HCNC

## 2025-03-23 PROCEDURE — 36415 COLL VENOUS BLD VENIPUNCTURE: CPT | Mod: HCNC | Performed by: HOSPITALIST

## 2025-03-23 PROCEDURE — 25000003 PHARM REV CODE 250: Mod: HCNC

## 2025-03-23 RX ORDER — INSULIN GLARGINE 100 [IU]/ML
30 INJECTION, SOLUTION SUBCUTANEOUS NIGHTLY
Status: DISCONTINUED | OUTPATIENT
Start: 2025-03-23 | End: 2025-03-24

## 2025-03-23 RX ADMIN — ATORVASTATIN CALCIUM 20 MG: 10 TABLET, FILM COATED ORAL at 08:03

## 2025-03-23 RX ADMIN — PANTOPRAZOLE SODIUM 40 MG: 40 INJECTION, POWDER, FOR SOLUTION INTRAVENOUS at 08:03

## 2025-03-23 RX ADMIN — Medication 6 MG: at 10:03

## 2025-03-23 RX ADMIN — POTASSIUM BICARBONATE 50 MEQ: 977.5 TABLET, EFFERVESCENT ORAL at 02:03

## 2025-03-23 RX ADMIN — INSULIN ASPART 15 UNITS: 100 INJECTION, SOLUTION INTRAVENOUS; SUBCUTANEOUS at 08:03

## 2025-03-23 RX ADMIN — ERTAPENEM 1 G: 1 INJECTION INTRAMUSCULAR; INTRAVENOUS at 12:03

## 2025-03-23 RX ADMIN — ESCITALOPRAM OXALATE 10 MG: 10 TABLET ORAL at 08:03

## 2025-03-23 RX ADMIN — LOPERAMIDE HYDROCHLORIDE 2 MG: 2 CAPSULE ORAL at 10:03

## 2025-03-23 RX ADMIN — ENOXAPARIN SODIUM 40 MG: 40 INJECTION SUBCUTANEOUS at 07:03

## 2025-03-23 RX ADMIN — LOPERAMIDE HYDROCHLORIDE 2 MG: 2 CAPSULE ORAL at 08:03

## 2025-03-23 RX ADMIN — Medication 16 G: at 12:03

## 2025-03-23 RX ADMIN — DONEPEZIL HYDROCHLORIDE 10 MG: 10 TABLET ORAL at 10:03

## 2025-03-23 RX ADMIN — INSULIN GLARGINE 30 UNITS: 100 INJECTION, SOLUTION SUBCUTANEOUS at 11:03

## 2025-03-23 NOTE — ASSESSMENT & PLAN NOTE
Patient's most recent potassium results are listed below.   Recent Labs     03/21/25  0431 03/22/25  0429 03/23/25  0439   K 3.5 3.9 3.4*     Plan  - Replete potassium per protocol  - Monitor potassium Daily  - Patient's hypokalemia is improving

## 2025-03-23 NOTE — PLAN OF CARE
Problem: Adult Inpatient Plan of Care  Goal: Plan of Care Review  Outcome: Progressing  Flowsheets (Taken 3/23/2025 1456)  Plan of Care Reviewed With: patient  Goal: Patient-Specific Goal (Individualized)  Outcome: Progressing  Goal: Absence of Hospital-Acquired Illness or Injury  Outcome: Progressing  Intervention: Identify and Manage Fall Risk  Flowsheets (Taken 3/23/2025 1456)  Safety Promotion/Fall Prevention:   assistive device/personal item within reach   bed alarm set   medications reviewed   instructed to call staff for mobility   high risk medications identified   nonskid shoes/socks when out of bed   room near unit station   side rails raised x 2  Intervention: Prevent Skin Injury  Flowsheets (Taken 3/23/2025 1456)  Body Position:   turned   weight shifting  Skin Protection: incontinence pads utilized  Device Skin Pressure Protection:   absorbent pad utilized/changed   adhesive use limited   pressure points protected  Intervention: Prevent and Manage VTE (Venous Thromboembolism) Risk  Flowsheets (Taken 3/23/2025 1456)  VTE Prevention/Management:   ambulation promoted   bleeding precautions maintained   bleeding risk assessed  Intervention: Prevent Infection  Flowsheets (Taken 3/23/2025 1456)  Infection Prevention: hand hygiene promoted  Goal: Optimal Comfort and Wellbeing  Outcome: Progressing  Goal: Readiness for Transition of Care  Outcome: Progressing     Problem: Diabetes Comorbidity  Goal: Blood Glucose Level Within Targeted Range  Outcome: Progressing     Problem: Infection  Goal: Absence of Infection Signs and Symptoms  Outcome: Progressing  Intervention: Prevent or Manage Infection  Flowsheets (Taken 3/23/2025 1456)  Infection Management: aseptic technique maintained     Problem: Fall Injury Risk  Goal: Absence of Fall and Fall-Related Injury  Outcome: Progressing  Intervention: Identify and Manage Contributors  Flowsheets (Taken 3/23/2025 1456)  Self-Care Promotion:   independence encouraged    BADL personal objects within reach   BADL personal routines maintained   meal set-up provided   adaptive equipment use encouraged  Medication Review/Management:   medications reviewed   high-risk medications identified  Intervention: Promote Injury-Free Environment  Flowsheets (Taken 3/23/2025 1456)  Safety Promotion/Fall Prevention:   assistive device/personal item within reach   bed alarm set   medications reviewed   instructed to call staff for mobility   high risk medications identified   nonskid shoes/socks when out of bed   room near unit station   side rails raised x 2     Problem: Acute Kidney Injury/Impairment  Goal: Fluid and Electrolyte Balance  Outcome: Progressing  Goal: Improved Oral Intake  Outcome: Progressing  Goal: Effective Renal Function  Outcome: Progressing     Problem: Skin Injury Risk Increased  Goal: Skin Health and Integrity  Outcome: Progressing  Intervention: Optimize Skin Protection  Flowsheets (Taken 3/23/2025 1456)  Pressure Reduction Devices: pressure-redistributing mattress utilized  Skin Protection: incontinence pads utilized  Activity Management:   Arm raise - L1   Ankle pumps - L1   Rolling - L1   Straight leg raise - L1  Head of Bed (HOB) Positioning: HOB at 30-45 degrees     Problem: Coping Ineffective  Goal: Effective Coping  Outcome: Progressing   Pt alert able to make needs known,mayur meds well,IV abx remains in progress,no s/s adverse reaction noted,reposition self q 2hrs,no c/o pain at this time ,POC explained,remains free from falls and pressure injuries,safety maintained,continue monitoring.

## 2025-03-23 NOTE — ASSESSMENT & PLAN NOTE
Patient's FSGs are uncontrolled due to hyperglycemia on current medication regimen.  Last A1c reviewed-   Lab Results   Component Value Date    HGBA1C 9.3 (H) 03/18/2025     Most recent fingerstick glucose reviewed-   Recent Labs   Lab 03/22/25  1600 03/22/25  1949 03/23/25  0803 03/23/25  1141   POCTGLUCOSE 142* 132* 65* 64*     Current correctional scale  Medium  Maintain anti-hyperglycemic dose as follows-   Antihyperglycemics (From admission, onward)      Start     Stop Route Frequency Ordered    03/20/25 2100  insulin glargine U-100 (Lantus) pen 40 Units         -- SubQ Nightly 03/20/25 1029    03/19/25 0030  insulin aspart U-100 pen 0-10 Units         -- SubQ Before meals & nightly PRN 03/18/25 2330          Hold Oral hypoglycemics while patient is in the hospital.

## 2025-03-23 NOTE — NURSING
[facility] [place]  Nurses Note -- 4 Eyes      3/23/2025       Skin assessed on: Q Shift      [x] No Pressure Injuries Present    [x]Prevention Measures Documented    [] Yes LDA  for Pressure Injury Previously documented     [] Yes New Pressure Injury Discovered   [] LDA for New Pressure Injury Added      Attending RN:  Meghan Brand LPN     Second RN:  ENRIQUE Garcia

## 2025-03-23 NOTE — PLAN OF CARE
Problem: Adult Inpatient Plan of Care  Goal: Plan of Care Review  Outcome: Progressing  Goal: Patient-Specific Goal (Individualized)  Outcome: Progressing  Goal: Absence of Hospital-Acquired Illness or Injury  Outcome: Progressing  Goal: Optimal Comfort and Wellbeing  Outcome: Progressing  Goal: Readiness for Transition of Care  Outcome: Progressing     Problem: Diabetes Comorbidity  Goal: Blood Glucose Level Within Targeted Range  Outcome: Progressing     Problem: Infection  Goal: Absence of Infection Signs and Symptoms  Outcome: Progressing     Problem: Fall Injury Risk  Goal: Absence of Fall and Fall-Related Injury  Outcome: Progressing     Problem: Acute Kidney Injury/Impairment  Goal: Fluid and Electrolyte Balance  Outcome: Progressing  Goal: Improved Oral Intake  Outcome: Progressing  Goal: Effective Renal Function  Outcome: Progressing     Problem: Skin Injury Risk Increased  Goal: Skin Health and Integrity  Outcome: Progressing     Problem: Coping Ineffective  Goal: Effective Coping  Outcome: Progressing

## 2025-03-23 NOTE — ASSESSMENT & PLAN NOTE
- on admission UA had hematuria but no growth on culture  - repeated UA on 03/20 for worsening DIONNE.  Now has many bacteria but with improving hematuria  - urine culture with Gram-negative anastacia  - started on ertapenem on 03/22; Zosyn DC'd

## 2025-03-23 NOTE — ASSESSMENT & PLAN NOTE
Anemia is likely due to unknown.  Suspect she was hemoconcentrated and dehydrated upon admission.  Hemoglobin has dropped daily.  She did have prior signs of blood loss.. Most recent hemoglobin and hematocrit are listed below.  Recent Labs     03/21/25  0431 03/22/25  0630 03/23/25  0439   HGB 11.1* 12.1 12.1   HCT 32.4* 36.0* 35.7*     Plan  - Monitor serial CBC: Daily  - Transfuse PRBC if patient becomes hemodynamically unstable, symptomatic or H/H drops below 7/21.  - Patient has not received any PRBC transfusions to date  - Patient's anemia is currently worsening. Will continue current treatment  - iron replete.  B12 and folate normal.  - monitor for further signs of blood loss   Need for prophylactic measure Need for prophylactic measure Need for prophylactic measure Need for prophylactic measure Need for prophylactic measure Need for prophylactic measure DISPLAY PLAN FREE TEXT

## 2025-03-23 NOTE — NURSING
[facility] - [place], [place]  Nurses Note -- 4 Eyes      3/22/2025       Skin assessed on: Q Shift      [x] No Pressure Injuries Present    [x]Prevention Measures Documented    [] Yes LDA  for Pressure Injury Previously documented     [] Yes New Pressure Injury Discovered   [] LDA for New Pressure Injury Added      Attending RN:  Radha Atkinson RN     Second RN:  ALINA Christianson

## 2025-03-23 NOTE — PROGRESS NOTES
[facility] Medicine  Progress Note    Patient Name: Narda Person  MRN: [id number]  Patient Class: IP- Inpatient   Admission Date: 3/18/2025  Length of Stay: 5 days  Attending Physician: Avi Cullen MD  Primary Care Provider: Sirena Coleman MD        Subjective     Principal Problem:Colitis        HPI:  This is an 79-year-old female with a past medical history of dementia, hypertension, type 2 diabetes, hyperlipidemia, anxiety, depression, who presents with vomiting.     Patient presented to [place] ED for evaluation of vomiting and diarrhea that started 2 days prior to presentation.  Patient was prescribed nitrofurantoin for UTI on 03/13, after which she developed nausea, vomiting and diarrhea.  Additional symptoms include blood in stool.  Patient is unable to contribute to the history, states that she does not remember what happened.  I was unable to reach both of her daughters on numbers listed in the chart.     In the ED, the patient was hypertensive (170s-190s/70s-80s). Labs were remarkable for leukocytosi (14.8), hyperglycemia (478 > 354) and positive stool occult blood. UA showed trace, >100 RBC, +2 protein, +3 glucose, +2 ketones, +3 blood.  CT abdomen and pelvis showed findings consistent with colitis.  Patient was given 2L of NS, ciprofloxacin, flagyl 500 mg IV, regular insulin 5 units IV, Phenergan 6.25 mg IV. She was admitted to [facility] for further management.     Overview/Hospital Course:  Ms Narda Person is a 79 y.o. woman with dementia, DM admitted with vomiting and diarrhea. Recently took nitrofurantoin for UTI. In ED, noted findings of colitis on CT. She was admitted and started zosyn. She still has LLQ pain but no vomiting, no diarrhea, and tolerating PO intake.  Stool studies are negative for infection.  She had hematuria, now improving.  Urine culture does have Gram-negative anastacia. She developed DIONNE on 3/20/25; started IVF with resolution of DIONNE.     She also  has R arm sling in place- daughter states this is from shoulder out of place/broken after a fall about a month ago. Repeat XR 3/20 showed comminuted right proximal humerus fracture.  Orthopedics consulted. They recommend sling for comfort.  Must do physical therapy daily for range of motion of her elbow.  Okay for gentle pendulums of proximal humerus.  Patient should follow-up in clinic in 2 weeks.    PT, OT consulted. Home health.  Discharge plans deferred on account of hypoglycemia unknown availability of family to receive teaching for IV antibiotics.    Interval History:  Noted episode of hypoglycemia with blood glucose in the 50s.  Insulin dose held.  Patient seen in bed this morning, complaining of neck discomfort.  Anticipated discharge plan cancelled for further insulin titration and IV antibiotics teaching.  Attempt med to contact family for update unsuccessful.    Review of Systems   Constitutional:  Negative for chills, fatigue and fever.   Musculoskeletal:         Neck pain     Objective:     Vital Signs (Most Recent):  Temp: 98.2 °F (36.8 °C) (03/23/25 0810)  Pulse: 76 (03/23/25 1142)  Resp: 19 (03/23/25 0810)  BP: 139/62 (03/23/25 1142)  SpO2: 96 % (03/23/25 1142) Vital Signs (24h Range):  Temp:  [98.1 °F (36.7 °C)-98.7 °F (37.1 °C)] 98.2 °F (36.8 °C)  Pulse:  [71-81] 76  Resp:  [16-19] 19  SpO2:  [93 %-97 %] 96 %  BP: (135-165)/(62-78) 139/62     Weight: 71.3 kg (157 lb 3 oz)  Body mass index is 28.75 kg/m².    Intake/Output Summary (Last 24 hours) at 3/23/2025 1235  Last data filed at 3/23/2025 0817  Gross per 24 hour   Intake 720 ml   Output 600 ml   Net 120 ml         Physical Exam  Constitutional:       General: She is not in acute distress.     Appearance: She is not ill-appearing, toxic-appearing or diaphoretic.   Cardiovascular:      Rate and Rhythm: Normal rate and regular rhythm.      Pulses: Normal pulses.      Heart sounds: Normal heart sounds. No murmur heard.     No friction rub. No  gallop.   Pulmonary:      Effort: Pulmonary effort is normal. No respiratory distress.      Breath sounds: Normal breath sounds. No stridor.   Abdominal:      General: There is no distension.      Palpations: Abdomen is soft. There is no mass.      Tenderness: There is no abdominal tenderness.      Hernia: No hernia is present.   Musculoskeletal:      Comments: Right shoulder pain; limitation of range of movement.  Intact neurovascular upon   Neurological:      Mental Status: Mental status is at baseline.               Significant Labs: CBC:   Recent Labs   Lab 03/22/25  0630 03/23/25  0439   WBC 6.00 6.47   HGB 12.1 12.1   HCT 36.0* 35.7*   * 191     CMP:   Recent Labs   Lab 03/22/25  0429 03/23/25  0439    141   K 3.9 3.4*    106   CO2 20* 26   BUN 11 8   CREATININE 0.8 0.7   CALCIUM 8.1* 8.4*   ANIONGAP 8 9       Significant Imaging: I have reviewed all pertinent imaging results/findings within the past 24 hours.      Assessment & Plan  Colitis  - Presents with vomiting/blood in stool after recent antibiotics  - CT abdomen and pelvis showed findings consistent with colitis.  - Continue Zosyn   - stool studies are negative at this point  - on 03/21 she does still have some left lower quadrant abdominal pain though it is improved from admission  - GI consulted.  No current plans for colonoscopy.    Diabetes mellitus, type 2  Patient's FSGs are uncontrolled due to hyperglycemia on current medication regimen.  Last A1c reviewed-   Lab Results   Component Value Date    HGBA1C 9.3 (H) 03/18/2025     Most recent fingerstick glucose reviewed-   Recent Labs   Lab 03/22/25  1600 03/22/25  1949 03/23/25  0803 03/23/25  1141   POCTGLUCOSE 142* 132* 65* 64*     Current correctional scale  Medium  Maintain anti-hyperglycemic dose as follows-   Antihyperglycemics (From admission, onward)      Start     Stop Route Frequency Ordered    03/20/25 2100  insulin glargine U-100 (Lantus) pen 40 Units         -- SubQ  Nightly 03/20/25 1029    03/19/25 0030  insulin aspart U-100 pen 0-10 Units         -- SubQ Before meals & nightly PRN 03/18/25 2330          Hold Oral hypoglycemics while patient is in the hospital.  Gastroesophageal reflux disease without esophagitis  - continue Protonix   - no prior EGD to review    Alzheimer's disease, unspecified (CODE)  - at baseline she is oriented to self and recognizes family.  Reviewed primary care notes which suggests that she is typically oriented and able to move around and eat but does have hallucinations  - Continue home Aricept  Anxiety and depression  - Continue Lexapro   Acute renal failure superimposed on stage 3a chronic kidney disease  Cr on admit: 1. Baseline Cr 1   - developed DIONNE on 03/20 with creatinine rise to 1.7  - Suspect due to colitis/UTI  - investigated with UA which now shows many bacteria as well as 3+ blood.  This is less blood than when she came into the hospital.  - treated with IV fluids- stopped IVF  - DIONNE resolved on 03/21  - now back at baseline CKD 3A  Hypokalemia  Patient's most recent potassium results are listed below.   Recent Labs     03/21/25  0431 03/22/25  0429 03/23/25  0439   K 3.5 3.9 3.4*     Plan  - Replete potassium per protocol  - Monitor potassium Daily  - Patient's hypokalemia is improving  Normocytic anemia  Anemia is likely due to  unknown.  Suspect she was hemoconcentrated and dehydrated upon admission.  Hemoglobin has dropped daily.  She did have prior signs of blood loss. . Most recent hemoglobin and hematocrit are listed below.  Recent Labs     03/21/25  0431 03/22/25  0630 03/23/25  0439   HGB 11.1* 12.1 12.1   HCT 32.4* 36.0* 35.7*     Plan  - Monitor serial CBC: Daily  - Transfuse PRBC if patient becomes hemodynamically unstable, symptomatic or H/H drops below 7/21.  - Patient has not received any PRBC transfusions to date  - Patient's anemia is currently worsening. Will continue current treatment  - iron replete.  B12 and folate  normal.  - monitor for further signs of blood loss  UTI due to extended-spectrum beta lactamase (ESBL) producing Escherichia coli  - on admission UA had hematuria but no growth on culture  - repeated UA on 03/20 for worsening DIONNE.  Now has many bacteria but with improving hematuria  - urine culture with Gram-negative anastacia  - started on ertapenem on 03/22; Zosyn DC'd    Fracture of right humerus  - presented with right arm in sling.  Daughter says that she fell about a month ago and was diagnosed with a broken shoulder in multiple places  - XR 3/20 shows comminuted right proxy humerus fracture  - nonweightbearing right upper extremity until seen by orthopedic  - orthopedics consulted; awaiting recommendation  - PT OT consult  - she does not have any pain in the shoulder    Falls      ACP (advance care planning)      Hypoglycemia  Noted episodes of hypoglycemia on 3/23  Insulin dose adjusted  Continue monitor      VTE Risk Mitigation (From admission, onward)           Ordered     enoxaparin injection 40 mg  Every 24 hours         03/21/25 1009     IP VTE HIGH RISK PATIENT  Once         03/18/25 2330     Place sequential compression device  Until discontinued         03/18/25 2330                    Discharge Planning   DWAYNE: 3/23/2025     Code Status: DNR   Medical Readiness for Discharge Date:   Discharge Plan A: Home Health                Please place Justification for DME        Avi Cullen MD  Department of Hospital Medicine   [facility]

## 2025-03-23 NOTE — SUBJECTIVE & OBJECTIVE
Interval History:  Noted episode of hypoglycemia with blood glucose in the 50s.  Insulin dose held.  Patient seen in bed this morning, complaining of neck discomfort.  Anticipated discharge plan cancelled for further insulin titration and IV antibiotics teaching.  Attempt med to contact family for update unsuccessful.    Review of Systems   Constitutional:  Negative for chills, fatigue and fever.   Musculoskeletal:         Neck pain     Objective:     Vital Signs (Most Recent):  Temp: 98.2 °F (36.8 °C) (03/23/25 0810)  Pulse: 76 (03/23/25 1142)  Resp: 19 (03/23/25 0810)  BP: 139/62 (03/23/25 1142)  SpO2: 96 % (03/23/25 1142) Vital Signs (24h Range):  Temp:  [98.1 °F (36.7 °C)-98.7 °F (37.1 °C)] 98.2 °F (36.8 °C)  Pulse:  [71-81] 76  Resp:  [16-19] 19  SpO2:  [93 %-97 %] 96 %  BP: (135-165)/(62-78) 139/62     Weight: 71.3 kg (157 lb 3 oz)  Body mass index is 28.75 kg/m².    Intake/Output Summary (Last 24 hours) at 3/23/2025 1235  Last data filed at 3/23/2025 0817  Gross per 24 hour   Intake 720 ml   Output 600 ml   Net 120 ml         Physical Exam  Constitutional:       General: She is not in acute distress.     Appearance: She is not ill-appearing, toxic-appearing or diaphoretic.   Cardiovascular:      Rate and Rhythm: Normal rate and regular rhythm.      Pulses: Normal pulses.      Heart sounds: Normal heart sounds. No murmur heard.     No friction rub. No gallop.   Pulmonary:      Effort: Pulmonary effort is normal. No respiratory distress.      Breath sounds: Normal breath sounds. No stridor.   Abdominal:      General: There is no distension.      Palpations: Abdomen is soft. There is no mass.      Tenderness: There is no abdominal tenderness.      Hernia: No hernia is present.   Musculoskeletal:      Comments: Right shoulder pain; limitation of range of movement.  Intact neurovascular upon   Neurological:      Mental Status: Mental status is at baseline.               Significant Labs: CBC:   Recent Labs   Lab  03/22/25  0630 03/23/25  0439   WBC 6.00 6.47   HGB 12.1 12.1   HCT 36.0* 35.7*   * 191     CMP:   Recent Labs   Lab 03/22/25  0429 03/23/25  0439    141   K 3.9 3.4*    106   CO2 20* 26   BUN 11 8   CREATININE 0.8 0.7   CALCIUM 8.1* 8.4*   ANIONGAP 8 9       Significant Imaging: I have reviewed all pertinent imaging results/findings within the past 24 hours.

## 2025-03-24 VITALS
DIASTOLIC BLOOD PRESSURE: 56 MMHG | WEIGHT: 168.19 LBS | SYSTOLIC BLOOD PRESSURE: 128 MMHG | OXYGEN SATURATION: 95 % | HEART RATE: 75 BPM | TEMPERATURE: 98 F | BODY MASS INDEX: 30.95 KG/M2 | HEIGHT: 62 IN | RESPIRATION RATE: 18 BRPM

## 2025-03-24 LAB
ABSOLUTE EOSINOPHIL (OHS): 0.23 K/UL
ABSOLUTE MONOCYTE (OHS): 0.51 K/UL (ref 0.3–1)
ABSOLUTE NEUTROPHIL COUNT (OHS): 2.46 K/UL (ref 1.8–7.7)
ANION GAP (OHS): 9 MMOL/L (ref 8–16)
BASOPHILS # BLD AUTO: 0.08 K/UL
BASOPHILS NFR BLD AUTO: 1.6 %
BUN SERPL-MCNC: 6 MG/DL (ref 8–23)
CALCIUM SERPL-MCNC: 8.4 MG/DL (ref 8.7–10.5)
CHLORIDE SERPL-SCNC: 106 MMOL/L (ref 95–110)
CO2 SERPL-SCNC: 24 MMOL/L (ref 23–29)
CREAT SERPL-MCNC: 0.7 MG/DL (ref 0.5–1.4)
ERYTHROCYTE [DISTWIDTH] IN BLOOD BY AUTOMATED COUNT: 12.7 % (ref 11.5–14.5)
GFR SERPLBLD CREATININE-BSD FMLA CKD-EPI: >60 ML/MIN/1.73/M2
GLUCOSE SERPL-MCNC: 116 MG/DL (ref 70–110)
HCT VFR BLD AUTO: 34.4 % (ref 37–48.5)
HGB BLD-MCNC: 11.5 GM/DL (ref 12–16)
IMM GRANULOCYTES # BLD AUTO: 0.04 K/UL (ref 0–0.04)
IMM GRANULOCYTES NFR BLD AUTO: 0.8 % (ref 0–0.5)
LYMPHOCYTES # BLD AUTO: 1.58 K/UL (ref 1–4.8)
MAGNESIUM SERPL-MCNC: 1.4 MG/DL (ref 1.6–2.6)
MCH RBC QN AUTO: 30.3 PG (ref 27–50)
MCHC RBC AUTO-ENTMCNC: 33.4 G/DL (ref 32–36)
MCV RBC AUTO: 91 FL (ref 82–98)
NUCLEATED RBC (/100WBC) (OHS): 0 /100 WBC
O+P STL MICRO: NORMAL
PLATELET # BLD AUTO: 183 K/UL (ref 150–450)
PMV BLD AUTO: 10.5 FL (ref 9.2–12.9)
POCT GLUCOSE: 103 MG/DL (ref 70–110)
POCT GLUCOSE: 207 MG/DL (ref 70–110)
POCT GLUCOSE: 208 MG/DL (ref 70–110)
POCT GLUCOSE: 78 MG/DL (ref 70–110)
POCT GLUCOSE: 88 MG/DL (ref 70–110)
POTASSIUM SERPL-SCNC: 3.5 MMOL/L (ref 3.5–5.1)
RBC # BLD AUTO: 3.8 M/UL (ref 4–5.4)
RELATIVE EOSINOPHIL (OHS): 4.7 %
RELATIVE LYMPHOCYTE (OHS): 32.2 % (ref 18–48)
RELATIVE MONOCYTE (OHS): 10.4 % (ref 4–15)
RELATIVE NEUTROPHIL (OHS): 50.3 % (ref 38–73)
SODIUM SERPL-SCNC: 139 MMOL/L (ref 136–145)
WBC # BLD AUTO: 4.9 K/UL (ref 3.9–12.7)

## 2025-03-24 PROCEDURE — 25000003 PHARM REV CODE 250: Mod: HCNC | Performed by: INTERNAL MEDICINE

## 2025-03-24 PROCEDURE — 63600175 PHARM REV CODE 636 W HCPCS: Mod: HCNC | Performed by: STUDENT IN AN ORGANIZED HEALTH CARE EDUCATION/TRAINING PROGRAM

## 2025-03-24 PROCEDURE — 25000003 PHARM REV CODE 250: Mod: HCNC | Performed by: STUDENT IN AN ORGANIZED HEALTH CARE EDUCATION/TRAINING PROGRAM

## 2025-03-24 PROCEDURE — C1751 CATH, INF, PER/CENT/MIDLINE: HCPCS | Mod: HCNC

## 2025-03-24 PROCEDURE — 97110 THERAPEUTIC EXERCISES: CPT | Mod: HCNC

## 2025-03-24 PROCEDURE — 36410 VNPNXR 3YR/> PHY/QHP DX/THER: CPT | Mod: HCNC

## 2025-03-24 PROCEDURE — 80048 BASIC METABOLIC PNL TOTAL CA: CPT | Mod: HCNC | Performed by: HOSPITALIST

## 2025-03-24 PROCEDURE — 36415 COLL VENOUS BLD VENIPUNCTURE: CPT | Mod: HCNC | Performed by: HOSPITALIST

## 2025-03-24 PROCEDURE — 85025 COMPLETE CBC W/AUTO DIFF WBC: CPT | Mod: HCNC | Performed by: HOSPITALIST

## 2025-03-24 PROCEDURE — 25000003 PHARM REV CODE 250: Mod: HCNC | Performed by: NURSE PRACTITIONER

## 2025-03-24 PROCEDURE — 97530 THERAPEUTIC ACTIVITIES: CPT | Mod: HCNC

## 2025-03-24 PROCEDURE — 97530 THERAPEUTIC ACTIVITIES: CPT | Mod: HCNC,CQ

## 2025-03-24 PROCEDURE — 83735 ASSAY OF MAGNESIUM: CPT | Mod: HCNC | Performed by: HOSPITALIST

## 2025-03-24 RX ORDER — INSULIN GLARGINE 100 [IU]/ML
25 INJECTION, SOLUTION SUBCUTANEOUS NIGHTLY
Status: DISCONTINUED | OUTPATIENT
Start: 2025-03-24 | End: 2025-03-24 | Stop reason: HOSPADM

## 2025-03-24 RX ORDER — SODIUM CHLORIDE 0.9 % (FLUSH) 0.9 %
5 SYRINGE (ML) INJECTION EVERY 8 HOURS PRN
Qty: 10 ML | Refills: 0 | Status: SHIPPED | OUTPATIENT
Start: 2025-03-24 | End: 2025-04-03

## 2025-03-24 RX ORDER — SODIUM CHLORIDE 0.9 % (FLUSH) 0.9 %
10 SYRINGE (ML) INJECTION EVERY 12 HOURS PRN
Status: DISCONTINUED | OUTPATIENT
Start: 2025-03-24 | End: 2025-03-24 | Stop reason: HOSPADM

## 2025-03-24 RX ADMIN — DEXTROSE MONOHYDRATE 12.5 G: 25 INJECTION, SOLUTION INTRAVENOUS at 03:03

## 2025-03-24 RX ADMIN — INSULIN ASPART 4 UNITS: 100 INJECTION, SOLUTION INTRAVENOUS; SUBCUTANEOUS at 12:03

## 2025-03-24 RX ADMIN — INSULIN ASPART 4 UNITS: 100 INJECTION, SOLUTION INTRAVENOUS; SUBCUTANEOUS at 05:03

## 2025-03-24 RX ADMIN — LOPERAMIDE HYDROCHLORIDE 2 MG: 2 CAPSULE ORAL at 09:03

## 2025-03-24 RX ADMIN — PANTOPRAZOLE SODIUM 40 MG: 40 INJECTION, POWDER, FOR SOLUTION INTRAVENOUS at 09:03

## 2025-03-24 RX ADMIN — ATORVASTATIN CALCIUM 20 MG: 10 TABLET, FILM COATED ORAL at 09:03

## 2025-03-24 RX ADMIN — ESCITALOPRAM OXALATE 10 MG: 10 TABLET ORAL at 09:03

## 2025-03-24 RX ADMIN — ERTAPENEM 1 G: 1 INJECTION INTRAMUSCULAR; INTRAVENOUS at 11:03

## 2025-03-24 RX ADMIN — INSULIN ASPART 4 UNITS: 100 INJECTION, SOLUTION INTRAVENOUS; SUBCUTANEOUS at 11:03

## 2025-03-24 NOTE — NURSING
[facility] [place]  Nurses Note -- 4 Eyes      3/24/2025       Skin assessed on: Q Shift      [x] No Pressure Injuries Present    []Prevention Measures Documented    [] Yes LDA  for Pressure Injury Previously documented     [] Yes New Pressure Injury Discovered   [] LDA for New Pressure Injury Added      Attending RN:  Guerda Washburn, RN     Second RN:  ALINA Garcia

## 2025-03-24 NOTE — PT/OT/SLP PROGRESS
Occupational Therapy   Treatment    Name: Narda Person  MRN: [id number]  Admitting Diagnosis:  Colitis       Recommendations:     Discharge Recommendations: Low Intensity Therapy (and 24 hr significant assist)  Discharge Equipment Recommendations:  wheelchair, bedside commode, hospital bed  Barriers to discharge:  Decreased caregiver support    Assessment:     Narda Person is a 79 y.o. female with a medical diagnosis of Colitis.  She presents with The primary encounter diagnosis was Diabetic polyneuropathy associated with type 2 diabetes mellitus. Diagnoses of Colitis, Chest pain, Osteoarthritis of spine with radiculopathy, cervical region, Type 2 diabetes mellitus with other specified complication, with long-term current use of insulin, UTI due to extended-spectrum beta lactamase (ESBL) producing Escherichia coli, and Acute renal failure superimposed on stage 3a chronic kidney disease, unspecified acute renal failure type were also pertinent to this visit. . Performance deficits affecting function are weakness, impaired endurance, impaired self care skills, impaired functional mobility, impaired balance, decreased upper extremity function, impaired cognition, decreased safety awareness, orthopedic precautions.     Rehab Prognosis:  Good; patient would benefit from acute skilled OT services to address these deficits and reach maximum level of function.       Plan:     Patient to be seen 2 x/week to address the above listed problems via self-care/home management, therapeutic activities, therapeutic exercises  Plan of Care Expires: 04/02/25  Plan of Care Reviewed with: patient    Subjective     Chief Complaint: dizziness upon upright posture  Patient/Family Comments/goals: pt reports dizziness worsens with stand and resolves with bed level  Pain/Comfort:  Pain Rating 1: 0/10  Pain Addressed 1: Reposition, Distraction, Cessation of Activity  Pain Rating Post-Intervention 1: 0/10    Objective:     Communicated with: RN  "prior to session.  Patient found HOB elevated with bed alarm, telemetry, peripheral IV, PureWick upon OT entry to room.    General Precautions: Standard, contact, fall    Orthopedic Precautions: (Per ortho: "sling for comfort, encourage rom of elbow, gentle pendulum of arm. no weight bearing R arm")  Braces: UE Sling  Respiratory Status: Room air     Occupational Performance:     Bed Mobility:    Patient completed Rolling/Turning to Left with  minimum assistance  Patient completed Scooting/Bridging with minimum assistance seated eob anteriorly; Max Ax2 in supine to hob with pt assisting with bridging  Patient completed Supine to Sit with minimum assistance  Patient completed Sit to Supine with moderate assistance x2    Functional Mobility/Transfers:  Patient completed Sit <> Stand Transfer with minimum assistance  with  L HHA   Functional Mobility: Pt performed side steps with Min Ax2/Min A/CGA and L HHA. Dynamic balance activity with Min/CGA. Pt reporting dizziness upon stand; BP  monitored: 177/124, reassessed 149/114 taken on R ankle    Activities of Daily Living:  Feeding:  moderate assistance to drink from cup/straw  Upper Body Dressing: maximal assistance to don 2nd gown and reposition/doff/re-don sling  Lower Body Dressing: maximal assistance to don socks      [facility] 6 Click ADL: 14    Treatment & Education:  ADL/functional mobility training as above.  Pt guided in the following MD ordered RUE protocol: lateral pendulums in stance, gentle elbow flex/ext 2x12, digit flex/ext x24. Re-ed on WB precs. Vc/tcs and demos on proper body mechanics.    Patient left HOB elevated with pillow to support offloading R hip/UE with support under RUE, all lines intact, call button in reach, bed alarm on, RN notified    GOALS:   Multidisciplinary Problems       Occupational Therapy Goals          Problem: Occupational Therapy    Goal Priority Disciplines Outcome Interventions   Occupational Therapy Goal     OT, PT/OT Progressing "    Description: Goals to be met by: 4/2/2025     Patient will increase functional independence with ADLs by performing:    Feeding with Modified [place].  UE Dressing with Stand-by Assistance.  LE Dressing with Contact Guard Assistance.  Grooming while seated with Modified [place].  Toileting from bedside commode with Contact Guard Assistance for hygiene and clothing management.   Step transfer with Contact Guard Assistance  Toilet transfer to bedside commode with Contact Guard Assistance.                         DME Justifications:  Narda Person has a mobility limitation that significantly impairs her ability to participate in one or more mobility related activities of daily living (MRADLs) such as toileting, feeding, dressing, grooming, and bathing in customary locations in the home.  The mobility limitation cannot be sufficiently resolved by the use of a cane or walker.   The use of a manual wheelchair will significantly improve the patients ability to participate in MRADLS and the patient will use it on regular basis in the home.  Narda Person has expressed her willingness to use a manual wheelchair in the home. Patients L upper body strength and B Lower body strength is sufficient for propulsion.  She also has a caregiver who is available, willing, and able to provide assistance with the wheelchair when needed.     DME  Narda requires a hospital bed due to her requiring positioning of the body in ways not feasible with an ordinary bed due to limited ability and cannot independently make changes in body position without the use of the bed.The positioning of the body cannot be sufficiently resolved by the use of pillows and wedges.    Time Tracking:     OT Date of Treatment: 03/24/25  OT Start Time: 1127  OT Stop Time: 1158  OT Total Time (min): 31 min PT cotx overlap    Billable Minutes:Therapeutic Activity 16  Therapeutic Exercise 15    OT/COURT: OT          3/24/2025

## 2025-03-24 NOTE — PLAN OF CARE
Problem: Adult Inpatient Plan of Care  Goal: Plan of Care Review  Outcome: Adequate for Care Transition  Goal: Patient-Specific Goal (Individualized)  Outcome: Adequate for Care Transition  Goal: Absence of Hospital-Acquired Illness or Injury  Outcome: Adequate for Care Transition  Goal: Optimal Comfort and Wellbeing  Outcome: Adequate for Care Transition  Goal: Readiness for Transition of Care  Outcome: Adequate for Care Transition      No abnormalities

## 2025-03-24 NOTE — PLAN OF CARE
[facility]      HOME HEALTH ORDERS  FACE TO FACE ENCOUNTER    Patient Name: Narda Person  YOB: 1945    PCP: Sirena Coleman MD   PCP Address: [address] [place] [address]  PCP Phone Number: [phone]  PCP Fax: [phone]    Encounter Date: 3/18/25    Admit to Home Health    Diagnoses:  Active Hospital Problems    Diagnosis  POA    *Colitis [K52.9]  Yes    Hypoglycemia [E16.2]  Yes    UTI due to extended-spectrum beta lactamase (ESBL) producing Escherichia coli [N39.0, B96.29, Z16.12]  Yes    Fracture of right humerus [S42.301A]  Yes    ACP (advance care planning) [Z71.89]  Not Applicable    Acute renal failure superimposed on stage 3a chronic kidney disease [N17.9, N18.31]  Yes    Normocytic anemia [D64.9]  Yes    Anxiety and depression [F41.9, F32.A]  Yes    Alzheimer's disease, unspecified (CODE) [G30.9]  Yes    Hypokalemia [E87.6]  Yes    Falls [R29.6]  Not Applicable    Diabetes mellitus, type 2 [E11.9]  Yes    Gastroesophageal reflux disease without esophagitis [K21.9]  Yes      Resolved Hospital Problems    Diagnosis Date Resolved POA    Hypomagnesemia [E83.42] 03/20/2025 Yes       Follow Up Appointments:  No future appointments.    Allergies:  Review of patient's allergies indicates:   Allergen Reactions    Percocet [oxycodone-acetaminophen] Itching    Percodan [oxycodone hcl-oxycodone-asa] Itching     Other reaction(s): Unknown    Latex, natural rubber Rash    Phenytoin sodium extended      Other reaction(s): Unknown    Sutures, catgut      Infections to sutures     Adhesive Rash    Adhesive tape-silicones Rash       Medications: Review discharge medications with patient and family and provide education.    Current Medications[1]     Medication List        START taking these medications      sodium chloride 0.9% injection  Commonly known as: NORMAL SALINE FLUSH  Inject 5 mLs into the vein every 8 (eight) hours as needed.            CONTINUE taking these medications   "    ACCU-CHEK COMPACT PLUS TEST Strp  Generic drug: blood sugar diagnostic, drum  USE TO CHECK BLOOD GLUCOSE FOUR TIMES DAILY     ALPRAZolam 0.25 MG tablet  Commonly known as: XANAX  Take 1 tablet (0.25 mg total) by mouth nightly as needed for Anxiety.     atorvastatin 20 MG tablet  Commonly known as: LIPITOR  Take 1 tablet (20 mg total) by mouth once daily.     blood sugar diagnostic Strp  Commonly known as: ACCU-CHEK GUIDE TEST STRIPS  1 strip by Misc.(Non-Drug; Combo Route) route 4 (four) times daily.     blood-glucose meter Misc  Commonly known as: ACCU-CHEK GUIDE GLUCOSE METER  1 each by Misc.(Non-Drug; Combo Route) route 4 (four) times daily.     diclofenac sodium 1 % Gel  Commonly known as: VOLTAREN  Apply 2 g topically 2 (two) times daily.     donepeziL 10 MG tablet  Commonly known as: ARICEPT  Take 1 tablet (10 mg total) by mouth every evening.     EScitalopram oxalate 10 MG tablet  Commonly known as: LEXAPRO  Take 1 tablet by mouth once daily     insulin syringe-needle U-100 0.3 mL 30 Syrg  Use with Levemir vial BID     JANUVIA 100 mg Tab  Generic drug: SITagliptin phosphate  Take 1 tablet by mouth once daily     lancets Misc  Commonly known as: ACCU-CHEK SOFTCLIX LANCETS  1 each by Misc.(Non-Drug; Combo Route) route 4 (four) times daily.     meclizine 25 mg tablet  Commonly known as: ANTIVERT  TAKE 1 TABLET BY MOUTH THREE TIMES DAILY AS NEEDED FOR DIZZINESS AND FOR NAUSEA     melatonin 5 mg  Commonly known as: MELATIN  Take 10 mg by mouth nightly as needed.     omeprazole 20 MG capsule  Commonly known as: PRILOSEC  Take 1 capsule (20 mg total) by mouth 2 (two) times daily.     pen needle, diabetic 31 gauge x 3/16" Ndle  Commonly known as: LITE TOUCH INSULIN PEN NEEDLES  1 application by Misc.(Non-Drug; Combo Route) route 4 (four) times daily.     pen needle, diabetic 32 gauge x 5/32" Ndle  1 Device by Misc.(Non-Drug; Combo Route) route as directed. Needles to be used with Novolog pens and Levemir pens " daily.     ramipriL 5 MG capsule  Commonly known as: ALTACE  Take 1 capsule by mouth once daily     TRESIBA FLEXTOUCH U-200 200 unit/mL (3 mL) insulin pen  Generic drug: insulin degludec  Inject 60 Units into the skin once daily.            STOP taking these medications      ferrous sulfate 325 (65 FE) MG EC tablet     gabapentin 300 MG capsule  Commonly known as: NEURONTIN     ibuprofen 400 MG tablet  Commonly known as: ADVIL,MOTRIN     metFORMIN 1000 MG tablet  Commonly known as: GLUCOPHAGE     nitrofurantoin 50 MG capsule  Commonly known as: MACRODANTIN            ASK your doctor about these medications      insulin aspart U-100 100 unit/mL (3 mL) Inpn pen  Commonly known as: NovoLOG Flexpen U-100 Insulin  Inject 30 Units into the skin 3 (three) times daily with meals.                I have seen and examined this patient within the last 30 days. My clinical findings that support the need for the home health skilled services and home bound status are the following:no   Weakness/numbness causing balance and gait disturbance due to Infection and Weakness/Debility making it taxing to leave home.     Diet:   cardiac diet and diabetic diet 2000 calorie    Labs:  SN to perform labs:  CBC: Weekly; One  week(s), CMP: Weekly; 1 week(s), and CRP: Weekly; 1 week(s) Results to :PCP    Referrals/ Consults  Physical Therapy to evaluate and treat. Evaluate for home safety and equipment needs; Establish/upgrade home exercise program. Perform / instruct on therapeutic exercises, gait training, transfer training, and Range of Motion.  Occupational Therapy to evaluate and treat. Evaluate home environment for safety and equipment needs. Perform/Instruct on transfers, ADL training, ROM, and therapeutic exercises.    Activities:   activity as tolerated    Nursing:   Agency to admit patient within 24 hours of hospital discharge unless specified on physician order or at patient request    SN to complete comprehensive assessment including  routine vital signs. Instruct on disease process and s/s of complications to report to MD. Review/verify medication list sent home with the patient at time of discharge  and instruct patient/caregiver as needed. Frequency may be adjusted depending on start of care date.     Skilled nurse to perform up to 3 visits PRN for symptoms related to diagnosis    Notify MD if SBP > 160 or < 90; DBP > 90 or < 50; HR > 120 or < 50; Temp > 101; O2 < 88%; Other:      Ok to schedule additional visits based on staff availability and patient request on consecutive days within the home health episode.    When multiple disciplines ordered:    Start of Care occurs on Sunday - Wednesday schedule remaining discipline evaluations as ordered on separate consecutive days following the start of care.    Thursday SOC -schedule subsequent evaluations Friday and Monday the following week.     Friday - Saturday SOC - schedule subsequent discipline evaluations on consecutive days starting Monday of the following week.    For all post-discharge communication and subsequent orders please contact patient's primary care physician. If unable to reach primary care physician or do not receive response within 30 minutes, please contact  for clinical staff order clarification    Miscellaneous   Home Infusion Therapy:   SN to perform Infusion Therapy/Central Line Care.  Review Central Line Care & Central Line Flush with patient.    Administer (drug and dose): Ertapenem 1g IV Daily   Last dose given: 3/24                         Home dose due: 3/25/2025 END DATE: 3/29/2025    REMOVE MIDLINE ON 3/29/2025 AFTER THE LAST DOSE    Scrub the Hub: Prior to accessing the line, always perform a 30 second alcohol scrub  Each lumen of the central line is to be flushed at least daily with 10 mL Normal Saline and 3 mL Heparin flush (10 units/mL)  Skilled Nurse (SN) may draw blood from IV access  Blood Draw Procedure:   - Aspirate at least 5 mL of blood   - Discard   -  Obtain specimen   - Change injection cap   - Flush with 20 mL Normal Saline followed by a                 3-5 mL Heparin flush (10 units/mL)  Central :   - Sterile dressing changes are done weekly and as needed.   - Use chlor-hexadine scrub to cleanse site, apply Biopatch to insertion site,       apply securement device dressing   - Injection caps are changed weekly and after EVERY lab draw.   - If sterile gauze is under dressing to control oozing,                 dressing change must be performed every 24 hours until gauze is not needed.    Home Health Aide:  Nursing Three times weekly, Physical Therapy Three times weekly, and Occupational Therapy Three times weekly    Wound Care Orders  no    I certify that this patient is confined to her home and needs intermittent skilled nursing care, physical therapy, and occupational therapy.                [1]   Current Facility-Administered Medications   Medication Dose Route Frequency Provider Last Rate Last Admin    acetaminophen tablet 650 mg  650 mg Oral Q8H PRN Ronnie Feliz MD        acetaminophen tablet 650 mg  650 mg Oral Q4H PRN Ronnie Feliz MD        aluminum-magnesium hydroxide-simethicone 200-200-20 mg/5 mL suspension 30 mL  30 mL Oral QID PRN Ronnie Feliz MD        atorvastatin tablet 20 mg  20 mg Oral Daily Ronnie Feliz MD   20 mg at 03/24/25 0945    bisacodyL suppository 10 mg  10 mg Rectal Daily PRN Ronnie Feliz MD        donepeziL tablet 10 mg  10 mg Oral QHS Ronnie Feliz MD   10 mg at 03/23/25 2201    enoxaparin injection 40 mg  40 mg Subcutaneous Q24H (prophylaxis, 1700) Verónica Charles MD   40 mg at 03/23/25 1945    ertapenem (INVANZ) 1 g in 0.9% NaCl 100 mL IVPB (MB+)  1 g Intravenous Q24H Santy Garduno MD   Stopped at 03/24/25 1136    EScitalopram oxalate tablet 10 mg  10 mg Oral Daily Ronnie Feliz MD   10 mg at 03/24/25 0945    glucagon (human recombinant) injection 1 mg  1 mg Intramuscular PRN Ronnie Feliz MD        glucose  chewable tablet 16 g  16 g Oral PRN Ronnie Feliz MD   16 g at 03/23/25 1235    glucose chewable tablet 24 g  24 g Oral BHARGAVIN Ronnie Feliz MD        insulin aspart U-100 pen 0-10 Units  0-10 Units Subcutaneous QID (AC + HS) PRN Ronnie Feliz MD   4 Units at 03/24/25 1207    insulin glargine U-100 (Lantus) pen 25 Units  25 Units Subcutaneous QHS Shana Rivas MD        loperamide capsule 2 mg  2 mg Oral BID Shannon Chu NP   2 mg at 03/24/25 0945    meclizine tablet 25 mg  25 mg Oral TID BHARGAVIN Ronnie Feliz MD        melatonin tablet 6 mg  6 mg Oral Nightly PRN Ronnie Feliz MD   6 mg at 03/23/25 2201    naloxone 0.4 mg/mL injection 0.02 mg  0.02 mg Intravenous PRN Ronnie Feliz MD        ondansetron injection 4 mg  4 mg Intravenous Q8H PRN Ronnie Feliz MD   4 mg at 03/20/25 1412    pantoprazole injection 40 mg  40 mg Intravenous BID Ronnie Feliz MD   40 mg at 03/24/25 0945    senna-docusate 8.6-50 mg per tablet 1 tablet  1 tablet Oral Daily PRRonnie Ponce MD        simethicone chewable tablet 80 mg  1 tablet Oral QID PRN Ronnie Feliz MD        sodium chloride 0.9% flush 10 mL  10 mL Intravenous Q12H PRRonnie Ponce MD

## 2025-03-24 NOTE — PROGRESS NOTES
[facility] nurse educator met with patient to discuss discharge plan for home IVATB. Patient will not give herself IVATB and instructed that I should call her daughter Barbi. Spoke to Barbi via phone about plan for patient to d/c home on IVATB. Verbal education complete and home infusion video sent to patient's daughter. Barbi states that  nurse Brii and another family member who is also a nurse will be able to help with infusions at home. Patient will dc home with family support. Patient's medication will infuse via Elastomeric Pump. Barbi (daughter) verbally educated on [facility].A.[facility] procedure. Written instruction on [facility] mat left at bedside for daughter when she arrives later this evening to take patient home. Patient education checklist reviewed and acknowledged by above person(s) and are agreeable to discharge with home infusion plan of care. IV administration process using aspetic technique was reviewed. Return demonstration not possible r/t virtual education. Patient's daughter feels comfortable with the support she has for home infusion. Patient will dc home with Ertapenem 1 gram daily with an end of care 3/29/25. SL midline to be placed prior to d/c. [facility] will follow patient or will come to [facility] for weekly dressing changes and lab draws. Time allotted for questions. Patients nurse and case management team notified teaching has been completed.     Medication delivery will be made to home    Ashley Severino RN, BSN  Clinical Liaison   [facility]  Cell [phone]  Available M-F 8:30-5pm  Office [phone]  Available 24/7

## 2025-03-24 NOTE — PLAN OF CARE
Pt remained safe/free from falls throughout the night. Alert to self. Tolerated meds well. Glucose was 143 so Dr. Rivas modified nightly dose of insulin from 40 units to 30 units. After giving 30 units, patient's glucose dropped to 88 so dextrose was given. Bed is locked in lowest position, side rails up x's 2 w/ call light in reach. No acute distress noted. Plan of care is on-going.      Problem: Adult Inpatient Plan of Care  Goal: Plan of Care Review  Outcome: Progressing     Problem: Diabetes Comorbidity  Goal: Blood Glucose Level Within Targeted Range  Outcome: Progressing     Problem: Fall Injury Risk  Goal: Absence of Fall and Fall-Related Injury  Outcome: Progressing

## 2025-03-24 NOTE — PT/OT/SLP PROGRESS
Physical Therapy Treatment    Patient Name:  Narda Person   MRN:  [id number]    Recommendations:     Discharge Recommendations: Low Intensity Therapy (with close supervision/assist PRN)  Discharge Equipment Recommendations: hospital bed, wheelchair  Barriers to discharge: None    Assessment:     Narda Person is a 79 y.o. female admitted with a medical diagnosis of Colitis.  She presents with the following impairments/functional limitations: weakness, impaired endurance, impaired functional mobility, impaired self care skills, impaired balance, gait instability, impaired cognition, decreased lower extremity function, decreased upper extremity function, decreased coordination, decreased safety awareness, pain, decreased ROM, edema, impaired cardiopulmonary response to activity, orthopedic precautions .    Rehab Prognosis: Fair; patient would benefit from acute skilled PT services to address these deficits and reach maximum level of function.    Recent Surgery: * No surgery found *      Plan:     During this hospitalization, patient to be seen 3 x/week to address the identified rehab impairments via therapeutic activities, therapeutic exercises, neuromuscular re-education, wheelchair management/training and progress toward the following goals:    Plan of Care Expires:  04/02/25    Subjective     Chief Complaint: dizziness   Patient/Family Comments/goals: pt is pleasantly confused and agreeable to therapy   Pain/Comfort:  Pain Rating 1: 0/10  Pain Rating Post-Intervention 1: 0/10      Objective:     Communicated with nurse prior to session.  Patient found HOB elevated with oxygen, telemetry, peripheral IV, bed alarm, PureWick upon PT entry to room.     General Precautions: Standard, fall, contact, diabetic  Orthopedic Precautions: RUE non weight bearing  Braces: UE Sling  Respiratory Status: Room air     Functional Mobility:  Bed Mobility:     Scooting: minimum assistance  Bridging: minimum assistance to scoot anteriorly  , maximal assistance, and of 2 persons to scoot in bed with bed in trendelenburg position   Supine to Sit: minimum assistance  Sit to Supine: moderate assistance x 2   Transfers:     Sit to Stand: x 3 trials from bed  minimum assistance with hand-held assist  ( pt able to ambulated 4-5 side steps to HOB with L HHA , MOD A  )   Balance:  fair+ in sitting, poor in dynamic standing       AM-PAC 6 CLICK MOBILITY  Turning over in bed (including adjusting bedclothes, sheets and blankets)?: 3  Sitting down on and standing up from a chair with arms (e.g., wheelchair, bedside commode, etc.): 3  Moving from lying on back to sitting on the side of the bed?: 3  Moving to and from a bed to a chair (including a wheelchair)?: 2  Need to walk in hospital room?: 2  Climbing 3-5 steps with a railing?: 1  Basic Mobility Total Score: 14       Treatment & Education:  Pt performed bed mobility and transfer as above.     Patient left with bed in chair position with RUE elevated , heels offloading  all lines intact, call button in reach, bed alarm on, nurse notified, and [employer] present..    GOALS:   Multidisciplinary Problems       Physical Therapy Goals          Problem: Physical Therapy    Goal Priority Disciplines Outcome Interventions   Physical Therapy Goal     PT, PT/OT Progressing    Description: Goals to be met by: 25     Patient will increase functional independence with mobility by performin. Supine to sit with MInimal Assistance  2. Rolling to Left and Right with Minimal Assistance.  3. Bed to chair transfer with Minimal Assistance    4. Wheelchair propulsion x25 feet with Stand-by Assistance using bilateral uppper extremities  5. Lower extremity exercise program x10 reps per handout, with assistance as needed                         DME Justifications:  Narda requires a hospital bed due to her requiring positioning of the body in ways not feasible with an ordinary bed to alleviate pain, due to being completely  immobile, and due to limited ability and cannot independently make changes in body position without the use of the bed.The positioning of the body cannot be sufficiently resolved by the use of pillows and wedges.  Narda Person has a mobility limitation that significantly impairs her ability to participate in one or more mobility related activities of daily living (MRADLs) such as toileting, feeding, dressing, grooming, and bathing in customary locations in the home.  The mobility limitation cannot be sufficiently resolved by the use of a cane or walker.   The use of a manual wheelchair will significantly improve the patients ability to participate in MRADLS and the patient will use it on regular basis in the home.  Narda Person has expressed her willingness to use a manual wheelchair in the home. Patients upper body strength is sufficient for propulsion.  She also has a caregiver who is available, willing, and able to provide assistance with the wheelchair when needed.      Time Tracking:     PT Received On: 03/24/25  PT Start Time: 1127     PT Stop Time: 1153  PT Total Time (min): 26 min     Billable Minutes: Therapeutic Activity 26  and Total Time 26 min with OT     Treatment Type: Treatment  PT/PTA: PTA     Number of PTA visits since last PT visit: 1 03/24/2025

## 2025-03-24 NOTE — DISCHARGE INSTRUCTIONS
Our goal at [facility] is to always give you outstanding care and exceptional service. You may receive a survey from [employer] by mail, text or e-mail in the next 24-48 hours asking about the care you received with us. The survey should only take 5-10 minutes to complete and is very important to us.     Your feedback provides us with a way to recognize our staff who work tirelessly to provide the best care! Also, your responses help us learn how to improve when your experience was below our aspiration of excellence. We are always looking for ways to improve your stay. We WILL use your feedback to continue making improvements to help us provide the highest quality care. We keep your personal information and feedback confidential. We appreciate your time completing this survey and can't wait to hear from you!!!    We look forward to your continued care with us! Thanks so much for choosing [facility] for your healthcare needs!

## 2025-03-24 NOTE — PLAN OF CARE
Case Management Final Discharge Note    Pt clear to dc after midline placement    Discharge Disposition: Home Health  I provided the patient a choice of post acute providers and offered a list of CMS rated home healths. Patient/family chose the following as their preferred providers:  [facility]    CM sent referral to [facility]. [facility] accepted.    CM [facility]. Ashley accepted & will complete virtual teaching with pt's daughter as pt's daughter won't arrive to the hospital until this evening.      New DME ordered / company name: [employer] - pt received one in 2021 so doesn't qualify for a new wheelchair. Pt's daughter Barbi was informed and I recommended she look online for one they want within their price range. She verbalized understanding and agreement.    Relevant SDOH / Transition of Care Barriers:  None identified    Person available to provide assistance at home when needed and their contact information: Barbi Brand [phone]    Scheduled followup appointment: PCP 4/1    Referrals placed: family practice    Transportation: private vehicle    Patient and family educated on discharge services and updated on DC plan. Bedside RN notified, patient clear to discharge from Case Management Perspective.      03/24/25 1425   Final Note   Assessment Type Final Discharge Note   Anticipated Discharge Disposition Home-Health   Hospital Resources/Appts/Education Provided Appointments scheduled and added to AVS;Provided patient/caregiver with written discharge plan information   Post-Acute Status   Coverage [employer]   Discharge Delays None known at this time        03/24/25 1215   Medicare Message   Important Message from Medicare regarding Discharge Appeal Rights Given to patient/caregiver;Explained to patient/caregiver   Date IMM was signed 03/24/25   Time IMM was signed 1215

## 2025-03-25 NOTE — CONSULTS
1932: Consult was conducted with the patient and her daughter. Patient was sitting up in bed conversing with her daughter. Patient was advised of the days that our  would be available. (Tuesdays and Fridays) The patient's daughter advised me that the patient has been discharged.    Prayers were offered for the patient and her family.        Sarah Escobarin  [phone]

## 2025-03-26 ENCOUNTER — PATIENT OUTREACH (OUTPATIENT)
Dept: ADMINISTRATIVE | Facility: CLINIC | Age: 80
End: 2025-03-26
Payer: MEDICARE

## 2025-03-26 LAB
POCT GLUCOSE: 143 MG/DL (ref 70–110)
POCT GLUCOSE: 148 MG/DL (ref 70–110)

## 2025-03-26 NOTE — PROGRESS NOTES
C3 nurse spoke with Narda Person's daughter, Barbi for a TCC post hospital discharge follow up call. The patient has a scheduled [facility] appointment with Sirena Coleman MD on 04/01/25 @ 1300.

## 2025-04-01 ENCOUNTER — OFFICE VISIT (OUTPATIENT)
Dept: INTERNAL MEDICINE | Facility: CLINIC | Age: 80
End: 2025-04-01
Payer: MEDICARE

## 2025-04-01 VITALS
HEIGHT: 62 IN | HEART RATE: 67 BPM | RESPIRATION RATE: 17 BRPM | BODY MASS INDEX: 27.51 KG/M2 | DIASTOLIC BLOOD PRESSURE: 60 MMHG | SYSTOLIC BLOOD PRESSURE: 110 MMHG | OXYGEN SATURATION: 100 % | WEIGHT: 149.5 LBS

## 2025-04-01 DIAGNOSIS — Z16.12 UTI DUE TO EXTENDED-SPECTRUM BETA LACTAMASE (ESBL) PRODUCING ESCHERICHIA COLI: ICD-10-CM

## 2025-04-01 DIAGNOSIS — Z16.12 ESBL (EXTENDED SPECTRUM BETA-LACTAMASE) PRODUCING BACTERIA INFECTION: ICD-10-CM

## 2025-04-01 DIAGNOSIS — A49.9 ESBL (EXTENDED SPECTRUM BETA-LACTAMASE) PRODUCING BACTERIA INFECTION: ICD-10-CM

## 2025-04-01 DIAGNOSIS — B96.29 UTI DUE TO EXTENDED-SPECTRUM BETA LACTAMASE (ESBL) PRODUCING ESCHERICHIA COLI: ICD-10-CM

## 2025-04-01 DIAGNOSIS — N39.0 UTI DUE TO EXTENDED-SPECTRUM BETA LACTAMASE (ESBL) PRODUCING ESCHERICHIA COLI: ICD-10-CM

## 2025-04-01 DIAGNOSIS — Z09 HOSPITAL DISCHARGE FOLLOW-UP: Primary | ICD-10-CM

## 2025-04-01 DIAGNOSIS — B37.89 CANDIDA RASH OF GROIN: ICD-10-CM

## 2025-04-01 DIAGNOSIS — E11.69 TYPE 2 DIABETES MELLITUS WITH OTHER SPECIFIED COMPLICATION, WITH LONG-TERM CURRENT USE OF INSULIN: ICD-10-CM

## 2025-04-01 DIAGNOSIS — Z79.4 TYPE 2 DIABETES MELLITUS WITH OTHER SPECIFIED COMPLICATION, WITH LONG-TERM CURRENT USE OF INSULIN: ICD-10-CM

## 2025-04-01 PROCEDURE — 1101F PT FALLS ASSESS-DOCD LE1/YR: CPT | Mod: HCNC,CPTII,S$GLB, | Performed by: INTERNAL MEDICINE

## 2025-04-01 PROCEDURE — 99999 PR PBB SHADOW E&M-EST. PATIENT-LVL V: CPT | Mod: PBBFAC,HCNC,, | Performed by: INTERNAL MEDICINE

## 2025-04-01 PROCEDURE — 3288F FALL RISK ASSESSMENT DOCD: CPT | Mod: HCNC,CPTII,S$GLB, | Performed by: INTERNAL MEDICINE

## 2025-04-01 PROCEDURE — 1157F ADVNC CARE PLAN IN RCRD: CPT | Mod: HCNC,CPTII,S$GLB, | Performed by: INTERNAL MEDICINE

## 2025-04-01 PROCEDURE — 1126F AMNT PAIN NOTED NONE PRSNT: CPT | Mod: HCNC,CPTII,S$GLB, | Performed by: INTERNAL MEDICINE

## 2025-04-01 PROCEDURE — 1111F DSCHRG MED/CURRENT MED MERGE: CPT | Mod: HCNC,CPTII,S$GLB, | Performed by: INTERNAL MEDICINE

## 2025-04-01 PROCEDURE — 1159F MED LIST DOCD IN RCRD: CPT | Mod: HCNC,CPTII,S$GLB, | Performed by: INTERNAL MEDICINE

## 2025-04-01 PROCEDURE — 3078F DIAST BP <80 MM HG: CPT | Mod: HCNC,CPTII,S$GLB, | Performed by: INTERNAL MEDICINE

## 2025-04-01 PROCEDURE — 99214 OFFICE O/P EST MOD 30 MIN: CPT | Mod: HCNC,S$GLB,, | Performed by: INTERNAL MEDICINE

## 2025-04-01 PROCEDURE — 1160F RVW MEDS BY RX/DR IN RCRD: CPT | Mod: HCNC,CPTII,S$GLB, | Performed by: INTERNAL MEDICINE

## 2025-04-01 PROCEDURE — G2211 COMPLEX E/M VISIT ADD ON: HCPCS | Mod: HCNC,S$GLB,, | Performed by: INTERNAL MEDICINE

## 2025-04-01 PROCEDURE — 3074F SYST BP LT 130 MM HG: CPT | Mod: HCNC,CPTII,S$GLB, | Performed by: INTERNAL MEDICINE

## 2025-04-01 RX ORDER — KETOCONAZOLE 20 MG/G
CREAM TOPICAL DAILY
Qty: 60 G | Refills: 1 | Status: SHIPPED | OUTPATIENT
Start: 2025-04-01

## 2025-04-01 NOTE — PROGRESS NOTES
Subjective:   History of Present Illness    CHIEF COMPLAINT:  Narda presents today for hospital discharge follow-up    RECENT HOSPITALIZATION:  She presented to ER on 3/18 with hematuria and was transferred to [facility]. She received 4/5 planned days of IV antibiotics, discontinued early due to accidental IV line removal.    CURRENT SYMPTOMS:  She reports urinary incontinence requiring diaper use. She has developed a rash in the perineal area, which was first noted upon discharge from nursing facility.    MEDICATIONS:  She discontinued metformin and currently takes Januvia in the morning and Trisiba 60 units at night.    HOME CARE:  She continues to receive home health care services.       Review of Systems   Constitutional:  Negative for chills and fever.   HENT:  Negative for congestion, hearing loss, sinus pressure and sore throat.    Eyes:  Negative for photophobia.   Respiratory:  Negative for cough, choking, chest tightness and wheezing.    Cardiovascular:  Negative for chest pain and palpitations.   Gastrointestinal:  Negative for blood in stool, nausea and vomiting.   Genitourinary:  Positive for enuresis. Negative for dysuria and hematuria.   Musculoskeletal:  Negative for arthralgias and myalgias.   Skin:  Positive for rash. Negative for pallor.   Neurological:  Negative for dizziness and numbness.        Forgetful at times   Hematological:  Does not bruise/bleed easily.   Psychiatric/Behavioral:  Negative for confusion and suicidal ideas. The patient is not nervous/anxious.       Objective:   Physical Exam  Vitals and nursing note reviewed.   Constitutional:       Appearance: She is well-developed.   HENT:      Head: Normocephalic and atraumatic.      Right Ear: External ear normal.      Left Ear: External ear normal.   Eyes:      Conjunctiva/sclera: Conjunctivae normal.      Pupils: Pupils are equal, round, and reactive to light.   Neck:      Thyroid: No thyromegaly.      Vascular: No JVD.       Trachea: No tracheal deviation.   Cardiovascular:      Rate and Rhythm: Normal rate and regular rhythm.      Heart sounds: Normal heart sounds.   Pulmonary:      Effort: Pulmonary effort is normal. No respiratory distress.      Breath sounds: Normal breath sounds. No wheezing or rales.   Chest:      Chest wall: No tenderness.   Abdominal:      General: Bowel sounds are normal. There is no distension.      Palpations: Abdomen is soft. There is no mass.      Tenderness: There is no abdominal tenderness. There is no guarding or rebound.   Musculoskeletal:         General: Normal range of motion.      Cervical back: Normal range of motion and neck supple.      Comments: Sitting in wheelchair brought by her daughter   Lymphadenopathy:      Cervical: No cervical adenopathy.   Skin:     General: Skin is warm and dry.   Neurological:      Mental Status: She is alert and oriented to person, place, and time.      Cranial Nerves: No cranial nerve deficit.      Motor: No abnormal muscle tone.      Coordination: Coordination normal.      Deep Tendon Reflexes: Reflexes are normal and symmetric. Reflexes normal.      Comments: CN: Optic discs are flat with normal vasculature, PERRL, Extraoccular movements and visual fields are full. Normal facial sensation and strength, Hearing symmetric, Tongue and Palate are midline and strong. Shoulder Shrug symmetric and strong.        Assessment/Plan:     1. Hospital discharge follow-up     Meds reconciled  Problem list reviewed and updated  Discharge summary reviewed  Discharge labs and inpatient radiology reports reviewed     2. ESBL (extended spectrum beta-lactamase) producing bacteria infection  CANCELED: Urinalysis, Reflex to Urine Culture   She finished her home treatment except last day of treatment when she pulled the PICC line.  I told her to check a UA and culture to document complete treatment.  Daughter is not sure if she would be able to give us a good specimen.   3. UTI due to  extended-spectrum beta lactamase (ESBL) producing Escherichia coli  Urinalysis, Reflex to Urine Culture    Urinalysis, Reflex to Urine Culture    CANCELED: Urinalysis, Reflex to Urine Culture      4. Candida rash of groin  ketoconazole (NIZORAL) 2 % cream      5. 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 coli UTI.  - Assessed completion of antibiotic course, noting 4/5 days of IV antibiotics received.  - Considered 1 missed day of antibiotics likely not problematic given overall treatment duration.  - Fungal infection likely cause of rash due to moisture from incontinence.  - Assessed diabetes management, noting recent medication changes including discontinuation of metformin.    URINARY TRACT INFECTION:  - Monitored the patient's condition, noting previous hospitalization for urinary tract infection with E. coli bacteria resistant to many antibiotics.  - Evaluated the patient's completion of 4/5 prescribed days of IV antibiotics due to line removal.  - Ordered urinalysis to check for complete clearance of UTI.  - Instructed the patient to provide a urine sample for testing before the next visit if possible.  - Assessed the need for additional antibiotics based on urinalysis results.  - Narda to increase fluid intake for proper hydration.    FUNGAL INFECTION  (CANDIDIASIS):  - Evaluated the patient's rash in the groin area, extending from front to back, likely due to moisture and wetness.  - Diagnosed a probable fungal infection due to moisture in the groin area.  - Prescribed antifungal cream for treatment of the rash.  - Educated the patient on the importance of moisture control in managing yeast infections in the groin area.  - Acknowledged the challenges of managing moisture-related fungal infections in this area.    URINARY INCONTINENCE:  - Noted that the patient has no control over urination and wears diapers.  - Observed that the patient urinates uncontrollably when the diaper is removed for cleaning.  - Discussed the challenges of managing incontinence-related skin issues with the caregiver.  - Acknowledged the difficulty in keeping the patient dry.    TYPE 2 DIABETES MANAGEMENT:  - Monitored the patient's blood sugar, noting good morning levels.  - Continued the current medication regimen of Januvia in the morning and 60 units of Trisiba at night.  - Discontinued Metformin.  - Continued long-term insulin therapy with 60 units of Trisiba at night.  - Continued long-term oral hypoglycemic therapy with Januvia in the morning.    NEED FOR ASSISTANCE WITH PERSONAL CARE:  - Noted that the patient requires assistance with diaper changes and personal hygiene.  - Continued home health care services for the patient.    FOLLOW-UP:  - Scheduled a follow-up visit in 3 months for reassessment and labs.  - Follow up in 3 months for labs.          This note was generated with the assistance of ambient listening technology. Verbal consent was obtained by the patient and accompanying visitor(s) for the recording of patient appointment to facilitate this note. I attest to having reviewed and edited the generated note for accuracy, though some syntax or spelling errors may persist. Please contact the author of this note for any clarification.

## 2025-04-05 NOTE — TELEPHONE ENCOUNTER
----- Message from Barbi Mirza sent at 2020  2:01 PM CDT -----  Contact: Barbi (daughter)  Narda Person  MRN: [id number]  : 1945  PCP: Sirena Coleman  Home Phone      [phone]  Work Phone      Not on file.  Mobile          [phone]    MESSAGE:   She is returning a nurse call / today.     Phone # [phone]    Pharmacy -[facility] [address] [place] [address]   Home

## 2025-04-08 NOTE — ASSESSMENT & PLAN NOTE
"Anemia is likely due to unknown.  Suspect she was hemoconcentrated and dehydrated upon admission.  Hemoglobin has dropped daily.  She did have prior signs of blood loss.. Most recent hemoglobin and hematocrit are listed below.  No results for input(s): "HGB", "HCT" in the last 72 hours.    Plan  - Monitor serial CBC: Daily  - Transfuse PRBC if patient becomes hemodynamically unstable, symptomatic or H/H drops below 7/21.  - Patient has not received any PRBC transfusions to date  - Patient's anemia is currently worsening. Will continue current treatment  - iron replete.  B12 and folate normal.  - monitor for further signs of blood loss  "

## 2025-04-08 NOTE — ASSESSMENT & PLAN NOTE
"Patient's FSGs are uncontrolled due to hyperglycemia on current medication regimen.  Last A1c reviewed-   Lab Results   Component Value Date    HGBA1C 9.3 (H) 03/18/2025     Most recent fingerstick glucose reviewed-   No results for input(s): "POCTGLUCOSE" in the last 24 hours.    Current correctional scale  Medium  Maintain anti-hyperglycemic dose as follows-   Antihyperglycemics (From admission, onward)      None          Hold Oral hypoglycemics while patient is in the hospital.  "

## 2025-04-08 NOTE — ASSESSMENT & PLAN NOTE
"Patient's most recent potassium results are listed below.   No results for input(s): "K" in the last 72 hours.    Plan  - Replete potassium per protocol  - Monitor potassium Daily  - Patient's hypokalemia is improving  " Medication: citalopram 20 mg & Ambien 10 mg    Provider: Camilo Estevez MD  Preferred Contact Number:     Mobile [phone]         Pharmacy:  [facility]    Patient instructed to call pharmacy directly for future refills.      Advised patient that the nurse will call if there are questions or concerns, otherwise refill processing may take 48-72 hours.

## 2025-04-08 NOTE — DISCHARGE SUMMARY
[facility] Medicine  Discharge Summary      Patient Name: Narda Person  MRN: [id number]  DANIELE: [id number]  Patient Class: IP- Inpatient  Admission Date: 3/18/2025  Hospital Length of Stay: 6 days  Discharge Date and Time: 3/24/2025  8:01 PM  Attending Physician: Allison att. providers found   Discharging Provider: Avi Cullen MD  Primary Care Provider: Sirena Coleman MD    Primary Care Team: Networked reference to record PCT     HPI:   This is an 79-year-old female with a past medical history of dementia, hypertension, type 2 diabetes, hyperlipidemia, anxiety, depression, who presents with vomiting.     Patient presented to [place] ED for evaluation of vomiting and diarrhea that started 2 days prior to presentation.  Patient was prescribed nitrofurantoin for UTI on 03/13, after which she developed nausea, vomiting and diarrhea.  Additional symptoms include blood in stool.  Patient is unable to contribute to the history, states that she does not remember what happened.  I was unable to reach both of her daughters on numbers listed in the chart.     In the ED, the patient was hypertensive (170s-190s/70s-80s). Labs were remarkable for leukocytosi (14.8), hyperglycemia (478 > 354) and positive stool occult blood. UA showed trace, >100 RBC, +2 protein, +3 glucose, +2 ketones, +3 blood.  CT abdomen and pelvis showed findings consistent with colitis.  Patient was given 2L of NS, ciprofloxacin, flagyl 500 mg IV, regular insulin 5 units IV, Phenergan 6.25 mg IV. She was admitted to [facility] for further management.     * No surgery found *      Hospital Course:   Ms Narda Person is a 79 y.o. woman with dementia, DM admitted with vomiting and diarrhea. Recently took nitrofurantoin for UTI. In ED, noted findings of colitis on CT. She was admitted and started zosyn. She still has LLQ pain but no vomiting, no diarrhea, and tolerating PO intake.  Stool studies are negative for infection.  She had  hematuria, now improving.  Urine culture does have Gram-negative anastacia which were subsequently revealed to be ESBL E coli. She developed DIONNE on 3/20/25; started IVF with resolution of DIONNE.     She also has R arm sling in place- daughter states this is from shoulder out of place/broken after a fall about a month ago. Repeat XR 3/20 showed comminuted right proximal humerus fracture.  Orthopedics consulted. They recommend sling for comfort.  Must do physical therapy daily for range of motion of her elbow.  Okay for gentle pendulums of proximal humerus.  Patient should follow-up in clinic in 2 weeks.  Patient received PT OT while inpatient; did develop an episode of hypoglycemia which resolved before discharge.  Patient was discharged home with home health on IV Ertapenem.  General condition markedly improved before discharge.  Patient was instructed to come back to the hospital in the event of fever, chills, or difficulty urination.  Patient was asked to follow up with PCP upon discharge.  All questions were answered to their satisfaction and they verbalized understanding.       Goals of Care Treatment Preferences:  Code Status: DNR      SDOH Screening:  The patient was screened for utility difficulties, food insecurity, transport difficulties, housing insecurity, and interpersonal safety and there were no concerns identified this admission.     Consults:   Consults (From admission, onward)          Status Ordering Provider     Inpatient consult to Spiritual Care  Once        Provider:  (Not yet assigned)    Completed ARABELLA COONEY     Inpatient consult to Palliative Care  Once        Provider:  Arabella Cooney MD    Completed SULMA IGLESIAS     Inpatient consult to Gastroenterology  Once        Provider:  Shannon Chu NP    Completed SULMA IGLESIAS     Inpatient consult to Orthopedic Surgery  Once        Provider:  Graham Hair III, MD    Completed SULMA IGLESIAS            Assessment &  "Plan  Colitis  - Presents with vomiting/blood in stool after recent antibiotics  - CT abdomen and pelvis showed findings consistent with colitis.  - Continue Zosyn   - stool studies are negative at this point  - on 03/21 she does still have some left lower quadrant abdominal pain though it is improved from admission  - GI consulted.  No current plans for colonoscopy.    Diabetes mellitus, type 2  Patient's FSGs are uncontrolled due to hyperglycemia on current medication regimen.  Last A1c reviewed-   Lab Results   Component Value Date    HGBA1C 9.3 (H) 03/18/2025     Most recent fingerstick glucose reviewed-   No results for input(s): "POCTGLUCOSE" in the last 24 hours.    Current correctional scale  Medium  Maintain anti-hyperglycemic dose as follows-   Antihyperglycemics (From admission, onward)      None          Hold Oral hypoglycemics while patient is in the hospital.  Gastroesophageal reflux disease without esophagitis  - continue Protonix   - no prior EGD to review    Alzheimer's disease, unspecified (CODE)  - at baseline she is oriented to self and recognizes family.  Reviewed primary care notes which suggests that she is typically oriented and able to move around and eat but does have hallucinations  - Continue home Aricept  Anxiety and depression  - Continue Lexapro   Acute renal failure superimposed on stage 3a chronic kidney disease  Cr on admit: 1. Baseline Cr 1   - developed DIONNE on 03/20 with creatinine rise to 1.7  - Suspect due to colitis/UTI  - investigated with UA which now shows many bacteria as well as 3+ blood.  This is less blood than when she came into the hospital.  - treated with IV fluids- stopped IVF  - DIONNE resolved on 03/21  - now back at baseline CKD 3A  Hypokalemia  Patient's most recent potassium results are listed below.   No results for input(s): "K" in the last 72 hours.    Plan  - Replete potassium per protocol  - Monitor potassium Daily  - Patient's hypokalemia is " "improving  Normocytic anemia  Anemia is likely due to  unknown.  Suspect she was hemoconcentrated and dehydrated upon admission.  Hemoglobin has dropped daily.  She did have prior signs of blood loss. . Most recent hemoglobin and hematocrit are listed below.  No results for input(s): "HGB", "HCT" in the last 72 hours.    Plan  - Monitor serial CBC: Daily  - Transfuse PRBC if patient becomes hemodynamically unstable, symptomatic or H/H drops below 7/21.  - Patient has not received any PRBC transfusions to date  - Patient's anemia is currently worsening. Will continue current treatment  - iron replete.  B12 and folate normal.  - monitor for further signs of blood loss  UTI due to extended-spectrum beta lactamase (ESBL) producing Escherichia coli  - on admission UA had hematuria but no growth on culture  - repeated UA on 03/20 for worsening DIONNE.  Now has many bacteria but with improving hematuria  - urine culture with Gram-negative anastacia  - started on ertapenem on 03/22; Zosyn DC'd    Fracture of right humerus  - presented with right arm in sling.  Daughter says that she fell about a month ago and was diagnosed with a broken shoulder in multiple places  - XR 3/20 shows comminuted right proxy humerus fracture  - nonweightbearing right upper extremity until seen by orthopedic  - orthopedics consulted; awaiting recommendation  - PT OT consult  - she does not have any pain in the shoulder    Falls      ACP (advance care planning)      Hypoglycemia  Noted episodes of hypoglycemia on 3/23  Insulin dose adjusted  Continue monitor      Final Active Diagnoses:    Diagnosis Date Noted POA    PRINCIPAL PROBLEM:  Colitis [K52.9] 03/18/2025 Yes    Hypoglycemia [E16.2] 03/23/2025 Yes    UTI due to extended-spectrum beta lactamase (ESBL) producing Escherichia coli [N39.0, B96.29, Z16.12] 03/21/2025 Yes    Fracture of right humerus [S42.301A] 03/21/2025 Yes    ACP (advance care planning) [Z71.89] 12/13/2024 Not Applicable    Acute renal " "failure superimposed on stage 3a chronic kidney disease [N17.9, N18.31] 09/07/2022 Yes    Normocytic anemia [D64.9] 06/28/2020 Yes    Anxiety and depression [F41.9, F32.A] 10/10/2018 Yes    Alzheimer's disease, unspecified (CODE) [G30.9] 04/12/2018 Yes    Hypokalemia [E87.6] 01/27/2017 Yes    Falls [R29.6] 01/03/2017 Not Applicable    Diabetes mellitus, type 2 [E11.9] 10/01/2012 Yes    Gastroesophageal reflux disease without esophagitis [K21.9] 10/01/2012 Yes      Problems Resolved During this Admission:    Diagnosis Date Noted Date Resolved POA    Hypomagnesemia [E83.42] 03/19/2025 03/20/2025 Yes       Discharged Condition: stable    Disposition: Home or Self Care    Follow Up:    Patient Instructions:      WHEELCHAIR FOR HOME USE     Order Specific Question Answer Comments   Hours in W/C per day: 18    Type of Wheelchair: Standard    Size(Width): 18"(STD adult)    Leg Support: STD footrests    Lap Belt: Velcro    Accessories: Anti-tippers    Cushion: Basic    Reclining Back No    Height: 5' 2" (1.575 m)    Weight: 76.3 kg (168 lb 3.4 oz)    Does patient have medical equipment at home? cane, straight    Does patient have medical equipment at home? walker, rolling    Length of need (1-99 months): 99    Please check all that apply: Caregiver is capable and willing to operate wheelchair safely.    Please check all that apply: Patient mobility limitations cannot be sufficiently resolved by the use of other ambulatory therapies.    Please check all that apply: The patient requires the use of a w/c for activities of daily living within the Home.      Ambulatory referral/consult to Family Practice   Standing Status: Future   Referral Priority: Routine Referral Type: Consultation   Referral Reason: Specialty Services Required   Requested Specialty: Family Medicine   Number of Visits Requested: 1     Diet diabetic     Diet Cardiac     Notify your health care provider if you experience any of the following:  temperature " >100.4     Notify your health care provider if you experience any of the following:  persistent nausea and vomiting or diarrhea     Notify your health care provider if you experience any of the following:  difficulty breathing or increased cough     Activity as tolerated       Significant Diagnostic Studies: N/A    Pending Diagnostic Studies:       None           Medications:  Reconciled Home Medications:      Medication List        CONTINUE taking these medications      ACCU-CHEK COMPACT PLUS TEST Strp  Generic drug: blood sugar diagnostic, drum  USE TO CHECK BLOOD GLUCOSE FOUR TIMES DAILY     ALPRAZolam 0.25 MG tablet  Commonly known as: XANAX  Take 1 tablet (0.25 mg total) by mouth nightly as needed for Anxiety.     atorvastatin 20 MG tablet  Commonly known as: LIPITOR  Take 1 tablet (20 mg total) by mouth once daily.     blood sugar diagnostic Strp  Commonly known as: ACCU-CHEK GUIDE TEST STRIPS  1 strip by Misc.(Non-Drug; Combo Route) route 4 (four) times daily.     blood-glucose meter Misc  Commonly known as: ACCU-CHEK GUIDE GLUCOSE METER  1 each by Misc.(Non-Drug; Combo Route) route 4 (four) times daily.     diclofenac sodium 1 % Gel  Commonly known as: VOLTAREN  Apply 2 g topically 2 (two) times daily.     donepeziL 10 MG tablet  Commonly known as: ARICEPT  Take 1 tablet (10 mg total) by mouth every evening.     EScitalopram oxalate 10 MG tablet  Commonly known as: LEXAPRO  Take 1 tablet by mouth once daily     insulin syringe-needle U-100 0.3 mL 30 Syrg  Use with Levemir vial BID     JANUVIA 100 mg Tab  Generic drug: SITagliptin phosphate  Take 1 tablet by mouth once daily     lancets Misc  Commonly known as: ACCU-CHEK SOFTCLIX LANCETS  1 each by Misc.(Non-Drug; Combo Route) route 4 (four) times daily.     meclizine 25 mg tablet  Commonly known as: ANTIVERT  TAKE 1 TABLET BY MOUTH THREE TIMES DAILY AS NEEDED FOR DIZZINESS AND FOR NAUSEA     melatonin 5 mg  Commonly known as: MELATIN  Take 10 mg by mouth  "nightly as needed.     omeprazole 20 MG capsule  Commonly known as: PRILOSEC  Take 1 capsule (20 mg total) by mouth 2 (two) times daily.     pen needle, diabetic 31 gauge x 3/16" Ndle  Commonly known as: LITE TOUCH INSULIN PEN NEEDLES  1 application by Misc.(Non-Drug; Combo Route) route 4 (four) times daily.     pen needle, diabetic 32 gauge x 5/32" Ndle  1 Device by Misc.(Non-Drug; Combo Route) route as directed. Needles to be used with Novolog pens and Levemir pens daily.     ramipriL 5 MG capsule  Commonly known as: ALTACE  Take 1 capsule by mouth once daily     TRESIBA FLEXTOUCH U-200 200 unit/mL (3 mL) insulin pen  Generic drug: insulin degludec  Inject 60 Units into the skin once daily.            STOP taking these medications      ferrous sulfate 325 (65 FE) MG EC tablet     gabapentin 300 MG capsule  Commonly known as: NEURONTIN     ibuprofen 400 MG tablet  Commonly known as: ADVIL,MOTRIN     metFORMIN 1000 MG tablet  Commonly known as: GLUCOPHAGE     nitrofurantoin 50 MG capsule  Commonly known as: MACRODANTIN            ASK your doctor about these medications      insulin aspart U-100 100 unit/mL (3 mL) Inpn pen  Commonly known as: NovoLOG Flexpen U-100 Insulin  Inject 30 Units into the skin 3 (three) times daily with meals.     sodium chloride 0.9% injection  Commonly known as: NORMAL SALINE FLUSH  Inject 5 mLs into the vein every 8 (eight) hours as needed.  Ask about: Should I take this medication?              Indwelling Lines/Drains at time of discharge:   Lines/Drains/Airways       None                   Time spent on the discharge of patient: more than 35 minutes         Aiv Cullen MD  Department of Hospital Medicine  [facility] [place] [facility]  "

## 2025-04-09 ENCOUNTER — DOCUMENT SCAN (OUTPATIENT)
Dept: HOME HEALTH SERVICES | Facility: HOSPITAL | Age: 80
End: 2025-04-09
Payer: MEDICARE

## 2025-04-09 ENCOUNTER — TELEPHONE (OUTPATIENT)
Dept: INTERNAL MEDICINE | Facility: CLINIC | Age: 80
End: 2025-04-09
Payer: MEDICARE

## 2025-04-09 NOTE — TELEPHONE ENCOUNTER
----- Message from Marii sent at 4/9/2025  1:43 PM CDT -----  Contact: Brii BurgessN: [id number]: 1945PCP: Sirena ColemanHome Phone      [phone] Phone      Not on file.Mobile          [phone]: Coffee left a message on the phone asking for maine to return her call in regards to the [phone]

## 2025-04-09 NOTE — TELEPHONE ENCOUNTER
Brii calling to report that the patient has yet to collect a voided urine specimen for U/A ordered on 4/1/25. The patient is asymptomatic and the U/A was ordered to follow up on a previous UTI. The patient's daughter will continue to attempt a collection, but the patient is incontinent so voided urine is difficult to collect. She will keep us posted

## 2025-04-16 ENCOUNTER — DOCUMENT SCAN (OUTPATIENT)
Dept: HOME HEALTH SERVICES | Facility: HOSPITAL | Age: 80
End: 2025-04-16
Payer: MEDICARE

## 2025-04-16 ENCOUNTER — LAB REQUISITION (OUTPATIENT)
Dept: LAB | Facility: HOSPITAL | Age: 80
End: 2025-04-16
Payer: MEDICARE

## 2025-04-16 DIAGNOSIS — R30.0 DYSURIA: ICD-10-CM

## 2025-04-16 LAB
BILIRUB UR QL STRIP.AUTO: NEGATIVE
CLARITY UR: CLEAR
COLOR UR AUTO: YELLOW
GLUCOSE UR QL STRIP: NEGATIVE
HGB UR QL STRIP: NEGATIVE
KETONES UR QL STRIP: NEGATIVE
LEUKOCYTE ESTERASE UR QL STRIP: NEGATIVE
NITRITE UR QL STRIP: NEGATIVE
PH UR STRIP: 6 [PH]
PROT UR QL STRIP: NEGATIVE
SP GR UR STRIP: 1.01
UROBILINOGEN UR STRIP-ACNC: NEGATIVE EU/DL

## 2025-04-16 PROCEDURE — 87086 URINE CULTURE/COLONY COUNT: CPT | Performed by: INTERNAL MEDICINE

## 2025-04-16 PROCEDURE — 81003 URINALYSIS AUTO W/O SCOPE: CPT | Performed by: INTERNAL MEDICINE

## 2025-04-18 LAB — BACTERIA UR CULT: NO GROWTH

## 2025-04-24 ENCOUNTER — DOCUMENT SCAN (OUTPATIENT)
Dept: HOME HEALTH SERVICES | Facility: HOSPITAL | Age: 80
End: 2025-04-24
Payer: MEDICARE

## 2025-05-05 RX ORDER — INSULIN DEGLUDEC 200 U/ML
60 INJECTION, SOLUTION SUBCUTANEOUS DAILY
Qty: 3 PEN | Refills: 3 | Status: SHIPPED | OUTPATIENT
Start: 2025-05-05 | End: 2025-05-06

## 2025-05-05 NOTE — TELEPHONE ENCOUNTER
----- Message from Marii sent at 5/5/2025  7:48 AM CDT -----  Contact: Brii PatelRN: [id number]: 1945PCP: Sirena ColemanHome Phone      [phone] Phone      Not on file.Mobile          [phone]: Brii left a message on the machine stating the pt needed a refill on the following prescriptionTRESIBA FLEXTOUCH U-200 200 unit/mL (3 mL) insulin [facility] [address] [place] [address] [address] [address]Wfyln: [phone] Fax: [phone]: Not open 24 hoursWay[facility] [phone]

## 2025-05-05 NOTE — TELEPHONE ENCOUNTER
Care Due:                  Date            Visit Type   Department     Provider  --------------------------------------------------------------------------------                                [facility] INTERNAL  Last Visit: 04-      FOLLOW UP    MEDICINE AMY Coleman                               -                              PRIMARY      [facility] INTERNAL  Next Visit: 07-      CARE ([facility])   MEDICINE II    Sirena Coleman                                                            Last  Test          Frequency    Reason                     Performed    Due Date  --------------------------------------------------------------------------------    Lipid Panel.  12 months..  atorvastatin.............  07- 07-    [facility] Embedded Care Due Messages. Reference number: [id number].   5/05/2025 8:37:08 AM CDT

## 2025-05-05 NOTE — TELEPHONE ENCOUNTER
No care due was identified.  [facility] Embedded Care Due Messages. Reference number: [id number].   5/05/2025 12:39:42 PM CDT

## 2025-05-06 RX ORDER — INSULIN DEGLUDEC 200 U/ML
60 INJECTION, SOLUTION SUBCUTANEOUS DAILY
Qty: 27 ML | Refills: 0 | Status: SHIPPED | OUTPATIENT
Start: 2025-05-06 | End: 2025-08-04

## 2025-05-06 NOTE — TELEPHONE ENCOUNTER
Refill Routing Note   Medication(s) are not appropriate for processing by [facility] for the following reason(s):        Clarification of medication (Rx) details    [facility] action(s):  Defer               Appointments  past 12m or future 3m with PCP    Date Provider   Last Visit   4/1/2025 Sirena Coleman MD   Next Visit   7/1/2025 Sirena Coleman MD   ED visits in past 90 days: 1        Note composed:8:39 PM 05/05/2025

## 2025-05-23 ENCOUNTER — TELEPHONE (OUTPATIENT)
Dept: INTERNAL MEDICINE | Facility: CLINIC | Age: 80
End: 2025-05-23
Payer: MEDICARE

## 2025-05-23 NOTE — TELEPHONE ENCOUNTER
Alanna Zarco Mohammad Q., MD; PATRICIA CHAVEZ Staff  PRACTITIONER NAME:DR DINERO    PATIENT RECEIVING  HOME HEALTH SERVICES FOR: HOME HEALTH    ASSESSMENT/CURRENT REPORTED PROBLEM/FINDINGS: ITCHING  FYI PATIENT DISCHARGED FROM HOME HEALTH    REQUESTED INTERVENTIONS/ORDERS TO ADDRESS PROBLEM:  PATIENT WITH COMPLAINTS OF ITCHING TO HER BACK FREQUENTLY. NO RASH OR BITES OBSERVED. PLEASE ADVISE ON OTC ANTI ITCH MEDICATION FOR PATIENT TO TAKE AS NEEDED.

## 2025-05-26 ENCOUNTER — EXTERNAL HOME HEALTH (OUTPATIENT)
Dept: HOME HEALTH SERVICES | Facility: HOSPITAL | Age: 80
End: 2025-05-26
Payer: MEDICARE

## 2025-05-28 ENCOUNTER — DOCUMENT SCAN (OUTPATIENT)
Dept: HOME HEALTH SERVICES | Facility: HOSPITAL | Age: 80
End: 2025-05-28
Payer: MEDICARE

## 2025-05-28 DIAGNOSIS — R42 VERTIGO: ICD-10-CM

## 2025-05-28 DIAGNOSIS — E11.65 UNCONTROLLED TYPE 2 DIABETES MELLITUS WITH HYPERGLYCEMIA: ICD-10-CM

## 2025-05-28 DIAGNOSIS — F41.9 ANXIETY AND DEPRESSION: ICD-10-CM

## 2025-05-28 DIAGNOSIS — F32.A ANXIETY AND DEPRESSION: ICD-10-CM

## 2025-05-28 RX ORDER — MECLIZINE HYDROCHLORIDE 25 MG/1
TABLET ORAL
Qty: 90 TABLET | Refills: 0 | Status: SHIPPED | OUTPATIENT
Start: 2025-05-28

## 2025-05-28 RX ORDER — ESCITALOPRAM OXALATE 10 MG/1
10 TABLET ORAL DAILY
Qty: 90 TABLET | Refills: 1 | Status: SHIPPED | OUTPATIENT
Start: 2025-05-28

## 2025-05-28 NOTE — TELEPHONE ENCOUNTER
Requested Prescriptions     Pending Prescriptions Disp Refills    meclizine (ANTIVERT) 25 mg tablet 90 tablet 0     Sig: TAKE 1 TABLET BY MOUTH THREE TIMES DAILY AS NEEDED FOR DIZZINESS AND FOR NAUSEA    SITagliptin phosphate (JANUVIA) 100 MG Tab 90 tablet 1     Sig: Take 1 tablet (100 mg total) by mouth once daily.    EScitalopram oxalate (LEXAPRO) 10 MG tablet 90 tablet 1     Sig: Take 1 tablet (10 mg total) by mouth once daily.   ;

## 2025-05-28 NOTE — TELEPHONE ENCOUNTER
No care due was identified.  [facility] Embedded Care Due Messages. Reference number: [id number].   5/28/2025 1:21:27 PM CDT

## 2025-06-07 DIAGNOSIS — I10 ESSENTIAL HYPERTENSION: ICD-10-CM

## 2025-06-07 RX ORDER — RAMIPRIL 5 MG/1
5 CAPSULE ORAL
Qty: 90 CAPSULE | Refills: 3 | Status: SHIPPED | OUTPATIENT
Start: 2025-06-07

## 2025-06-07 NOTE — TELEPHONE ENCOUNTER
No care due was identified.  [facility] Embedded Care Due Messages. Reference number: [id number].   6/07/2025 7:10:09 AM CDT

## 2025-06-08 NOTE — TELEPHONE ENCOUNTER
Refill Decision Note   Narda Naya  is requesting a refill authorization.  Brief Assessment and Rationale for Refill:  Approve     Medication Therapy Plan:         Comments:     Note composed:8:12 PM 06/07/2025

## 2025-06-19 ENCOUNTER — DOCUMENT SCAN (OUTPATIENT)
Dept: HOME HEALTH SERVICES | Facility: HOSPITAL | Age: 80
End: 2025-06-19
Payer: MEDICARE

## 2025-07-28 ENCOUNTER — PATIENT OUTREACH (OUTPATIENT)
Dept: ADMINISTRATIVE | Facility: HOSPITAL | Age: 80
End: 2025-07-28
Payer: MEDICARE

## 2025-07-28 NOTE — PROGRESS NOTES
OPCM Report  Portal active: active  Chart reviewed, immunization record updated.  No new results noted on [facility] or [facility] web site.  Care Everywhere updated.   [facility] Hypertension Program Eligibility: N/A  [facility] Diabetes Program Eligibility: N/A  Patient care coordination note  Upcoming PCP visit updated.  Next PCP visit 8/6/2025  Attempted to contact patient to discuss OPCM Eligibility, Eye exam and DEXA, unable to contact.

## 2025-07-31 ENCOUNTER — TELEPHONE (OUTPATIENT)
Dept: INTERNAL MEDICINE | Facility: CLINIC | Age: 80
End: 2025-07-31
Payer: MEDICARE

## 2025-07-31 DIAGNOSIS — R53.81 DEBILITY: Primary | ICD-10-CM

## 2025-07-31 NOTE — TELEPHONE ENCOUNTER
----- Message from Yoly sent at 2025 11:15 AM CDT -----  Contact: Barbi  Narda Person  MRN: [id number]  : 1945  PCP: Sirena Coleman  Home Phone      [phone]  Work Phone      Not on file.  Mobile          [phone]      MESSAGE:     Pts daughter, Barbi, requests orders for home health and physical therapy for pt.  Barbi states that pt is pretty immobile and getting her to appts is very difficult. Please advise    Barbi [phone]

## 2025-07-31 NOTE — TELEPHONE ENCOUNTER
Pts daughter, Barbi, requests orders for home health and physical therapy for pt.  Barbi states that pt is pretty immobile and getting her to appts is very difficult. Please advise     Barbi [phone]

## 2025-08-02 DIAGNOSIS — R41.3 MEMORY LOSS: ICD-10-CM

## 2025-08-02 DIAGNOSIS — G31.83 NEUROCOGNITIVE DISORDER WITH LEWY BODIES (CODE): ICD-10-CM

## 2025-08-03 NOTE — TELEPHONE ENCOUNTER
Care Due:                  Date            Visit Type   Department     Provider  --------------------------------------------------------------------------------                                HOSPITAL     [facility] INTERNAL  Last Visit: 04-      FOLLOW UP    MEDICINE AMY Coleman                               -                              PRIMARY      [facility] INTERNAL  Next Visit: 08-      CARE ([facility])   MEDICINE II    Sirena Coleman                                                            Last  Test          Frequency    Reason                     Performed    Due Date  --------------------------------------------------------------------------------    HBA1C.......  6 months...  TRESIBA..................  03-   09-    Lipid Panel.  12 months..  atorvastatin.............  07- 07-    [employer] Embedded Care Due Messages. Reference number: [id number].   8/03/2025 6:54:50 PM CDT

## 2025-08-04 RX ORDER — DONEPEZIL HYDROCHLORIDE 10 MG/1
10 TABLET, FILM COATED ORAL NIGHTLY
Qty: 90 TABLET | Refills: 0 | Status: SHIPPED | OUTPATIENT
Start: 2025-08-04

## 2025-08-04 RX ORDER — INSULIN DEGLUDEC 200 U/ML
INJECTION, SOLUTION SUBCUTANEOUS
Qty: 27 ML | Refills: 0 | Status: SHIPPED | OUTPATIENT
Start: 2025-08-04

## 2025-08-04 NOTE — TELEPHONE ENCOUNTER
Patient has not been seen per Dr. Coleman since 4/1/25, and per home health guidelines, she will need an appt to qualify for home health admission. The appt can be F2F or virtual. Attempted to contact Barbi, but no answer. Mailbox is full and cannot accept messages @ this time.

## 2025-08-05 NOTE — TELEPHONE ENCOUNTER
Refill Decision Note   Narda Person  is requesting a refill authorization.  Brief Assessment and Rationale for Refill:  Approve     Medication Therapy Plan:         Pharmacist review requested: Yes   Extended chart review required: Yes   Comments:     Note composed:7:27 PM 08/04/2025

## 2025-08-05 NOTE — TELEPHONE ENCOUNTER
Refill Routing Note   Medication(s) are not appropriate for processing by [facility] for the following reason(s):        Drug-disease interaction  Allergy or intolerance    [facility] action(s):  Defer     Requires labs : Yes           Pharmacist review requested: Yes     Appointments  past 12m or future 3m with PCP    Date Provider   Last Visit   4/1/2025 Sirena Coleman MD   Next Visit   8/6/2025 Sirena Colemna MD   ED visits in past 90 days: 0        Note composed:7:16 PM 08/04/2025